# Patient Record
Sex: MALE | Race: WHITE | NOT HISPANIC OR LATINO | Employment: OTHER | URBAN - METROPOLITAN AREA
[De-identification: names, ages, dates, MRNs, and addresses within clinical notes are randomized per-mention and may not be internally consistent; named-entity substitution may affect disease eponyms.]

---

## 2017-07-19 LAB
LEFT EYE DIABETIC RETINOPATHY: NORMAL
RIGHT EYE DIABETIC RETINOPATHY: NORMAL

## 2019-04-12 ENCOUNTER — APPOINTMENT (EMERGENCY)
Dept: CT IMAGING | Facility: HOSPITAL | Age: 74
End: 2019-04-12
Payer: COMMERCIAL

## 2019-04-12 ENCOUNTER — HOSPITAL ENCOUNTER (EMERGENCY)
Facility: HOSPITAL | Age: 74
Discharge: HOME | End: 2019-04-12
Attending: EMERGENCY MEDICINE | Admitting: EMERGENCY MEDICINE
Payer: COMMERCIAL

## 2019-04-12 VITALS
HEART RATE: 79 BPM | SYSTOLIC BLOOD PRESSURE: 134 MMHG | DIASTOLIC BLOOD PRESSURE: 65 MMHG | HEIGHT: 72 IN | TEMPERATURE: 98.3 F | OXYGEN SATURATION: 96 % | RESPIRATION RATE: 18 BRPM | BODY MASS INDEX: 32.51 KG/M2 | WEIGHT: 240 LBS

## 2019-04-12 DIAGNOSIS — N39.0 UTI (URINARY TRACT INFECTION): Primary | ICD-10-CM

## 2019-04-12 DIAGNOSIS — N28.89 RENAL MASS OF UNKNOWN NATURE: ICD-10-CM

## 2019-04-12 LAB
ALBUMIN SERPL BCP-MCNC: 4 G/DL (ref 3.5–5.7)
ALP SERPL-CCNC: 61 U/L (ref 55–165)
ALT SERPL W P-5'-P-CCNC: 14 U/L (ref 7–52)
ANION GAP SERPL CALCULATED.3IONS-SCNC: 7 MMOL/L (ref 4–13)
APTT PPP: 32 SECONDS (ref 26–38)
AST SERPL W P-5'-P-CCNC: 12 U/L (ref 13–39)
ATRIAL RATE: 85 BPM
BACTERIA UR QL AUTO: ABNORMAL /HPF
BILIRUB DIRECT SERPL-MCNC: 0.1 MG/DL (ref 0–0.2)
BILIRUB SERPL-MCNC: 0.8 MG/DL (ref 0.2–1)
BILIRUB UR QL STRIP: NEGATIVE
BUN SERPL-MCNC: 28 MG/DL (ref 7–25)
CALCIUM SERPL-MCNC: 9.6 MG/DL (ref 8.6–10.5)
CHLORIDE SERPL-SCNC: 97 MMOL/L (ref 98–107)
CLARITY UR: ABNORMAL
CO2 SERPL-SCNC: 30 MMOL/L (ref 21–31)
COLOR UR: YELLOW
CREAT SERPL-MCNC: 1.48 MG/DL (ref 0.7–1.3)
EOSINOPHIL # BLD AUTO: 0.2 THOUSAND/UL (ref 0–0.61)
EOSINOPHIL NFR BLD MANUAL: 2 % (ref 0–6)
ERYTHROCYTE [DISTWIDTH] IN BLOOD BY AUTOMATED COUNT: 13.7 % (ref 11.6–15.1)
EXT FECAL OCCULT BLOOD SCREEN: NEGATIVE
GFR SERPL CREATININE-BSD FRML MDRD: 47 ML/MIN/1.73SQ M
GLUCOSE SERPL-MCNC: 197 MG/DL (ref 65–140)
GLUCOSE SERPL-MCNC: 330 MG/DL (ref 65–140)
GLUCOSE UR STRIP-MCNC: ABNORMAL MG/DL
HCT VFR BLD AUTO: 44.5 % (ref 42–52)
HGB BLD-MCNC: 15 G/DL (ref 12–17)
HGB UR QL STRIP.AUTO: ABNORMAL
INR PPP: 0.95 (ref 0.9–1.5)
KETONES UR STRIP-MCNC: ABNORMAL MG/DL
LEUKOCYTE ESTERASE UR QL STRIP: NEGATIVE
LIPASE SERPL-CCNC: 21 U/L (ref 11–82)
LYMPHOCYTES # BLD AUTO: 0.51 THOUSAND/UL (ref 0.6–4.47)
LYMPHOCYTES # BLD AUTO: 5 % (ref 20–51)
MCH RBC QN AUTO: 30.4 PG (ref 26.8–34.3)
MCHC RBC AUTO-ENTMCNC: 33.7 G/DL (ref 31.4–37.4)
MCV RBC AUTO: 90 FL (ref 82–98)
MONOCYTES # BLD AUTO: 0.71 THOUSAND/UL (ref 0–1.22)
MONOCYTES NFR BLD AUTO: 7 % (ref 4–12)
MUCOUS THREADS UR QL AUTO: ABNORMAL
NEUTS BAND NFR BLD MANUAL: 1 % (ref 0–8)
NEUTS SEG # BLD: 8.69 THOUSAND/UL (ref 1.81–6.82)
NEUTS SEG NFR BLD AUTO: 85 % (ref 42–75)
NITRITE UR QL STRIP: NEGATIVE
NON-SQ EPI CELLS URNS QL MICRO: ABNORMAL /HPF
P AXIS: 61 DEGREES
PH UR STRIP.AUTO: 5.5 [PH]
PLATELET # BLD AUTO: 148 THOUSANDS/UL (ref 149–390)
PLATELET BLD QL SMEAR: ADEQUATE
PMV BLD AUTO: 9.8 FL (ref 8.9–12.7)
POTASSIUM SERPL-SCNC: 4 MMOL/L (ref 3.5–5.5)
PR INTERVAL: 172 MS
PROT SERPL-MCNC: 6.9 G/DL (ref 6.4–8.9)
PROT UR STRIP-MCNC: NEGATIVE MG/DL
PROTHROMBIN TIME: 11.1 SECONDS (ref 10.2–13)
QRS AXIS: -25 DEGREES
QRSD INTERVAL: 98 MS
QT INTERVAL: 374 MS
QTC INTERVAL: 445 MS
RBC # BLD AUTO: 4.93 MILLION/UL (ref 3.88–5.62)
RBC #/AREA URNS AUTO: ABNORMAL /HPF
RBC MORPH BLD: NORMAL
SODIUM SERPL-SCNC: 134 MMOL/L (ref 134–143)
SP GR UR STRIP.AUTO: 1.02 (ref 1–1.03)
T WAVE AXIS: 22 DEGREES
TOTAL CELLS COUNTED SPEC: 100
UROBILINOGEN UR QL STRIP.AUTO: 0.2 E.U./DL
VENTRICULAR RATE: 85 BPM
WBC # BLD AUTO: 10.1 THOUSAND/UL (ref 4.31–10.16)
WBC #/AREA URNS AUTO: ABNORMAL /HPF

## 2019-04-12 PROCEDURE — 87086 URINE CULTURE/COLONY COUNT: CPT | Performed by: EMERGENCY MEDICINE

## 2019-04-12 PROCEDURE — 81001 URINALYSIS AUTO W/SCOPE: CPT | Performed by: EMERGENCY MEDICINE

## 2019-04-12 PROCEDURE — 93005 ELECTROCARDIOGRAM TRACING: CPT

## 2019-04-12 PROCEDURE — 96361 HYDRATE IV INFUSION ADD-ON: CPT

## 2019-04-12 PROCEDURE — 85610 PROTHROMBIN TIME: CPT | Performed by: EMERGENCY MEDICINE

## 2019-04-12 PROCEDURE — 87077 CULTURE AEROBIC IDENTIFY: CPT | Performed by: EMERGENCY MEDICINE

## 2019-04-12 PROCEDURE — 82270 OCCULT BLOOD FECES: CPT | Performed by: EMERGENCY MEDICINE

## 2019-04-12 PROCEDURE — 81003 URINALYSIS AUTO W/O SCOPE: CPT | Performed by: EMERGENCY MEDICINE

## 2019-04-12 PROCEDURE — 96374 THER/PROPH/DIAG INJ IV PUSH: CPT

## 2019-04-12 PROCEDURE — 36415 COLL VENOUS BLD VENIPUNCTURE: CPT | Performed by: EMERGENCY MEDICINE

## 2019-04-12 PROCEDURE — 96375 TX/PRO/DX INJ NEW DRUG ADDON: CPT

## 2019-04-12 PROCEDURE — 87186 SC STD MICRODIL/AGAR DIL: CPT | Performed by: EMERGENCY MEDICINE

## 2019-04-12 PROCEDURE — 83690 ASSAY OF LIPASE: CPT | Performed by: EMERGENCY MEDICINE

## 2019-04-12 PROCEDURE — 85007 BL SMEAR W/DIFF WBC COUNT: CPT | Performed by: EMERGENCY MEDICINE

## 2019-04-12 PROCEDURE — 85730 THROMBOPLASTIN TIME PARTIAL: CPT | Performed by: EMERGENCY MEDICINE

## 2019-04-12 PROCEDURE — 80048 BASIC METABOLIC PNL TOTAL CA: CPT | Performed by: EMERGENCY MEDICINE

## 2019-04-12 PROCEDURE — 93010 ELECTROCARDIOGRAM REPORT: CPT | Performed by: INTERNAL MEDICINE

## 2019-04-12 PROCEDURE — 99284 EMERGENCY DEPT VISIT MOD MDM: CPT

## 2019-04-12 PROCEDURE — 82948 REAGENT STRIP/BLOOD GLUCOSE: CPT

## 2019-04-12 PROCEDURE — 74176 CT ABD & PELVIS W/O CONTRAST: CPT

## 2019-04-12 PROCEDURE — 80076 HEPATIC FUNCTION PANEL: CPT | Performed by: EMERGENCY MEDICINE

## 2019-04-12 PROCEDURE — 85027 COMPLETE CBC AUTOMATED: CPT | Performed by: EMERGENCY MEDICINE

## 2019-04-12 RX ORDER — KETOROLAC TROMETHAMINE 30 MG/ML
15 INJECTION, SOLUTION INTRAMUSCULAR; INTRAVENOUS ONCE
Status: COMPLETED | OUTPATIENT
Start: 2019-04-12 | End: 2019-04-12

## 2019-04-12 RX ORDER — ONDANSETRON 2 MG/ML
4 INJECTION INTRAMUSCULAR; INTRAVENOUS ONCE
Status: COMPLETED | OUTPATIENT
Start: 2019-04-12 | End: 2019-04-12

## 2019-04-12 RX ORDER — NITROFURANTOIN 25; 75 MG/1; MG/1
100 CAPSULE ORAL 2 TIMES DAILY WITH MEALS
Status: DISCONTINUED | OUTPATIENT
Start: 2019-04-12 | End: 2019-04-12 | Stop reason: HOSPADM

## 2019-04-12 RX ORDER — LISINOPRIL AND HYDROCHLOROTHIAZIDE 20; 12.5 MG/1; MG/1
1 TABLET ORAL 2 TIMES DAILY
COMMUNITY
End: 2019-12-19 | Stop reason: SDUPTHER

## 2019-04-12 RX ORDER — SODIUM CHLORIDE 9 MG/ML
125 INJECTION, SOLUTION INTRAVENOUS CONTINUOUS
Status: DISCONTINUED | OUTPATIENT
Start: 2019-04-12 | End: 2019-04-12 | Stop reason: HOSPADM

## 2019-04-12 RX ORDER — NITROFURANTOIN 25; 75 MG/1; MG/1
100 CAPSULE ORAL 2 TIMES DAILY WITH MEALS
Status: DISCONTINUED | OUTPATIENT
Start: 2019-04-12 | End: 2019-04-12

## 2019-04-12 RX ORDER — NITROFURANTOIN 25; 75 MG/1; MG/1
100 CAPSULE ORAL 2 TIMES DAILY
Qty: 14 CAPSULE | Refills: 0 | Status: SHIPPED | OUTPATIENT
Start: 2019-04-12 | End: 2019-04-19

## 2019-04-12 RX ADMIN — SODIUM CHLORIDE 125 ML/HR: 0.9 INJECTION, SOLUTION INTRAVENOUS at 08:26

## 2019-04-12 RX ADMIN — NITROFURANTOIN (MONOHYDRATE/MACROCRYSTALS) 100 MG: 75; 25 CAPSULE ORAL at 12:02

## 2019-04-12 RX ADMIN — KETOROLAC TROMETHAMINE 15 MG: 30 INJECTION, SOLUTION INTRAMUSCULAR; INTRAVENOUS at 08:46

## 2019-04-12 RX ADMIN — IOHEXOL 50 ML: 240 INJECTION, SOLUTION INTRATHECAL; INTRAVASCULAR; INTRAVENOUS; ORAL at 10:30

## 2019-04-12 RX ADMIN — ONDANSETRON 4 MG: 2 INJECTION INTRAMUSCULAR; INTRAVENOUS at 08:26

## 2019-04-14 LAB — BACTERIA UR CULT: ABNORMAL

## 2019-04-14 RX ORDER — CIPROFLOXACIN 500 MG/1
500 TABLET, FILM COATED ORAL 2 TIMES DAILY
Qty: 10 TABLET | Refills: 0 | Status: SHIPPED | OUTPATIENT
Start: 2019-04-14 | End: 2019-04-19

## 2019-06-21 ENCOUNTER — APPOINTMENT (EMERGENCY)
Dept: CT IMAGING | Facility: HOSPITAL | Age: 74
End: 2019-06-21
Payer: COMMERCIAL

## 2019-06-21 ENCOUNTER — HOSPITAL ENCOUNTER (EMERGENCY)
Facility: HOSPITAL | Age: 74
Discharge: HOME | End: 2019-06-22
Attending: EMERGENCY MEDICINE
Payer: COMMERCIAL

## 2019-06-21 VITALS
DIASTOLIC BLOOD PRESSURE: 87 MMHG | HEART RATE: 93 BPM | BODY MASS INDEX: 31.69 KG/M2 | TEMPERATURE: 97.8 F | WEIGHT: 234 LBS | SYSTOLIC BLOOD PRESSURE: 138 MMHG | RESPIRATION RATE: 16 BRPM | OXYGEN SATURATION: 96 % | HEIGHT: 72 IN

## 2019-06-21 DIAGNOSIS — R53.1 WEAKNESS: Primary | ICD-10-CM

## 2019-06-21 LAB
ANION GAP SERPL CALCULATED.3IONS-SCNC: 10 MMOL/L (ref 4–13)
BASOPHILS # BLD AUTO: 0 THOUSANDS/ΜL (ref 0–0.1)
BASOPHILS NFR BLD AUTO: 0 % (ref 0–2)
BUN SERPL-MCNC: 28 MG/DL (ref 7–25)
CALCIUM SERPL-MCNC: 9.8 MG/DL (ref 8.6–10.5)
CHLORIDE SERPL-SCNC: 101 MMOL/L (ref 98–107)
CO2 SERPL-SCNC: 27 MMOL/L (ref 21–31)
CREAT SERPL-MCNC: 1.5 MG/DL (ref 0.7–1.3)
EOSINOPHIL # BLD AUTO: 0.2 THOUSAND/ΜL (ref 0–0.61)
EOSINOPHIL NFR BLD AUTO: 2 % (ref 0–5)
ERYTHROCYTE [DISTWIDTH] IN BLOOD BY AUTOMATED COUNT: 13.6 % (ref 11.5–14.5)
GFR SERPL CREATININE-BSD FRML MDRD: 46 ML/MIN/1.73SQ M
GLUCOSE SERPL-MCNC: 181 MG/DL (ref 65–99)
HCT VFR BLD AUTO: 43.4 % (ref 42–47)
HGB BLD-MCNC: 15 G/DL (ref 14–18)
LYMPHOCYTES # BLD AUTO: 0.7 THOUSANDS/ΜL (ref 0.6–4.47)
LYMPHOCYTES NFR BLD AUTO: 6 % (ref 21–51)
MCH RBC QN AUTO: 30.8 PG (ref 26–34)
MCHC RBC AUTO-ENTMCNC: 34.5 G/DL (ref 31–37)
MCV RBC AUTO: 89 FL (ref 81–99)
MONOCYTES # BLD AUTO: 1 THOUSAND/ΜL (ref 0.17–1.22)
MONOCYTES NFR BLD AUTO: 8 % (ref 2–12)
NEUTROPHILS # BLD AUTO: 11 THOUSANDS/ΜL (ref 1.4–6.5)
NEUTS SEG NFR BLD AUTO: 85 % (ref 42–75)
PLATELET # BLD AUTO: 169 THOUSANDS/UL (ref 149–390)
PMV BLD AUTO: 10.1 FL (ref 8.6–11.7)
POTASSIUM SERPL-SCNC: 4 MMOL/L (ref 3.5–5.5)
RBC # BLD AUTO: 4.86 MILLION/UL (ref 4.3–5.9)
SODIUM SERPL-SCNC: 138 MMOL/L (ref 134–143)
WBC # BLD AUTO: 13 THOUSAND/UL (ref 4.8–10.8)

## 2019-06-21 PROCEDURE — 36415 COLL VENOUS BLD VENIPUNCTURE: CPT | Performed by: EMERGENCY MEDICINE

## 2019-06-21 PROCEDURE — 93005 ELECTROCARDIOGRAM TRACING: CPT

## 2019-06-21 PROCEDURE — 70496 CT ANGIOGRAPHY HEAD: CPT

## 2019-06-21 PROCEDURE — 70498 CT ANGIOGRAPHY NECK: CPT

## 2019-06-21 PROCEDURE — 80048 BASIC METABOLIC PNL TOTAL CA: CPT | Performed by: EMERGENCY MEDICINE

## 2019-06-21 PROCEDURE — 85025 COMPLETE CBC W/AUTO DIFF WBC: CPT | Performed by: EMERGENCY MEDICINE

## 2019-06-21 PROCEDURE — 70490 CT SOFT TISSUE NECK W/O DYE: CPT

## 2019-06-21 PROCEDURE — 99285 EMERGENCY DEPT VISIT HI MDM: CPT

## 2019-06-22 LAB
ATRIAL RATE: 91 BPM
P AXIS: 63 DEGREES
PR INTERVAL: 156 MS
QRS AXIS: -28 DEGREES
QRSD INTERVAL: 94 MS
QT INTERVAL: 346 MS
QTC INTERVAL: 425 MS
T WAVE AXIS: 23 DEGREES
VENTRICULAR RATE: 91 BPM

## 2019-06-22 PROCEDURE — 93010 ELECTROCARDIOGRAM REPORT: CPT | Performed by: INTERNAL MEDICINE

## 2019-06-24 ENCOUNTER — TRANSCRIBE ORDERS (OUTPATIENT)
Dept: NEUROSURGERY | Facility: CLINIC | Age: 74
End: 2019-06-24

## 2019-06-24 DIAGNOSIS — I72.9 ANEURYSM (HCC): Primary | ICD-10-CM

## 2019-07-09 ENCOUNTER — TRANSCRIBE ORDERS (OUTPATIENT)
Dept: NEUROSURGERY | Facility: CLINIC | Age: 74
End: 2019-07-09

## 2019-07-09 ENCOUNTER — CONSULT (OUTPATIENT)
Dept: NEUROSURGERY | Facility: CLINIC | Age: 74
End: 2019-07-09
Payer: COMMERCIAL

## 2019-07-09 VITALS
HEART RATE: 66 BPM | TEMPERATURE: 97.5 F | BODY MASS INDEX: 33.05 KG/M2 | HEIGHT: 72 IN | RESPIRATION RATE: 16 BRPM | WEIGHT: 244 LBS | DIASTOLIC BLOOD PRESSURE: 86 MMHG | SYSTOLIC BLOOD PRESSURE: 118 MMHG

## 2019-07-09 DIAGNOSIS — I72.9 ANEURYSM (HCC): ICD-10-CM

## 2019-07-09 DIAGNOSIS — Z01.818 PRE-PROCEDURAL EXAMINATION: Primary | ICD-10-CM

## 2019-07-09 DIAGNOSIS — G45.9 TIA (TRANSIENT ISCHEMIC ATTACK): ICD-10-CM

## 2019-07-09 DIAGNOSIS — I67.1 CEREBRAL ANEURYSM, NONRUPTURED: Primary | ICD-10-CM

## 2019-07-09 PROCEDURE — 99203 OFFICE O/P NEW LOW 30 MIN: CPT | Performed by: NEUROLOGICAL SURGERY

## 2019-07-09 NOTE — PROGRESS NOTES
[facility] Office Note  Joel Pastrana is a 72 y o  male    Hand Dominance: Right    Type of Visit: Consult      Diagnoses and all orders for this visit:    Cerebral aneurysm, nonruptured  -     Ambulatory referral to Physical Therapy; Future  -     CTA head w wo contrast; Future    Aneurysm ([address] )  -     Ambulatory referral to Neurosurgery    TIA (transient ischemic attack)  -     Ambulatory referral to Physical Therapy; Future  -     CTA head w wo contrast; Future        DISCUSSION SUMMARY  This is a 72-year-old gentleman who suffered a a sudden onset of left dexter paresis while in the shower that has subsequently resolved  This likely represented a transient ischemic attack  He had a incidentally noted left PCOM aneurysm which is 5 mm in greatest dimension with a wide-based neck  I have recommended that he undergo follow-up CTA in 6 months time as intervention should not be instituted in the setting of a recent TIA  I also recommended that he follow-up with neurology to improve the details of the situation ensure up the diagnosis of TIA and to consider optimal treatment strategies for this including consideration of anticoagulation/antiplatelets  In the interval I have recommended aspirin 81 mg per day      CHIEF COMPLAINT  Patient presents for initial consult regarding unruptured intracranial aneurysm    NEUROSURGERY PROCEDURES  NONE    HISTORY OF PRESENT ILLNESS  Patient reports that he was taking a shower and his left side gave out and he fell while in the bathtub  Then, EMS came and took him to the [facility]  While in they performed 3 CTs and one of them showed the brain aneurysm  Patient was referred for neurosurgical consult  Patient brought to the ER via EMS following acute left-sided weakness of unclear etiology this involved his left hand and left arm  The symptoms completely resolved within 24 hr    While in the ER he did have a CTA which demonstrated an incidental left-sided aneurysm  Is because of this incidental aneurysm that he is seen in the office today  It does not appear that an MRI was ever done  He has never had anything like this happen to him before  The total duration of this weakness was approximately 5 min  REVIEW OF SYSTEMS  Review of Systems   Constitutional: Negative  HENT: Negative  Eyes: Positive for visual disturbance (patient advised to set up appointment with regular eye doctor to examine)  Respiratory: Negative  Cardiovascular: Negative  Gastrointestinal: Negative  Endocrine: Negative  Genitourinary: Negative  Musculoskeletal: Positive for arthralgias (pain in his hips), back pain and neck pain  Negative for gait problem (just slow and careful)  Skin: Negative  Allergic/Immunologic: Negative  Neurological: Positive for weakness (weakness of the left side that will last about 5 minutes)  Hematological: Negative  Psychiatric/Behavioral: Negative  All other systems reviewed and are negative  I reviewed the ROS      MEDICAL HISTORY  Active Ambulatory Problems     Diagnosis Date Noted    Cerebral aneurysm, nonruptured 07/09/2019    TIA (transient ischemic attack) 07/09/2019     Resolved Ambulatory Problems     Diagnosis Date Noted    No Resolved Ambulatory Problems     Past Medical History:   Diagnosis Date    Diabetes mellitus ([facility] [address] )     Hypertension        Past Surgical History:   Procedure Laterality Date    CYSTECTOMY      Per patient cyst removal from his back       Social History     Tobacco Use   Smoking Status Never Smoker   Smokeless Tobacco Never Used       Social History     Substance and Sexual Activity   Alcohol Use Not Currently       Social History     Substance and Sexual Activity   Drug Use Not Currently       Vitals:    07/09/19 0958   BP: 118/86   BP Location: Left arm   Patient Position: Sitting   Cuff Size: Adult   Pulse: 66   Resp: 16   Temp: 97 5 °F (36 4 °C) TempSrc: Tympanic   Weight: 111 kg (244 lb)   Height: 6' (1 829 m)         Current Outpatient Medications:     lisinopril-hydrochlorothiazide (PRINZIDE,ZESTORETIC) 20-12 5 MG per tablet, Take 1 tablet by mouth 2 (two) times a day , Disp: , Rfl:     metFORMIN (GLUCOPHAGE) 500 mg tablet, 1000 mg in the morning and 500 mg at night, Disp: , Rfl:      No Known Allergies     The following portions of the patient's history were updated by MA and reviewed by MD: allergies, current medications, past family history, past medical history, past social history, past surgical history and problem list       Physical Exam  Awake alert and oriented  Extraocular movements are intact  Pupils are equal round reactive to light and accommodate  Facial sensation is intact bilaterally  Facial expression is intact in grimace and at rest  His speech is clear and comprehensible  He has no drift  His power is 5/5 bilaterally  He is able to ambulate with a normal station and gait however he is unable to perform tandem gait without sidestepping  He does not extinguish to double simultaneous stimulation    RESULTS/DATA  A CTA of the brain is carefully reviewed  This demonstrates a left supraclinoid aneurysm in the region of the posterior communicating artery  This is a wide-based neck and the overall dimension is 5 mm x 3 mm has a relatively smooth is dome

## 2019-07-10 ENCOUNTER — CONSULT (OUTPATIENT)
Dept: NEUROLOGY | Facility: CLINIC | Age: 74
End: 2019-07-10
Payer: COMMERCIAL

## 2019-07-10 VITALS
SYSTOLIC BLOOD PRESSURE: 130 MMHG | HEART RATE: 70 BPM | DIASTOLIC BLOOD PRESSURE: 70 MMHG | HEIGHT: 72 IN | RESPIRATION RATE: 18 BRPM | WEIGHT: 246 LBS | BODY MASS INDEX: 33.32 KG/M2

## 2019-07-10 DIAGNOSIS — R53.1 LEFT-SIDED WEAKNESS: Primary | ICD-10-CM

## 2019-07-10 DIAGNOSIS — G45.9 TIA (TRANSIENT ISCHEMIC ATTACK): ICD-10-CM

## 2019-07-10 DIAGNOSIS — I67.1 CEREBRAL ANEURYSM, NONRUPTURED: ICD-10-CM

## 2019-07-10 PROCEDURE — 99204 OFFICE O/P NEW MOD 45 MIN: CPT | Performed by: PSYCHIATRY & NEUROLOGY

## 2019-07-10 RX ORDER — ASPIRIN 81 MG/1
81 TABLET, CHEWABLE ORAL DAILY
COMMUNITY
End: 2019-08-29 | Stop reason: CLARIF

## 2019-07-10 RX ORDER — ATORVASTATIN CALCIUM 20 MG/1
20 TABLET, FILM COATED ORAL DAILY
Qty: 30 TABLET | Refills: 3 | Status: SHIPPED | OUTPATIENT
Start: 2019-07-10 | End: 2019-10-11 | Stop reason: SDUPTHER

## 2019-07-10 NOTE — PROGRESS NOTES
Patient ID: Helen Nix is a 72 y o  male  Assessment/Plan: This is a 71 y/o  Male who is here as a new patient referred by Neurosurgery  He has had 3 episodes of left-sided arm and leg weakness that resolved  1st 1 resolved within 2 hours  2nd to resolved within 5-10 minutes  I am concerned about a vascular process such as a TIA like event  So I do want a rule out possible seizure-like activity as well  He does not have any altered mental status or confusion with these episodes making seizure less likely  He had CTA head & neck which did not show any evidence of stenosis/occlusion  I personally reviewed these images  No focal deficits on my examination  PLAN:  -c/w with combination of aspirin, will start atorvastatin for secondary stroke prevention  -obtain CTA head & neck and MRI brain  -will get echocardiogram as well  -get routine EEG  -Continue with BP goal < 130/80, BP today is at goal currently    -Continue to monitor DM and appropriate Euglycemic control  -Defer to PCP regarding DM and BP management  -recommend PCP check LDL levels, goal LDL <70  -does not smoke  -denies any history of snoring  -I advised patient to avoid using NSAIDs for headaches or other pain and instead just stick to tylenol    -I educated regarding mediterranean style diet and regular exercise regimen of brisk walking atleast 4-5 times a week  -I educated patient and family regarding medication compliance, stroke risk factor modifications  Unruptured aneurysm  -patient following neurosurgery    I would like to follow up in 6 weeks  I would be happy to see the patient sooner if any new questions/concerns arise  Patient/Guardian was advised to the call the office if they have any questions and concerns in the meantime       Patient/Guardian does understand that if they have any new stroke like symptoms such as facial droop on one side, weakness/paralysis on either side, speech trouble, numbness on one side, balance issues, any vision changes, extreme dizziness or any new headache, to call 9-1-1 immediately or to proceed to the nearest ER immediately  Problem List Items Addressed This Visit        Cardiovascular and Mediastinum    TIA (transient ischemic attack)    Relevant Medications    atorvastatin (LIPITOR) 20 mg tablet    Other Relevant Orders    MRI brain without contrast    Echo complete with contrast if indicated       Nervous and Auditory    Left-sided weakness - Primary    Relevant Medications    atorvastatin (LIPITOR) 20 mg tablet    Other Relevant Orders    MRI brain without contrast    Echo complete with contrast if indicated    Lipid panel    EEG Routine and awake             Subjective:    HPI    This is a 71 y/o Male who is here as a new patient for evaluation of stroke/left sided symptoms  He says that she has a PCOM cerebral aneurysm (non-ruptured) and saw neurosurgery and there was a 5mm in dimension with a wide based neck  Neurosurgery at this point does not want to do any surgical intervention  Recommend undergoing follow-up CTA in 6 months  He was recommended to see Neurology for possible TIA like symptoms  He is here today  He had an episode on June 22nd, and he went to take a shower and while he was in the shower, he did not have strength in left arm, and left leg, and he does not recall if his left face was involved, and he could not hold on and it took an hour and half to get out of the tub, and then she called 9-1-1  It lasted for an hour and half then he was able to get strength back in that left side, and then was able to walked out to the ambulance and then he had to be assisted  He was able to dress himself as well  He stated that he did have some slurred speech as well  He felt like he was getting better by the time ambulance arrived  He does have history of DM, hypertension  He quit smoking 30 years ago  He does not have hyperlipidemia   When he was at the hospital, he had CTA head and neck which I personally reviewed the images of which showed the 5mm left supraclinoid ICA aneurysm, and no other stenosis/occlusion  He was aspirin and discharged  He has had two more episodes of left leg and left arm weak, and lasted for a few minutes  He peguero snot feel confusion/or altered during these episodes  He did not go to the ER when he had these episodes  The following portions of the patient's history were reviewed and updated as appropriate:   He  has a past medical history of Diabetes mellitus ([address] ) and Hypertension  He   Patient Active Problem List    Diagnosis Date Noted    Left-sided weakness 07/10/2019    Cerebral aneurysm, nonruptured 07/09/2019    TIA (transient ischemic attack) 07/09/2019     He  has a past surgical history that includes Cystectomy  His family history includes Colon cancer in his mother; Dementia in his father; Prostate cancer in his mother  He  reports that he has never smoked  He has never used smokeless tobacco  He reports that he drank alcohol  He reports that he has current or past drug history  Current Outpatient Medications   Medication Sig Dispense Refill    aspirin 81 mg chewable tablet Chew 81 mg daily      lisinopril-hydrochlorothiazide (PRINZIDE,ZESTORETIC) 20-12 5 MG per tablet Take 1 tablet by mouth 2 (two) times a day       metFORMIN (GLUCOPHAGE) 500 mg tablet 1000 mg in the morning and 500 mg at night      atorvastatin (LIPITOR) 20 mg tablet Take 1 tablet (20 mg total) by mouth daily 30 tablet 3     No current facility-administered medications for this visit        Current Outpatient Medications on File Prior to Visit   Medication Sig    aspirin 81 mg chewable tablet Chew 81 mg daily    lisinopril-hydrochlorothiazide (PRINZIDE,ZESTORETIC) 20-12 5 MG per tablet Take 1 tablet by mouth 2 (two) times a day     metFORMIN (GLUCOPHAGE) 500 mg tablet 1000 mg in the morning and 500 mg at night     No current facility-administered medications on file prior to visit  He has No Known Allergies            Objective:    Blood pressure 130/70, pulse 70, resp  rate 18, height 6' (1 829 m), weight 112 kg (246 lb)  Physical Exam  General: Patient is not in any acute/apparent distress, well nourished, well developed and cooperative  HEENT: normocephalic, atraumatic, moist membranes  Neck: supple  Heart: regular heart rate and rhythm, no murmurs, rubs and or gallops  Chest: Clear to auscultation bilaterally  Abdomen: soft and non-tender  Extremities: no edema noted   Skin: no lesions or rash  Musculosketal: no bony abnormalities    Neurologic Examination:   Mental status: alert, awake, oriented X 3 and following commands  Poor memory and inattentiveness    Speech/Language: Speech is fluent without any dysarthria, no aphasia noted, can name, repeat, read and write and comprehension intact    Cranial Nerves:   CN I: smell not tested  CN II: Visual fields full to confrontation  CN III, IV, VI: Extraocular movements intact bilaterally  Pupils equal round and reactive to light bilaterally  CN V: Facial sensation is normal   CN VII: Full and symmetric facial movement  CN VIII: Hearing is normal   CN IX, X: Palate elevates symmetrically  Normal gag reflex  CN XI: Shoulder shrug strength is normal   CN XII: Tongue midline without atrophy or fasciculations  Motor:   Strength 5/5 in all 4 extremities  No pronator drift  Normal rapid alternating movements  Bulk/tone - normal   Fasiculations - none    Sensory:   Sensation intact to soft touch, vibration and pinprick in all 4 extremities  Cerebellar:   Finger-to-nose intact, normal heel to shin  Reflexes: 2+ in all 4 extremities  Pathologic reflexes - babinski reflex negative, Hoffmans reflex - negative    Gait:   Normal gait, able to tandem walk, able to tip-toe, able to walk on heels, normal arm to swing   Rhomberg sign negative    ROS:  I personally reviewed ROS  Review of Systems   Constitutional: Negative  Negative for appetite change and fever  HENT: Negative  Negative for hearing loss, tinnitus, trouble swallowing and voice change  Eyes: Negative  Negative for photophobia and pain  Respiratory: Negative  Negative for shortness of breath  Cardiovascular: Negative  Negative for palpitations  Gastrointestinal: Negative  Negative for nausea and vomiting  Endocrine: Negative  Negative for cold intolerance and heat intolerance  Genitourinary: Negative  Negative for dysuria, frequency and urgency  Musculoskeletal: Positive for gait problem  Negative for myalgias and neck pain  Patient stated that he has balance problems and his left side goes weak  Patient also stated that he had a fall 22nd of June  Skin: Negative  Negative for rash  Neurological: Positive for weakness  Negative for dizziness, tremors, seizures, syncope, facial asymmetry, speech difficulty, light-headedness, numbness and headaches  Patient states that his left arm and leg has  weakness  Hematological: Negative  Does not bruise/bleed easily  Psychiatric/Behavioral: Negative  Negative for confusion, hallucinations and sleep disturbance

## 2019-08-01 ENCOUNTER — HOSPITAL ENCOUNTER (OUTPATIENT)
Dept: NEUROLOGY | Facility: HOSPITAL | Age: 74
Discharge: HOME | End: 2019-08-01
Attending: PSYCHIATRY & NEUROLOGY
Payer: COMMERCIAL

## 2019-08-01 ENCOUNTER — HOSPITAL ENCOUNTER (OUTPATIENT)
Dept: NON INVASIVE DIAGNOSTICS | Facility: HOSPITAL | Age: 74
Discharge: HOME | End: 2019-08-01
Attending: PSYCHIATRY & NEUROLOGY
Payer: COMMERCIAL

## 2019-08-01 ENCOUNTER — HOSPITAL ENCOUNTER (OUTPATIENT)
Dept: MRI IMAGING | Facility: HOSPITAL | Age: 74
Discharge: HOME | End: 2019-08-01
Attending: PSYCHIATRY & NEUROLOGY
Payer: COMMERCIAL

## 2019-08-01 DIAGNOSIS — R53.1 LEFT-SIDED WEAKNESS: ICD-10-CM

## 2019-08-01 DIAGNOSIS — G45.9 TIA (TRANSIENT ISCHEMIC ATTACK): ICD-10-CM

## 2019-08-01 PROCEDURE — 93306 TTE W/DOPPLER COMPLETE: CPT | Performed by: INTERNAL MEDICINE

## 2019-08-01 PROCEDURE — 70551 MRI BRAIN STEM W/O DYE: CPT

## 2019-08-01 PROCEDURE — 95816 EEG AWAKE AND DROWSY: CPT | Performed by: PSYCHIATRY & NEUROLOGY

## 2019-08-01 PROCEDURE — 93306 TTE W/DOPPLER COMPLETE: CPT

## 2019-08-01 PROCEDURE — 95816 EEG AWAKE AND DROWSY: CPT

## 2019-08-03 DIAGNOSIS — G45.9 TIA (TRANSIENT ISCHEMIC ATTACK): ICD-10-CM

## 2019-08-03 DIAGNOSIS — R53.1 LEFT-SIDED WEAKNESS: ICD-10-CM

## 2019-08-03 RX ORDER — ATORVASTATIN CALCIUM 20 MG/1
20 TABLET, FILM COATED ORAL DAILY
Qty: 30 TABLET | Refills: 0 | Status: CANCELLED | OUTPATIENT
Start: 2019-08-03

## 2019-08-05 ENCOUNTER — TELEPHONE (OUTPATIENT)
Dept: NEUROLOGY | Facility: CLINIC | Age: 74
End: 2019-08-05

## 2019-08-28 ENCOUNTER — DOCUMENTATION (OUTPATIENT)
Dept: NEUROLOGY | Facility: CLINIC | Age: 74
End: 2019-08-28

## 2019-08-28 NOTE — PROGRESS NOTES
Received fax stating lipid panel is not covered by diagnosis of weakness  Provided code for TIA G45 9  Faxed to [phone]

## 2019-08-29 ENCOUNTER — OFFICE VISIT (OUTPATIENT)
Dept: NEUROLOGY | Facility: CLINIC | Age: 74
End: 2019-08-29
Payer: COMMERCIAL

## 2019-08-29 VITALS
WEIGHT: 242 LBS | BODY MASS INDEX: 32.78 KG/M2 | HEART RATE: 80 BPM | DIASTOLIC BLOOD PRESSURE: 80 MMHG | HEIGHT: 72 IN | SYSTOLIC BLOOD PRESSURE: 120 MMHG

## 2019-08-29 DIAGNOSIS — R53.1 LEFT-SIDED WEAKNESS: ICD-10-CM

## 2019-08-29 DIAGNOSIS — I63.81 LACUNAR STROKE (HCC): ICD-10-CM

## 2019-08-29 DIAGNOSIS — G45.9 TIA (TRANSIENT ISCHEMIC ATTACK): Primary | ICD-10-CM

## 2019-08-29 DIAGNOSIS — I67.1 CEREBRAL ANEURYSM, NONRUPTURED: ICD-10-CM

## 2019-08-29 PROCEDURE — 99215 OFFICE O/P EST HI 40 MIN: CPT | Performed by: PSYCHIATRY & NEUROLOGY

## 2019-08-29 RX ORDER — CLOPIDOGREL BISULFATE 75 MG/1
75 TABLET ORAL DAILY
Qty: 30 TABLET | Refills: 2 | Status: SHIPPED | OUTPATIENT
Start: 2019-08-29 | End: 2019-09-28

## 2019-08-29 NOTE — PROGRESS NOTES
Patient ID: Karey Boswell is a 72 y o  male  Assessment/Plan:    80-year-old male who is here as a follow-up of possible TIA  No more episodes of L sided numbness/weakness  MRI brain which I personally reviewed the images which does show subacute R corona radiata stroke, which is likely can explanation of his symptoms  He had EEG routine which was also negative  He does have a DM, hypertension  Patient was already on aspirin prior to the stroke, so will switch to Plavix  Plan:  -will start plavix today, d/c aspirin and continue with lipitor for secondary stroke prevention  -will check P2y12 assay in a week  -Continue with BP goal < 130/80, BP is at goal    -Continue to monitor DM and appropriate Euglycemic control  -Defer to PCP regarding DM and BP management  -does not smoke  -I advised patient to avoid using NSAIDs for headaches or other pain and instead just stick to tylenol    -I educated regarding mediterranean style diet and regular exercise regimen of brisk walking atleast 4-5 times a week  Literature given  -I educated patient and family regarding medication compliance and stroke risk factor modifications    -is currently driving and no issues with that  -I reassured patient and family that fatigue is a common symptom of stroke and will resolve over time  I advised the patient to take multi-vitamin which might help give some energy  I would like to follow up in 6 months  I would be happy to see the patient sooner if any new questions/concerns arise  Patient/Guardian was advised to the call the office if they have any questions and concerns in the meantime       Patient/Guardian does understand that if they have any new stroke like symptoms such as facial droop on one side, weakness/paralysis on either side, speech trouble, numbness on one side, balance issues, any vision changes, extreme dizziness or any new headache, to call 9-1-1 immediately or to proceed to the nearest ER immediately  Problem List Items Addressed This Visit        Cardiovascular and Mediastinum    Cerebral aneurysm, nonruptured    TIA (transient ischemic attack) - Primary    Relevant Medications    clopidogrel (PLAVIX) 75 mg tablet    Other Relevant Orders    P2Y12 Platelet inhibitor       Nervous and Auditory    Left-sided weakness      Other Visit Diagnoses     Lacunar stroke (HCC)        Relevant Medications    clopidogrel (PLAVIX) 75 mg tablet    Other Relevant Orders    P2Y12 Platelet inhibitor             Subjective:    HPI    79-year-old male who is here as a follow-up of possible TIA like episode  He was last seen by me in July and at that time he had 3 episodes of left upper left lower extremity weakness that resolved within 2 hours  MRI brain which I personally reviewed the images of was done which was negative for any acute findings but does have some microangiopathic changes  He had an echocardiogram which showed EF of 50-55% without any wall motion abnormalities or any atrial size abnormalities  CTA head and neck showed a left supraclinoid ICA a 5 mm aneurysm  He has seen Neurosurgery for this 5 mm aneurysm in the meantime and no surgical intervention was recommended at this time  Patient had a routine EEG as well which was negative for any seizure-like activity  He is here today and is doing well  He is compliant with his medications  He is tolerating them well without any side effects  He did not have any more episodes of this  He has some fatigue but it is getting better  He says that he has been doing home PT for balance and stuff which does seem to be helping  The following portions of the patient's history were reviewed and updated as appropriate:   He  has a past medical history of Diabetes mellitus ([facility] [address] ) and Hypertension    He   Patient Active Problem List    Diagnosis Date Noted    Left-sided weakness 07/10/2019    Cerebral aneurysm, nonruptured 07/09/2019    TIA (transient ischemic attack) 07/09/2019     He  has a past surgical history that includes Cystectomy  His family history includes Colon cancer in his mother; Dementia in his father; Prostate cancer in his mother  He  reports that he has never smoked  He has never used smokeless tobacco  He reports that he drank alcohol  He reports that he has current or past drug history  Current Outpatient Medications   Medication Sig Dispense Refill    atorvastatin (LIPITOR) 20 mg tablet Take 1 tablet (20 mg total) by mouth daily 30 tablet 3    lisinopril-hydrochlorothiazide (PRINZIDE,ZESTORETIC) 20-12 5 MG per tablet Take 1 tablet by mouth 2 (two) times a day       metFORMIN (GLUCOPHAGE) 500 mg tablet 1000 mg in the morning and 500 mg at night      clopidogrel (PLAVIX) 75 mg tablet Take 1 tablet (75 mg total) by mouth daily 30 tablet 2     No current facility-administered medications for this visit  Current Outpatient Medications on File Prior to Visit   Medication Sig    atorvastatin (LIPITOR) 20 mg tablet Take 1 tablet (20 mg total) by mouth daily    lisinopril-hydrochlorothiazide (PRINZIDE,ZESTORETIC) 20-12 5 MG per tablet Take 1 tablet by mouth 2 (two) times a day     metFORMIN (GLUCOPHAGE) 500 mg tablet 1000 mg in the morning and 500 mg at night    [DISCONTINUED] aspirin 81 mg chewable tablet Chew 81 mg daily     No current facility-administered medications on file prior to visit  He has No Known Allergies            Objective:    Blood pressure 120/80, pulse 80, height 6' (1 829 m), weight 110 kg (242 lb)  Physical Exam  General - Patient is alert, awake, follows commands  Speech - no dysarthria noted, no aphasia noted  Neuro:   Cranial nerves: PERRL, EOMI, no facial droop noted, facial sensation intact, tongue midline     Motor: 5/5 throughout  Sensory - intact to soft touch throughout  Reflexes - 2+ throughout  Coordination - no ataxia/dysmetria noted  Gait - normal    ROS:  I personally reviewed ROS   Review of Systems   Constitutional: Positive for fatigue  Negative for appetite change and fever  HENT: Negative  Negative for hearing loss, tinnitus, trouble swallowing and voice change  Eyes: Negative  Negative for photophobia and pain  Respiratory: Negative  Negative for shortness of breath  Cardiovascular: Negative  Negative for palpitations  Gastrointestinal: Negative  Negative for nausea and vomiting  Endocrine: Negative  Negative for cold intolerance and heat intolerance  Genitourinary: Negative  Negative for dysuria, frequency and urgency  Musculoskeletal: Positive for gait problem  Negative for myalgias and neck pain  Patient states that his balance is getting better  Skin: Negative  Negative for rash  Neurological: Negative for dizziness, tremors, seizures, syncope, facial asymmetry, speech difficulty, weakness, light-headedness, numbness and headaches  Hematological: Negative  Does not bruise/bleed easily  Psychiatric/Behavioral: Negative  Negative for confusion, hallucinations and sleep disturbance

## 2019-09-06 ENCOUNTER — APPOINTMENT (OUTPATIENT)
Dept: LAB | Facility: HOSPITAL | Age: 74
End: 2019-09-06
Attending: PSYCHIATRY & NEUROLOGY
Payer: COMMERCIAL

## 2019-09-06 DIAGNOSIS — I63.81 LACUNAR STROKE (HCC): ICD-10-CM

## 2019-09-06 DIAGNOSIS — G45.9 TIA (TRANSIENT ISCHEMIC ATTACK): ICD-10-CM

## 2019-09-06 LAB — PA ADP BLD-ACNC: 117 PRU (ref 194–418)

## 2019-09-06 PROCEDURE — 85576 BLOOD PLATELET AGGREGATION: CPT

## 2019-09-06 PROCEDURE — 36415 COLL VENOUS BLD VENIPUNCTURE: CPT

## 2019-09-09 ENCOUNTER — TELEPHONE (OUTPATIENT)
Dept: NEUROLOGY | Facility: CLINIC | Age: 74
End: 2019-09-09

## 2019-09-09 NOTE — TELEPHONE ENCOUNTER
----- Message from Cheyanne Escalera sent at 9/9/2019  8:41 AM EDT -----  Regarding: Test Results Question  Contact: [phone]  I have a question about P2Y12 PLATELET INHIBITOR resulted on 9/6/19, 2:56 PM     Does this result show the drug "Atorvastatin" as working OK?

## 2019-09-09 NOTE — TELEPHONE ENCOUNTER
Spoke to patient to make him aware  Patient expressed understanding  No further questions at this time

## 2019-09-10 ENCOUNTER — OFFICE VISIT (OUTPATIENT)
Dept: NEPHROLOGY | Facility: CLINIC | Age: 74
End: 2019-09-10
Payer: COMMERCIAL

## 2019-09-10 VITALS
BODY MASS INDEX: 32.51 KG/M2 | WEIGHT: 240 LBS | DIASTOLIC BLOOD PRESSURE: 74 MMHG | HEIGHT: 72 IN | SYSTOLIC BLOOD PRESSURE: 130 MMHG

## 2019-09-10 DIAGNOSIS — N18.30 STAGE 3 CHRONIC KIDNEY DISEASE (HCC): Primary | ICD-10-CM

## 2019-09-10 DIAGNOSIS — I10 ESSENTIAL HYPERTENSION: ICD-10-CM

## 2019-09-10 DIAGNOSIS — I67.1 CEREBRAL ANEURYSM, NONRUPTURED: ICD-10-CM

## 2019-09-10 DIAGNOSIS — E11.9 TYPE 2 DIABETES MELLITUS WITHOUT COMPLICATION, WITHOUT LONG-TERM CURRENT USE OF INSULIN (HCC): ICD-10-CM

## 2019-09-10 PROCEDURE — 99214 OFFICE O/P EST MOD 30 MIN: CPT | Performed by: INTERNAL MEDICINE

## 2019-09-10 NOTE — ASSESSMENT & PLAN NOTE
Renal function continues to improve, he is likely approaching his max eGFR, continue to closely monitor for now

## 2019-09-10 NOTE — ASSESSMENT & PLAN NOTE
No results found for: HGBA1C    No results for input(s): POCGLU in the last 72 hours  Blood Sugar Average: Last 72 hrs:    HgbA1 excellent at 6 8%, continue with metformin

## 2019-09-10 NOTE — PROGRESS NOTES
[facility] [place], [place]    Name: Helen Nix  YOB: 1945      Assessment/Plan:    Essential hypertension  Well controlled on current medications  Stage 3 chronic kidney disease ([facility] )   Renal function continues to improve, he is likely approaching his max eGFR, continue to closely monitor for now  Type 2 diabetes mellitus (HCC)  No results found for: HGBA1C    No results for input(s): POCGLU in the last 72 hours  Blood Sugar Average: Last 72 hrs:    HgbA1 excellent at 6 8%, continue with metformin  Problem List Items Addressed This Visit        Endocrine    Type 2 diabetes mellitus ([facility] )     No results found for: HGBA1C    No results for input(s): POCGLU in the last 72 hours  Blood Sugar Average: Last 72 hrs:    HgbA1 excellent at 6 8%, continue with metformin  Relevant Orders    Hemoglobin A1C    Urinalysis with microscopic    Microalbumin / creatinine urine ratio    TSH + Free T4    CBC       Cardiovascular and Mediastinum    Essential hypertension     Well controlled on current medications  Relevant Orders    Comprehensive metabolic panel       Genitourinary    Stage 3 chronic kidney disease ([facility] ) - Primary      Renal function continues to improve, he is likely approaching his max eGFR, continue to closely monitor for now  Relevant Orders    Comprehensive metabolic panel       Other    Cerebral aneurysm, nonruptured            Subjective:      Patient ID: Helen Nix is a 72 y o  male  Patient is doing well at this time  He was switched from ASA to plavix  BPs have been well controlled  Patient's BS are doing better, he has significantly improved his diet        The following portions of the patient's history were reviewed and updated as appropriate: allergies, current medications, past family history, past medical history, past social history, past surgical history and problem list     Review of Systems   All other systems reviewed and are negative          Social History     Socioeconomic History    Marital status: /Civil Union     Spouse name: None    Number of children: None    Years of education: None    Highest education level: None   Occupational History    Occupation: Retired 2015 -     Social Needs    Financial resource strain: None    Food insecurity:     Worry: None     Inability: None    Transportation needs:     Medical: None     Non-medical: None   Tobacco Use    Smoking status: Never Smoker    Smokeless tobacco: Never Used   Substance and Sexual Activity    Alcohol use: Never     Frequency: Never    Drug use: Not Currently    Sexual activity: None   Lifestyle    Physical activity:     Days per week: None     Minutes per session: None    Stress: None   Relationships    Social connections:     Talks on phone: None     Gets together: None     Attends Taoist service: None     Active member of club or organization: None     Attends meetings of clubs or organizations: None     Relationship status: None    Intimate partner violence:     Fear of current or ex partner: None     Emotionally abused: None     Physically abused: None     Forced sexual activity: None   Other Topics Concern    None   Social History Narrative    Denies drug use - As per [employer]    Consumes on average 1 cup of regular coffee per day      Past Medical History:   Diagnosis Date    Benign essential hypertension     [facility]     Essential hypertension     Hyperlipidemia     Hypertension     Pilonidal cyst with abscess     Type 2 diabetes mellitus ([facility] )     Type II diabetes mellitus, uncontrolled ([facility] )     Vitamin D deficiency      Past Surgical History:   Procedure Laterality Date    CYSTECTOMY      Per patient cyst removal from his back    LASIK         Current Outpatient Medications:     atorvastatin (LIPITOR) 20 mg tablet, Take 1 tablet (20 mg total) by mouth daily, Disp: 30 tablet, Rfl: 3    clopidogrel (PLAVIX) 75 mg tablet, Take 1 tablet (75 mg total) by mouth daily, Disp: 30 tablet, Rfl: 2    lisinopril-hydrochlorothiazide (PRINZIDE,ZESTORETIC) 20-12 5 MG per tablet, Take 1 tablet by mouth 2 (two) times a day , Disp: , Rfl:     metFORMIN (GLUCOPHAGE) 500 mg tablet, 1000 mg in the morning and 500 mg at night, Disp: , Rfl:      Lab Results   Component Value Date    SODIUM 138 06/21/2019    K 4 0 06/21/2019     06/21/2019    CO2 27 06/21/2019    AGAP 10 06/21/2019    BUN 28 (H) 06/21/2019    CREATININE 1 50 (H) 06/21/2019    GLUC 181 (H) 06/21/2019    CALCIUM 9 8 06/21/2019    AST 12 (L) 04/12/2019    ALT 14 04/12/2019    ALKPHOS 61 04/12/2019    TP 6 9 04/12/2019    TBILI 0 80 04/12/2019    EGFR 46 06/21/2019     Lab Results   Component Value Date    WBC 13 00 (H) 06/21/2019    HGB 15 0 06/21/2019    HCT 43 4 06/21/2019    MCV 89 06/21/2019     06/21/2019     No results found for: CHOLESTEROL  No results found for: HDL  No results found for: LDLCALC  No results found for: TRIG  No results found for: CHOLHDL  No results found for: NKS4XNTFLVCE, TSH        Objective:      /74 (BP Location: Right arm, Patient Position: Sitting, Cuff Size: Standard)   Ht 6' (1 829 m)   Wt 109 kg (240 lb)   BMI 32 55 kg/m²          Physical Exam   Constitutional: He is oriented to person, place, and time  He appears well-developed and well-nourished  No distress  HENT:   Head: Normocephalic and atraumatic  Eyes: Conjunctivae are normal    Neck: Neck supple  Cardiovascular: Normal rate and regular rhythm  Pulmonary/Chest: Effort normal and breath sounds normal    Abdominal: Soft  Musculoskeletal: He exhibits no edema  Neurological: He is alert and oriented to person, place, and time  No cranial nerve deficit  Skin: Skin is warm  No rash noted  Psychiatric: He has a normal mood and affect   His behavior is normal

## 2019-09-24 ENCOUNTER — TELEPHONE (OUTPATIENT)
Dept: NEPHROLOGY | Facility: CLINIC | Age: 74
End: 2019-09-24

## 2019-09-24 ENCOUNTER — OFFICE VISIT (OUTPATIENT)
Dept: FAMILY MEDICINE CLINIC | Facility: CLINIC | Age: 74
End: 2019-09-24
Payer: COMMERCIAL

## 2019-09-24 VITALS
RESPIRATION RATE: 18 BRPM | OXYGEN SATURATION: 98 % | HEART RATE: 62 BPM | DIASTOLIC BLOOD PRESSURE: 72 MMHG | SYSTOLIC BLOOD PRESSURE: 132 MMHG | HEIGHT: 72 IN | TEMPERATURE: 97.7 F | WEIGHT: 242 LBS | BODY MASS INDEX: 32.78 KG/M2

## 2019-09-24 DIAGNOSIS — E11.8 TYPE 2 DIABETES MELLITUS WITH COMPLICATION, UNSPECIFIED WHETHER LONG TERM INSULIN USE: ICD-10-CM

## 2019-09-24 DIAGNOSIS — R21 SKIN RASH: ICD-10-CM

## 2019-09-24 DIAGNOSIS — Z01.00 DIABETIC EYE EXAM (HCC): ICD-10-CM

## 2019-09-24 DIAGNOSIS — E66.09 CLASS 1 OBESITY DUE TO EXCESS CALORIES WITH SERIOUS COMORBIDITY AND BODY MASS INDEX (BMI) OF 32.0 TO 32.9 IN ADULT: ICD-10-CM

## 2019-09-24 DIAGNOSIS — E11.9 DIABETIC EYE EXAM (HCC): ICD-10-CM

## 2019-09-24 DIAGNOSIS — Z00.00 ENCOUNTER FOR MEDICARE ANNUAL WELLNESS EXAM: Primary | ICD-10-CM

## 2019-09-24 DIAGNOSIS — E11.9 ENCOUNTER FOR DIABETIC FOOT EXAM (HCC): ICD-10-CM

## 2019-09-24 DIAGNOSIS — Z11.59 NEED FOR HEPATITIS C SCREENING TEST: ICD-10-CM

## 2019-09-24 LAB
CREAT UR-MCNC: 218 MG/DL
MICROALBUMIN UR-MCNC: 119 MG/L (ref 0–20)
MICROALBUMIN/CREAT 24H UR: 55 MG/G CREATININE (ref 0–30)
SL AMB POCT HEMOGLOBIN AIC: 6.9 (ref ?–6.5)

## 2019-09-24 PROCEDURE — G0438 PPPS, INITIAL VISIT: HCPCS | Performed by: NURSE PRACTITIONER

## 2019-09-24 PROCEDURE — 83036 HEMOGLOBIN GLYCOSYLATED A1C: CPT | Performed by: NURSE PRACTITIONER

## 2019-09-24 PROCEDURE — 1170F FXNL STATUS ASSESSED: CPT | Performed by: NURSE PRACTITIONER

## 2019-09-24 PROCEDURE — 82570 ASSAY OF URINE CREATININE: CPT | Performed by: NURSE PRACTITIONER

## 2019-09-24 PROCEDURE — 99214 OFFICE O/P EST MOD 30 MIN: CPT | Performed by: NURSE PRACTITIONER

## 2019-09-24 PROCEDURE — 82043 UR ALBUMIN QUANTITATIVE: CPT | Performed by: NURSE PRACTITIONER

## 2019-09-24 PROCEDURE — 1125F AMNT PAIN NOTED PAIN PRSNT: CPT | Performed by: NURSE PRACTITIONER

## 2019-09-24 NOTE — TELEPHONE ENCOUNTER
Please have the patient call cardiology regarding this issue and what medication might want to transition to  As far as the rash goes, please have him take 2 Benadryl, that is 50 mg total, now and then again 50 mg at around 8:00 p m  Tonight  They will make him drowsy just so he is aware  An please have not give a call back regarding what Cardiology wishes due with the Plavix, if anything

## 2019-09-24 NOTE — PATIENT INSTRUCTIONS
Claritin or Zyrtec (generic is fine) for histamine response  Consider Stopping Plavix for 2-3 weeks then restart when rash has cleared up- if rash returns contact Neurologist about Plavix allergy  If stopping Plavix, start Aspirin 325mg tablets (4 baby Aspirins) daily   Betamethasone cream to itchy areas

## 2019-09-24 NOTE — PROGRESS NOTES
Assessment and Plan:     Problem List Items Addressed This Visit        Endocrine    Type 2 diabetes mellitus ([address] )    Relevant Orders    POCT hemoglobin A1c (Completed)    Microalbumin / creatinine urine ratio      Other Visit Diagnoses     Encounter for Medicare annual wellness exam    -  Primary    Diabetic eye exam [facility])        Relevant Orders    Ambulatory Referral to Ophthalmology    Need for hepatitis C screening test        Relevant Orders    Hepatitis C antibody    Skin rash        Relevant Medications    betamethasone valerate (VALISONE) 0 1 % cream           Preventive health issues were discussed with patient, and age appropriate screening tests were ordered as noted in patient's After Visit Summary  Personalized health advice and appropriate referrals for health education or preventive services given if needed, as noted in patient's After Visit Summary       History of Present Illness:     Patient presents for Medicare Annual Wellness visit    Patient Care Team:  Wesley Velazquez as PCP - General (Family Medicine)     Problem List:     Patient Active Problem List   Diagnosis    Cerebral aneurysm, nonruptured    TIA (transient ischemic attack)    Left-sided weakness    Stage 3 chronic kidney disease ([address] )    Essential hypertension    Type 2 diabetes mellitus ([address] )      Past Medical and Surgical History:     Past Medical History:   Diagnosis Date    Benign essential hypertension     Diabetes mellitus ([address] )     Essential hypertension     Hyperlipidemia     Hypertension     Pilonidal cyst with abscess     Type 2 diabetes mellitus ([address] )     Type II diabetes mellitus, uncontrolled ([address] )     Vitamin D deficiency      Past Surgical History:   Procedure Laterality Date    CYSTECTOMY      Per patient cyst removal from his back    LASIK        Family History:     Family History   Problem Relation Age of Onset    Colon cancer Mother     Diabetes type II Mother     Heart disease Mother    Ary Sher Prostate cancer Father       Social History:     Social History     Socioeconomic History    Marital status: /Civil Union     Spouse name: None    Number of children: None    Years of education: None    Highest education level: None   Occupational History    Occupation: Retired 2015 -     Social Needs    Financial resource strain: None    Food insecurity:     Worry: None     Inability: None    Transportation needs:     Medical: None     Non-medical: None   Tobacco Use    Smoking status: Never Smoker    Smokeless tobacco: Never Used   Substance and Sexual Activity    Alcohol use: Never     Frequency: Never    Drug use: Not Currently    Sexual activity: None   Lifestyle    Physical activity:     Days per week: None     Minutes per session: None    Stress: None   Relationships    Social connections:     Talks on phone: None     Gets together: None     Attends Orthodox service: None     Active member of club or organization: None     Attends meetings of clubs or organizations: None     Relationship status: None    Intimate partner violence:     Fear of current or ex partner: None     Emotionally abused: None     Physically abused: None     Forced sexual activity: None   Other Topics Concern    None   Social History Narrative    Denies drug use - As per [employer]    Consumes on average 1 cup of regular coffee per day        Medications and Allergies:     Current Outpatient Medications   Medication Sig Dispense Refill    atorvastatin (LIPITOR) 20 mg tablet Take 1 tablet (20 mg total) by mouth daily 30 tablet 3    clopidogrel (PLAVIX) 75 mg tablet Take 1 tablet (75 mg total) by mouth daily 30 tablet 2    lisinopril-hydrochlorothiazide (PRINZIDE,ZESTORETIC) 20-12 5 MG per tablet Take 1 tablet by mouth 2 (two) times a day       metFORMIN (GLUCOPHAGE) 500 mg tablet 1000 mg in the morning and 500 mg at night      betamethasone valerate (VALISONE) 0 1 % cream Apply topically 2 (two) times a day 30 g 1     No current facility-administered medications for this visit  No Known Allergies   Immunizations:     Immunization History   Administered Date(s) Administered    INFLUENZA 11/03/2014, 09/14/2015    Pneumococcal Conjugate 13-Valent 01/03/2017    Pneumococcal Polysaccharide PPV23 11/25/2014, 10/17/2017      Health Maintenance:         Topic Date Due    Hepatitis C Screening  1945    CRC Screening: Colonoscopy  1945         Topic Date Due    HEPATITIS B VACCINES (1 of 3 - Risk 3-dose series) 10/05/1966    DTaP,Tdap,and Td Vaccines (1 - Tdap) 10/05/1968    INFLUENZA VACCINE  07/01/2019      Medicare Health Risk Assessment:     /72   Pulse 62   Temp 97 7 °F (36 5 °C)   Resp 18   Ht 6' (1 829 m)   Wt 110 kg (242 lb)   SpO2 98%   BMI 32 82 kg/m²      Jessica Stern is here for his Subsequent Wellness visit  Health Risk Assessment:   Patient rates overall health as fair  Patient feels that their physical health rating is slightly worse  Eyesight was rated as same  Hearing was rated as same  Patient feels that their emotional and mental health rating is slightly worse  Pain experienced in the last 7 days has been none  Patient states that he has experienced no weight loss or gain in last 6 months  Fall Risk Screening: In the past year, patient has experienced: history of falling in past year    Number of falls: 1  Injured during fall?: Yes    Feels unsteady when standing or walking?: No    Worried about falling?: No      Home Safety:  Patient does not have trouble with stairs inside or outside of their home  Patient has working smoke alarms and has working carbon monoxide detector  Home safety hazards include: none  Nutrition:   Current diet is Diabetic  Medications:   Patient is currently taking over-the-counter supplements  OTC medications include: see medication list  Patient is able to manage medications       Activities of Daily Living (ADLs)/Instrumental Activities of Daily Living (IADLs):   Walk and transfer into and out of bed and chair?: Yes  Dress and groom yourself?: Yes    Bathe or shower yourself?: Yes    Feed yourself?  Yes  Do your laundry/housekeeping?: Yes  Manage your money, pay your bills and track your expenses?: Yes  Make your own meals?: Yes    Do your own shopping?: Yes    Previous Hospitalizations:   Any hospitalizations or ED visits within the last 12 months?: Yes    How many hospitalizations have you had in the last year?: 1-2    Advance Care Planning:   Living will: Yes    Advanced directive: Yes    Advanced directive counseling given: Yes      PREVENTIVE SCREENINGS      Cardiovascular Screening:    General: Screening Current    Due for: Lipid Panel      Diabetes Screening:     General: History Diabetes and Screening Current    Due for: Blood Glucose      Colorectal Cancer Screening:       Due for: Colonoscopy - High Risk      Prostate Cancer Screening:    General: Risks and Benefits Discussed      Osteoporosis Screening:    General: Screening Not Indicated      Abdominal Aortic Aneurysm (AAA) Screening:    Risk factors include: age between 73-69 yo        Lung Cancer Screening:     General: Screening Not Indicated      Bary Lombard, CRNP

## 2019-09-24 NOTE — PROGRESS NOTES
[facility]        NAME: Roel Mazariegos is a 72 y o  male  : 1945    MRN: [id number]  DATE: 2019  TIME: 12:40 PM    Assessment and Plan   Encounter for Medicare annual wellness exam [W93 98]  1  Encounter for Medicare annual wellness exam     2  Type 2 diabetes mellitus with complication, unspecified whether long term insulin use  POCT hemoglobin A1c    Microalbumin / creatinine urine ratio   3  Diabetic eye exam [facility])  Ambulatory Referral to Ophthalmology   4  Need for hepatitis C screening test  Hepatitis C antibody   5  Skin rash  betamethasone valerate (VALISONE) 0 1 % cream   6  Class 1 obesity due to excess calories with serious comorbidity and body mass index (BMI) of 32 0 to 32 9 in adult     7  Encounter for diabetic foot exam [facility])           Patient Instructions     Patient Instructions   Claritin or Zyrtec (generic is fine) for histamine response  Consider Stopping Plavix for 2-3 weeks then restart when rash has cleared up- if rash returns contact Neurologist about Plavix allergy  If stopping Plavix, start Aspirin 325mg tablets (4 baby Aspirins) daily   Betamethasone cream to itchy areas            Chief Complaint     Chief Complaint   Patient presents with   [address]    Medicare Wellness Visit    Diabetes    Rash     red spots all overbody          History of Present Illness       Here to establish care- previously seen by Dr Clarissa Carranza, has stage 3 kidney disease  C/o Rash on right wrist- started 2 days ago- is now spreading on entire body- abdomen/forearms/legs- itchy  Recently started Plavix (1 month ago) for CVA by Neurologist- also takes 1- 81mg ASA daily   Denies tongue or mouth swelling/symptoms, denies breathing difficulty or wheezing  HgA1c today 6 9- needs DM foot, DM eye, Micro Albumin  Unsure when last PSA- last record of labs - will wait until we know for sure  Due for colonscopy- FIT test Positive for blood per patient- no record in chart        Review of Systems   Review of Systems   Constitutional: Negative for activity change, diaphoresis, fatigue and fever  HENT: Positive for voice change (since stroke- speech has been different- slower and higher pitch)  Negative for congestion, facial swelling, hearing loss, rhinorrhea, sinus pressure, sinus pain, sneezing and sore throat  Eyes: Negative for discharge and visual disturbance  Respiratory: Negative for cough, choking, chest tightness, shortness of breath, wheezing and stridor  Cardiovascular: Negative for chest pain, palpitations and leg swelling  Gastrointestinal: Negative for abdominal distention, abdominal pain, constipation, diarrhea, nausea and vomiting  Endocrine: Negative for polydipsia, polyphagia and polyuria  Genitourinary: Negative for difficulty urinating, dysuria, frequency and urgency  Musculoskeletal: Negative for arthralgias, back pain, gait problem, joint swelling, myalgias, neck pain and neck stiffness  Skin: Positive for rash (see hpi)  Negative for color change and wound  Neurological: Negative for dizziness, syncope, speech difficulty, weakness, light-headedness and headaches  Hematological: Negative for adenopathy  Does not bruise/bleed easily  Psychiatric/Behavioral: Negative for agitation, behavioral problems, confusion, hallucinations, sleep disturbance and suicidal ideas  The patient is not nervous/anxious            Current Medications       Current Outpatient Medications:     atorvastatin (LIPITOR) 20 mg tablet, Take 1 tablet (20 mg total) by mouth daily, Disp: 30 tablet, Rfl: 3    clopidogrel (PLAVIX) 75 mg tablet, Take 1 tablet (75 mg total) by mouth daily, Disp: 30 tablet, Rfl: 2    lisinopril-hydrochlorothiazide (PRINZIDE,ZESTORETIC) 20-12 5 MG per tablet, Take 1 tablet by mouth 2 (two) times a day , Disp: , Rfl:     metFORMIN (GLUCOPHAGE) 500 mg tablet, 1000 mg in the morning and 500 mg at night, Disp: , Rfl:     betamethasone valerate (VALISONE) 0 1 % cream, Apply topically 2 (two) times a day, Disp: 30 g, Rfl: 1    Current Allergies     Allergies as of 09/24/2019    (No Known Allergies)            The following portions of the patient's history were reviewed and updated as appropriate: allergies, current medications, past family history, past medical history, past social history, past surgical history and problem list      Past Medical History:   Diagnosis Date    Benign essential hypertension     Diabetes mellitus ([place] [address] )     Essential hypertension     Hyperlipidemia     Hypertension     Pilonidal cyst with abscess     Type 2 diabetes mellitus ([place] [address] )     Type II diabetes mellitus, uncontrolled ([place] [address] )     Vitamin D deficiency        Past Surgical History:   Procedure Laterality Date    CYSTECTOMY      Per patient cyst removal from his back    LASIK         Family History   Problem Relation Age of Onset    Colon cancer Mother     Diabetes type II Mother     Heart disease Mother     Prostate cancer Father          Medications have been verified  Objective   /72   Pulse 62   Temp 97 7 °F (36 5 °C)   Resp 18   Ht 6' (1 829 m)   Wt 110 kg (242 lb)   SpO2 98%   BMI 32 82 kg/m²        Physical Exam     Physical Exam   Constitutional: He is oriented to person, place, and time  He appears well-developed and well-nourished  No distress  HENT:   Mouth/Throat: Uvula is midline and oropharynx is clear and moist  No posterior oropharyngeal edema or posterior oropharyngeal erythema  Eyes: Pupils are equal, round, and reactive to light  Conjunctivae are normal    Neck: Normal range of motion  Neck supple  No tracheal deviation present  No thyromegaly present  Cardiovascular: Normal rate, regular rhythm and normal heart sounds  Pulses are no weak pulses  No murmur heard  Pulses:       Dorsalis pedis pulses are 2+ on the right side, and 2+ on the left side     Pulmonary/Chest: Effort normal and breath sounds normal  No respiratory distress  He has no wheezes  Musculoskeletal: Normal range of motion  He exhibits edema (LLE- +1 pitting- pt  reports this is baselines "my left foot is always swollen")  He exhibits no tenderness or deformity  Feet:   Right Foot:   Skin Integrity: Positive for dry skin  Negative for ulcer, skin breakdown, erythema, warmth or callus  Left Foot:   Skin Integrity: Positive for dry skin  Negative for ulcer, skin breakdown, erythema, warmth or callus  Neurological: He is alert and oriented to person, place, and time  Skin: Skin is warm and dry  Rash (blotchy urticarial rash- generalized- one area on left eyelid- erythematic, no central bite, no central clearing  most significant and largest areas on b/l lovett side wrists  warm to touch  other areas are smaller and round in appearance) noted  He is not diaphoretic  Psychiatric: He has a normal mood and affect  His behavior is normal  Judgment and thought content normal    Nursing note and vitals reviewed  BMI Counseling: Body mass index is 32 82 kg/m²  The BMI is above normal  Nutrition recommendations include reducing fast food intake, consuming healthier snacks, decreasing soda and/or juice intake and moderation in carbohydrate intake  Patient's shoes and socks removed  Right Foot/Ankle   Right Foot Inspection  Skin Exam: skin normal, skin intact and dry skin no warmth, no callus, no erythema, no maceration, no abnormal color, no pre-ulcer, no ulcer and no callus                          Toe Exam: ROM and strength within normal limits  Sensory       Monofilament testing: intact  Vascular  Capillary refills: < 3 seconds  The right DP pulse is 2+       Left Foot/Ankle  Left Foot Inspection  Skin Exam: skin normal, skin intact and dry skinno warmth, no erythema, no maceration, normal color, no pre-ulcer, no ulcer and no callus                         Toe Exam: ROM and strength within normal limits                   Sensory Monofilament: intact  Vascular  Capillary refills: < 3 seconds  The left DP pulse is 2+  Assign Risk Category:  No deformity present; No loss of protective sensation;  No weak pulses       Risk: 0

## 2019-09-24 NOTE — TELEPHONE ENCOUNTER
Patient called stating that he has a rash all over his body  He states that he thinks it started after he started taking Plavix  He has been on Plavix for a month

## 2019-09-27 ENCOUNTER — TELEPHONE (OUTPATIENT)
Dept: FAMILY MEDICINE CLINIC | Facility: CLINIC | Age: 74
End: 2019-09-27

## 2019-09-27 NOTE — TELEPHONE ENCOUNTER
Unfortunately we will most likely have to do Prednisone for his symptoms related to  the pain and allergic reaction- ok to give him 10mg PO BID x 5 days  This may increase his blood sugars but will only be temporary  Is the rash improving?

## 2019-09-27 NOTE — TELEPHONE ENCOUNTER
Patient is experiencing joint pain in hands and wrists as well as his right leg and hip  He has been experiencing the symptoms since the rash broke out  He'd like advise on what to do for it

## 2019-09-28 ENCOUNTER — APPOINTMENT (EMERGENCY)
Dept: RADIOLOGY | Facility: HOSPITAL | Age: 74
DRG: 872 | End: 2019-09-28
Payer: COMMERCIAL

## 2019-09-28 ENCOUNTER — TELEPHONE (OUTPATIENT)
Dept: OTHER | Facility: OTHER | Age: 74
End: 2019-09-28

## 2019-09-28 ENCOUNTER — HOSPITAL ENCOUNTER (INPATIENT)
Facility: HOSPITAL | Age: 74
LOS: 5 days | Discharge: HOME | DRG: 872 | End: 2019-10-03
Attending: EMERGENCY MEDICINE | Admitting: INTERNAL MEDICINE
Payer: COMMERCIAL

## 2019-09-28 ENCOUNTER — APPOINTMENT (EMERGENCY)
Dept: CT IMAGING | Facility: HOSPITAL | Age: 74
DRG: 872 | End: 2019-09-28
Payer: COMMERCIAL

## 2019-09-28 DIAGNOSIS — A41.9 SEPSIS (HCC): ICD-10-CM

## 2019-09-28 DIAGNOSIS — M25.50 ARTHRALGIA, UNSPECIFIED JOINT: Primary | ICD-10-CM

## 2019-09-28 LAB
ALBUMIN SERPL BCP-MCNC: 4 G/DL (ref 3.5–5.7)
ALP SERPL-CCNC: 56 U/L (ref 55–165)
ALT SERPL W P-5'-P-CCNC: 12 U/L (ref 7–52)
ANION GAP SERPL CALCULATED.3IONS-SCNC: 10 MMOL/L (ref 4–13)
APTT PPP: 40 SECONDS (ref 23–37)
AST SERPL W P-5'-P-CCNC: 9 U/L (ref 13–39)
BACTERIA UR QL AUTO: ABNORMAL /HPF
BASOPHILS # BLD AUTO: 0 THOUSANDS/ΜL (ref 0–0.1)
BASOPHILS NFR BLD AUTO: 0 % (ref 0–2)
BILIRUB SERPL-MCNC: 1.7 MG/DL (ref 0.2–1)
BILIRUB UR QL STRIP: NEGATIVE
BUN SERPL-MCNC: 20 MG/DL (ref 7–25)
CALCIUM SERPL-MCNC: 9.7 MG/DL (ref 8.6–10.5)
CHLORIDE SERPL-SCNC: 98 MMOL/L (ref 98–107)
CLARITY UR: CLEAR
CO2 SERPL-SCNC: 26 MMOL/L (ref 21–31)
COLOR UR: YELLOW
CREAT SERPL-MCNC: 1.11 MG/DL (ref 0.7–1.3)
CRP SERPL HS-MCNC: >90 MG/L
EOSINOPHIL # BLD AUTO: 0 THOUSAND/ΜL (ref 0–0.61)
EOSINOPHIL NFR BLD AUTO: 0 % (ref 0–5)
ERYTHROCYTE [DISTWIDTH] IN BLOOD BY AUTOMATED COUNT: 13.1 % (ref 11.5–14.5)
ERYTHROCYTE [SEDIMENTATION RATE] IN BLOOD: 41 MM/HOUR (ref 0–20)
FINE GRAN CASTS URNS QL MICRO: ABNORMAL /LPF
GFR SERPL CREATININE-BSD FRML MDRD: 66 ML/MIN/1.73SQ M
GLUCOSE SERPL-MCNC: 164 MG/DL (ref 65–140)
GLUCOSE SERPL-MCNC: 186 MG/DL (ref 65–140)
GLUCOSE SERPL-MCNC: 208 MG/DL (ref 65–99)
GLUCOSE UR STRIP-MCNC: NEGATIVE MG/DL
HCT VFR BLD AUTO: 43 % (ref 42–47)
HGB BLD-MCNC: 14.7 G/DL (ref 14–18)
HGB UR QL STRIP.AUTO: ABNORMAL
INR PPP: 1.23 (ref 0.9–1.5)
KETONES UR STRIP-MCNC: ABNORMAL MG/DL
LACTATE SERPL-SCNC: 1.4 MMOL/L (ref 0.5–2)
LEUKOCYTE ESTERASE UR QL STRIP: NEGATIVE
LIPASE SERPL-CCNC: 11 U/L (ref 11–82)
LYMPHOCYTES # BLD AUTO: 0.9 THOUSANDS/ΜL (ref 0.6–4.47)
LYMPHOCYTES NFR BLD AUTO: 7 % (ref 21–51)
MAGNESIUM SERPL-MCNC: 1.5 MG/DL (ref 1.9–2.7)
MCH RBC QN AUTO: 30.5 PG (ref 26–34)
MCHC RBC AUTO-ENTMCNC: 34.2 G/DL (ref 31–37)
MCV RBC AUTO: 89 FL (ref 81–99)
MONOCYTES # BLD AUTO: 1.9 THOUSAND/ΜL (ref 0.17–1.22)
MONOCYTES NFR BLD AUTO: 14 % (ref 2–12)
MUCOUS THREADS UR QL AUTO: ABNORMAL
NEUTROPHILS # BLD AUTO: 11.2 THOUSANDS/ΜL (ref 1.4–6.5)
NEUTS SEG NFR BLD AUTO: 79 % (ref 42–75)
NITRITE UR QL STRIP: NEGATIVE
NON-SQ EPI CELLS URNS QL MICRO: ABNORMAL /HPF
PH UR STRIP.AUTO: 5 [PH]
PLATELET # BLD AUTO: 174 THOUSANDS/UL (ref 149–390)
PMV BLD AUTO: 9.5 FL (ref 8.6–11.7)
POTASSIUM SERPL-SCNC: 3.7 MMOL/L (ref 3.5–5.5)
PROT SERPL-MCNC: 7.2 G/DL (ref 6.4–8.9)
PROT UR STRIP-MCNC: ABNORMAL MG/DL
PROTHROMBIN TIME: 14.3 SECONDS (ref 10.2–13)
RBC # BLD AUTO: 4.82 MILLION/UL (ref 4.3–5.9)
RBC #/AREA URNS AUTO: ABNORMAL /HPF
SODIUM SERPL-SCNC: 134 MMOL/L (ref 134–143)
SP GR UR STRIP.AUTO: >=1.03 (ref 1–1.03)
TROPONIN I SERPL-MCNC: <0.03 NG/ML
UROBILINOGEN UR QL STRIP.AUTO: 0.2 E.U./DL
WBC # BLD AUTO: 14 THOUSAND/UL (ref 4.8–10.8)
WBC #/AREA URNS AUTO: ABNORMAL /HPF

## 2019-09-28 PROCEDURE — 96365 THER/PROPH/DIAG IV INF INIT: CPT

## 2019-09-28 PROCEDURE — 86141 C-REACTIVE PROTEIN HS: CPT | Performed by: EMERGENCY MEDICINE

## 2019-09-28 PROCEDURE — 96367 TX/PROPH/DG ADDL SEQ IV INF: CPT

## 2019-09-28 PROCEDURE — 85652 RBC SED RATE AUTOMATED: CPT | Performed by: EMERGENCY MEDICINE

## 2019-09-28 PROCEDURE — 85025 COMPLETE CBC W/AUTO DIFF WBC: CPT | Performed by: EMERGENCY MEDICINE

## 2019-09-28 PROCEDURE — 81001 URINALYSIS AUTO W/SCOPE: CPT | Performed by: EMERGENCY MEDICINE

## 2019-09-28 PROCEDURE — 99285 EMERGENCY DEPT VISIT HI MDM: CPT

## 2019-09-28 PROCEDURE — 86618 LYME DISEASE ANTIBODY: CPT | Performed by: EMERGENCY MEDICINE

## 2019-09-28 PROCEDURE — 83605 ASSAY OF LACTIC ACID: CPT | Performed by: EMERGENCY MEDICINE

## 2019-09-28 PROCEDURE — 36415 COLL VENOUS BLD VENIPUNCTURE: CPT | Performed by: EMERGENCY MEDICINE

## 2019-09-28 PROCEDURE — 84484 ASSAY OF TROPONIN QUANT: CPT | Performed by: EMERGENCY MEDICINE

## 2019-09-28 PROCEDURE — 71045 X-RAY EXAM CHEST 1 VIEW: CPT

## 2019-09-28 PROCEDURE — 85730 THROMBOPLASTIN TIME PARTIAL: CPT | Performed by: EMERGENCY MEDICINE

## 2019-09-28 PROCEDURE — 86753 PROTOZOA ANTIBODY NOS: CPT | Performed by: EMERGENCY MEDICINE

## 2019-09-28 PROCEDURE — 86308 HETEROPHILE ANTIBODY SCREEN: CPT | Performed by: EMERGENCY MEDICINE

## 2019-09-28 PROCEDURE — 93005 ELECTROCARDIOGRAM TRACING: CPT

## 2019-09-28 PROCEDURE — 83735 ASSAY OF MAGNESIUM: CPT | Performed by: EMERGENCY MEDICINE

## 2019-09-28 PROCEDURE — 85610 PROTHROMBIN TIME: CPT | Performed by: EMERGENCY MEDICINE

## 2019-09-28 PROCEDURE — 86666 EHRLICHIA ANTIBODY: CPT | Performed by: EMERGENCY MEDICINE

## 2019-09-28 PROCEDURE — 99222 1ST HOSP IP/OBS MODERATE 55: CPT | Performed by: INTERNAL MEDICINE

## 2019-09-28 PROCEDURE — 80053 COMPREHEN METABOLIC PANEL: CPT | Performed by: EMERGENCY MEDICINE

## 2019-09-28 PROCEDURE — 96361 HYDRATE IV INFUSION ADD-ON: CPT

## 2019-09-28 PROCEDURE — 70450 CT HEAD/BRAIN W/O DYE: CPT

## 2019-09-28 PROCEDURE — 87631 RESP VIRUS 3-5 TARGETS: CPT | Performed by: EMERGENCY MEDICINE

## 2019-09-28 PROCEDURE — 81003 URINALYSIS AUTO W/O SCOPE: CPT | Performed by: EMERGENCY MEDICINE

## 2019-09-28 PROCEDURE — 82948 REAGENT STRIP/BLOOD GLUCOSE: CPT

## 2019-09-28 PROCEDURE — 87040 BLOOD CULTURE FOR BACTERIA: CPT | Performed by: EMERGENCY MEDICINE

## 2019-09-28 PROCEDURE — 83690 ASSAY OF LIPASE: CPT | Performed by: EMERGENCY MEDICINE

## 2019-09-28 RX ORDER — ACETAMINOPHEN 325 MG/1
650 TABLET ORAL ONCE
Status: COMPLETED | OUTPATIENT
Start: 2019-09-28 | End: 2019-09-28

## 2019-09-28 RX ORDER — DOXYCYCLINE HYCLATE 50 MG/1
100 CAPSULE ORAL EVERY 12 HOURS SCHEDULED
Status: DISCONTINUED | OUTPATIENT
Start: 2019-09-28 | End: 2019-10-03

## 2019-09-28 RX ORDER — ATORVASTATIN CALCIUM 20 MG/1
20 TABLET, FILM COATED ORAL
Status: DISCONTINUED | OUTPATIENT
Start: 2019-09-29 | End: 2019-09-28

## 2019-09-28 RX ORDER — ASPIRIN 81 MG/1
324 TABLET, CHEWABLE ORAL DAILY
Status: DISCONTINUED | OUTPATIENT
Start: 2019-09-29 | End: 2019-10-03 | Stop reason: HOSPADM

## 2019-09-28 RX ORDER — ACETAMINOPHEN 325 MG/1
650 TABLET ORAL EVERY 6 HOURS PRN
Status: DISCONTINUED | OUTPATIENT
Start: 2019-09-28 | End: 2019-10-03 | Stop reason: HOSPADM

## 2019-09-28 RX ORDER — ASPIRIN 81 MG/1
324 TABLET, CHEWABLE ORAL DAILY
COMMUNITY
End: 2020-09-29

## 2019-09-28 RX ORDER — CEFEPIME HYDROCHLORIDE 2 G/50ML
2000 INJECTION, SOLUTION INTRAVENOUS ONCE
Status: COMPLETED | OUTPATIENT
Start: 2019-09-28 | End: 2019-09-28

## 2019-09-28 RX ORDER — SODIUM CHLORIDE 9 MG/ML
75 INJECTION, SOLUTION INTRAVENOUS CONTINUOUS
Status: DISCONTINUED | OUTPATIENT
Start: 2019-09-28 | End: 2019-09-30

## 2019-09-28 RX ORDER — LISINOPRIL 20 MG/1
20 TABLET ORAL DAILY
Status: DISCONTINUED | OUTPATIENT
Start: 2019-09-29 | End: 2019-10-03 | Stop reason: HOSPADM

## 2019-09-28 RX ORDER — METOPROLOL TARTRATE 5 MG/5ML
5 INJECTION INTRAVENOUS EVERY 6 HOURS PRN
Status: DISCONTINUED | OUTPATIENT
Start: 2019-09-28 | End: 2019-09-29

## 2019-09-28 RX ORDER — MAGNESIUM SULFATE 1 G/100ML
1 INJECTION INTRAVENOUS ONCE
Status: COMPLETED | OUTPATIENT
Start: 2019-09-28 | End: 2019-09-28

## 2019-09-28 RX ADMIN — SODIUM CHLORIDE 75 ML/HR: 9 INJECTION, SOLUTION INTRAVENOUS at 14:57

## 2019-09-28 RX ADMIN — CEFEPIME HYDROCHLORIDE 2000 MG: 2 INJECTION, SOLUTION INTRAVENOUS at 12:49

## 2019-09-28 RX ADMIN — DOXYCYCLINE HYCLATE 100 MG: 50 CAPSULE ORAL at 14:57

## 2019-09-28 RX ADMIN — SODIUM CHLORIDE 500 ML: 0.9 INJECTION, SOLUTION INTRAVENOUS at 22:51

## 2019-09-28 RX ADMIN — SODIUM CHLORIDE 1000 ML: 0.9 INJECTION, SOLUTION INTRAVENOUS at 11:46

## 2019-09-28 RX ADMIN — ACETAMINOPHEN 650 MG: 325 TABLET ORAL at 11:48

## 2019-09-28 RX ADMIN — ACETAMINOPHEN 650 MG: 325 TABLET ORAL at 22:18

## 2019-09-28 RX ADMIN — ENOXAPARIN SODIUM 40 MG: 40 INJECTION SUBCUTANEOUS at 14:57

## 2019-09-28 RX ADMIN — SODIUM CHLORIDE 1000 ML: 0.9 INJECTION, SOLUTION INTRAVENOUS at 13:26

## 2019-09-28 RX ADMIN — MAGNESIUM SULFATE HEPTAHYDRATE 1 G: 1 INJECTION, SOLUTION INTRAVENOUS at 13:26

## 2019-09-28 RX ADMIN — DOXYCYCLINE HYCLATE 100 MG: 50 CAPSULE ORAL at 21:29

## 2019-09-28 RX ADMIN — METOPROLOL TARTRATE 5 MG: 5 INJECTION, SOLUTION INTRAVENOUS at 22:54

## 2019-09-28 RX ADMIN — INSULIN LISPRO 1 UNITS: 100 INJECTION, SOLUTION INTRAVENOUS; SUBCUTANEOUS at 16:58

## 2019-09-28 RX ADMIN — INSULIN LISPRO 1 UNITS: 100 INJECTION, SOLUTION INTRAVENOUS; SUBCUTANEOUS at 21:29

## 2019-09-28 NOTE — H&P
H&P- Tre Davidson 1945, 72 y o  male MRN: [id number]    Unit/Bed#: -01 Encounter: [id number]    Primary Care Provider: KAYODE Duff   Date and time admitted to hospital: 9/28/2019 11:08 AM        * Sepsis ([facility] )  Assessment & Plan  · Met sepsis criteria with leukocytosis and tachycardia  · No obvious source this time  · Concern for possible to tick borne illness in light of joint pain, rash, myalgias and body aches  · Will start doxycycline pending serologies including Lyme, Ehrlichia, Babesia  · If cultures and diagnostic testing unrevealing, will consider Infectious Disease consultation    Type 2 diabetes mellitus ([facility] )  Assessment & Plan  Lab Results   Component Value Date    HGBA1C 6 9 (A) 09/24/2019       No results for input(s): POCGLU in the last 72 hours  Blood Sugar Average: Last 72 hrs:     Hold metformin while in-house, sliding scale insulin    Essential hypertension  Assessment & Plan  · Continue home hypertensives aside from diuretics    TIA (transient ischemic attack)  Assessment & Plan  · Continue home regimen of aspirin, statin  · Plavix discontinued due to allergy      VTE Prophylaxis: Enoxaparin (Lovenox)  Code Status: full code  POLST: POLST is not applicable to this patient  Discussion with family:patient    Anticipated Length of Stay:  Patient will be admitted on an Inpatient basis with an anticipated length of stay of  > 2 midnights  Justification for Hospital Stay: sepsis    Total Time for Visit, including Counseling / Coordination of Care: 1 hour  Greater than 50% of this total time spent on direct patient counseling and coordination of care  Chief Complaint:   Joint pain, fever and rash    History of Present Illness:    Tre Davidson is a 72 y o  male who presents with joint pain, fever and rash  Patient notes his symptoms began approximately 2 days ago  Patient recently had a TIA/subacute CVA for which he was switched from aspirin and Plavix    When discussing his case with his primary care physician and describing his rash, he was instructed to stop the Plavix  His rash improved, the patient continues to have joint pain, weakness and subjective fevers  Patient denies any cough, chest pain, shortness of breath, nausea, vomiting, dysuria and is otherwise without complaints  No known history of tick bites although the patient does spend a significant amount of time outside  Review of Systems:  Review of Systems   Constitutional: Positive for chills, fatigue and fever  Musculoskeletal: Positive for myalgias  Neurological: Positive for weakness  All other systems reviewed and are negative  Past Medical and Surgical History:   Past Medical History:   Diagnosis Date    Benign essential hypertension     Diabetes mellitus ([facility] )     Essential hypertension     Hyperlipidemia     Hypertension     Pilonidal cyst with abscess     Rheumatic heart disease     TIA (transient ischemic attack)     Type 2 diabetes mellitus (HCC)     Type II diabetes mellitus, uncontrolled (HCC)     Vitamin D deficiency        Past Surgical History:   Procedure Laterality Date    CYSTECTOMY      Per patient cyst removal from his back    LASIK         Meds/Allergies:  Prior to Admission medications    Medication Sig Start Date End Date Taking?  Authorizing Provider   aspirin 81 mg chewable tablet Chew 324 mg daily   Yes Historical Provider, MD   atorvastatin (LIPITOR) 20 mg tablet Take 1 tablet (20 mg total) by mouth daily 7/10/19   Osiris Potts MD   betamethasone valerate (VALISONE) 0 1 % cream Apply topically 2 (two) times a day 9/24/19   KAYODE Clark   lisinopril-hydrochlorothiazide (PRINZIDE,ZESTORETIC) 20-12 5 MG per tablet Take 1 tablet by mouth 2 (two) times a day     Historical Provider, MD   metFORMIN (GLUCOPHAGE) 500 mg tablet 1000 mg in the morning and 500 mg at night    Historical Provider, MD   clopidogrel (PLAVIX) 75 mg tablet Take 1 tablet (75 mg total) by mouth daily 8/29/19 9/28/19  Sergio Doyle MD     I have reviewed home medications with patient personally  Allergies: Allergies   Allergen Reactions    Plavix [Clopidogrel] Rash     Stopped two days ago because got a rash and doctors thought it was from this        Social History:  Marital Status: /Civil Union   Occupation: n/a  Patient Pre-hospital Living Situation: home  Patient Pre-hospital Level of Mobility: good  Patient Pre-hospital Diet Restrictions: diabetic  Substance Use History:     Social History     Substance and Sexual Activity   Alcohol Use Never    Frequency: Never     Social History     Tobacco Use   Smoking Status Never Smoker   Smokeless Tobacco Never Used     Social History     Substance and Sexual Activity   Drug Use Not Currently       Family History:  I have reviewed the patients family history    Physical Exam:   Vitals:   Blood Pressure: 142/65 (09/28/19 1259)  Pulse: 100 (09/28/19 1259)  Temperature: 97 8 °F (36 6 °C) (09/28/19 1259)  Temp Source: Temporal (09/28/19 1259)  Respirations: 16 (09/28/19 1259)  Height: 6' (182 9 cm) (09/28/19 1114)  Weight - Scale: 110 kg (242 lb) (09/28/19 1114)  SpO2: 98 % (09/28/19 1259)    Physical Exam   Constitutional: He is oriented to person, place, and time  He appears well-developed and well-nourished  HENT:   Head: Normocephalic and atraumatic  Eyes: Pupils are equal, round, and reactive to light  EOM are normal    Neck: Normal range of motion  Neck supple  Cardiovascular: Regular rhythm  tachycardic   Pulmonary/Chest: Effort normal and breath sounds normal    Abdominal: Soft  Bowel sounds are normal    Musculoskeletal: Normal range of motion  He exhibits no edema  Neurological: He is alert and oriented to person, place, and time  Skin: Skin is warm  Capillary refill takes less than 2 seconds  Rash noted  Psychiatric: He has a normal mood and affect   His behavior is normal  Judgment and thought content normal    Nursing note and vitals reviewed  Additional Data:   Lab Results: I have personally reviewed pertinent reports  Results from last 7 days   Lab Units 09/28/19  1133   WBC Thousand/uL 14 00*   HEMOGLOBIN g/dL 14 7   HEMATOCRIT % 43 0   PLATELETS Thousands/uL 174   NEUTROS PCT % 79*   LYMPHS PCT % 7*   MONOS PCT % 14*   EOS PCT % 0     Results from last 7 days   Lab Units 09/28/19  1133   POTASSIUM mmol/L 3 7   CHLORIDE mmol/L 98   CO2 mmol/L 26   BUN mg/dL 20   CREATININE mg/dL 1 11   CALCIUM mg/dL 9 7   ALK PHOS U/L 56   ALT U/L 12   AST U/L 9*     Results from last 7 days   Lab Units 09/28/19  1133   INR  1 23         Results from last 7 days   Lab Units 09/24/19  1152   HEMOGLOBIN A1C  6 9*       Imaging: I have personally reviewed pertinent reports  CT head without contrast   Final Result by Sahil Banerjee MD (09/28 9644)      1  No acute intracranial abnormality  2   Known 5 mm left supraclinoid aneurysm better seen on prior exams  Workstation performed: [id number]         XR chest 1 view    (Results Pending)       EKG, Pathology, and Other Studies Reviewed on Admission:   · EKG: n/a    NetAccess/Epic Records Reviewed: Yes     ** Please Note: This note has been constructed using a voice recognition system   **

## 2019-09-28 NOTE — ED TRIAGE NOTES
Patient states he developed a rash on his hands and legs two days ago and his doctors took him off his plavix as they thought he had a reaction to it   He has had pain in his joints and increase in urination ( urinating every two hours, denies burning or pain)

## 2019-09-28 NOTE — ED PROVIDER NOTES
History  Chief Complaint   Patient presents with    Joint Pain    Rash    Weakness - Generalized     Generalized joint pain and rash, starting 2 days ago, no documented fever but "felt warm" pre-hospital   Rash was mainly on trunk and legs and after discussion with his doctors patient stopped taking Plavix which was started 1 month ago after TIA w/discovery of subacute CVA by MRI 2019  Rash is now nearly resolved, and barely visible to patient  Denies: cough, vomiting/diarrhea, SOB, now focal weakness  Denies tick bite or specific environmental exposure  Admits to urinary frequency w/o dysuria  Prior to Admission Medications   Prescriptions Last Dose Informant Patient Reported?  Taking?   aspirin 81 mg chewable tablet 2019 at Unknown time  Yes Yes   Sig: Chew 324 mg daily   atorvastatin (LIPITOR) 20 mg tablet 2019 at Unknown time  No Yes   Sig: Take 1 tablet (20 mg total) by mouth daily   betamethasone valerate (VALISONE) 0 1 % cream 2019 at Unknown time  No Yes   Sig: Apply topically 2 (two) times a day   lisinopril-hydrochlorothiazide (PRINZIDE,ZESTORETIC) 20-12 5 MG per tablet 2019 at Unknown time Self Yes Yes   Sig: Take 1 tablet by mouth 2 (two) times a day    metFORMIN (GLUCOPHAGE) 500 mg tablet 2019 at Unknown time Self Yes Yes   Si mg in the morning and 500 mg at night      Facility-Administered Medications: None       Past Medical History:   Diagnosis Date    Benign essential hypertension     Diabetes mellitus ([facility] )     Essential hypertension     Hyperlipidemia     Hypertension     Pilonidal cyst with abscess     Rheumatic heart disease     TIA (transient ischemic attack)     Type 2 diabetes mellitus (HCC)     Type II diabetes mellitus, uncontrolled ([address] )     Vitamin D deficiency        Past Surgical History:   Procedure Laterality Date    CYSTECTOMY      Per patient cyst removal from his back    LASIK         Family History   Problem Relation Age of Onset    Colon cancer Mother     Diabetes type II Mother     Heart disease Mother     Prostate cancer Father      I have reviewed and agree with the history as documented  Social History     Tobacco Use    Smoking status: Never Smoker    Smokeless tobacco: Never Used   Substance Use Topics    Alcohol use: Never     Frequency: Never    Drug use: Never        Review of Systems   Constitutional: Positive for chills, fatigue and fever  HENT: Negative for rhinorrhea and sore throat  Eyes: Negative for visual disturbance  Respiratory: Negative for cough and shortness of breath  Cardiovascular: Negative for chest pain and leg swelling  Gastrointestinal: Negative for abdominal pain, diarrhea, nausea and vomiting  Genitourinary: Positive for frequency  Negative for dysuria  Musculoskeletal: Positive for arthralgias  Negative for myalgias  Skin: Negative for rash  Neurological: Negative for dizziness, syncope and speech difficulty  Psychiatric/Behavioral: Negative for confusion  All other systems reviewed and are negative  Physical Exam  Physical Exam   Constitutional: He is oriented to person, place, and time  He appears well-developed and well-nourished  HENT:   Nose: Nose normal    Mouth/Throat: Oropharynx is clear and moist  No oropharyngeal exudate  Eyes: Pupils are equal, round, and reactive to light  Conjunctivae and EOM are normal  No scleral icterus  Neck: Normal range of motion  Neck supple  No JVD present  No tracheal deviation present  Cardiovascular: Regular rhythm and normal heart sounds  Tachycardia present  No murmur heard  Pulmonary/Chest: Effort normal and breath sounds normal  No respiratory distress  He has no wheezes  He has no rales  Abdominal: Soft  Bowel sounds are normal  There is no tenderness  There is no guarding  Musculoskeletal: Normal range of motion  He exhibits no edema or tenderness     Diffuse joint tenderness w/pain elicited on passive ROM   Neurological: He is alert and oriented to person, place, and time  No cranial nerve deficit or sensory deficit  He exhibits normal muscle tone  5/5 motor, nl sens   Skin: Skin is warm and dry  Rash: areas of faint circular erythema on LE which is blanching   Psychiatric: He has a normal mood and affect  His behavior is normal    Nursing note and vitals reviewed        Vital Signs  ED Triage Vitals   Temperature Pulse Respirations Blood Pressure SpO2   09/28/19 1114 09/28/19 1114 09/28/19 1114 09/28/19 1114 09/28/19 1114   99 6 °F (37 6 °C) (!) 112 20 (!) 185/102 98 %      Temp Source Heart Rate Source Patient Position - Orthostatic VS BP Location FiO2 (%)   09/28/19 1114 09/28/19 1114 09/28/19 1114 09/28/19 1114 --   Temporal Monitor Sitting Left arm       Pain Score       09/28/19 1420       Worst Possible Pain           Vitals:    09/28/19 1114 09/28/19 1200 09/28/19 1259 09/28/19 1420   BP: (!) 185/102  142/65 138/70   Pulse: (!) 112 103 100 95   Patient Position - Orthostatic VS: Sitting  Sitting Lying         Visual Acuity      ED Medications  Medications   aspirin chewable tablet 324 mg (has no administration in time range)   lisinopril (ZESTRIL) tablet 20 mg (has no administration in time range)   doxycycline hyclate (VIBRAMYCIN) capsule 100 mg (100 mg Oral Given 9/28/19 1457)   enoxaparin (LOVENOX) subcutaneous injection 40 mg (40 mg Subcutaneous Given 9/28/19 1457)   insulin lispro (HumaLOG) 100 units/mL subcutaneous injection 1-5 Units (1 Units Subcutaneous Given 9/28/19 1658)   insulin lispro (HumaLOG) 100 units/mL subcutaneous injection 1-5 Units (has no administration in time range)   sodium chloride 0 9 % infusion (75 mL/hr Intravenous New Bag 9/28/19 1457)   sodium chloride 0 9 % bolus 1,000 mL (0 mL Intravenous Stopped 9/28/19 1250)   acetaminophen (TYLENOL) tablet 650 mg (650 mg Oral Given 9/28/19 1148)   cefepime (MAXIPIME) IVPB (premix) 2,000 mg (0 mg Intravenous Stopped 9/28/19 1319)   magnesium sulfate IVPB (premix) SOLN 1 g (1 g Intravenous New Bag 9/28/19 1326)   sodium chloride 0 9 % bolus 1,000 mL (1,000 mL Intravenous New Bag 9/28/19 1326)       Diagnostic Studies  Results Reviewed     Procedure Component Value Units Date/Time    Urine Microscopic [[id number]]  (Abnormal) Collected:  09/28/19 1321    Lab Status:  Final result Specimen:  Urine, Clean Catch Updated:  09/28/19 1429     RBC, UA 1-2 /hpf      WBC, UA 1-2 /hpf      Epithelial Cells Occasional /hpf      Bacteria, UA Occasional /hpf      Fine granular casts 0-1 /lpf      MUCUS THREADS Occasional    UA w Reflex to Microscopic w Reflex to Culture [[id number]]  (Abnormal) Collected:  09/28/19 1321    Lab Status:  Final result Specimen:  Urine, Clean Catch Updated:  09/28/19 1335     Color, UA Yellow     Clarity, UA Clear     Specific Gravity, UA >=1 030     pH, UA 5 0     Leukocytes, UA Negative     Nitrite, UA Negative     Protein, UA 2+ mg/dl      Glucose, UA Negative mg/dl      Ketones, UA Trace mg/dl      Urobilinogen, UA 0 2 E U /dl      Bilirubin, UA Negative     Blood, UA 1+    Sedimentation rate, automated [[id number]]  (Abnormal) Collected:  09/28/19 1133    Lab Status:  Final result Specimen:  Blood from Arm, Right Updated:  09/28/19 1217     Sed Rate 41 mm/hour     Troponin I [[id number]]  (Normal) Collected:  09/28/19 1133    Lab Status:  Final result Specimen:  Blood from Arm, Right Updated:  09/28/19 1202     Troponin I <0 03 ng/mL     Comprehensive metabolic panel [[id number]]  (Abnormal) Collected:  09/28/19 1133    Lab Status:  Final result Specimen:  Blood from Arm, Right Updated:  09/28/19 1202     Sodium 134 mmol/L      Potassium 3 7 mmol/L      Chloride 98 mmol/L      CO2 26 mmol/L      ANION GAP 10 mmol/L      BUN 20 mg/dL      Creatinine 1 11 mg/dL      Glucose 208 mg/dL      Calcium 9 7 mg/dL      AST 9 U/L      ALT 12 U/L      Alkaline Phosphatase 56 U/L      Total Protein 7 2 g/dL Albumin 4 0 g/dL      Total Bilirubin 1 70 mg/dL      eGFR 66 ml/min/1 73sq m     Narrative:       [place] guidelines for Chronic Kidney Disease (CKD):     Stage 1 with normal or high GFR (GFR > 90 mL/min/1 73 square meters)    Stage 2 Mild CKD (GFR = 60-89 mL/min/1 73 square meters)    Stage 3A Moderate CKD (GFR = 45-59 mL/min/1 73 square meters)    Stage 3B Moderate CKD (GFR = 30-44 mL/min/1 73 square meters)    Stage 4 Severe CKD (GFR = 15-29 mL/min/1 73 square meters)    Stage 5 End Stage CKD (GFR <15 mL/min/1 73 square meters)  Note: GFR calculation is accurate only with a steady state creatinine    Magnesium [[id number]]  (Abnormal) Collected:  09/28/19 1133    Lab Status:  Final result Specimen:  Blood from Arm, Right Updated:  09/28/19 1202     Magnesium 1 5 mg/dL     Lipase [[id number]]  (Normal) Collected:  09/28/19 1133    Lab Status:  Final result Specimen:  Blood from Arm, Right Updated:  09/28/19 1202     Lipase 11 u/L     High sensitivity CRP [[id number]] Collected:  09/28/19 1133    Lab Status:  Final result Specimen:  Blood from Arm, Right Updated:  09/28/19 1201     CRP, High Sensitivity >90 00 mg/L     Narrative:             HsCRP Level       Relative Risk           <1 0 mg/L          Low           1 0 to 3 0 mg/L    Average           >3 0 mg/L          High        Lactic acid, plasma [[id number]]  (Normal) Collected:  09/28/19 1133    Lab Status:  Final result Specimen:  Blood from Arm, Right Updated:  09/28/19 1156     LACTIC ACID 1 4 mmol/L     Narrative:       Result may be elevated if tourniquet was used during collection      Protime-INR [[id number]]  (Abnormal) Collected:  09/28/19 1133    Lab Status:  Final result Specimen:  Blood from Arm, Right Updated:  09/28/19 1152     Protime 14 3 seconds      INR 1 23    APTT [[id number]]  (Abnormal) Collected:  09/28/19 1133    Lab Status:  Final result Specimen:  Blood from Arm, Right Updated:  09/28/19 1152     PTT 40 seconds     CBC and differential [[id number]]  (Abnormal) Collected:  09/28/19 1133    Lab Status:  Final result Specimen:  Blood from Arm, Right Updated:  09/28/19 1146     WBC 14 00 Thousand/uL      RBC 4 82 Million/uL      Hemoglobin 14 7 g/dL      Hematocrit 43 0 %      MCV 89 fL      MCH 30 5 pg      MCHC 34 2 g/dL      RDW 13 1 %      MPV 9 5 fL      Platelets 582 Thousands/uL      Neutrophils Relative 79 %      Lymphocytes Relative 7 %      Monocytes Relative 14 %      Eosinophils Relative 0 %      Basophils Relative 0 %      Neutrophils Absolute 11 20 Thousands/µL      Lymphocytes Absolute 0 90 Thousands/µL      Monocytes Absolute 1 90 Thousand/µL      Eosinophils Absolute 0 00 Thousand/µL      Basophils Absolute 0 00 Thousands/µL     Babesia microti antibody, IgG & Igm [[id number]] Collected:  09/28/19 1133    Lab Status: In process Specimen:  Blood from Arm, Right Updated:  09/28/19 1138    Lyme Antibody Profile with reflex to [facility] [[id number]] Collected:  09/28/19 1133    Lab Status: In process Specimen:  Blood from Arm, Right Updated:  44/74/52 1188    Ehrlichia antibody panel [[id number]] Collected:  09/28/19 1133    Lab Status: In process Specimen:  Blood from Arm, Right Updated:  09/28/19 1138    Mononucleosis screen [[id number]] Collected:  09/28/19 1133    Lab Status: In process Specimen:  Blood from Arm, Right Updated:  09/28/19 1138    Blood culture #1 [[id number]] Collected:  09/28/19 1133    Lab Status: In process Specimen:  Blood from Arm, Right Updated:  09/28/19 1138    Blood culture #2 [[id number]] Collected:  09/28/19 1133    Lab Status: In process Specimen:  Blood from Arm, Right Updated:  09/28/19 1137    Influenza A/B and RSV by PCR [[id number]] Collected:  09/28/19 1133    Lab Status: In process Specimen:  Nasopharyngeal Swab Updated:  09/28/19 1137                 XR chest 1 view   Final Result by Mitchel Jamil MD (09/28 1528)      No acute cardiopulmonary disease  Workstation performed: [id number]         CT head without contrast   Final Result by Elisabeth Santos MD (09/28 1234)      1  No acute intracranial abnormality  2   Known 5 mm left supraclinoid aneurysm better seen on prior exams                    Workstation performed: [id number]                    Procedures  ECG 12 Lead Documentation Only  Date/Time: 9/28/2019 12:25 PM  Performed by: Avelina Root MD  Authorized by: Avelina Root MD     ECG reviewed by me, the ED Provider: yes    Patient location:  ED  Comments:      Sinus tachycardia at 110, w/isolated PACs, no acute ST-T wave changes, no PVCs           ED Course  ED Course as of Sep 28 1755   Sat Sep 28, 2019   1124 Initial Impression/MDM: generalized arthralgias, recent rash now resolving (thought to be Plavix allergy), w/fever, no AMS or specific c/o w/regards to fever source; suspect sepsis; will screen with appropriate labs, start IVF w/Tylenol PO, then re-evaluate for more directed treatment; will send tick studies, although no hx tick bite          1349 Stable; no hypotension; inflammatory markers and WBCs elevated, given cefepime IV for broad empiric coverage pending ID of organism, suspicion for Lyme given presentation w/tick studies pending; discussed w/Dr Anum Quan who will admit                                   MDM    Disposition  Final diagnoses:   Arthralgia, unspecified joint   Sepsis ([facility] [address] )     Time reflects when diagnosis was documented in both MDM as applicable and the Disposition within this note     Time User Action Codes Description Comment    9/28/2019  1:47 PM Clarissa Gaucher Add [M25 50] Arthralgia, unspecified joint     9/28/2019  1:47 PM Clarissa Gaucher Add [A41 9] Sepsis [facility])       ED Disposition     ED Disposition Condition Date/Time Comment    Admit Stable Sat Sep 28, 2019  1:47 PM Case was discussed with Dr Anum Quan and the patient's admission status was agreed to be Admission Status: inpatient status to the service of   [facility]          Follow-up Information    None         Current Discharge Medication List      CONTINUE these medications which have NOT CHANGED    Details   aspirin 81 mg chewable tablet Chew 324 mg daily      atorvastatin (LIPITOR) 20 mg tablet Take 1 tablet (20 mg total) by mouth daily  Qty: 30 tablet, Refills: 3    Associated Diagnoses: TIA (transient ischemic attack); Left-sided weakness      betamethasone valerate (VALISONE) 0 1 % cream Apply topically 2 (two) times a day  Qty: 30 g, Refills: 1    Associated Diagnoses: Skin rash      lisinopril-hydrochlorothiazide (PRINZIDE,ZESTORETIC) 20-12 5 MG per tablet Take 1 tablet by mouth 2 (two) times a day       metFORMIN (GLUCOPHAGE) 500 mg tablet 1000 mg in the morning and 500 mg at night           No discharge procedures on file      ED Provider  Electronically Signed by           Roc Cheung MD  09/28/19 1548

## 2019-09-28 NOTE — TELEPHONE ENCOUNTER
Karol Tran 1945  CONFIDENTIALTY NOTICE: This fax transmission is intended only for the addressee  It contains information that is legally privileged,  confidential or otherwise protected from use or disclosure  If you are not the intended recipient, you are strictly prohibited from reviewing,  disclosing, copying using or disseminating any of this information or taking any action in reliance on or regarding this information  If you have  received this fax in error, please notify us immediately by telephone so that we can arrange for its return to us  Page: 1  2  Call Id: [id number]  Health Call  Standard Call Report  Health Call  Patient Name: Karol Tran  Gender: Male  : 1945  Age: 72 Y 6 M 21 D  Return Phone  Number:  [phone] (Home), [phone] (Cell)  Address: [facility]/Zip: [address] [place] [address]  Practice Name: [address] [place] [address] Charged:  Physician:  [facility] Name: Padmini Ritter  Relationship To  Patient: Spouse  Return Phone Number: [phone] (Cell)  Presenting Problem: "My  is having terrible body  pain "  Service Type: Triage  Charged Service 1: Triages  Pharmacy Name and  Number:  Nurse Assessment  Nurse: eBnjy Nixon RN, Gary Malagon Date/Time: 2019 8:56:29 AM  Type of assessment required:  ---General (Adult or Child)  Duration of Current S/S  ---Since yesterday  Location/Radiation  ---Generalized - Shoulder, hands, legs, hips  Temperature (F) and route:  ---98 (oral)  Symptom Specific Meds (Dose/Time):  ---None  Other S/S  ---Has been having generalized pain all over his body since yesterday morning  Pain  is in his arms, hips, and legs  Is having a hard time walking which is unusual for the  patient  Denies swelling to arms or legs  Is diabetic but has not measured BG this  morning  Wife states "he has been on a good trend recently" regarding blood glucose    Pain Scale on scale of 1-10, 10 being the worst:  ---10/10- Aching pain  Symptom progression:  ---worse  Intake and Output  Pearl Reach 1945  CONFIDENTIALTY NOTICE: This fax transmission is intended only for the addressee  It contains information that is legally privileged,  confidential or otherwise protected from use or disclosure  If you are not the intended recipient, you are strictly prohibited from reviewing,  disclosing, copying using or disseminating any of this information or taking any action in reliance on or regarding this information  If you have  received this fax in error, please notify us immediately by telephone so that we can arrange for its return to us  Page: 2 of 2  Call Id: [id number]  Nurse Assessment  ---Decreased / [place] a lot  Last Exam/Treatment:  ---9/24/2019 in the office for routine well check  Protocols  Protocol Title Nurse Date/Time  No Guideline Available - Sick Adult Call Daljit Borjas, [address] [place] [address], Vania Gardner 9/28/2019 9:10:28 AM  Question Caller Affirmed  Disp  Time Disposition Final User  9/28/2019 9:10:53 AM Go to ED Now Daljit Borjas RN, Vania Gardner  9/28/2019 9:11:06 AM RN Triaged Yes Daljit Borjas, RN, [facility] Advice Given Per Protocol  GO TO ED NOW: You need to be seen in the Emergency Department  Go to the ER at [facility] now  Drive carefully  CARE ADVICE per nursing judgment or reference (No Guideline Available - Sick Adult Call;  Adult)  Caller Understands: Yes  Caller Disagree/Comply: Comply  PreDisposition: Unsure

## 2019-09-28 NOTE — ASSESSMENT & PLAN NOTE
Lab Results   Component Value Date    HGBA1C 6 9 (A) 09/24/2019       No results for input(s): POCGLU in the last 72 hours      Blood Sugar Average: Last 72 hrs:     Hold metformin while in-house, sliding scale insulin

## 2019-09-28 NOTE — ASSESSMENT & PLAN NOTE
· Met sepsis criteria with leukocytosis and tachycardia  · No obvious source this time  · Concern for possible to tick borne illness in light of joint pain, rash, myalgias and body aches  · Will start doxycycline pending serologies including Lyme, Ehrlichia, Babesia  · If cultures and diagnostic testing unrevealing, will consider Infectious Disease consultation

## 2019-09-28 NOTE — PLAN OF CARE
Problem: Potential for Falls  Goal: Patient will remain free of falls  Description  INTERVENTIONS:  - Assess patient frequently for physical needs  -  Identify cognitive and physical deficits and behaviors that affect risk of falls    -  [place] fall precautions as indicated by assessment   - Educate patient/family on patient safety including physical limitations  - Instruct patient to call for assistance with activity based on assessment  - Modify environment to reduce risk of injury  - Consider OT/PT consult to assist with strengthening/mobility  Outcome: Progressing     Problem: Prexisting or High Potential for Compromised Skin Integrity  Goal: Skin integrity is maintained or improved  Description  INTERVENTIONS:  - Identify patients at risk for skin breakdown  - Assess and monitor skin integrity  - Assess and monitor nutrition and hydration status  - Monitor labs   - Assess for incontinence   - Turn and reposition patient  - Assist with mobility/ambulation  - Relieve pressure over bony prominences  - Avoid friction and shearing  - Provide appropriate hygiene as needed including keeping skin clean and dry  - Evaluate need for skin moisturizer/barrier cream  - Collaborate with interdisciplinary team   - Patient/family teaching  - Consider wound care consult   Outcome: Progressing     Problem: PAIN - ADULT  Goal: Verbalizes/displays adequate comfort level or baseline comfort level  Description  Interventions:  - Encourage patient to monitor pain and request assistance  - Assess pain using appropriate pain scale  - Administer analgesics based on type and severity of pain and evaluate response  - Implement non-pharmacological measures as appropriate and evaluate response  - Consider cultural and social influences on pain and pain management  - Notify physician/advanced practitioner if interventions unsuccessful or patient reports new pain  Outcome: Progressing     Problem: INFECTION - ADULT  Goal: Absence or prevention of progression during hospitalization  Description  INTERVENTIONS:  - Assess and monitor for signs and symptoms of infection  - Monitor lab/diagnostic results  - Monitor all insertion sites, i e  indwelling lines, tubes, and drains  - Monitor endotracheal if appropriate and nasal secretions for changes in amount and color  - [place] appropriate cooling/warming therapies per order  - Administer medications as ordered  - Instruct and encourage patient and family to use good hand hygiene technique  - Identify and instruct in appropriate isolation precautions for identified infection/condition  Outcome: Progressing  Goal: Absence of fever/infection during neutropenic period  Description  INTERVENTIONS:  - Monitor WBC    Outcome: Progressing     Problem: SAFETY ADULT  Goal: Maintain or return to baseline ADL function  Description  INTERVENTIONS:  -  Assess patient's ability to carry out ADLs; assess patient's baseline for ADL function and identify physical deficits which impact ability to perform ADLs (bathing, care of mouth/teeth, toileting, grooming, dressing, etc )  - Assess/evaluate cause of self-care deficits   - Assess range of motion  - Assess patient's mobility; develop plan if impaired  - Assess patient's need for assistive devices and provide as appropriate  - Encourage maximum independence but intervene and supervise when necessary  - Involve family in performance of ADLs  - Assess for home care needs following discharge   - Consider OT consult to assist with ADL evaluation and planning for discharge  - Provide patient education as appropriate  Outcome: Progressing  Goal: Maintain or return mobility status to optimal level  Description  INTERVENTIONS:  - Assess patient's baseline mobility status (ambulation, transfers, stairs, etc )    - Identify cognitive and physical deficits and behaviors that affect mobility  - Identify mobility aids required to assist with transfers and/or ambulation (gait belt, sit-to-stand, lift, walker, cane, etc )  - [place] fall precautions as indicated by assessment  - Record patient progress and toleration of activity level on Mobility SBAR; progress patient to next Phase/Stage  - Instruct patient to call for assistance with activity based on assessment  - Consider rehabilitation consult to assist with strengthening/weightbearing, etc   Outcome: Progressing     Problem: DISCHARGE PLANNING  Goal: Discharge to home or other facility with appropriate resources  Description  INTERVENTIONS:  - Identify barriers to discharge w/patient and caregiver  - Arrange for needed discharge resources and transportation as appropriate  - Identify discharge learning needs (meds, wound care, etc )  - Arrange for interpretive services to assist at discharge as needed  - Refer to Case Management Department for coordinating discharge planning if the patient needs post-hospital services based on physician/advanced practitioner order or complex needs related to functional status, cognitive ability, or social support system  Outcome: Progressing     Problem: Knowledge Deficit  Goal: Patient/family/caregiver demonstrates understanding of disease process, treatment plan, medications, and discharge instructions  Description  Complete learning assessment and assess knowledge base    Interventions:  - Provide teaching at level of understanding  - Provide teaching via preferred learning methods  Outcome: Progressing

## 2019-09-29 PROBLEM — M13.0 POLYARTICULAR ARTHRITIS: Status: ACTIVE | Noted: 2019-09-29

## 2019-09-29 LAB
ALBUMIN SERPL BCP-MCNC: 3.4 G/DL (ref 3.5–5.7)
ALP SERPL-CCNC: 44 U/L (ref 55–165)
ALT SERPL W P-5'-P-CCNC: 9 U/L (ref 7–52)
ANION GAP SERPL CALCULATED.3IONS-SCNC: 8 MMOL/L (ref 4–13)
AST SERPL W P-5'-P-CCNC: 7 U/L (ref 13–39)
ATRIAL RATE: 112 BPM
BASOPHILS # BLD AUTO: 0.1 THOUSANDS/ΜL (ref 0–0.1)
BASOPHILS NFR BLD AUTO: 0 % (ref 0–2)
BILIRUB SERPL-MCNC: 1.6 MG/DL (ref 0.2–1)
BUN SERPL-MCNC: 23 MG/DL (ref 7–25)
CALCIUM SERPL-MCNC: 8.8 MG/DL (ref 8.6–10.5)
CHLORIDE SERPL-SCNC: 99 MMOL/L (ref 98–107)
CK SERPL-CCNC: 23 U/L (ref 30–308)
CO2 SERPL-SCNC: 29 MMOL/L (ref 21–31)
CREAT SERPL-MCNC: 1.31 MG/DL (ref 0.7–1.3)
EOSINOPHIL # BLD AUTO: 0.1 THOUSAND/ΜL (ref 0–0.61)
EOSINOPHIL NFR BLD AUTO: 1 % (ref 0–5)
ERYTHROCYTE [DISTWIDTH] IN BLOOD BY AUTOMATED COUNT: 13.1 % (ref 11.5–14.5)
FLUAV AG SPEC QL: NOT DETECTED
FLUBV AG SPEC QL: NOT DETECTED
GFR SERPL CREATININE-BSD FRML MDRD: 54 ML/MIN/1.73SQ M
GLUCOSE SERPL-MCNC: 149 MG/DL (ref 65–140)
GLUCOSE SERPL-MCNC: 163 MG/DL (ref 65–99)
GLUCOSE SERPL-MCNC: 172 MG/DL (ref 65–140)
GLUCOSE SERPL-MCNC: 203 MG/DL (ref 65–140)
GLUCOSE SERPL-MCNC: 244 MG/DL (ref 65–140)
HCT VFR BLD AUTO: 39 % (ref 42–47)
HGB BLD-MCNC: 13.1 G/DL (ref 14–18)
LYMPHOCYTES # BLD AUTO: 1.4 THOUSANDS/ΜL (ref 0.6–4.47)
LYMPHOCYTES NFR BLD AUTO: 11 % (ref 21–51)
MCH RBC QN AUTO: 30.6 PG (ref 26–34)
MCHC RBC AUTO-ENTMCNC: 33.5 G/DL (ref 31–37)
MCV RBC AUTO: 92 FL (ref 81–99)
MONOCYTES # BLD AUTO: 1.5 THOUSAND/ΜL (ref 0.17–1.22)
MONOCYTES NFR BLD AUTO: 13 % (ref 2–12)
NEUTROPHILS # BLD AUTO: 9.1 THOUSANDS/ΜL (ref 1.4–6.5)
NEUTS SEG NFR BLD AUTO: 75 % (ref 42–75)
P AXIS: 50 DEGREES
PLATELET # BLD AUTO: 168 THOUSANDS/UL (ref 149–390)
PMV BLD AUTO: 9.7 FL (ref 8.6–11.7)
POTASSIUM SERPL-SCNC: 3.5 MMOL/L (ref 3.5–5.5)
PR INTERVAL: 150 MS
PROT SERPL-MCNC: 6.3 G/DL (ref 6.4–8.9)
QRS AXIS: -37 DEGREES
QRSD INTERVAL: 92 MS
QT INTERVAL: 330 MS
QTC INTERVAL: 450 MS
RBC # BLD AUTO: 4.27 MILLION/UL (ref 4.3–5.9)
RSV B RNA SPEC QL NAA+PROBE: NOT DETECTED
SODIUM SERPL-SCNC: 136 MMOL/L (ref 134–143)
T WAVE AXIS: 44 DEGREES
VENTRICULAR RATE: 112 BPM
WBC # BLD AUTO: 12.1 THOUSAND/UL (ref 4.8–10.8)

## 2019-09-29 PROCEDURE — 80053 COMPREHEN METABOLIC PANEL: CPT | Performed by: INTERNAL MEDICINE

## 2019-09-29 PROCEDURE — G8978 MOBILITY CURRENT STATUS: HCPCS

## 2019-09-29 PROCEDURE — 85025 COMPLETE CBC W/AUTO DIFF WBC: CPT | Performed by: INTERNAL MEDICINE

## 2019-09-29 PROCEDURE — 93010 ELECTROCARDIOGRAM REPORT: CPT | Performed by: INTERNAL MEDICINE

## 2019-09-29 PROCEDURE — G8979 MOBILITY GOAL STATUS: HCPCS

## 2019-09-29 PROCEDURE — 97530 THERAPEUTIC ACTIVITIES: CPT

## 2019-09-29 PROCEDURE — 99232 SBSQ HOSP IP/OBS MODERATE 35: CPT | Performed by: INTERNAL MEDICINE

## 2019-09-29 PROCEDURE — 97163 PT EVAL HIGH COMPLEX 45 MIN: CPT

## 2019-09-29 PROCEDURE — 82550 ASSAY OF CK (CPK): CPT | Performed by: INTERNAL MEDICINE

## 2019-09-29 PROCEDURE — 82948 REAGENT STRIP/BLOOD GLUCOSE: CPT

## 2019-09-29 RX ORDER — INSULIN GLARGINE 100 [IU]/ML
10 INJECTION, SOLUTION SUBCUTANEOUS
Status: DISCONTINUED | OUTPATIENT
Start: 2019-09-29 | End: 2019-09-29

## 2019-09-29 RX ORDER — INSULIN GLARGINE 100 [IU]/ML
10 INJECTION, SOLUTION SUBCUTANEOUS
Status: DISCONTINUED | OUTPATIENT
Start: 2019-09-29 | End: 2019-10-02

## 2019-09-29 RX ORDER — KETOROLAC TROMETHAMINE 30 MG/ML
15 INJECTION, SOLUTION INTRAMUSCULAR; INTRAVENOUS EVERY 6 HOURS PRN
Status: DISCONTINUED | OUTPATIENT
Start: 2019-09-29 | End: 2019-09-29

## 2019-09-29 RX ORDER — INSULIN GLARGINE 100 [IU]/ML
7 INJECTION, SOLUTION SUBCUTANEOUS
Status: DISCONTINUED | OUTPATIENT
Start: 2019-09-29 | End: 2019-09-29

## 2019-09-29 RX ADMIN — INSULIN GLARGINE 10 UNITS: 100 INJECTION, SOLUTION SUBCUTANEOUS at 21:52

## 2019-09-29 RX ADMIN — KETOROLAC TROMETHAMINE 15 MG: 30 INJECTION, SOLUTION INTRAMUSCULAR; INTRAVENOUS at 11:47

## 2019-09-29 RX ADMIN — ASPIRIN 81 MG 324 MG: 81 TABLET ORAL at 08:13

## 2019-09-29 RX ADMIN — INSULIN LISPRO 2 UNITS: 100 INJECTION, SOLUTION INTRAVENOUS; SUBCUTANEOUS at 11:47

## 2019-09-29 RX ADMIN — KETOROLAC TROMETHAMINE 15 MG: 30 INJECTION, SOLUTION INTRAMUSCULAR; INTRAVENOUS at 01:30

## 2019-09-29 RX ADMIN — INSULIN LISPRO 1 UNITS: 100 INJECTION, SOLUTION INTRAVENOUS; SUBCUTANEOUS at 22:10

## 2019-09-29 RX ADMIN — LISINOPRIL 20 MG: 20 TABLET ORAL at 08:13

## 2019-09-29 RX ADMIN — DOXYCYCLINE HYCLATE 100 MG: 50 CAPSULE ORAL at 21:52

## 2019-09-29 RX ADMIN — INSULIN LISPRO 1 UNITS: 100 INJECTION, SOLUTION INTRAVENOUS; SUBCUTANEOUS at 07:30

## 2019-09-29 RX ADMIN — SODIUM CHLORIDE 75 ML/HR: 9 INJECTION, SOLUTION INTRAVENOUS at 01:25

## 2019-09-29 RX ADMIN — ENOXAPARIN SODIUM 40 MG: 40 INJECTION SUBCUTANEOUS at 08:12

## 2019-09-29 RX ADMIN — DOXYCYCLINE HYCLATE 100 MG: 50 CAPSULE ORAL at 08:13

## 2019-09-29 NOTE — ASSESSMENT & PLAN NOTE
Lab Results   Component Value Date    HGBA1C 6 9 (A) 09/24/2019       Recent Labs     09/28/19  1608 09/28/19  2106 09/29/19  0621 09/29/19  1119   POCGLU 164* 186* 172* 244*       Blood Sugar Average: Last 72 hrs:  (P) 191 5   Hold metformin while in-house, sliding scale insulin along with Lantus

## 2019-09-29 NOTE — PHYSICAL THERAPY NOTE
Physical Therapy Evaluation     Patient's Name: Dami Qurenata    Admitting Diagnosis  Joint pain [M25 50]  Rash [R21]  Weakness [R53 1]  Sepsis ([facility] [address] ) [A41 9]  Arthralgia, unspecified joint [M25 50]    Problem List  Patient Active Problem List   Diagnosis    Cerebral aneurysm, nonruptured    TIA (transient ischemic attack)    Left-sided weakness    Stage 3 chronic kidney disease ([facility] [address] )    Essential hypertension    Type 2 diabetes mellitus ([place] [address] )    Class 1 obesity due to excess calories with serious comorbidity and body mass index (BMI) of 32 0 to 32 9 in adult    Sepsis [facility])       Past Medical History  Past Medical History:   Diagnosis Date    Benign essential hypertension     Diabetes mellitus ([place] [address] )     Essential hypertension     Hyperlipidemia     Hypertension     Pilonidal cyst with abscess     Rheumatic heart disease     TIA (transient ischemic attack)     Type 2 diabetes mellitus (HCC)     Type II diabetes mellitus, uncontrolled ([place] [address] )     Vitamin D deficiency        Past Surgical History  Past Surgical History:   Procedure Laterality Date    CYSTECTOMY      Per patient cyst removal from his back    LASIK          09/29/19 0820   Note Type   Note type Eval only   Pain Assessment   Pain Assessment 0-10   Pain Score 9   Pain Type Acute pain   Pain Location Generalized   Pain Descriptors Aching; Sharp   Pain Frequency Intermittent  (with mobility)   Pain Onset Ongoing   Clinical Progression Not changed   Patient's Stated Pain Goal No pain   Hospital Pain Intervention(s) Medication (See MAR); Repositioned; Ambulation/increased activity   Multiple Pain Sites Yes   Home Living   Type of Home Mobile home   Home Layout One level;Stairs to enter with rails  (6 [place]LLY w/B HR's)   Bathroom Shower/Tub Walk-in shower   Bathroom Toilet Raised   Bathroom Equipment Grab bars in shower   [address]   (PTA, pt  did not utilize an AD or DME )   Prior Function   Level of [place] Independent with ADLs and functional mobility   Lives With Spouse   ADL Assistance Independent   IADLs Independent   Falls in the last 6 months 1 to 4  (x 1)   Vocational   (works 1 day a week x 12 hrs)   Restrictions/Precautions   Weight Bearing Precautions Per Order No   Other Precautions Fall Risk;Pain;Telemetry   General   Family/Caregiver Present No   Cognition   Overall Cognitive Status WFL   Arousal/Participation Alert   Orientation Level Oriented X4   Memory Within functional limits   Following Commands Follows one step commands with increased time or repetition   RUE Assessment   RUE Assessment X   RUE Overall AROM   R Shoulder Flexion decreased to 90 degrees   R Shoulder ABduction decreased to 90 degrees   LUE Assessment   LUE Assessment WFL   RLE Assessment   RLE Assessment X   Strength RLE   R Hip Flexion 3+/5   R Knee Extension 3+/5   R Ankle Dorsiflexion 3+/5   LLE Assessment   LLE Assessment X   Strength LLE   L Hip Flexion 3+/5   L Knee Extension 3+/5   L Ankle Dorsiflexion 3+/5   Coordination   Movements are Fluid and Coordinated 0   Coordination and Movement Description Incremental mobility requiring increased time and tactile cues of 1 throughout session  Light Touch   RLE Light Touch Grossly intact   LLE Light Touch Grossly intact   Sharp/Dull   RLE Sharp/Dull Grossly intact   LLE Sharp/Dull Grossly intact   Bed Mobility   Supine to Sit 3  Moderate assistance   Additional items HOB elevated; Bedrails;Assist x 1; Increased time required;Verbal cues;LE management   Sit to Supine 3  Moderate assistance   Additional items Assist x 1;Bedrails; Increased time required;Verbal cues;LE management   Transfers   Sit to Stand 4  Minimal assistance   Additional items Assist x 1;Bedrails; Increased time required   Stand to Sit 4  Minimal assistance   Additional items Assist x 1;Bedrails; Increased time required;Verbal cues   Stand pivot 3  Moderate assistance   Additional items Assist x 1;Verbal cues Toilet transfer   (CGA)   Additional items Assist x 1;Standard toilet;Verbal cues; Increased time required   Ambulation/Elevation   Gait pattern Narrow MAGALY; Forward Flexion;Decreased foot clearance; Short stride   Gait Assistance   (CGA)   Additional items Assist x 1;Verbal cues; Tactile cues  (consistently)   Assistive Device Rolling walker   Distance 25 feet x 2  (to/from toilet)   Balance   Static Sitting Good   Dynamic Sitting Good   Static Standing Fair +   Dynamic Standing Fair   Ambulatory Fair   Endurance Deficit   Endurance Deficit Yes   Activity Tolerance   Activity Tolerance Patient limited by fatigue   Nurse Made Aware yes   Assessment   Prognosis Good   Problem List Decreased strength;Decreased endurance; Impaired balance;Decreased mobility; Decreased safety awareness; Impaired judgement;Pain   Assessment Pt is 72 y o  male seen for PT evaluation [facility] w/ Sepsis ([facility] [address] )  PT consulted to assess pt's functional mobility and d/c needs  Order placed for PT eval and tx, w/ activity as tolerated order  Comorbidities affecting pt's physical performance at time of assessment include: pain, sepsis, HTN,DM,CKD stage 3, L sided weakness   PTA, pt was independent w/ all functional mobility w/ o AD  Personal factors affecting pt at time of IE include: inaccessible home environment, ambulating w/ assistive device, stairs to enter home, inability to navigate community distances, inability to navigate level surfaces w/o external assistance, unable to perform dynamic tasks in community, unable to perform physical activity and inability to perform IADLs   Please find objective findings from PT assessment regarding body systems outlined above with impairments and limitations including weakness, impaired balance, decreased endurance, gait deviations, pain, decreased activity tolerance, decreased functional mobility tolerance, decreased safety awareness, impaired judgement and fall risk  The following objective measures performed on IE also reveal limitations: Barthel Index: 55/100  Pt's clinical presentation is currently unstable/unpredictable  Pt to benefit from continued PT tx to address deficits as defined above and maximize level of functional independent mobility and consistency  From PT/mobility standpoint, recommendation at time of d/c would be STR vs HHPT pending progress in order to facilitate return to PLOF  Barriers to Discharge Inaccessible home environment   Barriers to Discharge Comments HO[place] home   Goals   Patient Goals feel better soon   LTG Expiration Date 10/09/19   Long Term Goal #1 1 )Patient will complete bed mobility with supervision of 1 for decrease need for caregiver assistance, decrease burden of care  2 ) Patient will complete transfers with supervision of 1 to decrease risk of falls, facilitate upright standing posture  3 ) BLE strength to greater than/equal to 4-/5 gross musculature to increase ability to safely transfer, control descent to chair  4 ) Patient will exhibit increase dynamic standing balance to Good , for 2-3 minutes  without LOB and supervision of 1 to improve activity tolerance & endurance  5 ) Patient will exhibit increase dynamic ambulatory balance to Fair+ for 100-200 feet w/RW modified I  to improve ability to mobilize to toilet, chair and decrease risk for additional medical complications  6 ) Patient will exhibit good self monitoring and ability to follow 2 step commands to increase complexity of tasks and resume ADL's without LOB  PT Treatment Day 1   Plan   Treatment/Interventions Functional transfer training;LE strengthening/ROM; Therapeutic exercise; Endurance training;Patient/family training;Equipment eval/education; Bed mobility;Gait training;OT   PT Frequency 5x/wk   Recommendation   Recommendation Other (Comment)  (STR vs HHPT)   Equipment Recommended Walker   PT - OK to Discharge No   Additional Comments Upon conclusion, pt  was resting in bed w/all needs within reach  Barthel Index   Feeding 10   Bathing 5   Grooming Score 5   Dressing Score 10   Bladder Score 5   Bowels Score 5   Toilet Use Score 5   Transfers (Bed/Chair) Score 10   Mobility (Level Surface) Score 0   Stairs Score 0   Barthel Index Score 55     Additional skilled interventions: Therapeutic Activity x 10 minutes    S: 9/10 generalized pain w/ mobility, agreeable to mobilize OOB and requested toilet usage;A&Ox4  O: therapeutic activity x 10 minutes including mobilization education: bed mobility, supine<->sit, static/dynamic sitting balance w/ postural facilitation, sit<->stand transfers, static/dynamic standing balance w/ postural facilitation, toileting, and continued safety training for increased awareness  A: Patient exhibited  weakness, impaired balance, gait dysfunction, decreased endurance, activity intolerance, and decline from PLOF to benefit from continued interventions  P: Continue PT tx with progression of functional tasks as patient can safely tolerate, 5x/week        Beatriz Cole, PT

## 2019-09-29 NOTE — PLAN OF CARE
Problem: Potential for Falls  Goal: Patient will remain free of falls  Description  INTERVENTIONS:  - Assess patient frequently for physical needs  -  Identify cognitive and physical deficits and behaviors that affect risk of falls    -  [place] fall precautions as indicated by assessment   - Educate patient/family on patient safety including physical limitations  - Instruct patient to call for assistance with activity based on assessment  - Modify environment to reduce risk of injury  - Consider OT/PT consult to assist with strengthening/mobility  Outcome: Progressing     Problem: Prexisting or High Potential for Compromised Skin Integrity  Goal: Skin integrity is maintained or improved  Description  INTERVENTIONS:  - Identify patients at risk for skin breakdown  - Assess and monitor skin integrity  - Assess and monitor nutrition and hydration status  - Monitor labs   - Assess for incontinence   - Turn and reposition patient  - Assist with mobility/ambulation  - Relieve pressure over bony prominences  - Avoid friction and shearing  - Provide appropriate hygiene as needed including keeping skin clean and dry  - Evaluate need for skin moisturizer/barrier cream  - Collaborate with interdisciplinary team   - Patient/family teaching  - Consider wound care consult   Outcome: Progressing     Problem: PAIN - ADULT  Goal: Verbalizes/displays adequate comfort level or baseline comfort level  Description  Interventions:  - Encourage patient to monitor pain and request assistance  - Assess pain using appropriate pain scale  - Administer analgesics based on type and severity of pain and evaluate response  - Implement non-pharmacological measures as appropriate and evaluate response  - Consider cultural and social influences on pain and pain management  - Notify physician/advanced practitioner if interventions unsuccessful or patient reports new pain  Outcome: Progressing     Problem: INFECTION - ADULT  Goal: Absence or prevention of progression during hospitalization  Description  INTERVENTIONS:  - Assess and monitor for signs and symptoms of infection  - Monitor lab/diagnostic results  - Monitor all insertion sites, i e  indwelling lines, tubes, and drains  - Monitor endotracheal if appropriate and nasal secretions for changes in amount and color  - [place] appropriate cooling/warming therapies per order  - Administer medications as ordered  - Instruct and encourage patient and family to use good hand hygiene technique  - Identify and instruct in appropriate isolation precautions for identified infection/condition  Outcome: Progressing  Goal: Absence of fever/infection during neutropenic period  Description  INTERVENTIONS:  - Monitor WBC    Outcome: Progressing     Problem: SAFETY ADULT  Goal: Maintain or return to baseline ADL function  Description  INTERVENTIONS:  -  Assess patient's ability to carry out ADLs; assess patient's baseline for ADL function and identify physical deficits which impact ability to perform ADLs (bathing, care of mouth/teeth, toileting, grooming, dressing, etc )  - Assess/evaluate cause of self-care deficits   - Assess range of motion  - Assess patient's mobility; develop plan if impaired  - Assess patient's need for assistive devices and provide as appropriate  - Encourage maximum independence but intervene and supervise when necessary  - Involve family in performance of ADLs  - Assess for home care needs following discharge   - Consider OT consult to assist with ADL evaluation and planning for discharge  - Provide patient education as appropriate  Outcome: Progressing  Goal: Maintain or return mobility status to optimal level  Description  INTERVENTIONS:  - Assess patient's baseline mobility status (ambulation, transfers, stairs, etc )    - Identify cognitive and physical deficits and behaviors that affect mobility  - Identify mobility aids required to assist with transfers and/or ambulation (gait belt, sit-to-stand, lift, walker, cane, etc )  - [place] fall precautions as indicated by assessment  - Record patient progress and toleration of activity level on Mobility SBAR; progress patient to next Phase/Stage  - Instruct patient to call for assistance with activity based on assessment  - Consider rehabilitation consult to assist with strengthening/weightbearing, etc   Outcome: Progressing     Problem: DISCHARGE PLANNING  Goal: Discharge to home or other facility with appropriate resources  Description  INTERVENTIONS:  - Identify barriers to discharge w/patient and caregiver  - Arrange for needed discharge resources and transportation as appropriate  - Identify discharge learning needs (meds, wound care, etc )  - Arrange for interpretive services to assist at discharge as needed  - Refer to Case Management Department for coordinating discharge planning if the patient needs post-hospital services based on physician/advanced practitioner order or complex needs related to functional status, cognitive ability, or social support system  Outcome: Progressing     Problem: Knowledge Deficit  Goal: Patient/family/caregiver demonstrates understanding of disease process, treatment plan, medications, and discharge instructions  Description  Complete learning assessment and assess knowledge base    Interventions:  - Provide teaching at level of understanding  - Provide teaching via preferred learning methods  Outcome: Progressing

## 2019-09-29 NOTE — PLAN OF CARE
Problem: PHYSICAL THERAPY ADULT  Goal: Performs mobility at highest level of function for planned discharge setting  See evaluation for individualized goals  Description  Treatment/Interventions: Functional transfer training, LE strengthening/ROM, Therapeutic exercise, Endurance training, Patient/family training, Equipment eval/education, Bed mobility, Gait training, OT  Equipment Recommended: Atiya Rodríguez  See flowsheet documentation for full assessment, interventions and recommendations  Dennie Ona, PT     Note:   Prognosis: Good  Problem List: Decreased strength, Decreased endurance, Impaired balance, Decreased mobility, Decreased safety awareness, Impaired judgement, Pain  Assessment: Pt is 72 y o  male seen for PT evaluation [facility] w/ Sepsis ([facility] [address] )  PT consulted to assess pt's functional mobility and d/c needs  Order placed for PT eval and tx, w/ activity as tolerated order  Comorbidities affecting pt's physical performance at time of assessment include: pain, sepsis, HTN,DM,CKD stage 3, L sided weakness   PTA, pt was independent w/ all functional mobility w/ o AD  Personal factors affecting pt at time of IE include: inaccessible home environment, ambulating w/ assistive device, stairs to enter home, inability to navigate community distances, inability to navigate level surfaces w/o external assistance, unable to perform dynamic tasks in community, unable to perform physical activity and inability to perform IADLs  Please find objective findings from PT assessment regarding body systems outlined above with impairments and limitations including weakness, impaired balance, decreased endurance, gait deviations, pain, decreased activity tolerance, decreased functional mobility tolerance, decreased safety awareness, impaired judgement and fall risk  The following objective measures performed on IE also reveal limitations: Barthel Index: 55/100   Pt's clinical presentation is currently unstable/unpredictable  Pt to benefit from continued PT tx to address deficits as defined above and maximize level of functional independent mobility and consistency  From PT/mobility standpoint, recommendation at time of d/c would be STR vs HHPT pending progress in order to facilitate return to PLOF  Barriers to Discharge: Inaccessible home environment  Barriers to Discharge Comments: HO[place] home  Recommendation: Other (Comment)(STR vs HHPT)     PT - OK to Discharge: No    See flowsheet documentation for full assessment     Asya Loo, PT

## 2019-09-29 NOTE — PROGRESS NOTES
Patient resting in bed at present  Patient complains of being "hot"  Temp 98 degrees F tympanically  Cool washcloth given  Will continue to monitor

## 2019-09-29 NOTE — PROGRESS NOTES
Progress Note - Tj Pitt 1945, 72 y o  male MRN: [id number]    Unit/Bed#: -01 Encounter: [id number]    Primary Care Provider: KAYODE Clark   Date and time admitted to hospital: 9/28/2019 11:08 AM        * Sepsis ([address] )  Assessment & Plan  · Met sepsis criteria with leukocytosis and tachycardia  · No obvious source this time  · Concern for possible to tick borne illness in light of joint pain, rash, myalgias and body aches  · Will start doxycycline pending serologies including Lyme, Ehrlichia, Babesia  · If cultures and diagnostic testing unrevealing, will consider Infectious Disease consultation    Polyarticular arthritis  Assessment & Plan  · Patient notes feeling much better this morning  · Unclear cause of symptomatology  · Inflammatory markers elevated  · In light of fever, polyarticular joint pain, in outside activities, there is a concern for tick-borne illnesses and patient has empirically been started on doxycycline  A Lyme titers, Ehrlichia, Babesia have been sent  · Statin discontinued  · Autoimmune workup ordered    Type 2 diabetes mellitus [facility])  Assessment & Plan  Lab Results   Component Value Date    HGBA1C 6 9 (A) 09/24/2019       Recent Labs     09/28/19  1608 09/28/19  2106 09/29/19  0621 09/29/19  1119   POCGLU 164* 186* 172* 244*       Blood Sugar Average: Last 72 hrs:  (P) 191 5   Hold metformin while in-house, sliding scale insulin along with Lantus    Essential hypertension  Assessment & Plan  · Continue home hypertensives aside from diuretics    TIA (transient ischemic attack)  Assessment & Plan  · Continue home regimen of aspirin, statin  · Plavix discontinued due to allergy        VTE Pharmacologic Prophylaxis: Pharmacologic: Enoxaparin (Lovenox)    Patient Centered Rounds: I have performed bedside rounds with nursing staff today      Discussions with Specialists or Other Care Team Provider: n/a  Education and Discussions with Family / Patient: patient and wife    Current Length of Stay: 1 day(s)    Current Patient Status: Inpatient   Certification Statement: The patient will continue to require additional inpatient hospital stay due to sepsis    Discharge Plan: pending hospital course    Code Status: Level 1 - Full Code    Subjective:   Patient seen examined  No acute events overnight  Feels somewhat improved this morning but still with joint pains  Rash improved    Objective:     Vitals:   Temp (24hrs), Av 9 °F (37 2 °C), Min:97 4 °F (36 3 °C), Max:101 3 °F (38 5 °C)    Temp:  [97 4 °F (36 3 °C)-101 3 °F (38 5 °C)] 97 4 °F (36 3 °C)  HR:  [] 94  Resp:  [16-20] 18  BP: (120-182)/(64-87) 120/64  SpO2:  [93 %-98 %] 96 %  Body mass index is 33 kg/m²  Input and Output Summary (last 24 hours): Intake/Output Summary (Last 24 hours) at 2019 1234  Last data filed at 2019 0900  Gross per 24 hour   Intake 2350 ml   Output 1150 ml   Net 1200 ml       Physical Exam:     Physical Exam   Constitutional: He is oriented to person, place, and time  He appears well-developed and well-nourished  HENT:   Head: Normocephalic and atraumatic  Eyes: Pupils are equal, round, and reactive to light  EOM are normal    Neck: Normal range of motion  Neck supple  Cardiovascular: Normal rate and regular rhythm  Pulmonary/Chest: Effort normal and breath sounds normal    Abdominal: Soft  Bowel sounds are normal    Musculoskeletal: He exhibits no tenderness or deformity  Limited range of motion in all 4 extremities although improved from yesterday   Neurological: He is alert and oriented to person, place, and time  Skin: Skin is warm  Capillary refill takes less than 2 seconds  No erythema  Rash improved   Psychiatric: He has a normal mood and affect  His behavior is normal  Judgment and thought content normal    Nursing note and vitals reviewed        Additional Data:     Labs:    Results from last 7 days   Lab Units 19  0544   WBC Thousand/uL 12 10*   HEMOGLOBIN g/dL 13 1*   HEMATOCRIT % 39 0*   PLATELETS Thousands/uL 168   NEUTROS PCT % 75   LYMPHS PCT % 11*   MONOS PCT % 13*   EOS PCT % 1     Results from last 7 days   Lab Units 09/29/19  0544   POTASSIUM mmol/L 3 5   CHLORIDE mmol/L 99   CO2 mmol/L 29   BUN mg/dL 23   CREATININE mg/dL 1 31*   CALCIUM mg/dL 8 8   ALK PHOS U/L 44*   ALT U/L 9   AST U/L 7*     Results from last 7 days   Lab Units 09/28/19  1133   INR  1 23     Results from last 7 days   Lab Units 09/29/19  1119 09/29/19  0621 09/28/19  2106 09/28/19  1608   POC GLUCOSE mg/dl 244* 172* 186* 164*     Results from last 7 days   Lab Units 09/24/19  1152   HEMOGLOBIN A1C  6 9*       * I Have Reviewed All Lab Data Listed Above  * Additional Pertinent Lab Tests Reviewed: [address] Admission  Reviewed    Imaging:  Imaging Reports Reviewed Today Include: n/a    Recent Cultures (last 7 days):           Last 24 Hours Medication List:     Current Facility-Administered Medications:  acetaminophen 650 mg Oral Q6H PRN Nidia Torres PA-C    aspirin 324 mg Oral Daily Marcie Maher MD    doxycycline hyclate 100 mg Oral Q12H 300 Eddie Vann MD    enoxaparin 40 mg Subcutaneous Daily Marcie Maher MD    insulin glargine 10 Units Subcutaneous HS Marcie Maher MD    insulin lispro 1-5 Units Subcutaneous TID Miles Cole MD    insulin lispro 1-5 Units Subcutaneous HS Marcie Maher MD    lisinopril 20 mg Oral Daily Marcie Maher MD    sodium chloride 75 mL/hr Intravenous Continuous Marcie Maher MD Last Rate: 75 mL/hr (09/29/19 0125)        Today, Patient Was Seen By: Marcie Maher MD    ** Please Note: Dictation voice to text software may have been used in the creation of this document   **

## 2019-09-29 NOTE — ASSESSMENT & PLAN NOTE
· Patient notes feeling much better this morning  · Unclear cause of symptomatology  · Inflammatory markers elevated  · In light of fever, polyarticular joint pain, in outside activities, there is a concern for tick-borne illnesses and patient has empirically been started on doxycycline    A Lyme titers, Ehrlichia, Babesia have been sent  · Statin discontinued  · Autoimmune workup ordered

## 2019-09-29 NOTE — UTILIZATION REVIEW
Initial Clinical Review    Admission: Date/Time/Statement: Inpatient Admission Orders (From admission, onward)     Ordered        09/28/19 1347  Inpatient Admission (expected length of stay for this patient Order details is greater than two midnights)  Once                   Orders Placed This Encounter   Procedures    Inpatient Admission (expected length of stay for this patient Order details is greater than two midnights)     Standing Status:   Standing     Number of Occurrences:   1     Order Specific Question:   Admitting Physician     Answer:   Kamran Romo [[id number]]     Order Specific Question:   Level of Care     Answer:   Med Surg [16]     Order Specific Question:   Estimated length of stay     Answer:   More than 2 Midnights     Order Specific Question:   Certification     Answer:   I certify that inpatient services are medically necessary for this patient for a duration of greater than two midnights  See H&P and MD Progress Notes for additional information about the patient's course of treatment  ED Arrival Information     Expected Arrival Acuity Means of Arrival Escorted By Service Admission Type    - 9/28/2019 11:08 [facility]    Arrival Complaint    body aches        Chief Complaint   Patient presents with    Joint Pain    Rash    Weakness - Generalized     Assessment/Plan:73 year odl male to the ED via EMS with complaints of joint pain and rash which started 2 days prior to his arrival   Admitted to inpatient for sepsis  He has leukocytosis and tachycardia with no source of infection at this time  Concern for possible to tick borne illness in light of joint pain, rash, myalgias and body aches  Started on doxycycline pending lyme, ehrlichia, and abesia  Patient recently had TIA , subacute CVA for which he was started on plavix    When rash started, he was instructed to stop plavix which  improved the reash, but hie continues with myalgias, weakness  Update 9/28:  Joint pains continue, rash improved  ED Triage Vitals   Temperature Pulse Respirations Blood Pressure SpO2   09/28/19 1114 09/28/19 1114 09/28/19 1114 09/28/19 1114 09/28/19 1114   99 6 °F (37 6 °C) (!) 112 20 (!) 185/102 98 %      Temp Source Heart Rate Source Patient Position - Orthostatic VS BP Location FiO2 (%)   09/28/19 1114 09/28/19 1114 09/28/19 1114 09/28/19 1114 --   Temporal Monitor Sitting Left arm       Pain Score       09/28/19 1420       Worst Possible Pain        Wt Readings from Last 1 Encounters:   09/28/19 110 kg (243 lb 4 8 oz)     Additional Vital Signs:   Date/Time  Temp  Pulse  Resp  BP  SpO2  O2 Device  Patient Position - Orthostatic VS   09/29/19 0812        168/86         09/29/19 0723  97 4 °F (36 3 °C)Abnormal   94  18  169/87  96 %  None (Room air)  Lying   09/29/19 0103  98 1 °F (36 7 °C)  101  20  141/71      Lying   09/28/19 2226  100 6 °F (38 1 °C)Abnormal   117Abnormal   20  182/87Abnormal   93 %  None (Room air)  Lying   09/28/19 2158  101 3 °F (38 5 °C)Abnormal                09/28/19 1500            None (Room air)     09/28/19 1420  98 2 °F (36 8 °C)  95  18  138/70  95 %  None (Room air)  Lying   09/28/19 1259  97 8 °F (36 6 °C)  100  16  142/65  98 %  None (Room air)  Sitting   09/28/19 1200    103  24Abnormal                Pertinent Labs/Diagnostic Test Results:   CXR:  No acute cardiopulmonary disease  CT head 9/28:   1   No acute intracranial abnormality  2   Known 5 mm left supraclinoid aneurysm better seen on prior exams    Results from last 7 days   Lab Units 09/29/19  0544 09/28/19  1133   WBC Thousand/uL 12 10* 14 00*   HEMOGLOBIN g/dL 13 1* 14 7   HEMATOCRIT % 39 0* 43 0   PLATELETS Thousands/uL 168 174   NEUTROS ABS Thousands/µL 9 10* 11 20*         Results from last 7 days   Lab Units 09/29/19  0544 09/28/19  1133   SODIUM mmol/L 136 134   POTASSIUM mmol/L 3 5 3 7   CHLORIDE mmol/L 99 98   CO2 mmol/L 29 26 ANION GAP mmol/L 8 10   BUN mg/dL 23 20   CREATININE mg/dL 1 31* 1 11   EGFR ml/min/1 73sq m 54 66   CALCIUM mg/dL 8 8 9 7   MAGNESIUM mg/dL  --  1 5*     Results from last 7 days   Lab Units 09/29/19  0544 09/28/19  1133   AST U/L 7* 9*   ALT U/L 9 12   ALK PHOS U/L 44* 56   TOTAL PROTEIN g/dL 6 3* 7 2   ALBUMIN g/dL 3 4* 4 0   TOTAL BILIRUBIN mg/dL 1 60* 1 70*     Results from last 7 days   Lab Units 09/29/19  1119 09/29/19  0621 09/28/19  2106 09/28/19  1608   POC GLUCOSE mg/dl 244* 172* 186* 164*     Results from last 7 days   Lab Units 09/29/19  0544 09/28/19  1133   GLUCOSE RANDOM mg/dL 163* 208*         Results from last 7 days   Lab Units 09/24/19  1152   HEMOGLOBIN A1C  6 9*       Results from last 7 days   Lab Units 09/29/19  0544   CK TOTAL U/L 23*     Results from last 7 days   Lab Units 09/28/19  1133   TROPONIN I ng/mL <0 03         Results from last 7 days   Lab Units 09/28/19  1133   PROTIME seconds 14 3*   INR  1 23   PTT seconds 40*             Results from last 7 days   Lab Units 09/28/19  1133   LACTIC ACID mmol/L 1 4     Results from last 7 days   Lab Units 09/28/19  1133   LIPASE u/L 11     Results from last 7 days   Lab Units 09/28/19  1133   SED RATE mm/hour 41*         Results from last 7 days   Lab Units 09/28/19  1321 09/24/19  1216   CLARITY UA  Clear  --    COLOR UA  Yellow  --    SPEC GRAV UA  >=1 030*  --    PH UA  5 0  --    GLUCOSE UA mg/dl Negative  --    KETONES UA mg/dl Trace*  --    BLOOD UA  1+*  --    PROTEIN UA mg/dl 2+*  --    NITRITE UA  Negative  --    BILIRUBIN UA  Negative  --    UROBILINOGEN UA E U /dl 0 2  --    LEUKOCYTES UA  Negative  --    WBC UA /hpf 1-2*  --    RBC UA /hpf 1-2*  --    BACTERIA UA /hpf Occasional  --    EPITHELIAL CELLS WET PREP /hpf Occasional  --    MUCUS THREADS  Occasional*  --    CREATININE UR mg/dL  --  218 0       ED Treatment:   Medication Administration from 09/28/2019 1108 to 09/28/2019 2757       Date/Time Order Dose Route Action 09/28/2019 1146 sodium chloride 0 9 % bolus 1,000 mL 1,000 mL Intravenous New Bag     09/28/2019 1148 acetaminophen (TYLENOL) tablet 650 mg 650 mg Oral Given     09/28/2019 1249 cefepime (MAXIPIME) IVPB (premix) 2,000 mg 2,000 mg Intravenous New Bag     09/28/2019 1326 magnesium sulfate IVPB (premix) SOLN 1 g 1 g Intravenous New Bag     09/28/2019 1326 sodium chloride 0 9 % bolus 1,000 mL 1,000 mL Intravenous New Bag        Past Medical History:   Diagnosis Date    Benign essential hypertension     Diabetes mellitus ([facility] )     Essential hypertension     Hyperlipidemia     Hypertension     Pilonidal cyst with abscess     Rheumatic heart disease     TIA (transient ischemic attack)     Type 2 diabetes mellitus (HCC)     Type II diabetes mellitus, uncontrolled (HCC)     Vitamin D deficiency        Admitting Diagnosis: Joint pain [M25 50]  Rash [R21]  Weakness [R53 1]  Sepsis ([facility] ) [A41 9]  Arthralgia, unspecified joint [M25 50]  Age/Sex: 72 y o  male  Admission Orders:  CMP, CBC, Ramu with reflex to titer, blood cultures x 2, lyme antibody, ehrlicia, besia, mono screen, flu a/b/rsv  Current Facility-Administered Medications:  acetaminophen 650 mg Oral Q6H PRN   aspirin 324 mg Oral Daily   doxycycline hyclate 100 mg Oral Q12H [address]   enoxaparin 40 mg Subcutaneous Daily   insulin glargine 7 Units Subcutaneous HS   insulin lispro 1-5 Units Subcutaneous TID AC   insulin lispro 1-5 Units Subcutaneous HS   ketorolac 15 mg Intravenous Q6H PRN   lisinopril 20 mg Oral Daily   metoprolol 5 mg Intravenous Q6H PRN   sodium chloride 75 mL/hr Intravenous Continuous       Network Utilization Review Department  Phone: [phone]; Fax [phone]  [email or web address]  ATTENTION: Please call with any questions or concerns to [phone]  and carefully listen to the prompts so that you are directed to the right person     Send all requests for admission clinical reviews, approved or denied determinations and any other requests to fax [phone]   All voicemails are confidential

## 2019-09-30 LAB
ALBUMIN SERPL BCP-MCNC: 3 G/DL (ref 3.5–5.7)
ALP SERPL-CCNC: 42 U/L (ref 55–165)
ALT SERPL W P-5'-P-CCNC: 8 U/L (ref 7–52)
ANION GAP SERPL CALCULATED.3IONS-SCNC: 7 MMOL/L (ref 4–13)
AST SERPL W P-5'-P-CCNC: 7 U/L (ref 13–39)
BILIRUB SERPL-MCNC: 0.8 MG/DL (ref 0.2–1)
BUN SERPL-MCNC: 25 MG/DL (ref 7–25)
CALCIUM SERPL-MCNC: 8.4 MG/DL (ref 8.6–10.5)
CHLORIDE SERPL-SCNC: 103 MMOL/L (ref 98–107)
CO2 SERPL-SCNC: 25 MMOL/L (ref 21–31)
CREAT SERPL-MCNC: 1.12 MG/DL (ref 0.7–1.3)
ERYTHROCYTE [DISTWIDTH] IN BLOOD BY AUTOMATED COUNT: 13.3 % (ref 11.5–14.5)
GFR SERPL CREATININE-BSD FRML MDRD: 66 ML/MIN/1.73SQ M
GLUCOSE SERPL-MCNC: 166 MG/DL (ref 65–99)
GLUCOSE SERPL-MCNC: 169 MG/DL (ref 65–140)
GLUCOSE SERPL-MCNC: 206 MG/DL (ref 65–140)
GLUCOSE SERPL-MCNC: 233 MG/DL (ref 65–140)
GLUCOSE SERPL-MCNC: 251 MG/DL (ref 65–140)
HCT VFR BLD AUTO: 36 % (ref 42–47)
HETEROPH AB SER QL: NEGATIVE
HGB BLD-MCNC: 12.4 G/DL (ref 14–18)
MCH RBC QN AUTO: 31.1 PG (ref 26–34)
MCHC RBC AUTO-ENTMCNC: 34.4 G/DL (ref 31–37)
MCV RBC AUTO: 90 FL (ref 81–99)
PLATELET # BLD AUTO: 169 THOUSANDS/UL (ref 149–390)
PMV BLD AUTO: 10 FL (ref 8.6–11.7)
POTASSIUM SERPL-SCNC: 3.5 MMOL/L (ref 3.5–5.5)
PROT SERPL-MCNC: 5.7 G/DL (ref 6.4–8.9)
RBC # BLD AUTO: 3.98 MILLION/UL (ref 4.3–5.9)
SODIUM SERPL-SCNC: 135 MMOL/L (ref 134–143)
WBC # BLD AUTO: 9.4 THOUSAND/UL (ref 4.8–10.8)

## 2019-09-30 PROCEDURE — 97530 THERAPEUTIC ACTIVITIES: CPT

## 2019-09-30 PROCEDURE — 82948 REAGENT STRIP/BLOOD GLUCOSE: CPT

## 2019-09-30 PROCEDURE — 97116 GAIT TRAINING THERAPY: CPT

## 2019-09-30 PROCEDURE — 99232 SBSQ HOSP IP/OBS MODERATE 35: CPT | Performed by: PHYSICIAN ASSISTANT

## 2019-09-30 PROCEDURE — 80053 COMPREHEN METABOLIC PANEL: CPT | Performed by: INTERNAL MEDICINE

## 2019-09-30 PROCEDURE — 85027 COMPLETE CBC AUTOMATED: CPT | Performed by: INTERNAL MEDICINE

## 2019-09-30 PROCEDURE — 97167 OT EVAL HIGH COMPLEX 60 MIN: CPT

## 2019-09-30 PROCEDURE — G8988 SELF CARE GOAL STATUS: HCPCS

## 2019-09-30 PROCEDURE — G8987 SELF CARE CURRENT STATUS: HCPCS

## 2019-09-30 RX ORDER — HYDROCODONE BITARTRATE AND ACETAMINOPHEN 5; 325 MG/1; MG/1
1 TABLET ORAL EVERY 6 HOURS PRN
Status: DISCONTINUED | OUTPATIENT
Start: 2019-09-30 | End: 2019-10-03 | Stop reason: HOSPADM

## 2019-09-30 RX ORDER — CLONIDINE HYDROCHLORIDE 0.1 MG/1
0.1 TABLET ORAL
Status: DISCONTINUED | OUTPATIENT
Start: 2019-09-30 | End: 2019-10-03 | Stop reason: HOSPADM

## 2019-09-30 RX ADMIN — INSULIN LISPRO 1 UNITS: 100 INJECTION, SOLUTION INTRAVENOUS; SUBCUTANEOUS at 07:12

## 2019-09-30 RX ADMIN — LISINOPRIL 20 MG: 20 TABLET ORAL at 08:13

## 2019-09-30 RX ADMIN — ASPIRIN 81 MG 324 MG: 81 TABLET ORAL at 08:13

## 2019-09-30 RX ADMIN — CLONIDINE HYDROCHLORIDE 0.1 MG: 0.1 TABLET ORAL at 22:27

## 2019-09-30 RX ADMIN — HYDROCODONE BITARTRATE AND ACETAMINOPHEN 1 TABLET: 5; 325 TABLET ORAL at 17:49

## 2019-09-30 RX ADMIN — ACETAMINOPHEN 650 MG: 325 TABLET ORAL at 08:12

## 2019-09-30 RX ADMIN — ENOXAPARIN SODIUM 40 MG: 40 INJECTION SUBCUTANEOUS at 08:14

## 2019-09-30 RX ADMIN — HYDROCODONE BITARTRATE AND ACETAMINOPHEN 1 TABLET: 5; 325 TABLET ORAL at 22:51

## 2019-09-30 RX ADMIN — INSULIN GLARGINE 10 UNITS: 100 INJECTION, SOLUTION SUBCUTANEOUS at 22:28

## 2019-09-30 RX ADMIN — INSULIN LISPRO 2 UNITS: 100 INJECTION, SOLUTION INTRAVENOUS; SUBCUTANEOUS at 11:39

## 2019-09-30 RX ADMIN — INSULIN LISPRO 1 UNITS: 100 INJECTION, SOLUTION INTRAVENOUS; SUBCUTANEOUS at 22:28

## 2019-09-30 RX ADMIN — INSULIN LISPRO 2 UNITS: 100 INJECTION, SOLUTION INTRAVENOUS; SUBCUTANEOUS at 16:44

## 2019-09-30 RX ADMIN — DOXYCYCLINE HYCLATE 100 MG: 50 CAPSULE ORAL at 08:13

## 2019-09-30 RX ADMIN — SODIUM CHLORIDE 75 ML/HR: 9 INJECTION, SOLUTION INTRAVENOUS at 04:20

## 2019-09-30 RX ADMIN — HYDROCODONE BITARTRATE AND ACETAMINOPHEN 1 TABLET: 5; 325 TABLET ORAL at 11:39

## 2019-09-30 RX ADMIN — DOXYCYCLINE HYCLATE 100 MG: 50 CAPSULE ORAL at 22:27

## 2019-09-30 NOTE — OCCUPATIONAL THERAPY NOTE
Occupational Therapy Evaluation      Dary Silvestre    9/30/2019    Patient Active Problem List   Diagnosis    Cerebral aneurysm, nonruptured    TIA (transient ischemic attack)    Left-sided weakness    Stage 3 chronic kidney disease ([facility] )    Essential hypertension    Type 2 diabetes mellitus (HCC)    Class 1 obesity due to excess calories with serious comorbidity and body mass index (BMI) of 32 0 to 32 9 in adult    Sepsis ([facility] )    Polyarticular arthritis       Past Medical History:   Diagnosis Date    Benign essential hypertension     Diabetes mellitus ([place] [address] )     Essential hypertension     Hyperlipidemia     Hypertension     Pilonidal cyst with abscess     Rheumatic heart disease     TIA (transient ischemic attack)     Type 2 diabetes mellitus ([place] [address] )     Type II diabetes mellitus, uncontrolled ([place] [address] )     Vitamin D deficiency        Past Surgical History:   Procedure Laterality Date    CYSTECTOMY      Per patient cyst removal from his back    LASIK          09/30/19 7573   Note Type   Note type Eval only   Restrictions/Precautions   Weight Bearing Precautions Per Order No   Other Precautions Fall Risk;Pain   Pain Assessment   Pain Assessment 0-10   Pain Score Worst Possible Pain   Pain Type Acute pain   Pain Location Hand   Pain Orientation Left   Pain Descriptors Aching;Discomfort   Pain Frequency Intermittent   Pain Onset Ongoing   Hospital Pain Intervention(s) Ambulation/increased activity;Repositioned   Multiple Pain Sites No   Home Living   Type of Home Mobile home   Home Layout One level;Stairs to enter with rails; Performs ADLs on one level; Able to live on main level with bedroom/bathroom  (6 [address]LLY)   Bathroom Shower/Tub Walk-in shower   Bathroom Toilet Raised   Bathroom Equipment Grab bars in shower   [address]   (No AD used at baseline )   Prior Function   Level of [place] Independent with ADLs and functional mobility   Lives With Spouse Receives Help From Family   ADL Assistance Independent   IADLs Independent   Falls in the last 6 months 1 to 4  (x 1)   Vocational   (works 1 day a week x 12 hrs)   Lifestyle   Autonomy Patient reporting being independent with ADLs/IADLs, ambulatory with no AD and lives with wife in a one story house with 6 HOLLY   Reciprocal Relationships family    Intrinsic Gratification enjoys mowing the lawn    Psychosocial   Psychosocial (WDL) WDL   ADL   Eating Assistance 6  Modified independent   Grooming Assistance 5  Supervision/Setup   UB Bathing Assistance 5  Supervision/Setup   LB Bathing Assistance 4  Minimal Assistance   UB Dressing Assistance 5  Supervision/Setup   LB Dressing Assistance 4  [address]  4  Minimal Assistance   Bed Mobility   Supine to [address]   Additional items HOB elevated; Bedrails; Increased time required;Verbal cues   Additional Comments Pt OOB and seated in chair at end of session    Transfers   Sit to Stand 5  Supervision   Additional items Assist x 1; Increased time required;Verbal cues   Stand to Sit 5  Supervision   Additional items Assist x 1; Armrests; Increased time required;Verbal cues   Functional Mobility   Functional Mobility   (CGA)   Additional items Rolling walker   Balance   Static Sitting Good   Dynamic Sitting Good   Static Standing Fair +   Dynamic Standing Fair   Activity Tolerance   Activity Tolerance Patient limited by fatigue   Nurse Made Aware RN verbalized pt appropriate for therapy    RUE Assessment   RUE Assessment X   RUE Overall AROM   R Shoulder Flexion ~90 degrees    R Shoulder ABduction ~90 degrees    LUE Assessment   LUE Assessment WFL   Hand Function   Gross Motor Coordination Functional   Fine Motor Coordination Functional   Vision-Basic Assessment   Current Vision Wears glasses all the time   Cognition   Overall Cognitive Status WFL   Arousal/Participation Alert; Responsive; Cooperative   Attention Within functional limits Orientation Level Oriented X4   Memory Within functional limits   Following Commands Follows one step commands with increased time or repetition   Comments Mini-Cog assessment performed today  Version 1 was given  Patient able to recall 2/3 words  Patient able to draw a normal clock with the correct time  Total score of test was 4/5 indicating no further screening of cognition is required  Cognition Assessment Tools   (Mini-cog )   Score 4   Assessment   Limitation Decreased ADL status; Decreased UE strength;Decreased endurance;Decreased self-care trans;Decreased high-level ADLs   Prognosis Good   Assessment Pt is a 72 y o  male who was admitted to [address] [place] [address] on 9/28/2019 with Sepsis ([facility] )  At this time, patient is also affected by the comorbidities of: TIA, HTN, DM, and polyarticular arthritis  Additionally, patient is affected by the following personal factors:steps to enter environment, difficulty performing ADLS and difficulty performing IADLS   Orders placed for OT evaluation/treatment with up with assistance orders  Prior to admission, Patient reporting being independent with ADLs/IADLs, ambulatory with no AD and lives with wife in a one story house with 6 HOLLY  Upon OT evaluation, patient requires supervision/set-up for UB ADLs and minimum assist for LB ADLs  Occupational performance is affected by the following deficits: decreased ROM, decreased strength, decreased balance, decreased tolerance and increased pain  Based on the following information, patient would benefit from continued skilled OT treatment while in the hospital to address deficits and maximize level of functional independence with ADL's and functional mobility  Occupational Performance areas to address include: eating, grooming, bathing/shower, toilet hygiene, dressing, medication management, functional mobility, clothing management, meal prep and household maintenance   From OT standpoint, recommendation at time of d/c would be home OT  Goals   Patient Goals to go home    Plan   Treatment Interventions ADL retraining;Functional transfer training;UE strengthening/ROM; Endurance training;Patient/family training;Equipment evaluation/education; Compensatory technique education; Energy conservation   Goal Expiration Date 10/10/19   OT Treatment Day 0   OT Frequency 3-5x/wk   Recommendation   OT Discharge Recommendation Home OT   OT - OK to Discharge Yes  (Once medically cleared)   [place] Index   Feeding 10   Bathing 0   Grooming Score 5   Dressing Score 10   Bladder Score 10   Bowels Score 10   Toilet Use Score 5   Transfers (Bed/Chair) Score 10   Mobility (Level Surface) Score 0   Stairs Score 0   B[place]hel Index Score 60     GOALS:    *ADL transfers with (I) for inc'd independence with ADLs/purposeful tasks    *UB ADL with (I) for inc'd independence with self cares    *LB ADL with (I) using AE prn for inc'd independence with self cares    *Toileting with (I) for clothing management and hygiene for return to PLOF with personal care    *Increase static stand balance to good and dyn stand balance to fair+ for inc'd safety with standing purposeful tasks    *Increase stand tolerance x8 m for inc'd tolerance with standing purposeful tasks    *Participate in 10m UE therex to increase overall stamina/activity tolerance for purposeful tasks    *Bed mobility- (I) for inc'd independence to manage own comfort and initiate EOB & OOB purposeful tasks    *Patient will verbalize and demonstrate use of energy conservation/deep breathing techniques and work simplification skills during functional activities with no verbal cues  *Patient will increase OOB/sitting tolerance to 2-4 hours per day to increase participation in self-care and leisure tasks with no s/s of exertion           Meredith Foster MS, OTR/L

## 2019-09-30 NOTE — ASSESSMENT & PLAN NOTE
· Met sepsis criteria with leukocytosis and tachycardia  · No obvious source this time  · Concern for possible to tick borne illness in light of joint pain, rash, myalgias and body aches  · Continue doxycycline pending serologies including Lyme, Ehrlichia, Babesia  · If cultures and diagnostic testing unrevealing, will consider Infectious Disease consultation

## 2019-09-30 NOTE — NURSING NOTE
Pt assisted back into bed RT pt request, unable to sit out for lunch RT pain and fatigue  Temp 98 0  Personal needs and call bell within reach, will continue to monitor

## 2019-09-30 NOTE — ASSESSMENT & PLAN NOTE
· Patient notes feeling much better this morning  · Unclear cause of symptomatology  · Inflammatory markers elevated  · In light of fever, polyarticular joint pain, in outside activities, there is a concern for tick-borne illnesses and patient has empirically been started on doxycycline    A Lyme titers, Ehrlichia, Babesia have been sent  · Statin discontinued  · Autoimmune workup pending

## 2019-09-30 NOTE — PLAN OF CARE
Problem: PHYSICAL THERAPY ADULT  Goal: Performs mobility at highest level of function for planned discharge setting  See evaluation for individualized goals  Description  Treatment/Interventions: Functional transfer training, LE strengthening/ROM, Therapeutic exercise, Endurance training, Patient/family training, Equipment eval/education, Bed mobility, Gait training, OT  Equipment Recommended: Bria Mcintosh  See flowsheet documentation for full assessment, interventions and recommendations  Jonas Martinez, PT     Outcome: Progressing  Note:   Prognosis: Good  Problem List: Decreased strength, Decreased endurance, Impaired balance, Decreased mobility, Decreased safety awareness, Impaired judgement, Pain  Assessment: Pt seen for PT treatment session this date with interventions consisting of gait training w/ emphasis on improving pt's ability to ambulate level surfaces x 125 ft x 2 with CGA provided by therapist with RW and therapeutic activity consisting of training: bed mobility, supine<>sit transfers and sit<>stand transfers  Pt agreeable to PT treatment session upon arrival, pt found supine in bed w/ HOB elevated, in no apparent distress, A&O x 4 and responsive  In comparison to previous session, pt with improvements in tolerating increased amb distances, no overt LOB  Post session: pt returned back to recliner, all needs in reach and RN notified of session findings/recommendations  Continue to recommend OP PT at time of d/c in order to maximize pt's functional independence and safety w/ mobility  Pt continues to be functioning below baseline level, and remains limited 2* factors listed above  PT will continue to see pt while here in order to address the deficits listed above and provide interventions consistent w/ POC in effort to achieve STGs    Barriers to Discharge: Inaccessible home environment  Barriers to Discharge Comments: HOLLY home  Recommendation: Outpatient PT, Home with family support     PT - OK to Discharge: Yes(when medically cleared)    See flowsheet documentation for full assessment

## 2019-09-30 NOTE — ASSESSMENT & PLAN NOTE
Lab Results   Component Value Date    HGBA1C 6 9 (A) 09/24/2019       Recent Labs     09/29/19  1604 09/29/19  2050 09/30/19  0655 09/30/19  1034   POCGLU 149* 203* 169* 233*       Blood Sugar Average: Last 72 hrs:  (P) 190   Hold metformin while in-house, sliding scale insulin along with Lantus

## 2019-09-30 NOTE — PLAN OF CARE
Problem: OCCUPATIONAL THERAPY ADULT  Goal: Performs self-care activities at highest level of function for planned discharge setting  See evaluation for individualized goals  Description  Treatment Interventions: ADL retraining, Functional transfer training, UE strengthening/ROM, Endurance training, Patient/family training, Equipment evaluation/education, Compensatory technique education, Energy conservation          See flowsheet documentation for full assessment, interventions and recommendations  Note:   Limitation: Decreased ADL status, Decreased UE strength, Decreased endurance, Decreased self-care trans, Decreased high-level ADLs  Prognosis: Good  Assessment: Pt is a 72 y o  male who was admitted to [address] [place] [address] on 9/28/2019 with Sepsis ([facility] )  At this time, patient is also affected by the comorbidities of: TIA, HTN, DM, and polyarticular arthritis  Additionally, patient is affected by the following personal factors:steps to enter environment, difficulty performing ADLS and difficulty performing IADLS   Orders placed for OT evaluation/treatment with up with assistance orders  Prior to admission, Patient reporting being independent with ADLs/IADLs, ambulatory with no AD and lives with wife in a one story house with 6 HOLLY  Upon OT evaluation, patient requires supervision/set-up for UB ADLs and minimum assist for LB ADLs  Occupational performance is affected by the following deficits: decreased ROM, decreased strength, decreased balance, decreased tolerance and increased pain  Based on the following information, patient would benefit from continued skilled OT treatment while in the hospital to address deficits and maximize level of functional independence with ADL's and functional mobility   Occupational Performance areas to address include: eating, grooming, bathing/shower, toilet hygiene, dressing, medication management, functional mobility, clothing management, meal prep and household maintenance  From OT standpoint, recommendation at time of d/c would be home OT       OT Discharge Recommendation: Home OT  OT - OK to Discharge: Yes(Once medically cleared)     Anna Tavares, OT

## 2019-09-30 NOTE — PLAN OF CARE
Problem: Potential for Falls  Goal: Patient will remain free of falls  Description  INTERVENTIONS:  - Assess patient frequently for physical needs  -  Identify cognitive and physical deficits and behaviors that affect risk of falls    -  [place] fall precautions as indicated by assessment   - Educate patient/family on patient safety including physical limitations  - Instruct patient to call for assistance with activity based on assessment  - Modify environment to reduce risk of injury  - Consider OT/PT consult to assist with strengthening/mobility  Outcome: Progressing     Problem: Prexisting or High Potential for Compromised Skin Integrity  Goal: Skin integrity is maintained or improved  Description  INTERVENTIONS:  - Identify patients at risk for skin breakdown  - Assess and monitor skin integrity  - Assess and monitor nutrition and hydration status  - Monitor labs   - Assess for incontinence   - Turn and reposition patient  - Assist with mobility/ambulation  - Relieve pressure over bony prominences  - Avoid friction and shearing  - Provide appropriate hygiene as needed including keeping skin clean and dry  - Evaluate need for skin moisturizer/barrier cream  - Collaborate with interdisciplinary team   - Patient/family teaching  - Consider wound care consult   Outcome: Progressing     Problem: PAIN - ADULT  Goal: Verbalizes/displays adequate comfort level or baseline comfort level  Description  Interventions:  - Encourage patient to monitor pain and request assistance  - Assess pain using appropriate pain scale  - Administer analgesics based on type and severity of pain and evaluate response  - Implement non-pharmacological measures as appropriate and evaluate response  - Consider cultural and social influences on pain and pain management  - Notify physician/advanced practitioner if interventions unsuccessful or patient reports new pain  Outcome: Progressing     Problem: INFECTION - ADULT  Goal: Absence or prevention of progression during hospitalization  Description  INTERVENTIONS:  - Assess and monitor for signs and symptoms of infection  - Monitor lab/diagnostic results  - Monitor all insertion sites, i e  indwelling lines, tubes, and drains  - Monitor endotracheal if appropriate and nasal secretions for changes in amount and color  - [place] appropriate cooling/warming therapies per order  - Administer medications as ordered  - Instruct and encourage patient and family to use good hand hygiene technique  - Identify and instruct in appropriate isolation precautions for identified infection/condition  Outcome: Progressing  Goal: Absence of fever/infection during neutropenic period  Description  INTERVENTIONS:  - Monitor WBC    Outcome: Progressing     Problem: SAFETY ADULT  Goal: Maintain or return to baseline ADL function  Description  INTERVENTIONS:  -  Assess patient's ability to carry out ADLs; assess patient's baseline for ADL function and identify physical deficits which impact ability to perform ADLs (bathing, care of mouth/teeth, toileting, grooming, dressing, etc )  - Assess/evaluate cause of self-care deficits   - Assess range of motion  - Assess patient's mobility; develop plan if impaired  - Assess patient's need for assistive devices and provide as appropriate  - Encourage maximum independence but intervene and supervise when necessary  - Involve family in performance of ADLs  - Assess for home care needs following discharge   - Consider OT consult to assist with ADL evaluation and planning for discharge  - Provide patient education as appropriate  Outcome: Progressing  Goal: Maintain or return mobility status to optimal level  Description  INTERVENTIONS:  - Assess patient's baseline mobility status (ambulation, transfers, stairs, etc )    - Identify cognitive and physical deficits and behaviors that affect mobility  - Identify mobility aids required to assist with transfers and/or ambulation (gait belt, sit-to-stand, lift, walker, cane, etc )  - [place] fall precautions as indicated by assessment  - Record patient progress and toleration of activity level on Mobility SBAR; progress patient to next Phase/Stage  - Instruct patient to call for assistance with activity based on assessment  - Consider rehabilitation consult to assist with strengthening/weightbearing, etc   Outcome: Progressing     Problem: DISCHARGE PLANNING  Goal: Discharge to home or other facility with appropriate resources  Description  INTERVENTIONS:  - Identify barriers to discharge w/patient and caregiver  - Arrange for needed discharge resources and transportation as appropriate  - Identify discharge learning needs (meds, wound care, etc )  - Arrange for interpretive services to assist at discharge as needed  - Refer to Case Management Department for coordinating discharge planning if the patient needs post-hospital services based on physician/advanced practitioner order or complex needs related to functional status, cognitive ability, or social support system  Outcome: Progressing     Problem: Knowledge Deficit  Goal: Patient/family/caregiver demonstrates understanding of disease process, treatment plan, medications, and discharge instructions  Description  Complete learning assessment and assess knowledge base    Interventions:  - Provide teaching at level of understanding  - Provide teaching via preferred learning methods  Outcome: Progressing

## 2019-09-30 NOTE — SOCIAL WORK
Evaluated the pt at the bedside  Pt was admitted to the hospital with sepsis  Explained the  Role of CM and options of discharge planning with the pt  Pt states he lives with his spouse in a one story home with 6 HOLLY  Pt states prior to hi9s hospitalization the pt was independent with ADL's and IADL's and was able to drive  Pt gets his medications from [address] [place] [address] and [facility]  Pt reports his wife will transport him home upon discharge  Patient/caregiver received discharge checklist   Content reviewed  Patient/caregiver encouraged to participate in discharge plan of care prior to discharge home  CM reviewed d/c planning process including the following: identifying help at home, patient preference for d/c planning needs, availability of treatment team to discuss questions or concerns patient and/or family may have regarding understanding medications and recognizing signs and symptoms once discharged  CM also encouraged patient to follow up with all recommended appointments after discharge  Patient advised of importance for patient and family to participate in managing patients medical well being

## 2019-09-30 NOTE — PHYSICAL THERAPY NOTE
Physical Therapy Treatment Note       09/30/19 0905   Pain Assessment   Pain Assessment 0-10   Pain Score Worst Possible Pain   Pain Type Acute pain   Pain Location Hand   Pain Orientation Left   Pain Descriptors Aching;Crushing   Pain Frequency Intermittent   Pain Onset Ongoing   Effect of Pain on Daily Activities limited ability to grasp walker for ambulation   Patient's Stated Pain Goal No pain   Hospital Pain Intervention(s) Repositioned; Ambulation/increased activity; Elevated; Rest   Restrictions/Precautions   Weight Bearing Precautions Per Order No   Other Precautions Fall Risk;Pain;Multiple lines   General   Chart Reviewed Yes   Response to Previous Treatment Patient with no complaints from previous session  Family/Caregiver Present No   Cognition   Overall Cognitive Status WFL   Arousal/Participation Alert; Responsive; Cooperative   Attention Within functional limits   Orientation Level Oriented X4   Memory Within functional limits   Following Commands Follows one step commands with increased time or repetition   Comments pt agreeable to PT session   Subjective   Subjective "I feel pretty good today"   Bed Mobility   Supine to Sit 5  Supervision   Additional items Assist x 1;HOB elevated; Bedrails; Increased time required;Verbal cues   Additional Comments Pt OOB and seated in chair at end of session , all needs within reach   Transfers   Sit to Stand 5  Supervision   Additional items Assist x 1; Increased time required;Verbal cues   Stand to Sit 5  Supervision   Additional items Assist x 1;Verbal cues   Ambulation/Elevation   Gait pattern Narrow MAGALY; Forward Flexion;Decreased foot clearance; Short stride   Gait Assistance 4  Minimal assist  (CGA)   Additional items Assist x 1;Verbal cues   Assistive Device Rolling walker   Distance 125 ft x 2   Stair Management Assistance Not tested   Balance   Static Sitting Normal   Dynamic Sitting Good   Static Standing Good   Dynamic Standing Fair +   Ambulatory Fair + Endurance Deficit   Endurance Deficit Yes   Activity Tolerance   Activity Tolerance Patient tolerated treatment well   Nurse Made Aware RN verbalized pt appropriate for therapy    Assessment   Prognosis Good   Problem List Decreased strength;Decreased endurance; Impaired balance;Decreased mobility; Decreased safety awareness; Impaired judgement;Pain   Assessment Pt seen for PT treatment session this date with interventions consisting of gait training w/ emphasis on improving pt's ability to ambulate level surfaces x 125 ft x 2 with CGA provided by therapist with RW and therapeutic activity consisting of training: bed mobility, supine<>sit transfers and sit<>stand transfers  Pt agreeable to PT treatment session upon arrival, pt found supine in bed w/ HOB elevated, in no apparent distress, A&O x 4 and responsive  In comparison to previous session, pt with improvements in tolerating increased amb distances, no overt LOB  Post session: pt returned back to recliner, all needs in reach and RN notified of session findings/recommendations  Continue to recommend OP PT at time of d/c in order to maximize pt's functional independence and safety w/ mobility  Pt continues to be functioning below baseline level, and remains limited 2* factors listed above  PT will continue to see pt while here in order to address the deficits listed above and provide interventions consistent w/ POC in effort to achieve STGs  Barriers to Discharge Inaccessible home environment   Goals   Patient Goals "to return home"   LTG Expiration Date 10/09/19   Long Term Goal #1 goals remain appropriate   PT Treatment Day 2   Plan   Treatment/Interventions Functional transfer training;LE strengthening/ROM; Therapeutic exercise; Endurance training;Patient/family training;Equipment eval/education; Bed mobility;Gait training;OT   Progress Progressing toward goals   PT Frequency 5x/wk   Recommendation   Recommendation Outpatient PT; Home with family support Equipment Recommended Walker  (continue RW use)   PT - OK to Discharge Yes  (when medically cleared)       Vivian Ballesteros PT    Time of PT treatment session: [id number]

## 2019-09-30 NOTE — NURSING NOTE
Pt resting in bed, complains of sore throat, red blanching Rash to upper chest, pink, swollen bilat hands  Pt complains of 7/10 pain to bilat hands and declines tylenol, asking for better pain control since ,'Tylenol hasn't been working at all"  Pt denies shortness of breath  Personal needs and call bell within reach, will continue to monitor  Tylenol administered for fever

## 2019-09-30 NOTE — PROGRESS NOTES
Progress Note - Brandon Lance 1945, 72 y o  male MRN: [id number]    Unit/Bed#: -01 Encounter: [id number]    Primary Care Provider: KAYODE Mcguire   Date and time admitted to hospital: 9/28/2019 11:08 AM        Polyarticular arthritis  Assessment & Plan  · Patient notes feeling much better this morning  · Unclear cause of symptomatology  · Inflammatory markers elevated  · In light of fever, polyarticular joint pain, in outside activities, there is a concern for tick-borne illnesses and patient has empirically been started on doxycycline  A Lyme titers, Ehrlichia, Babesia have been sent  · Statin discontinued  · Autoimmune workup pending    * Sepsis ([facility] [address] )  Assessment & Plan  · Met sepsis criteria with leukocytosis and tachycardia  · No obvious source this time  · Concern for possible to tick borne illness in light of joint pain, rash, myalgias and body aches  · Continue doxycycline pending serologies including Lyme, Ehrlichia, Babesia  · If cultures and diagnostic testing unrevealing, will consider Infectious Disease consultation    Type 2 diabetes mellitus [facility])  Assessment & Plan  Lab Results   Component Value Date    HGBA1C 6 9 (A) 09/24/2019       Recent Labs     09/29/19  1604 09/29/19  2050 09/30/19  0655 09/30/19  1034   POCGLU 149* 203* 169* 233*       Blood Sugar Average: Last 72 hrs:  (P) 190   Hold metformin while in-house, sliding scale insulin along with Lantus    Essential hypertension  Assessment & Plan  · Continue lisinopril  · HCTZ on hold    TIA (transient ischemic attack)  Assessment & Plan  · Continue home regimen of aspirin, statin  · Plavix discontinued due to allergy    VTE Pharmacologic Prophylaxis: Pharmacologic: Enoxaparin (Lovenox)    Patient Centered Rounds: I have performed bedside rounds with nursing staff today      Discussions with Specialists or Other Care Team Provider: Nursing  Education and Discussions with Family / Patient: Patient and family at bedside    Current Length of Stay: 2 day(s)    Current Patient Status: Inpatient   Certification Statement: The patient will continue to require additional inpatient hospital stay due to fever, pending workup    Discharge Plan: Pending hospital course    Code Status: Level 1 - Full Code    Subjective:   Patient with increased hand swelling and pain today  Unable to get wedding ring off finger due to swelling  +Sore throat  Rash on anterior chest not itchy or painful  Appetite ok  No chest pain or shortness of breath  Objective:     Vitals:   Temp (24hrs), Av °F (37 2 °C), Min:98 °F (36 7 °C), Max:101 °F (38 3 °C)    Temp:  [98 °F (36 7 °C)-101 °F (38 3 °C)] 98 °F (36 7 °C)  HR:  [83-98] 98  Resp:  [18] 18  BP: (134-154)/(70-82) 154/82  SpO2:  [94 %-98 %] 94 %  Body mass index is 33 kg/m²  Input and Output Summary (last 24 hours): Intake/Output Summary (Last 24 hours) at 2019 1240  Last data filed at 2019 0816  Gross per 24 hour   Intake 300 ml   Output 1050 ml   Net -750 ml       Physical Exam:     Physical Exam   Constitutional: No distress  HENT:   Head: Normocephalic and atraumatic  Slight pharyngeal erythema   Neck: Neck supple  Cardiovascular: Normal rate and regular rhythm  Pulmonary/Chest: Effort normal and breath sounds normal  No respiratory distress  He has no wheezes  Abdominal: Soft  Bowel sounds are normal  He exhibits no distension  There is no tenderness  Musculoskeletal: He exhibits no tenderness  +Swelling of B/L hands L>R   Lymphadenopathy:     He has no cervical adenopathy  Neurological: He is alert  Skin: Skin is warm  Rash noted     +Erythema ant chest as well as B/L hands       Additional Data:     Labs:    Results from last 7 days   Lab Units 19  0511 19  0544   WBC Thousand/uL 9 40 12 10*   HEMOGLOBIN g/dL 12 4* 13 1*   HEMATOCRIT % 36 0* 39 0*   PLATELETS Thousands/uL 169 168   NEUTROS PCT %  --  75   LYMPHS PCT %  --  11*   MONOS PCT % --  13*   EOS PCT %  --  1     Results from last 7 days   Lab Units 09/30/19  0511   POTASSIUM mmol/L 3 5   CHLORIDE mmol/L 103   CO2 mmol/L 25   BUN mg/dL 25   CREATININE mg/dL 1 12   CALCIUM mg/dL 8 4*   ALK PHOS U/L 42*   ALT U/L 8   AST U/L 7*     Results from last 7 days   Lab Units 09/28/19  1133   INR  1 23     Results from last 7 days   Lab Units 09/30/19  1034 09/30/19  0655 09/29/19  2050 09/29/19  1604 09/29/19  1119 09/29/19  0621 09/28/19  2106 09/28/19  1608   POC GLUCOSE mg/dl 233* 169* 203* 149* 244* 172* 186* 164*     Results from last 7 days   Lab Units 09/24/19  1152   HEMOGLOBIN A1C  6 9*       * I Have Reviewed All Lab Data Listed Above  * Additional Pertinent Lab Tests Reviewed: [facility]  Reviewed      Recent Cultures (last 7 days):     Results from last 7 days   Lab Units 09/28/19  1133   BLOOD CULTURE  No Growth at 24 hrs  No Growth at 24 hrs  INFLUENZA B PCR  Not Detected   RSV PCR  Not Detected       Last 24 Hours Medication List:     Current Facility-Administered Medications:  acetaminophen 650 mg Oral Q6H PRN Bhaskar Flores PA-C   aspirin 324 mg Oral Daily Vinnie Flores MD   doxycycline hyclate 100 mg Oral Q12H [address] Vinnie Flores MD   enoxaparin 40 mg Subcutaneous Daily Vinnie Flores MD   HYDROcodone-acetaminophen 1 tablet Oral Q6H PRN Renetta Woods PA-C   insulin glargine 10 Units Subcutaneous HS Vinnie Flores MD   insulin lispro 1-5 Units Subcutaneous TID Deborah Rodríguez MD   insulin lispro 1-5 Units Subcutaneous HS Vinnie Flores MD   lisinopril 20 mg Oral Daily Vinnie Flores MD        Today, Patient Was Seen By: Renetta Woods PA-C    ** Please Note: Dictation voice to text software may have been used in the creation of this document   **

## 2019-09-30 NOTE — UTILIZATION REVIEW
Notification of Inpatient Admission/Inpatient Authorization Request  This is a Notification of Inpatient Admission/Request for Inpatient Authorization for our facility [facility]  Be advised that this patient was admitted to our facility under Inpatient Status  Please contact the Utilization Review Department where the patient is receiving care services for additional admission information  Place of Service Code: 24   Place of Service Name: [facility]  Presentation Date & Time: 9/28/2019 11:08 AM  Inpatient Admission Date & Time: 9/28/19 1347  Discharge Date & Time: No discharge date for patient encounter  Discharge Disposition (if discharged): Home/Self Care  Attending Physician & NPI#: Madeline Gramajo [[id number]]   Attending Physician:  CONCHIS Gramajo  Specialty- Internal Medicine  [address] [place] [address]  Phone 1: [phone]  Fax: [phone]   Admission Orders (From admission, onward)     Ordered        09/28/19 1347  Inpatient Admission (expected length of stay for this patient Order details is greater than two midnights)  Once                   Facility: [address] [place] [address] Utilization Review Department  Phone: [phone]; Fax [phone]  [email or web address]  ATTENTION: Please call with any questions or concerns to [phone]  and carefully listen to the prompts so that you are directed to the right person  Send all requests for admission clinical reviews, approved or denied determinations and any other requests to fax [phone]   All voicemails are confidential

## 2019-09-30 NOTE — PLAN OF CARE
Problem: DISCHARGE PLANNING - CARE MANAGEMENT  Goal: Discharge to post-acute care or home with appropriate resources  Description  INTERVENTIONS:  - Conduct assessment to determine patient/family and health care team treatment goals, and need for post-acute services based on payer coverage, community resources, and patient preferences, and barriers to discharge  - Address psychosocial, clinical, and financial barriers to discharge as identified in assessment in conjunction with the patient/family and health care team  - Arrange appropriate level of post-acute services according to patient's   needs and preference and payer coverage in collaboration with the physician and health care team  - Communicate with and update the patient/family, physician, and health care team regarding progress on the discharge plan  - Arrange appropriate transportation to post-acute venues  Discharge to home with possible [facility] services     Outcome: Progressing

## 2019-10-01 LAB
ANION GAP SERPL CALCULATED.3IONS-SCNC: 9 MMOL/L (ref 4–13)
B BURGDOR IGG+IGM SER-ACNC: <0.91 ISR (ref 0–0.9)
BASOPHILS # BLD AUTO: 0 THOUSANDS/ΜL (ref 0–0.1)
BASOPHILS NFR BLD AUTO: 0 % (ref 0–2)
BUN SERPL-MCNC: 24 MG/DL (ref 7–25)
CALCIUM SERPL-MCNC: 8.7 MG/DL (ref 8.6–10.5)
CHLORIDE SERPL-SCNC: 104 MMOL/L (ref 98–107)
CO2 SERPL-SCNC: 24 MMOL/L (ref 21–31)
CREAT SERPL-MCNC: 1.02 MG/DL (ref 0.7–1.3)
EOSINOPHIL # BLD AUTO: 0.1 THOUSAND/ΜL (ref 0–0.61)
EOSINOPHIL NFR BLD AUTO: 1 % (ref 0–5)
ERYTHROCYTE [DISTWIDTH] IN BLOOD BY AUTOMATED COUNT: 13.2 % (ref 11.5–14.5)
GFR SERPL CREATININE-BSD FRML MDRD: 74 ML/MIN/1.73SQ M
GLUCOSE SERPL-MCNC: 201 MG/DL (ref 65–140)
GLUCOSE SERPL-MCNC: 202 MG/DL (ref 65–140)
GLUCOSE SERPL-MCNC: 203 MG/DL (ref 65–99)
GLUCOSE SERPL-MCNC: 228 MG/DL (ref 65–140)
GLUCOSE SERPL-MCNC: 238 MG/DL (ref 65–140)
HCT VFR BLD AUTO: 36.6 % (ref 42–47)
HGB BLD-MCNC: 12.3 G/DL (ref 14–18)
LYMPHOCYTES # BLD AUTO: 1 THOUSANDS/ΜL (ref 0.6–4.47)
LYMPHOCYTES NFR BLD AUTO: 9 % (ref 21–51)
MCH RBC QN AUTO: 30.6 PG (ref 26–34)
MCHC RBC AUTO-ENTMCNC: 33.6 G/DL (ref 31–37)
MCV RBC AUTO: 91 FL (ref 81–99)
MONOCYTES # BLD AUTO: 1.1 THOUSAND/ΜL (ref 0.17–1.22)
MONOCYTES NFR BLD AUTO: 11 % (ref 2–12)
NEUTROPHILS # BLD AUTO: 8.2 THOUSANDS/ΜL (ref 1.4–6.5)
NEUTS SEG NFR BLD AUTO: 79 % (ref 42–75)
PLATELET # BLD AUTO: 197 THOUSANDS/UL (ref 149–390)
PMV BLD AUTO: 9.6 FL (ref 8.6–11.7)
POTASSIUM SERPL-SCNC: 3.6 MMOL/L (ref 3.5–5.5)
RBC # BLD AUTO: 4.02 MILLION/UL (ref 4.3–5.9)
SODIUM SERPL-SCNC: 137 MMOL/L (ref 134–143)
WBC # BLD AUTO: 10.5 THOUSAND/UL (ref 4.8–10.8)

## 2019-10-01 PROCEDURE — 80048 BASIC METABOLIC PNL TOTAL CA: CPT | Performed by: PHYSICIAN ASSISTANT

## 2019-10-01 PROCEDURE — 82948 REAGENT STRIP/BLOOD GLUCOSE: CPT

## 2019-10-01 PROCEDURE — 99232 SBSQ HOSP IP/OBS MODERATE 35: CPT | Performed by: NURSE PRACTITIONER

## 2019-10-01 PROCEDURE — 85025 COMPLETE CBC W/AUTO DIFF WBC: CPT | Performed by: PHYSICIAN ASSISTANT

## 2019-10-01 PROCEDURE — 97116 GAIT TRAINING THERAPY: CPT

## 2019-10-01 PROCEDURE — 97112 NEUROMUSCULAR REEDUCATION: CPT

## 2019-10-01 RX ADMIN — HYDROCODONE BITARTRATE AND ACETAMINOPHEN 1 TABLET: 5; 325 TABLET ORAL at 05:36

## 2019-10-01 RX ADMIN — LISINOPRIL 20 MG: 20 TABLET ORAL at 09:37

## 2019-10-01 RX ADMIN — HYDROCODONE BITARTRATE AND ACETAMINOPHEN 1 TABLET: 5; 325 TABLET ORAL at 09:41

## 2019-10-01 RX ADMIN — ACETAMINOPHEN 650 MG: 325 TABLET ORAL at 23:54

## 2019-10-01 RX ADMIN — ASPIRIN 81 MG 324 MG: 81 TABLET ORAL at 09:37

## 2019-10-01 RX ADMIN — INSULIN LISPRO 1 UNITS: 100 INJECTION, SOLUTION INTRAVENOUS; SUBCUTANEOUS at 07:52

## 2019-10-01 RX ADMIN — INSULIN LISPRO 2 UNITS: 100 INJECTION, SOLUTION INTRAVENOUS; SUBCUTANEOUS at 22:13

## 2019-10-01 RX ADMIN — INSULIN GLARGINE 10 UNITS: 100 INJECTION, SOLUTION SUBCUTANEOUS at 22:13

## 2019-10-01 RX ADMIN — INSULIN LISPRO 2 UNITS: 100 INJECTION, SOLUTION INTRAVENOUS; SUBCUTANEOUS at 11:29

## 2019-10-01 RX ADMIN — INSULIN LISPRO 1 UNITS: 100 INJECTION, SOLUTION INTRAVENOUS; SUBCUTANEOUS at 16:44

## 2019-10-01 RX ADMIN — ENOXAPARIN SODIUM 40 MG: 40 INJECTION SUBCUTANEOUS at 09:37

## 2019-10-01 RX ADMIN — DOXYCYCLINE HYCLATE 100 MG: 50 CAPSULE ORAL at 21:59

## 2019-10-01 RX ADMIN — DOXYCYCLINE HYCLATE 100 MG: 50 CAPSULE ORAL at 09:37

## 2019-10-01 NOTE — PLAN OF CARE
Problem: PHYSICAL THERAPY ADULT  Goal: Performs mobility at highest level of function for planned discharge setting  See evaluation for individualized goals  Description  Treatment/Interventions: Functional transfer training, LE strengthening/ROM, Therapeutic exercise, Endurance training, Patient/family training, Equipment eval/education, Bed mobility, Gait training, OT  Equipment Recommended: Atyia Rodríguez  See flowsheet documentation for full assessment, interventions and recommendations  Dennie Ona, PT     Outcome: Progressing  Note:   Prognosis: Good  Problem List: Decreased strength, Decreased endurance, Impaired balance, Decreased mobility, Impaired judgement, Decreased safety awareness, Pain  Assessment: Pt seen for PT treatment session this date with interventions consisting of gait training w/ emphasis on improving pt's ability to ambulate level surfaces x 150 feet x 2 with CGA provided by therapist with no AD and neuromuscular re-education: side stepping bilaterally, marching and multi-directional head turns x 20 reps ft w/ o UE support  Pt agreeable to PT treatment session upon arrival, pt found supine in bed w/ HOB elevated, A&O x 4  In comparison to previous session, pt with improvements in ambulatory distance  Post session: all needs in reach and RN notified of session findings/recommendations Continue to recommend OP PT at time of d/c in order to maximize pt's functional independence and safety w/ mobility  Pt continues to be functioning below baseline level, and remains limited 2* factors listed above and including impaired balance, gait dysfunction, weakness, pain, decline from PLOF  PT will continue to see pt while here in order to address the deficits listed above and provide interventions consistent w/ POC in effort to achieve LTGs    Barriers to Discharge: Inaccessible home environment  Barriers to Discharge Comments: [place] home  Recommendation: Outpatient PT     PT - OK to Discharge: Yes(if medically stable)    See flowsheet documentation for full assessment     Ladi Suarez, PT

## 2019-10-01 NOTE — ASSESSMENT & PLAN NOTE
· Met sepsis criteria with leukocytosis and tachycardia  · No obvious source this time  · Concern for possible to tick borne illness in light of joint pain, rash, myalgias and body aches  · Continue doxycycline pending serologies including Ehrlichia, Babesia  · Lyme antibodies negative    · Blood cultures no growth to date  · Will consult Infectious Disease

## 2019-10-01 NOTE — ASSESSMENT & PLAN NOTE
· Unclear cause of symptomatology  · Inflammatory markers elevated  · In light of fever, polyarticular joint pain, in outside activities, there is a concern for tick-borne illnesses and patient has empirically been started on doxycycline    A Lyme titers- negative  · Ehrlichia, Babesia- pending   · Statin discontinued  · Autoimmune workup pending  · 10/1- patient developed left hand redness and joint swelling

## 2019-10-01 NOTE — PROGRESS NOTES
Progress Note - Ximena Saba 1945, 72 y o  male MRN: [id number]    Unit/Bed#: -01 Encounter: [id number]    Primary Care Provider: Read Friends, CRNP   Date and time admitted to hospital: 9/28/2019 11:08 AM        * Sepsis ([address] )  Assessment & Plan  · Met sepsis criteria with leukocytosis and tachycardia  · No obvious source this time  · Concern for possible to tick borne illness in light of joint pain, rash, myalgias and body aches  · Continue doxycycline pending serologies including Ehrlichia, Babesia  · Lyme antibodies negative  · Blood cultures no growth to date  · Will consult Infectious Disease    Polyarticular arthritis  Assessment & Plan  · Unclear cause of symptomatology  · Inflammatory markers elevated  · In light of fever, polyarticular joint pain, in outside activities, there is a concern for tick-borne illnesses and patient has empirically been started on doxycycline  A Lyme titers- negative  · Ehrlichia, Babesia- pending   · Statin discontinued  · Autoimmune workup pending  · 10/1- patient developed left hand redness and joint swelling    Type 2 diabetes mellitus [facility])  Assessment & Plan  Lab Results   Component Value Date    HGBA1C 6 9 (A) 09/24/2019       Recent Labs     09/30/19  1634 09/30/19  2001 10/01/19  0607 10/01/19  1106   POCGLU 251* 206* 201* 238*       Blood Sugar Average: Last 72 hrs:  (P) 076 [id number]   · Hold metformin while in-house, sliding scale insulin along with Lantus      Essential hypertension  Assessment & Plan  · Continue lisinopril  · HCTZ on hold  · Monitor blood pressure closely    TIA (transient ischemic attack)  Assessment & Plan  · Continue home regimen of aspirin, statin  · Plavix discontinued due to allergy        VTE Pharmacologic Prophylaxis: Pharmacologic: Enoxaparin (Lovenox)    Patient Centered Rounds: I have performed bedside rounds with nursing staff today      Discussions with Specialists or Other Care Team Provider: nursing, CM, Rx  Education and Discussions with Family / Patient: patient and wife     Current Length of Stay: 3 day(s)    Current Patient Status: Inpatient   Certification Statement: The patient will continue to require additional inpatient hospital stay due to sepsis     Discharge Plan: pending hospital course     Code Status: Level 1 - Full Code    Subjective:   Feeling slightly better  Patient c/o left hand swelling with redness and increased pain  Objective:     Vitals:   Temp (24hrs), Av 6 °F (37 °C), Min:97 8 °F (36 6 °C), Max:100 3 °F (37 9 °C)    Temp:  [97 8 °F (36 6 °C)-100 3 °F (37 9 °C)] 97 8 °F (36 6 °C)  HR:  [] 85  Resp:  [18] 18  BP: (141-180)/(69-81) 147/75  SpO2:  [94 %-96 %] 96 %  Body mass index is 33 kg/m²  Input and Output Summary (last 24 hours): Intake/Output Summary (Last 24 hours) at 10/1/2019 1612  Last data filed at 2019 1749  Gross per 24 hour   Intake 260 ml   Output 300 ml   Net -40 ml       Physical Exam:     Physical Exam   Constitutional: He is oriented to person, place, and time  He appears well-developed and well-nourished  No distress  HENT:   Head: Normocephalic and atraumatic  Mouth/Throat: Oropharynx is clear and moist    Eyes: Pupils are equal, round, and reactive to light  Conjunctivae and EOM are normal    Neck: Normal range of motion  Neck supple  No thyromegaly present  Cardiovascular: Normal rate, regular rhythm, normal heart sounds and intact distal pulses  Pulmonary/Chest: Effort normal and breath sounds normal  No respiratory distress  He has no wheezes  Abdominal: Soft  Bowel sounds are normal  He exhibits no distension  There is no tenderness  Musculoskeletal: Normal range of motion  He exhibits no edema or deformity  Neurological: He is alert and oriented to person, place, and time  He has normal reflexes  No cranial nerve deficit  Skin: Skin is warm and dry  There is erythema (left hand with joints erythema and warm to touch)  Psychiatric: He has a normal mood and affect  His behavior is normal  Thought content normal    Vitals reviewed  Additional Data:     Labs:    Results from last 7 days   Lab Units 10/01/19  0531   WBC Thousand/uL 10 50   HEMOGLOBIN g/dL 12 3*   HEMATOCRIT % 36 6*   PLATELETS Thousands/uL 197   NEUTROS PCT % 79*   LYMPHS PCT % 9*   MONOS PCT % 11   EOS PCT % 1     Results from last 7 days   Lab Units 10/01/19  0531 09/30/19  0511   POTASSIUM mmol/L 3 6 3 5   CHLORIDE mmol/L 104 103   CO2 mmol/L 24 25   BUN mg/dL 24 25   CREATININE mg/dL 1 02 1 12   CALCIUM mg/dL 8 7 8 4*   ALK PHOS U/L  --  42*   ALT U/L  --  8   AST U/L  --  7*     Results from last 7 days   Lab Units 09/28/19  1133   INR  1 23     Results from last 7 days   Lab Units 10/01/19  1106 10/01/19  0607 09/30/19  2001 09/30/19  1634 09/30/19  1034 09/30/19  0655 09/29/19  2050 09/29/19  1604 09/29/19  1119 09/29/19  0621 09/28/19  2106 09/28/19  1608   POC GLUCOSE mg/dl 238* 201* 206* 251* 233* 169* 203* 149* 244* 172* 186* 164*           * I Have Reviewed All Lab Data Listed Above  * Additional Pertinent Lab Tests Reviewed: [facility] [address] Admission  Reviewed    Imaging:  Imaging Reports Reviewed Today Include: n/a     Recent Cultures (last 7 days):     Results from last 7 days   Lab Units 09/28/19  1133   BLOOD CULTURE  No Growth at 48 hrs  No Growth at 48 hrs     INFLUENZA B PCR  Not Detected   RSV PCR  Not Detected       Last 24 Hours Medication List:     Current Facility-Administered Medications:  acetaminophen 650 mg Oral Q6H PRN Simran Black PA-C   aspirin 324 mg Oral Daily Faith Newman MD   cloNIDine 0 1 mg Oral Q3H PRN Gian Anderson PA-C   doxycycline hyclate 100 mg Oral Q12H [facility] Faith Newman MD   enoxaparin 40 mg Subcutaneous Daily Faith Newman MD   HYDROcodone-acetaminophen 1 tablet Oral Q6H PRN Veda Garcia PA-C   insulin glargine 10 Units Subcutaneous HS Faith Newman MD   insulin lispro 1-5 Units Subcutaneous CHRISTINA Abreu MD   insulin lispro 1-5 Units Subcutaneous HS Claire Ramos MD   lisinopril 20 mg Oral Daily Claire Ramos MD        Today, Patient Was Seen By: KAYODE Jha    ** Please Note: Dictation voice to text software may have been used in the creation of this document   **

## 2019-10-01 NOTE — PHYSICAL THERAPY NOTE
Physical Therapy Treatment Session Note     Patient's Name: Arvin Davis    Admitting Diagnosis  Joint pain [M25 50]  Rash [R21]  Weakness [R53 1]  Sepsis ([facility] ) [A41 9]  Arthralgia, unspecified joint [M25 50]    Problem List  Patient Active Problem List   Diagnosis    Cerebral aneurysm, nonruptured    TIA (transient ischemic attack)    Left-sided weakness    Stage 3 chronic kidney disease ([facility] )    Essential hypertension    Type 2 diabetes mellitus ([place] [address] )    Class 1 obesity due to excess calories with serious comorbidity and body mass index (BMI) of 32 0 to 32 9 in adult    [facility]    Polyarticular arthritis       Past Medical History  Past Medical History:   Diagnosis Date    Benign essential hypertension     Diabetes mellitus ([place] [address] )     Essential hypertension     Hyperlipidemia     Hypertension     Pilonidal cyst with abscess     Rheumatic heart disease     TIA (transient ischemic attack)     Type 2 diabetes mellitus (HCC)     Type II diabetes mellitus, uncontrolled ([place] [address] )     Vitamin D deficiency        Past Surgical History  Past Surgical History:   Procedure Laterality Date    CYSTECTOMY      Per patient cyst removal from his back    LASIK          10/01/19 1149   Pain Assessment   Pain Assessment 0-10   Pain Score Worst Possible Pain   Pain Type Acute pain   Pain Location Hand   Pain Orientation Left   Pain Descriptors Aching;Tender   Pain Frequency Constant/continuous   Pain Onset Ongoing   Clinical Progression Not changed   Patient's Stated Pain Goal No pain   Hospital Pain Intervention(s) Medication (See MAR); Repositioned;Elevated   Diversional Activities Television   Multiple Pain Sites No   Restrictions/Precautions   Weight Bearing Precautions Per Order No   Other Precautions Fall Risk;Pain   General   Chart Reviewed Yes   Response to Previous Treatment Patient with no complaints from previous session     Family/Caregiver Present No   Cognition   Overall Cognitive Status [facility] Arousal/Participation Alert; Cooperative   Attention Within functional limits   Orientation Level Oriented X4   Memory Within functional limits   Following Commands Follows one step commands without difficulty   Subjective   Subjective "My hand feels really tender today"   Bed Mobility   Rolling R 5  Supervision   Additional items Assist x 1;HOB elevated; Bedrails   Rolling L 5  Supervision   Additional items Assist x 1;HOB elevated; Bedrails   Supine to Sit 5  Supervision   Additional items Assist x 1;HOB elevated; Bedrails; Increased time required   Sit to Supine   (DNT as pt  remained OOB in chair upon conclusion )   Transfers   Sit to Stand 5  Supervision   Additional items Assist x 1;Bedrails; Increased time required   Stand to Sit 5  Supervision   Additional items Assist x 1; Armrests; Increased time required   Stand pivot 5  Supervision   Additional items Assist x 1;Verbal cues   Ambulation/Elevation   Gait pattern Forward Flexion;Decreased foot clearance; Short stride   Gait Assistance   (CGA)   Additional items Assist x 1   Assistive Device None   Distance 150 feet x 2  (w/o rest break)   Stair Management Assistance Not tested   Balance   Static Sitting Normal   Dynamic Sitting Good   Static Standing Good   Dynamic Standing Good   Ambulatory Fair +   Endurance Deficit   Endurance Deficit Yes   Endurance Deficit Description SOB exhibited upon AMB progression, resolved upon rest in chair x 2 minutes  Activity Tolerance   Activity Tolerance Patient tolerated treatment well   Nurse Made Aware yes   Exercises   Neuro re-ed X 15 minutes including: standing multi-directional head turns 20 reps each w/ supervision of 1 and w/o UE support, sidestepping B x 20 reps w/ supervision of 1 w/o UE support, BLE marching x 20 reps w/ supervision of 1 and w/o UE support , and continued postural facilitation to increase stability & safety  Assessment   Prognosis Good   Problem List Decreased strength;Decreased endurance; Impaired balance;Decreased mobility; Impaired judgement;Decreased safety awareness;Pain   Assessment Pt seen for PT treatment session this date with interventions consisting of gait training w/ emphasis on improving pt's ability to ambulate level surfaces x 150 feet x 2 with CGA provided by therapist with no AD and neuromuscular re-education: side stepping bilaterally, marching and multi-directional head turns x 20 reps ft w/ o UE support  Pt agreeable to PT treatment session upon arrival, pt found supine in bed w/ HOB elevated, A&O x 4  In comparison to previous session, pt with improvements in ambulatory distance  Post session: all needs in reach and RN notified of session findings/recommendations Continue to recommend OP PT at time of d/c in order to maximize pt's functional independence and safety w/ mobility  Pt continues to be functioning below baseline level, and remains limited 2* factors listed above and including impaired balance, gait dysfunction, weakness, pain, decline from PLOF  PT will continue to see pt while here in order to address the deficits listed above and provide interventions consistent w/ POC in effort to achieve LTGs  Goals   Patient Goals go home soon   LTG Expiration Date 10/09/19   Long Term Goal #1 LTGs remain appropriate   PT Treatment Day 3   Plan   Treatment/Interventions Functional transfer training;LE strengthening/ROM; Therapeutic exercise; Endurance training;Bed mobility;Gait training;OT  (&neuro re-education w/balance training)   Progress Progressing toward goals   PT Frequency Other (Comment)  (3-5x/wk)   Recommendation   Recommendation Outpatient PT   PT - OK to Discharge Yes  (if medically stable)   Additional Comments Upon conclusion, pt  was resting in chair w/all needs within reach, new ice pack placed on L hand       Beatriz Cole, PT

## 2019-10-01 NOTE — ASSESSMENT & PLAN NOTE
Lab Results   Component Value Date    HGBA1C 6 9 (A) 09/24/2019       Recent Labs     09/30/19  1634 09/30/19  2001 10/01/19  0607 10/01/19  1106   POCGLU 251* 206* 201* 238*       Blood Sugar Average: Last 72 hrs:  (P) 015 [id number]   · Hold metformin while in-house, sliding scale insulin along with Lantus

## 2019-10-01 NOTE — OCCUPATIONAL THERAPY NOTE
Patient declined OT treatment at this time  He states he's waiting to 'find out' about his swollen Left hand - currently has ice on same  Will continue with POC as appropriate     BRUNA Segura/ELENA

## 2019-10-01 NOTE — PLAN OF CARE
Problem: Potential for Falls  Goal: Patient will remain free of falls  Description  INTERVENTIONS:  - Assess patient frequently for physical needs  -  Identify cognitive and physical deficits and behaviors that affect risk of falls    -  [place] fall precautions as indicated by assessment   - Educate patient/family on patient safety including physical limitations  - Instruct patient to call for assistance with activity based on assessment  - Modify environment to reduce risk of injury  - Consider OT/PT consult to assist with strengthening/mobility  Outcome: Progressing     Problem: Prexisting or High Potential for Compromised Skin Integrity  Goal: Skin integrity is maintained or improved  Description  INTERVENTIONS:  - Identify patients at risk for skin breakdown  - Assess and monitor skin integrity  - Assess and monitor nutrition and hydration status  - Monitor labs   - Assess for incontinence   - Turn and reposition patient  - Assist with mobility/ambulation  - Relieve pressure over bony prominences  - Avoid friction and shearing  - Provide appropriate hygiene as needed including keeping skin clean and dry  - Evaluate need for skin moisturizer/barrier cream  - Collaborate with interdisciplinary team   - Patient/family teaching  - Consider wound care consult   Outcome: Progressing     Problem: PAIN - ADULT  Goal: Verbalizes/displays adequate comfort level or baseline comfort level  Description  Interventions:  - Encourage patient to monitor pain and request assistance  - Assess pain using appropriate pain scale  - Administer analgesics based on type and severity of pain and evaluate response  - Implement non-pharmacological measures as appropriate and evaluate response  - Consider cultural and social influences on pain and pain management  - Notify physician/advanced practitioner if interventions unsuccessful or patient reports new pain  Outcome: Progressing     Problem: INFECTION - ADULT  Goal: Absence or prevention of progression during hospitalization  Description  INTERVENTIONS:  - Assess and monitor for signs and symptoms of infection  - Monitor lab/diagnostic results  - Monitor all insertion sites, i e  indwelling lines, tubes, and drains  - Monitor endotracheal if appropriate and nasal secretions for changes in amount and color  - [place] appropriate cooling/warming therapies per order  - Administer medications as ordered  - Instruct and encourage patient and family to use good hand hygiene technique  - Identify and instruct in appropriate isolation precautions for identified infection/condition  Outcome: Progressing  Goal: Absence of fever/infection during neutropenic period  Description  INTERVENTIONS:  - Monitor WBC    Outcome: Progressing     Problem: SAFETY ADULT  Goal: Maintain or return to baseline ADL function  Description  INTERVENTIONS:  -  Assess patient's ability to carry out ADLs; assess patient's baseline for ADL function and identify physical deficits which impact ability to perform ADLs (bathing, care of mouth/teeth, toileting, grooming, dressing, etc )  - Assess/evaluate cause of self-care deficits   - Assess range of motion  - Assess patient's mobility; develop plan if impaired  - Assess patient's need for assistive devices and provide as appropriate  - Encourage maximum independence but intervene and supervise when necessary  - Involve family in performance of ADLs  - Assess for home care needs following discharge   - Consider OT consult to assist with ADL evaluation and planning for discharge  - Provide patient education as appropriate  Outcome: Progressing  Goal: Maintain or return mobility status to optimal level  Description  INTERVENTIONS:  - Assess patient's baseline mobility status (ambulation, transfers, stairs, etc )    - Identify cognitive and physical deficits and behaviors that affect mobility  - Identify mobility aids required to assist with transfers and/or ambulation (gait belt, sit-to-stand, lift, walker, cane, etc )  - [place] fall precautions as indicated by assessment  - Record patient progress and toleration of activity level on Mobility SBAR; progress patient to next Phase/Stage  - Instruct patient to call for assistance with activity based on assessment  - Consider rehabilitation consult to assist with strengthening/weightbearing, etc   Outcome: Progressing     Problem: DISCHARGE PLANNING  Goal: Discharge to home or other facility with appropriate resources  Description  INTERVENTIONS:  - Identify barriers to discharge w/patient and caregiver  - Arrange for needed discharge resources and transportation as appropriate  - Identify discharge learning needs (meds, wound care, etc )  - Arrange for interpretive services to assist at discharge as needed  - Refer to Case Management Department for coordinating discharge planning if the patient needs post-hospital services based on physician/advanced practitioner order or complex needs related to functional status, cognitive ability, or social support system  Outcome: Progressing     Problem: Knowledge Deficit  Goal: Patient/family/caregiver demonstrates understanding of disease process, treatment plan, medications, and discharge instructions  Description  Complete learning assessment and assess knowledge base    Interventions:  - Provide teaching at level of understanding  - Provide teaching via preferred learning methods  Outcome: Progressing     Problem: DISCHARGE PLANNING - CARE MANAGEMENT  Goal: Discharge to post-acute care or home with appropriate resources  Description  INTERVENTIONS:  - Conduct assessment to determine patient/family and health care team treatment goals, and need for post-acute services based on payer coverage, community resources, and patient preferences, and barriers to discharge  - Address psychosocial, clinical, and financial barriers to discharge as identified in assessment in conjunction with the patient/family and health care team  - Arrange appropriate level of post-acute services according to patient's   needs and preference and payer coverage in collaboration with the physician and health care team  - Communicate with and update the patient/family, physician, and health care team regarding progress on the discharge plan  - Arrange appropriate transportation to post-acute venues  Discharge to home with possible [place] [id number] services     Outcome: Progressing

## 2019-10-02 ENCOUNTER — APPOINTMENT (INPATIENT)
Dept: MRI IMAGING | Facility: HOSPITAL | Age: 74
DRG: 872 | End: 2019-10-02
Payer: COMMERCIAL

## 2019-10-02 ENCOUNTER — APPOINTMENT (INPATIENT)
Dept: RADIOLOGY | Facility: HOSPITAL | Age: 74
DRG: 872 | End: 2019-10-02
Payer: COMMERCIAL

## 2019-10-02 ENCOUNTER — TELEPHONE (OUTPATIENT)
Dept: OBGYN CLINIC | Facility: HOSPITAL | Age: 74
End: 2019-10-02

## 2019-10-02 PROBLEM — N17.9 AKI (ACUTE KIDNEY INJURY) (HCC): Status: ACTIVE | Noted: 2019-10-02

## 2019-10-02 LAB
A PHAGOCYTOPH IGG TITR SER IF: NEGATIVE {TITER}
A PHAGOCYTOPH IGM TITR SER IF: NEGATIVE {TITER}
ANION GAP SERPL CALCULATED.3IONS-SCNC: 9 MMOL/L (ref 4–13)
B MICROTI IGG TITR SER: NORMAL {TITER}
B MICROTI IGM TITR SER: NORMAL {TITER}
BASOPHILS # BLD AUTO: 0 THOUSANDS/ΜL (ref 0–0.1)
BASOPHILS NFR BLD AUTO: 0 % (ref 0–2)
BUN SERPL-MCNC: 20 MG/DL (ref 7–25)
CALCIUM SERPL-MCNC: 8.5 MG/DL (ref 8.6–10.5)
CHLORIDE SERPL-SCNC: 100 MMOL/L (ref 98–107)
CO2 SERPL-SCNC: 24 MMOL/L (ref 21–31)
CREAT SERPL-MCNC: 0.95 MG/DL (ref 0.7–1.3)
E CHAFFEENSIS IGG TITR SER IF: NEGATIVE {TITER}
E CHAFFEENSIS IGM TITR SER IF: NEGATIVE {TITER}
EOSINOPHIL # BLD AUTO: 0.2 THOUSAND/ΜL (ref 0–0.61)
EOSINOPHIL NFR BLD AUTO: 2 % (ref 0–5)
ERYTHROCYTE [DISTWIDTH] IN BLOOD BY AUTOMATED COUNT: 12.9 % (ref 11.5–14.5)
GFR SERPL CREATININE-BSD FRML MDRD: 80 ML/MIN/1.73SQ M
GLUCOSE SERPL-MCNC: 160 MG/DL (ref 65–140)
GLUCOSE SERPL-MCNC: 184 MG/DL (ref 65–140)
GLUCOSE SERPL-MCNC: 185 MG/DL (ref 65–99)
GLUCOSE SERPL-MCNC: 206 MG/DL (ref 65–140)
GLUCOSE SERPL-MCNC: 258 MG/DL (ref 65–140)
HCT VFR BLD AUTO: 36.3 % (ref 42–47)
HGB BLD-MCNC: 12.2 G/DL (ref 14–18)
LYMPHOCYTES # BLD AUTO: 1.2 THOUSANDS/ΜL (ref 0.6–4.47)
LYMPHOCYTES NFR BLD AUTO: 13 % (ref 21–51)
MCH RBC QN AUTO: 30 PG (ref 26–34)
MCHC RBC AUTO-ENTMCNC: 33.6 G/DL (ref 31–37)
MCV RBC AUTO: 89 FL (ref 81–99)
MONOCYTES # BLD AUTO: 1.3 THOUSAND/ΜL (ref 0.17–1.22)
MONOCYTES NFR BLD AUTO: 14 % (ref 2–12)
NEUTROPHILS # BLD AUTO: 6.9 THOUSANDS/ΜL (ref 1.4–6.5)
NEUTS SEG NFR BLD AUTO: 72 % (ref 42–75)
PLATELET # BLD AUTO: 207 THOUSANDS/UL (ref 149–390)
PMV BLD AUTO: 8.9 FL (ref 8.6–11.7)
POTASSIUM SERPL-SCNC: 3.6 MMOL/L (ref 3.5–5.5)
PROCALCITONIN SERPL-MCNC: 0.23 NG/ML
RBC # BLD AUTO: 4.07 MILLION/UL (ref 4.3–5.9)
SODIUM SERPL-SCNC: 133 MMOL/L (ref 134–143)
URATE SERPL-MCNC: 4.8 MG/DL (ref 4.2–8)
WBC # BLD AUTO: 9.7 THOUSAND/UL (ref 4.8–10.8)

## 2019-10-02 PROCEDURE — 80048 BASIC METABOLIC PNL TOTAL CA: CPT | Performed by: NURSE PRACTITIONER

## 2019-10-02 PROCEDURE — 82948 REAGENT STRIP/BLOOD GLUCOSE: CPT

## 2019-10-02 PROCEDURE — 86038 ANTINUCLEAR ANTIBODIES: CPT | Performed by: NURSE PRACTITIONER

## 2019-10-02 PROCEDURE — 85025 COMPLETE CBC W/AUTO DIFF WBC: CPT | Performed by: NURSE PRACTITIONER

## 2019-10-02 PROCEDURE — 73218 MRI UPPER EXTREMITY W/O DYE: CPT

## 2019-10-02 PROCEDURE — 84550 ASSAY OF BLOOD/URIC ACID: CPT | Performed by: NURSE PRACTITIONER

## 2019-10-02 PROCEDURE — 97110 THERAPEUTIC EXERCISES: CPT

## 2019-10-02 PROCEDURE — 73221 MRI JOINT UPR EXTREM W/O DYE: CPT

## 2019-10-02 PROCEDURE — 84145 PROCALCITONIN (PCT): CPT | Performed by: NURSE PRACTITIONER

## 2019-10-02 PROCEDURE — 97116 GAIT TRAINING THERAPY: CPT

## 2019-10-02 PROCEDURE — 73130 X-RAY EXAM OF HAND: CPT

## 2019-10-02 PROCEDURE — 99232 SBSQ HOSP IP/OBS MODERATE 35: CPT | Performed by: NURSE PRACTITIONER

## 2019-10-02 PROCEDURE — G0427 INPT/ED TELECONSULT70: HCPCS | Performed by: INTERNAL MEDICINE

## 2019-10-02 RX ORDER — INSULIN GLARGINE 100 [IU]/ML
15 INJECTION, SOLUTION SUBCUTANEOUS
Status: DISCONTINUED | OUTPATIENT
Start: 2019-10-02 | End: 2019-10-03

## 2019-10-02 RX ORDER — IBUPROFEN 600 MG/1
600 TABLET ORAL EVERY 8 HOURS SCHEDULED
Status: DISCONTINUED | OUTPATIENT
Start: 2019-10-02 | End: 2019-10-03 | Stop reason: HOSPADM

## 2019-10-02 RX ORDER — SODIUM CHLORIDE 9 MG/ML
100 INJECTION, SOLUTION INTRAVENOUS CONTINUOUS
Status: DISPENSED | OUTPATIENT
Start: 2019-10-02 | End: 2019-10-02

## 2019-10-02 RX ORDER — NYSTATIN 100000 [USP'U]/G
POWDER TOPICAL 2 TIMES DAILY
Status: DISCONTINUED | OUTPATIENT
Start: 2019-10-02 | End: 2019-10-03 | Stop reason: HOSPADM

## 2019-10-02 RX ORDER — FAMOTIDINE 20 MG/1
20 TABLET, FILM COATED ORAL 2 TIMES DAILY
Status: DISCONTINUED | OUTPATIENT
Start: 2019-10-02 | End: 2019-10-03 | Stop reason: HOSPADM

## 2019-10-02 RX ADMIN — ENOXAPARIN SODIUM 40 MG: 40 INJECTION SUBCUTANEOUS at 08:26

## 2019-10-02 RX ADMIN — ACETAMINOPHEN 650 MG: 325 TABLET ORAL at 08:26

## 2019-10-02 RX ADMIN — IBUPROFEN 600 MG: 600 TABLET, FILM COATED ORAL at 15:11

## 2019-10-02 RX ADMIN — FAMOTIDINE 20 MG: 20 TABLET ORAL at 17:54

## 2019-10-02 RX ADMIN — SODIUM CHLORIDE 100 ML/HR: 9 INJECTION, SOLUTION INTRAVENOUS at 08:27

## 2019-10-02 RX ADMIN — DOXYCYCLINE HYCLATE 100 MG: 50 CAPSULE ORAL at 08:26

## 2019-10-02 RX ADMIN — LISINOPRIL 20 MG: 20 TABLET ORAL at 08:27

## 2019-10-02 RX ADMIN — IBUPROFEN 600 MG: 600 TABLET, FILM COATED ORAL at 09:45

## 2019-10-02 RX ADMIN — HYDROCODONE BITARTRATE AND ACETAMINOPHEN 1 TABLET: 5; 325 TABLET ORAL at 09:45

## 2019-10-02 RX ADMIN — NYSTATIN: 100000 POWDER TOPICAL at 17:54

## 2019-10-02 RX ADMIN — INSULIN LISPRO 2 UNITS: 100 INJECTION, SOLUTION INTRAVENOUS; SUBCUTANEOUS at 12:02

## 2019-10-02 RX ADMIN — INSULIN LISPRO 1 UNITS: 100 INJECTION, SOLUTION INTRAVENOUS; SUBCUTANEOUS at 16:32

## 2019-10-02 RX ADMIN — FAMOTIDINE 20 MG: 20 TABLET ORAL at 09:45

## 2019-10-02 RX ADMIN — INSULIN LISPRO 1 UNITS: 100 INJECTION, SOLUTION INTRAVENOUS; SUBCUTANEOUS at 08:26

## 2019-10-02 RX ADMIN — NYSTATIN 1 APPLICATION: 100000 POWDER TOPICAL at 08:25

## 2019-10-02 RX ADMIN — ASPIRIN 81 MG 324 MG: 81 TABLET ORAL at 08:26

## 2019-10-02 NOTE — CASE MANAGEMENT
Pt continues to have painful joints  Pt is recommending STR vs HHC upon discharge  Pt had a telenuero consult today  Will evaluate prior to discharge for needs

## 2019-10-02 NOTE — TELEPHONE ENCOUNTER
Karla from [facility] called in requesting a consult for L hand  Patient is in room 120 bed 1  Kirstin Hilton for more assistance       #: [phone]

## 2019-10-02 NOTE — PHYSICAL THERAPY NOTE
10/02/19 0823   Pain Assessment   Pain Assessment 0-10   Pain Score Worst Possible Pain   Pain Type Acute pain   Pain Location Hand   Pain Orientation Left   Pain Descriptors Aching; Sharp   Pain Frequency Constant/continuous   Pain Onset Ongoing   Clinical Progression Gradually worsening   Effect of Pain on Daily Activities effecting gripping of RW for amb   Patient's Stated Pain Goal No pain   Multiple Pain Sites Yes  (7/10 R lateral knee)   Restrictions/Precautions   Weight Bearing Precautions Per Order No   Other Precautions Telemetry; Fall Risk;Pain   General   Chart Reviewed Yes   Family/Caregiver Present No   Cognition   Overall Cognitive Status WFL   Arousal/Participation Alert; Cooperative   Attention Within functional limits   Orientation Level Oriented X4   Memory Within functional limits   Following Commands Follows one step commands without difficulty   Comments pt agreed to PT session   Subjective   Subjective "My L hand really hurts 10/10  The side of my R knee hurts too 7/10 "   Bed Mobility   Supine to Sit 4  Minimal assistance   Additional items Assist x 1;HOB elevated; Bedrails; Increased time required;Verbal cues;LE management   Additional Comments pt sat OOB in chair to end session & set up for breakfast   Transfers   Sit to Stand 5  Supervision   Additional items Assist x 1;Bedrails; Increased time required;Verbal cues   Stand to Sit 5  Supervision   Additional items Assist x 1; Armrests; Increased time required;Verbal cues   Ambulation/Elevation   Gait pattern Narrow MAGALY; Forward Flexion;Decreased foot clearance; Short stride; Excessively slow   Gait Assistance   (CGA)   Additional items Assist x 1;Verbal cues; Tactile cues   Assistive Device Rolling walker   Distance 100 feet x 2 with seated rest break between   Stair Management Assistance Not tested   Balance   Static Sitting Normal   Dynamic Sitting Good   Static Standing Good   Dynamic Standing Fair +   Ambulatory Fair +   Endurance Deficit Endurance Deficit Yes   Endurance Deficit Description fatigue, SOB, pain   Activity Tolerance   Activity Tolerance Patient limited by fatigue;Patient limited by pain   Nurse Made Aware yes RN Renard Matias aware   Exercises   Hip Flexion Sitting;20 reps;AROM; Bilateral   Hip Abduction Sitting;20 reps;AROM; Bilateral   Hip Adduction Sitting;20 reps;AROM; Bilateral   Knee AROM Long Arc Quad Sitting;20 reps;AROM; Bilateral   Ankle Pumps Sitting;20 reps;AROM; Bilateral   Assessment   Prognosis Good   Problem List Decreased strength;Decreased range of motion;Decreased endurance; Impaired balance;Decreased mobility; Impaired judgement;Decreased safety awareness;Pain   Assessment Pt seen for PT treatment session this date with interventions consisting of gait training w/ emphasis on improving pt's ability to ambulate level surfaces x 100 feet x 2 with CGA provided by therapist with RW and Therapeutic exercise consisting of: AROM 20 reps B LE in sitting position  Pt agreeable to PT treatment session upon arrival, pt found supine in bed w/ HOB elevated, in no apparent distress  In comparison to previous session, pt with no improvements as evidenced by decreased amb distance due to c/o pain L hand & R knee  Post session: all needs in reach seated in bedside chair, set up for breakfast, SCDs active  Continue to recommend STR at time of d/c in order to maximize pt's functional independence and safety w/ mobility  Pt continues to be functioning below baseline level, and remains limited 2* factors listed above and including decreased strength, endurance & safe functional mobility  PT will continue to see pt while here in order to address the deficits listed above and provide interventions consistent w/ POC in effort to achieve STGs     Barriers to Discharge Inaccessible home environment   Barriers to Discharge Comments [place] home   Goals   Patient Goals have less pain   PT Treatment Day 4   Plan   Treatment/Interventions Functional transfer training;LE strengthening/ROM; Elevations; Therapeutic exercise; Endurance training;Patient/family training;Equipment eval/education; Bed mobility;Gait training;Spoke to nursing   Progress No functional improvements   PT Frequency 5x/wk   Recommendation   Recommendation   (STR vs home PT)   Equipment Recommended Walker   PT - OK to Discharge Yes  (when med cleared)   Stephanie Gomes, PTA

## 2019-10-02 NOTE — QUICK NOTE
Addendum to my progress note-   Acute kidney injury  · likely due to volume depletion and acute infection  · patient received IV fluid and renal function is much improved  · Avoid any nephrotoxins  · Monitor renal function closely

## 2019-10-02 NOTE — ASSESSMENT & PLAN NOTE
Lab Results   Component Value Date    HGBA1C 6 9 (A) 09/24/2019       Recent Labs     10/01/19  1106 10/01/19  1636 10/01/19  2015 10/02/19  0634   POCGLU 238* 202* 228* 184*       Blood Sugar Average: Last 72 hrs:  (P) 351 [id number]   · Hold metformin while in-house, sliding scale insulin along with Lantus

## 2019-10-02 NOTE — PLAN OF CARE
Problem: PHYSICAL THERAPY ADULT  Goal: Performs mobility at highest level of function for planned discharge setting  See evaluation for individualized goals  Description  Treatment/Interventions: Functional transfer training, LE strengthening/ROM, Therapeutic exercise, Endurance training, Patient/family training, Equipment eval/education, Bed mobility, Gait training, OT  Equipment Recommended: Jd Kay  See flowsheet documentation for full assessment, interventions and recommendations  Pawan , PT     Outcome: Not Progressing  Note:   Prognosis: Good  Problem List: Decreased strength, Decreased range of motion, Decreased endurance, Impaired balance, Decreased mobility, Impaired judgement, Decreased safety awareness, Pain  Assessment: Pt seen for PT treatment session this date with interventions consisting of gait training w/ emphasis on improving pt's ability to ambulate level surfaces x 100 feet x 2 with CGA provided by therapist with RW and Therapeutic exercise consisting of: AROM 20 reps B LE in sitting position  Pt agreeable to PT treatment session upon arrival, pt found supine in bed w/ HOB elevated, in no apparent distress  In comparison to previous session, pt with no improvements as evidenced by decreased amb distance due to c/o pain L hand & R knee  Post session: all needs in reach seated in bedside chair, set up for breakfast, SCDs active  Continue to recommend STR at time of d/c in order to maximize pt's functional independence and safety w/ mobility  Pt continues to be functioning below baseline level, and remains limited 2* factors listed above and including decreased strength, endurance & safe functional mobility  PT will continue to see pt while here in order to address the deficits listed above and provide interventions consistent w/ POC in effort to achieve STGs    Barriers to Discharge: Inaccessible home environment  Barriers to Discharge Comments: [place] home  Recommendation: (STR vs home PT) PT - OK to Discharge: Yes(when med cleared)    See flowsheet documentation for full assessment

## 2019-10-02 NOTE — TELEPHONE ENCOUNTER
Spoke with patients wife  He is in the hospital still with severe hand pain that has traveled to his body and a body rash  They will contact the office once is he discharged

## 2019-10-02 NOTE — ASSESSMENT & PLAN NOTE
· Present on admission noted with creatinine of 1 3  · Suspected to be due to volume depletion and acute infection  · Resolved   · Avoid nephrotoxins  · Monitor renal function closely

## 2019-10-02 NOTE — ASSESSMENT & PLAN NOTE
· Met sepsis criteria with leukocytosis and tachycardia  · No obvious source at this time  · Concern for possible to tick borne illness in light of joint pain, rash, myalgias and body aches  · Patient continues to spike fevers  · Continue doxycycline pending serologies including Ehrlichia, Babesia  · Lyme antibodies negative    · Blood cultures no growth to date  · Pending infectious Disease evaluation

## 2019-10-02 NOTE — ASSESSMENT & PLAN NOTE
· Unclear cause of symptomatology  · Inflammatory markers elevated  · In light of fever, polyarticular joint pain, in outside activities, there is a concern for tick-borne illnesses and patient has empirically been started on doxycycline    A Lyme titers- negative  · Ehrlichia, Babesia- pending   · Statin discontinued  · CPR and sed rate- elevated, RODRIGO- pending   · 10/1- patient developed left hand redness and joint swelling- improving

## 2019-10-02 NOTE — TELEMEDICINE
Consultation - Infectious Disease   Arvin Davis 72 y o  male MRN: [id number]  Unit/Bed#: -01 Encounter: [id number]      Inpatient consult to Infectious Diseases  Consult performed by: Yusef Carlin MD  Consult ordered by: KAYODE Jennings            REQUIRED DOCUMENTATION:     1  This service was provided via Telemedicine  2  Provider located at Home  3  TeleMed provider: Yusef Carlin MD   4  Identify all parties in room with patient during tele consult:  RN  5  After connecting through [employer], patient was identified by name and date of birth and assistant checked wristband  Patient was then informed that this was a Telemedicine visit and that the exam was being conducted confidentially over secure lines  My office door was closed  No one else was in the room  Patient acknowledged consent and understanding of privacy and security of the Telemedicine visit, and gave us permission to have the assistant stay in the room in order to assist with the history and to conduct the exam   I informed the patient that I have reviewed their record in Epic and presented the opportunity for them to ask any questions regarding the visit today  The patient agreed to participate  Assessment/Recommendations     44-year-old diabetic male with recent hospitalization for subacute stroke presents with a rash followed by polyarthralgia and febrile illness/sepsis like syndrome     1  Sepsis like syndrome, present on admission with leukocytosis and tachycardia and now fever  - No apparent infectious source of sepsis  - Clinically stable but persistently febrile without leukocytosis    · Management as below    2  Acute febrile illness  - patient has no localizing symptoms of UTI/pneumonia or other bacterial infection    Concern remains for possible viral syndrome and/or tick-borne illness   - non infectious causes include vasculitis/connective tissue disease versus crystal arthropathy such as gout  - patient remains febrile but without leukocytosis    · Await results of Ehrlichia panel, RODRIGO  · Please check uric acid  · Please repeat LFTs  · Please check EBV and CMV serology  · Recommend continuing oral Doxycycline   · Await results of XR hand, consult Ortho    3  Polyarthralgia, acute left wrist and hand arthritis  - Patient has generalized joint pain and now acute arthritis of left hand  - Differential includes early Lyme disease which may explain negative initial titers  Would also need to rule out gout  No other clinical features to suggest connective tissue disorder    · Await results of XR hand  · Recommend consulting Orthopedics, consider joint aspiration to assess for crystals, cell count and culture    4  Type 2 diabetes, recent subacute CVA    · Continue supportive care per primary team    Thank you for involving me in the care of your patient  Please call with questions, change in clinical status or if tests recommended above are abnormal      Discussed with the primary service  History     Reason for Consult: Fever  HPI: Arvin Davis is a 72y o  year old male with type 2 diabetes, hypertension and a recent hospitalization about one month ago with TIA symptoms, noted to have a subacute CVA and started on plavix  He reports that about one week ago he acutely developed  a rash over his trunk, hands and legs  Rash was itchy and per the patient, comprised of "grey patches"  He saw his PCP who instructed him to stop plavix and use claritin as well as topical cream  The patient does not recall taking prednisone  The rash resolved but he subsequently developed generalized pain in all joints and muscle aches and weakness and subjective fever  He has not had symptoms like this before  No recent tick bites  He presented to the ER on 9/28 for further evaluation  In the ER, he was tachycardic and hypertensive  Labs were notable for leukocytosis  EKG noted no acute ST changes   CXR showed no infiltrate  He was admitted and started on doxycycline for possible tick-borne infection  On 10/1 patient developed acute onset left hand redness and joint swelling  The redness has resolved but the swelling has persisted across his knuckles and wrist  Any active or passive motion at the joint is painful  He also developed a fever to 102 7 last night  This morning his right hand appears to be mildly swollen  He continues to note generalized aches and pains  White count remains normal   Blood cultures from admission have been negative  Lyme titers have been negative  Denies nausea, vomiting, diarrhea or rash and is tolerating antibiotics well  Infectious disease is being consulted for diagnostic work up and antibiotic management  Review of Systems  A [id number] point system-based review of systems is otherwise negative      PAST MEDICAL HISTORY:  Past Medical History:   Diagnosis Date    Benign essential hypertension     Diabetes mellitus ([address] )     Essential hypertension     Hyperlipidemia     Hypertension     Pilonidal cyst with abscess     Rheumatic heart disease     TIA (transient ischemic attack)     Type 2 diabetes mellitus (HCC)     Type II diabetes mellitus, uncontrolled (HCC)     Vitamin D deficiency      Past Surgical History:   Procedure Laterality Date    CYSTECTOMY      Per patient cyst removal from his back    LAS         FAMILY HISTORY:  Non-contributory    SOCIAL HISTORY:  Social History   /Civil Union  Social History     Substance and Sexual Activity   Alcohol Use Never    Frequency: Never     Social History     Substance and Sexual Activity   Drug Use Never     Social History     Tobacco Use   Smoking Status Never Smoker   Smokeless Tobacco Never Used       ALLERGIES:  Allergies   Allergen Reactions    Plavix [Clopidogrel] Rash     Stopped two days ago because got a rash and doctors thought it was from this        MEDICATIONS:  All current active medications have been reviewed  Physical Exam     Temp:  [97 8 °F (36 6 °C)-102 7 °F (39 3 °C)] 101 °F (38 3 °C)  HR:  [] 100  Resp:  [18] 18  BP: (147-166)/(73-98) 166/98  SpO2:  [94 %-96 %] 94 %  Temp (24hrs), Av 2 °F (37 9 °C), Min:97 8 °F (36 6 °C), Max:102 7 °F (39 3 °C)  Current: Temperature: (!) 101 °F (38 3 °C)    Intake/Output Summary (Last 24 hours) at 10/2/2019 09  Last data filed at 10/2/2019 0446  Gross per 24 hour   Intake    Output 800 ml   Net -800 ml     Physical exam findings reported by bedside and primary medical team staff    General Appearance:  Appearing sleepy but nontoxic, and in no distress, appears stated age   Head:  Normocephalic, without obvious abnormality, atraumatic   Eyes:  PERRL, conjunctiva pink and sclera anicteric, both eyes   Nose: Nares normal, mucosa normal, no drainage   Throat: Oropharynx moist without lesions; lips, mucosa, and tongue normal; teeth and gums normal   Neck: Supple, symmetrical, trachea midline, no adenopathy, no tenderness/mass/nodules   Back:   Symmetric, no curvature, ROM normal, no CVA tenderness   Lungs:   Clear to auscultation bilaterally, no audible wheezes, rhonchi and rales, respirations unlabored   Chest Wall:  No tenderness or deformity   Heart:  Regular rate and rhythm, S1, S2 normal, no murmur, rub or gallop   Abdomen:   Soft, non-tender, non-distended, positive bowel sounds, no masses, no organomegaly    No CVA tenderness   Extremities: L hand appears swollen with swelling across MCP joints and wrist joint with painful passive range of motion  Mild erythema and warmth  Mild swelling noted over right hand  Skin: Skin color, texture, turgor normal, no rashes or lesions  No draining wounds noted  Lymph nodes: Cervical, supraclavicular, and axillary nodes normal   Neurologic: Alert and oriented times 3       Invasive Devices:   Peripheral IV 19 Right Wrist (Active)   Site Assessment Clean;Dry; Intact 2019  7:56 PM   Dressing Type Transparent 9/30/2019  7:56 PM   Line Status Flushed 9/30/2019  7:56 PM   Dressing Status Clean;Dry; Intact 9/30/2019  7:56 PM   Reason Not Rotated Not due 9/30/2019  9:20 AM       Labs, Imaging, & Other Studies     Lab Results:    I have personally reviewed pertinent labs  Results from last 7 days   Lab Units 10/02/19  0438 10/01/19  0531 09/30/19  0511   WBC Thousand/uL 9 70 10 50 9 40   HEMOGLOBIN g/dL 12 2* 12 3* 12 4*   PLATELETS Thousands/uL 207 197 169     Results from last 7 days   Lab Units 10/02/19  0438  09/30/19  0511 09/29/19  0544 09/28/19  1133   POTASSIUM mmol/L 3 6   < > 3 5 3 5 3 7   CHLORIDE mmol/L 100   < > 103 99 98   CO2 mmol/L 24   < > 25 29 26   BUN mg/dL 20   < > 25 23 20   CREATININE mg/dL 0 95   < > 1 12 1 31* 1 11   EGFR ml/min/1 73sq m 80   < > 66 54 66   CALCIUM mg/dL 8 5*   < > 8 4* 8 8 9 7   AST U/L  --   --  7* 7* 9*   ALT U/L  --   --  8 9 12   ALK PHOS U/L  --   --  42* 44* 56    < > = values in this interval not displayed  Results from last 7 days   Lab Units 09/28/19  1133   BLOOD CULTURE  No Growth at 72 hrs  No Growth at 72 hrs  INFLUENZA B PCR  Not Detected   RSV PCR  Not Detected       Imaging Studies:   I have personally reviewed pertinent imaging study reports and images in PACS  EKG, Pathology, and Other Studies:   I have personally reviewed pertinent reports  Counseling/Coordination of care: Total 70 minutes communication with the patient via telehealth  Labs, medical tests and imaging studies were independently reviewed by me as noted above in HPI and old records were obtained and summarized as noted above in HPI

## 2019-10-02 NOTE — PROGRESS NOTES
Progress Note - Mike Anderson 1945, 72 y o  male MRN: [id number]    Unit/Bed#: -Radha Encounter: [id number]    Primary Care Provider: KAYODE Gramajo   Date and time admitted to hospital: 9/28/2019 11:08 AM        * Sepsis ([address] )  Assessment & Plan  · Met sepsis criteria with leukocytosis and tachycardia  · No obvious source at this time  · Concern for possible to tick borne illness in light of joint pain, rash, myalgias and body aches  · Patient continues to spike fevers  · Continue doxycycline pending serologies including Ehrlichia, Babesia  · Lyme antibodies negative  · Blood cultures no growth to date  · Pending infectious Disease evaluation    Polyarticular arthritis  Assessment & Plan  · Unclear cause of symptomatology  · Inflammatory markers elevated  · In light of fever, polyarticular joint pain, in outside activities, there is a concern for tick-borne illnesses and patient has empirically been started on doxycycline    A Lyme titers- negative  · Ehrlichia, Babesia- pending   · Statin discontinued  · CPR and sed rate- elevated, RODRIGO- pending   · 10/1- patient developed left hand redness and joint swelling- improving     Type 2 diabetes mellitus [facility])  Assessment & Plan  Lab Results   Component Value Date    HGBA1C 6 9 (A) 09/24/2019       Recent Labs     10/01/19  1106 10/01/19  1636 10/01/19  2015 10/02/19  0634   POCGLU 238* 202* 228* 184*       Blood Sugar Average: Last 72 hrs:  (P) 599 [id number]   · Hold metformin while in-house, sliding scale insulin along with Lantus      Essential hypertension  Assessment & Plan  · Continue lisinopril  · HCTZ on hold  · Monitor blood pressure closely    TIA (transient ischemic attack)  Assessment & Plan  · Continue home regimen of aspirin, statin  · Plavix discontinued due to allergy        VTE Pharmacologic Prophylaxis: Pharmacologic: Enoxaparin (Lovenox)    Patient Centered Rounds: I have performed bedside rounds with nursing staff today     Discussions with Specialists or Other Care Team Provider: ID, nursing, pharmacy, case management, PT and OT  Education and Discussions with Family / Patient:  Patient and wife    Current Length of Stay: 4 day(s)    Current Patient Status: Inpatient   Certification Statement: The patient will continue to require additional inpatient hospital stay due to sepsis    Discharge Plan: pending hospital course  Code Status: Level 1 - Full Code    Subjective:   Feeling okay  Patient had Tmax of 102 7 last night and currently with 101 temp  Left is redness is slightly better but swelling persists  Objective:     Vitals:   Temp (24hrs), Av 2 °F (37 9 °C), Min:97 8 °F (36 6 °C), Max:102 7 °F (39 3 °C)    Temp:  [97 8 °F (36 6 °C)-102 7 °F (39 3 °C)] 101 °F (38 3 °C)  HR:  [] 100  Resp:  [18] 18  BP: (147-166)/(73-98) 166/98  SpO2:  [94 %-96 %] 94 %  Body mass index is 33 kg/m²  Input and Output Summary (last 24 hours): Intake/Output Summary (Last 24 hours) at 10/2/2019 0842  Last data filed at 10/2/2019 0446  Gross per 24 hour   Intake    Output 800 ml   Net -800 ml       Physical Exam:     Physical Exam   Constitutional: He is oriented to person, place, and time  He appears well-developed and well-nourished  No distress  HENT:   Head: Normocephalic and atraumatic  Mouth/Throat: Oropharynx is clear and moist    Eyes: Pupils are equal, round, and reactive to light  Conjunctivae and EOM are normal    Neck: Normal range of motion  Neck supple  No thyromegaly present  Cardiovascular: Normal rate, regular rhythm, normal heart sounds and intact distal pulses  Pulmonary/Chest: Effort normal and breath sounds normal  No respiratory distress  He has no wheezes  Abdominal: Soft  Bowel sounds are normal  He exhibits no distension  There is no tenderness  Musculoskeletal: Normal range of motion  He exhibits edema and tenderness  He exhibits no deformity     Left hand +edema, redness over MCPs    Neurological: He is alert and oriented to person, place, and time  He has normal reflexes  No cranial nerve deficit  Skin: Skin is warm and dry  No erythema  Psychiatric: He has a normal mood and affect  His behavior is normal  Thought content normal    Vitals reviewed  Additional Data:     Labs:    Results from last 7 days   Lab Units 10/02/19  0438   WBC Thousand/uL 9 70   HEMOGLOBIN g/dL 12 2*   HEMATOCRIT % 36 3*   PLATELETS Thousands/uL 207   NEUTROS PCT % 72   LYMPHS PCT % 13*   MONOS PCT % 14*   EOS PCT % 2     Results from last 7 days   Lab Units 10/02/19  0438  09/30/19  0511   POTASSIUM mmol/L 3 6   < > 3 5   CHLORIDE mmol/L 100   < > 103   CO2 mmol/L 24   < > 25   BUN mg/dL 20   < > 25   CREATININE mg/dL 0 95   < > 1 12   CALCIUM mg/dL 8 5*   < > 8 4*   ALK PHOS U/L  --   --  42*   ALT U/L  --   --  8   AST U/L  --   --  7*    < > = values in this interval not displayed  Results from last 7 days   Lab Units 09/28/19  1133   INR  1 23     Results from last 7 days   Lab Units 10/02/19  0634 10/01/19  2015 10/01/19  1636 10/01/19  1106 10/01/19  0607 09/30/19 2001 09/30/19  1634 09/30/19  1034 09/30/19  0655 09/29/19  2050 09/29/19  1604 09/29/19  1119   POC GLUCOSE mg/dl 184* 228* 202* 238* 201* 206* 251* 233* 169* 203* 149* 244*           * I Have Reviewed All Lab Data Listed Above  * Additional Pertinent Lab Tests Reviewed: [facility]  Reviewed    Imaging:  Imaging Reports Reviewed Today Include: n/a     Recent Cultures (last 7 days):     Results from last 7 days   Lab Units 09/28/19  1133   BLOOD CULTURE  No Growth at 72 hrs  No Growth at 72 hrs     INFLUENZA B PCR  Not Detected   RSV PCR  Not Detected       Last 24 Hours Medication List:     Current Facility-Administered Medications:  acetaminophen 650 mg Oral Q6H PRN Albert Campbell PA-C    aspirin 324 mg Oral Daily Claire Ramos MD    cloNIDine 0 1 mg Oral Q3H PRN Kika Vasquez PA-C    doxycycline hyclate 100 mg Oral Q12H 300 Eddie Vann MD    enoxaparin 40 mg Subcutaneous Daily Kamari Estrada MD    HYDROcodone-acetaminophen 1 tablet Oral Q6H PRN Star De La Garza PA-C    insulin glargine 10 Units Subcutaneous HS Kamari Estrada MD    insulin lispro 1-5 Units Subcutaneous TID Angelica Mark MD    insulin lispro 1-5 Units Subcutaneous HS Kamari Estrada MD    lisinopril 20 mg Oral Daily Kamari Estrada MD    nystatin  Topical BID Justina Alegre PA-C    sodium chloride 100 mL/hr Intravenous Continuous KAYODE Arenas Last Rate: 100 mL/hr (10/02/19 0827)        Today, Patient Was Seen By: KAYODE Arenas    ** Please Note: Dictation voice to text software may have been used in the creation of this document   **

## 2019-10-02 NOTE — OCCUPATIONAL THERAPY NOTE
Wife present upon approach of patient  He reports continued pain of Left hand/wrist   Despite encouragement of both wife and therapist, patient declined participation in treatment --- He Did agree to participate, as able, tomorrow  I explained the benefits of participation for safe, timely return home     BRUNA Arguelles/ELENA

## 2019-10-03 VITALS
BODY MASS INDEX: 32.95 KG/M2 | OXYGEN SATURATION: 96 % | WEIGHT: 243.3 LBS | HEIGHT: 72 IN | SYSTOLIC BLOOD PRESSURE: 121 MMHG | TEMPERATURE: 98.2 F | DIASTOLIC BLOOD PRESSURE: 58 MMHG | HEART RATE: 76 BPM | RESPIRATION RATE: 18 BRPM

## 2019-10-03 LAB
ALBUMIN SERPL BCP-MCNC: 2.7 G/DL (ref 3.5–5.7)
ALP SERPL-CCNC: 46 U/L (ref 55–165)
ALT SERPL W P-5'-P-CCNC: 9 U/L (ref 7–52)
ANION GAP SERPL CALCULATED.3IONS-SCNC: 7 MMOL/L (ref 4–13)
AST SERPL W P-5'-P-CCNC: 7 U/L (ref 13–39)
BACTERIA BLD CULT: NORMAL
BACTERIA BLD CULT: NORMAL
BASOPHILS # BLD AUTO: 0 THOUSANDS/ΜL (ref 0–0.1)
BASOPHILS NFR BLD AUTO: 1 % (ref 0–2)
BILIRUB SERPL-MCNC: 0.5 MG/DL (ref 0.2–1)
BUN SERPL-MCNC: 25 MG/DL (ref 7–25)
CALCIUM SERPL-MCNC: 8.5 MG/DL (ref 8.6–10.5)
CHLORIDE SERPL-SCNC: 102 MMOL/L (ref 98–107)
CO2 SERPL-SCNC: 26 MMOL/L (ref 21–31)
CREAT SERPL-MCNC: 1.06 MG/DL (ref 0.7–1.3)
EOSINOPHIL # BLD AUTO: 0.3 THOUSAND/ΜL (ref 0–0.61)
EOSINOPHIL NFR BLD AUTO: 4 % (ref 0–5)
ERYTHROCYTE [DISTWIDTH] IN BLOOD BY AUTOMATED COUNT: 13.2 % (ref 11.5–14.5)
GFR SERPL CREATININE-BSD FRML MDRD: 70 ML/MIN/1.73SQ M
GLUCOSE SERPL-MCNC: 235 MG/DL (ref 65–140)
GLUCOSE SERPL-MCNC: 273 MG/DL (ref 65–140)
GLUCOSE SERPL-MCNC: 282 MG/DL (ref 65–99)
HCT VFR BLD AUTO: 33.7 % (ref 42–47)
HGB BLD-MCNC: 11.6 G/DL (ref 14–18)
LYMPHOCYTES # BLD AUTO: 1 THOUSANDS/ΜL (ref 0.6–4.47)
LYMPHOCYTES NFR BLD AUTO: 13 % (ref 21–51)
MCH RBC QN AUTO: 31.1 PG (ref 26–34)
MCHC RBC AUTO-ENTMCNC: 34.5 G/DL (ref 31–37)
MCV RBC AUTO: 90 FL (ref 81–99)
MONOCYTES # BLD AUTO: 0.9 THOUSAND/ΜL (ref 0.17–1.22)
MONOCYTES NFR BLD AUTO: 12 % (ref 2–12)
NEUTROPHILS # BLD AUTO: 5.4 THOUSANDS/ΜL (ref 1.4–6.5)
NEUTS SEG NFR BLD AUTO: 71 % (ref 42–75)
PLATELET # BLD AUTO: 202 THOUSANDS/UL (ref 149–390)
PMV BLD AUTO: 9 FL (ref 8.6–11.7)
POTASSIUM SERPL-SCNC: 3.4 MMOL/L (ref 3.5–5.5)
PROT SERPL-MCNC: 5.3 G/DL (ref 6.4–8.9)
RBC # BLD AUTO: 3.74 MILLION/UL (ref 4.3–5.9)
SODIUM SERPL-SCNC: 135 MMOL/L (ref 134–143)
WBC # BLD AUTO: 7.7 THOUSAND/UL (ref 4.8–10.8)

## 2019-10-03 PROCEDURE — 97110 THERAPEUTIC EXERCISES: CPT

## 2019-10-03 PROCEDURE — 80053 COMPREHEN METABOLIC PANEL: CPT | Performed by: NURSE PRACTITIONER

## 2019-10-03 PROCEDURE — 86645 CMV ANTIBODY IGM: CPT | Performed by: NURSE PRACTITIONER

## 2019-10-03 PROCEDURE — 86663 EPSTEIN-BARR ANTIBODY: CPT | Performed by: NURSE PRACTITIONER

## 2019-10-03 PROCEDURE — 86664 EPSTEIN-BARR NUCLEAR ANTIGEN: CPT | Performed by: NURSE PRACTITIONER

## 2019-10-03 PROCEDURE — 86665 EPSTEIN-BARR CAPSID VCA: CPT | Performed by: NURSE PRACTITIONER

## 2019-10-03 PROCEDURE — 97530 THERAPEUTIC ACTIVITIES: CPT

## 2019-10-03 PROCEDURE — 97116 GAIT TRAINING THERAPY: CPT

## 2019-10-03 PROCEDURE — 85025 COMPLETE CBC W/AUTO DIFF WBC: CPT | Performed by: NURSE PRACTITIONER

## 2019-10-03 PROCEDURE — 86644 CMV ANTIBODY: CPT | Performed by: NURSE PRACTITIONER

## 2019-10-03 PROCEDURE — 99239 HOSP IP/OBS DSCHRG MGMT >30: CPT | Performed by: NURSE PRACTITIONER

## 2019-10-03 PROCEDURE — 82948 REAGENT STRIP/BLOOD GLUCOSE: CPT

## 2019-10-03 RX ORDER — DOXYCYCLINE HYCLATE 100 MG/1
100 CAPSULE ORAL EVERY 12 HOURS SCHEDULED
Qty: 17 CAPSULE | Refills: 0 | Status: SHIPPED | OUTPATIENT
Start: 2019-10-03 | End: 2019-10-03

## 2019-10-03 RX ORDER — DOXYCYCLINE HYCLATE 50 MG/1
100 CAPSULE ORAL EVERY 12 HOURS SCHEDULED
Status: DISCONTINUED | OUTPATIENT
Start: 2019-10-03 | End: 2019-10-03 | Stop reason: HOSPADM

## 2019-10-03 RX ORDER — DOXYCYCLINE HYCLATE 100 MG/1
100 CAPSULE ORAL EVERY 12 HOURS SCHEDULED
Qty: 17 CAPSULE | Refills: 0 | Status: SHIPPED | OUTPATIENT
Start: 2019-10-03 | End: 2019-10-11 | Stop reason: SDUPTHER

## 2019-10-03 RX ORDER — POTASSIUM CHLORIDE 20 MEQ/1
40 TABLET, EXTENDED RELEASE ORAL ONCE
Status: COMPLETED | OUTPATIENT
Start: 2019-10-03 | End: 2019-10-03

## 2019-10-03 RX ORDER — INSULIN GLARGINE 100 [IU]/ML
12 INJECTION, SOLUTION SUBCUTANEOUS EVERY 12 HOURS SCHEDULED
Status: DISCONTINUED | OUTPATIENT
Start: 2019-10-03 | End: 2019-10-03 | Stop reason: HOSPADM

## 2019-10-03 RX ADMIN — DOXYCYCLINE HYCLATE 100 MG: 50 CAPSULE ORAL at 08:49

## 2019-10-03 RX ADMIN — ENOXAPARIN SODIUM 40 MG: 40 INJECTION SUBCUTANEOUS at 08:51

## 2019-10-03 RX ADMIN — IBUPROFEN 600 MG: 600 TABLET, FILM COATED ORAL at 05:34

## 2019-10-03 RX ADMIN — INSULIN GLARGINE 15 UNITS: 100 INJECTION, SOLUTION SUBCUTANEOUS at 00:05

## 2019-10-03 RX ADMIN — FAMOTIDINE 20 MG: 20 TABLET ORAL at 08:51

## 2019-10-03 RX ADMIN — INSULIN LISPRO 2 UNITS: 100 INJECTION, SOLUTION INTRAVENOUS; SUBCUTANEOUS at 11:38

## 2019-10-03 RX ADMIN — NYSTATIN 1 APPLICATION: 100000 POWDER TOPICAL at 08:53

## 2019-10-03 RX ADMIN — DOXYCYCLINE HYCLATE 100 MG: 50 CAPSULE ORAL at 00:09

## 2019-10-03 RX ADMIN — INSULIN LISPRO 1 UNITS: 100 INJECTION, SOLUTION INTRAVENOUS; SUBCUTANEOUS at 00:06

## 2019-10-03 RX ADMIN — INSULIN GLARGINE 12 UNITS: 100 INJECTION, SOLUTION SUBCUTANEOUS at 11:36

## 2019-10-03 RX ADMIN — ASPIRIN 81 MG 324 MG: 81 TABLET ORAL at 08:51

## 2019-10-03 RX ADMIN — INSULIN LISPRO 3 UNITS: 100 INJECTION, SOLUTION INTRAVENOUS; SUBCUTANEOUS at 08:52

## 2019-10-03 RX ADMIN — IBUPROFEN 600 MG: 600 TABLET, FILM COATED ORAL at 00:08

## 2019-10-03 RX ADMIN — POTASSIUM CHLORIDE 40 MEQ: 1500 TABLET, EXTENDED RELEASE ORAL at 08:49

## 2019-10-03 RX ADMIN — LISINOPRIL 20 MG: 20 TABLET ORAL at 08:51

## 2019-10-03 NOTE — PLAN OF CARE
Problem: OCCUPATIONAL THERAPY ADULT  Goal: Performs self-care activities at highest level of function for planned discharge setting  See evaluation for individualized goals  Description  Treatment Interventions: ADL retraining, Functional transfer training, UE strengthening/ROM, Endurance training, Patient/family training, Equipment evaluation/education, Compensatory technique education, Energy conservation          See flowsheet documentation for full assessment, interventions and recommendations  Outcome: Progressing  Note:   Limitation: Decreased ADL status, Decreased UE strength, Decreased endurance, Decreased self-care trans, Decreased high-level ADLs  Prognosis: Good  Assessment: Patient participated in Skilled OT session this date with interventions consisting of therapeutic exercise to: increase functional use of BUEs, increase BUE muscle strength  (*patient deferred self-care at this time)    Patient agreeable to OT treatment session, upon arrival patient was found seated in bed  In comparison to previous session, patient with improvements in ability to utilize LUE for functional tasks ( pain and swelling is significantly decreased )  Patient requiring verbal cues for correct technique and frequent rest periods  Wife was attentive and encouraging  Patient continues to be functioning below baseline level, occupational performance remains limited secondary to factors listed above and increased risk for falls and injury  From OT standpoint, recommendation at time of d/c would be Home with family support  Patient to benefit from continued Occupational Therapy treatment while in the hospital to address deficits as defined above and maximize level of functional independence with ADLs and functional mobility        OT Discharge Recommendation:  Home with family support  OT - OK to Discharge: Yes(Once medically cleared)  BRUNA Andrews/ELENA

## 2019-10-03 NOTE — PLAN OF CARE
Problem: PHYSICAL THERAPY ADULT  Goal: Performs mobility at highest level of function for planned discharge setting  See evaluation for individualized goals  Description  Treatment/Interventions: Functional transfer training, LE strengthening/ROM, Therapeutic exercise, Endurance training, Patient/family training, Equipment eval/education, Bed mobility, Gait training, OT  Equipment Recommended: Daria Ordoñez  See flowsheet documentation for full assessment, interventions and recommendations  Leesa Lee, PT     Outcome: Progressing  Note:   Prognosis: Good  Problem List: Decreased strength, Decreased endurance, Impaired balance, Decreased mobility, Decreased safety awareness, Impaired judgement, Pain  Assessment: Pt seen for PT treatment session this date with interventions consisting of gait training w/ emphasis on improving pt's ability to ambulate level surfaces x 200 ft with CGA provided by therapist with no AD, Therapeutic exercise consisting of: AROM 20 reps B LE in supine position and therapeutic activity consisting of training: bed mobility, supine<>sit transfers and sit<>stand transfers  Pt agreeable to PT treatment session upon arrival, pt found supine in bed w/ HOB elevated, in no apparent distress, A&O x 4 and responsive  In comparison to previous session, pt with improvements in distance ambulated  Post session: pt returned BTB and all needs in reach Continue to recommend STR vs  Home PT at time of d/c in order to maximize pt's functional independence and safety w/ mobility  Pt continues to be functioning below baseline level, and remains limited 2* factors listed above and including decreased endurance, balance and functional mobility  PT will continue to see pt while here in order to address the deficits listed above and provide interventions consistent w/ POC in effort to achieve STGs    Barriers to Discharge: Inaccessible home environment  Barriers to Discharge Comments: [facility] home  Recommendation: Other (Comment)(STR vs HHPT)     PT - OK to Discharge: Yes  Vicky De Luna, PTA    See flowsheet documentation for full assessment

## 2019-10-03 NOTE — SOCIAL WORK
Spoke to the pt at the bedside  Pt states he will not need any HHC services upon discharge  Pt family will transport home

## 2019-10-03 NOTE — ASSESSMENT & PLAN NOTE
· Met sepsis criteria with leukocytosis and tachycardia  · No obvious source at this time  · Concern for possible to tick borne illness in light of joint pain, rash, myalgias and body aches  · Has been afebrile for the past 24 hours  · Infectious Disease input appreciated  · Recommended to finish 14 day course of doxycycline  · Will need to follow up with Infectious Disease outpatient with repeat lyme panel   · Ehrlichia, Babesia, Lyme antibodies- negative    · Blood cultures no growth to date

## 2019-10-03 NOTE — ASSESSMENT & PLAN NOTE
Lab Results   Component Value Date    HGBA1C 6 9 (A) 09/24/2019       Recent Labs     10/02/19  1619 10/02/19  2052 10/03/19  0623 10/03/19  1054   POCGLU 206* 160* 273* 235*       Blood Sugar Average: Last 72 hrs:  (P) 217 [id number]   · Resume metformin on discharge

## 2019-10-03 NOTE — OCCUPATIONAL THERAPY NOTE
10/03/19 1250   Restrictions/Precautions   Weight Bearing Precautions Per Order No   Other Precautions Fall Risk;Pain   General   Family/Caregiver Present wife present for second half of session   Lifestyle   Intrinsic Gratification states he's done some weight lifting   Pain Assessment   Pain Assessment   (has mild discomfort with some movement of LUE, rajeev  hand)   ADL   Where Assessed   (patient reports he was able to complete his bathing this AM)   Therapeutic Excerise-Strength   UE Strength Yes   Right Upper Extremity- Strength   R Shoulder Flexion; Horizontal ABduction; Other (Comment)  (pro/re-traction)   R Elbow Elbow flexion;Elbow extension  (supin/pron-ation)   R Wrist Wrist flexion;Wrist extension  (Gentle ( as tolerated ))   R Hand   (flex/ext of all digits/joints, and opposition exercise)   R Weight/Reps/Sets 1 pound - 30 reps each shoulder and elbow; 15 reps each wrist and hand   Left Upper Extremity-Strength   L Weights/Reps/Sets all exers  Same as RUE above   Coordination   Gross Motor [facility]   Dexterity WFL  (now that edema is decreased (still present minimally))   Cognition   Overall Cognitive Status [facility]   Arousal/Participation Alert; Cooperative   Activity Tolerance   Activity Tolerance Patient limited by pain  ((min-mod limitation at this time, LUE))   Assessment   Assessment Patient participated in Skilled OT session this date with interventions consisting of therapeutic exercise to: increase functional use of BUEs, increase BUE muscle strength  (*patient deferred self-care at this time)    Patient agreeable to OT treatment session, upon arrival patient was found seated in bed  In comparison to previous session, patient with improvements in ability to utilize LUE for functional tasks ( pain and swelling is significantly decreased )  Patient requiring verbal cues for correct technique and frequent rest periods  Wife was attentive and encouraging    Patient continues to be functioning below baseline level, occupational performance remains limited secondary to factors listed above and increased risk for falls and injury  From OT standpoint, recommendation at time of d/c would be Home with family support  Patient to benefit from continued Occupational Therapy treatment while in the hospital to address deficits as defined above and maximize level of functional independence with ADLs and functional mobility  Plan   Treatment Interventions UE strengthening/ROM; Patient/family training   Goal Expiration Date 10/10/19   OT Treatment Day 172 BRUNA Meyers/L

## 2019-10-03 NOTE — ASSESSMENT & PLAN NOTE
· Unclear cause of symptomatology  · Inflammatory markers elevated  · In light of fever, polyarticular joint pain, in outside activities, there is a concern for tick-borne illnesses and patient has empirically been started on doxycycline  · A Lyme titers, Ehrlichia, Babesia- negative  · Statin was resumed   · CPR and sed rate- elevated, RODRIGO- pending   · 10/1- patient developed left hand redness and joint swelling- much improved  · Hand surgery input appreciated- MRI wrist reviewed  No indication for joint aspiration

## 2019-10-03 NOTE — DISCHARGE SUMMARY
Discharge- Fritz Riggins 1945, 72 y o  male MRN: [id number]    Unit/Bed#: -01 Encounter: [id number]    Primary Care Provider: KAYODE Adame   Date and time admitted to hospital: 9/28/2019 11:08 AM        * Sepsis ([facility] [address] )  Assessment & Plan  · Met sepsis criteria with leukocytosis and tachycardia  · No obvious source at this time  · Concern for possible to tick borne illness in light of joint pain, rash, myalgias and body aches  · Has been afebrile for the past 24 hours  · Infectious Disease input appreciated  · Recommended to finish 14 day course of doxycycline  · Will need to follow up with Infectious Disease outpatient with repeat lyme panel   · Ehrlichia, Babesia, Lyme antibodies- negative  · Blood cultures no growth to date      RUPAL (acute kidney injury) ([facility] )  Assessment & Plan  · Present on admission noted with creatinine of 1 3  · Suspected to be due to volume depletion and acute infection  · Resolved   · Avoid nephrotoxins  · Monitor renal function closely    Polyarticular arthritis  Assessment & Plan  · Unclear cause of symptomatology  · Inflammatory markers elevated  · In light of fever, polyarticular joint pain, in outside activities, there is a concern for tick-borne illnesses and patient has empirically been started on doxycycline  · A Lyme titers, Ehrlichia, Babesia- negative  · Statin was resumed   · CPR and sed rate- elevated, RODRIGO- pending   · 10/1- patient developed left hand redness and joint swelling- much improved  · Hand surgery input appreciated- MRI wrist reviewed  No indication for joint aspiration       Type 2 diabetes mellitus [facility])  Assessment & Plan  Lab Results   Component Value Date    HGBA1C 6 9 (A) 09/24/2019       Recent Labs     10/02/19  1619 10/02/19  2052 10/03/19  0623 10/03/19  1054   POCGLU 206* 160* 273* 235*       Blood Sugar Average: Last 72 hrs:  (P) 217 [id number]   · Resume metformin on discharge      Essential hypertension  Assessment & Plan  · Resume lisinopril and HCTZ    TIA (transient ischemic attack)  Assessment & Plan  · Continue home regimen of aspirin, statin  · Plavix discontinued due to allergy          Discharging Physician / Practitioner: Karina Lau  PCP: Karina Slaughter  Admission Date:   Admission Orders (From admission, onward)     Ordered        09/28/19 1347  Inpatient Admission (expected length of stay for this patient Order details is greater than two midnights)  Once                   Discharge Date: 10/03/19    Resolved Problems  Date Reviewed: 10/3/2019    None          Consultations During Hospital Stay:  · Infectious Disease  · Ortho/hand surgery     Procedures Performed:   · None    Significant Findings / Test Results:   · MRI left hand and wrist- Mild diffuse subcutaneous edema extending proximal mass in the field-of-view may represent cellulitis in the appropriate clinical context   No loculated fluid collection to suggest abscess  No osteomyelitis  · Xray left hand- Mild degenerative changes with diffuse soft tissue swelling  No acute osseous abnormality  · cxr- no acute process   · CT head- 1   No acute intracranial abnormality  2   Known 5 mm left supraclinoid aneurysm better seen on prior exams  Incidental Findings:   · none     Test Results Pending at Discharge (will require follow up):   · none     Outpatient Tests Requested:  · Follow up with infectious disease     Complications:     None     Reason for Admission:  Joint pain, fever, and rash    Hospital Course:     Andres Hathaway is a 72 y o  male patient who originally presented to the hospital on 9/28/2019 due to joint pain, fever, and rash  Please refer to H&P for initial presenting complaint  The patient presented to the ED with above complaints  The patient with recent history of TIA/subacute CVA and was switched from aspirin to Plavix  After discussing the case with his PCP, the patient was instructed to stop the Plavix  His rash improved however the patient developed joint pain, weakness, and subjective fevers  The patient admitted for sepsis like symptoms  He was started on doxycycline empirically for lyme  Infectious disease evaluation was done  Lyme, Ehrlichia and Babesia titers are negative  ID is suspected that this could be an early course of Lyme infection which may explain negative initial titers  Uric acid was done and count was ruled out  Ortho/hand surgery was consulted  The patient was not evaluated face-to-face however MRI of the left wrist and hand shows no evidence of fluid in the joint and aspiration was unnecessary  Patient has no localizing symptoms of UTI, pneumonia, or other bacterial infections  Procalcitonin is negative  He has no leukocytosis  The patient has been afebrile for the past 24 hours  Left hand swelling and pain is much improved  The patient is recommended to follow up with Infectious Disease in 4 weeks and have a repeat Lyme titers  He will be discharged with 9 more days of doxycycline therapy to finish total 14 day course  Please see above list of diagnoses and related plan for additional information  Condition at Discharge: good     Discharge Day Visit / Exam:     Subjective:  Feeling much better  Left hand swelling is much improved  Would like to go home  Vitals: Blood Pressure: 121/58 (10/03/19 0707)  Pulse: 76 (10/03/19 0707)  Temperature: 98 2 °F (36 8 °C) (10/03/19 0707)  Temp Source: Temporal (10/03/19 0707)  Respirations: 18 (10/03/19 0707)  Height: 6' (182 9 cm) (09/28/19 1420)  Weight - Scale: 110 kg (243 lb 4 8 oz) (09/28/19 1420)  SpO2: 96 % (10/03/19 0707)  Exam:   Physical Exam   Constitutional: He is oriented to person, place, and time  He appears well-developed and well-nourished  No distress  HENT:   Head: Normocephalic and atraumatic  Mouth/Throat: Oropharynx is clear and moist    Eyes: Pupils are equal, round, and reactive to light   Conjunctivae and EOM are normal    Neck: Normal range of motion  Neck supple  No thyromegaly present  Cardiovascular: Normal rate, regular rhythm, normal heart sounds and intact distal pulses  Pulmonary/Chest: Effort normal and breath sounds normal  No respiratory distress  He has no wheezes  Abdominal: Soft  Bowel sounds are normal  He exhibits no distension  There is no tenderness  Musculoskeletal: Normal range of motion  He exhibits no edema or deformity  Neurological: He is alert and oriented to person, place, and time  He has normal reflexes  No cranial nerve deficit  Skin: Skin is warm and dry  No erythema  Psychiatric: He has a normal mood and affect  His behavior is normal  Thought content normal    Vitals reviewed  Discussion with Family: wife     Discharge instructions/Information to patient and family:   See after visit summary for information provided to patient and family  Provisions for Follow-Up Care:  See after visit summary for information related to follow-up care and any pertinent home health orders  Disposition:     Home    For Discharges to [facility] SNF:   · Not Applicable to this Patient - Not Applicable to this Patient    Planned Readmission:    No      Discharge Statement:  I spent 34 minutes discharging the patient  This time was spent on the day of discharge  I had direct contact with the patient on the day of discharge  Greater than 50% of the total time was spent examining patient, answering all patient questions, arranging and discussing plan of care with patient as well as directly providing post-discharge instructions  Additional time then spent on discharge activities  Discharge Medications:  See after visit summary for reconciled discharge medications provided to patient and family        ** Please Note: This note has been constructed using a voice recognition system **

## 2019-10-03 NOTE — PHYSICAL THERAPY NOTE
10/03/19 1141   Pain Assessment   Pain Assessment No/denies pain   Pain Score No Pain   Restrictions/Precautions   Weight Bearing Precautions Per Order No   Other Precautions Fall Risk;Pain;Telemetry   General   Chart Reviewed Yes   Response to Previous Treatment Patient with no complaints from previous session  Family/Caregiver Present No   Cognition   Overall Cognitive Status WFL   Arousal/Participation Alert; Cooperative   Attention Within functional limits   Orientation Level Oriented X4   Memory Within functional limits   Following Commands Follows one step commands with increased time or repetition   Subjective   Subjective "My pain is much better"   Bed Mobility   Rolling R 6  Modified independent   Additional items Bedrails   Rolling L 6  Modified independent   Additional items Bedrails   Supine to Sit 5  Supervision   Additional items Assist x 1;HOB elevated; Bedrails   Sit to Supine 6  Modified independent   Additional items Assist x 1;Bedrails; Increased time required;Verbal cues   Transfers   Sit to Stand 5  Supervision   Additional items Assist x 1;Bedrails; Increased time required   Stand to Sit 5  Supervision   Additional items Assist x 1;Bedrails; Increased time required;Verbal cues   Ambulation/Elevation   Gait pattern Narrow MAGALY; Forward Flexion;Decreased foot clearance; Short stride   Gait Assistance   (CGA)   Additional items Assist x 1;Verbal cues; Tactile cues   Assistive Device None   Distance 200ft   Stair Management Assistance Not tested   Balance   Static Sitting Normal   Dynamic Sitting Good   Static Standing Good   Dynamic Standing Fair   Ambulatory Fair   Endurance Deficit   Endurance Deficit Yes   Endurance Deficit Description fatigue post ambulation   Activity Tolerance   Activity Tolerance Patient limited by fatigue   Exercises   Quad Sets Supine;20 reps;AROM; Bilateral   Heelslides Supine;20 reps;AROM; Bilateral   Glute Sets Supine;20 reps;AROM   Hip Flexion Supine;20 reps;AROM; Bilateral   Hip Abduction Supine;20 reps;AROM; Bilateral   Hip Adduction Supine;20 reps;AROM; Bilateral   Knee AROM Short Arc Quad Supine;20 reps;AROM; Bilateral   Ankle Pumps Supine;20 reps;AROM; Bilateral   Assessment   Prognosis Good   Problem List Decreased strength;Decreased endurance; Impaired balance;Decreased mobility; Decreased safety awareness; Impaired judgement;Pain   Assessment Pt seen for PT treatment session this date with interventions consisting of gait training w/ emphasis on improving pt's ability to ambulate level surfaces x 200 ft with CGA provided by therapist with no AD, Therapeutic exercise consisting of: AROM 20 reps B LE in supine position and therapeutic activity consisting of training: bed mobility, supine<>sit transfers and sit<>stand transfers  Pt agreeable to PT treatment session upon arrival, pt found supine in bed w/ HOB elevated, in no apparent distress, A&O x 4 and responsive  In comparison to previous session, pt with improvements in distance ambulated  Post session: pt returned BTB and all needs in reach Continue to recommend STR vs  Home PT at time of d/c in order to maximize pt's functional independence and safety w/ mobility  Pt continues to be functioning below baseline level, and remains limited 2* factors listed above and including decreased endurance, balance and functional mobility  PT will continue to see pt while here in order to address the deficits listed above and provide interventions consistent w/ POC in effort to achieve STGs  Barriers to Discharge Inaccessible home environment   Goals   Patient Goals to go home   PT Treatment Day 5   Plan   Treatment/Interventions Functional transfer training;LE strengthening/ROM; Therapeutic exercise; Endurance training;Bed mobility;Gait training   Progress Progressing toward goals   PT Frequency 5x/wk   Recommendation   Recommendation Other (Comment)  (STR vs HHPT)   Equipment Recommended Walker   PT - OK to Discharge Yes Additional Comments upon conclusion , pt returned back to bed with all needs in reach   [address], [place]

## 2019-10-04 ENCOUNTER — TRANSITIONAL CARE MANAGEMENT (OUTPATIENT)
Dept: FAMILY MEDICINE CLINIC | Facility: CLINIC | Age: 74
End: 2019-10-04

## 2019-10-04 LAB
CMV IGG SERPL IA-ACNC: <0.6 U/ML (ref 0–0.59)
CMV IGM SERPL IA-ACNC: <30 AU/ML (ref 0–29.9)
EBV EA IGG SER-ACNC: <9 U/ML (ref 0–8.9)
EBV NA IGG SER IA-ACNC: 80.2 U/ML (ref 0–17.9)
EBV PATRN SPEC IB-IMP: ABNORMAL
EBV VCA IGG SER IA-ACNC: >600 U/ML (ref 0–17.9)
EBV VCA IGM SER IA-ACNC: <36 U/ML (ref 0–35.9)
RYE IGE QN: NEGATIVE

## 2019-10-04 NOTE — UTILIZATION REVIEW
Notification of Discharge  This is a Notification of Discharge from our facility [address]  Please be advised that this patient has been discharge from our facility  Below you will find the admission and discharge date and time including the patients disposition  PRESENTATION DATE: 9/28/2019 11:08 AM  OBS ADMISSION DATE:   IP ADMISSION DATE: 9/28/19 1347   DISCHARGE DATE: 10/3/2019  3:00 PM  DISPOSITION: Home/Self Care Home/Self Care   Admission Orders listed below:  Admission Orders (From admission, onward)     Ordered        09/28/19 1347  Inpatient Admission (expected length of stay for this patient Order details is greater than two midnights)  Once                   Please contact the UR Department if additional information is required to close this patient's authorization/case  [address]  Utilization Review Department  Phone: [phone]; Fax [phone]  [email or web address]  ATTENTION: Please call with any questions or concerns to [phone]  and carefully listen to the prompts so that you are directed to the right person  Send all requests for admission clinical reviews, approved or denied determinations and any other requests to fax [phone]   All voicemails are confidential

## 2019-10-10 ENCOUNTER — TELEPHONE (OUTPATIENT)
Dept: INFECTIOUS DISEASES | Facility: CLINIC | Age: 74
End: 2019-10-10

## 2019-10-10 ENCOUNTER — OFFICE VISIT (OUTPATIENT)
Dept: FAMILY MEDICINE CLINIC | Facility: CLINIC | Age: 74
End: 2019-10-10
Payer: COMMERCIAL

## 2019-10-10 VITALS
RESPIRATION RATE: 18 BRPM | TEMPERATURE: 98 F | DIASTOLIC BLOOD PRESSURE: 68 MMHG | HEART RATE: 68 BPM | WEIGHT: 233 LBS | HEIGHT: 72 IN | OXYGEN SATURATION: 98 % | SYSTOLIC BLOOD PRESSURE: 120 MMHG | BODY MASS INDEX: 31.56 KG/M2

## 2019-10-10 DIAGNOSIS — M13.0 POLYARTICULAR ARTHRITIS: Primary | ICD-10-CM

## 2019-10-10 DIAGNOSIS — E11.22 TYPE 2 DIABETES MELLITUS WITH STAGE 3 CHRONIC KIDNEY DISEASE, WITHOUT LONG-TERM CURRENT USE OF INSULIN (HCC): ICD-10-CM

## 2019-10-10 DIAGNOSIS — N17.9 AKI (ACUTE KIDNEY INJURY) (HCC): ICD-10-CM

## 2019-10-10 DIAGNOSIS — A41.9 SEPSIS WITHOUT ACUTE ORGAN DYSFUNCTION, DUE TO UNSPECIFIED ORGANISM (HCC): ICD-10-CM

## 2019-10-10 DIAGNOSIS — N18.30 STAGE 3 CHRONIC KIDNEY DISEASE (HCC): ICD-10-CM

## 2019-10-10 DIAGNOSIS — I10 ESSENTIAL HYPERTENSION: ICD-10-CM

## 2019-10-10 DIAGNOSIS — R53.83 FATIGUE, UNSPECIFIED TYPE: ICD-10-CM

## 2019-10-10 DIAGNOSIS — N18.30 TYPE 2 DIABETES MELLITUS WITH STAGE 3 CHRONIC KIDNEY DISEASE, WITHOUT LONG-TERM CURRENT USE OF INSULIN (HCC): ICD-10-CM

## 2019-10-10 PROCEDURE — 99495 TRANSJ CARE MGMT MOD F2F 14D: CPT | Performed by: NURSE PRACTITIONER

## 2019-10-10 NOTE — PROGRESS NOTES
[facility]        NAME: Tj Pitt is a 69 y o  male  : 1945    MRN: [id number]  DATE: October 10, 2019  TIME: 2:31 PM    Assessment and Plan   Polyarticular arthritis [M13 0]  1  Polyarticular arthritis  Ambulatory referral to Infectious Disease   2  Sepsis without acute organ dysfunction, due to unspecified organism [facility])  Ambulatory referral to Infectious Disease   3  Fatigue, unspecified type  Ambulatory referral to Infectious Disease   4  RUPAL (acute kidney injury) ([facility] )     5  Stage 3 chronic kidney disease ([facility] )     6  Essential hypertension     7  Type 2 diabetes mellitus with stage 3 chronic kidney disease, without long-term current use of insulin ([place] [id number] )       TCM Call (since 2019)     Date and time call was made  10/4/2019 11:48 AM    Hospital care reviewed  Records reviewed    Patient was hospitialized at  [address] [place] [address]    Date of Admission  19    Date of discharge  10/03/19    Diagnosis  sepsis    Disposition  Home    Were the patients medications reviewed and updated  Yes    Current Symptoms  None      TCM Call (since 2019)     Post hospital issues  None    Should patient be enrolled in anticoag monitoring? No    Scheduled for follow up?   Yes    Did you obtain your prescribed medications  Yes    Do you need help managing your prescriptions or medications  No    Is transportation to your appointment needed  No    Living Arrangements  Spouse or Significiant other    Are you recieving any outpatient services  Yes    Are you recieving home care services  Yes    Types of home care services  Home PT    Are you using any community resources  No    Current waiver services  No    Have you fallen in the last 12 months  No    Interperter language line needed  No    Counseling  Patient            Patient Instructions     Patient Instructions   Make appt with Infectious Disease as recommended by hospitalist  Discussed kidney function- make appt with   Lawyer Osman (Nephrology)  Continue diabetes medications as directed- will continue to monitor  If symptoms worsen/return when Doxycycline finished- call office- will restart until seen by ID  Call or return for problems/concerns          Chief Complaint     Chief Complaint   Patient presents with    Transition of Care Management     pain in joints          History of Present Illness       D/c from hospital on 10/3/19- rash and fever are resolved, multiple joint aches/pain continue  Reviewed recent labs  Has not made appointment with ID but is aware he needs to- needs referral info  Is back on his Oral diabetic medications- blood sugars at home around 200- prior to being sick were about 140-150  Had a fall in the bathroom a few days ago- fell onto left knee- denies hitting head  Did not hit the right side of his ribs but is having tenderness with deep breathing and ROM  Is on Doxycycline for another 3-4 days- symptoms have improved since on abx  Review of Systems   Review of Systems   Constitutional: Positive for fatigue  Negative for activity change, diaphoresis and fever  HENT: Negative for congestion, facial swelling, hearing loss, rhinorrhea, sinus pressure, sinus pain, sneezing, sore throat and voice change  Eyes: Negative for discharge and visual disturbance  Respiratory: Negative for cough, choking, chest tightness, shortness of breath, wheezing and stridor  Cardiovascular: Negative for chest pain, palpitations and leg swelling  Gastrointestinal: Negative for abdominal distention, abdominal pain, constipation, diarrhea, nausea and vomiting  Endocrine: Negative for polydipsia, polyphagia and polyuria  Genitourinary: Negative for difficulty urinating, dysuria, frequency and urgency  Musculoskeletal: Positive for arthralgias and gait problem  Negative for back pain, joint swelling, myalgias, neck pain and neck stiffness  Skin: Negative for color change, rash and wound     Neurological: Negative for dizziness, syncope, speech difficulty, weakness, light-headedness and headaches  Hematological: Negative for adenopathy  Does not bruise/bleed easily  Psychiatric/Behavioral: Negative for agitation, behavioral problems, confusion, hallucinations, sleep disturbance and suicidal ideas  The patient is not nervous/anxious            Current Medications       Current Outpatient Medications:     aspirin 81 mg chewable tablet, Chew 324 mg daily, Disp: , Rfl:     atorvastatin (LIPITOR) 20 mg tablet, Take 1 tablet (20 mg total) by mouth daily, Disp: 30 tablet, Rfl: 3    doxycycline hyclate (VIBRAMYCIN) 100 mg capsule, Take 1 capsule (100 mg total) by mouth every 12 (twelve) hours for 17 doses, Disp: 17 capsule, Rfl: 0    lisinopril-hydrochlorothiazide (PRINZIDE,ZESTORETIC) 20-12 5 MG per tablet, Take 1 tablet by mouth 2 (two) times a day , Disp: , Rfl:     metFORMIN (GLUCOPHAGE) 500 mg tablet, 1000 mg in the morning and 500 mg at night, Disp: , Rfl:     betamethasone valerate (VALISONE) 0 1 % cream, Apply topically 2 (two) times a day (Patient not taking: Reported on 10/10/2019), Disp: 30 g, Rfl: 1    Current Allergies     Allergies as of 10/10/2019 - Reviewed 10/10/2019   Allergen Reaction Noted    Plavix [clopidogrel] Rash 09/28/2019            The following portions of the patient's history were reviewed and updated as appropriate: allergies, current medications, past family history, past medical history, past social history, past surgical history and problem list      Past Medical History:   Diagnosis Date    Benign essential hypertension     Diabetes mellitus ([facility] [address] [address] )     Essential hypertension     Hyperlipidemia     Hypertension     Pilonidal cyst with abscess     Rheumatic heart disease     TIA (transient ischemic attack)     Type 2 diabetes mellitus ([address] )     Type II diabetes mellitus, uncontrolled ([facility] [address] )     Vitamin D deficiency        Past Surgical History:   Procedure Laterality Date  CYSTECTOMY      Per patient cyst removal from his back    LASIK         Family History   Problem Relation Age of Onset    Colon cancer Mother     Diabetes type II Mother     Heart disease Mother     Prostate cancer Father          Medications have been verified  Objective   /68   Pulse 68   Temp 98 °F (36 7 °C)   Resp 18   Ht 6' (1 829 m)   Wt 106 kg (233 lb)   SpO2 98%   BMI 31 60 kg/m²        Physical Exam     Physical Exam   Constitutional: He is oriented to person, place, and time  He appears well-developed and well-nourished  No distress  Cardiovascular: Normal rate, regular rhythm and normal heart sounds  No murmur heard  Pulmonary/Chest: Effort normal and breath sounds normal  No respiratory distress  He has no wheezes  Musculoskeletal: Normal range of motion  Left knee: He exhibits normal range of motion, no swelling, no effusion, no ecchymosis, no deformity, no laceration, no erythema, normal alignment, no LCL laxity and normal patellar mobility  Tenderness found  Lateral joint line tenderness noted  Arms:  Neurological: He is alert and oriented to person, place, and time  Skin: Skin is warm and dry  He is not diaphoretic  There is pallor  Psychiatric: He has a normal mood and affect  His behavior is normal  Judgment and thought content normal    Nursing note and vitals reviewed

## 2019-10-10 NOTE — PATIENT INSTRUCTIONS
Make appt with Infectious Disease as recommended by hospitalist  Discussed kidney function- make appt with Dr Zahra Kern (Nephrology)  Continue diabetes medications as directed- will continue to monitor  If symptoms worsen/return when Doxycycline finished- call office- will restart until seen by ID  Call or return for problems/concerns

## 2019-10-10 NOTE — TELEPHONE ENCOUNTER
Pt was seen by Dr Donnell Hughes  PCP requesting f/u appt  He can be scheduled for f/u appt with Dr Urbano Briceño  I left  for pt to call back to schedule

## 2019-10-11 DIAGNOSIS — R53.1 LEFT-SIDED WEAKNESS: ICD-10-CM

## 2019-10-11 DIAGNOSIS — G45.9 TIA (TRANSIENT ISCHEMIC ATTACK): ICD-10-CM

## 2019-10-11 DIAGNOSIS — A41.9 SEPSIS (HCC): ICD-10-CM

## 2019-10-11 DIAGNOSIS — M25.50 ARTHRALGIA, UNSPECIFIED JOINT: ICD-10-CM

## 2019-10-11 RX ORDER — DOXYCYCLINE HYCLATE 100 MG/1
100 CAPSULE ORAL EVERY 12 HOURS SCHEDULED
Qty: 34 CAPSULE | Refills: 0 | Status: SHIPPED | OUTPATIENT
Start: 2019-10-11 | End: 2019-10-20

## 2019-10-11 RX ORDER — ATORVASTATIN CALCIUM 20 MG/1
20 TABLET, FILM COATED ORAL DAILY
Qty: 30 TABLET | Refills: 3 | Status: SHIPPED | OUTPATIENT
Start: 2019-10-11 | End: 2020-03-02 | Stop reason: ALTCHOICE

## 2019-10-11 RX ORDER — ATORVASTATIN CALCIUM 20 MG/1
TABLET, FILM COATED ORAL
Qty: 30 TABLET | Refills: 3 | Status: SHIPPED | OUTPATIENT
Start: 2019-10-11 | End: 2020-03-02 | Stop reason: ALTCHOICE

## 2019-10-28 ENCOUNTER — TELEPHONE (OUTPATIENT)
Dept: FAMILY MEDICINE CLINIC | Facility: CLINIC | Age: 74
End: 2019-10-28

## 2019-10-28 NOTE — TELEPHONE ENCOUNTER
The specialist is not necessary if he is feeling better but if he hasn't been off for at least 2 weeks I would keep the appointment because it did take a while to get the visit and if his joint pain returns he will be back where he started and have to wait for an appointment with specialist

## 2019-10-28 NOTE — TELEPHONE ENCOUNTER
Patient just called to let Magdalena Kilgore know he stopped taking his antibiotic like she suggested and his joints feel  Better and he feels much better   Wants to know if you still want him to see the specialist  Shantel Wakefield

## 2019-10-30 ENCOUNTER — IMMUNIZATIONS (OUTPATIENT)
Dept: FAMILY MEDICINE CLINIC | Facility: CLINIC | Age: 74
End: 2019-10-30
Payer: COMMERCIAL

## 2019-10-30 DIAGNOSIS — Z23 NEEDS FLU SHOT: Primary | ICD-10-CM

## 2019-10-30 PROCEDURE — 90662 IIV NO PRSV INCREASED AG IM: CPT

## 2019-10-30 PROCEDURE — G0008 ADMIN INFLUENZA VIRUS VAC: HCPCS

## 2019-10-31 NOTE — PROGRESS NOTES
FOLLOW UP - Infectious Disease   Raza Gutierres 69 y o  male MRN: [id number]  Unit/Bed#:  Encounter: [id number]      IMPRESSION & RECOMMENDATIONS:     61-year-old diabetic male with recent hospitalization for subacute stroke presents with a rash followed by polyarthralgia and febrile illness/sepsis like syndrome      1  Sepsis like syndrome, present on admission with leukocytosis and tachycardia and now fever  - No apparent infectious source of sepsis  - Clinically resolved     · Management as below     2  Acute febrile illness  - patient had no localizing symptoms of UTI/pneumonia or other bacterial infection    - Ehrlichia, EBV, CMV, Lyme titer was negative (EBV indicative of past infection/convalescence)  - non infectious causes include vasculitis/connective tissue disease versus crystal arthropathy such as gout but there was no evidence of arthritis, uric acid and RODRIGO was normal vesus delayed type hypersensitivity reaction to plavix or statin     · Repeat Lyme titer now to assess for seroconversion  · Patient has completed a 14 day course of Doxy  · Continue supportive care    3  Polyarthralgia, acute left wrist and hand arthritis  - Patient had generalized joint pain and now acute left hand swelling although MRI was negative for arthritis  - Clinically improved    · Continue supportive care    4  Type 2 diabetes, recent subacute CVA     · Continue supportive care per primary team         HISTORY OF PRESENT ILLNESS:    HPI: Raza Gutierres is a 69y o  year old male with a recent hospitalization for subacute stroke presented with a rash followed by polyarthralgia and febrile illness/sepsis like syndrome  There was concern for possible  tick borne illness in light of joint pain, rash, myalgias and body aches and he was started on empiric doxycycline  Work up was unremarkable with negative ehrlichia, babesia and Lyme   Blood cultures were negative but inflammatory markers were elevated (ESR and CRP but RODRIGO was negative)  His hospital course was complicated by development of acute left hand swelling and redness  MRI wrist and hand noted only subcutaneous edema  He was discharged home to complete doxy  INTERVAL HISTORY:  The patient has no complaints  He has had no additional fever, chills or joint pains or swelling  Denies fevers, chills, or sweats  Denies nausea, vomiting, or diarrhea  REVIEW OF SYSTEMS:  A complete 12 point system-based review of systems is otherwise negative  PAST MEDICAL HISTORY:  Past Medical History:   Diagnosis Date    Benign essential hypertension     Diabetes mellitus ([address] )     Essential hypertension     Hyperlipidemia     Hypertension     Pilonidal cyst with abscess     Rheumatic heart disease     TIA (transient ischemic attack)     Type 2 diabetes mellitus (HCC)     Type II diabetes mellitus, uncontrolled (HCC)     Vitamin D deficiency      Past Surgical History:   Procedure Laterality Date    CYSTECTOMY      Per patient cyst removal from his back    LASIK         FAMILY HISTORY:  Non-contributory    SOCIAL HISTORY:  Social History   Social History     Substance and Sexual Activity   Alcohol Use Never    Frequency: Never     Social History     Substance and Sexual Activity   Drug Use Never     Social History     Tobacco Use   Smoking Status Never Smoker   Smokeless Tobacco Never Used       ALLERGIES:  Allergies   Allergen Reactions    Plavix [Clopidogrel] Rash     Stopped two days ago because got a rash and doctors thought it was from this        MEDICATIONS:  All current active medications have been reviewed  Antibiotics:    PHYSICAL EXAM:  There were no vitals filed for this visit        General Appearance:  Appearing well, nontoxic, and in no distress   Head:  Normocephalic, without obvious abnormality, atraumatic   Eyes:  Conjunctiva pink and sclera anicteric, both eyes   Nose: Nares normal, mucosa normal, no drainage   Throat: Oropharynx moist without lesions Neck: Supple, symmetrical, no adenopathy, no tenderness/mass/nodules   Back:   Symmetric, no curvature, ROM normal, no CVA tenderness   Lungs:   Clear to auscultation bilaterally, respirations unlabored   Chest Wall:  No tenderness or deformity   Heart:  RRR; no murmur, rub or gallop   Abdomen:   Soft, non-tender, non-distended, positive bowel sounds,    Extremities: No cyanosis, clubbing or edema   Skin: No rashes or lesions  No draining wounds noted  Lymph nodes: Cervical, supraclavicular nodes normal   Neurologic: Alert and oriented times 3, extremity strength 5/5 and symmetric       LABS, IMAGING, & OTHER STUDIES:  Lab Results:  I have personally reviewed pertinent labs  Lab Results   Component Value Date    K 3 4 (L) 10/03/2019     10/03/2019    CO2 26 10/03/2019    BUN 25 10/03/2019    CREATININE 1 06 10/03/2019    CALCIUM 8 5 (L) 10/03/2019    AST 7 (L) 10/03/2019    ALT 9 10/03/2019    ALKPHOS 46 (L) 10/03/2019    EGFR 70 10/03/2019     Lab Results   Component Value Date    WBC 7 70 10/03/2019    HGB 11 6 (L) 10/03/2019    HCT 33 7 (L) 10/03/2019    MCV 90 10/03/2019     10/03/2019   No results found for: SEDRATE      Imaging Studies:   I have personally reviewed pertinent imaging study reports and images in PACS  Other Studies:   I have personally reviewed pertinent reports  Labs, medical tests and imaging studies were independently reviewed by me as noted above in HPI and old records were obtained and summarized as noted above in HPI

## 2019-11-04 ENCOUNTER — OFFICE VISIT (OUTPATIENT)
Dept: INFECTIOUS DISEASES | Facility: CLINIC | Age: 74
End: 2019-11-04
Payer: COMMERCIAL

## 2019-11-04 ENCOUNTER — APPOINTMENT (OUTPATIENT)
Dept: LAB | Facility: HOSPITAL | Age: 74
End: 2019-11-04
Attending: INTERNAL MEDICINE
Payer: COMMERCIAL

## 2019-11-04 VITALS
SYSTOLIC BLOOD PRESSURE: 148 MMHG | HEART RATE: 83 BPM | WEIGHT: 231 LBS | DIASTOLIC BLOOD PRESSURE: 69 MMHG | HEIGHT: 72 IN | TEMPERATURE: 98.2 F | BODY MASS INDEX: 31.29 KG/M2

## 2019-11-04 DIAGNOSIS — E11.22 TYPE 2 DIABETES MELLITUS WITH STAGE 3 CHRONIC KIDNEY DISEASE, WITHOUT LONG-TERM CURRENT USE OF INSULIN (HCC): Primary | ICD-10-CM

## 2019-11-04 DIAGNOSIS — N18.30 TYPE 2 DIABETES MELLITUS WITH STAGE 3 CHRONIC KIDNEY DISEASE, WITHOUT LONG-TERM CURRENT USE OF INSULIN (HCC): Primary | ICD-10-CM

## 2019-11-04 DIAGNOSIS — M13.0 POLYARTICULAR ARTHRITIS: ICD-10-CM

## 2019-11-04 DIAGNOSIS — A41.9 SEPSIS WITHOUT ACUTE ORGAN DYSFUNCTION, DUE TO UNSPECIFIED ORGANISM (HCC): ICD-10-CM

## 2019-11-04 DIAGNOSIS — R22.32 LOCALIZED SWELLING ON LEFT HAND: ICD-10-CM

## 2019-11-04 PROCEDURE — 99214 OFFICE O/P EST MOD 30 MIN: CPT | Performed by: INTERNAL MEDICINE

## 2019-11-04 PROCEDURE — 86618 LYME DISEASE ANTIBODY: CPT

## 2019-11-04 PROCEDURE — 36415 COLL VENOUS BLD VENIPUNCTURE: CPT

## 2019-11-05 LAB — B BURGDOR IGG+IGM SER-ACNC: <0.91 ISR (ref 0–0.9)

## 2019-11-06 ENCOUNTER — TELEPHONE (OUTPATIENT)
Dept: INFECTIOUS DISEASES | Facility: CLINIC | Age: 74
End: 2019-11-06

## 2019-11-06 NOTE — TELEPHONE ENCOUNTER
Pt did not answer  Left below information in voicemail with our callback number if pt has questions or concerns  ----- Message from Liana Hawkins MD sent at 11/6/2019  8:05 AM EST -----  Lyme titer remains negative  So unclear if his previous symptoms were due to a virus or possibly a drug reaction  Since he has no symptoms now, no further testing needed

## 2019-11-29 ENCOUNTER — TELEPHONE (OUTPATIENT)
Dept: NEUROSURGERY | Facility: CLINIC | Age: 74
End: 2019-11-29

## 2019-11-29 NOTE — TELEPHONE ENCOUNTER
Received message f/u appointment for 1/7/2020 was cancelled via my chart        Called patient back [facility] for him to call back to reschedule his follow up appointment for after his f/u up study 1/2/2020

## 2019-12-02 ENCOUNTER — OFFICE VISIT (OUTPATIENT)
Dept: FAMILY MEDICINE CLINIC | Facility: CLINIC | Age: 74
End: 2019-12-02
Payer: COMMERCIAL

## 2019-12-02 VITALS
OXYGEN SATURATION: 96 % | HEIGHT: 72 IN | BODY MASS INDEX: 32.91 KG/M2 | DIASTOLIC BLOOD PRESSURE: 76 MMHG | HEART RATE: 120 BPM | TEMPERATURE: 99.4 F | SYSTOLIC BLOOD PRESSURE: 128 MMHG | RESPIRATION RATE: 18 BRPM | WEIGHT: 243 LBS

## 2019-12-02 DIAGNOSIS — B34.9 ACUTE VIRAL SYNDROME: Primary | ICD-10-CM

## 2019-12-02 PROCEDURE — 1036F TOBACCO NON-USER: CPT | Performed by: NURSE PRACTITIONER

## 2019-12-02 PROCEDURE — 3725F SCREEN DEPRESSION PERFORMED: CPT | Performed by: NURSE PRACTITIONER

## 2019-12-02 PROCEDURE — 99213 OFFICE O/P EST LOW 20 MIN: CPT | Performed by: NURSE PRACTITIONER

## 2019-12-02 PROCEDURE — 3008F BODY MASS INDEX DOCD: CPT | Performed by: NURSE PRACTITIONER

## 2019-12-02 PROCEDURE — 1160F RVW MEDS BY RX/DR IN RCRD: CPT | Performed by: NURSE PRACTITIONER

## 2019-12-02 RX ORDER — GUAIFENESIN 600 MG
600 TABLET, EXTENDED RELEASE 12 HR ORAL EVERY 12 HOURS SCHEDULED
Qty: 30 TABLET | Refills: 1 | Status: SHIPPED | OUTPATIENT
Start: 2019-12-02 | End: 2020-03-02 | Stop reason: ALTCHOICE

## 2019-12-02 RX ORDER — AZITHROMYCIN 250 MG/1
TABLET, FILM COATED ORAL
Qty: 6 TABLET | Refills: 0 | Status: SHIPPED | OUTPATIENT
Start: 2019-12-02 | End: 2019-12-07

## 2019-12-02 NOTE — PROGRESS NOTES
[facility]        NAME: Destin Pollock is a 69 y o  male  : 1945    MRN: [id number]  DATE: 2019  TIME: 4:55 PM    Assessment and Plan   Acute viral syndrome [B34 9]  1  Acute viral syndrome  azithromycin (ZITHROMAX) 250 mg tablet    guaiFENesin (MUCINEX) 600 mg 12 hr tablet         Patient Instructions     Patient Instructions   Increase fluids and rest  Mucinex and Zpak as directed  Discussed viral vs bacterial infection  Return if symptoms worsen or for problems/concerns            Chief Complaint     Chief Complaint   Patient presents with    Cough    Wheezing    Shortness of Breath    Fatigue         History of Present Illness       Cough x 2 days, fatigue, sinus congestion and drainage  No fevers at home      Review of Systems   Review of Systems   Constitutional: Positive for fatigue  Negative for activity change, chills and fever  HENT: Positive for congestion, postnasal drip, rhinorrhea and sore throat  Negative for ear pain and sinus pressure  Eyes: Positive for discharge (reports "a little bit")  Negative for pain and redness  Respiratory: Positive for cough and wheezing (mild)  Cardiovascular: Negative for chest pain  Gastrointestinal: Negative for diarrhea, nausea and vomiting  Musculoskeletal: Negative for myalgias  Skin: Negative for rash  Neurological: Positive for dizziness  Negative for headaches           Current Medications       Current Outpatient Medications:     aspirin 81 mg chewable tablet, Chew 324 mg daily, Disp: , Rfl:     lisinopril-hydrochlorothiazide (PRINZIDE,ZESTORETIC) 20-12 5 MG per tablet, Take 1 tablet by mouth 2 (two) times a day , Disp: , Rfl:     metFORMIN (GLUCOPHAGE) 500 mg tablet, 1000 mg in the morning and 500 mg at night, Disp: , Rfl:     atorvastatin (LIPITOR) 20 mg tablet, TAKE 1 TABLET BY MOUTH ONCE DAILY (Patient not taking: Reported on 2019), Disp: 30 tablet, Rfl: 3    atorvastatin (LIPITOR) 20 mg tablet, Take 1 tablet (20 mg total) by mouth daily (Patient not taking: Reported on 11/4/2019), Disp: 30 tablet, Rfl: 3    azithromycin (ZITHROMAX) 250 mg tablet, 2 tablets day 1, then 1 tablet days 2-5, Disp: 6 tablet, Rfl: 0    betamethasone valerate (VALISONE) 0 1 % cream, Apply topically 2 (two) times a day (Patient not taking: Reported on 10/10/2019), Disp: 30 g, Rfl: 1    guaiFENesin (MUCINEX) 600 mg 12 hr tablet, Take 1 tablet (600 mg total) by mouth every 12 (twelve) hours, Disp: 30 tablet, Rfl: 1    Current Allergies     Allergies as of 12/02/2019 - Reviewed 12/02/2019   Allergen Reaction Noted    Plavix [clopidogrel] Rash 09/28/2019            The following portions of the patient's history were reviewed and updated as appropriate: allergies, current medications, past family history, past medical history, past social history, past surgical history and problem list      Past Medical History:   Diagnosis Date    Benign essential hypertension     Diabetes mellitus ([facility] )     Essential hypertension     Hyperlipidemia     Hypertension     Pilonidal cyst with abscess     Rheumatic heart disease     TIA (transient ischemic attack)     Type 2 diabetes mellitus ([facility] )     Type II diabetes mellitus, uncontrolled ([facility] )     Vitamin D deficiency        Past Surgical History:   Procedure Laterality Date    CYSTECTOMY      Per patient cyst removal from his back    LASIK         Family History   Problem Relation Age of Onset    Colon cancer Mother     Diabetes type II Mother     Heart disease Mother     Prostate cancer Father          Medications have been verified  Objective   /76   Pulse (!) 120   Temp 99 4 °F (37 4 °C)   Resp 18   Ht 6' (1 829 m)   Wt 110 kg (243 lb)   SpO2 96%   BMI 32 96 kg/m²        Physical Exam     Physical Exam   Constitutional: He is oriented to person, place, and time  He appears well-developed and well-nourished  He has a sickly appearance   No distress  HENT:   Head: Normocephalic and atraumatic  Right Ear: Tympanic membrane, external ear and ear canal normal    Left Ear: Tympanic membrane, external ear and ear canal normal    Nose: Rhinorrhea present  No epistaxis  Mouth/Throat: Uvula is midline and mucous membranes are normal  Posterior oropharyngeal erythema present  Cardiovascular: Regular rhythm and normal heart sounds  Tachycardia present  Exam reveals no gallop and no friction rub  No murmur heard  Pulmonary/Chest: Effort normal and breath sounds normal  No respiratory distress  He has no wheezes  He has no rales  Abdominal: He exhibits no distension  There is no tenderness  Lymphadenopathy:        Head (right side): No submandibular adenopathy present  Head (left side): No submandibular adenopathy present  Neurological: He is alert and oriented to person, place, and time  Skin: He is not diaphoretic  Psychiatric: He has a normal mood and affect  His behavior is normal  Judgment and thought content normal    Nursing note and vitals reviewed

## 2019-12-02 NOTE — PATIENT INSTRUCTIONS
Increase fluids and rest  Mucinex and Zpak as directed  Discussed viral vs bacterial infection  Return if symptoms worsen or for problems/concerns

## 2019-12-04 ENCOUNTER — TELEPHONE (OUTPATIENT)
Dept: FAMILY MEDICINE CLINIC | Facility: CLINIC | Age: 74
End: 2019-12-04

## 2019-12-04 DIAGNOSIS — B34.9 ACUTE VIRAL SYNDROME: Primary | ICD-10-CM

## 2019-12-04 RX ORDER — PREDNISONE 1 MG/1
20 TABLET ORAL DAILY
Qty: 5 TABLET | Refills: 0 | Status: SHIPPED | OUTPATIENT
Start: 2019-12-04 | End: 2020-03-02 | Stop reason: ALTCHOICE

## 2019-12-04 NOTE — TELEPHONE ENCOUNTER
Spoke with patients wife and notified per provider we can call in a cough suppressant for the patient and he can schedule an appointment tomorrow to follow up  Per patients wife they can only come in today after 4:15   The patient will go to Urgent Care for an evaluation this evening after 4:30

## 2019-12-04 NOTE — TELEPHONE ENCOUNTER
Patients wife called stating patients congestion and cough has worsened and he is now wheezing  They are concerned it is going to turn into pneumonia  They would like to know if he should have stronger medications or if he should be seen this afternoon?

## 2019-12-04 NOTE — TELEPHONE ENCOUNTER
Patient is already prescribed an antibiotic that would treat the pneumonia  Mucinex would help, can get this over the counter  Could also try robitussin OTC  Antibiotics take time to start working  If the wheezing is worse we can try prednisone 20mg daily x 5 days if they are agreeable

## 2019-12-04 NOTE — TELEPHONE ENCOUNTER
Spoke with patients wife again, they would like prednisone called in to [address] [place] [address] in [place]  They will try otc Robitussin and let us know if his symptoms do not worsen

## 2019-12-19 DIAGNOSIS — I10 ESSENTIAL HYPERTENSION: Primary | ICD-10-CM

## 2019-12-19 RX ORDER — LISINOPRIL AND HYDROCHLOROTHIAZIDE 20; 12.5 MG/1; MG/1
1 TABLET ORAL 2 TIMES DAILY
Qty: 180 TABLET | Refills: 1 | Status: SHIPPED | OUTPATIENT
Start: 2019-12-19 | End: 2020-06-22 | Stop reason: SDUPTHER

## 2019-12-19 NOTE — TELEPHONE ENCOUNTER
Need refill on Lisinopril/hydro 20/12 5 mg takes 1 tablet 2 times daily  # 90 refill?     Pharmacy: [facility]    VLMMD:[phone]

## 2020-01-04 DIAGNOSIS — E11.9 TYPE 2 DIABETES MELLITUS WITHOUT COMPLICATION, WITHOUT LONG-TERM CURRENT USE OF INSULIN (HCC): Primary | ICD-10-CM

## 2020-01-04 PROCEDURE — 3066F NEPHROPATHY DOC TX: CPT | Performed by: ANESTHESIOLOGY

## 2020-01-29 NOTE — ADDENDUM NOTE
Addended by: Leticia Cannon on: 10/10/2019 02:33 PM     Modules accepted: Level of Service JAUN ambulatory encounter  FAMILY PRACTICE OFFICE VISIT    CHIEF COMPLAINT:    Chief Complaint   Patient presents with   • Back Pain     left lower back pain radiating to the leg and upper back for 2 weeks, tylenol and aleve but not working       SUBJECTIVE:  Davon Hull is a 87 year old male who presented with daughter requesting evaluation for low back pain  Started 2 weeks ago  In left lumbar area, radiates to upper back and buttock  Denies any trauma or falls he lives by himself and he is here with daughter and daughter's not sure of any recent falls  He denies any numbness, weakness, tingling in bilateral lower extremities  He denies any bowel or urinary incontinence  Taking Tylenol and aleve p.r.n. no relief    Review of systems:   Constitutional: No fever and chills.   HENT: No ear pain, congestion, sore throat  Respiratory: No cough and shortness of breath.    Cardiovascular: No chest pain or chest pressure.   Gastrointestinal: No nausea, vomiting  Genitourinary: No dysuria  Skin: No rash.   Neurological: No headaches, vertigo      OBJECTIVE:  PROBLEM LIST:   Patient Active Problem List   Diagnosis   • Hypertension   • Hyperlipidemia   • OA (osteoarthritis)   • CKD (chronic kidney disease) stage 3, GFR 30-59 ml/min (CMS/HCC)   • Glaucoma   • Diabetic polyneuropathy (CMS/HCC)   • GERD (gastroesophageal reflux disease)   • PFO (patent foramen ovale)   • Dermatophytosis of nail   • Pain of toes of both feet   • Bilateral lower extremity edema   • Dyspnea on exertion   • Eczema   • Leg edema, right   • Type 2 diabetes mellitus with diabetic neuropathy (CMS/HCC)       PAST HISTORIES:   I have reviewed the past medical history, family history, social history, medications and allergies listed in the medical record as obtained by my nursing staff and support staff and agree with their documentation.    PHYSICAL EXAM:   Vitals:    01/29/20 1322   BP: 134/82   Pulse: 72   Resp: 16   Weight: 90 kg   Height:  5' 7\" (1.702 m)     General:   Alert, cooperative, conversive in no acute distress.  Skin:  Warm and dry without rash.    Head:  Normocephalic, atraumatic.   Neck:  Trachea is midline.  Eyes:  Normal conjunctivae and sclerae.  Pupils equal, round and reactive to light.  Extraocular movements intact.  ENT:  Oral Mucous membranes are moist.  Cardiovascular:  Symmetrical distal pulses.  Regular rate and rhythm without murmur.  Respiratory:  Normal respiratory effort.  Clear to auscultation.  No wheezes, rales or rhonchi.  Gastrointestinal:  Soft and non tender.    Musculoskeletal: BACK:  The spine is nontender.  No costovertebral angle tenderness.  DTRs (deep tendon reflexes) 2+ bilaterally, no neurovascular compromise.   straight leg exam cannot be performed due to patient's debility  Neurologic:   Oriented x4.  Cranial nerves II-XII are intact.  No focal deficits or lateralizing signs.  Psychiatric:   Cooperative.  Appropriate mood and affect.    LAB RESULTS:   All pertinent laboratory results were reviewed.    ASSESSMENT:   1. Multifocal dementia, with behavioral disturbance (CMS/[facility])    2. Back pain of lumbar region with sciatica    3. Type 2 diabetes mellitus with diabetic neuropathy, without long-term current use of insulin (CMS/[facility])        PLAN:   Orders Placed This Encounter   • SERVICE TO HOME CARE   • tiZANidine (ZANAFLEX) 4 MG tablet       Davon was seen today for back pain.    Diagnoses and all orders for this visit:    Multifocal dementia, with behavioral disturbance (CMS/HCC)  -     SERVICE TO HOME CARE    Back pain of lumbar region with sciatica  -     SERVICE TO HOME CARE    Type 2 diabetes mellitus with diabetic neuropathy, without long-term current use of insulin (CMS/[facility])  -     SERVICE TO HOME CARE    Other orders  -     tiZANidine (ZANAFLEX) 4 MG tablet; Take 1 tablet by mouth every 8 hours as needed (pain).      Red flag s/s reviewed and discussed for conditions listed including concerns for  prompt ED evaluation  Medical compliance with plan discussed and risks of non-compliance reviewed  Education completed on disease process, etiology & prognosis  Proper usage and side effects of medications reviewed & discussed     Return to clinic as clinically indicated or as discussed     Health Maintenance:   - Topics discussed are recorded in the Health Maintenance section of the EMR.     Follow-up as needed    Instructions provided as documented in the after visit summary.    The patient indicated understanding of the diagnosis and agreed with the plan of care.

## 2020-01-31 DIAGNOSIS — E11.9 TYPE 2 DIABETES MELLITUS WITHOUT COMPLICATION, WITHOUT LONG-TERM CURRENT USE OF INSULIN (HCC): ICD-10-CM

## 2020-02-20 ENCOUNTER — OFFICE VISIT (OUTPATIENT)
Dept: FAMILY MEDICINE CLINIC | Facility: CLINIC | Age: 75
End: 2020-02-20
Payer: COMMERCIAL

## 2020-02-20 VITALS
RESPIRATION RATE: 18 BRPM | DIASTOLIC BLOOD PRESSURE: 80 MMHG | HEART RATE: 64 BPM | OXYGEN SATURATION: 98 % | TEMPERATURE: 98.3 F | WEIGHT: 243 LBS | BODY MASS INDEX: 32.91 KG/M2 | HEIGHT: 72 IN | SYSTOLIC BLOOD PRESSURE: 124 MMHG

## 2020-02-20 DIAGNOSIS — B34.9 ACUTE VIRAL SYNDROME: Primary | ICD-10-CM

## 2020-02-20 DIAGNOSIS — N18.30 TYPE 2 DIABETES MELLITUS WITH STAGE 3 CHRONIC KIDNEY DISEASE, WITHOUT LONG-TERM CURRENT USE OF INSULIN (HCC): ICD-10-CM

## 2020-02-20 DIAGNOSIS — I49.9 IRREGULAR HEART RATE: ICD-10-CM

## 2020-02-20 DIAGNOSIS — R56.9 CONVULSIONS, UNSPECIFIED CONVULSION TYPE (HCC): ICD-10-CM

## 2020-02-20 DIAGNOSIS — E11.22 TYPE 2 DIABETES MELLITUS WITH STAGE 3 CHRONIC KIDNEY DISEASE, WITHOUT LONG-TERM CURRENT USE OF INSULIN (HCC): ICD-10-CM

## 2020-02-20 PROBLEM — I72.9 ANEURYSM (HCC): Status: ACTIVE | Noted: 2020-02-20

## 2020-02-20 PROCEDURE — 99213 OFFICE O/P EST LOW 20 MIN: CPT | Performed by: NURSE PRACTITIONER

## 2020-02-20 PROCEDURE — 1036F TOBACCO NON-USER: CPT | Performed by: NURSE PRACTITIONER

## 2020-02-20 PROCEDURE — 4040F PNEUMOC VAC/ADMIN/RCVD: CPT | Performed by: NURSE PRACTITIONER

## 2020-02-20 PROCEDURE — 3079F DIAST BP 80-89 MM HG: CPT | Performed by: NURSE PRACTITIONER

## 2020-02-20 PROCEDURE — 3074F SYST BP LT 130 MM HG: CPT | Performed by: NURSE PRACTITIONER

## 2020-02-20 PROCEDURE — 3008F BODY MASS INDEX DOCD: CPT | Performed by: NURSE PRACTITIONER

## 2020-02-20 PROCEDURE — 1160F RVW MEDS BY RX/DR IN RCRD: CPT | Performed by: NURSE PRACTITIONER

## 2020-02-20 PROCEDURE — 3066F NEPHROPATHY DOC TX: CPT | Performed by: NURSE PRACTITIONER

## 2020-02-20 NOTE — PATIENT INSTRUCTIONS
Discussed viral vs bacterial infection  Continue OTC cough/cold medications as directed, if fevers start- call office for antibiotic  Call or return for problems/concerns  EKG copy given to patient- pt   Wishes to f/u with outpatient cardiology instead of going to ER- agrees to go to ER if he feels different or worse  EKG reviewed and patient also evaluated by Dr Sandra Blackwood in office

## 2020-02-20 NOTE — PROGRESS NOTES
[facility]        NAME: Jamal Davis is a 69 y o  male  : 1945    MRN: [id number]  DATE: 2020  TIME: 4:09 PM    Assessment and Plan   Acute viral syndrome [B34 9]  1  Acute viral syndrome     2  Irregular heart rate  Ambulatory referral to Cardiology   3  Type 2 diabetes mellitus with stage 3 chronic kidney disease, without long-term current use of insulin ([facility] [address] )     4  Convulsions, unspecified convulsion type [facility])           Patient Instructions     Patient Instructions   Discussed viral vs bacterial infection  Continue OTC cough/cold medications as directed, if fevers start- call office for antibiotic  Call or return for problems/concerns  EKG copy given to patient- pt  Wishes to f/u with outpatient cardiology instead of going to ER- agrees to go to ER if he feels different or worse  EKG reviewed and patient also evaluated by Dr Sandra Blackwood in office              Chief Complaint     Chief Complaint   Patient presents with    Cough    Nasal Congestion         History of Present Illness       68 y/o male presents to the office for right-sided sore throat, congestion, and right ear pain  Also notes sneezing   + non-productive cough  Has not tried any OTC medications  Patent denies fever, chills, SOB, chest pain, or palpitations  Notes symptoms have been the same, but today awoke feeling  Better  Review of Systems   Review of Systems   Constitutional: Negative for activity change, chills, fatigue and fever  HENT: Positive for congestion, ear pain, postnasal drip, rhinorrhea, sinus pressure and sore throat  Negative for trouble swallowing  Eyes: Negative for pain, discharge and redness  Respiratory: Positive for cough  Negative for wheezing  Cardiovascular: Negative for chest pain  Gastrointestinal: Negative for constipation, diarrhea, nausea and vomiting  Musculoskeletal: Negative for myalgias  Skin: Negative for rash     Neurological: Negative for dizziness and headaches           Current Medications       Current Outpatient Medications:     aspirin 81 mg chewable tablet, Chew 324 mg daily, Disp: , Rfl:     lisinopril-hydrochlorothiazide (PRINZIDE,ZESTORETIC) 20-12 5 MG per tablet, Take 1 tablet by mouth 2 (two) times a day, Disp: 180 tablet, Rfl: 1    metFORMIN (GLUCOPHAGE) 500 mg tablet, Take 2 Tablets by mouth in the AM and 1 Tablet by Mouth in the pm, Disp: 270 tablet, Rfl: 1    atorvastatin (LIPITOR) 20 mg tablet, TAKE 1 TABLET BY MOUTH ONCE DAILY (Patient not taking: Reported on 11/4/2019), Disp: 30 tablet, Rfl: 3    atorvastatin (LIPITOR) 20 mg tablet, Take 1 tablet (20 mg total) by mouth daily (Patient not taking: Reported on 11/4/2019), Disp: 30 tablet, Rfl: 3    betamethasone valerate (VALISONE) 0 1 % cream, Apply topically 2 (two) times a day (Patient not taking: Reported on 10/10/2019), Disp: 30 g, Rfl: 1    guaiFENesin (MUCINEX) 600 mg 12 hr tablet, Take 1 tablet (600 mg total) by mouth every 12 (twelve) hours (Patient not taking: Reported on 2/20/2020), Disp: 30 tablet, Rfl: 1    predniSONE 5 mg tablet, Take 4 tablets (20 mg total) by mouth daily (Patient not taking: Reported on 2/20/2020), Disp: 5 tablet, Rfl: 0    Current Allergies     Allergies as of 02/20/2020 - Reviewed 02/20/2020   Allergen Reaction Noted    Plavix [clopidogrel] Rash 09/28/2019            The following portions of the patient's history were reviewed and updated as appropriate: allergies, current medications, past family history, past medical history, past social history, past surgical history and problem list      Past Medical History:   Diagnosis Date    Benign essential hypertension     Diabetes mellitus ([facility] [address] )     Essential hypertension     Hyperlipidemia     Hypertension     Pilonidal cyst with abscess     Rheumatic heart disease     TIA (transient ischemic attack)     Type 2 diabetes mellitus ([address] )     Type II diabetes mellitus, uncontrolled ([facility] [address] )  Vitamin D deficiency        Past Surgical History:   Procedure Laterality Date    CYSTECTOMY      Per patient cyst removal from his back    LASIK         Family History   Problem Relation Age of Onset    Colon cancer Mother     Diabetes type II Mother     Heart disease Mother     Prostate cancer Father          Medications have been verified  Objective   /80   Pulse 64   Temp 98 3 °F (36 8 °C) (Tympanic)   Resp 18   Ht 6' (1 829 m)   Wt 110 kg (243 lb)   SpO2 98%   BMI 32 96 kg/m²        Physical Exam     Physical Exam   Constitutional: He is oriented to person, place, and time  He appears well-developed and well-nourished  No distress  HENT:   Head: Normocephalic and atraumatic  Right Ear: Tympanic membrane and external ear normal    Left Ear: Tympanic membrane and external ear normal    Ears:    Nose: Mucosal edema and rhinorrhea present  No epistaxis  Right sinus exhibits no maxillary sinus tenderness and no frontal sinus tenderness  Left sinus exhibits no maxillary sinus tenderness and no frontal sinus tenderness  Mouth/Throat: Uvula is midline and mucous membranes are normal  Posterior oropharyngeal erythema present  Neck: Normal range of motion  Neck supple  No thyromegaly present  Cardiovascular: Normal rate and normal heart sounds  An irregular rhythm present  Exam reveals no gallop and no friction rub  No murmur heard  Pulmonary/Chest: Effort normal and breath sounds normal  No respiratory distress  He has no wheezes  He has no rales  Abdominal: He exhibits no distension  Musculoskeletal: Normal range of motion  Lymphadenopathy:     He has no cervical adenopathy  Neurological: He is alert and oriented to person, place, and time  Skin: He is not diaphoretic  Psychiatric: He has a normal mood and affect  His behavior is normal  Judgment and thought content normal    Nursing note and vitals reviewed

## 2020-02-24 PROBLEM — N18.2 STAGE 2 CHRONIC KIDNEY DISEASE: Status: ACTIVE | Noted: 2019-09-10

## 2020-02-28 ENCOUNTER — TELEPHONE (OUTPATIENT)
Dept: FAMILY MEDICINE CLINIC | Facility: CLINIC | Age: 75
End: 2020-02-28

## 2020-03-02 ENCOUNTER — CONSULT (OUTPATIENT)
Dept: CARDIOLOGY CLINIC | Facility: CLINIC | Age: 75
End: 2020-03-02
Payer: COMMERCIAL

## 2020-03-02 VITALS
BODY MASS INDEX: 32.51 KG/M2 | HEIGHT: 72 IN | SYSTOLIC BLOOD PRESSURE: 128 MMHG | HEART RATE: 64 BPM | DIASTOLIC BLOOD PRESSURE: 72 MMHG | WEIGHT: 240 LBS

## 2020-03-02 DIAGNOSIS — I10 ESSENTIAL HYPERTENSION: ICD-10-CM

## 2020-03-02 DIAGNOSIS — I49.1 PREMATURE ATRIAL COMPLEXES: Primary | ICD-10-CM

## 2020-03-02 DIAGNOSIS — I49.9 IRREGULAR HEART RATE: ICD-10-CM

## 2020-03-02 PROCEDURE — 99203 OFFICE O/P NEW LOW 30 MIN: CPT | Performed by: PHYSICIAN ASSISTANT

## 2020-03-02 RX ORDER — MELATONIN
1000 DAILY
COMMUNITY
End: 2020-09-29

## 2020-03-02 RX ORDER — GLUC/MSM/COLGN2/HYAL/ANTIARTH3 375-375-20
TABLET ORAL 2 TIMES DAILY
COMMUNITY

## 2020-03-02 NOTE — PROGRESS NOTES
[facility]     Venkata Fair / 69 y o  male / : 1945 / MRN: [id number]  Date of Visit: 20    ASSESSMENT/PLAN  1  PACs  · Noted on EKG during recent PCP visit - have also been present on prior EKGs   · Asymptomatic   · Discussed benign nature w/ pt and wife   · Had one EKG last year with PVCs, but echo since that time revealed normal LV function  · No further cardiac testing at this time    2  HTN - controlled on lisinopril-HCTZ  3  Prior TIA 2019- on ASA   4  Diabetes     Follow up on PRN basis     SUBJECTIVE:  HPI: Venkata Fair is a 69 y o  male who presents in consultation from [facility] for irregular heart beat detected during recent office visit  EKG from visit reviewed - sinus with PACs  He is asymptomatic and denies palpitations  No other cardiac complaints - he denies chest pain, dyspnea, change in exercise tolerance, near-syncope or syncope  He remains relatively active and does body weight exercises at home daily  Cardiac testing:    EKG 20 - sinus rhythm with PACs, left axis  EKG 19 - sinus tachycardia with PACs, left axis   EKG 19 - sinus rhythm with PACs  EKG 2019 - sinus rhyhm with PVCs     Echo 19 - EF 50-55%         Allergies   Allergen Reactions    Plavix [Clopidogrel] Rash     Stopped two days ago because got a rash and doctors thought it was from this          Current Outpatient Medications:     aspirin 81 mg chewable tablet, Chew 324 mg daily, Disp: , Rfl:     cholecalciferol (VITAMIN D3) 1,000 units tablet, Take 1,000 Units by mouth daily, Disp: , Rfl:     lisinopril-hydrochlorothiazide (PRINZIDE,ZESTORETIC) 20-12 5 MG per tablet, Take 1 tablet by mouth 2 (two) times a day, Disp: 180 tablet, Rfl: 1    metFORMIN (GLUCOPHAGE) 500 mg tablet, Take 2 Tablets by mouth in the AM and 1 Tablet by Mouth in the pm, Disp: 270 tablet, Rfl: 1    Multiple Vitamins-Minerals (MICHELLE MULTI MEN) TABS, Take by mouth 2 (two) times a day, Disp: , Rfl:     Past Medical History:   Diagnosis Date    Hyperlipidemia     Hypertension     Pilonidal cyst with abscess     Rheumatic heart disease     TIA (transient ischemic attack)     Type 2 diabetes mellitus (HCC)     Vitamin D deficiency        Family History   Problem Relation Age of Onset    Colon cancer Mother     Diabetes type II Mother     Heart disease Mother     Prostate cancer Father        Past Surgical History:   Procedure Laterality Date    CYSTECTOMY      Per patient cyst removal from his back    LASIK         Social History     Socioeconomic History    Marital status: /Civil Union     Spouse name: Not on file    Number of children: Not on file    Years of education: Not on file    Highest education level: Not on file   Occupational History    Occupation: Retired 2015 -     Social Needs    Financial resource strain: Not on file    Food insecurity:     Worry: Not on file     Inability: Not on file   [employer] needs:     Medical: Not on file     Non-medical: Not on file   Tobacco Use    Smoking status: Never Smoker    Smokeless tobacco: Never Used   Substance and Sexual Activity    Alcohol use: Never     Frequency: Never    Drug use: Never    Sexual activity: Not on file   Lifestyle    Physical activity:     Days per week: Not on file     Minutes per session: Not on file    Stress: Not on file   Relationships    Social connections:     Talks on phone: Not on file     Gets together: Not on file     Attends [employer] service: Not on file     Active member of club or organization: Not on file     Attends meetings of clubs or organizations: Not on file     Relationship status: Not on file    Intimate partner violence:     Fear of current or ex partner: Not on file     Emotionally abused: Not on file     Physically abused: Not on file     Forced sexual activity: Not on file   Other Topics Concern    Not on file   Social History Narrative Denies drug use - As per [employer]    Consumes on average 1 cup of regular coffee per day        Review of Systems   Constitution: Negative  HENT: Negative  Eyes: Negative  Cardiovascular: Negative for chest pain, claudication, dyspnea on exertion, irregular heartbeat, leg swelling, near-syncope, orthopnea, palpitations, paroxysmal nocturnal dyspnea and syncope  Respiratory: Negative for cough, shortness of breath and wheezing  Endocrine: Negative  Skin: Negative  Musculoskeletal: Negative  Gastrointestinal: Negative  Genitourinary: Negative  Neurological: Negative for dizziness and light-headedness  Psychiatric/Behavioral: Negative  OBJECTIVE:  Vitals: /72   Pulse 64   Ht 6' (1 829 m)   Wt 109 kg (240 lb)   BMI 32 55 kg/m²     Physical exam:   Physical Exam   Constitutional: He is oriented to person, place, and time  He appears well-developed and well-nourished  No distress  HENT:   Head: Normocephalic and atraumatic  Eyes: EOM are normal  No scleral icterus  Neck: Normal range of motion  Neck supple  No JVD present  Cardiovascular: Normal rate, S1 normal and S2 normal  An irregular rhythm present  No murmur heard  Consistent with PACs   Pulmonary/Chest: Effort normal and breath sounds normal  He has no wheezes  He has no rales  Abdominal: Soft  Bowel sounds are normal    Musculoskeletal: He exhibits no edema  Neurological: He is alert and oriented to person, place, and time  Skin: Skin is warm and dry  Psychiatric: He has a normal mood and affect   His behavior is normal        Lab Results:   Lab Results   Component Value Date    HGBA1C 6 9 (A) 09/24/2019     No results found for: CHOL  No results found for: HDL  No results found for: LDLCALC  No results found for: TRIG  No results found for: CHOLHDL

## 2020-06-05 ENCOUNTER — OFFICE VISIT (OUTPATIENT)
Dept: FAMILY MEDICINE CLINIC | Facility: CLINIC | Age: 75
End: 2020-06-05
Payer: COMMERCIAL

## 2020-06-05 VITALS
SYSTOLIC BLOOD PRESSURE: 128 MMHG | HEIGHT: 72 IN | HEART RATE: 70 BPM | DIASTOLIC BLOOD PRESSURE: 82 MMHG | BODY MASS INDEX: 32.37 KG/M2 | OXYGEN SATURATION: 99 % | TEMPERATURE: 97.5 F | WEIGHT: 239 LBS | RESPIRATION RATE: 18 BRPM

## 2020-06-05 DIAGNOSIS — G89.29 CHRONIC RIGHT-SIDED LOW BACK PAIN WITH RIGHT-SIDED SCIATICA: Primary | ICD-10-CM

## 2020-06-05 DIAGNOSIS — M54.41 CHRONIC RIGHT-SIDED LOW BACK PAIN WITH RIGHT-SIDED SCIATICA: Primary | ICD-10-CM

## 2020-06-05 DIAGNOSIS — I72.9 ANEURYSM (HCC): ICD-10-CM

## 2020-06-05 DIAGNOSIS — N18.30 TYPE 2 DIABETES MELLITUS WITH STAGE 3 CHRONIC KIDNEY DISEASE, WITHOUT LONG-TERM CURRENT USE OF INSULIN (HCC): ICD-10-CM

## 2020-06-05 DIAGNOSIS — E11.22 TYPE 2 DIABETES MELLITUS WITH STAGE 3 CHRONIC KIDNEY DISEASE, WITHOUT LONG-TERM CURRENT USE OF INSULIN (HCC): ICD-10-CM

## 2020-06-05 LAB — SL AMB POCT HEMOGLOBIN AIC: 7 (ref ?–6.5)

## 2020-06-05 PROCEDURE — 3051F HG A1C>EQUAL 7.0%<8.0%: CPT | Performed by: NURSE PRACTITIONER

## 2020-06-05 PROCEDURE — 3008F BODY MASS INDEX DOCD: CPT | Performed by: NURSE PRACTITIONER

## 2020-06-05 PROCEDURE — 3066F NEPHROPATHY DOC TX: CPT | Performed by: NURSE PRACTITIONER

## 2020-06-05 PROCEDURE — 83036 HEMOGLOBIN GLYCOSYLATED A1C: CPT | Performed by: NURSE PRACTITIONER

## 2020-06-05 PROCEDURE — 1160F RVW MEDS BY RX/DR IN RCRD: CPT | Performed by: NURSE PRACTITIONER

## 2020-06-05 PROCEDURE — 3079F DIAST BP 80-89 MM HG: CPT | Performed by: NURSE PRACTITIONER

## 2020-06-05 PROCEDURE — 4040F PNEUMOC VAC/ADMIN/RCVD: CPT | Performed by: NURSE PRACTITIONER

## 2020-06-05 PROCEDURE — 3051F HG A1C>EQUAL 7.0%<8.0%: CPT | Performed by: ANESTHESIOLOGY

## 2020-06-05 PROCEDURE — 1036F TOBACCO NON-USER: CPT | Performed by: NURSE PRACTITIONER

## 2020-06-05 PROCEDURE — 3074F SYST BP LT 130 MM HG: CPT | Performed by: NURSE PRACTITIONER

## 2020-06-05 PROCEDURE — 99214 OFFICE O/P EST MOD 30 MIN: CPT | Performed by: NURSE PRACTITIONER

## 2020-06-05 RX ORDER — CYCLOBENZAPRINE HCL 10 MG
10 TABLET ORAL 3 TIMES DAILY PRN
Qty: 30 TABLET | Refills: 0 | Status: SHIPPED | OUTPATIENT
Start: 2020-06-05 | End: 2020-09-29

## 2020-06-05 RX ORDER — PREDNISONE 10 MG/1
10 TABLET ORAL 2 TIMES DAILY WITH MEALS
Qty: 10 TABLET | Refills: 0 | Status: SHIPPED | OUTPATIENT
Start: 2020-06-05 | End: 2020-06-10

## 2020-06-08 ENCOUNTER — CONSULT (OUTPATIENT)
Dept: PAIN MEDICINE | Facility: CLINIC | Age: 75
End: 2020-06-08
Payer: COMMERCIAL

## 2020-06-08 VITALS
DIASTOLIC BLOOD PRESSURE: 72 MMHG | SYSTOLIC BLOOD PRESSURE: 134 MMHG | HEIGHT: 72 IN | BODY MASS INDEX: 32.64 KG/M2 | WEIGHT: 241 LBS

## 2020-06-08 DIAGNOSIS — M54.16 LUMBAR RADICULOPATHY: Primary | ICD-10-CM

## 2020-06-08 DIAGNOSIS — M54.41 CHRONIC RIGHT-SIDED LOW BACK PAIN WITH RIGHT-SIDED SCIATICA: ICD-10-CM

## 2020-06-08 DIAGNOSIS — G89.29 CHRONIC RIGHT-SIDED LOW BACK PAIN WITH RIGHT-SIDED SCIATICA: ICD-10-CM

## 2020-06-08 PROCEDURE — 99204 OFFICE O/P NEW MOD 45 MIN: CPT | Performed by: ANESTHESIOLOGY

## 2020-06-08 PROCEDURE — 3051F HG A1C>EQUAL 7.0%<8.0%: CPT | Performed by: ANESTHESIOLOGY

## 2020-06-08 PROCEDURE — 1160F RVW MEDS BY RX/DR IN RCRD: CPT | Performed by: ANESTHESIOLOGY

## 2020-06-08 RX ORDER — GABAPENTIN 100 MG/1
100 CAPSULE ORAL 3 TIMES DAILY
Qty: 90 CAPSULE | Refills: 1 | Status: SHIPPED | OUTPATIENT
Start: 2020-06-08 | End: 2020-09-29

## 2020-06-11 ENCOUNTER — EVALUATION (OUTPATIENT)
Dept: PHYSICAL THERAPY | Facility: CLINIC | Age: 75
End: 2020-06-11
Payer: COMMERCIAL

## 2020-06-11 ENCOUNTER — TELEPHONE (OUTPATIENT)
Dept: PAIN MEDICINE | Facility: MEDICAL CENTER | Age: 75
End: 2020-06-11

## 2020-06-11 DIAGNOSIS — G89.29 CHRONIC RIGHT-SIDED LOW BACK PAIN WITH RIGHT-SIDED SCIATICA: ICD-10-CM

## 2020-06-11 DIAGNOSIS — G89.29 CHRONIC RIGHT-SIDED LOW BACK PAIN WITH RIGHT-SIDED SCIATICA: Primary | ICD-10-CM

## 2020-06-11 DIAGNOSIS — M54.16 LUMBAR RADICULOPATHY: ICD-10-CM

## 2020-06-11 DIAGNOSIS — M54.41 CHRONIC RIGHT-SIDED LOW BACK PAIN WITH RIGHT-SIDED SCIATICA: Primary | ICD-10-CM

## 2020-06-11 DIAGNOSIS — M54.41 CHRONIC RIGHT-SIDED LOW BACK PAIN WITH RIGHT-SIDED SCIATICA: ICD-10-CM

## 2020-06-11 PROCEDURE — 97162 PT EVAL MOD COMPLEX 30 MIN: CPT | Performed by: PHYSICAL THERAPIST

## 2020-06-11 PROCEDURE — 97110 THERAPEUTIC EXERCISES: CPT | Performed by: PHYSICAL THERAPIST

## 2020-06-11 PROCEDURE — 97535 SELF CARE MNGMENT TRAINING: CPT | Performed by: PHYSICAL THERAPIST

## 2020-06-11 NOTE — TELEPHONE ENCOUNTER
Pt was there 2 days ago  RM gave him a script for Gabapentin and a chart says gabapentin 300 mg and when he got to the pharmacy the bottle is saying Gabapentin 100 mg take 3x a day  Pt is confused now       Pt can be reached at [phone]

## 2020-06-11 NOTE — TELEPHONE ENCOUNTER
Order states Gabapentin 100 mg, take 1 capsule TID  Please clarify Gabapentin titration instructions for pt  Thank you

## 2020-06-12 ENCOUNTER — HOSPITAL ENCOUNTER (OUTPATIENT)
Dept: MRI IMAGING | Facility: HOSPITAL | Age: 75
Discharge: HOME | End: 2020-06-12
Payer: COMMERCIAL

## 2020-06-12 DIAGNOSIS — M54.41 CHRONIC RIGHT-SIDED LOW BACK PAIN WITH RIGHT-SIDED SCIATICA: ICD-10-CM

## 2020-06-12 DIAGNOSIS — M54.16 LUMBAR RADICULOPATHY: ICD-10-CM

## 2020-06-12 DIAGNOSIS — G89.29 CHRONIC RIGHT-SIDED LOW BACK PAIN WITH RIGHT-SIDED SCIATICA: ICD-10-CM

## 2020-06-12 PROCEDURE — 72148 MRI LUMBAR SPINE W/O DYE: CPT

## 2020-06-15 ENCOUNTER — OFFICE VISIT (OUTPATIENT)
Dept: PHYSICAL THERAPY | Facility: CLINIC | Age: 75
End: 2020-06-15
Payer: COMMERCIAL

## 2020-06-15 ENCOUNTER — TELEPHONE (OUTPATIENT)
Dept: PAIN MEDICINE | Facility: CLINIC | Age: 75
End: 2020-06-15

## 2020-06-15 DIAGNOSIS — M54.41 CHRONIC RIGHT-SIDED LOW BACK PAIN WITH RIGHT-SIDED SCIATICA: Primary | ICD-10-CM

## 2020-06-15 DIAGNOSIS — G89.29 CHRONIC RIGHT-SIDED LOW BACK PAIN WITH RIGHT-SIDED SCIATICA: Primary | ICD-10-CM

## 2020-06-15 PROCEDURE — 97535 SELF CARE MNGMENT TRAINING: CPT | Performed by: PHYSICAL THERAPIST

## 2020-06-15 PROCEDURE — 97110 THERAPEUTIC EXERCISES: CPT | Performed by: PHYSICAL THERAPIST

## 2020-06-15 PROCEDURE — 97140 MANUAL THERAPY 1/> REGIONS: CPT | Performed by: PHYSICAL THERAPIST

## 2020-06-18 ENCOUNTER — APPOINTMENT (OUTPATIENT)
Dept: PHYSICAL THERAPY | Facility: CLINIC | Age: 75
End: 2020-06-18
Payer: COMMERCIAL

## 2020-06-18 DIAGNOSIS — M48.061 LUMBAR FORAMINAL STENOSIS: Primary | ICD-10-CM

## 2020-06-18 DIAGNOSIS — M54.16 LUMBAR RADICULOPATHY: ICD-10-CM

## 2020-06-18 NOTE — PROGRESS NOTES
Daily Note     Today's date: 2020  Patient name: Bertha Coulter  : 1945  MRN: [id number]  Referring provider: KAYODE Mcdonald  Dx: No diagnosis found  Subjective: ***      Objective: See treatment diary below      Assessment: Tolerated treatment {Tolerated treatment :}   Patient {assessment:[id number]}      Plan: {PLAN:}     Precautions: RECENT TIA (  in the last 10 months) / FALLS  RE-EVAL:    Specialty Daily Treatment Diary     Manual  6/11 6/15 6/18     STM lumbar paraspinals        Prone knee flexion stretch  B/L 3x:30 xx     Hamstring stretch B/L  *90-90 3x:30 x     Piriformis Stretch B/L        Hip Flexor Stretch B/L            Therapeutic Exercise 6/11 6/15 6/18     Treadmill Ambulation        UBE Stand ALT FWD/BACK  *NV *m     Clam shells  *NV *x     Abdominal hollow  *10x:05 x     kegels  *10x:10 x     Bridges  *10x p! x     Self 90-90 hamstring stretch B/L   *x     Prone press ups 2x5 with cues 2x10 xx     Standing back extensions x10 with UE support for balance At high table 3x10 x     Quad DLS UE  LE  UE+LE        SLR flex/EXT  *NV *x,xx             Quadruped knee lifts        Lean on mat donkey kicks        Neuro Re-ed        Core brace        kegels        Brace with knee fallouts        Sit ball marches        SLB                Gait Train                Therapeutic Activity        Chair squats            Modalities        MHP/CP to L/B

## 2020-06-22 ENCOUNTER — APPOINTMENT (OUTPATIENT)
Dept: PHYSICAL THERAPY | Facility: CLINIC | Age: 75
End: 2020-06-22
Payer: COMMERCIAL

## 2020-06-22 DIAGNOSIS — I10 ESSENTIAL HYPERTENSION: ICD-10-CM

## 2020-06-22 RX ORDER — LISINOPRIL AND HYDROCHLOROTHIAZIDE 20; 12.5 MG/1; MG/1
1 TABLET ORAL 2 TIMES DAILY
Qty: 180 TABLET | Refills: 1 | Status: SHIPPED | OUTPATIENT
Start: 2020-06-22 | End: 2020-12-14

## 2020-06-25 ENCOUNTER — APPOINTMENT (OUTPATIENT)
Dept: PHYSICAL THERAPY | Facility: CLINIC | Age: 75
End: 2020-06-25
Payer: COMMERCIAL

## 2020-06-29 ENCOUNTER — APPOINTMENT (OUTPATIENT)
Dept: PHYSICAL THERAPY | Facility: CLINIC | Age: 75
End: 2020-06-29
Payer: COMMERCIAL

## 2020-06-29 ENCOUNTER — OFFICE VISIT (OUTPATIENT)
Dept: PAIN MEDICINE | Facility: CLINIC | Age: 75
End: 2020-06-29
Payer: COMMERCIAL

## 2020-06-29 VITALS
SYSTOLIC BLOOD PRESSURE: 140 MMHG | TEMPERATURE: 98.1 F | BODY MASS INDEX: 31.94 KG/M2 | WEIGHT: 235.8 LBS | DIASTOLIC BLOOD PRESSURE: 86 MMHG | HEIGHT: 72 IN

## 2020-06-29 DIAGNOSIS — M54.16 LUMBAR RADICULOPATHY: Primary | ICD-10-CM

## 2020-06-29 DIAGNOSIS — M54.41 CHRONIC RIGHT-SIDED LOW BACK PAIN WITH RIGHT-SIDED SCIATICA: ICD-10-CM

## 2020-06-29 DIAGNOSIS — G89.29 CHRONIC RIGHT-SIDED LOW BACK PAIN WITH RIGHT-SIDED SCIATICA: ICD-10-CM

## 2020-06-29 PROCEDURE — 3008F BODY MASS INDEX DOCD: CPT | Performed by: ANESTHESIOLOGY

## 2020-06-29 PROCEDURE — 1036F TOBACCO NON-USER: CPT | Performed by: ANESTHESIOLOGY

## 2020-06-29 PROCEDURE — 1160F RVW MEDS BY RX/DR IN RCRD: CPT | Performed by: ANESTHESIOLOGY

## 2020-06-29 PROCEDURE — 99214 OFFICE O/P EST MOD 30 MIN: CPT | Performed by: ANESTHESIOLOGY

## 2020-06-29 PROCEDURE — 3051F HG A1C>EQUAL 7.0%<8.0%: CPT | Performed by: ANESTHESIOLOGY

## 2020-06-29 PROCEDURE — 4040F PNEUMOC VAC/ADMIN/RCVD: CPT | Performed by: ANESTHESIOLOGY

## 2020-06-29 PROCEDURE — 3066F NEPHROPATHY DOC TX: CPT | Performed by: ANESTHESIOLOGY

## 2020-06-29 PROCEDURE — 3077F SYST BP >= 140 MM HG: CPT | Performed by: ANESTHESIOLOGY

## 2020-06-29 PROCEDURE — 3079F DIAST BP 80-89 MM HG: CPT | Performed by: ANESTHESIOLOGY

## 2020-06-30 DIAGNOSIS — M54.16 LUMBAR RADICULOPATHY: Primary | ICD-10-CM

## 2020-06-30 DIAGNOSIS — Z01.818 PREOP TESTING: ICD-10-CM

## 2020-07-01 ENCOUNTER — TELEPHONE (OUTPATIENT)
Dept: FAMILY MEDICINE CLINIC | Facility: CLINIC | Age: 75
End: 2020-07-01

## 2020-07-01 DIAGNOSIS — Z01.818 PREOP TESTING: Primary | ICD-10-CM

## 2020-07-06 NOTE — TELEPHONE ENCOUNTER
Yes- he should come in 7 days before procedure  Have him come in on Friday 7/10 before our  comes for first

## 2020-07-08 ENCOUNTER — TELEPHONE (OUTPATIENT)
Dept: NEUROSURGERY | Facility: CLINIC | Age: 75
End: 2020-07-08

## 2020-07-08 NOTE — TELEPHONE ENCOUNTER
07/08/2020-CALLED PT AND LEFT MESSAGE ON MACHINE TO RESCHEDULE CX APT      IN BASKET MESSAGE:  Appointment canceled for Richa Michelle ([phone])   Visit Type: OFFICE VISIT LONG PG   Date        Time      Length    Provider                  Department   7/30/2020    1:00 PM  30 mins  Lorena Alberto MD            [facility]      Reason for Cancellation: Canceled via [address]

## 2020-07-09 NOTE — TELEPHONE ENCOUNTER
Called and left message with patient informing him of provider's recommendations and stressed importance of returning phone call  Asked for a return call to confirm  Also, called and left message for patient's emergency contact, his wife, with the same information

## 2020-07-09 NOTE — TELEPHONE ENCOUNTER
Received return call from patient  States that he is going to go to [place] for his covid swab  I confirmed that we do have the correct number on file for patient  Patient stated he was receiving messages just forgot to call us back

## 2020-07-10 ENCOUNTER — TELEPHONE (OUTPATIENT)
Dept: PAIN MEDICINE | Facility: CLINIC | Age: 75
End: 2020-07-10

## 2020-07-10 DIAGNOSIS — Z01.818 PREOP TESTING: ICD-10-CM

## 2020-07-10 PROCEDURE — U0003 INFECTIOUS AGENT DETECTION BY NUCLEIC ACID (DNA OR RNA); SEVERE ACUTE RESPIRATORY SYNDROME CORONAVIRUS 2 (SARS-COV-2) (CORONAVIRUS DISEASE [COVID-19]), AMPLIFIED PROBE TECHNIQUE, MAKING USE OF HIGH THROUGHPUT TECHNOLOGIES AS DESCRIBED BY CMS-2020-01-R: HCPCS

## 2020-07-11 LAB
INPATIENT: NORMAL
SARS-COV-2 RNA SPEC QL NAA+PROBE: NOT DETECTED

## 2020-07-17 ENCOUNTER — HOSPITAL ENCOUNTER (OUTPATIENT)
Dept: RADIOLOGY | Facility: HOSPITAL | Age: 75
Discharge: HOME | End: 2020-07-17

## 2020-07-17 NOTE — TELEPHONE ENCOUNTER
LVM for patient that his procedure is cancelled for today due to hospital  Charan Donya my number for patient to call back and reschedule

## 2020-07-17 NOTE — TELEPHONE ENCOUNTER
Patient called returning procedure  call  Please be advise thank you        Please call patient back @ [phone]

## 2020-07-20 NOTE — TELEPHONE ENCOUNTER
Pt called in to reschedule his procedure         Please be advise thank you        Please call patient back @  [phone]

## 2020-07-20 NOTE — TELEPHONE ENCOUNTER
Pt called in to reschedule his procedure   Please be advise thank you        Please call patient back @  [phone]

## 2020-07-21 ENCOUNTER — TELEPHONE (OUTPATIENT)
Dept: FAMILY MEDICINE CLINIC | Facility: CLINIC | Age: 75
End: 2020-07-21

## 2020-07-21 NOTE — TELEPHONE ENCOUNTER
Called patient in regards to diabetic eye exam  States he had surgery on his retina   Will call to see if he had an actual diabetic eye exam  Eye surgery done at [facility]

## 2020-07-22 NOTE — TELEPHONE ENCOUNTER
Patient has been scheduled for procedure with Dr Jenny Vargas at the [address]  Procedure instructions were reviewed with patient as follows:  Nothing to eat or drink one hour before  Wear appropriate clothing  Transportation is needed   If sick, on an antibiotic or have an open wound must call office  Refrain from vaccinations 2 weeks before and after  If any questions call the office    Patient advised that he will need COVID testing on 08/07 or 08/10    Patient verbalized understanding and appreciation

## 2020-07-28 DIAGNOSIS — E11.9 TYPE 2 DIABETES MELLITUS WITHOUT COMPLICATION, WITHOUT LONG-TERM CURRENT USE OF INSULIN (HCC): ICD-10-CM

## 2020-08-06 DIAGNOSIS — Z01.818 PREOP TESTING: Primary | ICD-10-CM

## 2020-08-07 DIAGNOSIS — Z01.818 PREOP TESTING: ICD-10-CM

## 2020-08-07 PROCEDURE — U0003 INFECTIOUS AGENT DETECTION BY NUCLEIC ACID (DNA OR RNA); SEVERE ACUTE RESPIRATORY SYNDROME CORONAVIRUS 2 (SARS-COV-2) (CORONAVIRUS DISEASE [COVID-19]), AMPLIFIED PROBE TECHNIQUE, MAKING USE OF HIGH THROUGHPUT TECHNOLOGIES AS DESCRIBED BY CMS-2020-01-R: HCPCS | Performed by: ANESTHESIOLOGY

## 2020-08-09 LAB — SARS-COV-2 RNA SPEC QL NAA+PROBE: NOT DETECTED

## 2020-08-14 ENCOUNTER — HOSPITAL ENCOUNTER (OUTPATIENT)
Dept: RADIOLOGY | Facility: HOSPITAL | Age: 75
Discharge: HOME | End: 2020-08-14
Admitting: ANESTHESIOLOGY
Payer: COMMERCIAL

## 2020-08-14 VITALS
WEIGHT: 235 LBS | OXYGEN SATURATION: 97 % | RESPIRATION RATE: 18 BRPM | BODY MASS INDEX: 31.83 KG/M2 | TEMPERATURE: 99 F | HEART RATE: 84 BPM | DIASTOLIC BLOOD PRESSURE: 96 MMHG | HEIGHT: 72 IN | SYSTOLIC BLOOD PRESSURE: 182 MMHG

## 2020-08-14 DIAGNOSIS — M54.16 LUMBAR RADICULOPATHY: ICD-10-CM

## 2020-08-14 PROCEDURE — 77003 FLUOROGUIDE FOR SPINE INJECT: CPT

## 2020-08-14 RX ORDER — LIDOCAINE HYDROCHLORIDE 10 MG/ML
5 INJECTION, SOLUTION EPIDURAL; INFILTRATION; INTRACAUDAL; PERINEURAL ONCE
Status: COMPLETED | OUTPATIENT
Start: 2020-08-14 | End: 2020-08-14

## 2020-08-14 RX ORDER — PAPAVERINE HCL 150 MG
7.5 CAPSULE, EXTENDED RELEASE ORAL ONCE
Status: COMPLETED | OUTPATIENT
Start: 2020-08-14 | End: 2020-08-14

## 2020-08-14 RX ADMIN — LIDOCAINE HYDROCHLORIDE 5 ML: 10 INJECTION, SOLUTION EPIDURAL; INFILTRATION; INTRACAUDAL; PERINEURAL at 12:20

## 2020-08-14 RX ADMIN — Medication 7.5 MG: at 12:24

## 2020-08-14 NOTE — DISCHARGE INSTRUCTIONS
Epidural Steroid Injection   WHAT YOU NEED TO KNOW:   An epidural steroid injection (KHLOE) is a procedure to inject steroid medicine into the epidural space  The epidural space is between your spinal cord and vertebrae  Steroids reduce inflammation and fluid buildup in your spine that may be causing pain  You may be given pain medicine along with the steroids  ACTIVITY  · Do not drive or operate machinery today  · No strenuous activity today - bending, lifting, etc   · You may resume normal activites starting tomorrow - start slowly and as tolerated  · You may shower today, but no tub baths or hot tubs  · You may have numbness for several hours from the local anesthetic  Please use caution and common sense, especially with weight-bearing activities  CARE OF THE INJECTION SITE  · If you have soreness or pain, apply ice to the area today (20 minutes on/20 minutes off)  · Starting tomorrow, you may use warm, moist heat or ice if needed  · You may have an increase or change in your discomfort for 36-48 hours after your treatment  · Apply ice and continue with any pain medication you have been prescribed  · [facility]ify [facility] if you have any of the following: redness, drainage, swelling, headache, stiff neck or fever above 100°F     SPECIAL INSTRUCTIONS  · Our office will contact you in approximately 7 days for a progress report  MEDICATIONS  · Continue to take all routine medications  · Our office may have instructed you to hold some medications  If you have a problem specifically related to your procedure, please call our office at [phone]  Problems not related to your procedure should be directed to your primary care physician

## 2020-08-14 NOTE — H&P
Assessment:  1  Lumbar radiculopathy  FL spine and pain procedure    FL spine and pain procedure         Plan:  Rosalind Martinez is a 69 y o  male with complaints of low back pain and right leg pain presents to surgical center for procedure  1  We will perform a right L3-L4 transforaminal epidural steroid injection  2  2  Follow-up 1 month after injection    Complete risks and benefits including bleeding, infection, tissue reaction, nerve injury and allergic reaction were discussed  The approach was demonstrated using models and literature was provided  Verbal and written consent was obtained  My impressions and treatment recommendations were discussed in detail with the patient who verbalized understanding and had no further questions  Discharge instructions were provided  I personally saw and examined the patient and I agree with the above discussed plan of care  Orders Placed This Encounter   Procedures    FL spine and pain procedure     Standing Status:   Standing     Number of Occurrences:   1     Order Specific Question:   Reason for Exam:     Answer:   Right L3-L4 TFESI     Order Specific Question:   Anticoagulant hold needed? Answer:   yes-Aspirin     No orders of the defined types were placed in this encounter  History of Present Illness:  Rosalind Martinez is a 69 y o  male who presents for a follow up office visit in regards to = low back and right leg pain  The patients current symptoms include 8/10 constant throbbing, cramping, burning pain without any particular time pattern  I have personally reviewed and/or updated the patient's past medical history, past surgical history, family history, social history, current medications, allergies, and vital signs today  Review of Systems   Musculoskeletal: Positive for back pain  Neurological:        Burning   All other systems reviewed and are negative        Patient Active Problem List   Diagnosis    Cerebral aneurysm, nonruptured    TIA (transient ischemic attack)    Left-sided weakness    Stage 2 chronic kidney disease    Essential hypertension    Type 2 diabetes mellitus (HCC)    Class 1 obesity due to excess calories with serious comorbidity and body mass index (BMI) of 32 0 to 32 9 in adult    Sepsis (HCC)    Polyarticular arthritis    [facility]UPAL [facility]acute kidney injury) ([facility] [address] )    Aneurysm (HCC)    Chronic right-sided low back pain with right-sided sciatica    Lumbar radiculopathy       Past Medical History:   Diagnosis Date    Hyperlipidemia     Hypertension     Pilonidal cyst with abscess     Rheumatic heart disease     TIA (transient ischemic attack)     Type 2 diabetes mellitus (HCC)     Vitamin D deficiency        Past Surgical History:   Procedure Laterality Date    CYSTECTOMY      Per patient cyst removal from his back    LASIK         Family History   Problem Relation Age of Onset    Colon cancer Mother     Diabetes type II Mother     Heart disease Mother     Prostate cancer Father        Social History     Occupational History    Occupation: Retired 2015 -     Tobacco Use    Smoking status: Never Smoker    Smokeless tobacco: Never Used   Substance and Sexual Activity    Alcohol use: Never     Frequency: Never    Drug use: Never    Sexual activity: Not on file       Current Outpatient Medications on File Prior to Encounter   Medication Sig    aspirin 81 mg chewable tablet Chew 324 mg daily    cholecalciferol (VITAMIN D3) 1,000 units tablet Take 1,000 Units by mouth daily    cyclobenzaprine (FLEXERIL) 10 mg tablet Take 1 tablet (10 mg total) by mouth 3 (three) times a day as needed for muscle spasms for up to 10 days    gabapentin (NEURONTIN) 100 mg capsule Take 1 capsule (100 mg total) by mouth 3 (three) times a day (Patient not taking: Reported on 6/29/2020)    lisinopril-hydrochlorothiazide (PRINZIDE,ZESTORETIC) 20-12 5 MG per tablet Take 1 tablet by mouth 2 (two) times a day    metFORMIN (GLUCOPHAGE) 500 mg tablet take 2 tablets by mouth IN THE MORNING then take 1 tablet by mouth IN THE EVENING    Multiple Vitamins-Minerals (MICHELLE MULTI MEN) TABS Take by mouth 2 (two) times a day     No current facility-administered medications on file prior to encounter  Allergies   Allergen Reactions    Plavix [Clopidogrel] Rash     Stopped two days ago because got a rash and doctors thought it was from this        Physical Exam:    /74   Pulse 77   Temp (!) 97 1 °F (36 2 °C) (Temporal)   Resp 18   Ht 6' (1 829 m)   Wt 107 kg (235 lb)   SpO2 98%   BMI 31 87 kg/m²     Constitutional:normal, well developed, well nourished, alert, in no distress and non-toxic and no overt pain behavior    Eyes:anicteric  HEENT:grossly intact  Neck:supple, symmetric, trachea midline and no masses   Pulmonary:even and unlabored  Cardiovascular:No edema or pitting edema present  Skin:Normal without rashes or lesions and well hydrated  Psychiatric:Mood and affect appropriate  Neurologic:Cranial Nerves II-XII grossly intact  Musculoskeletal:normal

## 2020-08-21 ENCOUNTER — TELEPHONE (OUTPATIENT)
Dept: PAIN MEDICINE | Facility: CLINIC | Age: 75
End: 2020-08-21

## 2020-08-25 ENCOUNTER — TELEPHONE (OUTPATIENT)
Dept: RADIOLOGY | Facility: CLINIC | Age: 75
End: 2020-08-25

## 2020-08-25 NOTE — TELEPHONE ENCOUNTER
----- Message from [employer]qu[employer] sent at 8/25/2020  1:11 PM EDT -----  Regarding: Visit Follow-Up Question  Contact: [phone]  I have a question about FL SPINE AND PAIN PROCEDURE resulted on 8/14/20, 12:30 PM     My pain is strongest in the morning  Why do I still have pain? The pain decreases after I wear my pants: Why? After a short time I can walk without aids all day long; but the pain stays

## 2020-08-25 NOTE — TELEPHONE ENCOUNTER
Please see note below from patient  Pt had reported 90% improvement 1 week post   Needs to be scheduled for F/U visit  Any suggestions?

## 2020-09-29 ENCOUNTER — OFFICE VISIT (OUTPATIENT)
Dept: FAMILY MEDICINE CLINIC | Facility: CLINIC | Age: 75
End: 2020-09-29
Payer: COMMERCIAL

## 2020-09-29 VITALS
DIASTOLIC BLOOD PRESSURE: 70 MMHG | HEART RATE: 62 BPM | OXYGEN SATURATION: 98 % | HEIGHT: 72 IN | BODY MASS INDEX: 32.29 KG/M2 | SYSTOLIC BLOOD PRESSURE: 108 MMHG | WEIGHT: 238.4 LBS | RESPIRATION RATE: 18 BRPM | TEMPERATURE: 97.1 F

## 2020-09-29 DIAGNOSIS — N18.30 TYPE 2 DIABETES MELLITUS WITH STAGE 3 CHRONIC KIDNEY DISEASE, WITHOUT LONG-TERM CURRENT USE OF INSULIN (HCC): Primary | ICD-10-CM

## 2020-09-29 DIAGNOSIS — E11.9 ENCOUNTER FOR DIABETIC FOOT EXAM (HCC): ICD-10-CM

## 2020-09-29 DIAGNOSIS — Z00.00 ENCOUNTER FOR MEDICARE ANNUAL WELLNESS EXAM: ICD-10-CM

## 2020-09-29 DIAGNOSIS — Z23 ENCOUNTER FOR IMMUNIZATION: ICD-10-CM

## 2020-09-29 DIAGNOSIS — Z01.00 DIABETIC EYE EXAM (HCC): ICD-10-CM

## 2020-09-29 DIAGNOSIS — E11.9 DIABETIC EYE EXAM (HCC): ICD-10-CM

## 2020-09-29 DIAGNOSIS — Z12.5 SCREENING FOR PROSTATE CANCER: ICD-10-CM

## 2020-09-29 DIAGNOSIS — E78.2 MIXED HYPERLIPIDEMIA: ICD-10-CM

## 2020-09-29 DIAGNOSIS — Z11.59 ENCOUNTER FOR HEPATITIS C SCREENING TEST FOR LOW RISK PATIENT: ICD-10-CM

## 2020-09-29 DIAGNOSIS — E11.22 TYPE 2 DIABETES MELLITUS WITH STAGE 3 CHRONIC KIDNEY DISEASE, WITHOUT LONG-TERM CURRENT USE OF INSULIN (HCC): Primary | ICD-10-CM

## 2020-09-29 DIAGNOSIS — E66.09 CLASS 1 OBESITY DUE TO EXCESS CALORIES WITH SERIOUS COMORBIDITY AND BODY MASS INDEX (BMI) OF 32.0 TO 32.9 IN ADULT: ICD-10-CM

## 2020-09-29 LAB — SL AMB POCT HEMOGLOBIN AIC: 6.6 (ref ?–6.5)

## 2020-09-29 PROCEDURE — 3044F HG A1C LEVEL LT 7.0%: CPT | Performed by: NURSE PRACTITIONER

## 2020-09-29 PROCEDURE — 3066F NEPHROPATHY DOC TX: CPT | Performed by: NURSE PRACTITIONER

## 2020-09-29 PROCEDURE — 83036 HEMOGLOBIN GLYCOSYLATED A1C: CPT | Performed by: NURSE PRACTITIONER

## 2020-09-29 PROCEDURE — 90662 IIV NO PRSV INCREASED AG IM: CPT

## 2020-09-29 PROCEDURE — 99213 OFFICE O/P EST LOW 20 MIN: CPT | Performed by: NURSE PRACTITIONER

## 2020-09-29 PROCEDURE — G0008 ADMIN INFLUENZA VIRUS VAC: HCPCS

## 2020-09-29 PROCEDURE — G0439 PPPS, SUBSEQ VISIT: HCPCS | Performed by: NURSE PRACTITIONER

## 2020-09-29 PROCEDURE — 1170F FXNL STATUS ASSESSED: CPT | Performed by: NURSE PRACTITIONER

## 2020-09-29 PROCEDURE — 3078F DIAST BP <80 MM HG: CPT | Performed by: NURSE PRACTITIONER

## 2020-09-29 PROCEDURE — 1125F AMNT PAIN NOTED PAIN PRSNT: CPT | Performed by: NURSE PRACTITIONER

## 2020-09-29 NOTE — PROGRESS NOTES
Assessment and Plan:     Problem List Items Addressed This Visit        Endocrine    Type 2 diabetes mellitus ([place] [address] )    Relevant Orders    POCT hemoglobin A1c (Completed)    Comprehensive metabolic panel       Other    Class 1 obesity due to excess calories with serious comorbidity and body mass index (BMI) of 32 0 to 32 9 in adult      Other Visit Diagnoses     Encounter for Medicare annual wellness exam    -  Primary    Diabetic eye exam ([place] [address] )        Encounter for hepatitis C screening test for low risk patient        Relevant Orders    Hepatitis C antibody    Encounter for immunization        Relevant Orders    influenza vaccine, high-dose, PF 0 7 mL (FLUZONE HIGH-DOSE) (Completed)    Mixed hyperlipidemia        Relevant Orders    Lipid Panel with Direct LDL reflex    Screening for prostate cancer        Relevant Orders    PSA, Total Screen        BMI Counseling: Body mass index is 32 33 kg/m²  The BMI is above normal  Nutrition recommendations include decreasing portion sizes, encouraging healthy choices of fruits and vegetables, decreasing fast food intake, consuming healthier snacks, limiting drinks that contain sugar, moderation in carbohydrate intake and increasing intake of lean protein  Preventive health issues were discussed with patient, and age appropriate screening tests were ordered as noted in patient's After Visit Summary  Personalized health advice and appropriate referrals for health education or preventive services given if needed, as noted in patient's After Visit Summary       History of Present Illness:     Patient presents for Medicare Annual Wellness visit    Patient Care Team:  Yesenia Tilley as PCP - General (Family Medicine)     Problem List:     Patient Active Problem List   Diagnosis    Cerebral aneurysm, nonruptured    TIA (transient ischemic attack)    Left-sided weakness    Stage 2 chronic kidney disease    Essential hypertension    Type 2 diabetes mellitus ([place] [address] )  Class 1 obesity due to excess calories with serious comorbidity and body mass index (BMI) of 32 0 to 32 9 in adult    Sepsis (HCC)    Polyarticular arthritis    RUPAL [facility]acute kidney injury) ([facility] [address] )    Aneurysm (HCC)    Chronic right-sided low back pain with right-sided sciatica    Lumbar radiculopathy      Past Medical and Surgical History:     Past Medical History:   Diagnosis Date    Hyperlipidemia     Hypertension     Pilonidal cyst with abscess     Rheumatic heart disease     TIA (transient ischemic attack)     Type 2 diabetes mellitus (HCC)     Vitamin D deficiency      Past Surgical History:   Procedure Laterality Date    CYSTECTOMY      Per patient cyst removal from his back    LASIK        Family History:     Family History   Problem Relation Age of Onset    Colon cancer Mother     Diabetes type II Mother     Heart disease Mother     Prostate cancer Father       Social History:        Social History     Socioeconomic History    Marital status: /Civil Union     Spouse name: None    Number of children: None    Years of education: None    Highest education level: None   Occupational History    Occupation: Retired 2015 -     Social Needs    Financial resource strain: None    Food insecurity     Worry: None     Inability: None    Transportation needs     Medical: None     Non-medical: None   Tobacco Use    Smoking status: Never Smoker    Smokeless tobacco: Never Used   Substance and Sexual Activity    Alcohol use: Never     Frequency: Never    Drug use: Never    Sexual activity: None   Lifestyle    Physical activity     Days per week: None     Minutes per session: None    Stress: None   Relationships    Social connections     Talks on phone: None     Gets together: None     Attends [employer] service: None     Active member of club or organization: None     Attends meetings of clubs or organizations: None     Relationship status: None    Intimate partner violence     Fear of current or ex partner: None     Emotionally abused: None     Physically abused: None     Forced sexual activity: None   Other Topics Concern    None   Social History Narrative    Denies drug use - As per [employer]    Consumes on average 1 cup of regular coffee per day       Medications and Allergies:     Current Outpatient Medications   Medication Sig Dispense Refill    lisinopril-hydrochlorothiazide (PRINZIDE,ZESTORETIC) 20-12 5 MG per tablet Take 1 tablet by mouth 2 (two) times a day 180 tablet 1    metFORMIN (GLUCOPHAGE) 500 mg tablet take 2 tablets by mouth IN THE MORNING then take 1 tablet by mouth IN THE EVENING 270 tablet 1    Multiple Vitamins-Minerals (MICHELLE MULTI MEN) TABS Take by mouth 2 (two) times a day       No current facility-administered medications for this visit  Allergies   Allergen Reactions    Plavix [Clopidogrel] Rash     Stopped two days ago because got a rash and doctors thought it was from this       Immunizations:     Immunization History   Administered Date(s) Administered    INFLUENZA 11/03/2014, 09/14/2015    Influenza, high dose seasonal 0 7 mL 10/30/2019, 09/29/2020    Pneumococcal Conjugate 13-Valent 01/03/2017    Pneumococcal Polysaccharide PPV23 11/25/2014, 10/17/2017      Health Maintenance:         Topic Date Due    Hepatitis C Screening  1945         Topic Date Due    DTaP,Tdap,and Td Vaccines (1 - Tdap) 10/05/1968      Medicare Health Risk Assessment:     /70   Pulse 62   Temp (!) 97 1 °F (36 2 °C)   Resp 18   Ht 6' (1 829 m)   Wt 108 kg (238 lb 6 4 oz)   SpO2 98%   BMI 32 33 kg/m²      Viviane De Dios is here for his Subsequent Wellness visit  Health Risk Assessment:   Patient rates overall health as good  Patient feels that their physical health rating is same  Eyesight was rated as same  Hearing was rated as slightly worse  Patient feels that their emotional and mental health rating is same   Pain experienced in the last 7 days has been some  Patient's pain rating has been 4/10  Patient states that he has experienced no weight loss or gain in last 6 months  Fall Risk Screening: In the past year, patient has experienced: no history of falling in past year      Home Safety:  Patient does not have trouble with stairs inside or outside of their home  Patient has working smoke alarms and has working carbon monoxide detector  Home safety hazards include: none  Nutrition:   Current diet is Regular  Medications:   Patient is currently taking over-the-counter supplements  OTC medications include: see medication list  Patient is able to manage medications  Activities of Daily Living (ADLs)/Instrumental Activities of Daily Living (IADLs):   Walk and transfer into and out of bed and chair?: Yes  Dress and groom yourself?: Yes    Bathe or shower yourself?: Yes    Feed yourself?  Yes  Do your laundry/housekeeping?: Yes  Manage your money, pay your bills and track your expenses?: Yes  Make your own meals?: Yes    Do your own shopping?: Yes    Previous Hospitalizations:   Any hospitalizations or ED visits within the last 12 months?: No      Advance Care Planning:   Living will: Yes    Durable POA for healthcare: No    Advanced directive: Yes    Five wishes given: No      PREVENTIVE SCREENINGS      Cardiovascular Screening:    General: Screening Not Indicated and History Lipid Disorder    Due for: Lipid Panel      Diabetes Screening:     General: Screening Not Indicated and History Diabetes    Due for: Blood Glucose      Colorectal Cancer Screening:     General: Screening Current      Prostate Cancer Screening:      Due for: PSA      Abdominal Aortic Aneurysm (AAA) Screening:    Risk factors include: age between 73-67 yo        Lung Cancer Screening:     General: Screening Not Indicated      Hepatitis C Screening:      Hep C Screening Accepted: Yes        KAYODE Giron

## 2020-09-29 NOTE — PROGRESS NOTES
[facility]        NAME: Shaan Sadler is a 69 y o  male  : 1945    MRN: [id number]  DATE: 2020  TIME: 11:00 AM    Assessment and Plan   Type 2 diabetes mellitus with stage 3 chronic kidney disease, without long-term current use of insulin (HCC) [E11 22, N18 3]  1  Type 2 diabetes mellitus with stage 3 chronic kidney disease, without long-term current use of insulin (HCC)  POCT hemoglobin A1c    Comprehensive metabolic panel   2  Diabetic eye exam ([facility] [address] )     3  Encounter for hepatitis C screening test for low risk patient  Hepatitis C antibody   4  Encounter for immunization  influenza vaccine, high-dose, PF 0 7 mL (FLUZONE HIGH-DOSE)   5  Mixed hyperlipidemia  Lipid Panel with Direct LDL reflex   6  Screening for prostate cancer  PSA, Total Screen   7  Encounter for Medicare annual wellness exam     8  Class 1 obesity due to excess calories with serious comorbidity and body mass index (BMI) of 32 0 to 32 9 in adult     9  Encounter for diabetic foot exam [facility])           Patient Instructions     Patient Instructions     Call our office when you get home with the name of your eye doctor so we can put a referral in- we need you to get your Diabetic Eye exam done before the end of the year  Get labs as ordered at your convenience- fast for 10 hours  Return in 6 months or sooner if needed   Medicare Preventive Visit Patient Instructions  Thank you for completing your Welcome to Medicare Visit or Medicare Annual Wellness Visit today  Your next wellness visit will be due in one year (2021)  The screening/preventive services that you may require over the next 5-10 years are detailed below  Some tests may not apply to you based off risk factors and/or age  Screening tests ordered at today's visit but not completed yet may show as past due  Also, please note that scanned in results may not display below    Preventive Screenings:  Service Recommendations Previous Testing/Comments   Colorectal Cancer Screening  · Colonoscopy    · Fecal Occult Blood Test (FOBT)/Fecal Immunochemical Test (FIT)  · Fecal DNA/Cologuard Test  · Flexible Sigmoidoscopy Age: 54-65 years old   Colonoscopy: every 10 years (May be performed more frequently if at higher risk)  OR  FOBT/FIT: every 1 year  OR  Cologuard: every 3 years  OR  Sigmoidoscopy: every 5 years  Screening may be recommended earlier than age 48 if at higher risk for colorectal cancer  Also, an individualized decision between you and your healthcare provider will decide whether screening between the ages of 74-80 would be appropriate  Colonoscopy: Not on file  FOBT/FIT: Not on file  Cologuard: 06/14/2019  Sigmoidoscopy: Not on file    Screening Current     Prostate Cancer Screening Individualized decision between patient and health care provider in men between ages of 53-78   Medicare will cover every 12 months beginning on the day after your 50th birthday PSA: No results in last 5 years          Hepatitis C Screening Once for adults born between 80 and 1965  More frequently in patients at high risk for Hepatitis C Hep C Antibody: Not on file       Diabetes Screening 1-2 times per year if you're at risk for diabetes or have pre-diabetes Fasting glucose: No results in last 5 years   A1C: 7 0    Screening Not Indicated  History Diabetes   Cholesterol Screening Once every 5 years if you don't have a lipid disorder  May order more often based on risk factors  Lipid panel: Not on file          Other Preventive Screenings Covered by Medicare:  1  Abdominal Aortic Aneurysm (AAA) Screening: covered once if your at risk  You're considered to be at risk if you have a family history of AAA or a male between the age of 73-68 who smoking at least 100 cigarettes in your lifetime    2  Lung Cancer Screening: covers low dose CT scan once per year if you meet all of the following conditions: (1) Age 50-69; (2) No signs or symptoms of lung cancer; (3) Current smoker or have quit smoking within the last 15 years; (4) You have a tobacco smoking history of at least 30 pack years (packs per day x number of years you smoked); (5) You get a written order from a healthcare provider  3  Glaucoma Screening: covered annually if you're considered high risk: (1) You have diabetes OR (2) Family history of glaucoma OR (3)  aged 48 and older OR (3)  American aged 72 and older  3  Osteoporosis Screening: covered every 2 years if you meet one of the following conditions: (1) Have a vertebral abnormality; (2) On glucocorticoid therapy for more than 3 months; (3) Have primary hyperparathyroidism; (4) On osteoporosis medications and need to assess response to drug therapy  5  HIV Screening: covered annually if you're between the age of 12-76  Also covered annually if you are younger than 13 and older than 72 with risk factors for HIV infection  For pregnant patients, it is covered up to 3 times per pregnancy  Immunizations:  Immunization Recommendations   Influenza Vaccine Annual influenza vaccination during flu season is recommended for all persons aged >= 6 months who do not have contraindications   Pneumococcal Vaccine (Prevnar and Pneumovax)  * Prevnar = PCV13  * Pneumovax = PPSV23 Adults 25-60 years old: 1-3 doses may be recommended based on certain risk factors  Adults 72 years old: Prevnar (PCV13) vaccine recommended followed by Pneumovax (PPSV23) vaccine  If already received PPSV23 since turning 65, then PCV13 recommended at least one year after PPSV23 dose  Hepatitis B Vaccine 3 dose series if at intermediate or high risk (ex: diabetes, end stage renal disease, liver disease)   Tetanus (Td) Vaccine - COST NOT COVERED BY MEDICARE PART B Following completion of primary series, a booster dose should be given every 10 years to maintain immunity against tetanus  Td may also be given as tetanus wound prophylaxis     Tdap Vaccine - COST NOT COVERED BY MEDICARE PART B Recommended at least once for all adults  For pregnant patients, recommended with each pregnancy  Shingles Vaccine (Shingrix) - COST NOT COVERED BY MEDICARE PART B  2 shot series recommended in those aged 48 and above     Health Maintenance Due:      Topic Date Due    Hepatitis C Screening  1945     Immunizations Due:      Topic Date Due    DTaP,Tdap,and Td Vaccines (1 - Tdap) 10/05/1968    Influenza Vaccine  07/01/2020     Advance Directives   What are advance directives? Advance directives are legal documents that state your wishes and plans for medical care  These plans are made ahead of time in case you lose your ability to make decisions for yourself  Advance directives can apply to any medical decision, such as the treatments you want, and if you want to donate organs  What are the types of advance directives? There are many types of advance directives, and each state has rules about how to use them  You may choose a combination of any of the following:  · Living will: This is a written record of the treatment you want  You can also choose which treatments you do not want, which to limit, and which to stop at a certain time  This includes surgery, medicine, IV fluid, and tube feedings  · Durable power of  for healthcare [facility]): This is a written record that states who you want to make healthcare choices for you when you are unable to make them for yourself  This person, called a proxy, is usually a family member or a friend  You may choose more than 1 proxy  · Do not resuscitate (DNR) order:  A DNR order is used in case your heart stops beating or you stop breathing  It is a request not to have certain forms of treatment, such as CPR  A DNR order may be included in other types of advance directives  · Medical directive: This covers the care that you want if you are in a coma, near death, or unable to make decisions for yourself   You can list the treatments you want for each condition  Treatment may include pain medicine, surgery, blood transfusions, dialysis, IV or tube feedings, and a ventilator (breathing machine)  · Values history: This document has questions about your views, beliefs, and how you feel and think about life  This information can help others choose the care that you would choose  Why are advance directives important? An advance directive helps you control your care  Although spoken wishes may be used, it is better to have your wishes written down  Spoken wishes can be misunderstood, or not followed  Treatments may be given even if you do not want them  An advance directive may make it easier for your family to make difficult choices about your care  Weight Management   Why it is important to manage your weight:  Being overweight increases your risk of health conditions such as heart disease, high blood pressure, type 2 diabetes, and certain types of cancer  It can also increase your risk for osteoarthritis, sleep apnea, and other respiratory problems  Aim for a slow, steady weight loss  Even a small amount of weight loss can lower your risk of health problems  How to lose weight safely:  A safe and healthy way to lose weight is to eat fewer calories and get regular exercise  You can lose up about 1 pound a week by decreasing the number of calories you eat by 500 calories each day  Healthy meal plan for weight management:  A healthy meal plan includes a variety of foods, contains fewer calories, and helps you stay healthy  A healthy meal plan includes the following:  · Eat whole-grain foods more often  A healthy meal plan should contain fiber  Fiber is the part of grains, fruits, and vegetables that is not broken down by your body  Whole-grain foods are healthy and provide extra fiber in your diet  Some examples of whole-grain foods are whole-wheat breads and pastas, oatmeal, brown rice, and bulgur  · Eat a variety of vegetables every day  Include dark, leafy greens such as spinach, kale, mukund greens, and mustard greens  Eat yellow and orange vegetables such as carrots, sweet potatoes, and winter squash  · Eat a variety of fruits every day  Choose fresh or canned fruit (canned in its own juice or light syrup) instead of juice  Fruit juice has very little or no fiber  · Eat low-fat dairy foods  Drink fat-free (skim) milk or 1% milk  Eat fat-free yogurt and low-fat cottage cheese  Try low-fat cheeses such as mozzarella and other reduced-fat cheeses  · Choose meat and other protein foods that are low in fat  Choose beans or other legumes such as split peas or lentils  Choose fish, skinless poultry (chicken or turkey), or lean cuts of red meat (beef or pork)  Before you cook meat or poultry, cut off any visible fat  · Use less fat and oil  Try baking foods instead of frying them  Add less fat, such as margarine, sour cream, regular salad dressing and mayonnaise to foods  Eat fewer high-fat foods  Some examples of high-fat foods include french fries, doughnuts, ice cream, and cakes  · Eat fewer sweets  Limit foods and drinks that are high in sugar  This includes candy, cookies, regular soda, and sweetened drinks  Exercise:  Exercise at least 30 minutes per day on most days of the week  Some examples of exercise include walking, biking, dancing, and swimming  You can also fit in more physical activity by taking the stairs instead of the elevator or parking farther away from stores  Ask your healthcare provider about the best exercise plan for you  © Copyright [employer] 2018 Information is for End User's use only and may not be sold, redistributed or otherwise used for commercial purposes   All illustrations and images included in CareNotes® are the copyrighted property of A D A M , Inc  or [facility] [place] [employer] [place]           Chief Complaint     Chief Complaint   Patient presents with   [facility] Wellness Visit         History of Present Illness       Here for 6 month [facility]- Hga1c today is 6 6  He is taking Metformin once daily, reviewed medication list- is UTD and accurate  Patient denies complaints  Is willing to get labs and Diabetic eye exam done  Diabetic foot exam done today      Review of Systems   Review of Systems   Constitutional: Negative for activity change, diaphoresis, fatigue and fever  HENT: Negative for congestion, facial swelling, hearing loss, rhinorrhea, sinus pressure, sinus pain, sneezing, sore throat and voice change  Eyes: Negative for discharge and visual disturbance  Respiratory: Negative for cough, choking, chest tightness, shortness of breath, wheezing and stridor  Cardiovascular: Negative for chest pain, palpitations and leg swelling  Gastrointestinal: Negative for abdominal distention, abdominal pain, constipation, diarrhea, nausea and vomiting  Endocrine: Negative for polydipsia, polyphagia and polyuria  Genitourinary: Negative for difficulty urinating, dysuria, frequency and urgency  Musculoskeletal: Negative for arthralgias, back pain, gait problem, joint swelling, myalgias, neck pain and neck stiffness  Skin: Negative for color change, rash and wound  Neurological: Negative for dizziness, syncope, speech difficulty, weakness, light-headedness and headaches  Hematological: Negative for adenopathy  Does not bruise/bleed easily  Psychiatric/Behavioral: Negative for agitation, behavioral problems, confusion, hallucinations, sleep disturbance and suicidal ideas  The patient is not nervous/anxious            Current Medications       Current Outpatient Medications:     lisinopril-hydrochlorothiazide (PRINZIDE,ZESTORETIC) 20-12 5 MG per tablet, Take 1 tablet by mouth 2 (two) times a day, Disp: 180 tablet, Rfl: 1    metFORMIN (GLUCOPHAGE) 500 mg tablet, take 2 tablets by mouth IN THE MORNING then take 1 tablet by mouth IN THE EVENING, Disp: 270 tablet, Rfl: 1    Multiple Vitamins-Minerals (MICHELLE MULTI MEN) TABS, Take by mouth 2 (two) times a day, Disp: , Rfl:     Current Allergies     Allergies as of 09/29/2020 - Reviewed 09/29/2020   Allergen Reaction Noted    Plavix [clopidogrel] Rash 09/28/2019            The following portions of the patient's history were reviewed and updated as appropriate: allergies, current medications, past family history, past medical history, past social history, past surgical history and problem list      Past Medical History:   Diagnosis Date    Hyperlipidemia     Hypertension     Pilonidal cyst with abscess     Rheumatic heart disease     TIA (transient ischemic attack)     Type 2 diabetes mellitus ([facility] [address] )     Vitamin D deficiency        Past Surgical History:   Procedure Laterality Date    CYSTECTOMY      Per patient cyst removal from his back    LASIK         Family History   Problem Relation Age of Onset    Colon cancer Mother     Diabetes type II Mother     Heart disease Mother     Prostate cancer Father          Medications have been verified  Objective   /70   Pulse 62   Temp (!) 97 1 °F (36 2 °C)   Resp 18   Ht 6' (1 829 m)   Wt 108 kg (238 lb 6 4 oz)   SpO2 98%   BMI 32 33 kg/m²        Physical Exam     Physical Exam  Vitals signs and nursing note reviewed  Constitutional:       General: He is not in acute distress  Appearance: He is well-developed  He is not diaphoretic  Neck:      Musculoskeletal: Normal range of motion and neck supple  Thyroid: No thyromegaly  Trachea: No tracheal deviation  Cardiovascular:      Rate and Rhythm: Normal rate and regular rhythm  Pulses: no weak pulses          Dorsalis pedis pulses are 2+ on the right side and 2+ on the left side  Heart sounds: Normal heart sounds  No murmur  Pulmonary:      Effort: Pulmonary effort is normal  No respiratory distress  Breath sounds: Normal breath sounds  No wheezing  Musculoskeletal: Normal range of motion           General: No tenderness or deformity  Feet:      Right foot:      Skin integrity: Dry skin present  No ulcer, skin breakdown, erythema, warmth or callus  Left foot:      Skin integrity: Dry skin present  No ulcer, skin breakdown, erythema, warmth or callus  Skin:     General: Skin is warm and dry  Neurological:      Mental Status: He is alert and oriented to person, place, and time  Psychiatric:         Mood and Affect: Mood normal          Speech: Speech normal          Behavior: Behavior normal          Thought Content: Thought content normal          Judgment: Judgment normal        Patient's shoes and socks removed  Right Foot/Ankle   Right Foot Inspection  Skin Exam: skin normal, skin intact and dry skin no warmth, no callus, no erythema, no maceration, no abnormal color, no pre-ulcer, no ulcer and no callus                          Toe Exam: ROM and strength within normal limits  Sensory       Monofilament testing: intact  Vascular  Capillary refills: < 3 seconds  The right DP pulse is 2+  Left Foot/Ankle  Left Foot Inspection  Skin Exam: skin normal, skin intact and dry skinno warmth, no erythema, no maceration, normal color, no pre-ulcer, no ulcer and no callus                         Toe Exam: ROM and strength within normal limits                   Sensory       Monofilament: intact  Vascular  Capillary refills: < 3 seconds  The left DP pulse is 2+  Assign Risk Category:  No deformity present; No loss of protective sensation;  No weak pulses       Risk: 0

## 2020-09-29 NOTE — PATIENT INSTRUCTIONS
Call our office when you get home with the name of your eye doctor so we can put a referral in- we need you to get your Diabetic Eye exam done before the end of the year  Get labs as ordered at your convenience- fast for 10 hours  Return in 6 months or sooner if needed   Medicare Preventive Visit Patient Instructions  Thank you for completing your Welcome to Medicare Visit or Medicare Annual Wellness Visit today  Your next wellness visit will be due in one year (9/29/2021)  The screening/preventive services that you may require over the next 5-10 years are detailed below  Some tests may not apply to you based off risk factors and/or age  Screening tests ordered at today's visit but not completed yet may show as past due  Also, please note that scanned in results may not display below  Preventive Screenings:  Service Recommendations Previous Testing/Comments   Colorectal Cancer Screening  · Colonoscopy    · Fecal Occult Blood Test (FOBT)/Fecal Immunochemical Test (FIT)  · Fecal DNA/Cologuard Test  · Flexible Sigmoidoscopy Age: 54-65 years old   Colonoscopy: every 10 years (May be performed more frequently if at higher risk)  OR  FOBT/FIT: every 1 year  OR  Cologuard: every 3 years  OR  Sigmoidoscopy: every 5 years  Screening may be recommended earlier than age 48 if at higher risk for colorectal cancer  Also, an individualized decision between you and your healthcare provider will decide whether screening between the ages of 74-80 would be appropriate   Colonoscopy: Not on file  FOBT/FIT: Not on file  Cologuard: 06/14/2019  Sigmoidoscopy: Not on file    Screening Current     Prostate Cancer Screening Individualized decision between patient and health care provider in men between ages of 53-78   Medicare will cover every 12 months beginning on the day after your 50th birthday PSA: No results in last 5 years          Hepatitis C Screening Once for adults born between 80 and 1965  More frequently in patients at high risk for Hepatitis C Hep C Antibody: Not on file       Diabetes Screening 1-2 times per year if you're at risk for diabetes or have pre-diabetes Fasting glucose: No results in last 5 years   A1C: 7 0    Screening Not Indicated  History Diabetes   Cholesterol Screening Once every 5 years if you don't have a lipid disorder  May order more often based on risk factors  Lipid panel: Not on file          Other Preventive Screenings Covered by Medicare:  1  Abdominal Aortic Aneurysm (AAA) Screening: covered once if your at risk  You're considered to be at risk if you have a family history of AAA or a male between the age of 73-68 who smoking at least 100 cigarettes in your lifetime  2  Lung Cancer Screening: covers low dose CT scan once per year if you meet all of the following conditions: (1) Age 50-69; (2) No signs or symptoms of lung cancer; (3) Current smoker or have quit smoking within the last 15 years; (4) You have a tobacco smoking history of at least 30 pack years (packs per day x number of years you smoked); (5) You get a written order from a healthcare provider  3  Glaucoma Screening: covered annually if you're considered high risk: (1) You have diabetes OR (2) Family history of glaucoma OR (3)  aged 48 and older OR (3)  American aged 72 and older  3  Osteoporosis Screening: covered every 2 years if you meet one of the following conditions: (1) Have a vertebral abnormality; (2) On glucocorticoid therapy for more than 3 months; (3) Have primary hyperparathyroidism; (4) On osteoporosis medications and need to assess response to drug therapy  5  HIV Screening: covered annually if you're between the age of 12-76  Also covered annually if you are younger than 13 and older than 72 with risk factors for HIV infection  For pregnant patients, it is covered up to 3 times per pregnancy      Immunizations:  Immunization Recommendations   Influenza Vaccine Annual influenza vaccination during flu season is recommended for all persons aged >= 6 months who do not have contraindications   Pneumococcal Vaccine (Prevnar and Pneumovax)  * Prevnar = PCV13  * Pneumovax = PPSV23 Adults 25-60 years old: 1-3 doses may be recommended based on certain risk factors  Adults 72 years old: Prevnar (PCV13) vaccine recommended followed by Pneumovax (PPSV23) vaccine  If already received PPSV23 since turning 65, then PCV13 recommended at least one year after PPSV23 dose  Hepatitis B Vaccine 3 dose series if at intermediate or high risk (ex: diabetes, end stage renal disease, liver disease)   Tetanus (Td) Vaccine - COST NOT COVERED BY MEDICARE PART B Following completion of primary series, a booster dose should be given every 10 years to maintain immunity against tetanus  Td may also be given as tetanus wound prophylaxis  Tdap Vaccine - COST NOT COVERED BY MEDICARE PART B Recommended at least once for all adults  For pregnant patients, recommended with each pregnancy  Shingles Vaccine (Shingrix) - COST NOT COVERED BY MEDICARE PART B  2 shot series recommended in those aged 48 and above     Health Maintenance Due:      Topic Date Due    Hepatitis C Screening  1945     Immunizations Due:      Topic Date Due    DTaP,Tdap,and Td Vaccines (1 - Tdap) 10/05/1968    Influenza Vaccine  07/01/2020     Advance Directives   What are advance directives? Advance directives are legal documents that state your wishes and plans for medical care  These plans are made ahead of time in case you lose your ability to make decisions for yourself  Advance directives can apply to any medical decision, such as the treatments you want, and if you want to donate organs  What are the types of advance directives? There are many types of advance directives, and each state has rules about how to use them  You may choose a combination of any of the following:  · Living will: This is a written record of the treatment you want   You can also choose which treatments you do not want, which to limit, and which to stop at a certain time  This includes surgery, medicine, IV fluid, and tube feedings  · Durable power of  for healthcare [facility]): This is a written record that states who you want to make healthcare choices for you when you are unable to make them for yourself  This person, called a proxy, is usually a family member or a friend  You may choose more than 1 proxy  · Do not resuscitate (DNR) order:  A DNR order is used in case your heart stops beating or you stop breathing  It is a request not to have certain forms of treatment, such as CPR  A DNR order may be included in other types of advance directives  · Medical directive: This covers the care that you want if you are in a coma, near death, or unable to make decisions for yourself  You can list the treatments you want for each condition  Treatment may include pain medicine, surgery, blood transfusions, dialysis, IV or tube feedings, and a ventilator (breathing machine)  · Values history: This document has questions about your views, beliefs, and how you feel and think about life  This information can help others choose the care that you would choose  Why are advance directives important? An advance directive helps you control your care  Although spoken wishes may be used, it is better to have your wishes written down  Spoken wishes can be misunderstood, or not followed  Treatments may be given even if you do not want them  An advance directive may make it easier for your family to make difficult choices about your care  Weight Management   Why it is important to manage your weight:  Being overweight increases your risk of health conditions such as heart disease, high blood pressure, type 2 diabetes, and certain types of cancer  It can also increase your risk for osteoarthritis, sleep apnea, and other respiratory problems  Aim for a slow, steady weight loss   Even a small amount of weight loss can lower your risk of health problems  How to lose weight safely:  A safe and healthy way to lose weight is to eat fewer calories and get regular exercise  You can lose up about 1 pound a week by decreasing the number of calories you eat by 500 calories each day  Healthy meal plan for weight management:  A healthy meal plan includes a variety of foods, contains fewer calories, and helps you stay healthy  A healthy meal plan includes the following:  · Eat whole-grain foods more often  A healthy meal plan should contain fiber  Fiber is the part of grains, fruits, and vegetables that is not broken down by your body  Whole-grain foods are healthy and provide extra fiber in your diet  Some examples of whole-grain foods are whole-wheat breads and pastas, oatmeal, brown rice, and bulgur  · Eat a variety of vegetables every day  Include dark, leafy greens such as spinach, kale, mukund greens, and mustard greens  Eat yellow and orange vegetables such as carrots, sweet potatoes, and winter squash  · Eat a variety of fruits every day  Choose fresh or canned fruit (canned in its own juice or light syrup) instead of juice  Fruit juice has very little or no fiber  · Eat low-fat dairy foods  Drink fat-free (skim) milk or 1% milk  Eat fat-free yogurt and low-fat cottage cheese  Try low-fat cheeses such as mozzarella and other reduced-fat cheeses  · Choose meat and other protein foods that are low in fat  Choose beans or other legumes such as split peas or lentils  Choose fish, skinless poultry (chicken or turkey), or lean cuts of red meat (beef or pork)  Before you cook meat or poultry, cut off any visible fat  · Use less fat and oil  Try baking foods instead of frying them  Add less fat, such as margarine, sour cream, regular salad dressing and mayonnaise to foods  Eat fewer high-fat foods  Some examples of high-fat foods include french fries, doughnuts, ice cream, and cakes  · Eat fewer sweets  Limit foods and drinks that are high in sugar  This includes candy, cookies, regular soda, and sweetened drinks  Exercise:  Exercise at least 30 minutes per day on most days of the week  Some examples of exercise include walking, biking, dancing, and swimming  You can also fit in more physical activity by taking the stairs instead of the elevator or parking farther away from stores  Ask your healthcare provider about the best exercise plan for you  © Copyright [employer] 2018 Information is for End User's use only and may not be sold, redistributed or otherwise used for commercial purposes   All illustrations and images included in CareNotes® are the copyrighted property of [employer]  or [address] [place] [address]

## 2020-09-30 ENCOUNTER — APPOINTMENT (OUTPATIENT)
Dept: LAB | Facility: CLINIC | Age: 75
End: 2020-09-30
Payer: COMMERCIAL

## 2020-09-30 DIAGNOSIS — E78.2 MIXED HYPERLIPIDEMIA: ICD-10-CM

## 2020-09-30 DIAGNOSIS — Z11.59 ENCOUNTER FOR HEPATITIS C SCREENING TEST FOR LOW RISK PATIENT: ICD-10-CM

## 2020-09-30 DIAGNOSIS — N18.30 TYPE 2 DIABETES MELLITUS WITH STAGE 3 CHRONIC KIDNEY DISEASE, WITHOUT LONG-TERM CURRENT USE OF INSULIN (HCC): ICD-10-CM

## 2020-09-30 DIAGNOSIS — E11.22 TYPE 2 DIABETES MELLITUS WITH STAGE 3 CHRONIC KIDNEY DISEASE, WITHOUT LONG-TERM CURRENT USE OF INSULIN (HCC): ICD-10-CM

## 2020-09-30 DIAGNOSIS — Z12.5 SCREENING FOR PROSTATE CANCER: ICD-10-CM

## 2020-09-30 LAB
ALBUMIN SERPL BCP-MCNC: 3.8 G/DL (ref 3.5–5)
ALP SERPL-CCNC: 66 U/L (ref 46–116)
ALT SERPL W P-5'-P-CCNC: 19 U/L (ref 12–78)
ANION GAP SERPL CALCULATED.3IONS-SCNC: 5 MMOL/L (ref 4–13)
AST SERPL W P-5'-P-CCNC: 9 U/L (ref 5–45)
BACTERIA UR QL AUTO: ABNORMAL /HPF
BILIRUB SERPL-MCNC: 1.07 MG/DL (ref 0.2–1)
BILIRUB UR QL STRIP: NEGATIVE
BUN SERPL-MCNC: 28 MG/DL (ref 5–25)
CALCIUM SERPL-MCNC: 10 MG/DL (ref 8.3–10.1)
CHLORIDE SERPL-SCNC: 104 MMOL/L (ref 100–108)
CHOLEST SERPL-MCNC: 203 MG/DL (ref 50–200)
CLARITY UR: CLEAR
CO2 SERPL-SCNC: 31 MMOL/L (ref 21–32)
COLOR UR: YELLOW
CREAT SERPL-MCNC: 1.25 MG/DL (ref 0.6–1.3)
CREAT UR-MCNC: 119 MG/DL
GFR SERPL CREATININE-BSD FRML MDRD: 57 ML/MIN/1.73SQ M
GLUCOSE P FAST SERPL-MCNC: 127 MG/DL (ref 65–99)
GLUCOSE UR STRIP-MCNC: NEGATIVE MG/DL
HCV AB SER QL: NORMAL
HDLC SERPL-MCNC: 42 MG/DL
HGB UR QL STRIP.AUTO: NEGATIVE
HYALINE CASTS #/AREA URNS LPF: ABNORMAL /LPF
KETONES UR STRIP-MCNC: NEGATIVE MG/DL
LDLC SERPL CALC-MCNC: 128 MG/DL (ref 0–100)
LEUKOCYTE ESTERASE UR QL STRIP: NEGATIVE
MICROALBUMIN UR-MCNC: 61.8 MG/L (ref 0–20)
MICROALBUMIN/CREAT 24H UR: 52 MG/G CREATININE (ref 0–30)
NITRITE UR QL STRIP: NEGATIVE
NON-SQ EPI CELLS URNS QL MICRO: ABNORMAL /HPF
PH UR STRIP.AUTO: 6.5 [PH]
POTASSIUM SERPL-SCNC: 3.8 MMOL/L (ref 3.5–5.3)
PROT SERPL-MCNC: 7.3 G/DL (ref 6.4–8.2)
PROT UR STRIP-MCNC: ABNORMAL MG/DL
PSA SERPL-MCNC: 0.9 NG/ML (ref 0–4)
RBC #/AREA URNS AUTO: ABNORMAL /HPF
SODIUM SERPL-SCNC: 140 MMOL/L (ref 136–145)
SP GR UR STRIP.AUTO: 1.02 (ref 1–1.03)
TRIGL SERPL-MCNC: 164 MG/DL
UROBILINOGEN UR QL STRIP.AUTO: 1 E.U./DL
WBC #/AREA URNS AUTO: ABNORMAL /HPF

## 2020-09-30 PROCEDURE — 81001 URINALYSIS AUTO W/SCOPE: CPT | Performed by: INTERNAL MEDICINE

## 2020-09-30 PROCEDURE — 36415 COLL VENOUS BLD VENIPUNCTURE: CPT

## 2020-09-30 PROCEDURE — 80061 LIPID PANEL: CPT

## 2020-09-30 PROCEDURE — 80053 COMPREHEN METABOLIC PANEL: CPT

## 2020-09-30 PROCEDURE — 3060F POS MICROALBUMINURIA REV: CPT | Performed by: NURSE PRACTITIONER

## 2020-09-30 PROCEDURE — G0103 PSA SCREENING: HCPCS

## 2020-09-30 PROCEDURE — 82570 ASSAY OF URINE CREATININE: CPT | Performed by: INTERNAL MEDICINE

## 2020-09-30 PROCEDURE — 86803 HEPATITIS C AB TEST: CPT

## 2020-09-30 PROCEDURE — 82043 UR ALBUMIN QUANTITATIVE: CPT | Performed by: INTERNAL MEDICINE

## 2020-10-02 ENCOUNTER — TELEPHONE (OUTPATIENT)
Dept: FAMILY MEDICINE CLINIC | Facility: CLINIC | Age: 75
End: 2020-10-02

## 2020-10-02 DIAGNOSIS — E66.09 CLASS 1 OBESITY DUE TO EXCESS CALORIES WITH SERIOUS COMORBIDITY AND BODY MASS INDEX (BMI) OF 32.0 TO 32.9 IN ADULT: ICD-10-CM

## 2020-10-02 DIAGNOSIS — E11.22 TYPE 2 DIABETES MELLITUS WITH STAGE 3 CHRONIC KIDNEY DISEASE, WITHOUT LONG-TERM CURRENT USE OF INSULIN, UNSPECIFIED WHETHER STAGE 3A OR 3B CKD (HCC): Primary | ICD-10-CM

## 2020-10-02 DIAGNOSIS — N18.30 TYPE 2 DIABETES MELLITUS WITH STAGE 3 CHRONIC KIDNEY DISEASE, WITHOUT LONG-TERM CURRENT USE OF INSULIN, UNSPECIFIED WHETHER STAGE 3A OR 3B CKD (HCC): Primary | ICD-10-CM

## 2020-10-05 ENCOUNTER — OFFICE VISIT (OUTPATIENT)
Dept: PAIN MEDICINE | Facility: CLINIC | Age: 75
End: 2020-10-05
Payer: COMMERCIAL

## 2020-10-05 VITALS — HEIGHT: 72 IN | TEMPERATURE: 97 F | WEIGHT: 238 LBS | BODY MASS INDEX: 32.23 KG/M2

## 2020-10-05 DIAGNOSIS — M54.41 CHRONIC RIGHT-SIDED LOW BACK PAIN WITH RIGHT-SIDED SCIATICA: ICD-10-CM

## 2020-10-05 DIAGNOSIS — M54.16 LUMBAR RADICULOPATHY: ICD-10-CM

## 2020-10-05 DIAGNOSIS — G89.29 CHRONIC RIGHT-SIDED LOW BACK PAIN WITH RIGHT-SIDED SCIATICA: ICD-10-CM

## 2020-10-05 DIAGNOSIS — G89.4 CHRONIC PAIN SYNDROME: Primary | ICD-10-CM

## 2020-10-05 PROCEDURE — 1160F RVW MEDS BY RX/DR IN RCRD: CPT | Performed by: NURSE PRACTITIONER

## 2020-10-05 PROCEDURE — 1036F TOBACCO NON-USER: CPT | Performed by: NURSE PRACTITIONER

## 2020-10-05 PROCEDURE — 99213 OFFICE O/P EST LOW 20 MIN: CPT | Performed by: NURSE PRACTITIONER

## 2020-11-17 LAB
LEFT EYE DIABETIC RETINOPATHY: NORMAL
RIGHT EYE DIABETIC RETINOPATHY: NORMAL

## 2020-11-17 PROCEDURE — 2022F DILAT RTA XM EVC RTNOPTHY: CPT | Performed by: NURSE PRACTITIONER

## 2020-12-14 DIAGNOSIS — I10 ESSENTIAL HYPERTENSION: ICD-10-CM

## 2020-12-14 RX ORDER — LISINOPRIL AND HYDROCHLOROTHIAZIDE 20; 12.5 MG/1; MG/1
TABLET ORAL
Qty: 180 TABLET | Refills: 1 | Status: SHIPPED | OUTPATIENT
Start: 2020-12-14 | End: 2021-04-05 | Stop reason: SDUPTHER

## 2021-01-22 DIAGNOSIS — E11.9 TYPE 2 DIABETES MELLITUS WITHOUT COMPLICATION, WITHOUT LONG-TERM CURRENT USE OF INSULIN (HCC): ICD-10-CM

## 2021-03-05 DIAGNOSIS — Z23 ENCOUNTER FOR IMMUNIZATION: ICD-10-CM

## 2021-03-23 ENCOUNTER — IMMUNIZATIONS (OUTPATIENT)
Dept: FAMILY MEDICINE CLINIC | Facility: HOSPITAL | Age: 76
End: 2021-03-23

## 2021-03-23 DIAGNOSIS — Z23 ENCOUNTER FOR IMMUNIZATION: Primary | ICD-10-CM

## 2021-03-23 PROCEDURE — 0001A SARS-COV-2 / COVID-19 MRNA VACCINE (PFIZER-BIONTECH) 30 MCG: CPT

## 2021-03-23 PROCEDURE — 91300 SARS-COV-2 / COVID-19 MRNA VACCINE (PFIZER-BIONTECH) 30 MCG: CPT

## 2021-04-05 ENCOUNTER — OFFICE VISIT (OUTPATIENT)
Dept: FAMILY MEDICINE CLINIC | Facility: CLINIC | Age: 76
End: 2021-04-05
Payer: COMMERCIAL

## 2021-04-05 ENCOUNTER — APPOINTMENT (OUTPATIENT)
Dept: LAB | Facility: CLINIC | Age: 76
End: 2021-04-05
Payer: COMMERCIAL

## 2021-04-05 VITALS
SYSTOLIC BLOOD PRESSURE: 138 MMHG | HEART RATE: 77 BPM | WEIGHT: 241.2 LBS | TEMPERATURE: 98.1 F | RESPIRATION RATE: 18 BRPM | OXYGEN SATURATION: 99 % | HEIGHT: 72 IN | DIASTOLIC BLOOD PRESSURE: 78 MMHG | BODY MASS INDEX: 32.67 KG/M2

## 2021-04-05 DIAGNOSIS — E11.22 TYPE 2 DIABETES MELLITUS WITH STAGE 3A CHRONIC KIDNEY DISEASE, WITHOUT LONG-TERM CURRENT USE OF INSULIN (HCC): ICD-10-CM

## 2021-04-05 DIAGNOSIS — I10 ESSENTIAL HYPERTENSION: ICD-10-CM

## 2021-04-05 DIAGNOSIS — E66.09 CLASS 1 OBESITY DUE TO EXCESS CALORIES WITH SERIOUS COMORBIDITY AND BODY MASS INDEX (BMI) OF 32.0 TO 32.9 IN ADULT: ICD-10-CM

## 2021-04-05 DIAGNOSIS — N18.30 TYPE 2 DIABETES MELLITUS WITH STAGE 3 CHRONIC KIDNEY DISEASE, WITHOUT LONG-TERM CURRENT USE OF INSULIN, UNSPECIFIED WHETHER STAGE 3A OR 3B CKD (HCC): ICD-10-CM

## 2021-04-05 DIAGNOSIS — E55.9 VITAMIN D DEFICIENCY: ICD-10-CM

## 2021-04-05 DIAGNOSIS — R53.83 FATIGUE, UNSPECIFIED TYPE: Primary | ICD-10-CM

## 2021-04-05 DIAGNOSIS — I67.1 BRAIN ANEURYSM: ICD-10-CM

## 2021-04-05 DIAGNOSIS — E11.9 TYPE 2 DIABETES MELLITUS WITHOUT COMPLICATION, WITHOUT LONG-TERM CURRENT USE OF INSULIN (HCC): ICD-10-CM

## 2021-04-05 DIAGNOSIS — R53.83 FATIGUE, UNSPECIFIED TYPE: ICD-10-CM

## 2021-04-05 DIAGNOSIS — N18.31 TYPE 2 DIABETES MELLITUS WITH STAGE 3A CHRONIC KIDNEY DISEASE, WITHOUT LONG-TERM CURRENT USE OF INSULIN (HCC): ICD-10-CM

## 2021-04-05 DIAGNOSIS — E11.22 TYPE 2 DIABETES MELLITUS WITH STAGE 3 CHRONIC KIDNEY DISEASE, WITHOUT LONG-TERM CURRENT USE OF INSULIN, UNSPECIFIED WHETHER STAGE 3A OR 3B CKD (HCC): ICD-10-CM

## 2021-04-05 LAB
25(OH)D3 SERPL-MCNC: 66.6 NG/ML (ref 30–100)
ALBUMIN SERPL BCP-MCNC: 3.9 G/DL (ref 3.5–5)
ALP SERPL-CCNC: 66 U/L (ref 46–116)
ALT SERPL W P-5'-P-CCNC: 22 U/L (ref 12–78)
ANION GAP SERPL CALCULATED.3IONS-SCNC: 5 MMOL/L (ref 4–13)
AST SERPL W P-5'-P-CCNC: 16 U/L (ref 5–45)
BASOPHILS # BLD AUTO: 0.05 THOUSANDS/ΜL (ref 0–0.1)
BASOPHILS NFR BLD AUTO: 1 % (ref 0–1)
BILIRUB SERPL-MCNC: 1.07 MG/DL (ref 0.2–1)
BUN SERPL-MCNC: 28 MG/DL (ref 5–25)
CALCIUM SERPL-MCNC: 9.7 MG/DL (ref 8.3–10.1)
CHLORIDE SERPL-SCNC: 103 MMOL/L (ref 100–108)
CHOLEST SERPL-MCNC: 235 MG/DL (ref 50–200)
CO2 SERPL-SCNC: 31 MMOL/L (ref 21–32)
CREAT SERPL-MCNC: 1.33 MG/DL (ref 0.6–1.3)
CREAT UR-MCNC: 115 MG/DL
EOSINOPHIL # BLD AUTO: 0.3 THOUSAND/ΜL (ref 0–0.61)
EOSINOPHIL NFR BLD AUTO: 3 % (ref 0–6)
ERYTHROCYTE [DISTWIDTH] IN BLOOD BY AUTOMATED COUNT: 12.5 % (ref 11.6–15.1)
EST. AVERAGE GLUCOSE BLD GHB EST-MCNC: 134 MG/DL
GFR SERPL CREATININE-BSD FRML MDRD: 53 ML/MIN/1.73SQ M
GLUCOSE P FAST SERPL-MCNC: 157 MG/DL (ref 65–99)
HBA1C MFR BLD: 6.3 %
HCT VFR BLD AUTO: 46.6 % (ref 36.5–49.3)
HDLC SERPL-MCNC: 39 MG/DL
HGB BLD-MCNC: 15.3 G/DL (ref 12–17)
IMM GRANULOCYTES # BLD AUTO: 0.05 THOUSAND/UL (ref 0–0.2)
IMM GRANULOCYTES NFR BLD AUTO: 1 % (ref 0–2)
LDLC SERPL CALC-MCNC: 148 MG/DL (ref 0–100)
LYMPHOCYTES # BLD AUTO: 1.8 THOUSANDS/ΜL (ref 0.6–4.47)
LYMPHOCYTES NFR BLD AUTO: 20 % (ref 14–44)
MCH RBC QN AUTO: 30.5 PG (ref 26.8–34.3)
MCHC RBC AUTO-ENTMCNC: 32.8 G/DL (ref 31.4–37.4)
MCV RBC AUTO: 93 FL (ref 82–98)
MICROALBUMIN UR-MCNC: 98.4 MG/L (ref 0–20)
MICROALBUMIN/CREAT 24H UR: 86 MG/G CREATININE (ref 0–30)
MONOCYTES # BLD AUTO: 0.77 THOUSAND/ΜL (ref 0.17–1.22)
MONOCYTES NFR BLD AUTO: 9 % (ref 4–12)
NEUTROPHILS # BLD AUTO: 6.01 THOUSANDS/ΜL (ref 1.85–7.62)
NEUTS SEG NFR BLD AUTO: 66 % (ref 43–75)
NONHDLC SERPL-MCNC: 196 MG/DL
NRBC BLD AUTO-RTO: 0 /100 WBCS
PLATELET # BLD AUTO: 181 THOUSANDS/UL (ref 149–390)
PMV BLD AUTO: 12.5 FL (ref 8.9–12.7)
POTASSIUM SERPL-SCNC: 4.1 MMOL/L (ref 3.5–5.3)
PROT SERPL-MCNC: 7.4 G/DL (ref 6.4–8.2)
RBC # BLD AUTO: 5.02 MILLION/UL (ref 3.88–5.62)
SODIUM SERPL-SCNC: 139 MMOL/L (ref 136–145)
TRIGL SERPL-MCNC: 242 MG/DL
TSH SERPL DL<=0.05 MIU/L-ACNC: 1.47 UIU/ML (ref 0.36–3.74)
WBC # BLD AUTO: 8.98 THOUSAND/UL (ref 4.31–10.16)

## 2021-04-05 PROCEDURE — 85025 COMPLETE CBC W/AUTO DIFF WBC: CPT

## 2021-04-05 PROCEDURE — 1036F TOBACCO NON-USER: CPT | Performed by: NURSE PRACTITIONER

## 2021-04-05 PROCEDURE — 80053 COMPREHEN METABOLIC PANEL: CPT

## 2021-04-05 PROCEDURE — 3725F SCREEN DEPRESSION PERFORMED: CPT | Performed by: NURSE PRACTITIONER

## 2021-04-05 PROCEDURE — 82570 ASSAY OF URINE CREATININE: CPT | Performed by: NURSE PRACTITIONER

## 2021-04-05 PROCEDURE — 3066F NEPHROPATHY DOC TX: CPT | Performed by: NURSE PRACTITIONER

## 2021-04-05 PROCEDURE — 3008F BODY MASS INDEX DOCD: CPT | Performed by: NURSE PRACTITIONER

## 2021-04-05 PROCEDURE — 84443 ASSAY THYROID STIM HORMONE: CPT

## 2021-04-05 PROCEDURE — 99214 OFFICE O/P EST MOD 30 MIN: CPT | Performed by: NURSE PRACTITIONER

## 2021-04-05 PROCEDURE — 82306 VITAMIN D 25 HYDROXY: CPT

## 2021-04-05 PROCEDURE — 83036 HEMOGLOBIN GLYCOSYLATED A1C: CPT

## 2021-04-05 PROCEDURE — 1160F RVW MEDS BY RX/DR IN RCRD: CPT | Performed by: NURSE PRACTITIONER

## 2021-04-05 PROCEDURE — 80061 LIPID PANEL: CPT

## 2021-04-05 PROCEDURE — 3044F HG A1C LEVEL LT 7.0%: CPT | Performed by: NURSE PRACTITIONER

## 2021-04-05 PROCEDURE — 3060F POS MICROALBUMINURIA REV: CPT | Performed by: NURSE PRACTITIONER

## 2021-04-05 PROCEDURE — 36415 COLL VENOUS BLD VENIPUNCTURE: CPT

## 2021-04-05 PROCEDURE — 82043 UR ALBUMIN QUANTITATIVE: CPT | Performed by: NURSE PRACTITIONER

## 2021-04-05 RX ORDER — LISINOPRIL AND HYDROCHLOROTHIAZIDE 20; 12.5 MG/1; MG/1
1 TABLET ORAL 2 TIMES DAILY
Qty: 180 TABLET | Refills: 3 | Status: SHIPPED | OUTPATIENT
Start: 2021-04-05 | End: 2022-06-13

## 2021-04-05 NOTE — PROGRESS NOTES
[facility]        NAME: Elvin Tai is a 70 y o  male  : 1945    MRN: [id number]  DATE: 2021  TIME: 12:05 PM    Assessment and Plan   Fatigue, unspecified type [R53 83]  1  Fatigue, unspecified type  CBC and differential    TSH, 3rd generation with Free T4 reflex   2  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 chronic kidney disease, without long-term current use of insulin ([facility])  Microalbumin / creatinine urine ratio   3  Brain aneurysm      congenital- per patient- no follow up indicated   4  Vitamin D deficiency  Vitamin D 25 hydroxy   5  Class 1 obesity due to excess calories with serious comorbidity and body mass index (BMI) of 32 0 to 32 9 in adult     6  Essential hypertension  lisinopril-hydrochlorothiazide (PRINZIDE,ZESTORETIC) 20-12 5 MG per tablet   7  Type 2 diabetes mellitus without complication, without long-term current use of insulin ([facility])  metFORMIN (GLUCOPHAGE) 500 mg tablet         Patient Instructions     Patient Instructions   Continue same medications  Get labs today  Encouraged exercise for energy, weight loss  6 month medcheck or call sooner for problems/concerns          Chief Complaint     Chief Complaint   Patient presents with    Follow-up         History of Present Illness       Here for 6 month medcheck- continues with chronic fatigue, is willing to go get his routine labs after this appointment      Review of Systems   Review of Systems   Constitutional: Positive for fatigue  Negative for activity change, diaphoresis and fever  HENT: Negative for congestion, facial swelling, hearing loss, rhinorrhea, sinus pressure, sinus pain, sneezing, sore throat and voice change  Eyes: Negative for discharge and visual disturbance  Respiratory: Negative for cough, choking, chest tightness, shortness of breath, wheezing and stridor  Cardiovascular: Negative for chest pain, palpitations and leg swelling     Gastrointestinal: Negative for abdominal distention, abdominal pain, constipation, diarrhea, nausea and vomiting  Endocrine: Negative for polydipsia, polyphagia and polyuria  Genitourinary: Negative for difficulty urinating, dysuria, frequency and urgency  Musculoskeletal: Negative for arthralgias, back pain, gait problem, joint swelling, myalgias, neck pain and neck stiffness  Skin: Negative for color change, rash and wound  Neurological: Negative for dizziness, syncope, speech difficulty, weakness, light-headedness and headaches  Hematological: Negative for adenopathy  Does not bruise/bleed easily  Psychiatric/Behavioral: Negative for agitation, behavioral problems, confusion, hallucinations, sleep disturbance and suicidal ideas  The patient is not nervous/anxious            Current Medications       Current Outpatient Medications:     lisinopril-hydrochlorothiazide (PRINZIDE,ZESTORETIC) 20-12 5 MG per tablet, Take 1 tablet by mouth 2 (two) times a day, Disp: 180 tablet, Rfl: 3    metFORMIN (GLUCOPHAGE) 500 mg tablet, 2 tablets in the morning, 1 tablet in the evening, Disp: 270 tablet, Rfl: 3    Multiple Vitamins-Minerals (MICHELLE MULTI MEN) TABS, Take by mouth 2 (two) times a day, Disp: , Rfl:     Current Allergies     Allergies as of 04/05/2021 - Reviewed 04/05/2021   Allergen Reaction Noted    Plavix [clopidogrel] Rash 09/28/2019            The following portions of the patient's history were reviewed and updated as appropriate: allergies, current medications, past family history, past medical history, past social history, past surgical history and problem list      Past Medical History:   Diagnosis Date    Hyperlipidemia     Hypertension     Pilonidal cyst with abscess     Rheumatic heart disease     TIA (transient ischemic attack)     Type 2 diabetes mellitus ([facility] [address] )     Vitamin D deficiency        Past Surgical History:   Procedure Laterality Date    CYSTECTOMY      Per patient cyst removal from his back    LASIK         Family History   Problem Relation Age of Onset    Colon cancer Mother     Diabetes type II Mother     Heart disease Mother     Prostate cancer Father          Medications have been verified  Objective   /78   Pulse 77   Temp 98 1 °F (36 7 °C) (Tympanic)   Resp 18   Ht 6' (1 829 m)   Wt 109 kg (241 lb 3 2 oz)   SpO2 99%   BMI 32 71 kg/m²        Physical Exam     Physical Exam  Vitals signs and nursing note reviewed  Constitutional:       General: He is not in acute distress  Appearance: Normal appearance  He is well-developed  He is not diaphoretic  Neck:      Musculoskeletal: Normal range of motion and neck supple  Thyroid: No thyromegaly  Trachea: No tracheal deviation  Cardiovascular:      Rate and Rhythm: Normal rate and regular rhythm  Heart sounds: Normal heart sounds  No murmur  Comments: Negative carotid bruits bilateral  Pulmonary:      Effort: Pulmonary effort is normal  No respiratory distress  Breath sounds: Normal breath sounds  No wheezing  Musculoskeletal: Normal range of motion  General: No tenderness or deformity  Right lower leg: No edema  Left lower leg: No edema  Skin:     General: Skin is warm and dry  Neurological:      General: No focal deficit present  Mental Status: He is alert and oriented to person, place, and time  Psychiatric:         Mood and Affect: Mood normal          Speech: Speech normal          Behavior: Behavior normal          Thought Content: Thought content normal          Judgment: Judgment normal          BMI Counseling: Body mass index is 32 71 kg/m²  The BMI is above normal  Nutrition recommendations include decreasing portion sizes, encouraging healthy choices of fruits and vegetables, decreasing fast food intake, consuming healthier snacks, limiting drinks that contain sugar, moderation in carbohydrate intake and increasing intake of lean protein   Exercise recommendations include exercising 3-5 times per week             PHQ-9 Depression Screening    PHQ-9:   Frequency of the following problems over the past two weeks:

## 2021-04-05 NOTE — PATIENT INSTRUCTIONS
Continue same medications  Get labs today  Encouraged exercise for energy, weight loss  6 month medcheck or call sooner for problems/concerns

## 2021-04-06 DIAGNOSIS — E78.2 MIXED HYPERLIPIDEMIA: Primary | ICD-10-CM

## 2021-04-06 RX ORDER — ROSUVASTATIN CALCIUM 5 MG/1
5 TABLET, COATED ORAL DAILY
Qty: 90 TABLET | Refills: 1 | Status: SHIPPED | OUTPATIENT
Start: 2021-04-06 | End: 2022-02-21

## 2021-04-14 ENCOUNTER — IMMUNIZATIONS (OUTPATIENT)
Dept: FAMILY MEDICINE CLINIC | Facility: HOSPITAL | Age: 76
End: 2021-04-14

## 2021-04-14 DIAGNOSIS — Z23 ENCOUNTER FOR IMMUNIZATION: Primary | ICD-10-CM

## 2021-04-14 PROCEDURE — 0002A SARS-COV-2 / COVID-19 MRNA VACCINE (PFIZER-BIONTECH) 30 MCG: CPT

## 2021-04-14 PROCEDURE — 91300 SARS-COV-2 / COVID-19 MRNA VACCINE (PFIZER-BIONTECH) 30 MCG: CPT

## 2021-08-25 ENCOUNTER — VBI (OUTPATIENT)
Dept: ADMINISTRATIVE | Facility: OTHER | Age: 76
End: 2021-08-25

## 2021-10-07 ENCOUNTER — OFFICE VISIT (OUTPATIENT)
Dept: FAMILY MEDICINE CLINIC | Facility: CLINIC | Age: 76
End: 2021-10-07
Payer: COMMERCIAL

## 2021-10-07 ENCOUNTER — APPOINTMENT (OUTPATIENT)
Dept: LAB | Facility: CLINIC | Age: 76
End: 2021-10-07
Payer: COMMERCIAL

## 2021-10-07 VITALS
SYSTOLIC BLOOD PRESSURE: 130 MMHG | HEART RATE: 74 BPM | WEIGHT: 239 LBS | TEMPERATURE: 97.6 F | RESPIRATION RATE: 20 BRPM | HEIGHT: 72 IN | OXYGEN SATURATION: 98 % | BODY MASS INDEX: 32.37 KG/M2 | DIASTOLIC BLOOD PRESSURE: 78 MMHG

## 2021-10-07 DIAGNOSIS — Z12.5 SCREENING FOR PROSTATE CANCER: ICD-10-CM

## 2021-10-07 DIAGNOSIS — E78.2 MIXED HYPERLIPIDEMIA: ICD-10-CM

## 2021-10-07 DIAGNOSIS — Z00.00 MEDICARE ANNUAL WELLNESS VISIT, SUBSEQUENT: ICD-10-CM

## 2021-10-07 DIAGNOSIS — I72.9 ANEURYSM (HCC): ICD-10-CM

## 2021-10-07 DIAGNOSIS — N18.31 STAGE 3A CHRONIC KIDNEY DISEASE (HCC): ICD-10-CM

## 2021-10-07 DIAGNOSIS — E66.09 CLASS 1 OBESITY DUE TO EXCESS CALORIES WITH SERIOUS COMORBIDITY AND BODY MASS INDEX (BMI) OF 32.0 TO 32.9 IN ADULT: ICD-10-CM

## 2021-10-07 DIAGNOSIS — N18.31 TYPE 2 DIABETES MELLITUS WITH STAGE 3A CHRONIC KIDNEY DISEASE, WITHOUT LONG-TERM CURRENT USE OF INSULIN (HCC): Primary | ICD-10-CM

## 2021-10-07 DIAGNOSIS — N18.31 TYPE 2 DIABETES MELLITUS WITH STAGE 3A CHRONIC KIDNEY DISEASE, WITHOUT LONG-TERM CURRENT USE OF INSULIN (HCC): ICD-10-CM

## 2021-10-07 DIAGNOSIS — Z01.00 DIABETIC EYE EXAM (HCC): ICD-10-CM

## 2021-10-07 DIAGNOSIS — E11.9 DIABETIC EYE EXAM (HCC): ICD-10-CM

## 2021-10-07 DIAGNOSIS — E11.22 TYPE 2 DIABETES MELLITUS WITH STAGE 3A CHRONIC KIDNEY DISEASE, WITHOUT LONG-TERM CURRENT USE OF INSULIN (HCC): ICD-10-CM

## 2021-10-07 DIAGNOSIS — E11.22 TYPE 2 DIABETES MELLITUS WITH STAGE 3A CHRONIC KIDNEY DISEASE, WITHOUT LONG-TERM CURRENT USE OF INSULIN (HCC): Primary | ICD-10-CM

## 2021-10-07 DIAGNOSIS — E11.9 ENCOUNTER FOR DIABETIC FOOT EXAM (HCC): ICD-10-CM

## 2021-10-07 LAB
ALBUMIN SERPL BCP-MCNC: 3.7 G/DL (ref 3.5–5)
ALP SERPL-CCNC: 73 U/L (ref 46–116)
ALT SERPL W P-5'-P-CCNC: 21 U/L (ref 12–78)
ANION GAP SERPL CALCULATED.3IONS-SCNC: 2 MMOL/L (ref 4–13)
AST SERPL W P-5'-P-CCNC: 12 U/L (ref 5–45)
BASOPHILS # BLD AUTO: 0.06 THOUSANDS/ΜL (ref 0–0.1)
BASOPHILS NFR BLD AUTO: 1 % (ref 0–1)
BILIRUB SERPL-MCNC: 1.14 MG/DL (ref 0.2–1)
BUN SERPL-MCNC: 22 MG/DL (ref 5–25)
CALCIUM SERPL-MCNC: 10.3 MG/DL (ref 8.3–10.1)
CHLORIDE SERPL-SCNC: 102 MMOL/L (ref 100–108)
CHOLEST SERPL-MCNC: 204 MG/DL (ref 50–200)
CO2 SERPL-SCNC: 32 MMOL/L (ref 21–32)
CREAT SERPL-MCNC: 1.46 MG/DL (ref 0.6–1.3)
EOSINOPHIL # BLD AUTO: 0.26 THOUSAND/ΜL (ref 0–0.61)
EOSINOPHIL NFR BLD AUTO: 3 % (ref 0–6)
ERYTHROCYTE [DISTWIDTH] IN BLOOD BY AUTOMATED COUNT: 12.6 % (ref 11.6–15.1)
EST. AVERAGE GLUCOSE BLD GHB EST-MCNC: 143 MG/DL
GFR SERPL CREATININE-BSD FRML MDRD: 47 ML/MIN/1.73SQ M
GLUCOSE P FAST SERPL-MCNC: 149 MG/DL (ref 65–99)
HBA1C MFR BLD: 6.6 %
HCT VFR BLD AUTO: 45.9 % (ref 36.5–49.3)
HDLC SERPL-MCNC: 36 MG/DL
HGB BLD-MCNC: 15.2 G/DL (ref 12–17)
IMM GRANULOCYTES # BLD AUTO: 0.03 THOUSAND/UL (ref 0–0.2)
IMM GRANULOCYTES NFR BLD AUTO: 0 % (ref 0–2)
LDLC SERPL CALC-MCNC: 120 MG/DL (ref 0–100)
LYMPHOCYTES # BLD AUTO: 1.71 THOUSANDS/ΜL (ref 0.6–4.47)
LYMPHOCYTES NFR BLD AUTO: 20 % (ref 14–44)
MCH RBC QN AUTO: 30.7 PG (ref 26.8–34.3)
MCHC RBC AUTO-ENTMCNC: 33.1 G/DL (ref 31.4–37.4)
MCV RBC AUTO: 93 FL (ref 82–98)
MONOCYTES # BLD AUTO: 0.88 THOUSAND/ΜL (ref 0.17–1.22)
MONOCYTES NFR BLD AUTO: 10 % (ref 4–12)
NEUTROPHILS # BLD AUTO: 5.68 THOUSANDS/ΜL (ref 1.85–7.62)
NEUTS SEG NFR BLD AUTO: 66 % (ref 43–75)
NONHDLC SERPL-MCNC: 168 MG/DL
NRBC BLD AUTO-RTO: 0 /100 WBCS
PLATELET # BLD AUTO: 180 THOUSANDS/UL (ref 149–390)
PMV BLD AUTO: 12.1 FL (ref 8.9–12.7)
POTASSIUM SERPL-SCNC: 4.2 MMOL/L (ref 3.5–5.3)
PROT SERPL-MCNC: 7.4 G/DL (ref 6.4–8.2)
PSA SERPL-MCNC: 1.1 NG/ML (ref 0–4)
RBC # BLD AUTO: 4.95 MILLION/UL (ref 3.88–5.62)
SODIUM SERPL-SCNC: 136 MMOL/L (ref 136–145)
TRIGL SERPL-MCNC: 238 MG/DL
WBC # BLD AUTO: 8.62 THOUSAND/UL (ref 4.31–10.16)

## 2021-10-07 PROCEDURE — 3066F NEPHROPATHY DOC TX: CPT | Performed by: NURSE PRACTITIONER

## 2021-10-07 PROCEDURE — 85025 COMPLETE CBC W/AUTO DIFF WBC: CPT

## 2021-10-07 PROCEDURE — 80053 COMPREHEN METABOLIC PANEL: CPT

## 2021-10-07 PROCEDURE — 3288F FALL RISK ASSESSMENT DOCD: CPT | Performed by: NURSE PRACTITIONER

## 2021-10-07 PROCEDURE — G0439 PPPS, SUBSEQ VISIT: HCPCS | Performed by: NURSE PRACTITIONER

## 2021-10-07 PROCEDURE — 3044F HG A1C LEVEL LT 7.0%: CPT | Performed by: NURSE PRACTITIONER

## 2021-10-07 PROCEDURE — 1160F RVW MEDS BY RX/DR IN RCRD: CPT | Performed by: NURSE PRACTITIONER

## 2021-10-07 PROCEDURE — 99214 OFFICE O/P EST MOD 30 MIN: CPT | Performed by: NURSE PRACTITIONER

## 2021-10-07 PROCEDURE — 1036F TOBACCO NON-USER: CPT | Performed by: NURSE PRACTITIONER

## 2021-10-07 PROCEDURE — 36415 COLL VENOUS BLD VENIPUNCTURE: CPT

## 2021-10-07 PROCEDURE — 80061 LIPID PANEL: CPT

## 2021-10-07 PROCEDURE — G0103 PSA SCREENING: HCPCS

## 2021-10-07 PROCEDURE — 1125F AMNT PAIN NOTED PAIN PRSNT: CPT | Performed by: NURSE PRACTITIONER

## 2021-10-07 PROCEDURE — 3725F SCREEN DEPRESSION PERFORMED: CPT | Performed by: NURSE PRACTITIONER

## 2021-10-07 PROCEDURE — 83036 HEMOGLOBIN GLYCOSYLATED A1C: CPT

## 2021-10-07 PROCEDURE — 3008F BODY MASS INDEX DOCD: CPT | Performed by: NURSE PRACTITIONER

## 2021-10-07 PROCEDURE — 1170F FXNL STATUS ASSESSED: CPT | Performed by: NURSE PRACTITIONER

## 2021-10-08 ENCOUNTER — TELEPHONE (OUTPATIENT)
Dept: NEPHROLOGY | Facility: CLINIC | Age: 76
End: 2021-10-08

## 2021-10-25 ENCOUNTER — TELEPHONE (OUTPATIENT)
Dept: NEPHROLOGY | Facility: CLINIC | Age: 76
End: 2021-10-25

## 2021-11-23 ENCOUNTER — VBI (OUTPATIENT)
Dept: ADMINISTRATIVE | Facility: OTHER | Age: 76
End: 2021-11-23

## 2021-12-09 ENCOUNTER — TELEPHONE (OUTPATIENT)
Dept: FAMILY MEDICINE CLINIC | Facility: CLINIC | Age: 76
End: 2021-12-09

## 2021-12-16 ENCOUNTER — TELEPHONE (OUTPATIENT)
Dept: FAMILY MEDICINE CLINIC | Facility: CLINIC | Age: 76
End: 2021-12-16

## 2022-02-17 ENCOUNTER — APPOINTMENT (OUTPATIENT)
Dept: LAB | Facility: CLINIC | Age: 77
End: 2022-02-17
Payer: COMMERCIAL

## 2022-02-17 DIAGNOSIS — N18.31 STAGE 3A CHRONIC KIDNEY DISEASE (HCC): ICD-10-CM

## 2022-02-17 LAB
ALBUMIN SERPL BCP-MCNC: 3.7 G/DL (ref 3.5–5)
ALP SERPL-CCNC: 65 U/L (ref 46–116)
ALT SERPL W P-5'-P-CCNC: 24 U/L (ref 12–78)
ANION GAP SERPL CALCULATED.3IONS-SCNC: 5 MMOL/L (ref 4–13)
AST SERPL W P-5'-P-CCNC: 17 U/L (ref 5–45)
BACTERIA UR QL AUTO: NORMAL /HPF
BASOPHILS # BLD AUTO: 0.06 THOUSANDS/ΜL (ref 0–0.1)
BASOPHILS NFR BLD AUTO: 1 % (ref 0–1)
BILIRUB SERPL-MCNC: 1.01 MG/DL (ref 0.2–1)
BILIRUB UR QL STRIP: NEGATIVE
BUN SERPL-MCNC: 23 MG/DL (ref 5–25)
CALCIUM SERPL-MCNC: 10.4 MG/DL (ref 8.3–10.1)
CHLORIDE SERPL-SCNC: 102 MMOL/L (ref 100–108)
CLARITY UR: CLEAR
CO2 SERPL-SCNC: 31 MMOL/L (ref 21–32)
COLOR UR: YELLOW
CREAT SERPL-MCNC: 1.38 MG/DL (ref 0.6–1.3)
CREAT UR-MCNC: 105 MG/DL
EOSINOPHIL # BLD AUTO: 0.29 THOUSAND/ΜL (ref 0–0.61)
EOSINOPHIL NFR BLD AUTO: 3 % (ref 0–6)
ERYTHROCYTE [DISTWIDTH] IN BLOOD BY AUTOMATED COUNT: 12.4 % (ref 11.6–15.1)
GFR SERPL CREATININE-BSD FRML MDRD: 50 ML/MIN/1.73SQ M
GLUCOSE P FAST SERPL-MCNC: 139 MG/DL (ref 65–99)
GLUCOSE UR STRIP-MCNC: NEGATIVE MG/DL
HCT VFR BLD AUTO: 47.5 % (ref 36.5–49.3)
HGB BLD-MCNC: 16 G/DL (ref 12–17)
HGB UR QL STRIP.AUTO: NEGATIVE
HYALINE CASTS #/AREA URNS LPF: NORMAL /LPF
IMM GRANULOCYTES # BLD AUTO: 0.04 THOUSAND/UL (ref 0–0.2)
IMM GRANULOCYTES NFR BLD AUTO: 0 % (ref 0–2)
KETONES UR STRIP-MCNC: NEGATIVE MG/DL
LEUKOCYTE ESTERASE UR QL STRIP: NEGATIVE
LYMPHOCYTES # BLD AUTO: 1.87 THOUSANDS/ΜL (ref 0.6–4.47)
LYMPHOCYTES NFR BLD AUTO: 21 % (ref 14–44)
MAGNESIUM SERPL-MCNC: 1.8 MG/DL (ref 1.6–2.6)
MCH RBC QN AUTO: 31.5 PG (ref 26.8–34.3)
MCHC RBC AUTO-ENTMCNC: 33.7 G/DL (ref 31.4–37.4)
MCV RBC AUTO: 94 FL (ref 82–98)
MICROALBUMIN UR-MCNC: 67.9 MG/L (ref 0–20)
MICROALBUMIN/CREAT 24H UR: 65 MG/G CREATININE (ref 0–30)
MONOCYTES # BLD AUTO: 0.9 THOUSAND/ΜL (ref 0.17–1.22)
MONOCYTES NFR BLD AUTO: 10 % (ref 4–12)
NEUTROPHILS # BLD AUTO: 5.98 THOUSANDS/ΜL (ref 1.85–7.62)
NEUTS SEG NFR BLD AUTO: 65 % (ref 43–75)
NITRITE UR QL STRIP: NEGATIVE
NON-SQ EPI CELLS URNS QL MICRO: NORMAL /HPF
NRBC BLD AUTO-RTO: 0 /100 WBCS
PH UR STRIP.AUTO: 6 [PH]
PHOSPHATE SERPL-MCNC: 3.1 MG/DL (ref 2.3–4.1)
PLATELET # BLD AUTO: 187 THOUSANDS/UL (ref 149–390)
PMV BLD AUTO: 12.3 FL (ref 8.9–12.7)
POTASSIUM SERPL-SCNC: 4 MMOL/L (ref 3.5–5.3)
PROT SERPL-MCNC: 7.3 G/DL (ref 6.4–8.2)
PROT UR STRIP-MCNC: NEGATIVE MG/DL
PTH-INTACT SERPL-MCNC: 11.9 PG/ML (ref 18.4–80.1)
RBC # BLD AUTO: 5.08 MILLION/UL (ref 3.88–5.62)
RBC #/AREA URNS AUTO: NORMAL /HPF
SODIUM SERPL-SCNC: 138 MMOL/L (ref 136–145)
SP GR UR STRIP.AUTO: 1.02 (ref 1–1.03)
UROBILINOGEN UR QL STRIP.AUTO: 0.2 E.U./DL
WBC # BLD AUTO: 9.14 THOUSAND/UL (ref 4.31–10.16)
WBC #/AREA URNS AUTO: NORMAL /HPF

## 2022-02-17 PROCEDURE — 83735 ASSAY OF MAGNESIUM: CPT

## 2022-02-17 PROCEDURE — 82570 ASSAY OF URINE CREATININE: CPT

## 2022-02-17 PROCEDURE — 84100 ASSAY OF PHOSPHORUS: CPT

## 2022-02-17 PROCEDURE — 3060F POS MICROALBUMINURIA REV: CPT | Performed by: INTERNAL MEDICINE

## 2022-02-17 PROCEDURE — 81001 URINALYSIS AUTO W/SCOPE: CPT

## 2022-02-17 PROCEDURE — 83970 ASSAY OF PARATHORMONE: CPT

## 2022-02-17 PROCEDURE — 80053 COMPREHEN METABOLIC PANEL: CPT

## 2022-02-17 PROCEDURE — 85025 COMPLETE CBC W/AUTO DIFF WBC: CPT

## 2022-02-17 PROCEDURE — 36415 COLL VENOUS BLD VENIPUNCTURE: CPT

## 2022-02-17 PROCEDURE — 82043 UR ALBUMIN QUANTITATIVE: CPT

## 2022-02-18 ENCOUNTER — TELEPHONE (OUTPATIENT)
Dept: NEPHROLOGY | Facility: CLINIC | Age: 77
End: 2022-02-18

## 2022-02-18 NOTE — TELEPHONE ENCOUNTER
Appointment Confirmation   Person confirmed appointment with  If not patient, name of the person Patient   Date and time of appointment 02/21/22 @ 4:00     Patient acknowledged and will be at appointment?  yes   Did you advise the patient to bring their current medications for verification? (including any OTC) yes    Additional Information

## 2022-02-21 ENCOUNTER — OFFICE VISIT (OUTPATIENT)
Dept: NEPHROLOGY | Facility: CLINIC | Age: 77
End: 2022-02-21
Payer: COMMERCIAL

## 2022-02-21 VITALS
SYSTOLIC BLOOD PRESSURE: 138 MMHG | OXYGEN SATURATION: 99 % | BODY MASS INDEX: 32.41 KG/M2 | DIASTOLIC BLOOD PRESSURE: 78 MMHG | HEART RATE: 60 BPM | WEIGHT: 239 LBS

## 2022-02-21 DIAGNOSIS — N18.31 TYPE 2 DIABETES MELLITUS WITH STAGE 3A CHRONIC KIDNEY DISEASE, WITHOUT LONG-TERM CURRENT USE OF INSULIN (HCC): ICD-10-CM

## 2022-02-21 DIAGNOSIS — E83.52 HYPERCALCEMIA: ICD-10-CM

## 2022-02-21 DIAGNOSIS — E11.22 TYPE 2 DIABETES MELLITUS WITH STAGE 3A CHRONIC KIDNEY DISEASE, WITHOUT LONG-TERM CURRENT USE OF INSULIN (HCC): ICD-10-CM

## 2022-02-21 DIAGNOSIS — I10 ESSENTIAL HYPERTENSION: ICD-10-CM

## 2022-02-21 DIAGNOSIS — N18.31 STAGE 3A CHRONIC KIDNEY DISEASE (HCC): Primary | ICD-10-CM

## 2022-02-21 DIAGNOSIS — N28.1 KIDNEY CYST, ACQUIRED: ICD-10-CM

## 2022-02-21 PROBLEM — N18.2 STAGE 2 CHRONIC KIDNEY DISEASE: Status: RESOLVED | Noted: 2019-09-10 | Resolved: 2022-02-21

## 2022-02-21 PROBLEM — N17.9 AKI (ACUTE KIDNEY INJURY) (HCC): Status: RESOLVED | Noted: 2019-10-02 | Resolved: 2022-02-21

## 2022-02-21 PROCEDURE — 99214 OFFICE O/P EST MOD 30 MIN: CPT | Performed by: INTERNAL MEDICINE

## 2022-02-21 PROCEDURE — 1036F TOBACCO NON-USER: CPT | Performed by: INTERNAL MEDICINE

## 2022-02-21 PROCEDURE — 3066F NEPHROPATHY DOC TX: CPT | Performed by: INTERNAL MEDICINE

## 2022-02-21 PROCEDURE — 1160F RVW MEDS BY RX/DR IN RCRD: CPT | Performed by: INTERNAL MEDICINE

## 2022-02-21 NOTE — PROGRESS NOTES
[facility] [place], [place]    Name: Milly Cedillo  YOB: 1945      Assessment/Plan:    Stage 3a chronic kidney disease [facility])  Lab Results   Component Value Date    EGFR 50 02/17/2022    EGFR 47 10/07/2021    EGFR 53 04/05/2021    CREATININE 1 38 (H) 02/17/2022    CREATININE 1 46 (H) 10/07/2021    CREATININE 1 33 (H) 04/05/2021     Patient kidney function appears to have stabilized around an estimated GFR 50 mL/ minute  True kidney function likely slightly better than this as his body surface area is greater than 1 73 meters squared, however will continue to monitor at this time  Continue to avoid potential nephrotoxins, focus on controlling blood pressures as well as maintaining appropriate blood sugar control  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 chronic kidney disease, without long-term current use of insulin ([facility])    Lab Results   Component Value Date    HGBA1C 6 6 (H) 10/07/2021       Blood sugars continue to be fairly well controlled, continue current medications according to his primary care provider  Patient may ultimately benefit from additional medications, will defer for now and continue monitor  Kidney cyst, acquired    Patient noted to have a potentially septated cyst from CT scan in 2019  Will re-evaluate this with a kidney/bladder ultrasound in the near future  Further imaging referral to be done as clinically indicated  Hypercalcemia    Patient does not believe he is on hydrochlorothiazide, however it is part of the lisinopril/hydrochlorothiazide combination pill  He will confirm this with a call back to the office  I would ideally like to discontinue hydrochlorothiazide to confirm that his calcium level decreases  Patient had an appropriately reduced PTH level in the setting of hypercalcemia           Problem List Items Addressed This Visit        Endocrine    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 chronic kidney disease, without long-term current use of insulin ([facility] )       Lab Results   Component Value Date    HGBA1C 6 6 (H) 10/07/2021       Blood sugars continue to be fairly well controlled, continue current medications according to his primary care provider  Patient may ultimately benefit from additional medications, will defer for now and continue monitor  Cardiovascular and Mediastinum    Essential hypertension       Genitourinary    Stage 3a chronic kidney disease ([facility] ) - Primary     Lab Results   Component Value Date    EGFR 50 02/17/2022    EGFR 47 10/07/2021    EGFR 53 04/05/2021    CREATININE 1 38 (H) 02/17/2022    CREATININE 1 46 (H) 10/07/2021    CREATININE 1 33 (H) 04/05/2021     Patient kidney function appears to have stabilized around an estimated GFR 50 mL/ minute  True kidney function likely slightly better than this as his body surface area is greater than 1 73 meters squared, however will continue to monitor at this time  Continue to avoid potential nephrotoxins, focus on controlling blood pressures as well as maintaining appropriate blood sugar control  Relevant Orders    Comprehensive metabolic panel    Microalbumin / creatinine urine ratio    Urinalysis with microscopic    Phosphorus    PTH, intact    Magnesium    Kidney cyst, acquired       Patient noted to have a potentially septated cyst from CT scan in 2019  Will re-evaluate this with a kidney/bladder ultrasound in the near future  Further imaging referral to be done as clinically indicated  Relevant Orders    US kidney and bladder       Other    Hypercalcemia       Patient does not believe he is on hydrochlorothiazide, however it is part of the lisinopril/hydrochlorothiazide combination pill  He will confirm this with a call back to the office  I would ideally like to discontinue hydrochlorothiazide to confirm that his calcium level decreases    Patient had an appropriately reduced PTH level in the setting of hypercalcemia  BMI 32 0-32 9,adult            Patient is stable the renal standpoint  Will be checking a kidney ultrasound to follow-up on the kidney cyst that was noted in 2019  Also need to follow-up on the serum calcium level after the patient confirms that he is on the combination lisinopril / hydrochlorothiazide tablet  An additional issue is that the patient may be experiencing apnea, he deferred further evaluation of this, this should be addressed at every appointment case the patient has this issue, and would benefit from CPAP  Subjective:      Patient ID: Jeanette Gutierrez is a 78 y o  male  Patient presents for follow-up  In general is no specific complaints, taking all medications as prescribed with no specific side effects  A small issue that he noted is that as he is falling asleep, he notes that at times he forgets debride, catches breath and then there was no other issues  He is unsure if he has apneic episodes while he is sleeping  Hypertension  This is a chronic problem  The current episode started more than 1 year ago  The problem is unchanged  The problem is controlled  Pertinent negatives include no chest pain, orthopnea or peripheral edema  There are no associated agents to hypertension  Risk factors for coronary artery disease include diabetes mellitus, male gender and sedentary lifestyle  Past treatments include ACE inhibitors, diuretics and lifestyle changes  There are no compliance problems  Hypertensive end-organ damage includes kidney disease  Identifiable causes of hypertension include chronic renal disease  The following portions of the patient's history were reviewed and updated as appropriate: allergies, current medications, past family history, past medical history, past social history, past surgical history and problem list     Review of Systems   Cardiovascular: Negative for chest pain and orthopnea     All other systems reviewed and are negative          Social History     Socioeconomic History    Marital status: /Civil Union     Spouse name: None    Number of children: None    Years of education: None    Highest education level: None   Occupational History    Occupation: Retired 2015 -     Tobacco Use    Smoking status: Never Smoker    Smokeless tobacco: Never Used   Vaping Use    Vaping Use: Never used   Substance and Sexual Activity    Alcohol use: Never    Drug use: Never    Sexual activity: None   Other Topics Concern    None   Social History Narrative    Denies drug use - As per [address]    Consumes on average 1 cup of regular coffee per day      Social Determinants of Health     Financial Resource Strain: Not on file   Food Insecurity: Not on file   Transportation Needs: Not on file   Physical Activity: Not on file   Stress: Not on file   Social Connections: Not on file   Intimate Partner Violence: Not on file   Housing Stability: Not on file     Past Medical History:   Diagnosis Date    RUPAL [facility]acute kidney injury) ([facility] ) 10/2/2019    Hyperlipidemia     Hypertension     Pilonidal cyst with abscess     Rheumatic heart disease     Stage 2 chronic kidney disease 9/10/2019    TIA (transient ischemic attack)     Type 2 diabetes mellitus ([place] [address] )     Vitamin D deficiency      Past Surgical History:   Procedure Laterality Date    CYSTECTOMY      Per patient cyst removal from his back    LASIK         Current Outpatient Medications:     lisinopril-hydrochlorothiazide (PRINZIDE,ZESTORETIC) 20-12 5 MG per tablet, Take 1 tablet by mouth 2 (two) times a day, Disp: 180 tablet, Rfl: 3    metFORMIN (GLUCOPHAGE) 500 mg tablet, 2 tablets in the morning, 1 tablet in the evening, Disp: 270 tablet, Rfl: 3    Multiple Vitamins-Minerals (MICHELLE MULTI MEN) TABS, Take by mouth 2 (two) times a day, Disp: , Rfl:     Lab Results   Component Value Date    SODIUM 138 02/17/2022    K 4 0 02/17/2022     02/17/2022    CO2 31 02/17/2022    AGAP 5 02/17/2022    BUN 23 02/17/2022    CREATININE 1 38 (H) 02/17/2022    GLUC 282 (H) 10/03/2019    GLUF 139 (H) 02/17/2022    CALCIUM 10 4 (H) 02/17/2022    AST 17 02/17/2022    ALT 24 02/17/2022    ALKPHOS 65 02/17/2022    TP 7 3 02/17/2022    TBILI 1 01 (H) 02/17/2022    EGFR 50 02/17/2022     Lab Results   Component Value Date    WBC 9 14 02/17/2022    HGB 16 0 02/17/2022    HCT 47 5 02/17/2022    MCV 94 02/17/2022     02/17/2022     Lab Results   Component Value Date    CHOLESTEROL 204 (H) 10/07/2021    CHOLESTEROL 235 (H) 04/05/2021    CHOLESTEROL 203 (H) 09/30/2020     Lab Results   Component Value Date    HDL 36 (L) 10/07/2021    HDL 39 (L) 04/05/2021    HDL 42 09/30/2020     Lab Results   Component Value Date    LDLCALC 120 (H) 10/07/2021    LDLCALC 148 (H) 04/05/2021    LDLCALC 128 (H) 09/30/2020     Lab Results   Component Value Date    TRIG 238 (H) 10/07/2021    TRIG 242 (H) 04/05/2021    TRIG 164 (H) 09/30/2020     No results found for: [place], [place]  Lab Results   Component Value Date    NYR8KGELTYLU 1 470 04/05/2021     Lab Results   Component Value Date    PTH 11 9 (L) 02/17/2022    CALCIUM 10 4 (H) 02/17/2022    PHOS 3 1 02/17/2022     No results found for: SPEP, UPEP  No results found for: MICROALBUR, [id number]        Objective:      /78   Pulse 60   Wt 108 kg (239 lb)   SpO2 99%   BMI 32 41 kg/m²          Physical Exam  Vitals reviewed  Constitutional:       General: He is not in acute distress  Appearance: He is well-developed  HENT:      Head: Normocephalic and atraumatic  Eyes:      Conjunctiva/sclera: Conjunctivae normal    Cardiovascular:      Rate and Rhythm: Normal rate and regular rhythm  Pulmonary:      Effort: Pulmonary effort is normal       Breath sounds: Normal breath sounds  Abdominal:      Palpations: Abdomen is soft  Musculoskeletal:         General: Swelling (trace bilateral LE edema) present  Cervical back: Neck supple     Skin: General: Skin is warm  Findings: No rash  Neurological:      Mental Status: He is alert and oriented to person, place, and time  Cranial Nerves: No cranial nerve deficit     Psychiatric:         Behavior: Behavior normal

## 2022-02-21 NOTE — ASSESSMENT & PLAN NOTE
Patient does not believe he is on hydrochlorothiazide, however it is part of the lisinopril/hydrochlorothiazide combination pill  He will confirm this with a call back to the office  I would ideally like to discontinue hydrochlorothiazide to confirm that his calcium level decreases  Patient had an appropriately reduced PTH level in the setting of hypercalcemia

## 2022-02-21 NOTE — ASSESSMENT & PLAN NOTE
Lab Results   Component Value Date    HGBA1C 6 6 (H) 10/07/2021       Blood sugars continue to be fairly well controlled, continue current medications according to his primary care provider  Patient may ultimately benefit from additional medications, will defer for now and continue monitor

## 2022-02-21 NOTE — PATIENT INSTRUCTIONS
Please give us a call regarding your blood pressure medication, confirm that you are on the lisinopril/hydrochlorothiazide combination pill  If fever on this medication, will need to decide if he would like to continue to stay on a combination or if we can send in a prescription for plain lisinopril due to the elevated calcium levels

## 2022-02-21 NOTE — ASSESSMENT & PLAN NOTE
Patient noted to have a potentially septated cyst from CT scan in 2019  Will re-evaluate this with a kidney/bladder ultrasound in the near future  Further imaging referral to be done as clinically indicated

## 2022-02-21 NOTE — ASSESSMENT & PLAN NOTE
Lab Results   Component Value Date    EGFR 50 02/17/2022    EGFR 47 10/07/2021    EGFR 53 04/05/2021    CREATININE 1 38 (H) 02/17/2022    CREATININE 1 46 (H) 10/07/2021    CREATININE 1 33 (H) 04/05/2021     Patient kidney function appears to have stabilized around an estimated GFR 50 mL/ minute  True kidney function likely slightly better than this as his body surface area is greater than 1 73 meters squared, however will continue to monitor at this time  Continue to avoid potential nephrotoxins, focus on controlling blood pressures as well as maintaining appropriate blood sugar control

## 2022-02-24 ENCOUNTER — VBI (OUTPATIENT)
Dept: ADMINISTRATIVE | Facility: OTHER | Age: 77
End: 2022-02-24

## 2022-03-21 ENCOUNTER — TELEPHONE (OUTPATIENT)
Dept: NEPHROLOGY | Facility: CLINIC | Age: 77
End: 2022-03-21

## 2022-03-21 ENCOUNTER — HOSPITAL ENCOUNTER (OUTPATIENT)
Dept: ULTRASOUND IMAGING | Facility: HOSPITAL | Age: 77
Discharge: HOME | End: 2022-03-21
Attending: INTERNAL MEDICINE
Payer: COMMERCIAL

## 2022-03-21 DIAGNOSIS — N28.89 KIDNEY MASS: Primary | ICD-10-CM

## 2022-03-21 DIAGNOSIS — N28.1 KIDNEY CYST, ACQUIRED: ICD-10-CM

## 2022-03-21 PROCEDURE — 76770 US EXAM ABDO BACK WALL COMP: CPT

## 2022-03-21 NOTE — PROGRESS NOTES
Discussed with patient that he has a kidney cyst that appears to have transformed from a septated lesion to a solid lesion  He will be seeing Urology for evaluation and recommendations going forward  In the meantime orders placed in the chart for CT scan with contrast to further evaluate      Rizwan Anderson,

## 2022-03-21 NOTE — TELEPHONE ENCOUNTER
Please contact the patient regarding scheduling CT scan of the abdomen pelvis which is semi urgent, I do not 1 weight more than 1 week    Orders in the chart for referral to see urologist, please call their office and have the patient scheduled to be seen in next 2-3 weeks if possible

## 2022-03-22 ENCOUNTER — TELEPHONE (OUTPATIENT)
Dept: UROLOGY | Facility: AMBULATORY SURGERY CENTER | Age: 77
End: 2022-03-22

## 2022-03-22 NOTE — TELEPHONE ENCOUNTER
Called Urology and spoke with Carson Richter and she stated that they will contact patient to schedule the appointment  Called patient to make him aware that Urology will be contacting him to schedule his appointment

## 2022-03-22 NOTE — TELEPHONE ENCOUNTER
Quinn Marsh from [facility] called in regards to appointment for patient for Renal Mass

## 2022-03-22 NOTE — TELEPHONE ENCOUNTER
Called and spoke to patient  Informed patient that Dr Dot Lopez in [address] location has an opening on 3/28/22  Patient stated that his doctor referred him to Dr Queenie Cho  Informed patient that Dr Queenie Cho does not travel to this location  Patient stated that he really wants to see Dr Queenie Cho  Informed patient I would route to appropriate office to assist in scheduling  Patient has 5 0cm lesion concerning for RCC  Patient is having CT done on 3/26/22  Patient would only like to see Dr Queenie Cho      Please assist in scheduling

## 2022-03-22 NOTE — TELEPHONE ENCOUNTER
It would be best for him to see 1 of the robotic surgeons, but he insists I can see him for a 15 minute visit after gets his CT scan done so I can take a look at that

## 2022-03-22 NOTE — TELEPHONE ENCOUNTER
Complaint/Diagnosis:Kidney Mass         Insurance:[employer] Gold PPO    History of cancer:no    Previous urologist:?     OutsideTesting/where:epic    If yes,what kind:epic    Records requested/where:[facility]    Preferred location:[facility]

## 2022-03-23 NOTE — TELEPHONE ENCOUNTER
Called pt and left msg on VM per comm consent to schedule appt with Dr Char Ashraf to discuss CT scheduled for 3/26/22  Dr Char Ashraf said to schedule pt in a 15 minute slot  Not sure if [facility] office is doable for pt  [facility]

## 2022-03-26 ENCOUNTER — HOSPITAL ENCOUNTER (OUTPATIENT)
Dept: CT IMAGING | Facility: HOSPITAL | Age: 77
Discharge: HOME | End: 2022-03-26
Attending: INTERNAL MEDICINE
Payer: COMMERCIAL

## 2022-03-26 DIAGNOSIS — N28.89 KIDNEY MASS: ICD-10-CM

## 2022-03-26 PROCEDURE — 74177 CT ABD & PELVIS W/CONTRAST: CPT

## 2022-03-26 RX ADMIN — IOHEXOL 100 ML: 350 INJECTION, SOLUTION INTRAVENOUS at 15:16

## 2022-04-01 DIAGNOSIS — N18.31 STAGE 3A CHRONIC KIDNEY DISEASE (HCC): Primary | ICD-10-CM

## 2022-04-01 DIAGNOSIS — N28.89 RIGHT KIDNEY MASS: Primary | ICD-10-CM

## 2022-04-01 PROCEDURE — 3066F NEPHROPATHY DOC TX: CPT | Performed by: UROLOGY

## 2022-04-06 DIAGNOSIS — E11.9 TYPE 2 DIABETES MELLITUS WITHOUT COMPLICATION, WITHOUT LONG-TERM CURRENT USE OF INSULIN (HCC): ICD-10-CM

## 2022-04-06 NOTE — TELEPHONE ENCOUNTER
Patient pharmacy requesting refill(s) of: Metformin 500mg    Last filled: 4/5/21 #270x3  Last appt: 10/7/21  Next appt: 4/15/22  Pharmacy: [facility]

## 2022-04-11 ENCOUNTER — TELEPHONE (OUTPATIENT)
Dept: NEPHROLOGY | Facility: CLINIC | Age: 77
End: 2022-04-11

## 2022-04-11 NOTE — TELEPHONE ENCOUNTER
Molina Jones called stating that patient needs prior auth for his MRI  I e-mailed the central auth team about doing the authorization for this

## 2022-04-13 ENCOUNTER — APPOINTMENT (OUTPATIENT)
Dept: LAB | Facility: CLINIC | Age: 77
End: 2022-04-13
Payer: COMMERCIAL

## 2022-04-13 DIAGNOSIS — N18.31 STAGE 3A CHRONIC KIDNEY DISEASE (HCC): ICD-10-CM

## 2022-04-13 LAB
BUN SERPL-MCNC: 20 MG/DL (ref 5–25)
CREAT SERPL-MCNC: 1.4 MG/DL (ref 0.6–1.3)
GFR SERPL CREATININE-BSD FRML MDRD: 49 ML/MIN/1.73SQ M

## 2022-04-13 PROCEDURE — 82565 ASSAY OF CREATININE: CPT

## 2022-04-13 PROCEDURE — 36415 COLL VENOUS BLD VENIPUNCTURE: CPT

## 2022-04-13 PROCEDURE — 84520 ASSAY OF UREA NITROGEN: CPT

## 2022-04-18 ENCOUNTER — HOSPITAL ENCOUNTER (OUTPATIENT)
Dept: MRI IMAGING | Facility: HOSPITAL | Age: 77
Discharge: HOME | End: 2022-04-18
Attending: INTERNAL MEDICINE
Payer: COMMERCIAL

## 2022-04-18 DIAGNOSIS — N28.89 RIGHT KIDNEY MASS: ICD-10-CM

## 2022-04-18 PROCEDURE — G1004 CDSM NDSC: HCPCS

## 2022-04-18 PROCEDURE — A9585 GADOBUTROL INJECTION: HCPCS | Performed by: INTERNAL MEDICINE

## 2022-04-18 PROCEDURE — 74183 MRI ABD W/O CNTR FLWD CNTR: CPT

## 2022-04-18 RX ADMIN — GADOBUTROL 10 ML: 604.72 INJECTION INTRAVENOUS at 11:39

## 2022-04-22 ENCOUNTER — TELEPHONE (OUTPATIENT)
Dept: NEPHROLOGY | Facility: CLINIC | Age: 77
End: 2022-04-22

## 2022-04-29 ENCOUNTER — OFFICE VISIT (OUTPATIENT)
Dept: UROLOGY | Facility: CLINIC | Age: 77
End: 2022-04-29
Payer: COMMERCIAL

## 2022-04-29 VITALS
BODY MASS INDEX: 32.51 KG/M2 | WEIGHT: 240 LBS | DIASTOLIC BLOOD PRESSURE: 80 MMHG | HEIGHT: 72 IN | SYSTOLIC BLOOD PRESSURE: 124 MMHG

## 2022-04-29 DIAGNOSIS — N28.89 KIDNEY MASS: ICD-10-CM

## 2022-04-29 PROCEDURE — 3008F BODY MASS INDEX DOCD: CPT | Performed by: UROLOGY

## 2022-04-29 PROCEDURE — 1160F RVW MEDS BY RX/DR IN RCRD: CPT | Performed by: UROLOGY

## 2022-04-29 PROCEDURE — 1036F TOBACCO NON-USER: CPT | Performed by: UROLOGY

## 2022-04-29 PROCEDURE — 99204 OFFICE O/P NEW MOD 45 MIN: CPT | Performed by: UROLOGY

## 2022-04-29 NOTE — PROGRESS NOTES
[address] [facility]  [facility]  [address] [facility], [address] [place] [address]  [phone]  www  [facility]  org      NAME: Ximena Saba  AGE: 78 y o  SEX: male  : 1945   MRN: [id number]    DATE: 2022  TIME: 11:16 AM    Assessment and Plan:    Right renal mass, solid and looks like renal cell carcinoma  We discussed treatments for this and I recommend laparoscopic-assisted robotic nephrectomy  It does not look like partial nephrectomy candidate  In my opinion no need for biopsy  He has a normal contralateral kidney although he does have chronic kidney disease with type 2 diabetes and hypertension  We will refer him to 1 of our robotic surgeons who operates at [place] per his preference for another opinion and to set this up  Chief Complaint     Chief Complaint   Patient presents with    New Patient Visit       History of Present Illness   New patient office visit:  5 cm right renal mass-looks like a typical renal cell carcinoma, lower pole  Does not look like a candidate for partial nephrectomy  I personally have reviewed his CT scan  This was present on the scan in 2019 it was 5 cm at the time, now it is read as 5 1 cm  No lymphadenopathy  Normal contralateral kidney  Creatinine is elevated at 1 40 as of 2022, and it was 1 331 year ago, and was normal at 1 2 2020  He has known stage III CKD for which he sees Dr Margarete Bernheim for, type 2 diabetes and hypertension  He has a history of a TIA  Prostate cancer screening:  PSA was 1 1 10/7/2021  It was 0 2020       The following portions of the patient's history were reviewed and updated as appropriate: allergies, current medications, past family history, past medical history, past social history, past surgical history and problem list   Past Medical History:   Diagnosis Date    [facility]) ([facility] ) 10/2/2019    Hyperlipidemia     Hypertension  Pilonidal cyst with abscess     Rheumatic heart disease     Stage 2 chronic kidney disease 9/10/2019    TIA (transient ischemic attack)     Type 2 diabetes mellitus (HCC)     Vitamin D deficiency      Past Surgical History:   Procedure Laterality Date    CYSTECTOMY      Per patient cyst removal from his back    LASIK       shoulder  Review of Systems   Review of Systems   Constitutional: Negative for fever  Respiratory: Negative for shortness of breath  Cardiovascular: Negative for chest pain  Genitourinary: Negative  Musculoskeletal: Positive for back pain  Active Problem List     Patient Active Problem List   Diagnosis    Cerebral aneurysm, nonruptured    TIA (transient ischemic attack)    Left-sided weakness    Essential hypertension    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 chronic kidney disease, without long-term current use of insulin ([facility]    Class 1 obesity due to excess calories with serious comorbidity and body mass index (BMI) of 32 0 to 32 9 in adult    [facility]    Polyarticular arthritis    Aneurysm (HCC)    Chronic right-sided low back pain with right-sided sciatica    Lumbar radiculopathy    Chronic pain syndrome    Hypercalcemia    Stage 3a chronic kidney disease (HCC)    Kidney cyst, acquired    BMI 32 0-32 9,adult       Objective   /80   Ht 6' (1 829 m)   Wt 109 kg (240 lb)   BMI 32 55 kg/m²     Physical Exam  Vitals reviewed  Constitutional:       Appearance: Normal appearance  He is normal weight  HENT:      Head: Normocephalic and atraumatic  Eyes:      Extraocular Movements: Extraocular movements intact  Pulmonary:      Effort: Pulmonary effort is normal    Abdominal:      General: There is no distension  Palpations: Abdomen is soft  There is no mass  Tenderness: There is no abdominal tenderness  There is no right CVA tenderness, left CVA tenderness, guarding or rebound  Hernia: No hernia is present  Genitourinary:     Penis: Normal        Testes: Normal       Prostate: Normal       Rectum: Normal       Comments: Normal circumcised phallus, testicles are descended bilaterally and within normal limits  Rectal exam reveals normal tone no masses and his prostate is about 40 g, smooth symmetric benign  No nodules  Musculoskeletal:         General: Normal range of motion  Cervical back: Normal range of motion  Skin:     Coloration: Skin is not jaundiced or pale  Neurological:      General: No focal deficit present  Mental Status: He is alert and oriented to person, place, and time  Psychiatric:         Mood and Affect: Mood normal          Behavior: Behavior normal          Thought Content:  Thought content normal          Judgment: Judgment normal              Current Medications     Current Outpatient Medications:     lisinopril-hydrochlorothiazide (PRINZIDE,ZESTORETIC) 20-12 5 MG per tablet, Take 1 tablet by mouth 2 (two) times a day, Disp: 180 tablet, Rfl: 3    metFORMIN (GLUCOPHAGE) 500 mg tablet, take 2 tablets by mouth every morning then take 1 tablet every evening, Disp: 270 tablet, Rfl: 3    Multiple Vitamins-Minerals (MICHELLE MULTI MEN) TABS, Take by mouth 2 (two) times a day, Disp: , Rfl:         Kina Vicente MD

## 2022-05-02 ENCOUNTER — TELEPHONE (OUTPATIENT)
Dept: NEPHROLOGY | Facility: CLINIC | Age: 77
End: 2022-05-02

## 2022-05-02 NOTE — TELEPHONE ENCOUNTER
Patient called stating that he would like to speak with you in regards to his right kidney  He can be reached at [phone]

## 2022-05-04 NOTE — TELEPHONE ENCOUNTER
I do not have a chance to do this yet, I wanted to do it over lunch and have been too busy with other things  And continue to forget to call him after hours

## 2022-05-16 ENCOUNTER — OFFICE VISIT (OUTPATIENT)
Dept: FAMILY MEDICINE CLINIC | Facility: CLINIC | Age: 77
End: 2022-05-16
Payer: COMMERCIAL

## 2022-05-16 VITALS
RESPIRATION RATE: 20 BRPM | HEIGHT: 72 IN | BODY MASS INDEX: 32.23 KG/M2 | SYSTOLIC BLOOD PRESSURE: 128 MMHG | TEMPERATURE: 98 F | WEIGHT: 238 LBS | HEART RATE: 77 BPM | OXYGEN SATURATION: 99 % | DIASTOLIC BLOOD PRESSURE: 80 MMHG

## 2022-05-16 DIAGNOSIS — N18.31 TYPE 2 DIABETES MELLITUS WITH STAGE 3A CHRONIC KIDNEY DISEASE, WITHOUT LONG-TERM CURRENT USE OF INSULIN (HCC): Primary | ICD-10-CM

## 2022-05-16 DIAGNOSIS — N28.89 RIGHT RENAL MASS: ICD-10-CM

## 2022-05-16 DIAGNOSIS — E78.2 MIXED HYPERLIPIDEMIA: ICD-10-CM

## 2022-05-16 DIAGNOSIS — I10 ESSENTIAL HYPERTENSION: ICD-10-CM

## 2022-05-16 DIAGNOSIS — E66.09 CLASS 1 OBESITY DUE TO EXCESS CALORIES WITH SERIOUS COMORBIDITY AND BODY MASS INDEX (BMI) OF 32.0 TO 32.9 IN ADULT: ICD-10-CM

## 2022-05-16 DIAGNOSIS — I67.1 CEREBRAL ANEURYSM, NONRUPTURED: ICD-10-CM

## 2022-05-16 DIAGNOSIS — Z12.5 SCREENING FOR PROSTATE CANCER: ICD-10-CM

## 2022-05-16 DIAGNOSIS — E11.22 TYPE 2 DIABETES MELLITUS WITH STAGE 3A CHRONIC KIDNEY DISEASE, WITHOUT LONG-TERM CURRENT USE OF INSULIN (HCC): Primary | ICD-10-CM

## 2022-05-16 LAB — SL AMB POCT HEMOGLOBIN AIC: 7.2 (ref ?–6.5)

## 2022-05-16 PROCEDURE — 99214 OFFICE O/P EST MOD 30 MIN: CPT | Performed by: NURSE PRACTITIONER

## 2022-05-16 PROCEDURE — 83036 HEMOGLOBIN GLYCOSYLATED A1C: CPT | Performed by: NURSE PRACTITIONER

## 2022-05-16 PROCEDURE — 3051F HG A1C>EQUAL 7.0%<8.0%: CPT | Performed by: UROLOGY

## 2022-05-16 PROCEDURE — 3066F NEPHROPATHY DOC TX: CPT | Performed by: UROLOGY

## 2022-05-16 PROCEDURE — 3725F SCREEN DEPRESSION PERFORMED: CPT | Performed by: NURSE PRACTITIONER

## 2022-05-16 NOTE — PROGRESS NOTES
[facility]        NAME: Yuriy Foley is a 78 y o  male  : 1945    MRN: [id number]  DATE: May 16, 2022  TIME: 10:40 AM    Assessment and Plan   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 chronic kidney disease, without long-term current use of insulin (HCC) [E11 22, N18 31]  1  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 chronic kidney disease, without long-term current use of insulin (HCC)  POCT hemoglobin A1c    Comprehensive metabolic panel    CBC and differential    HEMOGLOBIN A1C W/ EAG ESTIMATION   2  Cerebral aneurysm, nonruptured     3  Mixed hyperlipidemia  Lipid panel   4  Screening for prostate cancer  PSA, Total Screen   5  Class 1 obesity due to excess calories with serious comorbidity and body mass index (BMI) of 32 0 to 32 9 in adult     6  Essential hypertension     7  Right renal mass           Patient Instructions     Patient Instructions   Continue same medications  Strict diabetic diet  Keep appointment with robotic surgeon  Get bloodwork done before next visit  6 month medche/medicare wellness or call sooner for problems/concerns          Chief Complaint     Chief Complaint   Patient presents with    Follow-up         History of Present Illness       Here for 6 month medcheck-   Since his last visit he has been diagnosed with a right renal mass- is scheduled with Robotic surgeon to discuss right nephrectomy on   HgA1c today is 7 2- he has been having a sliver of pie and small scoop of ice cream every day- we discussed that he should try to cut this back to every other day and to eliminate other sources of simple carbohydrates      Review of Systems   Review of Systems   Constitutional: Negative for activity change, diaphoresis, fatigue and fever  HENT: Negative for congestion, facial swelling, hearing loss, rhinorrhea, sinus pressure, sinus pain, sneezing, sore throat and voice change  Eyes: Negative for discharge and visual disturbance     Respiratory: Negative for cough, choking, chest tightness, shortness of breath, wheezing and stridor  Cardiovascular: Negative for chest pain, palpitations and leg swelling  Gastrointestinal: Negative for abdominal distention, abdominal pain, constipation, diarrhea, nausea and vomiting  Endocrine: Negative for polydipsia, polyphagia and polyuria  Genitourinary: Negative for difficulty urinating, dysuria, frequency and urgency  Musculoskeletal: Positive for arthralgias, back pain and myalgias  Negative for gait problem, joint swelling, neck pain and neck stiffness  Skin: Negative for color change, rash and wound  Neurological: Negative for dizziness, syncope, speech difficulty, weakness, light-headedness and headaches  Hematological: Negative for adenopathy  Does not bruise/bleed easily  Psychiatric/Behavioral: Negative for agitation, behavioral problems, confusion, hallucinations, sleep disturbance and suicidal ideas  The patient is not nervous/anxious            Current Medications       Current Outpatient Medications:     lisinopril-hydrochlorothiazide (PRINZIDE,ZESTORETIC) 20-12 5 MG per tablet, Take 1 tablet by mouth 2 (two) times a day, Disp: 180 tablet, Rfl: 3    metFORMIN (GLUCOPHAGE) 500 mg tablet, take 2 tablets by mouth every morning then take 1 tablet every evening, Disp: 270 tablet, Rfl: 3    Multiple Vitamins-Minerals (MICHELLE MULTI MEN) TABS, Take by mouth 2 (two) times a day, Disp: , Rfl:     Current Allergies     Allergies as of 05/16/2022 - Reviewed 05/16/2022   Allergen Reaction Noted    Plavix [clopidogrel] Rash 09/28/2019            The following portions of the patient's history were reviewed and updated as appropriate: allergies, current medications, past family history, past medical history, past social history, past surgical history and problem list      Past Medical History:   Diagnosis Date    TIA (transient ischemic attack)        Past Surgical History:   Procedure Laterality Date  CYSTECTOMY      Per patient cyst removal from his back    LASIK         Family History   Problem Relation Age of Onset    Colon cancer Mother     Diabetes type II Mother     Heart disease Mother     Prostate cancer Father          Medications have been verified  Objective   /80   Pulse 77   Temp 98 °F (36 7 °C) (Tympanic)   Resp 20   Ht 6' (1 829 m)   Wt 108 kg (238 lb)   SpO2 99%   BMI 32 28 kg/m²        Physical Exam     Physical Exam  Vitals and nursing note reviewed  Exam conducted with a chaperone present (wife- Surinder Shadow)  Constitutional:       General: He is not in acute distress  Appearance: He is well-developed  He is not diaphoretic  Neck:      Thyroid: No thyromegaly  Trachea: No tracheal deviation  Cardiovascular:      Rate and Rhythm: Normal rate  Rhythm regularly irregular  Heart sounds: Normal heart sounds  No murmur heard  Pulmonary:      Effort: Pulmonary effort is normal  No respiratory distress  Breath sounds: Normal breath sounds  No wheezing  Musculoskeletal:         General: No tenderness or deformity  Normal range of motion  Cervical back: Normal range of motion and neck supple  Skin:     General: Skin is warm and dry  Neurological:      Mental Status: He is alert and oriented to person, place, and time  Psychiatric:         Mood and Affect: Mood normal          Speech: Speech normal          Behavior: Behavior normal          Thought Content: Thought content normal          Judgment: Judgment normal        BMI Counseling: Body mass index is 32 28 kg/m²  The BMI is above normal  Nutrition recommendations include decreasing portion sizes, encouraging healthy choices of fruits and vegetables, decreasing fast food intake, consuming healthier snacks, limiting drinks that contain sugar, moderation in carbohydrate intake and increasing intake of lean protein  Rationale for BMI follow-up plan is due to patient being overweight or obese  Depression Screening and Follow-up Plan: Patient was screened for depression during today's encounter  They screened negative with a PHQ-2 score of 0

## 2022-05-31 ENCOUNTER — OFFICE VISIT (OUTPATIENT)
Dept: UROLOGY | Facility: CLINIC | Age: 77
End: 2022-05-31
Payer: COMMERCIAL

## 2022-05-31 VITALS
HEART RATE: 67 BPM | SYSTOLIC BLOOD PRESSURE: 140 MMHG | DIASTOLIC BLOOD PRESSURE: 80 MMHG | HEIGHT: 72 IN | BODY MASS INDEX: 32.23 KG/M2 | WEIGHT: 238 LBS

## 2022-05-31 DIAGNOSIS — C64.1 MALIGNANT NEOPLASM OF RIGHT KIDNEY (HCC): ICD-10-CM

## 2022-05-31 DIAGNOSIS — C64.2 MALIGNANT NEOPLASM OF LEFT KIDNEY (HCC): Primary | ICD-10-CM

## 2022-05-31 PROCEDURE — 1160F RVW MEDS BY RX/DR IN RCRD: CPT | Performed by: UROLOGY

## 2022-05-31 PROCEDURE — 1036F TOBACCO NON-USER: CPT | Performed by: UROLOGY

## 2022-05-31 PROCEDURE — 3008F BODY MASS INDEX DOCD: CPT | Performed by: UROLOGY

## 2022-05-31 PROCEDURE — 99215 OFFICE O/P EST HI 40 MIN: CPT | Performed by: UROLOGY

## 2022-05-31 RX ORDER — MELATONIN
1000 DAILY
COMMUNITY

## 2022-05-31 RX ORDER — ASPIRIN 81 MG/1
81 TABLET ORAL DAILY
COMMUNITY

## 2022-05-31 RX ORDER — RIBOFLAVIN (VITAMIN B2) 100 MG
100 TABLET ORAL DAILY
COMMUNITY

## 2022-05-31 RX ORDER — SODIUM CHLORIDE 9 MG/ML
125 INJECTION, SOLUTION INTRAVENOUS CONTINUOUS
Status: CANCELLED | OUTPATIENT
Start: 2022-05-31

## 2022-05-31 NOTE — LETTER
May 31, 2022     Jennifer Avilez, [address] [facility]    Patient: Nighat Moseley   YOB: 1945   Date of Visit: 5/31/2022       Dear Dr Arauz Salvage: Thank you for referring Thaddeus Pastrana to me for evaluation  Below are my notes for this consultation  If you have questions, please do not hesitate to call me  I look forward to following your patient along with you  Sincerely,        Andrea Rogers MD        CC: No Recipients  Andrea Rogers MD  5/31/2022 11:24 AM  Sign when Signing Visit  5/31/2022    Marco Culp  1945  [id number]        Assessment  5 cm right renal mass concerning for renal cell carcinoma, chronic kidney disease      Discussion  I had a lengthy discussion with Nanci Garza and his wife in the office today regarding his centrally located 5 cm renal mass along with his chronic kidney disease  We discussed that the mass is a renal cell carcinoma by MRI and CT scan criteria until proven otherwise  Treatment of renal cell carcinoma is surgical removal   I recommended a robot assisted laparoscopic right radical nephrectomy  He will require preoperative medical clearance secondary to prior history of TIA/CVA  He can continue on his 81 mg of aspirin at this time  We discussed the risk of the procedure including, but not limited to, bleeding, infection, conversion to an open procedure, and progression of chronic kidney disease/acute renal failure  History of Present Illness  78 y o  male with a history of a right renal mass identified on a CT scan in 2019  At that time it was approximately 4 cm in maximal dimension  More recently he had a follow-up CT scan almost 3 years later which reveals the mass now measures approximately 5 cm in maximal dimension  Fortunately there is no sign of metastatic disease or lymphadenopathy  Presents to the office today with his wife    He was previously evaluated by my partner   Sergo Cotter and has been recommended evaluation by 1 of the robotic surgeons in our group  The patient states that he is otherwise healthy other than a history of prior TIA/CVA  He had previously been treated with Plavix but this was held secondary to reaction and he is now treated with 81 mg of aspirin  He has a history of a congenital cerebral aneurysm  He also has a history of type 2 diabetes with stage IIIA chronic kidney disease  He denies any lower urinary tract symptoms  His most recent PSA from October of 2021 is noted to be 1 1  There is no family history of prostate cancer  In 2020 his PSA was 0 9  Per AUA guidelines at 78years of age he requires no further PSA testing  He is 6 ft tall and weighs 238 lb with a BMI of 32  AUA Symptom Score      Review of Systems  Review of Systems   Constitutional: Negative  HENT: Negative  Eyes: Negative  Respiratory: Negative  Cardiovascular: Negative  Gastrointestinal: Negative  Endocrine: Negative  Genitourinary:        Per HPI   Musculoskeletal: Negative  Skin: Negative  Allergic/Immunologic: Negative  Neurological: Negative  Hematological: Negative  Psychiatric/Behavioral: Negative            Past Medical History  Past Medical History:   Diagnosis Date    TIA (transient ischemic attack)        Past Social History  Past Surgical History:   Procedure Laterality Date    CYSTECTOMY      Per patient cyst removal from his back    LASIK         Past Family History  Family History   Problem Relation Age of Onset    Colon cancer Mother     Diabetes type II Mother     Heart disease Mother     Prostate cancer Father        Past Social history  Social History     Socioeconomic History    Marital status: /Civil Union     Spouse name: Not on file    Number of children: Not on file    Years of education: Not on file    Highest education level: Not on file   Occupational History    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     Tobacco Use    Smoking status: Never Smoker    Smokeless tobacco: Never Used   Vaping Use    Vaping Use: Never used   Substance and Sexual Activity    Alcohol use: Never    Drug use: Never    Sexual activity: Not on file   Other Topics Concern    Not on file   Social History Narrative    Denies drug use - As per [address]    Consumes on average 1 cup of regular coffee per day      Social Determinants of Health     Financial Resource Strain: Not on file   Food Insecurity: Not on file   Transportation Needs: Not on file   Physical Activity: Not on file   Stress: Not on file   Social Connections: Not on file   Intimate Partner Violence: Not on file   Housing Stability: Not on file       Current Medications  Current Outpatient Medications   Medication Sig Dispense Refill    Ascorbic Acid (vitamin C) 100 MG tablet Take 100 mg by mouth daily      aspirin (ECOTRIN LOW STRENGTH) 81 mg EC tablet Take 81 mg by mouth daily      cholecalciferol (VITAMIN D3) 1,000 units tablet Take 1,000 Units by mouth daily      lisinopril-hydrochlorothiazide (PRINZIDE,ZESTORETIC) 20-12 5 MG per tablet Take 1 tablet by mouth 2 (two) times a day 180 tablet 3    metFORMIN (GLUCOPHAGE) 500 mg tablet take 2 tablets by mouth every morning then take 1 tablet every evening 270 tablet 3    Multiple Vitamins-Minerals (MICHELLE MULTI MEN) TABS Take by mouth 2 (two) times a day       No current facility-administered medications for this visit  Allergies  Allergies   Allergen Reactions    Plavix [Clopidogrel] Rash     Stopped two days ago because got a rash and doctors thought it was from this        Past Medical History, Social History, Family History, medications and allergies were reviewed  Vitals  Vitals:    05/31/22 1015   BP: 140/80   Pulse: 67   Weight: 108 kg (238 lb)   Height: 6' (1 829 m)       Physical Exam  Physical Exam  On examination he is in no acute distress  Cardiac is regular  He has no respiratory distress  His abdomen is soft nontender nondistended  There are no prior surgical scars   examination reveals no CVA tenderness  Digital rectal examination is deferred  Skin is warm  Extremities without edema  Gait is slow      Results  Lab Results   Component Value Date    PSA 1 1 10/07/2021    PSA 0 9 09/30/2020     Lab Results   Component Value Date    CALCIUM 10 4 (H) 02/17/2022    K 4 0 02/17/2022    CO2 31 02/17/2022     02/17/2022    BUN 20 04/13/2022    CREATININE 1 40 (H) 04/13/2022     Lab Results   Component Value Date    WBC 9 14 02/17/2022    HGB 16 0 02/17/2022    HCT 47 5 02/17/2022    MCV 94 02/17/2022     02/17/2022         Office Urine Dip  No results found for this or any previous visit (from the past 1 hour(s)) ]

## 2022-05-31 NOTE — PROGRESS NOTES
5/31/2022    Fritz Riggins  1945  [id number]        Assessment  5 cm right renal mass concerning for renal cell carcinoma, chronic kidney disease      Discussion  I had a lengthy discussion with Kassandra Valle and his wife in the office today regarding his centrally located 5 cm renal mass along with his chronic kidney disease  We discussed that the mass is a renal cell carcinoma by MRI and CT scan criteria until proven otherwise  Treatment of renal cell carcinoma is surgical removal   I recommended a robot assisted laparoscopic right radical nephrectomy  He will require preoperative medical clearance secondary to prior history of TIA/CVA  He can continue on his 81 mg of aspirin at this time  We discussed the risk of the procedure including, but not limited to, bleeding, infection, conversion to an open procedure, and progression of chronic kidney disease/acute renal failure  History of Present Illness  78 y o  male with a history of a right renal mass identified on a CT scan in 2019  At that time it was approximately 4 cm in maximal dimension  More recently he had a follow-up CT scan almost 3 years later which reveals the mass now measures approximately 5 cm in maximal dimension  Fortunately there is no sign of metastatic disease or lymphadenopathy  Presents to the office today with his wife  He was previously evaluated by my partner Dr Maribeth Valdes and has been recommended evaluation by 1 of the robotic surgeons in our group  The patient states that he is otherwise healthy other than a history of prior TIA/CVA  He had previously been treated with Plavix but this was held secondary to reaction and he is now treated with 81 mg of aspirin  He has a history of a congenital cerebral aneurysm  He also has a history of type 2 diabetes with stage IIIA chronic kidney disease  He denies any lower urinary tract symptoms  His most recent PSA from October of 2021 is noted to be 1 1    There is no family history of prostate cancer  In 2020 his PSA was 0 9  Per AUA guidelines at 78years of age he requires no further PSA testing  He is 6 ft tall and weighs 238 lb with a BMI of 32  AUA Symptom Score      Review of Systems  Review of Systems   Constitutional: Negative  HENT: Negative  Eyes: Negative  Respiratory: Negative  Cardiovascular: Negative  Gastrointestinal: Negative  Endocrine: Negative  Genitourinary:        Per HPI   Musculoskeletal: Negative  Skin: Negative  Allergic/Immunologic: Negative  Neurological: Negative  Hematological: Negative  Psychiatric/Behavioral: Negative            Past Medical History  Past Medical History:   Diagnosis Date    TIA (transient ischemic attack)        Past Social History  Past Surgical History:   Procedure Laterality Date    CYSTECTOMY      Per patient cyst removal from his back    LASIK         Past Family History  Family History   Problem Relation Age of Onset    Colon cancer Mother     Diabetes type II Mother     Heart disease Mother     Prostate cancer Father        Past Social history  Social History     Socioeconomic History    Marital status: /Civil Union     Spouse name: Not on file    Number of children: Not on file    Years of education: Not on file    Highest education level: Not on file   Occupational History    Occupation: Retired 2015 -     Tobacco Use    Smoking status: Never Smoker    Smokeless tobacco: Never Used   Vaping Use    Vaping Use: Never used   Substance and Sexual Activity    Alcohol use: Never    Drug use: Never    Sexual activity: Not on file   Other Topics Concern    Not on file   Social History Narrative    Denies drug use - As per [address]    Consumes on average 1 cup of regular coffee per day      Social Determinants of Health     Financial Resource Strain: Not on file   Food Insecurity: Not on file   Transportation Needs: Not on file   Physical Activity: Not on file   Stress: Not on file   Social Connections: Not on file   Intimate Partner Violence: Not on file   Housing Stability: Not on file       Current Medications  Current Outpatient Medications   Medication Sig Dispense Refill    Ascorbic Acid (vitamin C) 100 MG tablet Take 100 mg by mouth daily      aspirin (ECOTRIN LOW STRENGTH) 81 mg EC tablet Take 81 mg by mouth daily      cholecalciferol (VITAMIN D3) 1,000 units tablet Take 1,000 Units by mouth daily      lisinopril-hydrochlorothiazide (PRINZIDE,ZESTORETIC) 20-12 5 MG per tablet Take 1 tablet by mouth 2 (two) times a day 180 tablet 3    metFORMIN (GLUCOPHAGE) 500 mg tablet take 2 tablets by mouth every morning then take 1 tablet every evening 270 tablet 3    Multiple Vitamins-Minerals (MICHELLE MULTI MEN) TABS Take by mouth 2 (two) times a day       No current facility-administered medications for this visit  Allergies  Allergies   Allergen Reactions    Plavix [Clopidogrel] Rash     Stopped two days ago because got a rash and doctors thought it was from this        Past Medical History, Social History, Family History, medications and allergies were reviewed  Vitals  Vitals:    05/31/22 1015   BP: 140/80   Pulse: 67   Weight: 108 kg (238 lb)   Height: 6' (1 829 m)       Physical Exam  Physical Exam  On examination he is in no acute distress  Cardiac is regular  He has no respiratory distress  His abdomen is soft nontender nondistended  There are no prior surgical scars   examination reveals no CVA tenderness  Digital rectal examination is deferred  Skin is warm  Extremities without edema  Gait is slow      Results  Lab Results   Component Value Date    PSA 1 1 10/07/2021    PSA 0 9 09/30/2020     Lab Results   Component Value Date    CALCIUM 10 4 (H) 02/17/2022    K 4 0 02/17/2022    CO2 31 02/17/2022     02/17/2022    BUN 20 04/13/2022    CREATININE 1 40 (H) 04/13/2022     Lab Results   Component Value Date    WBC 9 14 02/17/2022 HGB 16 0 02/17/2022    HCT 47 5 02/17/2022    MCV 94 02/17/2022     02/17/2022         Office Urine Dip  No results found for this or any previous visit (from the past 1 hour(s)) ]

## 2022-06-07 ENCOUNTER — TELEPHONE (OUTPATIENT)
Dept: UROLOGY | Facility: AMBULATORY SURGERY CENTER | Age: 77
End: 2022-06-07

## 2022-06-07 NOTE — TELEPHONE ENCOUNTER
I called pt to inform him that I am working on getting him scheduled for surgery with Dr Radha Chung on 7/6/2022 but I am still waiting for confirmation on the MD assist for that day and once I get it I will call him back to confirm pre op instructions  Pt verbalized understanding and will wait to hear back from me

## 2022-06-08 ENCOUNTER — PREP FOR PROCEDURE (OUTPATIENT)
Dept: UROLOGY | Facility: AMBULATORY SURGERY CENTER | Age: 77
End: 2022-06-08

## 2022-06-08 ENCOUNTER — ANESTHESIA EVENT (OUTPATIENT)
Dept: PERIOP | Facility: HOSPITAL | Age: 77
DRG: 656 | End: 2022-06-08
Payer: COMMERCIAL

## 2022-06-08 DIAGNOSIS — Z79.01 LONG TERM (CURRENT) USE OF ANTICOAGULANTS: ICD-10-CM

## 2022-06-08 DIAGNOSIS — R39.89 SUSPECTED UTI: ICD-10-CM

## 2022-06-08 DIAGNOSIS — Z01.810 PREOP CARDIOVASCULAR EXAM: ICD-10-CM

## 2022-06-08 DIAGNOSIS — Z01.818 PREOP EXAMINATION: ICD-10-CM

## 2022-06-08 DIAGNOSIS — C64.1 MALIGNANT NEOPLASM OF RIGHT KIDNEY (HCC): Primary | ICD-10-CM

## 2022-06-08 DIAGNOSIS — Z01.812 PRE-PROCEDURE LAB EXAM: ICD-10-CM

## 2022-06-08 NOTE — TELEPHONE ENCOUNTER
I returned call to pt and I was able to speak with him  I confirmed scheduling his surgery with Dr Gini Snow at the [facility] on 7/6/22  I then verbally went over all of pt 's pre op instructions and prep information with him  Pt is aware that he needs to have his pre op labs and EKG at a [address] around 6/15/22-6/22/22 so his PCP will have the results in time for his medical clearance appt on 6/24/22 at 10:00AM  Pt also aware that he needs to stop any Aspirin and vitamin products 1 week prior to surgery  Per pt 's request I am mailing him a copy of his surgical packet and instructed him to call our office with any questions or concerns regarding this procedure

## 2022-06-08 NOTE — TELEPHONE ENCOUNTER
I called pt this morning to confirm scheduling him for surgery with Dr Mateusz Shelby at the [facility] on 7/6/22  There was no answer when I called pt so I did leave a voicemail asking him to call our office back to schedule

## 2022-06-12 DIAGNOSIS — I10 ESSENTIAL HYPERTENSION: ICD-10-CM

## 2022-06-13 RX ORDER — LISINOPRIL AND HYDROCHLOROTHIAZIDE 20; 12.5 MG/1; MG/1
TABLET ORAL
Qty: 180 TABLET | Refills: 3 | Status: SHIPPED | OUTPATIENT
Start: 2022-06-13 | End: 2022-07-18

## 2022-06-15 ENCOUNTER — CLINICAL SUPPORT (OUTPATIENT)
Dept: URGENT CARE | Facility: CLINIC | Age: 77
End: 2022-06-15
Payer: COMMERCIAL

## 2022-06-15 ENCOUNTER — LAB REQUISITION (OUTPATIENT)
Dept: LAB | Facility: HOSPITAL | Age: 77
End: 2022-06-15
Payer: COMMERCIAL

## 2022-06-15 ENCOUNTER — APPOINTMENT (OUTPATIENT)
Dept: LAB | Facility: CLINIC | Age: 77
End: 2022-06-15
Payer: COMMERCIAL

## 2022-06-15 DIAGNOSIS — C64.1 MALIGNANT NEOPLASM OF RIGHT KIDNEY (HCC): ICD-10-CM

## 2022-06-15 DIAGNOSIS — C64.1 MALIGNANT NEOPLASM OF RIGHT KIDNEY, EXCEPT RENAL PELVIS (HCC): ICD-10-CM

## 2022-06-15 DIAGNOSIS — Z79.01 LONG TERM (CURRENT) USE OF ANTICOAGULANTS: ICD-10-CM

## 2022-06-15 DIAGNOSIS — Z01.810 PREOP CARDIOVASCULAR EXAM: ICD-10-CM

## 2022-06-15 DIAGNOSIS — Z01.818 PREOP EXAMINATION: ICD-10-CM

## 2022-06-15 DIAGNOSIS — Z01.812 PRE-PROCEDURE LAB EXAM: ICD-10-CM

## 2022-06-15 DIAGNOSIS — R94.31 ABNORMAL EKG: Primary | ICD-10-CM

## 2022-06-15 DIAGNOSIS — R39.89 SUSPECTED UTI: ICD-10-CM

## 2022-06-15 LAB
ABO GROUP BLD: NORMAL
ANION GAP SERPL CALCULATED.3IONS-SCNC: 6 MMOL/L (ref 4–13)
APTT PPP: 34 SECONDS (ref 23–37)
BASOPHILS # BLD AUTO: 0.05 THOUSANDS/ΜL (ref 0–0.1)
BASOPHILS NFR BLD AUTO: 1 % (ref 0–1)
BLD GP AB SCN SERPL QL: NEGATIVE
BUN SERPL-MCNC: 24 MG/DL (ref 5–25)
CALCIUM SERPL-MCNC: 9.5 MG/DL (ref 8.3–10.1)
CHLORIDE SERPL-SCNC: 103 MMOL/L (ref 100–108)
CO2 SERPL-SCNC: 28 MMOL/L (ref 21–32)
CREAT SERPL-MCNC: 1.37 MG/DL (ref 0.6–1.3)
EOSINOPHIL # BLD AUTO: 0.25 THOUSAND/ΜL (ref 0–0.61)
EOSINOPHIL NFR BLD AUTO: 3 % (ref 0–6)
ERYTHROCYTE [DISTWIDTH] IN BLOOD BY AUTOMATED COUNT: 12.3 % (ref 11.6–15.1)
GFR SERPL CREATININE-BSD FRML MDRD: 50 ML/MIN/1.73SQ M
GLUCOSE P FAST SERPL-MCNC: 138 MG/DL (ref 65–99)
HCT VFR BLD AUTO: 45.6 % (ref 36.5–49.3)
HGB BLD-MCNC: 15.1 G/DL (ref 12–17)
IMM GRANULOCYTES # BLD AUTO: 0.03 THOUSAND/UL (ref 0–0.2)
IMM GRANULOCYTES NFR BLD AUTO: 0 % (ref 0–2)
INR PPP: 0.99 (ref 0.84–1.19)
LYMPHOCYTES # BLD AUTO: 1.46 THOUSANDS/ΜL (ref 0.6–4.47)
LYMPHOCYTES NFR BLD AUTO: 19 % (ref 14–44)
MCH RBC QN AUTO: 31.2 PG (ref 26.8–34.3)
MCHC RBC AUTO-ENTMCNC: 33.1 G/DL (ref 31.4–37.4)
MCV RBC AUTO: 94 FL (ref 82–98)
MONOCYTES # BLD AUTO: 0.78 THOUSAND/ΜL (ref 0.17–1.22)
MONOCYTES NFR BLD AUTO: 10 % (ref 4–12)
NEUTROPHILS # BLD AUTO: 5.33 THOUSANDS/ΜL (ref 1.85–7.62)
NEUTS SEG NFR BLD AUTO: 67 % (ref 43–75)
NRBC BLD AUTO-RTO: 0 /100 WBCS
PLATELET # BLD AUTO: 181 THOUSANDS/UL (ref 149–390)
PMV BLD AUTO: 12.5 FL (ref 8.9–12.7)
POTASSIUM SERPL-SCNC: 4.2 MMOL/L (ref 3.5–5.3)
PROTHROMBIN TIME: 12.7 SECONDS (ref 11.6–14.5)
RBC # BLD AUTO: 4.84 MILLION/UL (ref 3.88–5.62)
RH BLD: POSITIVE
SODIUM SERPL-SCNC: 137 MMOL/L (ref 136–145)
SPECIMEN EXPIRATION DATE: NORMAL
WBC # BLD AUTO: 7.9 THOUSAND/UL (ref 4.31–10.16)

## 2022-06-15 PROCEDURE — 86901 BLOOD TYPING SEROLOGIC RH(D): CPT | Performed by: UROLOGY

## 2022-06-15 PROCEDURE — 80048 BASIC METABOLIC PNL TOTAL CA: CPT

## 2022-06-15 PROCEDURE — 36415 COLL VENOUS BLD VENIPUNCTURE: CPT

## 2022-06-15 PROCEDURE — 85025 COMPLETE CBC W/AUTO DIFF WBC: CPT

## 2022-06-15 PROCEDURE — 86850 RBC ANTIBODY SCREEN: CPT | Performed by: UROLOGY

## 2022-06-15 PROCEDURE — 85610 PROTHROMBIN TIME: CPT

## 2022-06-15 PROCEDURE — 86900 BLOOD TYPING SEROLOGIC ABO: CPT | Performed by: UROLOGY

## 2022-06-15 PROCEDURE — 85730 THROMBOPLASTIN TIME PARTIAL: CPT

## 2022-06-15 PROCEDURE — 87086 URINE CULTURE/COLONY COUNT: CPT

## 2022-06-16 LAB — BACTERIA UR CULT: NORMAL

## 2022-06-20 ENCOUNTER — RA CDI HCC (OUTPATIENT)
Dept: OTHER | Facility: HOSPITAL | Age: 77
End: 2022-06-20

## 2022-06-20 ENCOUNTER — TELEPHONE (OUTPATIENT)
Dept: UROLOGY | Facility: MEDICAL CENTER | Age: 77
End: 2022-06-20

## 2022-06-21 NOTE — PROGRESS NOTES
[facility]  coding opportunities     [phone]     Chart Reviewed number of suggestions sent to Provider: 1     Patients Insurance     Medicare Insurance: [address] [place] [address]

## 2022-06-23 NOTE — PRE-PROCEDURE INSTRUCTIONS
See Geriatric Assessment below    Phone assessment    Cognitive Assessment:    CAM:    TUG <15 sec:   Falls (last 6 months): none    Hand  score:  -Attempt 1:  -Attempt 2:  -Attempt 3:   J Luis Total Score: 22   PHQ- 9 Depression Scale:4   Nutrition Assessment Score:14   METS:7 25   Health goals:  -What are your overall health goals? (quit smoking, wt  loss, rest, decrease stress) reduce stress     -What brings you strength? (family, friends, Voodoo, health) Family and friends     -What activities are important to you? (exercise, reading, travel, work)  Working on the computer and travelling       Pre-Surgery Instructions:   Medication Instructions    Ascorbic Acid (vitamin C) 100 MG tablet Stop taking 7 days prior to surgery   aspirin (ECOTRIN LOW STRENGTH) 81 mg EC tablet Instructions provided by MD    cholecalciferol (VITAMIN D3) 1,000 units tablet Stop taking 7 days prior to surgery   lisinopril-hydrochlorothiazide (PRINZIDE,ZESTORETIC) 20-12 5 MG per tablet Hold day of surgery   metFORMIN (GLUCOPHAGE) 500 mg tablet Hold day of surgery   Multiple Vitamins-Minerals (MICHELLE MULTI MEN) TABS Stop taking 7 days prior to surgery

## 2022-06-23 NOTE — PRE-PROCEDURE INSTRUCTIONS
Pre-Surgery Instructions:   Medication Instructions    Ascorbic Acid (vitamin C) 100 MG tablet Stop taking 7 days prior to surgery   aspirin (ECOTRIN LOW STRENGTH) 81 mg EC tablet Instructions provided by MD    cholecalciferol (VITAMIN D3) 1,000 units tablet Stop taking 7 days prior to surgery   lisinopril-hydrochlorothiazide (PRINZIDE,ZESTORETIC) 20-12 5 MG per tablet Hold day of surgery   metFORMIN (GLUCOPHAGE) 500 mg tablet Hold day of surgery   Multiple Vitamins-Minerals (MICHELLE MULTI MEN) TABS Stop taking 7 days prior to surgery  PT DENIES FEVER, SOB, SORE THROAT AND COUGH  Pt verbalized understanding of shower, med and bowel prep instructions  Pt instructed to stop nsaids and supplements one week prior to surgery per surgeons office  Pt instructed to stop prinzide the day before and the day of surgery

## 2022-06-23 NOTE — PRE-PROCEDURE INSTRUCTIONS
Pre-Surgery Instructions:   Medication Instructions    Ascorbic Acid (vitamin C) 100 MG tablet Stop taking 7 days prior to surgery   aspirin (ECOTRIN LOW STRENGTH) 81 mg EC tablet Instructions provided by MD    cholecalciferol (VITAMIN D3) 1,000 units tablet Stop taking 7 days prior to surgery   lisinopril-hydrochlorothiazide (PRINZIDE,ZESTORETIC) 20-12 5 MG per tablet Hold day of surgery   metFORMIN (GLUCOPHAGE) 500 mg tablet Hold day of surgery   Multiple Vitamins-Minerals (MICHELLE MULTI MEN) TABS Stop taking 7 days prior to surgery

## 2022-06-24 ENCOUNTER — CONSULT (OUTPATIENT)
Dept: FAMILY MEDICINE CLINIC | Facility: CLINIC | Age: 77
End: 2022-06-24
Payer: COMMERCIAL

## 2022-06-24 VITALS
TEMPERATURE: 98.4 F | OXYGEN SATURATION: 98 % | SYSTOLIC BLOOD PRESSURE: 136 MMHG | BODY MASS INDEX: 31.97 KG/M2 | RESPIRATION RATE: 18 BRPM | DIASTOLIC BLOOD PRESSURE: 78 MMHG | HEIGHT: 72 IN | HEART RATE: 80 BPM | WEIGHT: 236 LBS

## 2022-06-24 DIAGNOSIS — C64.1 MALIGNANT NEOPLASM OF RIGHT KIDNEY (HCC): Primary | ICD-10-CM

## 2022-06-24 DIAGNOSIS — Z01.818 PREOP EXAMINATION: ICD-10-CM

## 2022-06-24 PROCEDURE — 99214 OFFICE O/P EST MOD 30 MIN: CPT | Performed by: NURSE PRACTITIONER

## 2022-06-24 PROCEDURE — 3725F SCREEN DEPRESSION PERFORMED: CPT | Performed by: NURSE PRACTITIONER

## 2022-06-24 NOTE — PROGRESS NOTES
[facility]        NAME: Arianna Streeter is a 78 y o  male  : 1945    MRN: [id number]  DATE: 2022  TIME: 10:11 AM    Assessment and Plan   Malignant neoplasm of right kidney ([facility] [address] ) [C64 1]  1  Malignant neoplasm of right kidney [facility])  Ambulatory referral to [facility]   2  Preop examination  Ambulatory referral to [facility]         Patient Instructions     There are no Patient Instructions on file for this visit  Chief Complaint     Chief Complaint   Patient presents with    Pre-op Exam         History of Present Illness       Presurgical Evaluation    Subjective:     Patient ID: Arianna Streeter is a 78 y o  male      Patient presents with:  Pre-op Exam      @[facility]@    The following portions of the patient's history were reviewed and updated as appropriate: allergies, current medications, past family history, past medical history, past social history, past surgical history and problem list     Procedure date: 22    Surgeon:  Dr Mini Cleveland  Planned procedure:  Right kidney removal  Diagnosis for procedure:  Malignant Neoplasm of right kidney    Prior anesthesia: no     CAD History: None     Pulmonary History: Pneumonia    Renal history: CKD stage 3 - GFR 30-59    Diabetes History:  Type 2  Controlled     Neurological History: CVA- TIA     On Immunosuppressant meds/biologics: No      [unfilled]     Current Outpatient Medications:  Ascorbic Acid (vitamin C) 100 MG tablet, Take 100 mg by mouth daily, Disp: , Rfl:   aspirin (ECOTRIN LOW STRENGTH) 81 mg EC tablet, Take 81 mg by mouth daily, Disp: , Rfl:   cholecalciferol (VITAMIN D3) 1,000 units tablet, Take 1,000 Units by mouth daily, Disp: , Rfl:   lisinopril-hydrochlorothiazide (PRINZIDE,ZESTORETIC) 20-12 5 MG per tablet, take 1 tablet by mouth twice a day, Disp: 180 tablet, Rfl: 3  metFORMIN (GLUCOPHAGE) 500 mg tablet, take 2 tablets by mouth every morning then take 1 tablet every evening, Disp: 270 tablet, Rfl: 3  Multiple Vitamins-Minerals (MICHELLE MULTI MEN) TABS, Take by mouth 2 (two) times a day, Disp: , Rfl:     No current facility-administered medications for this visit        Allergies on file:   Plavix (clopidogrel)    Patient Active Problem List:     Cerebral aneurysm, nonruptured     TIA (transient ischemic attack)     Left-sided weakness     Essential hypertension     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 chronic kidney disease, without long-term current use of insulin ([facility])     Class 1 obesity due to excess calories with serious comorbidity and body mass index (BMI) of 32 0 to 32 9 in adult     Polyarticular arthritis     Chronic right-sided low back pain with right-sided sciatica     Lumbar radiculopathy     Chronic pain syndrome     Hypercalcemia     Stage 3a chronic kidney disease (HCC)     Kidney cyst, acquired       Past Medical History:  No date: Diabetes mellitus ([facility] )  No date: Hypertension  No date: TIA (transient ischemic attack)    Past Surgical History:  No date: CYSTECTOMY      Comment:  Per patient cyst removal from his back  No date: LASIK    Review of patient's family history indicates:  Problem: Colon cancer      Relation: Mother          Age of Onset: (Not Specified)  Problem: Diabetes type II      Relation: Mother          Age of Onset: (Not Specified)  Problem: Heart disease      Relation: Mother          Age of Onset: (Not Specified)  Problem: Prostate cancer      Relation: Father          Age of Onset: (Not Specified)      Social History    Tobacco Use      Smoking status: Never Smoker      Smokeless tobacco: Never Used    Vaping Use      Vaping Use: Never used    Alcohol use: Never    Drug use: Never      Objective:    ---------------------------               06/24/22                      1006         ---------------------------   BP:          154/82         Pulse:         80           Resp:          18           Temp:   98 4 °F (36 9 °C)   TempSrc:    Tympanic SpO2:          98%          Weight:  107 kg (236 lb)    Height:   6' (1 829 m)     ---------------------------    [unfilled]     Preop labs/testing available and reviewed: yes    eGFR       Date                     Value               Ref Range           Status                06/15/2022               50                  ml/min/1 73sq m     Final            ----------  WBC       Date                     Value               Ref Range           Status                06/15/2022               7 90                4 31 - 10 16 T*     Final            ----------  INR       Date                     Value               Ref Range           Status                06/15/2022               0 99                0 84 - 1 19         Final            ----------    EKG yes    Echo no    Stress test/cath no    PFT/[place] no    Functional capacity: Walking , 4-5 MPH               4 Mets   Pick the highest level patient can comfortably perform   4 mets or greater for surgery    RCRI  High Risk surgery? 1 Point  CAD History:         1 Point   MI; Positive Stress Test; CP due to Mi;  Nitrate Usage to control Angina; Pathologic Q wave on EKG  CHF Active:         1 Point   Pulm Edema; Paroxysmal Nocturnal Dyspnea;  Bibasilar Rales (crackles);S3; CHF on CXR  Cerebrovascular Disease (TIA or CVA):     1 Point  DM on Insulin:        1 Point  Serum Creat >2 0 mg/dl:       1 Point          Total Points: 1     Scorin: Class I, Very Low Risk (0 4%)     1: Class II, Low risk (0 9%)     2: Class III Moderate (6 6%)     3: Class IV High (>11%)      RUPAL Risk:  GFR: eGFR       Date                     Value               Ref Range           Status                06/15/2022               50                  ml/min/1 73sq m     Final            ----------      For PCP:  If GFR>60, Hold ACE/ARB/Diuretic on the day of surgery, and NSAIDS 10 days before  If GFR<45, Consider PRE and POST op Nephrology Consult      If 46 <GFR> 59 : Has Patient had RUPAL in last 6 Months? no   If YES: Preop Nephrology consult   If No:  [facility] Nephrology consult  Assessment/Plan:    Patient is medically optimized (cleared) for the planned procedure  Further testing/evaluation is not required  Postop concerns: no    Problem List Items Addressed This Visit    None     Visit Diagnoses     Screening for colon cancer    -  Primary    Malignant neoplasm of right kidney ([address] )        Preop examination            Diagnoses and associated orders for this visit:     Malignant neoplasm of right kidney [facility])   Ambulatory referral to Family Practice     Preop examination   Ambulatory referral to [facility]       Outpatient Medications Marked as Taking for the 6/24/22 encounter (Consult) with KAYODE Orozco:  Ascorbic Acid (vitamin C) 100 MG tablet, per anesthesia guidelines   aspirin (ECOTRIN LOW STRENGTH) 81 mg EC tablet, Stop taking 1 week prior to surgery  cholecalciferol (VITAMIN D3) 1,000 units tablet, per anesthesia guidelines   lisinopril-hydrochlorothiazide (PRINZIDE,ZESTORETIC) 20-12 5 MG per tablet, per anesthesia guidelines   metFORMIN (GLUCOPHAGE) 500 mg tablet, per anesthesia guidelines   Multiple Vitamins-Minerals (MICHELLE MULTI MEN) TABS, per anesthesia guidelines          NOTE: Please use the above to review important meds for your specialty, the remainder "per anesthesia Guidelines "    NOTE: Please place an Inbasket message for "[facility]" pool for complicated patients  Review of Systems   Review of Systems   Constitutional: Negative for activity change, diaphoresis, fatigue and fever  HENT: Negative for congestion, facial swelling, hearing loss, rhinorrhea, sinus pressure, sinus pain, sneezing, sore throat and voice change  Eyes: Negative for discharge and visual disturbance  Respiratory: Negative for cough, choking, chest tightness, shortness of breath, wheezing and stridor      Cardiovascular: Negative for chest pain, palpitations and leg swelling  Gastrointestinal: Negative for abdominal distention, abdominal pain, constipation, diarrhea, nausea and vomiting  Endocrine: Negative for polydipsia, polyphagia and polyuria  Genitourinary: Negative for difficulty urinating, dysuria, frequency and urgency  Musculoskeletal: Positive for gait problem (at times)  Negative for arthralgias, back pain, joint swelling, myalgias, neck pain and neck stiffness  Skin: Negative for color change, rash and wound  Neurological: Negative for dizziness, syncope, speech difficulty, weakness, light-headedness and headaches  Hematological: Negative for adenopathy  Does not bruise/bleed easily  Psychiatric/Behavioral: Negative for agitation, behavioral problems, confusion, hallucinations, sleep disturbance and suicidal ideas  The patient is not nervous/anxious            Current Medications       Current Outpatient Medications:     Ascorbic Acid (vitamin C) 100 MG tablet, Take 100 mg by mouth daily, Disp: , Rfl:     aspirin (ECOTRIN LOW STRENGTH) 81 mg EC tablet, Take 81 mg by mouth daily, Disp: , Rfl:     cholecalciferol (VITAMIN D3) 1,000 units tablet, Take 1,000 Units by mouth daily, Disp: , Rfl:     lisinopril-hydrochlorothiazide (PRINZIDE,ZESTORETIC) 20-12 5 MG per tablet, take 1 tablet by mouth twice a day, Disp: 180 tablet, Rfl: 3    metFORMIN (GLUCOPHAGE) 500 mg tablet, take 2 tablets by mouth every morning then take 1 tablet every evening, Disp: 270 tablet, Rfl: 3    Multiple Vitamins-Minerals (MICHELLE MULTI MEN) TABS, Take by mouth 2 (two) times a day, Disp: , Rfl:     Current Allergies     Allergies as of 06/24/2022 - Reviewed 06/24/2022   Allergen Reaction Noted    Plavix [clopidogrel] Rash 09/28/2019            The following portions of the patient's history were reviewed and updated as appropriate: allergies, current medications, past family history, past medical history, past social history, past surgical history and problem list      Past Medical History:   Diagnosis Date    Diabetes mellitus ([address] )     Hypertension     TIA (transient ischemic attack)        Past Surgical History:   Procedure Laterality Date    CYSTECTOMY      Per patient cyst removal from his back    LASIK         Family History   Problem Relation Age of Onset    Colon cancer Mother     Diabetes type II Mother     Heart disease Mother     Prostate cancer Father          Medications have been verified  Objective   /78   Pulse 80   Temp 98 4 °F (36 9 °C) (Tympanic)   Resp 18   Ht 6' (1 829 m)   Wt 107 kg (236 lb)   SpO2 98%   BMI 32 01 kg/m²        Physical Exam     Physical Exam  Vitals and nursing note reviewed  Exam conducted with a chaperone present (wife)  Constitutional:       General: He is not in acute distress  Appearance: He is well-developed  He is not diaphoretic  Neck:      Thyroid: No thyromegaly  Trachea: No tracheal deviation  Cardiovascular:      Rate and Rhythm: Normal rate and regular rhythm  Heart sounds: Normal heart sounds  No murmur heard  Pulmonary:      Effort: Pulmonary effort is normal  No respiratory distress  Breath sounds: Normal breath sounds  No wheezing  Musculoskeletal:         General: No tenderness or deformity  Normal range of motion  Cervical back: Normal range of motion and neck supple  Skin:     General: Skin is warm and dry  Neurological:      Mental Status: He is alert and oriented to person, place, and time  Psychiatric:         Mood and Affect: Mood normal          Speech: Speech normal          Behavior: Behavior normal          Thought Content:  Thought content normal          Judgment: Judgment normal

## 2022-06-28 ENCOUNTER — OFFICE VISIT (OUTPATIENT)
Dept: UROLOGY | Facility: CLINIC | Age: 77
End: 2022-06-28
Payer: COMMERCIAL

## 2022-06-28 VITALS
WEIGHT: 236 LBS | DIASTOLIC BLOOD PRESSURE: 64 MMHG | OXYGEN SATURATION: 96 % | HEART RATE: 46 BPM | BODY MASS INDEX: 31.97 KG/M2 | HEIGHT: 72 IN | SYSTOLIC BLOOD PRESSURE: 122 MMHG

## 2022-06-28 DIAGNOSIS — C64.1 MALIGNANT NEOPLASM OF RIGHT KIDNEY (HCC): Primary | ICD-10-CM

## 2022-06-28 PROCEDURE — 1036F TOBACCO NON-USER: CPT | Performed by: UROLOGY

## 2022-06-28 PROCEDURE — 1160F RVW MEDS BY RX/DR IN RCRD: CPT | Performed by: UROLOGY

## 2022-06-28 PROCEDURE — 99215 OFFICE O/P EST HI 40 MIN: CPT | Performed by: UROLOGY

## 2022-06-28 PROCEDURE — 3008F BODY MASS INDEX DOCD: CPT | Performed by: UROLOGY

## 2022-06-28 NOTE — H&P (VIEW-ONLY)
6/28/2022    Denver Derry  1945  [id number]        Assessment  5 cm central right renal mass suspicious for renal cell carcinoma      Discussion  I had a lengthy discussion with the patient and his wife in the office today regarding his centrally located 5 cm right renal mass highly suspicious for renal cell carcinoma based on CT and MRI criteria  I do not feel that the mass is amenable to a partial nephrectomy  I recommend a robot assisted laparoscopic right radical nephrectomy  Risk of the procedure including, but not limited to, bleeding, infection, conversion to an open procedure, renal failure, and disease recurrence were discussed and reviewed  Informed consent obtained  Bowel preparations were reviewed  History of Present Illness  78 y o  male with a history of an incidentally found 5 cm right renal mass highly suspicious for renal cell carcinoma  The mass was identified on an ultrasound and confirmed by CT and MRI  I reviewed all imaging studies and I feel that based on the size and location of the mass the lesion is not amenable to a partial nephrectomy and that the patient will require a radical nephrectomy  He states he is otherwise healthy  He denies any prior abdominal surgery  AUA Symptom Score      Review of Systems  Review of Systems   Constitutional: Negative  HENT: Negative  Eyes: Negative  Respiratory: Negative  Cardiovascular: Negative  Gastrointestinal: Negative  Endocrine: Negative  Genitourinary:        Per HPI   Musculoskeletal: Negative  Skin: Negative  Allergic/Immunologic: Negative  Neurological: Negative  Hematological: Negative  Psychiatric/Behavioral: Negative            Past Medical History  Past Medical History:   Diagnosis Date    Diabetes mellitus ([address] )     Hypertension     TIA (transient ischemic attack)        Past Social History  Past Surgical History:   Procedure Laterality Date    CYSTECTOMY      Per patient cyst removal from his back    LASIK         Past Family History  Family History   Problem Relation Age of Onset    Colon cancer Mother     Diabetes type II Mother     Heart disease Mother     Prostate cancer Father        Past Social history  Social History     Socioeconomic History    Marital status: /Civil Union     Spouse name: Not on file    Number of children: Not on file    Years of education: Not on file    Highest education level: Not on file   Occupational History    Occupation: Retired 2015 -     Tobacco Use    Smoking status: Never Smoker    Smokeless tobacco: Never Used   Vaping Use    Vaping Use: Never used   Substance and Sexual Activity    Alcohol use: Never    Drug use: Never    Sexual activity: Not on file   Other Topics Concern    Not on file   Social History Narrative    Denies drug use - As per [address]    Consumes on average 1 cup of regular coffee per day      Social Determinants of Health     Financial Resource Strain: Not on file   Food Insecurity: Not on file   Transportation Needs: Not on file   Physical Activity: Not on file   Stress: Not on file   Social Connections: Not on file   Intimate Partner Violence: Not on file   Housing Stability: Not on file       Current Medications  Current Outpatient Medications   Medication Sig Dispense Refill    Ascorbic Acid (vitamin C) 100 MG tablet Take 100 mg by mouth daily      aspirin (ECOTRIN LOW STRENGTH) 81 mg EC tablet Take 81 mg by mouth daily      cholecalciferol (VITAMIN D3) 1,000 units tablet Take 1,000 Units by mouth daily      lisinopril-hydrochlorothiazide (PRINZIDE,ZESTORETIC) 20-12 5 MG per tablet take 1 tablet by mouth twice a day 180 tablet 3    metFORMIN (GLUCOPHAGE) 500 mg tablet take 2 tablets by mouth every morning then take 1 tablet every evening 270 tablet 3    Multiple Vitamins-Minerals (MICHELLE MULTI MEN) TABS Take by mouth 2 (two) times a day       No current facility-administered medications for this visit  Allergies  Allergies   Allergen Reactions    Plavix [Clopidogrel] Rash     Stopped two days ago because got a rash and doctors thought it was from this        Past Medical History, Social History, Family History, medications and allergies were reviewed  Vitals  Vitals:    06/28/22 1417   BP: 122/64   BP Location: Left arm   Patient Position: Sitting   Cuff Size: Adult   Pulse: (!) 46   SpO2: 96%   Weight: 107 kg (236 lb)   Height: 6' (1 829 m)       Physical Exam  Physical Exam    On examination he is in no acute distress  Cardiac is regular  He has no respiratory distress  Abdomen is soft nontender nondistended  There are no abdominal scars noted  Skin is warm  Extremities without edema    Neurologic is grossly intact and nonfocal   Gait normal   Affect normal   Results  Lab Results   Component Value Date    PSA 1 1 10/07/2021    PSA 0 9 09/30/2020     Lab Results   Component Value Date    CALCIUM 9 5 06/15/2022    K 4 2 06/15/2022    CO2 28 06/15/2022     06/15/2022    BUN 24 06/15/2022    CREATININE 1 37 (H) 06/15/2022     Lab Results   Component Value Date    WBC 7 90 06/15/2022    HGB 15 1 06/15/2022    HCT 45 6 06/15/2022    MCV 94 06/15/2022     06/15/2022         Office Urine Dip  No results found for this or any previous visit (from the past 1 hour(s)) ]

## 2022-06-28 NOTE — PROGRESS NOTES
6/28/2022    Jessica Ramsey  1945  [id number]        Assessment  5 cm central right renal mass suspicious for renal cell carcinoma      Discussion  I had a lengthy discussion with the patient and his wife in the office today regarding his centrally located 5 cm right renal mass highly suspicious for renal cell carcinoma based on CT and MRI criteria  I do not feel that the mass is amenable to a partial nephrectomy  I recommend a robot assisted laparoscopic right radical nephrectomy  Risk of the procedure including, but not limited to, bleeding, infection, conversion to an open procedure, renal failure, and disease recurrence were discussed and reviewed  Informed consent obtained  Bowel preparations were reviewed  History of Present Illness  78 y o  male with a history of an incidentally found 5 cm right renal mass highly suspicious for renal cell carcinoma  The mass was identified on an ultrasound and confirmed by CT and MRI  I reviewed all imaging studies and I feel that based on the size and location of the mass the lesion is not amenable to a partial nephrectomy and that the patient will require a radical nephrectomy  He states he is otherwise healthy  He denies any prior abdominal surgery  AUA Symptom Score      Review of Systems  Review of Systems   Constitutional: Negative  HENT: Negative  Eyes: Negative  Respiratory: Negative  Cardiovascular: Negative  Gastrointestinal: Negative  Endocrine: Negative  Genitourinary:        Per HPI   Musculoskeletal: Negative  Skin: Negative  Allergic/Immunologic: Negative  Neurological: Negative  Hematological: Negative  Psychiatric/Behavioral: Negative            Past Medical History  Past Medical History:   Diagnosis Date    Diabetes mellitus ([address] )     Hypertension     TIA (transient ischemic attack)        Past Social History  Past Surgical History:   Procedure Laterality Date    CYSTECTOMY      Per patient cyst removal from his back    LASIK         Past Family History  Family History   Problem Relation Age of Onset    Colon cancer Mother     Diabetes type II Mother     Heart disease Mother     Prostate cancer Father        Past Social history  Social History     Socioeconomic History    Marital status: /Civil Union     Spouse name: Not on file    Number of children: Not on file    Years of education: Not on file    Highest education level: Not on file   Occupational History    Occupation: Retired 2015 -     Tobacco Use    Smoking status: Never Smoker    Smokeless tobacco: Never Used   Vaping Use    Vaping Use: Never used   Substance and Sexual Activity    Alcohol use: Never    Drug use: Never    Sexual activity: Not on file   Other Topics Concern    Not on file   Social History Narrative    Denies drug use - As per [address]    Consumes on average 1 cup of regular coffee per day      Social Determinants of Health     Financial Resource Strain: Not on file   Food Insecurity: Not on file   Transportation Needs: Not on file   Physical Activity: Not on file   Stress: Not on file   Social Connections: Not on file   Intimate Partner Violence: Not on file   Housing Stability: Not on file       Current Medications  Current Outpatient Medications   Medication Sig Dispense Refill    Ascorbic Acid (vitamin C) 100 MG tablet Take 100 mg by mouth daily      aspirin (ECOTRIN LOW STRENGTH) 81 mg EC tablet Take 81 mg by mouth daily      cholecalciferol (VITAMIN D3) 1,000 units tablet Take 1,000 Units by mouth daily      lisinopril-hydrochlorothiazide (PRINZIDE,ZESTORETIC) 20-12 5 MG per tablet take 1 tablet by mouth twice a day 180 tablet 3    metFORMIN (GLUCOPHAGE) 500 mg tablet take 2 tablets by mouth every morning then take 1 tablet every evening 270 tablet 3    Multiple Vitamins-Minerals (MICHELLE MULTI MEN) TABS Take by mouth 2 (two) times a day       No current facility-administered medications for this visit  Allergies  Allergies   Allergen Reactions    Plavix [Clopidogrel] Rash     Stopped two days ago because got a rash and doctors thought it was from this        Past Medical History, Social History, Family History, medications and allergies were reviewed  Vitals  Vitals:    06/28/22 1417   BP: 122/64   BP Location: Left arm   Patient Position: Sitting   Cuff Size: Adult   Pulse: (!) 46   SpO2: 96%   Weight: 107 kg (236 lb)   Height: 6' (1 829 m)       Physical Exam  Physical Exam    On examination he is in no acute distress  Cardiac is regular  He has no respiratory distress  Abdomen is soft nontender nondistended  There are no abdominal scars noted  Skin is warm  Extremities without edema    Neurologic is grossly intact and nonfocal   Gait normal   Affect normal   Results  Lab Results   Component Value Date    PSA 1 1 10/07/2021    PSA 0 9 09/30/2020     Lab Results   Component Value Date    CALCIUM 9 5 06/15/2022    K 4 2 06/15/2022    CO2 28 06/15/2022     06/15/2022    BUN 24 06/15/2022    CREATININE 1 37 (H) 06/15/2022     Lab Results   Component Value Date    WBC 7 90 06/15/2022    HGB 15 1 06/15/2022    HCT 45 6 06/15/2022    MCV 94 06/15/2022     06/15/2022         Office Urine Dip  No results found for this or any previous visit (from the past 1 hour(s)) ]

## 2022-07-06 ENCOUNTER — HOSPITAL ENCOUNTER (INPATIENT)
Facility: HOSPITAL | Age: 77
LOS: 12 days | Discharge: HOME HEALTH | DRG: 656 | End: 2022-07-18
Attending: UROLOGY | Admitting: UROLOGY
Payer: COMMERCIAL

## 2022-07-06 ENCOUNTER — ANESTHESIA (OUTPATIENT)
Dept: PERIOP | Facility: HOSPITAL | Age: 77
DRG: 656 | End: 2022-07-06
Payer: COMMERCIAL

## 2022-07-06 DIAGNOSIS — N17.9 AKI (ACUTE KIDNEY INJURY) (HCC): ICD-10-CM

## 2022-07-06 DIAGNOSIS — E11.22 TYPE 2 DIABETES MELLITUS WITH STAGE 3A CHRONIC KIDNEY DISEASE, WITHOUT LONG-TERM CURRENT USE OF INSULIN (HCC): ICD-10-CM

## 2022-07-06 DIAGNOSIS — C64.1 RENAL CELL CANCER, RIGHT (HCC): ICD-10-CM

## 2022-07-06 DIAGNOSIS — C64.1 MALIGNANT NEOPLASM OF RIGHT KIDNEY (HCC): ICD-10-CM

## 2022-07-06 DIAGNOSIS — K92.0 COFFEE GROUND EMESIS: ICD-10-CM

## 2022-07-06 DIAGNOSIS — I10 ESSENTIAL HYPERTENSION: ICD-10-CM

## 2022-07-06 DIAGNOSIS — N18.31 TYPE 2 DIABETES MELLITUS WITH STAGE 3A CHRONIC KIDNEY DISEASE, WITHOUT LONG-TERM CURRENT USE OF INSULIN (HCC): ICD-10-CM

## 2022-07-06 DIAGNOSIS — N18.31 STAGE 3A CHRONIC KIDNEY DISEASE (HCC): Primary | ICD-10-CM

## 2022-07-06 DIAGNOSIS — I26.99 PULMONARY EMBOLISM AND INFARCTION (HCC): ICD-10-CM

## 2022-07-06 DIAGNOSIS — A41.9 SEPSIS (HCC): ICD-10-CM

## 2022-07-06 DIAGNOSIS — I48.91 ATRIAL FIBRILLATION WITH RAPID VENTRICULAR RESPONSE (HCC): ICD-10-CM

## 2022-07-06 LAB
ABO GROUP BLD: NORMAL
ANION GAP SERPL CALCULATED.3IONS-SCNC: 9 MMOL/L (ref 4–13)
BASOPHILS # BLD AUTO: 0.03 THOUSANDS/ΜL (ref 0–0.1)
BASOPHILS NFR BLD AUTO: 0 % (ref 0–1)
BUN SERPL-MCNC: 22 MG/DL (ref 5–25)
CALCIUM SERPL-MCNC: 8.5 MG/DL (ref 8.3–10.1)
CHLORIDE SERPL-SCNC: 107 MMOL/L (ref 100–108)
CO2 SERPL-SCNC: 22 MMOL/L (ref 21–32)
CREAT SERPL-MCNC: 1.8 MG/DL (ref 0.6–1.3)
EOSINOPHIL # BLD AUTO: 0.03 THOUSAND/ΜL (ref 0–0.61)
EOSINOPHIL NFR BLD AUTO: 0 % (ref 0–6)
ERYTHROCYTE [DISTWIDTH] IN BLOOD BY AUTOMATED COUNT: 12.4 % (ref 11.6–15.1)
GFR SERPL CREATININE-BSD FRML MDRD: 36 ML/MIN/1.73SQ M
GLUCOSE P FAST SERPL-MCNC: 202 MG/DL (ref 65–99)
GLUCOSE SERPL-MCNC: 166 MG/DL (ref 65–140)
GLUCOSE SERPL-MCNC: 198 MG/DL (ref 65–140)
GLUCOSE SERPL-MCNC: 202 MG/DL (ref 65–140)
GLUCOSE SERPL-MCNC: 231 MG/DL (ref 65–140)
HCT VFR BLD AUTO: 41 % (ref 36.5–49.3)
HGB BLD-MCNC: 13.5 G/DL (ref 12–17)
IMM GRANULOCYTES # BLD AUTO: 0.09 THOUSAND/UL (ref 0–0.2)
IMM GRANULOCYTES NFR BLD AUTO: 1 % (ref 0–2)
LYMPHOCYTES # BLD AUTO: 0.51 THOUSANDS/ΜL (ref 0.6–4.47)
LYMPHOCYTES NFR BLD AUTO: 4 % (ref 14–44)
MCH RBC QN AUTO: 30.8 PG (ref 26.8–34.3)
MCHC RBC AUTO-ENTMCNC: 32.9 G/DL (ref 31.4–37.4)
MCV RBC AUTO: 93 FL (ref 82–98)
MONOCYTES # BLD AUTO: 0.62 THOUSAND/ΜL (ref 0.17–1.22)
MONOCYTES NFR BLD AUTO: 5 % (ref 4–12)
NEUTROPHILS # BLD AUTO: 12.12 THOUSANDS/ΜL (ref 1.85–7.62)
NEUTS SEG NFR BLD AUTO: 90 % (ref 43–75)
NRBC BLD AUTO-RTO: 0 /100 WBCS
PLATELET # BLD AUTO: 115 THOUSANDS/UL (ref 149–390)
PLATELET # BLD AUTO: 120 THOUSANDS/UL (ref 149–390)
PMV BLD AUTO: 11.4 FL (ref 8.9–12.7)
PMV BLD AUTO: 12 FL (ref 8.9–12.7)
POTASSIUM SERPL-SCNC: 3.7 MMOL/L (ref 3.5–5.3)
RBC # BLD AUTO: 4.39 MILLION/UL (ref 3.88–5.62)
RH BLD: POSITIVE
SODIUM SERPL-SCNC: 138 MMOL/L (ref 136–145)
WBC # BLD AUTO: 13.4 THOUSAND/UL (ref 4.31–10.16)

## 2022-07-06 PROCEDURE — 88342 IMHCHEM/IMCYTCHM 1ST ANTB: CPT | Performed by: PATHOLOGY

## 2022-07-06 PROCEDURE — 85049 AUTOMATED PLATELET COUNT: CPT | Performed by: UROLOGY

## 2022-07-06 PROCEDURE — 82948 REAGENT STRIP/BLOOD GLUCOSE: CPT

## 2022-07-06 PROCEDURE — NC001 PR NO CHARGE: Performed by: PHYSICIAN ASSISTANT

## 2022-07-06 PROCEDURE — 99222 1ST HOSP IP/OBS MODERATE 55: CPT | Performed by: NURSE PRACTITIONER

## 2022-07-06 PROCEDURE — 88341 IMHCHEM/IMCYTCHM EA ADD ANTB: CPT | Performed by: PATHOLOGY

## 2022-07-06 PROCEDURE — 8E0W4CZ ROBOTIC ASSISTED PROCEDURE OF TRUNK REGION, PERCUTANEOUS ENDOSCOPIC APPROACH: ICD-10-PCS | Performed by: UROLOGY

## 2022-07-06 PROCEDURE — 50545 LAPARO RADICAL NEPHRECTOMY: CPT | Performed by: UROLOGY

## 2022-07-06 PROCEDURE — 0TT04ZZ RESECTION OF RIGHT KIDNEY, PERCUTANEOUS ENDOSCOPIC APPROACH: ICD-10-PCS | Performed by: UROLOGY

## 2022-07-06 PROCEDURE — 86920 COMPATIBILITY TEST SPIN: CPT

## 2022-07-06 PROCEDURE — S2900 ROBOTIC SURGICAL SYSTEM: HCPCS | Performed by: UROLOGY

## 2022-07-06 PROCEDURE — 85025 COMPLETE CBC W/AUTO DIFF WBC: CPT | Performed by: UROLOGY

## 2022-07-06 PROCEDURE — 80048 BASIC METABOLIC PNL TOTAL CA: CPT | Performed by: UROLOGY

## 2022-07-06 PROCEDURE — 88307 TISSUE EXAM BY PATHOLOGIST: CPT | Performed by: PATHOLOGY

## 2022-07-06 RX ORDER — LIDOCAINE HYDROCHLORIDE 20 MG/ML
INJECTION, SOLUTION EPIDURAL; INFILTRATION; INTRACAUDAL; PERINEURAL AS NEEDED
Status: DISCONTINUED | OUTPATIENT
Start: 2022-07-06 | End: 2022-07-06

## 2022-07-06 RX ORDER — ACETAMINOPHEN 325 MG/1
650 TABLET ORAL EVERY 6 HOURS SCHEDULED
Status: DISCONTINUED | OUTPATIENT
Start: 2022-07-06 | End: 2022-07-18 | Stop reason: HOSPADM

## 2022-07-06 RX ORDER — INSULIN LISPRO 100 [IU]/ML
1-6 INJECTION, SOLUTION INTRAVENOUS; SUBCUTANEOUS
Status: DISCONTINUED | OUTPATIENT
Start: 2022-07-07 | End: 2022-07-07 | Stop reason: ALTCHOICE

## 2022-07-06 RX ORDER — DEXTROSE, SODIUM CHLORIDE, SODIUM LACTATE, POTASSIUM CHLORIDE, AND CALCIUM CHLORIDE 5; .6; .31; .03; .02 G/100ML; G/100ML; G/100ML; G/100ML; G/100ML
125 INJECTION, SOLUTION INTRAVENOUS CONTINUOUS
Status: DISCONTINUED | OUTPATIENT
Start: 2022-07-06 | End: 2022-07-07 | Stop reason: ALTCHOICE

## 2022-07-06 RX ORDER — OXYCODONE HYDROCHLORIDE 5 MG/1
2.5 TABLET ORAL EVERY 4 HOURS PRN
Status: DISCONTINUED | OUTPATIENT
Start: 2022-07-06 | End: 2022-07-18 | Stop reason: HOSPADM

## 2022-07-06 RX ORDER — FENTANYL CITRATE 50 UG/ML
INJECTION, SOLUTION INTRAMUSCULAR; INTRAVENOUS AS NEEDED
Status: DISCONTINUED | OUTPATIENT
Start: 2022-07-06 | End: 2022-07-06

## 2022-07-06 RX ORDER — SODIUM CHLORIDE, SODIUM LACTATE, POTASSIUM CHLORIDE, CALCIUM CHLORIDE 600; 310; 30; 20 MG/100ML; MG/100ML; MG/100ML; MG/100ML
20 INJECTION, SOLUTION INTRAVENOUS CONTINUOUS
Status: DISCONTINUED | OUTPATIENT
Start: 2022-07-06 | End: 2022-07-07 | Stop reason: ALTCHOICE

## 2022-07-06 RX ORDER — CEFAZOLIN SODIUM 2 G/50ML
2000 SOLUTION INTRAVENOUS ONCE
Status: COMPLETED | OUTPATIENT
Start: 2022-07-06 | End: 2022-07-06

## 2022-07-06 RX ORDER — ENOXAPARIN SODIUM 100 MG/ML
40 INJECTION SUBCUTANEOUS DAILY
Status: DISCONTINUED | OUTPATIENT
Start: 2022-07-06 | End: 2022-07-08

## 2022-07-06 RX ORDER — HYDROMORPHONE HCL/PF 1 MG/ML
SYRINGE (ML) INJECTION AS NEEDED
Status: DISCONTINUED | OUTPATIENT
Start: 2022-07-06 | End: 2022-07-06

## 2022-07-06 RX ORDER — MORPHINE SULFATE 4 MG/ML
4 INJECTION, SOLUTION INTRAMUSCULAR; INTRAVENOUS EVERY 2 HOUR PRN
Status: DISPENSED | OUTPATIENT
Start: 2022-07-06 | End: 2022-07-08

## 2022-07-06 RX ORDER — SODIUM CHLORIDE 9 MG/ML
125 INJECTION, SOLUTION INTRAVENOUS CONTINUOUS
Status: DISCONTINUED | OUTPATIENT
Start: 2022-07-06 | End: 2022-07-07 | Stop reason: ALTCHOICE

## 2022-07-06 RX ORDER — PROPOFOL 10 MG/ML
INJECTION, EMULSION INTRAVENOUS AS NEEDED
Status: DISCONTINUED | OUTPATIENT
Start: 2022-07-06 | End: 2022-07-06

## 2022-07-06 RX ORDER — SODIUM CHLORIDE, SODIUM LACTATE, POTASSIUM CHLORIDE, CALCIUM CHLORIDE 600; 310; 30; 20 MG/100ML; MG/100ML; MG/100ML; MG/100ML
125 INJECTION, SOLUTION INTRAVENOUS CONTINUOUS
Status: DISCONTINUED | OUTPATIENT
Start: 2022-07-06 | End: 2022-07-07 | Stop reason: ALTCHOICE

## 2022-07-06 RX ORDER — ONDANSETRON 2 MG/ML
INJECTION INTRAMUSCULAR; INTRAVENOUS AS NEEDED
Status: DISCONTINUED | OUTPATIENT
Start: 2022-07-06 | End: 2022-07-06

## 2022-07-06 RX ORDER — BUPIVACAINE HYDROCHLORIDE 5 MG/ML
INJECTION, SOLUTION EPIDURAL; INTRACAUDAL AS NEEDED
Status: DISCONTINUED | OUTPATIENT
Start: 2022-07-06 | End: 2022-07-06 | Stop reason: HOSPADM

## 2022-07-06 RX ORDER — HYDROMORPHONE HCL IN WATER/PF 6 MG/30 ML
0.2 PATIENT CONTROLLED ANALGESIA SYRINGE INTRAVENOUS
Status: DISCONTINUED | OUTPATIENT
Start: 2022-07-06 | End: 2022-07-06 | Stop reason: HOSPADM

## 2022-07-06 RX ORDER — ROCURONIUM BROMIDE 10 MG/ML
INJECTION, SOLUTION INTRAVENOUS AS NEEDED
Status: DISCONTINUED | OUTPATIENT
Start: 2022-07-06 | End: 2022-07-06

## 2022-07-06 RX ORDER — BACITRACIN, NEOMYCIN, POLYMYXIN B 400; 3.5; 5 [USP'U]/G; MG/G; [USP'U]/G
1 OINTMENT TOPICAL 2 TIMES DAILY
Status: DISCONTINUED | OUTPATIENT
Start: 2022-07-06 | End: 2022-07-18 | Stop reason: HOSPADM

## 2022-07-06 RX ORDER — DEXAMETHASONE SODIUM PHOSPHATE 10 MG/ML
INJECTION, SOLUTION INTRAMUSCULAR; INTRAVENOUS AS NEEDED
Status: DISCONTINUED | OUTPATIENT
Start: 2022-07-06 | End: 2022-07-06

## 2022-07-06 RX ORDER — ALBUMIN, HUMAN INJ 5% 5 %
SOLUTION INTRAVENOUS CONTINUOUS PRN
Status: DISCONTINUED | OUTPATIENT
Start: 2022-07-06 | End: 2022-07-06

## 2022-07-06 RX ORDER — DOCUSATE SODIUM 100 MG/1
100 CAPSULE, LIQUID FILLED ORAL 2 TIMES DAILY
Status: DISCONTINUED | OUTPATIENT
Start: 2022-07-06 | End: 2022-07-18 | Stop reason: HOSPADM

## 2022-07-06 RX ORDER — OXYCODONE HYDROCHLORIDE 5 MG/1
5 TABLET ORAL EVERY 4 HOURS PRN
Status: DISCONTINUED | OUTPATIENT
Start: 2022-07-06 | End: 2022-07-18 | Stop reason: HOSPADM

## 2022-07-06 RX ORDER — ONDANSETRON 2 MG/ML
4 INJECTION INTRAMUSCULAR; INTRAVENOUS ONCE AS NEEDED
Status: DISCONTINUED | OUTPATIENT
Start: 2022-07-06 | End: 2022-07-06 | Stop reason: HOSPADM

## 2022-07-06 RX ORDER — FENTANYL CITRATE/PF 50 MCG/ML
25 SYRINGE (ML) INJECTION
Status: DISCONTINUED | OUTPATIENT
Start: 2022-07-06 | End: 2022-07-06 | Stop reason: HOSPADM

## 2022-07-06 RX ORDER — ONDANSETRON 2 MG/ML
4 INJECTION INTRAMUSCULAR; INTRAVENOUS EVERY 6 HOURS PRN
Status: DISCONTINUED | OUTPATIENT
Start: 2022-07-06 | End: 2022-07-18 | Stop reason: HOSPADM

## 2022-07-06 RX ORDER — LIDOCAINE HYDROCHLORIDE 10 MG/ML
0.5 INJECTION, SOLUTION EPIDURAL; INFILTRATION; INTRACAUDAL; PERINEURAL ONCE AS NEEDED
Status: DISCONTINUED | OUTPATIENT
Start: 2022-07-06 | End: 2022-07-06 | Stop reason: HOSPADM

## 2022-07-06 RX ADMIN — SODIUM CHLORIDE, SODIUM LACTATE, POTASSIUM CHLORIDE, AND CALCIUM CHLORIDE 125 ML/HR: .6; .31; .03; .02 INJECTION, SOLUTION INTRAVENOUS at 12:55

## 2022-07-06 RX ADMIN — ACETAMINOPHEN 650 MG: 325 TABLET, FILM COATED ORAL at 22:02

## 2022-07-06 RX ADMIN — ROCURONIUM BROMIDE 20 MG: 10 INJECTION INTRAVENOUS at 15:51

## 2022-07-06 RX ADMIN — PROPOFOL 120 MG: 10 INJECTION, EMULSION INTRAVENOUS at 13:55

## 2022-07-06 RX ADMIN — ROCURONIUM BROMIDE 10 MG: 10 INJECTION INTRAVENOUS at 15:18

## 2022-07-06 RX ADMIN — Medication 25 MCG: at 18:45

## 2022-07-06 RX ADMIN — ROCURONIUM BROMIDE 10 MG: 10 INJECTION INTRAVENOUS at 16:28

## 2022-07-06 RX ADMIN — FENTANYL CITRATE 50 MCG: 50 INJECTION INTRAMUSCULAR; INTRAVENOUS at 13:55

## 2022-07-06 RX ADMIN — ROCURONIUM BROMIDE 10 MG: 10 INJECTION INTRAVENOUS at 14:30

## 2022-07-06 RX ADMIN — ENOXAPARIN SODIUM 40 MG: 40 INJECTION SUBCUTANEOUS at 22:03

## 2022-07-06 RX ADMIN — SODIUM CHLORIDE, SODIUM LACTATE, POTASSIUM CHLORIDE, AND CALCIUM CHLORIDE: .6; .31; .03; .02 INJECTION, SOLUTION INTRAVENOUS at 16:34

## 2022-07-06 RX ADMIN — HYDROMORPHONE HYDROCHLORIDE 0.2 MG: 0.2 INJECTION, SOLUTION INTRAMUSCULAR; INTRAVENOUS; SUBCUTANEOUS at 18:57

## 2022-07-06 RX ADMIN — SODIUM CHLORIDE: 0.9 INJECTION, SOLUTION INTRAVENOUS at 13:57

## 2022-07-06 RX ADMIN — DEXTROSE, SODIUM CHLORIDE, SODIUM LACTATE, POTASSIUM CHLORIDE, AND CALCIUM CHLORIDE 125 ML/HR: 5; .6; .31; .03; .02 INJECTION, SOLUTION INTRAVENOUS at 19:29

## 2022-07-06 RX ADMIN — Medication 25 MCG: at 18:12

## 2022-07-06 RX ADMIN — ONDANSETRON 4 MG: 2 INJECTION INTRAMUSCULAR; INTRAVENOUS at 17:38

## 2022-07-06 RX ADMIN — FENTANYL CITRATE 25 MCG: 50 INJECTION INTRAMUSCULAR; INTRAVENOUS at 15:21

## 2022-07-06 RX ADMIN — PROPOFOL 30 MG: 10 INJECTION, EMULSION INTRAVENOUS at 13:57

## 2022-07-06 RX ADMIN — ALBUMIN (HUMAN): 12.5 INJECTION, SOLUTION INTRAVENOUS at 14:45

## 2022-07-06 RX ADMIN — Medication 25 MCG: at 18:40

## 2022-07-06 RX ADMIN — BACITRACIN ZINC, NEOMYCIN, POLYMYXIN B 1 SMALL APPLICATION: 400; 3.5; 5 OINTMENT TOPICAL at 22:03

## 2022-07-06 RX ADMIN — LIDOCAINE HYDROCHLORIDE 100 MG: 20 INJECTION, SOLUTION EPIDURAL; INFILTRATION; INTRACAUDAL at 13:55

## 2022-07-06 RX ADMIN — FENTANYL CITRATE 25 MCG: 50 INJECTION INTRAMUSCULAR; INTRAVENOUS at 14:10

## 2022-07-06 RX ADMIN — Medication 25 MCG: at 18:20

## 2022-07-06 RX ADMIN — ROCURONIUM BROMIDE 20 MG: 10 INJECTION INTRAVENOUS at 14:49

## 2022-07-06 RX ADMIN — DOCUSATE SODIUM 100 MG: 100 CAPSULE, LIQUID FILLED ORAL at 22:02

## 2022-07-06 RX ADMIN — CEFAZOLIN SODIUM 2000 MG: 2 SOLUTION INTRAVENOUS at 14:28

## 2022-07-06 RX ADMIN — HYDROMORPHONE HYDROCHLORIDE 0.2 MG: 1 INJECTION, SOLUTION INTRAMUSCULAR; INTRAVENOUS; SUBCUTANEOUS at 17:20

## 2022-07-06 RX ADMIN — DEXAMETHASONE SODIUM PHOSPHATE 10 MG: 10 INJECTION, SOLUTION INTRAMUSCULAR; INTRAVENOUS at 13:55

## 2022-07-06 RX ADMIN — ROCURONIUM BROMIDE 40 MG: 10 INJECTION INTRAVENOUS at 13:55

## 2022-07-06 RX ADMIN — SUGAMMADEX 300 MG: 100 INJECTION, SOLUTION INTRAVENOUS at 17:44

## 2022-07-06 RX ADMIN — PHENYLEPHRINE HYDROCHLORIDE 20 MCG/MIN: 10 INJECTION INTRAVENOUS at 14:21

## 2022-07-06 NOTE — ANESTHESIA PROCEDURE NOTES
Arterial Line Insertion  Performed by: Robles Vilchis MD  Authorized by: Robles Vilchis MD   Patient identity confirmed: verbally with patient  Preparation: Patient was prepped and draped in the usual sterile fashion    Indications: hemodynamic monitoring  Orientation:  Left  Location: radial artery  Procedure Details:  Needle gauge: 20  Number of attempts: 1    Post-procedure:  Post-procedure: dressing applied  Waveform: good waveform  Patient tolerance: Patient tolerated the procedure well with no immediate complications

## 2022-07-06 NOTE — INTERVAL H&P NOTE
H&P reviewed  After examining the patient I find no changes in the patients condition since the H&P had been written  Vitals:    07/06/22 1300   BP: 146/70   Pulse: 94   Resp: 16   Temp: 98 6 °F (37 °C)   SpO2: 96%     Incidental 5 cm right renal mass highly suspicious for renal cell carcinoma based on CT and MRI criteria  Patient has a prior history of TIA and had been on Plavix which has been on hold  Impression:  5 cm central right renal mass highly suspicious for renal cell carcinoma    Plan:  Based on the size and location of the mass I recommend a radical nephrectomy  I do not feel that the mass is amenable to a partial nephrectomy  Will plan for robot assisted laparoscopic right radical nephrectomy  Risk of the procedure including, but not limited to, bleeding, infection, bowel/vascular injury, conversion to open, and renal failure were discussed and reviewed  Informed consent obtained

## 2022-07-06 NOTE — CONSULTS
Consultation - Nephrology   Milly Cedillo 78 y o  male MRN: [id number]  Unit/Bed#: OR POOL Encounter: [id number]    ASSESSMENT and PLAN:  Perioperative optimization to reduce the incidence of acute kidney injury in the setting of Chronic kidney disease IIIA:    Etiology: Suspect secondary to diabetic kidney disease, hypertension nephrosclerosis and age-related nephron loss  Assessment:   After review medical records through [facility] and Care everywhere baseline creatinine between 1 3-1 4 dating back to 2020   Most recent creatinine 1 37 on 06/15/2022 with stable electrolytes and acid-base balance   Patient follows with Dr Sharon Hernandez and last seen on 02/21/2022   Last dose lisinopril/HCTZ was 07/03/2022  Plan:   Avoid hypotension, perturbations of blood pressure to prevent decreased renal perfusion   Avoid NSAIDs, nephrotoxins and limit IV contrast   IV fluids per primary team   Continue to hold lisinopril/HCTZ while admitted   Okay to use amlodipine for blood pressure control if needed while admitted   Continue monitor I/O, lab values volume status closely   Check BMP in a m     Suspect will have a higher creatinine in the setting of decreased nephron loss with nephrectomy    Blood pressure/hypertension:  Assessment and Plan:   Home medications include:  Lisinopril/HCTZ 20-12 5 mg 2 times daily   Current medications include:  None   Maximize hemodynamics to maintain MAP >65   Avoid hypotension or fluctuations in blood pressure   Will continue to trend    Right Renal mass:  Assessment and Plan:   Plan for surgical resection of right-sided renal mass   Per urology office note-concern for renal cell carcinoma   Urology follow-up    Electrolytes:   Assessment and Plan:  · Within normal limits  · Patient with history of hypercalcemia-now resolved    Diabetes:  · Per primary team      HISTORY OF PRESENT ILLNESS:  Requesting Physician: Sarah Stahl MD  Reason for Consult:  Perioperative optimization to reduce the incidence of acute kidney injury in the setting of Chronic Kidney Disease    Latanya Stafford is a 78 y o  male who has past medical history of CKD IIIA, hypertension, diabetes II, TIA, chronic pain syndrome who was found to have a 5 cm right renal mass concerning for renal cell carcinoma who presents for surgical resection  A renal consultation is requested today  On discussion the patient reports overall feeling okay  Reports has chronic lower extremity edema left greater than right and does not wear compression stockings  Last dose of lisinopril/HCTZ was Sunday  Denies NSAID use  Patient follows with Dr Mortimer Scurry and last seen in February       PAST MEDICAL HISTORY:  Past Medical History:   Diagnosis Date    Diabetes mellitus ([address] )     Hypertension     TIA (transient ischemic attack)        PAST SURGICAL HISTORY:  Past Surgical History:   Procedure Laterality Date    CYSTECTOMY      Per patient cyst removal from his back    LASIK         ALLERGIES:  Allergies   Allergen Reactions    Plavix [Clopidogrel] Rash     Stopped two days ago because got a rash and doctors thought it was from this        SOCIAL HISTORY:  Social History     Substance and Sexual Activity   Alcohol Use Never     Social History     Substance and Sexual Activity   Drug Use Never     Social History     Tobacco Use   Smoking Status Never Smoker   Smokeless Tobacco Never Used       FAMILY HISTORY:  Family History   Problem Relation Age of Onset    Colon cancer Mother     Diabetes type II Mother     Heart disease Mother     Prostate cancer Father        MEDICATIONS:    Current Facility-Administered Medications:     ceFAZolin (ANCEF) IVPB (premix in dextrose) 2,000 mg 50 mL, 2,000 mg, Intravenous, Once, Bridget Chakraborty MD    sodium chloride 0 9 % infusion, 125 mL/hr, Intravenous, Continuous, Bridget Chakraborty MD      REVIEW OF SYSTEMS:  Patient seen and examined at bedside in [facility]; prior to surgery  Review of Systems   Constitutional: Negative  HENT: Negative  Eyes: Negative  Respiratory: Negative  Cardiovascular: Positive for leg swelling  Bilateral lower extremity edema and report this is normal   Gastrointestinal: Positive for diarrhea  Reports still having some diarrhea/loose stools since bowel prep prior to surgery   Endocrine: Negative  Genitourinary: Negative  Musculoskeletal: Negative  Skin: Negative  PHYSICAL EXAM:  Current weight:    There were no vitals filed for this visit  Physical Exam  Vitals reviewed  Constitutional:       Appearance: Normal appearance  Comments: No acute distress, sitting on side of stretcher   HENT:      Head: Normocephalic and atraumatic  Nose: Nose normal       Mouth/Throat:      Mouth: Mucous membranes are moist       Pharynx: Oropharynx is clear  Eyes:      Extraocular Movements: Extraocular movements intact  Conjunctiva/sclera: Conjunctivae normal    Cardiovascular:      Rate and Rhythm: Normal rate and regular rhythm  Pulses: Normal pulses  Heart sounds: Normal heart sounds  Pulmonary:      Effort: Pulmonary effort is normal       Breath sounds: Normal breath sounds  Abdominal:      General: Bowel sounds are normal       Palpations: Abdomen is soft  Musculoskeletal:      Cervical back: Normal range of motion and neck supple  Right lower leg: Edema present  Left lower leg: Edema present  Comments: Plus one lower extremity edema bilaterally right greater than left   Skin:     General: Skin is warm and dry  Neurological:      General: No focal deficit present  Mental Status: He is alert and oriented to person, place, and time  Psychiatric:         Mood and Affect: Mood normal          Behavior: Behavior normal          Thought Content:  Thought content normal          Judgment: Judgment normal                 Lab Results:         Invalid input(s): ALBUMIN    Other Studies:

## 2022-07-06 NOTE — ANESTHESIA POSTPROCEDURE EVALUATION
Post-Op Assessment Note    CV Status:  Stable    Pain management: adequate     Mental Status:  Alert and awake   Hydration Status:  Euvolemic   PONV Controlled:  Controlled   Airway Patency:  Patent      Post Op Vitals Reviewed: Yes      Staff: CRNA         No complications documented      BP   175/92   Temp 99 2 °F (37 3 °C) (07/06/22 1805)    Pulse 105 (07/06/22 1805)   Resp 15 (07/06/22 1805)    SpO2 97 % (07/06/22 1805)

## 2022-07-06 NOTE — ANESTHESIA PREPROCEDURE EVALUATION
Procedure:  NEPHRECTOMY RADICAL LAPAROSCOPIC W/ ROBOTICS, poss open (Right Abdomen)    Relevant Problems   CARDIO   (+) Essential hypertension      ENDO   (+) 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 chronic kidney disease, without long-term current use of insulin (HCC)      /RENAL   (+) Kidney cyst, acquired   (+) Stage 3a chronic kidney disease (HCC)      MUSCULOSKELETAL   (+) Chronic right-sided low back pain with right-sided sciatica      NEURO/PSYCH   (+) Chronic pain syndrome   (+) Chronic right-sided low back pain with right-sided sciatica   (+) TIA (transient ischemic attack)     CAD/PCI/MI/CHF -- denies, small unruptured aneurysm possibly the cause  COPD/ASTHMA/ANGEL -- denies  PROBS WITH PRIOR ANESTHESIA -- denies  NPO STATUS CONFIRMED    Lab Results   Component Value Date    SODIUM 137 06/15/2022    K 4 2 06/15/2022    BUN 24 06/15/2022    CREATININE 1 37 (H) 06/15/2022    EGFR 50 06/15/2022     Lab Results   Component Value Date    HGBA1C 7 2 (A) 2022       Lab Results   Component Value Date    HGB 15 1 06/15/2022    HGB 16 0 2022    HGB 15 2 10/07/2021     06/15/2022     2022     10/07/2021     Lab Results   Component Value Date    WBC 7 90 06/15/2022       Lab Results   Component Value Date    CREATININE 1 37 (H) 06/15/2022    CREATININE 1 40 (H) 2022    CREATININE 1 38 (H) 2022       Lab Results   Component Value Date    INR 0 99 06/15/2022    INR 1 23 2019    INR 0 95 2019     Lab Results   Component Value Date    PTT 34 06/15/2022    PTT 40 (H) 2019    PTT 32 2019       No results found for: LACTATE      Type and Screen  O    Transthoracic Echocardiogram  2D, M-mode, Doppler, and Color Doppler     Study date:  01-Aug-2019     Patient: Malka Harmon  MR number: [id number]  Account number: [de-identified]  : 1945  Age: 72 years  Gender: Male  Status: Outpatient  [facility]   Height: 72 in  Weight: 245 5 lb  BP: 163/ 88 mmHg     Indications: TIA      Diagnoses: G45 9 - Transient cerebral ischemic attack, unspecified     Sonographer:  Nael Causey RDCS  Referring Physician:  Hammad Dewey MD  Group:  [facility]  Interpreting Physician:  Hernando Iverson MD     IMPRESSIONS:  Although no cardiac source of embolism was identified, the possibility cannot be ruled out on the basis of this study      SUMMARY     LEFT VENTRICLE:  Systolic function was normal  Ejection fraction was estimated in the range of 50 % to 55 %  There were no regional wall motion abnormalities  Doppler parameters were consistent with abnormal left ventricular relaxation (grade 1 diastolic dysfunction)      RIGHT VENTRICLE:  The size was normal   Systolic function was normal      MITRAL VALVE:  There was mild annular calcification  There was trace regurgitation  Physical Exam    Airway    Mallampati score: I  TM Distance: >3 FB       Dental   upper dentures and lower dentures,     Cardiovascular      Pulmonary      Other Findings        Anesthesia Plan  ASA Score- 3     Anesthesia Type- general with ASA Monitors  Additional Monitors: arterial line  Airway Plan: ETT  Comment:  CONCHIS Perez , have personally seen and evaluated the patient prior to anesthetic care  I have reviewed the pre-anesthetic record, and other medical records if appropriate to the anesthetic care  If a CRNA is involved in the case, I have reviewed the CRNA assessment, if present, and agree  Risks/benefits and alternatives discussed with patient including possible PONV, sore throat, and possibility of rare anesthetic and surgical emergencies          Plan Factors-Exercise tolerance (METS): >4 METS  Chart reviewed  EKG reviewed  Imaging results reviewed  Existing labs reviewed  Patient summary reviewed  Patient is not a current smoker    Patient instructed to abstain from smoking on day of procedure  There is medical exclusion for perioperative obstructive sleep apnea risk education  Induction- intravenous  Postoperative Plan- Plan for postoperative opioid use  Planned trial extubation    Informed Consent- Anesthetic plan and risks discussed with patient  I personally reviewed this patient with the CRNA  Discussed and agreed on the Anesthesia Plan with the CRNA  Silvio Torres

## 2022-07-06 NOTE — OP NOTE
OPERATIVE REPORT  PATIENT NAME: Hannah Doll    :  1945  MRN: [id number]  Pt Location: [facility] OR ROOM 14    SURGERY DATE: 2022    Surgeon(s) and Role:     * Landry Almaguer MD - Primary     * Linden Ramirez PA-C - Assisting     * Miguel Silverman PA-C - Harjinder Andrew MD - Baldemar Opitz, MD - Assisting      The physician assistants were both necessary assistance for port placement, retraction, suction, and passage of instruments  Dr Amber Eller was a required attending assistant surgeon due to the patient's body habitus and size and location of the mass and complexity of the renal hilum  Preop Diagnosis:  Malignant neoplasm of right kidney ([address] ) [C64 1]    Post-Op Diagnosis Codes:     * Malignant neoplasm of right kidney ([address] ) [C64 1]    Procedure(s) (LRB):  NEPHRECTOMY RADICAL LAPAROSCOPIC W/ ROBOTICS, poss open (Right)    Specimen(s):  ID Type Source Tests Collected by Time Destination   1 : RIGHT KIDNEY  Tissue Kidney, Right TISSUE EXAM Landry Almaguer MD 2022 1719        Estimated Blood Loss:   100 ml    Drains:  Urethral Catheter Latex 16 Fr  (Active)   Site Assessment Clean;Skin intact 22   Collection Container Standard drainage bag 22   Securement Method Securing device (Describe) 22   Number of days: 0       [REMOVED] NG/OG/Enteral Tube Orogastric 18 Fr Center mouth (Removed)   Number of days: 0       Anesthesia Type:   General    Operative Indications:  Malignant neoplasm of right kidney ([address] ) [C64 1]      Operative Findings:  Standard robot assisted laparoscopic right radical nephrectomy secondary to a central 5 cm right renal mass suspicious for renal cell carcinoma based on CT and MRI criteria    Complications:   None    Procedure and Technique:  Jose Luis Biggs is a 72-year-old male with a 5 cm central right renal mass which is not amenable to partial nephrectomy    Based on CT and MRI criteria this is most consistent with renal cell carcinoma  Risk and benefits of a robot assisted laparoscopic right radical nephrectomy were discussed reviewed informed consent was obtained  The patient was brought to the operating room on July 6, 2022  After the smooth induction general endotracheal anesthesia, patient was placed into a right lateral decubitus position  His bottom leg was bent and his top leg was straight  All pressure points were padded  An axillary roll was placed  His left arm was placed onto an arm board and the right arm was brought across his body and placed onto a thoracic arm   The bed was flexed  The patient was secured to the bed with gel rolls, talus and tape  A Baird catheter been placed prior to the start of the case  A time-out was performed with all members the operative team confirming the patient's identity, procedure to be performed, laterality of the case  Intravenous antibiotics were administered  A Veress needle was used to enter the abdomen in the right midportion  A pneumoperitoneum was created  Straight line ports were then placed approximately 6-7 fingerbreadths lateral to the umbilicus based on his large pannus  I 1st placed a port approximately 2 finger breaths on this line below the costal margin followed by 3 additional robotic ports all placed 4 fingerbreadths from 1 another along the same line  An additional 12 mm AirSeal port was placed between the camera port and right robotic arm and a subxiphoid 5 mm port was placed to act as a liver retractor  A locking Allis was utilized to lift up the lower edge of the liver  The robot was docked  The colon was reflected medially  This exposed the duodenum which was Kocherized  The vena cava was exposed  A significant amount of fatty tissue was around the kidney  This made dissection of the lower pole challenging  Ultimately the gonadal vein was identified followed by the ureter    The 4th arm of the robot was used to either lift the ureter or to place tension on to the kidney to facilitate dissection along the vena cava until the renal vein was identified  A single renal vein was identified which bifurcated early  There was also early bifurcation of the renal artery  The 2 bifurcated branches of the renal artery were dissected out  The most inferior branch was taken 1st with the endovascular stapler followed by the more superior branch just behind the renal vein  This left just the renal vein remaining which was also taken with the stapler  The remainder of the attachments of the kidney including the ureter and the plane between the kidney and the adrenal gland on the right side were taken with the vessel sealer  The kidney was fully mobilized after all the lateral, superior, and posterior attachments were taken  Hemostasis from the hilum was excellent  Based on the shear size of the kidney I made the decision that the kidney would not fit into an Endo-Catch bag and therefore opened along the straight line ports connecting the robotic ports until the incision was large enough to manually deliver the specimen from the abdominal cavity  This was accomplished with electrocautery while the pneumoperitoneum was in place until a hand could safely be placed into the abdomen to facilitate safe entry into the abdominal cavity  The fascia was then reapproximated with a 0 looped PDS  All incisions were irrigated  The skin was closed with staples  All sponge needle instrument counts were correct  Overall the patient tolerated the procedure well number no complications  The patient was extubated in the operating room transferred the PACU in stable condition at the conclusion the case      Patient Disposition:  PACU stable and extubated      SIGNATURE: Nick Wells MD  DATE: July 6, 2022  TIME: 7:10 PM

## 2022-07-07 ENCOUNTER — APPOINTMENT (INPATIENT)
Dept: RADIOLOGY | Facility: HOSPITAL | Age: 77
DRG: 656 | End: 2022-07-07
Payer: COMMERCIAL

## 2022-07-07 LAB
ANION GAP SERPL CALCULATED.3IONS-SCNC: 11 MMOL/L (ref 4–13)
ANION GAP SERPL CALCULATED.3IONS-SCNC: 7 MMOL/L (ref 4–13)
ATRIAL RATE: 117 BPM
BACTERIA UR QL AUTO: ABNORMAL /HPF
BILIRUB UR QL STRIP: NEGATIVE
BUN SERPL-MCNC: 24 MG/DL (ref 5–25)
BUN SERPL-MCNC: 32 MG/DL (ref 5–25)
CALCIUM SERPL-MCNC: 7.6 MG/DL (ref 8.3–10.1)
CALCIUM SERPL-MCNC: 8.3 MG/DL (ref 8.3–10.1)
CHLORIDE SERPL-SCNC: 103 MMOL/L (ref 100–108)
CHLORIDE SERPL-SCNC: 105 MMOL/L (ref 100–108)
CLARITY UR: CLEAR
CO2 SERPL-SCNC: 20 MMOL/L (ref 21–32)
CO2 SERPL-SCNC: 26 MMOL/L (ref 21–32)
COLOR UR: ABNORMAL
CREAT SERPL-MCNC: 2.39 MG/DL (ref 0.6–1.3)
CREAT SERPL-MCNC: 2.95 MG/DL (ref 0.6–1.3)
ERYTHROCYTE [DISTWIDTH] IN BLOOD BY AUTOMATED COUNT: 12.8 % (ref 11.6–15.1)
GFR SERPL CREATININE-BSD FRML MDRD: 19 ML/MIN/1.73SQ M
GFR SERPL CREATININE-BSD FRML MDRD: 25 ML/MIN/1.73SQ M
GLUCOSE SERPL-MCNC: 1277 MG/DL (ref 65–140)
GLUCOSE SERPL-MCNC: 212 MG/DL (ref 65–140)
GLUCOSE SERPL-MCNC: 235 MG/DL (ref 65–140)
GLUCOSE SERPL-MCNC: 307 MG/DL (ref 65–140)
GLUCOSE SERPL-MCNC: 309 MG/DL (ref 65–140)
GLUCOSE SERPL-MCNC: 311 MG/DL (ref 65–140)
GLUCOSE SERPL-MCNC: 322 MG/DL (ref 65–140)
GLUCOSE SERPL-MCNC: 327 MG/DL (ref 65–140)
GLUCOSE SERPL-MCNC: 345 MG/DL (ref 65–140)
GLUCOSE UR STRIP-MCNC: ABNORMAL MG/DL
HCT VFR BLD AUTO: 31.3 % (ref 36.5–49.3)
HGB BLD-MCNC: 10 G/DL (ref 12–17)
HGB UR QL STRIP.AUTO: ABNORMAL
KETONES UR STRIP-MCNC: ABNORMAL MG/DL
LEUKOCYTE ESTERASE UR QL STRIP: ABNORMAL
MCH RBC QN AUTO: 31.2 PG (ref 26.8–34.3)
MCHC RBC AUTO-ENTMCNC: 31.9 G/DL (ref 31.4–37.4)
MCV RBC AUTO: 98 FL (ref 82–98)
MUCOUS THREADS UR QL AUTO: ABNORMAL
NITRITE UR QL STRIP: NEGATIVE
NON-SQ EPI CELLS URNS QL MICRO: ABNORMAL /HPF
P AXIS: 29 DEGREES
PH UR STRIP.AUTO: 5 [PH]
PLATELET # BLD AUTO: 172 THOUSANDS/UL (ref 149–390)
PMV BLD AUTO: 13 FL (ref 8.9–12.7)
POTASSIUM SERPL-SCNC: 4.3 MMOL/L (ref 3.5–5.3)
POTASSIUM SERPL-SCNC: 4.7 MMOL/L (ref 3.5–5.3)
PR INTERVAL: 144 MS
PROT UR STRIP-MCNC: ABNORMAL MG/DL
QRS AXIS: -24 DEGREES
QRSD INTERVAL: 78 MS
QT INTERVAL: 320 MS
QTC INTERVAL: 446 MS
RBC # BLD AUTO: 3.21 MILLION/UL (ref 3.88–5.62)
RBC #/AREA URNS AUTO: ABNORMAL /HPF
SODIUM SERPL-SCNC: 136 MMOL/L (ref 136–145)
SODIUM SERPL-SCNC: 136 MMOL/L (ref 136–145)
SP GR UR STRIP.AUTO: 1.02 (ref 1–1.03)
T WAVE AXIS: -3 DEGREES
UROBILINOGEN UR STRIP-ACNC: <2 MG/DL
VENTRICULAR RATE: 117 BPM
WBC # BLD AUTO: 16.5 THOUSAND/UL (ref 4.31–10.16)
WBC #/AREA URNS AUTO: ABNORMAL /HPF

## 2022-07-07 PROCEDURE — 99024 POSTOP FOLLOW-UP VISIT: CPT | Performed by: UROLOGY

## 2022-07-07 PROCEDURE — 93010 ELECTROCARDIOGRAM REPORT: CPT | Performed by: INTERNAL MEDICINE

## 2022-07-07 PROCEDURE — 80048 BASIC METABOLIC PNL TOTAL CA: CPT | Performed by: PHYSICIAN ASSISTANT

## 2022-07-07 PROCEDURE — 80048 BASIC METABOLIC PNL TOTAL CA: CPT | Performed by: UROLOGY

## 2022-07-07 PROCEDURE — 99222 1ST HOSP IP/OBS MODERATE 55: CPT | Performed by: INTERNAL MEDICINE

## 2022-07-07 PROCEDURE — 99233 SBSQ HOSP IP/OBS HIGH 50: CPT | Performed by: INTERNAL MEDICINE

## 2022-07-07 PROCEDURE — 81001 URINALYSIS AUTO W/SCOPE: CPT | Performed by: INTERNAL MEDICINE

## 2022-07-07 PROCEDURE — 74018 RADEX ABDOMEN 1 VIEW: CPT

## 2022-07-07 PROCEDURE — 85027 COMPLETE CBC AUTOMATED: CPT | Performed by: UROLOGY

## 2022-07-07 PROCEDURE — 93005 ELECTROCARDIOGRAM TRACING: CPT

## 2022-07-07 PROCEDURE — 82948 REAGENT STRIP/BLOOD GLUCOSE: CPT

## 2022-07-07 PROCEDURE — C9113 INJ PANTOPRAZOLE SODIUM, VIA: HCPCS | Performed by: STUDENT IN AN ORGANIZED HEALTH CARE EDUCATION/TRAINING PROGRAM

## 2022-07-07 RX ORDER — INSULIN LISPRO 100 [IU]/ML
1-6 INJECTION, SOLUTION INTRAVENOUS; SUBCUTANEOUS EVERY 6 HOURS SCHEDULED
Status: DISCONTINUED | OUTPATIENT
Start: 2022-07-07 | End: 2022-07-11

## 2022-07-07 RX ORDER — PANTOPRAZOLE SODIUM 40 MG/10ML
40 INJECTION, POWDER, LYOPHILIZED, FOR SOLUTION INTRAVENOUS
Status: DISCONTINUED | OUTPATIENT
Start: 2022-07-07 | End: 2022-07-07

## 2022-07-07 RX ORDER — PANTOPRAZOLE SODIUM 40 MG/10ML
40 INJECTION, POWDER, LYOPHILIZED, FOR SOLUTION INTRAVENOUS EVERY 12 HOURS SCHEDULED
Status: DISCONTINUED | OUTPATIENT
Start: 2022-07-07 | End: 2022-07-12

## 2022-07-07 RX ORDER — SODIUM CHLORIDE 9 MG/ML
50 INJECTION, SOLUTION INTRAVENOUS CONTINUOUS
Status: DISCONTINUED | OUTPATIENT
Start: 2022-07-07 | End: 2022-07-12

## 2022-07-07 RX ADMIN — MORPHINE SULFATE 4 MG: 4 INJECTION INTRAVENOUS at 08:39

## 2022-07-07 RX ADMIN — SODIUM CHLORIDE 100 ML/HR: 0.9 INJECTION, SOLUTION INTRAVENOUS at 22:45

## 2022-07-07 RX ADMIN — BACITRACIN ZINC, NEOMYCIN, POLYMYXIN B 1 SMALL APPLICATION: 400; 3.5; 5 OINTMENT TOPICAL at 08:40

## 2022-07-07 RX ADMIN — BACITRACIN ZINC, NEOMYCIN, POLYMYXIN B 1 SMALL APPLICATION: 400; 3.5; 5 OINTMENT TOPICAL at 20:10

## 2022-07-07 RX ADMIN — ENOXAPARIN SODIUM 40 MG: 40 INJECTION SUBCUTANEOUS at 08:41

## 2022-07-07 RX ADMIN — PANTOPRAZOLE SODIUM 40 MG: 40 INJECTION, POWDER, FOR SOLUTION INTRAVENOUS at 22:42

## 2022-07-07 RX ADMIN — SODIUM CHLORIDE, SODIUM LACTATE, POTASSIUM CHLORIDE, AND CALCIUM CHLORIDE 1000 ML: .6; .31; .03; .02 INJECTION, SOLUTION INTRAVENOUS at 01:00

## 2022-07-07 RX ADMIN — MORPHINE SULFATE 4 MG: 4 INJECTION INTRAVENOUS at 12:52

## 2022-07-07 RX ADMIN — INSULIN LISPRO 5 UNITS: 100 INJECTION, SOLUTION INTRAVENOUS; SUBCUTANEOUS at 08:40

## 2022-07-07 RX ADMIN — INSULIN LISPRO 4 UNITS: 100 INJECTION, SOLUTION INTRAVENOUS; SUBCUTANEOUS at 12:53

## 2022-07-07 RX ADMIN — ACETAMINOPHEN 650 MG: 325 TABLET, FILM COATED ORAL at 05:20

## 2022-07-07 RX ADMIN — SODIUM CHLORIDE 100 ML/HR: 0.9 INJECTION, SOLUTION INTRAVENOUS at 13:05

## 2022-07-07 RX ADMIN — INSULIN LISPRO 3 UNITS: 100 INJECTION, SOLUTION INTRAVENOUS; SUBCUTANEOUS at 20:10

## 2022-07-07 RX ADMIN — OXYCODONE HYDROCHLORIDE 5 MG: 5 TABLET ORAL at 02:54

## 2022-07-07 RX ADMIN — PANTOPRAZOLE SODIUM 40 MG: 40 INJECTION, POWDER, FOR SOLUTION INTRAVENOUS at 12:52

## 2022-07-07 RX ADMIN — ONDANSETRON 4 MG: 2 INJECTION INTRAMUSCULAR; INTRAVENOUS at 01:06

## 2022-07-07 NOTE — PROGRESS NOTES
NEPHROLOGY PROGRESS NOTE   Richa Michelle 78 y o  male MRN: [id number]  Unit/Bed#: -01 Encounter: [id number]    ASSESSMENT & PLAN:  53-year-old male with past history of CKD stage IIIA, hypertension, diabetes mellitus type 2, chronic lower extremity edema left more than right right renal mass concerning for renal cell carcinoma was admitted and underwent elective laparoscopic radical right nephrectomy on 07/06  Nephrology following for postop RUPAL  Acute kidney injury on chronic kidney disease stage 3a  -baseline creatinine 1 3-1 4 dating back to 2020, recently was 1 37 on 06/15  -follows with Dr Francisco Fuller  -chronic kidney disease likely due to diabetic kidney disease, hypertensive nephrosclerosis and age-related nephron loss  -creatinine was trending up to 1 8 postop on 07/06, further worsened to peak creatinine 2 9 on 07/07  Patient being tachycardic, blood pressure was once in 90s on 07/07 night     Did require Levophed on 07/06  -postop acute kidney injury likely due to hemodynamic changes/hyperglycemia with glucose of 1277 on 07/07-? Draw from IV getting D5 LR/ hypotension/tachycardia and from nephron loss status post right nephrectomy on 7/6    -Check UA urine microscopy, previous UA from February was bland   -recommend avoiding hypotension  -continue current IV fluids  May consider IV albumin if blood pressure drops  Avoid nephrotoxins and NSAIDs  Avoid hypotension  -recommend avoiding CT with IV contrast due to risk of contrast nephropathy    Primary hypertension  -blood pressure currently acceptable  -continue to hold lisinopril HCTZ  Currently not on any antihypertensive medication  Avoid hypotension    Anemia hemoglobin trending down to 10 0, continue to monitor    Right renal mass, status post radical laparoscopic nephrectomy on 07/06, continue postop care per Urology      Diabetes mellitus with Hyperglycemia likely due to D5 LR, also suspecting blood draw from same site as receiving IV fluid  Repeat glucose level was much better    Discussed with primary team    SUBJECTIVE:  Complaining of nausea and had episode of vomiting  No chest pain or SOB  OBJECTIVE:  Current Weight: Weight - Scale: 107 kg (236 lb)  Vitals:    07/07/22 0729   BP: 148/97   Pulse: (!) 109   Resp:    Temp: 98 4 °F (36 9 °C)   SpO2: 92%       Intake/Output Summary (Last 24 hours) at 7/7/2022 1422  Last data filed at 7/7/2022 0729  Gross per 24 hour   Intake 2450 ml   Output 710 ml   Net 1740 ml       Physical Exam  General:  Ill looking, awake  Eyes: Conjunctivae pink,  Sclera anicteric  ENT: lips and mucous membranes moist  Neck: supple   Chest: Clear to Auscultation both lungs,  no crackles, ronchus or wheezing  CVS: S1 & S2 present, normal rate, regular rhythm, no murmur  Abdomen: soft, non-tender, non-distended, Bowel sounds normoactive   Stapled incisions   Extremities: trace edema of  legs  Skin: no rash  Neuro: awake, alert, oriented x 3   Psych: Mood and affect appropriate     Medications:    Current Facility-Administered Medications:     acetaminophen (TYLENOL) tablet 650 mg, 650 mg, Oral, Q6H [address], Rufus Ruiz MD, 650 mg at 07/07/22 0520    docusate sodium (COLACE) capsule 100 mg, 100 mg, Oral, BID, Rufus Ruiz MD, 100 mg at 07/06/22 2202    enoxaparin (LOVENOX) subcutaneous injection 40 mg, 40 mg, Subcutaneous, Daily, Rufus Ruiz MD, 40 mg at 07/07/22 0841    insulin lispro (HumaLOG) 100 units/mL subcutaneous injection 1-6 Units, 1-6 Units, Subcutaneous, Q6H [address], 4 Units at 07/07/22 1253 **AND** Fingerstick Glucose (POCT), , , Q6H, Jamia Olguin PA-C    lactated ringers bolus 1,000 mL, 1,000 mL, Intravenous, Once PRN, Last Rate: 1,000 mL/hr at 07/07/22 0100, 1,000 mL at 07/07/22 0100 **AND** lactated ringers bolus 1,000 mL, 1,000 mL, Intravenous, Once PRN, Rufus Ruiz MD    morphine injection 4 mg, 4 mg, Intravenous, Q2H PRN, Rufus Ruiz MD, 4 mg at 07/07/22 1252    neomycin-bacitracin-polymyxin b (NEOSPORIN) ointment 1 small application, 1 small application, Topical, BID, Virgilio Verdugo MD, 1 small application at 61/50/59 0840    ondansetron [facility]) injection 4 mg, 4 mg, Intravenous, Q6H PRN, Virgilio Verdugo MD, 4 mg at 07/07/22 0106    oxyCODONE (ROXICODONE) IR tablet 2 5 mg, 2 5 mg, Oral, Q4H PRN, Virgilio Verdugo MD    oxyCODONE (ROXICODONE) IR tablet 5 mg, 5 mg, Oral, Q4H PRN, Virgilio Verdugo MD, 5 mg at 07/07/22 0254    pantoprazole (PROTONIX) injection 40 mg, 40 mg, Intravenous, Q12H [address], Nella Lakes, DO, 40 mg at 07/07/22 1252    sodium chloride 0 9 % bolus 1,000 mL, 1,000 mL, Intravenous, Once PRN **AND** sodium chloride 0 9 % bolus 1,000 mL, 1,000 mL, Intravenous, Once PRN, Virgilio Verdugo MD    sodium chloride 0 9 % infusion, 100 mL/hr, Intravenous, Continuous, Niharika Garcia PA-C, Last Rate: 100 mL/hr at 07/07/22 1305, 100 mL/hr at 07/07/22 1305    Invasive Devices:   Urethral Catheter Latex 16 Fr   (Active)   Site Assessment Clean;Skin intact 07/06/22 1805   Baird Care Done 07/07/22 0839   Collection Container Standard drainage bag 07/06/22 1930   Securement Method Securing device (Describe) 07/06/22 1930   Output (mL) 350 mL 07/07/22 0729       Lab Results:   Results from last 7 days   Lab Units 07/07/22  1115 07/07/22  0817 07/07/22  0518 07/06/22  2125 07/06/22  1843   WBC Thousand/uL  --   --  16 50*  --  13 40*   HEMOGLOBIN g/dL  --   --  10 0*  --  13 5   HEMATOCRIT %  --   --  31 3*  --  41 0   PLATELETS Thousands/uL  --   --  172 115* 120*   POTASSIUM mmol/L 4 7 4 3  --   --  3 7   CHLORIDE mmol/L 103 105  --   --  107   CO2 mmol/L 26 20*  --   --  22   BUN mg/dL 32* 24  --   --  22   CREATININE mg/dL 2 95* 2 39*  --   --  1 80*   CALCIUM mg/dL 8 3 7 6*  --   --  8 5       Previous work up:    Prior MRI:   IMPRESSION:     Enhancing, predominantly solid 4 8 x 4 3 x 5 1 cm anterior lower pole right renal cortical mass extending into the renal sinus #10/96 and #11/38 suspicious for renal cell carcinoma  This lesion is increased in size from 2019 dimensions of 3 9 x 3 3 x   5 0 cm      Indeterminate 6 mm left hepatic lesion is likely a benign hemangioma  Attention to this lesion is recommended on surveillance imaging      4 mm gallbladder adherent sludge ball or fundal polyp; this does not meet size criteria necessitating follow-up follow-up      No definite metastatic disease  Portions of the record may have been created with voice recognition software  Occasional wrong word or "sound a like" substitutions may have occurred due to the inherent limitations of voice recognition software  Read the chart carefully and recognize, using context, where substitutions have occurred  If you have any questions, please contact the dictating provider

## 2022-07-07 NOTE — PLAN OF CARE
Problem: PAIN - ADULT  Goal: Verbalizes/displays adequate comfort level or baseline comfort level  Description: Interventions:  - Encourage patient to monitor pain and request assistance  - Assess pain using appropriate pain scale  - Administer analgesics based on type and severity of pain and evaluate response  - Implement non-pharmacological measures as appropriate and evaluate response  - Consider cultural and social influences on pain and pain management  - Notify physician/advanced practitioner if interventions unsuccessful or patient reports new pain  Outcome: Progressing     Problem: INFECTION - ADULT  Goal: Absence or prevention of progression during hospitalization  Description: INTERVENTIONS:  - Assess and monitor for signs and symptoms of infection  - Monitor lab/diagnostic results  - Monitor all insertion sites, i e  indwelling lines, tubes, and drains  - Monitor endotracheal if appropriate and nasal secretions for changes in amount and color  - [place] appropriate cooling/warming therapies per order  - Administer medications as ordered  - Instruct and encourage patient and family to use good hand hygiene technique  - Identify and instruct in appropriate isolation precautions for identified infection/condition  Outcome: Progressing  Goal: Absence of fever/infection during neutropenic period  Description: INTERVENTIONS:  - Monitor WBC    Outcome: Progressing     Problem: SAFETY ADULT  Goal: Patient will remain free of falls  Description: INTERVENTIONS:  - Educate patient/family on patient safety including physical limitations  - Instruct patient to call for assistance with activity   - Consult OT/PT to assist with strengthening/mobility   - Keep Call bell within reach  - Keep bed low and locked with side rails adjusted as appropriate  - Keep care items and personal belongings within reach  - Initiate and maintain comfort rounds  - Make Fall Risk Sign visible to staff  - Offer Toileting every  Hours, in advance of need  - Initiate/Maintain     alarm  - Obtain necessary fall risk management equipment:     - Apply yellow socks and bracelet for high fall risk patients  - Consider moving patient to room near nurses station  Outcome: Progressing  Goal: Maintain or return to baseline ADL function  Description: INTERVENTIONS:  -  Assess patient's ability to carry out ADLs; assess patient's baseline for ADL function and identify physical deficits which impact ability to perform ADLs (bathing, care of mouth/teeth, toileting, grooming, dressing, etc )  - Assess/evaluate cause of self-care deficits   - Assess range of motion  - Assess patient's mobility; develop plan if impaired  - Assess patient's need for assistive devices and provide as appropriate  - Encourage maximum independence but intervene and supervise when necessary  - Involve family in performance of ADLs  - Assess for home care needs following discharge   - Consider OT consult to assist with ADL evaluation and planning for discharge  - Provide patient education as appropriate  Outcome: Progressing  Goal: Maintains/Returns to pre admission functional level  Description: INTERVENTIONS:  - Perform BMAT or MOVE assessment daily    - Set and communicate daily mobility goal to care team and patient/family/caregiver  - Collaborate with rehabilitation services on mobility goals if consulted  - Perform Range of Motion    times a day  - Reposition patient every    hours    - Dangle patient      times a day  - Stand patient    times a day  - Ambulate patient    times a day  - Out of bed to chair      times a day   - Out of bed for meals    times a day  - Out of bed for toileting  - Record patient progress and toleration of activity level   Outcome: Progressing     Problem: DISCHARGE PLANNING  Goal: Discharge to home or other facility with appropriate resources  Description: INTERVENTIONS:  - Identify barriers to discharge w/patient and caregiver  - Arrange for needed discharge resources and transportation as appropriate  - Identify discharge learning needs (meds, wound care, etc )  - Arrange for interpretive services to assist at discharge as needed  - Refer to Case Management Department for coordinating discharge planning if the patient needs post-hospital services based on physician/advanced practitioner order or complex needs related to functional status, cognitive ability, or social support system  Outcome: Progressing     Problem: Knowledge Deficit  Goal: Patient/family/caregiver demonstrates understanding of disease process, treatment plan, medications, and discharge instructions  Description: Complete learning assessment and assess knowledge base    Interventions:  - Provide teaching at level of understanding  - Provide teaching via preferred learning methods  Outcome: Progressing

## 2022-07-07 NOTE — QUICK NOTE
Received TT message from primary RN that patient became diaphoretic and had 1 episode of vomiting brown emesis  Primary RN reports vitals of T 97 4,  bpm, RR 18, SpO2 96% on 1 L NC, BP 94/58  Blood sugar 327  Patient received 4 mg IV zofran  I discussed this with Dr Patrice Landrum who recommended patient receive an IV fluid bolus  Primary RN instructed to give 1,000 mL lactated ringer bolus per hypotension protocol        Zeinab Perera

## 2022-07-07 NOTE — UTILIZATION REVIEW
Initial Clinical Review    Elective Inpatient surgical procedure  Age/Sex: 78 y o  male  Surgery Date: 7/6/77  Procedure: NEPHRECTOMY RADICAL LAPAROSCOPIC W/ ROBOTICS, poss open (Right)  Anesthesia: general  Operative Findings: Standard robot assisted laparoscopic right radical nephrectomy secondary to a central 5 cm right renal mass suspicious for renal cell carcinoma based on CT and MRI criteria    POD#1 Progress Note:  Patient currently reporting that he is experiencing 9 and of 10 pain  He also reports some nausea and vomiting  Pt progressing slowly  Had 4 episodes of coffee-ground emesis, nausea currently improved with Zofran  GI consulted  Patient to remain NPO until that time  Continue IVF  Labs drawn revealed likely falsely elevated glucose dt labs drawn near IV site w/ 5% dextrose and LR  Repeat draw was 311  IVF changed to NSS  Metformin dc'd and pt placed on accuchecks w/ssi  Encourage ambulation, PT ordered  Provide bowel regimen, encourage inc spir use  Monitor hgb and recheck on 7/8  Pt was tachycardic, EKG revealed Sinus tachycardia with Premature atrial complexes with Aberrant conduction  Nephrology following dt hx of CKD, Cr trending up from baseline of 1 3, most recently 2 95  Maintain hernández catheter and monitor IOs  Admission Orders: Date/Time/Statement:   Admission Orders (From admission, onward)     Ordered        07/06/22 1910  Inpatient Admission  Once                      Orders Placed This Encounter   Procedures    Inpatient Admission     Standing Status:   Standing     Number of Occurrences:   1     Order Specific Question:   Level of Care     Answer:   Med Surg [16]     Order Specific Question:   Estimated length of stay     Answer:   More than 2 Midnights     Order Specific Question:   Certification     Answer:   I certify that inpatient services are medically necessary for this patient for a duration of greater than two midnights   See H&P and MD Progress Notes for additional information about the patient's course of treatment       Vital Signs: /97   Pulse (!) 109   Temp 98 4 °F (36 9 °C)   Resp 16   Ht 6' (1 829 m)   Wt 107 kg (236 lb)   SpO2 92%   BMI 32 01 kg/m²     Pertinent Labs/Diagnostic Test Results:       Results from last 7 days   Lab Units 07/06/22  2125 07/06/22  1843   WBC Thousand/uL  --  13 40*   HEMOGLOBIN g/dL  --  13 5   HEMATOCRIT %  --  41 0   PLATELETS Thousands/uL 115* 120*   NEUTROS ABS Thousands/µL  --  12 12*     Results from last 7 days   Lab Units 07/06/22  1843   SODIUM mmol/L 138   POTASSIUM mmol/L 3 7   CHLORIDE mmol/L 107   CO2 mmol/L 22   ANION GAP mmol/L 9   BUN mg/dL 22   CREATININE mg/dL 1 80*   EGFR ml/min/1 73sq m 36   CALCIUM mg/dL 8 5     Results from last 7 days   Lab Units 07/07/22  0658 07/07/22  0315 07/07/22  0043 07/06/22  2110 07/06/22  1813 07/06/22  1204   POC GLUCOSE mg/dl 322* 307* 327* 231* 198* 166*     Results from last 7 days   Lab Units 07/06/22  1843   GLUCOSE RANDOM mg/dL 202*       Diet: NPO  Mobility: OOB  DVT Prophylaxis: scd    Medications/Pain Control:   Scheduled Medications:  acetaminophen, 650 mg, Oral, Q6H ERICA  docusate sodium, 100 mg, Oral, BID  enoxaparin, 40 mg, Subcutaneous, Daily  insulin lispro, 1-6 Units, Subcutaneous, TID AC  metFORMIN, 1,000 mg, Oral, BID With Meals  neomycin-bacitracin-polymyxin b, 1 small application, Topical, BID      Continuous IV Infusions:  dextrose 5% lactated ringer's, 125 mL/hr, Intravenous, Continuous  lactated ringers, 125 mL/hr, Intravenous, Continuous  lactated ringers, 20 mL/hr, Intravenous, Continuous  sodium chloride, 125 mL/hr, Intravenous, Continuous      PRN Meds:  lactated ringers, 1,000 mL, Intravenous, Once PRN   And  lactated ringers, 1,000 mL, Intravenous, Once PRN  morphine injection, 4 mg, Intravenous, Q2H PRN  ondansetron, 4 mg, Intravenous, Q6H PRN  oxyCODONE, 2 5 mg, Oral, Q4H PRN  oxyCODONE, 5 mg, Oral, Q4H PRN  sodium chloride, 1,000 mL, Intravenous, Once PRN   And  sodium chloride, 1,000 mL, Intravenous, Once PRN        Network Utilization Review Department  ATTENTION: Please call with any questions or concerns to [phone] and carefully listen to the prompts so that you are directed to the right person  All voicemails are confidential   Sudie Crigler all requests for admission clinical reviews, approved or denied determinations and any other requests to dedicated fax number below belonging to the campus where the patient is receiving treatment   List of dedicated fax numbers for the Facilities:  [address] DENIALS (Administrative/Medical Necessity) [phone]   [address] (Maternity/NICU/Pediatrics) [phone]   [address]  [address] [facility] [place] [address] [address] [place] [address] [address] Sinan Mu[address]oz [address] [facility] [facility] [phone]

## 2022-07-07 NOTE — UTILIZATION REVIEW
Inpatient Admission Authorization Request   NOTIFICATION OF INPATIENT ADMISSION/INPATIENT AUTHORIZATION REQUEST   SERVICING FACILITY:   [facility]  Address: [address] [facility], [address] [address]  Tax ID: [id number]  NPI: [id number]  Place of Service: Inpatient [address] [facility] Code: 24     ATTENDING PROVIDER:  Attending Name and NPI#: Madeline Hilario [[id number]]  Address: [address] [place] [address], [address] [place] [address]  Phone: [phone]     UTILIZATION REVIEW CONTACT:  Mikaela De León Utilization   Network Utilization Review Department  Phone: [phone]  Fax: [phone]  Email: [email or web address]     PHYSICIAN ADVISORY SERVICES:  FOR MRVV-CV-DSYN REVIEW - MEDICAL NECESSITY DENIAL  Phone: [phone]  Fax: [phone]  Email: [email or web address]     TYPE OF REQUEST:  Inpatient Status     ADMISSION INFORMATION:  ADMISSION DATE/TIME: 7/6/22  7:10 PM  PATIENT DIAGNOSIS CODE/DESCRIPTION:  Malignant neoplasm of right kidney (HCC) [C64 1]  DISCHARGE DATE/TIME: No discharge date for patient encounter  IMPORTANT INFORMATION:  Please contact Mikaela De León directly with any questions or concerns regarding this request  Department voicemails are confidential     Send requests for admission clinical reviews, concurrent reviews, approvals, and administrative denials due to lack of clinical to fax [phone]

## 2022-07-07 NOTE — PROGRESS NOTES
Progress Note - Urology  Naresh Avila 78 y o  male MRN: [id number]  Unit/Bed#: -01 Encounter: [id number]    Assessment & Plan:    Right renal mass:  -postop day 1 radical nephrectomy laparoscopic with robotics, progressing slowly  -patient developed emesis overnight and has had approximately 4 episodes of coffee-ground emesis, nausea currently improved with Zofran  GI consulted  Patient to remain NPO until that time  -IV fluids currently 5% dextrose and lactated Ringer, a m  Labs revealed falsely elevated glucose of a 1000 most likely due to drawing near IV  Repeat drawn on opposite arm came back at 311  Fluids changed to normal saline 100 mL/hr   -metformin discontinued and patient placed on sliding scale blood sugar checks q 6 hours, will adjust to before meals once patient is able to have a diet  -  encourage ambulation, PT ordered  -provide a good bowel regimen  -encourage incentive spirometer  -SCDs  -reactive leukocytosis of 16  -hemoglobin dropped to 10 from 13 5, possible dilution as CBC was most likely drawn from site near IV like prior BMP  Will re-evaluate tomorrow   -patient tachycardic, additional labs are stable  EKG was obtained which revealed Sinus tachycardia with Premature atrial complexes with Aberrant conduction  Minimal voltage criteria for LVH, may be normal variant  Borderline ECG  When compared with ECG of 15-KSENIA-2022 10:26,  Vent  rate has increased BY  45 BPM  Inverted T waves have replaced nonspecific T wave abnormality in Inferior leads  -patient with history of CKD, nephrology consulted and following, appreciate their recommendations  Creatinine trending upwards from baseline of 1 3-1 0 4-1 8 and on repeat 2 39, a most recent repeat 2 95  Will maintain urethral Baird catheter at this time for adequate eyes and nose, 600 cc between yesterday and today       Anticipated additional stay for an additional 24-48 hours for medical optimization and postsurgical care     Subjective/Objective   Chief Complaint:     Subjective:  Patient currently reporting that he is experiencing 9 and of 10 pain  He also reports some nausea and vomiting  Denies any fevers or chills  Objective:     Blood pressure 148/97, pulse (!) 109, temperature 98 4 °F (36 9 °C), resp  rate 16, height 6' (1 829 m), weight 107 kg (236 lb), SpO2 92 %  ,Body mass index is 32 01 kg/m²  Intake/Output Summary (Last 24 hours) at 7/7/2022 1213  Last data filed at 7/7/2022 2382  Gross per 24 hour   Intake 2450 ml   Output 760 ml   Net 1690 ml       Invasive Devices  Report    Peripheral Intravenous Line  Duration           Peripheral IV 07/06/22 Left Wrist <1 day    Peripheral IV 07/06/22 Right;Ventral (anterior) Forearm <1 day          Drain  Duration           Urethral Catheter Latex 16 Fr  <1 day                Physical Exam  Constitutional:       General: He is not in acute distress  Appearance: He is normal weight  He is ill-appearing and diaphoretic  He is not toxic-appearing  Comments: Frail and clammy in appearance   HENT:      Head: Normocephalic and atraumatic  Right Ear: External ear normal       Left Ear: External ear normal       Nose: Nose normal       Mouth/Throat:      Pharynx: Oropharynx is clear  Eyes:      General: No scleral icterus  Conjunctiva/sclera: Conjunctivae normal    Cardiovascular:      Rate and Rhythm: Regular rhythm  Tachycardia present  Pulses: Normal pulses  Heart sounds: No murmur heard  No friction rub  No gallop  Pulmonary:      Effort: Pulmonary effort is normal  No respiratory distress  Breath sounds: No wheezing, rhonchi or rales  Abdominal:      General: Bowel sounds are normal  There is no distension  Palpations: Abdomen is soft  Tenderness: There is abdominal tenderness        Comments: Surgical incisions(staples) intact, dry, no sign of wound dehiscence or underlying infection, bowel sounds present although decreased throughout  Abdomen is soft, non tympanic to percussion,  Appropriate incisional tenderness on the right   Genitourinary:     Comments: Urethral Baird catheter in place draining clear yellow urine  Musculoskeletal:         General: Normal range of motion  Cervical back: Normal range of motion  Skin:     General: Skin is warm  Neurological:      General: No focal deficit present  Mental Status: He is alert and oriented to person, place, and time  Psychiatric:         Mood and Affect: Mood normal          Behavior: Behavior normal          Thought Content: Thought content normal          Judgment: Judgment normal            Lab, Imaging and other studies:I have personally reviewed pertinent lab results     Lab Results   Component Value Date    WBC 16 50 (H) 07/07/2022    HGB 10 0 (L) 07/07/2022    HCT 31 3 (L) 07/07/2022    MCV 98 07/07/2022     07/07/2022     Lab Results   Component Value Date    SODIUM 136 07/07/2022    K 4 7 07/07/2022     07/07/2022    CO2 26 07/07/2022    BUN 32 (H) 07/07/2022    CREATININE 2 95 (H) 07/07/2022    GLUC 311 (H) 07/07/2022    CALCIUM 8 3 07/07/2022        VTE Pharmacologic Prophylaxis: Enoxaparin (Lovenox)  VTE Mechanical Prophylaxis: sequential compression device    Demetri Carrera PA-C

## 2022-07-07 NOTE — CONSULTS
Consult Service: Gastroenterology      PATIENT INFORMATION      Jj Barnett 78 y o  male MRN: [id number]  Unit/Bed#: -01 Encounter: [id number]  PCP: KAYODE Koch  Date of Admission:  7/6/2022  Date of Consultation: 07/07/22  Requesting Physician: Dotty Mccracken MD       ASSESSMENTS & PLAN   Jj Barnett is a 78 y o  old male with PMH of CVA on daily ASA 80, HTN who is s/p R radical nephrectomy (7/6/22) to remove 5cm R renal mass suspected to be RCC, and is being consulted for an episode of hematemesis described as 'coffee ground' that occurred this AM     Hematemesis  Coffee-ground in appearance, likely concerning for possible upper GI source  Etiology likely s/t local esophageal injury from daily ASA use  H&H and RBC trending down, possible dilutional component, low clinical suspicion for acute GI bleed  Other ddx include PUD, Katalina-Mcgee tear, angiodysplasia though unlikely given no reported CVD or valvular disease or repair  Unlikely s/t variceal bleed or portal HTN gastropathy given patient is without significant risk factors including alcohol use or liver disease  · Start IV Protonix 40 q12h    · NPO status in preparation for EGD tomorrow for further evaluation  · Serial CBC   · IVF for hydration, replete electrolytes as indicated      REASON FOR CONSULTATION      Hematemesis       HISTORY OF PRESENT ILLNESS      Jj Barnett is a 78 y o  old male with PMH of CVA on daily ASA 80 who is admitted to med surg s/p R radical nephrectomy (7/6/22) to remove 5cm R renal mass suspected to be RCC, and is being consulted for an episode of coffee ground emesis this AM  Per nurse, patient was found to be diaphoretic, hypotensive at 94/58, tachycardic at 109, 96% on RA with coffee-ground emesis  Patient endorses nausea, but no new episodes of vomiting since  Denies prior hx of hematemesis  Denies melena or hematochezia  No prior EGD or colonoscopy   Most recent hospitalization in 2019 for sepsis s/t possible tick-born illness but o/w denies other medical hx including CVD, valvular repair, or liver disease  No prior hx of abdominal surgeries  Denies tobacco smoking or regular ETOH consumption  Endorses FH of colon cancer (father)  REVIEW OF SYSTEMS     A thorough 12-point review of systems has been conducted  Pertinent positives and negatives are mentioned in the history of present illness  PAST MEDICAL & SURGICAL HISTORY      Past Medical History:   Diagnosis Date    Diabetes mellitus ([address] )     Hypertension     TIA (transient ischemic attack)        Past Surgical History:   Procedure Laterality Date    CYSTECTOMY      Per patient cyst removal from his back    LASIK           MEDICATIONS & ALLERGIES       Medications:   Prior to Admission medications    Medication Sig Start Date End Date Taking? Authorizing Provider   Ascorbic Acid (vitamin C) 100 MG tablet Take 100 mg by mouth daily   Yes Historical Provider, MD   aspirin (ECOTRIN LOW STRENGTH) 81 mg EC tablet Take 81 mg by mouth daily   Yes Historical Provider, MD   cholecalciferol (VITAMIN D3) 1,000 units tablet Take 1,000 Units by mouth daily   Yes Historical Provider, MD   lisinopril-hydrochlorothiazide (PRINZIDE,ZESTORETIC) 20-12 5 MG per tablet take 1 tablet by mouth twice a day 6/13/22  Yes KAYODE Lay   metFORMIN (GLUCOPHAGE) 500 mg tablet take 2 tablets by mouth every morning then take 1 tablet every evening 4/7/22  Yes KAYODE Lay   Multiple Vitamins-Minerals (MICHELLE MULTI MEN) TABS Take by mouth 2 (two) times a day   Yes Historical Provider, MD       Allergies:    Allergies   Allergen Reactions    Plavix [Clopidogrel] Rash     Stopped two days ago because got a rash and doctors thought it was from this          SOCIAL HISTORY      Marital Status: /Civil Union    Substance Use History:   Social History     Substance and Sexual Activity   Alcohol Use Never     Social History     Tobacco Use Smoking Status Never Smoker   Smokeless Tobacco Never Used     Social History     Substance and Sexual Activity   Drug Use Never         FAMILY HISTORY      Non-Contributory      PHYSICAL EXAM     Vitals:   Blood Pressure: 148/97 (07/07/22 0729)  Pulse: (!) 109 (07/07/22 0729)  Temperature: 98 4 °F (36 9 °C) (07/07/22 0729)  Temp Source: Tympanic (07/06/22 1300)  Respirations: 16 (07/06/22 1930)  Height: 6' (182 9 cm) (07/06/22 1300)  Weight - Scale: 107 kg (236 lb) (07/06/22 1300)  SpO2: 92 % (07/07/22 0729)    Physical Exam:   GENERAL: NAD  HEENT:  NC/AT, no scleral icterus  CARDIAC:  RRR, +S1/S2  PULMONARY:  CTA B/L, no wheezing/rales/rhonci, non-labored breathing  ABDOMEN:  Soft, NT/ND, +BS, no rebound/guarding/rigidity  Multiple sutured sites of incision from R radical nephrectomy without active drainage or discharge  Extremities:  No edema, cyanosis, or clubbing  NEUROLOGIC:  Alert  SKIN:  No rashes or erythema        ADDITIONAL DATA     Lab Results:     Results from last 7 days   Lab Units 07/07/22  0518 07/06/22  2125 07/06/22  1843   WBC Thousand/uL 16 50*  --  13 40*   HEMOGLOBIN g/dL 10 0*  --  13 5   HEMATOCRIT % 31 3*  --  41 0   PLATELETS Thousands/uL 172   < > 120*   NEUTROS PCT %  --   --  90*   LYMPHS PCT %  --   --  4*   MONOS PCT %  --   --  5   EOS PCT %  --   --  0    < > = values in this interval not displayed  Results from last 7 days   Lab Units 07/07/22  0817   POTASSIUM mmol/L 4 3   CHLORIDE mmol/L 105   CO2 mmol/L 20*   BUN mg/dL 24   CREATININE mg/dL 2 39*   CALCIUM mg/dL 7 6*           Imaging:    No results found  EKG, Pathology, and Other Studies Reviewed on Admission:   · EKG: Reviewed      Counseling / Coordination of Care Time: 30 total mins spent n consult   Greater than 50% of total time spent on patient counseling and coordination of care            ** Please Note: This note is constructed using a voice recognition dictation system   **

## 2022-07-08 ENCOUNTER — APPOINTMENT (INPATIENT)
Dept: RADIOLOGY | Facility: HOSPITAL | Age: 77
DRG: 656 | End: 2022-07-08
Payer: COMMERCIAL

## 2022-07-08 ENCOUNTER — APPOINTMENT (INPATIENT)
Dept: GASTROENTEROLOGY | Facility: HOSPITAL | Age: 77
DRG: 656 | End: 2022-07-08
Payer: COMMERCIAL

## 2022-07-08 PROBLEM — R00.0 TACHYCARDIA: Status: ACTIVE | Noted: 2022-07-08

## 2022-07-08 PROBLEM — K92.2 GI BLEED: Status: ACTIVE | Noted: 2022-07-08

## 2022-07-08 PROBLEM — A41.9 SEPSIS (HCC): Status: ACTIVE | Noted: 2022-07-08

## 2022-07-08 PROBLEM — J96.01 ACUTE RESPIRATORY FAILURE WITH HYPOXIA (HCC): Status: ACTIVE | Noted: 2022-07-08

## 2022-07-08 LAB
ABO GROUP BLD BPU: NORMAL
ABO GROUP BLD BPU: NORMAL
ANION GAP SERPL CALCULATED.3IONS-SCNC: 5 MMOL/L (ref 4–13)
ANION GAP SERPL CALCULATED.3IONS-SCNC: 6 MMOL/L (ref 4–13)
BASE EXCESS BLDA CALC-SCNC: 0.9 MMOL/L
BASOPHILS # BLD AUTO: 0.04 THOUSANDS/ΜL (ref 0–0.1)
BASOPHILS NFR BLD AUTO: 0 % (ref 0–1)
BPU ID: NORMAL
BPU ID: NORMAL
BUN SERPL-MCNC: 43 MG/DL (ref 5–25)
BUN SERPL-MCNC: 45 MG/DL (ref 5–25)
CALCIUM SERPL-MCNC: 8 MG/DL (ref 8.3–10.1)
CALCIUM SERPL-MCNC: 8.1 MG/DL (ref 8.3–10.1)
CARDIAC TROPONIN I PNL SERPL HS: 120 NG/L (ref 8–18)
CHLORIDE SERPL-SCNC: 105 MMOL/L (ref 100–108)
CHLORIDE SERPL-SCNC: 108 MMOL/L (ref 100–108)
CO2 SERPL-SCNC: 25 MMOL/L (ref 21–32)
CO2 SERPL-SCNC: 26 MMOL/L (ref 21–32)
CREAT SERPL-MCNC: 3.55 MG/DL (ref 0.6–1.3)
CREAT SERPL-MCNC: 3.84 MG/DL (ref 0.6–1.3)
CROSSMATCH: NORMAL
CROSSMATCH: NORMAL
EOSINOPHIL # BLD AUTO: 0.02 THOUSAND/ΜL (ref 0–0.61)
EOSINOPHIL NFR BLD AUTO: 0 % (ref 0–6)
ERYTHROCYTE [DISTWIDTH] IN BLOOD BY AUTOMATED COUNT: 13.1 % (ref 11.6–15.1)
ERYTHROCYTE [DISTWIDTH] IN BLOOD BY AUTOMATED COUNT: 13.2 % (ref 11.6–15.1)
GFR SERPL CREATININE-BSD FRML MDRD: 14 ML/MIN/1.73SQ M
GFR SERPL CREATININE-BSD FRML MDRD: 15 ML/MIN/1.73SQ M
GLUCOSE SERPL-MCNC: 168 MG/DL (ref 65–140)
GLUCOSE SERPL-MCNC: 182 MG/DL (ref 65–140)
GLUCOSE SERPL-MCNC: 192 MG/DL (ref 65–140)
GLUCOSE SERPL-MCNC: 209 MG/DL (ref 65–140)
GLUCOSE SERPL-MCNC: 216 MG/DL (ref 65–140)
HCO3 BLDA-SCNC: 24.6 MMOL/L (ref 22–28)
HCT VFR BLD AUTO: 28.1 % (ref 36.5–49.3)
HCT VFR BLD AUTO: 29.8 % (ref 36.5–49.3)
HGB BLD-MCNC: 10.2 G/DL (ref 12–17)
HGB BLD-MCNC: 9.2 G/DL (ref 12–17)
IMM GRANULOCYTES # BLD AUTO: 0.21 THOUSAND/UL (ref 0–0.2)
IMM GRANULOCYTES NFR BLD AUTO: 1 % (ref 0–2)
LYMPHOCYTES # BLD AUTO: 1.52 THOUSANDS/ΜL (ref 0.6–4.47)
LYMPHOCYTES NFR BLD AUTO: 7 % (ref 14–44)
MCH RBC QN AUTO: 31.4 PG (ref 26.8–34.3)
MCH RBC QN AUTO: 31.5 PG (ref 26.8–34.3)
MCHC RBC AUTO-ENTMCNC: 32.7 G/DL (ref 31.4–37.4)
MCHC RBC AUTO-ENTMCNC: 34.2 G/DL (ref 31.4–37.4)
MCV RBC AUTO: 92 FL (ref 82–98)
MCV RBC AUTO: 96 FL (ref 82–98)
MONOCYTES # BLD AUTO: 3.07 THOUSAND/ΜL (ref 0.17–1.22)
MONOCYTES NFR BLD AUTO: 14 % (ref 4–12)
NASAL CANNULA: 2
NEUTROPHILS # BLD AUTO: 17.12 THOUSANDS/ΜL (ref 1.85–7.62)
NEUTS SEG NFR BLD AUTO: 78 % (ref 43–75)
NRBC BLD AUTO-RTO: 0 /100 WBCS
O2 CT BLDA-SCNC: 12 ML/DL (ref 16–23)
OXYHGB MFR BLDA: 90.7 % (ref 94–97)
PCO2 BLDA: 35.3 MM HG (ref 36–44)
PH BLDA: 7.46 [PH] (ref 7.35–7.45)
PLATELET # BLD AUTO: 160 THOUSANDS/UL (ref 149–390)
PLATELET # BLD AUTO: 187 THOUSANDS/UL (ref 149–390)
PMV BLD AUTO: 11.3 FL (ref 8.9–12.7)
PMV BLD AUTO: 12.2 FL (ref 8.9–12.7)
PO2 BLDA: 61.5 MM HG (ref 75–129)
POTASSIUM SERPL-SCNC: 4.6 MMOL/L (ref 3.5–5.3)
POTASSIUM SERPL-SCNC: 4.8 MMOL/L (ref 3.5–5.3)
PROCALCITONIN SERPL-MCNC: 1.21 NG/ML
RBC # BLD AUTO: 2.92 MILLION/UL (ref 3.88–5.62)
RBC # BLD AUTO: 3.25 MILLION/UL (ref 3.88–5.62)
SODIUM SERPL-SCNC: 137 MMOL/L (ref 136–145)
SODIUM SERPL-SCNC: 138 MMOL/L (ref 136–145)
SPECIMEN SOURCE: ABNORMAL
TSH SERPL DL<=0.05 MIU/L-ACNC: 0.55 UIU/ML (ref 0.45–4.5)
UNIT DISPENSE STATUS: NORMAL
UNIT DISPENSE STATUS: NORMAL
UNIT PRODUCT CODE: NORMAL
UNIT PRODUCT CODE: NORMAL
UNIT PRODUCT VOLUME: 350 ML
UNIT PRODUCT VOLUME: 350 ML
UNIT RH: NORMAL
UNIT RH: NORMAL
WBC # BLD AUTO: 17.5 THOUSAND/UL (ref 4.31–10.16)
WBC # BLD AUTO: 21.98 THOUSAND/UL (ref 4.31–10.16)

## 2022-07-08 PROCEDURE — 71045 X-RAY EXAM CHEST 1 VIEW: CPT

## 2022-07-08 PROCEDURE — 82805 BLOOD GASES W/O2 SATURATION: CPT | Performed by: PHYSICIAN ASSISTANT

## 2022-07-08 PROCEDURE — 99233 SBSQ HOSP IP/OBS HIGH 50: CPT | Performed by: INTERNAL MEDICINE

## 2022-07-08 PROCEDURE — G1004 CDSM NDSC: HCPCS

## 2022-07-08 PROCEDURE — A9540 TC99M MAA: HCPCS

## 2022-07-08 PROCEDURE — 97163 PT EVAL HIGH COMPLEX 45 MIN: CPT

## 2022-07-08 PROCEDURE — 85027 COMPLETE CBC AUTOMATED: CPT | Performed by: PHYSICIAN ASSISTANT

## 2022-07-08 PROCEDURE — 82948 REAGENT STRIP/BLOOD GLUCOSE: CPT

## 2022-07-08 PROCEDURE — 84145 PROCALCITONIN (PCT): CPT | Performed by: PHYSICIAN ASSISTANT

## 2022-07-08 PROCEDURE — 80048 BASIC METABOLIC PNL TOTAL CA: CPT | Performed by: INTERNAL MEDICINE

## 2022-07-08 PROCEDURE — 99253 IP/OBS CNSLTJ NEW/EST LOW 45: CPT | Performed by: INTERNAL MEDICINE

## 2022-07-08 PROCEDURE — 84443 ASSAY THYROID STIM HORMONE: CPT | Performed by: PHYSICIAN ASSISTANT

## 2022-07-08 PROCEDURE — 87040 BLOOD CULTURE FOR BACTERIA: CPT | Performed by: PHYSICIAN ASSISTANT

## 2022-07-08 PROCEDURE — 78582 LUNG VENTILAT&PERFUS IMAGING: CPT

## 2022-07-08 PROCEDURE — 80048 BASIC METABOLIC PNL TOTAL CA: CPT | Performed by: PHYSICIAN ASSISTANT

## 2022-07-08 PROCEDURE — A9558 XE133 XENON 10MCI: HCPCS

## 2022-07-08 PROCEDURE — 36600 WITHDRAWAL OF ARTERIAL BLOOD: CPT

## 2022-07-08 PROCEDURE — 99024 POSTOP FOLLOW-UP VISIT: CPT | Performed by: UROLOGY

## 2022-07-08 PROCEDURE — 93005 ELECTROCARDIOGRAM TRACING: CPT

## 2022-07-08 PROCEDURE — 84484 ASSAY OF TROPONIN QUANT: CPT | Performed by: PHYSICIAN ASSISTANT

## 2022-07-08 PROCEDURE — C9113 INJ PANTOPRAZOLE SODIUM, VIA: HCPCS | Performed by: STUDENT IN AN ORGANIZED HEALTH CARE EDUCATION/TRAINING PROGRAM

## 2022-07-08 PROCEDURE — 85025 COMPLETE CBC W/AUTO DIFF WBC: CPT | Performed by: INTERNAL MEDICINE

## 2022-07-08 PROCEDURE — 97167 OT EVAL HIGH COMPLEX 60 MIN: CPT

## 2022-07-08 PROCEDURE — 76770 US EXAM ABDO BACK WALL COMP: CPT

## 2022-07-08 RX ORDER — ENOXAPARIN SODIUM 100 MG/ML
30 INJECTION SUBCUTANEOUS DAILY
Status: DISCONTINUED | OUTPATIENT
Start: 2022-07-09 | End: 2022-07-09

## 2022-07-08 RX ORDER — ALBUMIN (HUMAN) 12.5 G/50ML
25 SOLUTION INTRAVENOUS ONCE
Status: COMPLETED | OUTPATIENT
Start: 2022-07-08 | End: 2022-07-08

## 2022-07-08 RX ORDER — SENNOSIDES 8.6 MG
1 TABLET ORAL
Status: DISCONTINUED | OUTPATIENT
Start: 2022-07-08 | End: 2022-07-14

## 2022-07-08 RX ORDER — SIMETHICONE 80 MG
80 TABLET,CHEWABLE ORAL EVERY 6 HOURS PRN
Status: DISCONTINUED | OUTPATIENT
Start: 2022-07-08 | End: 2022-07-18 | Stop reason: HOSPADM

## 2022-07-08 RX ORDER — DILTIAZEM HYDROCHLORIDE 5 MG/ML
10 INJECTION INTRAVENOUS ONCE
Status: COMPLETED | OUTPATIENT
Start: 2022-07-08 | End: 2022-07-09

## 2022-07-08 RX ORDER — LEVALBUTEROL INHALATION SOLUTION 0.63 MG/3ML
0.63 SOLUTION RESPIRATORY (INHALATION) EVERY 6 HOURS PRN
Status: DISCONTINUED | OUTPATIENT
Start: 2022-07-08 | End: 2022-07-08

## 2022-07-08 RX ADMIN — INSULIN LISPRO 2 UNITS: 100 INJECTION, SOLUTION INTRAVENOUS; SUBCUTANEOUS at 18:43

## 2022-07-08 RX ADMIN — SODIUM CHLORIDE 1000 ML: 0.9 INJECTION, SOLUTION INTRAVENOUS at 17:09

## 2022-07-08 RX ADMIN — SODIUM CHLORIDE 100 ML/HR: 0.9 INJECTION, SOLUTION INTRAVENOUS at 08:38

## 2022-07-08 RX ADMIN — CEFEPIME HYDROCHLORIDE 1000 MG: 1 INJECTION, POWDER, FOR SOLUTION INTRAMUSCULAR; INTRAVENOUS at 18:43

## 2022-07-08 RX ADMIN — ALBUMIN (HUMAN) 25 G: 0.25 INJECTION, SOLUTION INTRAVENOUS at 16:09

## 2022-07-08 RX ADMIN — PANTOPRAZOLE SODIUM 40 MG: 40 INJECTION, POWDER, FOR SOLUTION INTRAVENOUS at 08:47

## 2022-07-08 RX ADMIN — INSULIN LISPRO 1 UNITS: 100 INJECTION, SOLUTION INTRAVENOUS; SUBCUTANEOUS at 01:50

## 2022-07-08 RX ADMIN — BACITRACIN ZINC, NEOMYCIN, POLYMYXIN B 1 SMALL APPLICATION: 400; 3.5; 5 OINTMENT TOPICAL at 08:47

## 2022-07-08 NOTE — QUICK NOTE
Patient noted to be hypoxic setting in the high 80s upon arrival to the endoscopy suite  He was tachycardic between 120-140  Svetlana Blend Appears slightly pale  Spoke with Anesthesia and will hold off on endoscopy at this time  Hemoglobin has remained stable  No bowel movements for over 24 hours  No more episodes of vomiting for 24 hours  Consider getting PNA or PE workup

## 2022-07-08 NOTE — PROGRESS NOTES
Vancomycin Assessment    Sandra Bhagat is a 78 y o  male who is currently receiving vancomycin 1750mg once for sepsis   Relevant clinical data and objective history reviewed:  Creatinine   Date Value Ref Range Status   07/08/2022 3 84 (H) 0 60 - 1 30 mg/dL Final     Comment:     Standardized to IDMS reference method   07/08/2022 3 55 (H) 0 60 - 1 30 mg/dL Final     Comment:     Standardized to IDMS reference method   07/07/2022 2 95 (H) 0 60 - 1 30 mg/dL Final     Comment:     Standardized to IDMS reference method     /66   Pulse (!) 125   Temp 99 3 °F (37 4 °C) (Oral)   Resp 18   Ht 6' (1 829 m)   Wt 107 kg (236 lb)   SpO2 96%   BMI 32 01 kg/m²   I/O last 3 completed shifts:  In: -   Out: 410 [Urine:410]  Lab Results   Component Value Date/Time    BUN 45 (H) 07/08/2022 02:20 PM    WBC 17 50 (H) 07/08/2022 02:20 PM    HGB 9 2 (L) 07/08/2022 02:20 PM    HCT 28 1 (L) 07/08/2022 02:20 PM    MCV 96 07/08/2022 02:20 PM     07/08/2022 02:20 PM     Temp Readings from Last 3 Encounters:   07/08/22 99 3 °F (37 4 °C) (Oral)   06/24/22 98 4 °F (36 9 °C) (Tympanic)   05/16/22 98 °F (36 7 °C) (Tympanic)     Vancomycin Days of Therapy: 1    Assessment/Plan  The patient is currently on vancomycin utilizing pulse dosing based on adjusted body weight (due to obesity)  Baseline risks associated with therapy include: pre-existing renal impairment and advanced age  The patient is currently receiving 1750mg once and after clinical evaluation will be changed to 1250mg once when trough < 20  Pharmacy will also follow closely for s/sx of nephrotoxicity, infusion reactions, and appropriateness of therapy  BMP and CBC will be ordered per protocol  Plan for random level to be drawn at approx  1300 on 7/9/22  Due to infection severity, will target a trough of 15-20 (appropriate for most indications)   Pharmacy will continue to follow the patients culture results and clinical progress daily      Raquel Malhotra, Pharmacist

## 2022-07-08 NOTE — CONSULTS
[address] 1945, 78 y o  male MRN: [id number]  Unit/Bed#: [facility] Ugarte5-Radha Encounter: [id number]  Primary Care Provider: Nona Lennox, CRNP   Date and time admitted to hospital: 7/6/2022 11:12 AM    Consults    * Acute respiratory failure with hypoxia [facility])  Assessment & Plan  -Patient developed tachycardia and hypoxia prior to undergoing EGD to evaluate upper GI bleed  -Endoscopy canceled due to hypoxia, pox  89% RA    -clinically patient does not appear volume overload, no wheezing, able to speak in full sentences  -Started on 2L O2, pox 95% now  -check CXR, probnp, troponin, ABG  -R/o PE, no CTA secondary to renal disease  -check V/Q scan  -On prophylactic Lovenox  -Will give xopenex now due to tachycardia  -monitor respiratory status closely    Sepsis [facility])  Assessment & Plan  Patient s/p right nephrectomy developed tachycardia, worsening leukocytosis, hypoxia with no clear source of infection at this time  -check procalcitonin, lactic acid, chest x-ray  -UA small leukocyte, pyuria 7/7/22  -start empiric cefepime and vanco  -check blood culture  -Normal saline fluid resuscitation  -deescalate antibiotics based on culture results  -consider ID consult if above worsens      Tachycardia  Assessment & Plan  -likely multifactorial could be due to sepsis, ? PE  -IVF fluid resuscitation  -ekg stat  -V/Q scan when clinically stable  -cardiac monitoring  -check troponin, BNP  -monitor heart rate closely    GI bleed  Assessment & Plan  -Patient with an episode of coffee-ground emesis evaluated by GI was to undergo upper endoscopy this morning, procedure canceled due to hypoxia and tachycardia  -currently hemodynamically stable  -on PPI  -Hemoglobin 10 2  -GI on board    Renal cell cancer, right [facility])  Assessment & Plan  -Patient s/p right nephrectomy POD#2  -pathology pending    Stage 3a chronic kidney disease [facility])  Assessment & Plan  Lab Results Component Value Date    EGFR 15 07/08/2022    EGFR 19 07/07/2022    EGFR 25 07/07/2022    CREATININE 3 55 (H) 07/08/2022    CREATININE 2 95 (H) 07/07/2022    CREATININE 2 39 (H) 07/07/2022     -s/p right nephrectomy  -nephrology on board    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 chronic kidney disease, without long-term current use of insulin [facility])  Assessment & Plan  Lab Results   Component Value Date    HGBA1C 7 2 (A) 05/16/2022   Diabetes fairly controlled with A1c 7 2  Hold metformin   On ISS accu cheks    Recent Labs     07/07/22  1614 07/07/22  2112 07/08/22  0106 07/08/22  0800   POCGLU 235* 212* 182* 192*       Blood Sugar Average: Last 72 hrs:  (P) 589 [id number]    Essential hypertension  Assessment & Plan  -Currently normotensive  -oral antihypertensive on hold  -will give IV metoprolol p r n  VTE Pharmacologic Prophylaxis: VTE Score: 7 High Risk (Score >/= 5) - Pharmacological DVT Prophylaxis Ordered: enoxaparin (Lovenox)  Sequential Compression Devices Ordered  Code Status: Level 1 - Full Code   Discussion with family: Pending workup to communicated with  Anticipated Length of Stay: Patient will be admitted on an inpatient basis with an anticipated length of stay of greater than 2 midnights secondary to Acute respiratory failure with hypoxia  Total Time for Visit, including Counseling / Coordination of Care: 60 minutes Greater than 50% of this total time spent on direct patient counseling and coordination of care  Chief Complaint: hypoxia    History of Present Illness:  Bertha Coulter is a 78 y o  male with a PMH of DMII, HTN, right renal mass s/p right nephrectomy POD2 medicine consult called for hypoxia  Patient had coffee-ground emesis yesterday was to undergo EGD today  Patient noted with tachycardia heart rate 112-125 and hypoxia pox 89% RA with endoscopy canceled due to above    Currently, patient complains of yellowish productive cough, mild shortness of breath and chills  Denies fever, palpitation, chest pain, history of CHF, COPD, asthma or any other complaint  Review of Systems:  Review of Systems   Comprehensive review of systems negative except above HPI  Past Medical and Surgical History:   Past Medical History:   Diagnosis Date    Diabetes mellitus ([address] )     Hypertension     TIA (transient ischemic attack)        Past Surgical History:   Procedure Laterality Date    CYSTECTOMY      Per patient cyst removal from his back    LASIK      VT LAP, RADICAL NEPHRECTOMY Right 7/6/2022    Procedure: NEPHRECTOMY RADICAL LAPAROSCOPIC W/ ROBOTICS, poss open;  Surgeon: Dotty Mccracken MD;  Location: [facility];  Service: Urology       Meds/Allergies:  Prior to Admission medications    Medication Sig Start Date End Date Taking? Authorizing Provider   Ascorbic Acid (vitamin C) 100 MG tablet Take 100 mg by mouth daily   Yes Historical Provider, MD   aspirin (ECOTRIN LOW STRENGTH) 81 mg EC tablet Take 81 mg by mouth daily   Yes Historical Provider, MD   cholecalciferol (VITAMIN D3) 1,000 units tablet Take 1,000 Units by mouth daily   Yes Historical Provider, MD   lisinopril-hydrochlorothiazide (PRINZIDE,ZESTORETIC) 20-12 5 MG per tablet take 1 tablet by mouth twice a day 6/13/22  Yes KAYODE Lay   metFORMIN (GLUCOPHAGE) 500 mg tablet take 2 tablets by mouth every morning then take 1 tablet every evening 4/7/22  Yes KAYODE Lay   Multiple Vitamins-Minerals (MICHELLE MULTI MEN) TABS Take by mouth 2 (two) times a day   Yes Historical Provider, MD     I have reviewed home medications using recent Epic encounter  Allergies:    Allergies   Allergen Reactions    Plavix [Clopidogrel] Rash     Stopped two days ago because got a rash and doctors thought it was from this        Social History:  Marital Status: /Civil Union   Occupation:   Patient Pre-hospital Living Situation: Home  Patient Pre-hospital Level of Mobility: walks  Patient Pre-hospital Diet Restrictions:  Substance Use History:   Social History     Substance and Sexual Activity   Alcohol Use Never     Social History     Tobacco Use   Smoking Status Never Smoker   Smokeless Tobacco Never Used     Social History     Substance and Sexual Activity   Drug Use Never       Family History:  Family History   Problem Relation Age of Onset    Colon cancer Mother     Diabetes type II Mother     Heart disease Mother     Prostate cancer Father        Physical Exam:     Vitals:   Blood Pressure: 107/66 (07/08/22 1220)  Pulse: (!) 125 (07/08/22 1220)  Temperature: 99 3 °F (37 4 °C) (07/08/22 1233)  Temp Source: Oral (07/08/22 1233)  Respirations: 18 (07/08/22 1504)  Height: 6' (182 9 cm) (07/06/22 1300)  Weight - Scale: 107 kg (236 lb) (07/06/22 1300)  SpO2: 96 % (07/08/22 1504)    Physical Exam  Vitals and nursing note reviewed  Constitutional:       General: He is not in acute distress  Appearance: Normal appearance  He is well-developed  He is ill-appearing  He is not toxic-appearing  HENT:      Head: Normocephalic and atraumatic  Eyes:      Conjunctiva/sclera: Conjunctivae normal    Cardiovascular:      Rate and Rhythm: Regular rhythm  Tachycardia present  Pulses: Normal pulses  Heart sounds: Normal heart sounds  No murmur heard  Pulmonary:      Effort: Pulmonary effort is normal  No respiratory distress  Breath sounds: Normal breath sounds  Comments: Able to speak in full sentence  No accessory muscle use  Abdominal:      General: Abdomen is flat  Bowel sounds are normal  There is no distension  Palpations: Abdomen is soft  Tenderness: There is no abdominal tenderness  Musculoskeletal:      Cervical back: Neck supple  Right lower leg: Edema (Trace) present  Left lower leg: Edema (Trace) present  Skin:     General: Skin is warm and dry        Comments: Right mid abdominal multiple stable, no erythema purulent discharge   Neurological:      General: No focal deficit present  Mental Status: He is alert and oriented to person, place, and time  Psychiatric:         Mood and Affect: Mood normal          Behavior: Behavior normal          Additional Data:     Lab Results:  Results from last 7 days   Lab Units 07/08/22  1420 07/08/22  0451   WBC Thousand/uL 17 50* 21 98*   HEMOGLOBIN g/dL 9 2* 10 2*   HEMATOCRIT % 28 1* 29 8*   PLATELETS Thousands/uL 160 187   NEUTROS PCT %  --  78*   LYMPHS PCT %  --  7*   MONOS PCT %  --  14*   EOS PCT %  --  0     Results from last 7 days   Lab Units 07/08/22  1420   SODIUM mmol/L 138   POTASSIUM mmol/L 4 6   CHLORIDE mmol/L 108   CO2 mmol/L 25   BUN mg/dL 45*   CREATININE mg/dL 3 84*   ANION GAP mmol/L 5   CALCIUM mg/dL 8 0*   GLUCOSE RANDOM mg/dL 209*         Results from last 7 days   Lab Units 07/08/22  0800 07/08/22  0106 07/07/22  2112 07/07/22  1614 07/07/22  1116 07/07/22  0938 07/07/22  0658 07/07/22  0315 07/07/22  0043 07/06/22  2110 07/06/22  1813 07/06/22  1204   POC GLUCOSE mg/dl 192* 182* 212* 235* 309* 345* 322* 307* 327* 231* 198* 166*         Results from last 7 days   Lab Units 07/08/22  1420   PROCALCITONIN ng/ml 1 21*       Imaging: Reviewed radiology reports from this admission including: ultrasound(s)  XR chest 1 view   Final Result by Ruby Fair MD (07/08 1444)      Small left pleural effusion with adjacent atelectasis  Workstation performed: [id number]         US kidney and bladder   Final Result by Austen Conrad MD (07/08 0238)      Status post right nephrectomy with complex fluid collection identified within the right nephrectomy bed  Normal left kidney  Workstation performed: [id number]         XR abdomen 1 view kub   Final Result by Petros Fontanez MD (07/08 5767)      Mildly distended stomach and small bowel loops, most likely postoperative ileus given history  Small bowel obstruction cannot be excluded        Large amount of subcutaneous emphysema along the lower body wall, an expected recent postoperative finding  Workstation performed: [id number]         NM lung ventilation / perfusion    (Results Pending)       EKG and Other Studies Reviewed on Admission:   · EKG: pending  ** Please Note: This note has been constructed using a voice recognition system   **

## 2022-07-08 NOTE — CASE MANAGEMENT
Case Management Discharge Planning Note    Patient name Shaan Briggs /-16 MRN [id number]  : 1945 Date 2022       Current Admission Date: 2022  Current Admission Diagnosis:Renal cell cancer, right [facility])   Patient Active Problem List    Diagnosis Date Noted    [facility]al cell cancer, right [facility] [address] ) 2022    Hypercalcemia 2022    Stage 3a chronic kidney disease ([facility] ) 2022    Kidney cyst, acquired 2022    Chronic pain syndrome 10/05/2020    Chronic right-sided low back pain with right-sided sciatica 2020    Lumbar radiculopathy 2020    Polyarticular arthritis 2019    Class 1 obesity due to excess calories with serious comorbidity and body mass index (BMI) of 32 0 to 32 9 in adult 2019    Essential hypertension 09/10/2019    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 chronic kidney disease, without long-term current use of insulin ([facility] ) 09/10/2019    Left-sided weakness 07/10/2019    Cerebral aneurysm, nonruptured 2019    TIA (transient ischemic attack) 2019      LOS (days): 2  Geometric Mean LOS (GMLOS) (days): 2 10  Days to GMLOS:0 4     OBJECTIVE:  Risk of Unplanned Readmission Score: 15 25         Current admission status: Inpatient   Preferred Pharmacy:   [address] [place] [address] [address] [place] [id number]  Phone: [phone] Fax: [phone]    Primary Care Provider: Addis Sethi    Primary Insurance: Cristian RAMESH  Secondary Insurance:     DISCHARGE DETAILS:                                                       Discharge Destination Plan[de-identified] Home                                IMM Given (Date):: 22  IMM Given to[de-identified] Patient

## 2022-07-08 NOTE — PLAN OF CARE
Problem: OCCUPATIONAL THERAPY ADULT  Goal: Performs self-care activities at highest level of function for planned discharge setting  See evaluation for individualized goals  Description: Treatment Interventions: ADL retraining, Functional transfer training, UE strengthening/ROM, Endurance training, Cognitive reorientation, Patient/family training, Equipment evaluation/education, Compensatory technique education, Energy conservation, Activityengagement          See flowsheet documentation for full assessment, interventions and recommendations  Note: Limitation: Decreased ADL status, Decreased UE ROM, Decreased UE strength, Decreased Safe judgement during ADL, Decreased cognition, Decreased endurance, Decreased self-care trans, Decreased high-level ADLs, Decreased fine motor control  Prognosis: Fair  Assessment: Pt is a 78 y o  male who was admitted to [facility] on 7/6/2022 with Sepsis ([facility] [address] ) right renal mas spicious for renal cell carcinoma NEPHRECTOMY RADICAL LAPAROSCOPIC W/ ROBOTICS on 7/6  Pt's problem list also includes PMH of  has a past medical history of Diabetes mellitus ([facility] [address] ), Hypertension, and TIA (transient ischemic attack)    At baseline pt was completing I with ADL's/IaDL's  Pt lives with spouse in a mobile home with [employer]  Currently pt requires min a feeding, mod/max a  for overall ADLS and mod a x 2 for functional mobility/transfers  Pt currently presents with impairments in the following categories -steps to enter environment, difficulty performing ADLS, difficulty performing IADLS , limited insight into deficits, flat affect and decreased initiation and engagement  activity tolerance, endurance, standing balance/tolerance, sitting balance/tolerance, FMC, insight, safety , judgement , attention , sequencing , communication and interpersonal skills   These impairments, as well as pt's fatigue and risk for falls  limit pt's ability to safely engage in all baseline areas of occupation, includingeating, grooming, bathing, dressing, toileting, functional mobility/transfers, community mobility, laundry , driving, house maintenance, medication management, meal prep, cleaning, social participation  and leisure activities  The patient's raw score on the AM-PAC Daily Activity inpatient short form is 11, standardized score is 29 04, less than 39 4  Patients at this level are likely to benefit from discharge to post-acute rehabilitation services  Please refer to the recommendation of the Occupational Therapist for safe discharge planning  From OT standpoint, recommend STR upon D/C  OT will continue to follow to address the below stated goals  OT Discharge Recommendation: Post acute rehabilitation services  OT - OK to Discharge:  Yes

## 2022-07-08 NOTE — PLAN OF CARE
Problem: PAIN - ADULT  Goal: Verbalizes/displays adequate comfort level or baseline comfort level  Description: Interventions:  - Encourage patient to monitor pain and request assistance  - Assess pain using appropriate pain scale  - Administer analgesics based on type and severity of pain and evaluate response  - Implement non-pharmacological measures as appropriate and evaluate response  - Consider cultural and social influences on pain and pain management  - Notify physician/advanced practitioner if interventions unsuccessful or patient reports new pain  Outcome: Progressing     Problem: INFECTION - ADULT  Goal: Absence or prevention of progression during hospitalization  Description: INTERVENTIONS:  - Assess and monitor for signs and symptoms of infection  - Monitor lab/diagnostic results  - Monitor all insertion sites, i e  indwelling lines, tubes, and drains  - Monitor endotracheal if appropriate and nasal secretions for changes in amount and color  - [place] appropriate cooling/warming therapies per order  - Administer medications as ordered  - Instruct and encourage patient and family to use good hand hygiene technique  - Identify and instruct in appropriate isolation precautions for identified infection/condition  Outcome: Progressing  Goal: Absence of fever/infection during neutropenic period  Description: INTERVENTIONS:  - Monitor WBC    Outcome: Progressing     Problem: SAFETY ADULT  Goal: Patient will remain free of falls  Description: INTERVENTIONS:  - Educate patient/family on patient safety including physical limitations  - Instruct patient to call for assistance with activity   - Consult OT/PT to assist with strengthening/mobility   - Keep Call bell within reach  - Keep bed low and locked with side rails adjusted as appropriate  - Keep care items and personal belongings within reach  - Initiate and maintain comfort rounds  - Make Fall Risk Sign visible to staff  - Apply yellow socks and bracelet for high fall risk patients  - Consider moving patient to room near nurses station  Outcome: Progressing  Goal: Maintain or return to baseline ADL function  Description: INTERVENTIONS:  -  Assess patient's ability to carry out ADLs; assess patient's baseline for ADL function and identify physical deficits which impact ability to perform ADLs (bathing, care of mouth/teeth, toileting, grooming, dressing, etc )  - Assess/evaluate cause of self-care deficits   - Assess range of motion  - Assess patient's mobility; develop plan if impaired  - Assess patient's need for assistive devices and provide as appropriate  - Encourage maximum independence but intervene and supervise when necessary  - Involve family in performance of ADLs  - Assess for home care needs following discharge   - Consider OT consult to assist with ADL evaluation and planning for discharge  - Provide patient education as appropriate  Outcome: Progressing  Goal: Maintains/Returns to pre admission functional level  Description: INTERVENTIONS:  - Perform BMAT or MOVE assessment daily    - Set and communicate daily mobility goal to care team and patient/family/caregiver     - Collaborate with rehabilitation services on mobility goals if consulted  - Record patient progress and toleration of activity level   Outcome: Progressing     Problem: DISCHARGE PLANNING  Goal: Discharge to home or other facility with appropriate resources  Description: INTERVENTIONS:  - Identify barriers to discharge w/patient and caregiver  - Arrange for needed discharge resources and transportation as appropriate  - Identify discharge learning needs (meds, wound care, etc )  - Arrange for interpretive services to assist at discharge as needed  - Refer to Case Management Department for coordinating discharge planning if the patient needs post-hospital services based on physician/advanced practitioner order or complex needs related to functional status, cognitive ability, or social support system  Outcome: Progressing     Problem: Knowledge Deficit  Goal: Patient/family/caregiver demonstrates understanding of disease process, treatment plan, medications, and discharge instructions  Description: Complete learning assessment and assess knowledge base  Interventions:  - Provide teaching at level of understanding  - Provide teaching via preferred learning methods  Outcome: Progressing     Problem: MOBILITY - ADULT  Goal: Maintain or return to baseline ADL function  Description: INTERVENTIONS:  -  Assess patient's ability to carry out ADLs; assess patient's baseline for ADL function and identify physical deficits which impact ability to perform ADLs (bathing, care of mouth/teeth, toileting, grooming, dressing, etc )  - Assess/evaluate cause of self-care deficits   - Assess range of motion  - Assess patient's mobility; develop plan if impaired  - Assess patient's need for assistive devices and provide as appropriate  - Encourage maximum independence but intervene and supervise when necessary  - Involve family in performance of ADLs  - Assess for home care needs following discharge   - Consider OT consult to assist with ADL evaluation and planning for discharge  - Provide patient education as appropriate  Outcome: Progressing  Goal: Maintains/Returns to pre admission functional level  Description: INTERVENTIONS:  - Perform BMAT or MOVE assessment daily    - Set and communicate daily mobility goal to care team and patient/family/caregiver     - Collaborate with rehabilitation services on mobility goals if consulted  - Out of bed for toileting  - Record patient progress and toleration of activity level   Outcome: Progressing     Problem: Prexisting or High Potential for Compromised Skin Integrity  Goal: Skin integrity is maintained or improved  Description: INTERVENTIONS:  - Identify patients at risk for skin breakdown  - Assess and monitor skin integrity  - Assess and monitor nutrition and hydration status  - Monitor labs   - Assess for incontinence   - Turn and reposition patient  - Assist with mobility/ambulation  - Relieve pressure over bony prominences  - Avoid friction and shearing  - Provide appropriate hygiene as needed including keeping skin clean and dry  - Evaluate need for skin moisturizer/barrier cream  - Collaborate with interdisciplinary team   - Patient/family teaching  - Consider wound care consult   Outcome: Progressing     Problem: Potential for Falls  Goal: Patient will remain free of falls  Description: INTERVENTIONS:  - Educate patient/family on patient safety including physical limitations  - Instruct patient to call for assistance with activity   - Consult OT/PT to assist with strengthening/mobility   - Keep Call bell within reach  - Keep bed low and locked with side rails adjusted as appropriate  - Keep care items and personal belongings within reach  - Initiate and maintain comfort rounds  - Make Fall Risk Sign visible to staff  - Apply yellow socks and bracelet for high fall risk patients  - Consider moving patient to room near nurses station  Outcome: Progressing

## 2022-07-08 NOTE — CASE MANAGEMENT
Case Management Assessment & Discharge Planning Note    Patient name Janel Bledsoe  Location /-15 MRN [id number]  : 1945 Date 2022       Current Admission Date: 2022  Current Admission Diagnosis:Renal cell cancer, right [facility])   Patient Active Problem List    Diagnosis Date Noted    Renal cell cancer, right ([facility] [facility] ) 2022    Hypercalcemia 2022    Stage 3a chronic kidney disease ([facility] ) 2022    Kidney cyst, acquired 2022    Chronic pain syndrome 10/05/2020    Chronic right-sided low back pain with right-sided sciatica 2020    Lumbar radiculopathy 2020    Polyarticular arthritis 2019    Class 1 obesity due to excess calories with serious comorbidity and body mass index (BMI) of 32 0 to 32 9 in adult 2019    Essential hypertension 09/10/2019    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 chronic kidney disease, without long-term current use of insulin ([facility] ) 09/10/2019    Left-sided weakness 07/10/2019    Cerebral aneurysm, nonruptured 2019    TIA (transient ischemic attack) 2019      LOS (days): 2  Geometric Mean LOS (GMLOS) (days): 2 10  Days to GMLOS:0 4     OBJECTIVE:    Risk of Unplanned Readmission Score: 15 25         Current admission status: Inpatient       Preferred Pharmacy:   [address] [place] [address] [address] [place] [id number]  Phone: [phone] Fax: [phone]    Primary Care Provider: Genny Hope    Primary Insurance: Claire RAMESH  Secondary Insurance:     ASSESSMENT:  Kathy 26 Proxies    There are no active Health Care Proxies on file                   Readmission Root Cause  30 Day Readmission: No    Patient Information  Admitted from[de-identified] Home  Mental Status: Alert  During Assessment patient was accompanied by: Not accompanied during assessment  Assessment information provided by[de-identified] Patient  Primary Caregiver: Self  Support Systems: Spouse/significant other  [place] of Residence: [address] do you live in?: [address] entry access options   Select all that apply : Stairs  Number of steps to enter home : 2  Type of Current Residence: 2 story home  In the last 12 months, was there a time when you were not able to pay the mortgage or rent on time?: No  In the last 12 months, how many places have you lived?: 1  In the last 12 months, was there a time when you did not have a steady place to sleep or slept in a shelter (including now)?: No  Homeless/housing insecurity resource given?: N/A  Living Arrangements: Lives w/ Spouse/significant other    Activities of Daily Living Prior to Admission  Completes ADLs independently?: Yes  Does patient have a history of [address]?: No  Does patient currently have [address]?: No         Patient Information Continued  Does patient have prescription coverage?: Yes  Within the past 12 months, you worried that your food would run out before you got the money to buy more : Never true  Within the past 12 months, the food you bought just didn't last and you didn't have money to get more : Never true  Food insecurity resource given?: N/A  Does patient receive dialysis treatments?: No  Does patient have a history of substance abuse?: No  Does patient have a history of Mental Health Diagnosis?: No         Means of Transportation  Means of Transport to Appts[de-identified] Drives Self  In the past 12 months, has lack of transportation kept you from medical appointments or from getting medications?: No  In the past 12 months, has lack of transportation kept you from meetings, work, or from getting things needed for daily living?: No  Was application for public transport provided?: N/A        DISCHARGE DETAILS:     Discharge Destination Plan[de-identified] Home

## 2022-07-08 NOTE — ASSESSMENT & PLAN NOTE
Patient s/p right nephrectomy developed tachycardia, worsening leukocytosis, hypoxia with no clear source of infection at this time  -check procalcitonin, lactic acid, chest x-ray  -UA small leukocyte, pyuria 7/7/22  -start empiric cefepime and vanco  -check blood culture  -Normal saline fluid resuscitation  -deescalate antibiotics based on culture results  -consider ID consult if above worsens

## 2022-07-08 NOTE — PLAN OF CARE
Problem: PAIN - ADULT  Goal: Verbalizes/displays adequate comfort level or baseline comfort level  Description: Interventions:  - Encourage patient to monitor pain and request assistance  - Assess pain using appropriate pain scale  - Administer analgesics based on type and severity of pain and evaluate response  - Implement non-pharmacological measures as appropriate and evaluate response  - Consider cultural and social influences on pain and pain management  - Notify physician/advanced practitioner if interventions unsuccessful or patient reports new pain  Outcome: Progressing     Problem: INFECTION - ADULT  Goal: Absence or prevention of progression during hospitalization  Description: INTERVENTIONS:  - Assess and monitor for signs and symptoms of infection  - Monitor lab/diagnostic results  - Monitor all insertion sites, i e  indwelling lines, tubes, and drains  - Monitor endotracheal if appropriate and nasal secretions for changes in amount and color  - [place] appropriate cooling/warming therapies per order  - Administer medications as ordered  - Instruct and encourage patient and family to use good hand hygiene technique  - Identify and instruct in appropriate isolation precautions for identified infection/condition  Outcome: Progressing     Problem: SAFETY ADULT  Goal: Patient will remain free of falls  Description: INTERVENTIONS:  - Educate patient/family on patient safety including physical limitations  - Instruct patient to call for assistance with activity   - Consult OT/PT to assist with strengthening/mobility   - Keep Call bell within reach  - Keep bed low and locked with side rails adjusted as appropriate  - Keep care items and personal belongings within reach  - Initiate and maintain comfort rounds  - Make Fall Risk Sign visible to staff  - Offer Toileting every  Hours, in advance of need  - Initiate/Maintain alarm  - Obtain necessary fall risk management equipment:   - Apply yellow socks and bracelet for high fall risk patients  - Consider moving patient to room near nurses station  Outcome: Progressing

## 2022-07-08 NOTE — ASSESSMENT & PLAN NOTE
-Patient developed tachycardia and hypoxia prior to undergoing EGD to evaluate upper GI bleed  -Endoscopy canceled due to hypoxia, pox  89% RA    -clinically patient does not appear volume overload, no wheezing, able to speak in full sentences  -Started on 2L O2, pox 95% now  -check CXR, probnp, troponin, ABG  -R/o PE, no CTA secondary to renal disease  -check V/Q scan  -On prophylactic Lovenox  -Will give xopenex now due to tachycardia  -monitor respiratory status closely

## 2022-07-08 NOTE — RESPIRATORY THERAPY NOTE
RT Protocol Note  Tej Szymanski 78 y o  male MRN: [id number]  Unit/Bed#: -01 Encounter: [id number]    Assessment    Principal Problem:    Sepsis ([facility] )  Active Problems:    Essential hypertension    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 chronic kidney disease, without long-term current use of insulin (HCC)    Stage 3a chronic kidney disease (HCC)    Renal cell cancer, right (HCC)    Acute respiratory failure with hypoxia (HCC)    GI bleed    Tachycardia      Home Pulmonary Medications:  none       Past Medical History:   Diagnosis Date    Diabetes mellitus ([facility] )     Hypertension     TIA (transient ischemic attack)      Social History     Socioeconomic History    Marital status: /Civil Union     Spouse name: None    Number of children: None    Years of education: None    Highest education level: None   Occupational History    Occupation: Retired 2015 -     Tobacco Use    Smoking status: Never Smoker    Smokeless tobacco: Never Used   Vaping Use    Vaping Use: Never used   Substance and Sexual Activity    Alcohol use: Never    Drug use: Never    Sexual activity: None   Other Topics Concern    None   Social History Narrative    Denies drug use - As per [address]    Consumes on average 1 cup of regular coffee per day      Social Determinants of Health     Financial Resource Strain: Not on file   Food Insecurity: No Food Insecurity    Worried About Running Out of Food in the Last Year: Never true    Ran Out of Food in the Last Year: Never true   Transportation Needs: No Transportation Needs    Lack of Transportation (Medical): No    Lack of Transportation (Non-Medical):  No   Physical Activity: Not on file   Stress: Not on file   Social Connections: Not on file   Intimate Partner Violence: Not on file   Housing Stability: Low Risk     Unable to Pay for Housing in the Last Year: No    Number of Places Lived in the Last Year: 1    Unstable Housing in the Last Year: No       Subjective Objective    Physical Exam:   Assessment Type: Assess only  General Appearance: Alert, Awake  Respiratory Pattern: Normal  Chest Assessment: Chest expansion symmetrical  Bilateral Breath Sounds: Clear, Diminished  O2 Device: nc    Vitals:  Blood pressure 107/66, pulse (!) 125, temperature 99 3 °F (37 4 °C), temperature source Oral, resp  rate 18, height 6' (1 829 m), weight 107 kg (236 lb), SpO2 96 %  Results from last 7 days   Lab Units 07/08/22  1410   PH ART  7 461*   PCO2 ART mm Hg 35 3*   PO2 ART mm Hg 61 5*   HCO3 ART mmol/L 24 6   BASE EXC ART mmol/L 0 9   O2 CONTENT ART mL/dL 12 0*   O2 HGB, ARTERIAL % 90 7*   ABG SOURCE  Radial, Right       Imaging and other studies: I have personally reviewed pertinent reports  O2 Device: nc     Plan    Respiratory Plan: Discontinue Protocol        Resp Comments: (P) Pt found on 2L NC  CC Sepsis, CKD, cancer of kidney  Pt has no pulmonary history and does not take home nebs or inhalers and has never smoked  Most recent CXR shows atelectasis and left pleural effusion  Most recent ABG on 2L was 7 46/35/61/24  Breath sounds were clear and diminished  Pt appeared ill and complained of nausea  D/C respiratory protocol and prn xopenex as they are not indicated

## 2022-07-08 NOTE — PROGRESS NOTES
Postoperative day 2  Status post robot assisted laparoscopic radical right nephrectomy secondary to 5 cm central right renal mass  He has no complaints at this time  He did report coffee ground emesis within the last 24 hours  He has been evaluated by GI and they are planning for upper endoscopy today  Appreciate input from Nephrology  /80   Pulse (!) 120   Temp 98 6 °F (37 °C)   Resp 16   Ht 6' (1 829 m)   Wt 107 kg (236 lb)   SpO2 90%   BMI 32 01 kg/m²      on examination he is in no acute distress  His abdomen is benign  Incisions are clean dry and intact with staples in place  There are no peritoneal signs  There is no rebound or guarding  Lower extremity 1 to 2+ edema is appreciated to the mid shin     white count 22   Creatinine 3 55 ( baseline 1 4)      impression:   Right renal mass suspicious for renal cell carcinoma status post robot assisted laparoscopic right radical nephrectomy, acute on chronic renal failure, diabetes, leukocytosis     plan:    Maintain Baird until creatinine nadirs  Continue with IV hydration  Appreciate input from Nephrology   Appreciate input from GI  Plan for EGD today  Out of bed and ambulate  PT consult  Perform left renal ultrasound to ensure no evidence of hydronephrosis  Slim assistance for management of diabetes  Continue with inpatient management  Telephone update on Abiodun Ballesteros condition provided to his wife Bess Albert this morning    Addendum:  The patient went for EGD today and had transient hypoxia with tachycardia  Will obtain stat chest x-ray and ABG

## 2022-07-08 NOTE — OCCUPATIONAL THERAPY NOTE
Occupational Therapy Evaluation     Patient Name: Saritha Cain  Today's Date: 7/8/2022  Problem List  Principal Problem:    Sepsis ([facility] [facility] )  Active Problems:    Essential hypertension    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 chronic kidney disease, without long-term current use of insulin (HCC)    Stage 3a chronic kidney disease (HCC)    Renal cell cancer, right (HCC)    Acute respiratory failure with hypoxia (HCC)    GI bleed    Tachycardia    Past Medical History  Past Medical History:   Diagnosis Date    Diabetes mellitus ([facility] [facility] )     Hypertension     TIA (transient ischemic attack)      Past Surgical History  Past Surgical History:   Procedure Laterality Date    CYSTECTOMY      Per patient cyst removal from his back    LASIK      MS LAP, RADICAL NEPHRECTOMY Right 7/6/2022    Procedure: NEPHRECTOMY RADICAL LAPAROSCOPIC W/ ROBOTICS, poss open;  Surgeon: Monet Altamirano MD;  Location: [facility];  Service: Urology         07/08/22 1015   OT Last Visit   OT Visit Date 07/08/22   Note Type   Note type Evaluation   Restrictions/Precautions   Weight Bearing Precautions Per Order No   Other Precautions Cognitive; Chair Alarm; Bed Alarm;Telemetry; Fall Risk   Pain Assessment   Pain Assessment Tool 0-10   Pain Score No Pain   Home Living   Type of Home Mobile home  (+6STE)   Home Layout One level   Bathroom Shower/Tub Walk-in shower   Prior Function   Level of [place] Independent with ADLs and functional mobility   Lives With Spouse   Receives Help From Family   ADL Assistance Independent   IADLs Independent   Falls in the last 6 months 0   Lifestyle   Autonomy I with ADL's, shares homemaking tasks with spouse, continue to assess , pt a vague historian     Reciprocal Relationships spouse   Service to Others retired   ADL   Eating Assistance 4  [facility] 3  Moderate Assistance   UB [address] 3  Moderate Assistance   LB [address] 2  Maximal Assistance   UB Dressing Assistance 3  Moderate Assistance   LB Dressing Assistance 1  Total Assistance   Toileting Assistance  1  Total Assistance   Bed Mobility   Supine to Sit 3  Moderate assistance   Additional items Assist x 2   Sit to Supine 3  Moderate assistance   Additional items Assist x 2   Transfers   Sit to Stand 3  Moderate assistance   Additional items Assist x 2   Stand to Sit 3  Moderate assistance   Additional items Assist x 2   Additional Comments +B/L HHA and cues for safety/sequencing steps   Functional Mobility   Functional Mobility 3  Moderate assistance   Additional Comments mod a x 2 with 1-2 small steps to stretcher for off the floor testing   Balance   Static Sitting Fair   Dynamic Sitting Fair -   Static Standing Poor +   Dynamic Standing Poor   Ambulatory Poor -   Activity Tolerance   Activity Tolerance Patient limited by fatigue; Other (Comment)  (tachyarcdic ( bmp in supine) RN/nephrolgist notified )   Medical Staff Made Aware PT reji   Nurse Made Aware Rn cleared pt for therapy   RUE Assessment   RUE Assessment WFL   LUE Assessment   LUE Assessment WFL   Hand Function   Gross Motor Coordination Functional   Fine Motor Coordination Impaired   Cognition   Overall Cognitive Status Impaired   Arousal/Participation Alert; Responsive;Lethargic   Attention Difficulty attending to directions   Orientation Level Oriented to place   Memory Decreased recall of precautions;Decreased recall of recent events;Decreased short term memory   Following Commands Follows one step commands with increased time or repetition   Comments pt lethargic, able to answer a few evaluative questions/vague historian, slow to respond, impaired memory, 2 step direction following, continnue to assess  Assessment   Limitation Decreased ADL status; Decreased UE ROM; Decreased UE strength;Decreased Safe judgement during ADL;Decreased cognition;Decreased endurance;Decreased self-care trans;Decreased high-level ADLs; Decreased fine motor control   Prognosis Fair   Assessment Pt is a 78 y o  male who was admitted to [facility] on 7/6/2022 with Sepsis [facility]) right renal mas spicious for renal cell carcinoma NEPHRECTOMY RADICAL LAPAROSCOPIC W/ ROBOTICS on 7/6  Pt's problem list also includes PMH of  has a past medical history of Diabetes mellitus ([address] ), Hypertension, and TIA (transient ischemic attack)    At baseline pt was completing I with ADL's/IaDL's  Pt lives with spouse in a mobile home with [place]  Currently pt requires min a feeding, mod/max a  for overall ADLS and mod a x 2 for functional mobility/transfers  Pt currently presents with impairments in the following categories -steps to enter environment, difficulty performing ADLS, difficulty performing IADLS , limited insight into deficits, flat affect and decreased initiation and engagement  activity tolerance, endurance, standing balance/tolerance, sitting balance/tolerance, FMC, insight, safety , judgement , attention , sequencing , communication and interpersonal skills  These impairments, as well as pt's fatigue and risk for falls  limit pt's ability to safely engage in all baseline areas of occupation, includingeating, grooming, bathing, dressing, toileting, functional mobility/transfers, community mobility, laundry , driving, house maintenance, medication management, meal prep, cleaning, social participation  and leisure activities  The patient's raw score on the AM-PAC Daily Activity inpatient short form is 11, standardized score is 29 04, less than 39 4  Patients at this level are likely to benefit from discharge to post-acute rehabilitation services  Please refer to the recommendation of the Occupational Therapist for safe discharge planning  From OT standpoint, recommend STR upon D/C  OT will continue to follow to address the below stated goals     Goals   Patient Goals unable to state   LTG Time Frame 10-14   Long Term Goal #1 see goals below   Plan   Treatment Interventions ADL retraining;Functional transfer training;UE strengthening/ROM; Endurance training;Cognitive reorientation;Patient/family training;Equipment evaluation/education; Compensatory technique education; Energy conservation; Activityengagement   Goal Expiration Date 07/22/22   OT Frequency 3-5x/wk   Recommendation   OT Discharge Recommendation Post acute rehabilitation services   OT - OK to Discharge Yes   AM-PAC Daily Activity Inpatient   Lower Body Dressing 1   Bathing 2   Toileting 1   Upper Body Dressing 2   Grooming 2   Eating 3   Daily Activity Raw Score 11   Daily Activity Standardized Score (Calc for Raw Score >=11) 29 04   AM-PAC Applied Cognition Inpatient   Following a Speech/Presentation 2   Understanding Ordinary Conversation 3   Taking Medications 2   Remembering Where Things Are Placed or Put Away 2   Remembering List of 4-5 Errands 1   Taking Care of Complicated Tasks 1   Applied Cognition Raw Score 11   Applied Cognition Standardized Score 27 03    Occupational Therapy Goals:    *Mod I with bed mobility to engage in functional tasks  *Mod I Adl's after setup with use of AE PRN  *Mod I toileting and clothing management   *Mod I functional mobility and transfers to/from all surfaces with Fair + dynamic balance and safety for participation in dynamic adls and iadl tasks   *Demonstrate good carryover with safe use of RW during functional tasks   *Assess DME needs   *Increase activity tolerance to 25-30 minutes for participation in adls and enjoyable activities  *Pt to participate in further cognitive testing with good attention and participation to assist with safe d/c recommendations  *Mod I with Simulated IADL management task  *Pt will follow 100% simple one step verbal commands and be A/Ox4 consistently t/o use of external environmental cues w/ mod I  *Demonstrate good carryover of pt/family education and training with good tolerance for increased safety and independence with ADL's/ADl's    *Pt will improve standing balance to 2-3 minutes with functional tasks to increase I with toileting/transfers    *Patient will demonstrate 100% carryover of energy conservation techniques t/o functional I/ADL/leisure tasks w/o cues s/p skilled education to increase endurance during functional tasks    Jennifer Velez MOT, OTR/L

## 2022-07-08 NOTE — PROGRESS NOTES
NEPHROLOGY PROGRESS NOTE   Shaan Sadler 78 y o  male MRN: [id number]  Unit/Bed#: -01 Encounter: [id number]    ASSESSMENT & PLAN:  57-year-old male with past history of CKD stage IIIA, hypertension, diabetes mellitus type 2, chronic lower extremity edema left more than right right renal mass concerning for renal cell carcinoma was admitted and underwent elective laparoscopic radical right nephrectomy on 07/06  Nephrology following for postop RUPAL  Acute kidney injury on chronic kidney disease stage 3a  -baseline creatinine 1 3-1 4 dating back to 2020, recently was 1 37 on 06/15  -follows with Dr Violet Moore  -chronic kidney disease likely due to diabetic kidney disease, hypertensive nephrosclerosis and age-related nephron loss  -creatinine was trending up to 1 8 postop on 07/06, further worsened to peak creatinine 2 9 on 07/07  Patient being tachycardic, blood pressure was once in 90s on 07/07 night     Did require Levophed on 07/06  -postop acute kidney injury likely due to hemodynamic changes / hypotension/tachycardia and from nephron loss status post right nephrectomy on 7/6  -UA within microscopy from July 7th with 4-10 RBC as well as few wbc's nitrite negative  -renal function has worsened further to creatinine 3 5, EGFR 15  Patient had renal ultrasound done today, follow-up results  -currently NPO, will continue IV fluids  Ordered for IV albumin 25 g x 1 dose  -If shortness of breath, may decrease the rate of fluid and give lasix   -no indication for renal replacement therapy at this time but if renal function continues to worsen may need renal replacement therapy  Primary hypertension  -blood pressure currently on lower side but has been labile  -continue to hold lisinopril HCTZ  Currently not on any antihypertensive medication  Avoid hypotension    Anemia hemoglobin trended down but currently stable at 10 2 gram/deciliter, noted plan for EGD      Right renal mass, status post radical laparoscopic nephrectomy on 07/06, continue postop care per Urology  Diabetes mellitus with Hyperglycemia likely due to D5 LR, also suspecting blood draw from same site as receiving IV fluid  Repeat glucose level was much better  Continue management of diabetes per primary team     Leukocytosis/tachycardia:  Recommend medical team involvement, Tiger text message sent to the Urology    Discussed with primary team  Discussed with patient's wife Izabel Dominguez, discussed about possibility of dialysis and she is agreeable to dialysis if needed  SUBJECTIVE:  Patient appear ill  C/o back ache and left shoulder ache  OBJECTIVE:  Current Weight: Weight - Scale: 107 kg (236 lb)  Vitals:    07/08/22 1233   BP:    Pulse:    Resp:    Temp: 99 3 °F (37 4 °C)   SpO2: 95%   /66   Pulse (!) 125   Temp 99 3 °F (37 4 °C) (Oral)   Resp 18   Ht 6' (1 829 m)   Wt 107 kg (236 lb)   SpO2 95% Comment: Reported to me he was 88% on RA and oxygen was applied  BMI 32 01 kg/m²     No intake or output data in the 24 hours ending 07/08/22 1252    Physical Exam  General:  Ill looking, awake  Eyes: Conjunctivae pink,  Sclera anicteric  ENT: lips and mucous membranes moist  Neck: supple   Chest: Clear to Auscultation both lungs,  no crackles, ronchus or wheezing  CVS: S1 & S2 present, tachycardia,, regular rhythm, no murmur    Abdomen: soft, non-tender, non-distended, Bowel sounds normoactive  Extremities: trace edema of  legs  Skin: no rash  Neuro: awake, alert, oriented x 3   Psych: Mood and affect appropriate     Medications:    Current Facility-Administered Medications:     acetaminophen (TYLENOL) tablet 650 mg, 650 mg, Oral, Q6H [address], Orlando Pham MD, 650 mg at 07/07/22 0520    docusate sodium (COLACE) capsule 100 mg, 100 mg, Oral, BID, Orlando Pham MD, 100 mg at 07/06/22 2202    enoxaparin (LOVENOX) subcutaneous injection 40 mg, 40 mg, Subcutaneous, Daily, Orlando Pham MD, 40 mg at 07/07/22 5576    insulin lispro (HumaLOG) 100 units/mL subcutaneous injection 1-6 Units, 1-6 Units, Subcutaneous, Q6H [address], 1 Units at 07/08/22 0150 **AND** Fingerstick Glucose (POCT), , , Q6H, Niharika Garcia PA-C    morphine injection 4 mg, 4 mg, Intravenous, Q2H PRN, Virgilio Verdugo MD, 4 mg at 07/07/22 1252    neomycin-bacitracin-polymyxin b (NEOSPORIN) ointment 1 small application, 1 small application, Topical, BID, Virgilio Verdugo MD, 1 small application at 21/08/82 0847    ondansetron (ZOFRAN) injection 4 mg, 4 mg, Intravenous, Q6H PRN, Virgilio Verdugo MD, 4 mg at 07/07/22 0106    oxyCODONE (ROXICODONE) IR tablet 2 5 mg, 2 5 mg, Oral, Q4H PRN, Virgilio Verdugo MD    oxyCODONE (ROXICODONE) IR tablet 5 mg, 5 mg, Oral, Q4H PRN, Virgilio Verdugo MD, 5 mg at 07/07/22 0254    pantoprazole (PROTONIX) injection 40 mg, 40 mg, Intravenous, Q12H [address], [facility], , 40 mg at 07/08/22 0847    senna (SENOKOT) tablet 8 6 mg, 1 tablet, Oral, HS, Niharika Garcia PA-C    simethicone (MYLICON) chewable tablet 80 mg, 80 mg, Oral, Q6H PRN, Niharika Garcia PA-C    sodium chloride 0 9 % infusion, 75 mL/hr, Intravenous, Continuous, Niharika Garcia PA-C, Last Rate: 75 mL/hr at 07/08/22 1047, 75 mL/hr at 07/08/22 1047    Invasive Devices:   Urethral Catheter Latex 16 Fr   (Active)   Site Assessment Clean;Skin intact 07/06/22 1805   Barid Care Done 07/07/22 0839   Collection Container Standard drainage bag 07/06/22 1930   Securement Method Securing device (Describe) 07/06/22 1930   Output (mL) 350 mL 07/07/22 0729       Lab Results:   Results from last 7 days   Lab Units 07/08/22  0451 07/07/22  1115 07/07/22  0817 07/07/22  0518 07/06/22  2125 07/06/22  1843   WBC Thousand/uL 21 98*  --   --  16 50*  --  13 40*   HEMOGLOBIN g/dL 10 2*  --   --  10 0*  --  13 5   HEMATOCRIT % 29 8*  --   --  31 3*  --  41 0   PLATELETS Thousands/uL 187  --   --  172 115* 120*   POTASSIUM mmol/L 4 8 4 7 4 3 --   --  3 7   CHLORIDE mmol/L 105 103 105  --   --  107   CO2 mmol/L 26 26 20*  --   --  22   BUN mg/dL 43* 32* 24  --   --  22   CREATININE mg/dL 3 55* 2 95* 2 39*  --   --  1 80*   CALCIUM mg/dL 8 1* 8 3 7 6*  --   --  8 5       Previous work up:    Prior MRI:   IMPRESSION:     Enhancing, predominantly solid 4 8 x 4 3 x 5 1 cm anterior lower pole right renal cortical mass extending into the renal sinus #10/96 and #11/38 suspicious for renal cell carcinoma  This lesion is increased in size from 2019 dimensions of 3 9 x 3 3 x   5 0 cm      Indeterminate 6 mm left hepatic lesion is likely a benign hemangioma  Attention to this lesion is recommended on surveillance imaging      4 mm gallbladder adherent sludge ball or fundal polyp; this does not meet size criteria necessitating follow-up follow-up      No definite metastatic disease  Portions of the record may have been created with voice recognition software  Occasional wrong word or "sound a like" substitutions may have occurred due to the inherent limitations of voice recognition software  Read the chart carefully and recognize, using context, where substitutions have occurred  If you have any questions, please contact the dictating provider

## 2022-07-08 NOTE — PROGRESS NOTES
Pharmacy-Driven Renal Dosing Protocol    Per [employer] approved Pharmacy-Driven Renal Dosing Protocol, enoxaparin (Lovenox) 40 mg SQ daily has been reduced to enoxaparin (Lovenox) 30 mg SQ daily for a CrCl < 30 mL/min      Leesa Pabon, PharmD  Pharmacist

## 2022-07-08 NOTE — ASSESSMENT & PLAN NOTE
-likely multifactorial could be due to sepsis, ? PE  -IVF fluid resuscitation  -ekg stat  -V/Q scan when clinically stable  -cardiac monitoring  -check troponin, BNP  -monitor heart rate closely

## 2022-07-08 NOTE — ASSESSMENT & PLAN NOTE
Lab Results   Component Value Date    HGBA1C 7 2 (A) 05/16/2022   Diabetes fairly controlled with A1c 7 2  Hold metformin   On ISS accu cheks    Recent Labs     07/07/22  1614 07/07/22  2112 07/08/22  0106 07/08/22  0800   POCGLU 235* 212* 182* 192*       Blood Sugar Average: Last 72 hrs:  (P) 159 [id number]

## 2022-07-08 NOTE — PLAN OF CARE
Problem: PHYSICAL THERAPY ADULT  Goal: Performs mobility at highest level of function for planned discharge setting  See evaluation for individualized goals  Description: Treatment/Interventions: Functional transfer training, LE strengthening/ROM, Patient/family training, Bed mobility, Gait training, Spoke to nursing, Spoke to case management, OT          See flowsheet documentation for full assessment, interventions and recommendations  Note: Prognosis: Good  Problem List: Decreased strength, Decreased endurance, Impaired balance, Decreased mobility, Decreased cognition, Decreased coordination, Impaired judgement, Decreased safety awareness, Pain  Assessment: Pt seen for high complexity PT evaluation  Pt with active PT eval/treat and up with assistance orders  Pt is a 78 y o  male who was admitted to [facility] on 7/6/2022 with renal cell cancer s/p laparoscopic radical R nephrorectomy on 7/6  Pt's current dx/ problem list include: type 2 DM, obesity, HTN  Comorbidities affecting pt's physical performance at time of assessment are as follows:  has a past medical history of Diabetes mellitus ([facility] [address] ), Hypertension, and TIA (transient ischemic attack)  Personal factors affecting pt at time of initial examination include: steps to enter environment, limited home support, advanced age, past experience, inability to perform IADLs, inability to perform ADLs, inability to ambulate household distances, limited insight into impairments  Due to acute medical issues, ongoing medical workup for primary dx; pain, fall risk, increased reliance on more restrictive AD compared to baseline;  decreased activity tolerance compared to baseline, increased assistance needed from caregiver at current time, multiple lines, decline in overall functional mobility status; health management issues; note unstable clinical picture (high complexity)  Pt poor historian, information obtained from chart review   At baseline, pt resides with spouse in 2 [address] [place] [address] with 2 HOLLY vs mobile home with 6 HOLLY and was independent prior to hospital admission  Currently, upon initial examination, pt  is requiring mod Ax2 A for bed mobility skills; mod Ax2 for functional transfers and mod Ax2 for ambulation with b/l HHA  Currently, pt presents functioning below baseline and with overall mobility deficits 2* to: decreased LE strength/AROM; limited flexibility;  generalized weakness/ deconditioning; decreased endurance; decreased activity tolerance; decreased coordination; impaired balance; gait deviations; decreased safety awareness; SOB/FLORES; fatigue; impaired safety and judgement; limited insight into current deficits; bed/ chair alarms; multiple lines; as well as: weakness, decreased ROM, impaired balance, decreased endurance, impaired coordination, gait deviations, pain, decreased activity tolerance, decreased functional mobility tolerance and fall risk  Pt currently at increased risk for falls  At the end of evaluation, pt was left supine on stretcher with transport staff present with all needs in reach  Pt would benefit from continued skilled PT services while in hospital to address remaining limitations and maximize functional potential  PT to continue to follow pt and recommends rehab upon d/c  The patient's AM-PAC Basic Mobility Inpatient Short Form Raw Score is 8  A Raw score of less than or equal to 16 suggests the patient may benefit from discharge to post-acute rehabilitation services  Please also refer to the recommendation of the Physical Therapist for safe discharge planning  Barriers to Discharge: Inaccessible home environment        PT Discharge Recommendation: Post acute rehabilitation services          See flowsheet documentation for full assessment

## 2022-07-08 NOTE — PHYSICAL THERAPY NOTE
Physical Therapy Evaluation     Patient's Name: Phuong Harrington    Admitting Diagnosis  Malignant neoplasm of right kidney [facility]) [C64 1]    Problem List  Patient Active Problem List   Diagnosis    Cerebral aneurysm, nonruptured    TIA (transient ischemic attack)    Left-sided weakness    Essential hypertension    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 chronic kidney disease, without long-term current use of insulin (HCC)    Class 1 obesity due to excess calories with serious comorbidity and body mass index (BMI) of 32 0 to 32 9 in adult    Polyarticular arthritis    Chronic right-sided low back pain with right-sided sciatica    Lumbar radiculopathy    Chronic pain syndrome    Hypercalcemia    Stage 3a chronic kidney disease (HCC)    Kidney cyst, acquired    Renal cell cancer, right [facility])       Past Medical History  Past Medical History:   Diagnosis Date    Diabetes mellitus ([facility] [address] )     Hypertension     TIA (transient ischemic attack)        Past Surgical History  Past Surgical History:   Procedure Laterality Date    CYSTECTOMY      Per patient cyst removal from his back    LASIK      ID LAP, RADICAL NEPHRECTOMY Right 7/6/2022    Procedure: NEPHRECTOMY RADICAL LAPAROSCOPIC W/ ROBOTICS, poss open;  Surgeon: Damaris Welch MD;  Location: [facility];  Service: Urology          07/08/22 1119   PT Last Visit   PT Visit Date 07/08/22   Note Type   Note type Evaluation   Pain Assessment   Pain Assessment Tool FLACC   Pain Location/Orientation Location: Abdomen   Hospital Pain Intervention(s) Repositioned; Ambulation/increased activity; Emotional support   Pain Rating: FLACC (Rest) - Face 1   Pain Rating: FLACC (Rest) - Legs 0   Pain Rating: FLACC (Rest) - Activity 1   Pain Rating: FLACC (Rest) - Cry 0   Pain Rating: FLACC (Rest) - Consolability 0   Score: FLACC (Rest) 2   Pain Rating: FLACC (Activity) - Face 1   Pain Rating: FLACC (Activity) - Legs 0   Pain Rating: FLACC (Activity) - Activity 1   Pain Rating: FLACC (Activity) - Cry 1   Pain Rating: FLACC (Activity) - Consolability 0   Score: FLACC (Activity) 3   Restrictions/Precautions   Weight Bearing Precautions Per Order No   Other Precautions Cognitive; Chair Alarm; Bed Alarm;Multiple lines; Fall Risk;Pain   Home Living   Additional Comments Pt poor historian 2* increased lethargy  Conflicting information in chart regarding home setup  Per chart pt resides in mobile home with 6 HOLLY vs 2 SH with 2 HOLLY  Pt resides with spouse and was I with ADLs/ IADLs without AD+ drives self  Will need to confirm with CM   Prior Function   Level of [place] Independent with ADLs and functional mobility   Lives With Spouse   Receives Help From Family   ADL Assistance Independent   IADLs Independent   Falls in the last 6 months 0  (per pt report)   General   Family/Caregiver Present No   Cognition   Overall Cognitive Status Impaired   Arousal/Participation Lethargic   Orientation Level Oriented to person   Following Commands Follows one step commands with increased time or repetition   Comments pt with increased lethargy this am, slow to respond with increased time required throughout session- RN aware   RLE Assessment   RLE Assessment   (functionally 3/5 assessed with mobility)   LLE Assessment   LLE Assessment   (functionally 3/5 assessed with mobility)   Bed Mobility   Supine to Sit 3  Moderate assistance   Additional items Assist x 2;HOB elevated; Bedrails; Increased time required;Verbal cues;LE management   Sit to Supine 3  Moderate assistance   Additional items Assist x 2;HOB elevated; Increased time required;Verbal cues;LE management   Additional Comments pt supine in bed upon arrival  Pt returned supine on stretcher with transport staff present  (Pt with /105, - RN updated)   Transfers   Sit to Stand 3  Moderate assistance   Additional items Assist x 2; Increased time required;Verbal cues   Stand to Sit 3  Moderate assistance   Additional items Assist x 2; Increased time required;Verbal cues   Stand pivot 3  Moderate assistance   Additional items Assist x 2; Increased time required;Verbal cues   Additional Comments transfers with b/l HHA   Ambulation/Elevation   Gait pattern Excessively slow; Short stride; Foward flexed;Decreased foot clearance; Improper Weight shift   Gait Assistance 3  Moderate assist   Additional items Assist x 2;Verbal cues; Tactile cues   Assistive Device   (b/l HHA)   Distance 1 step to assist with SPT from EOB > stretcher   Balance   Static Sitting Fair   Dynamic Sitting Fair -   Static Standing Poor   Dynamic Standing Poor   Ambulatory Poor -   Endurance Deficit   Endurance Deficit Yes   Activity Tolerance   Activity Tolerance Patient limited by fatigue   Medical Staff Made Aware OT; co-eval performed this date 2* increased medicla complexity and multiple co-morbidities   Nurse Made Aware RN cleared pt to participate in therapy session   Assessment   Prognosis Good   Problem List Decreased strength;Decreased endurance; Impaired balance;Decreased mobility; Decreased cognition;Decreased coordination; Impaired judgement;Decreased safety awareness;Pain   Assessment Pt seen for high complexity PT evaluation  Pt with active PT eval/treat and up with assistance orders  Pt is a 78 y o  male who was admitted to [facility] on 7/6/2022 with renal cell cancer s/p laparoscopic radical R nephrorectomy on 7/6  Pt's current dx/ problem list include: type 2 DM, obesity, HTN  Comorbidities affecting pt's physical performance at time of assessment are as follows:  has a past medical history of Diabetes mellitus ([facility] [address] ), Hypertension, and TIA (transient ischemic attack)  Personal factors affecting pt at time of initial examination include: steps to enter environment, limited home support, advanced age, past experience, inability to perform IADLs, inability to perform ADLs, inability to ambulate household distances, limited insight into impairments   Due to acute medical issues, ongoing medical workup for primary dx; pain, fall risk, increased reliance on more restrictive AD compared to baseline;  decreased activity tolerance compared to baseline, increased assistance needed from caregiver at current time, multiple lines, decline in overall functional mobility status; health management issues; note unstable clinical picture (high complexity)  Pt poor historian, information obtained from chart review  At baseline, pt resides with spouse in 2  with 2 HOLLY vs mobile home with 6 HOLLY and was independent prior to hospital admission  Currently, upon initial examination, pt  is requiring mod Ax2 A for bed mobility skills; mod Ax2 for functional transfers and mod Ax2 for ambulation with b/l HHA  Currently, pt presents functioning below baseline and with overall mobility deficits 2* to: decreased LE strength/AROM; limited flexibility;  generalized weakness/ deconditioning; decreased endurance; decreased activity tolerance; decreased coordination; impaired balance; gait deviations; decreased safety awareness; SOB/FLORES; fatigue; impaired safety and judgement; limited insight into current deficits; bed/ chair alarms; multiple lines; as well as: weakness, decreased ROM, impaired balance, decreased endurance, impaired coordination, gait deviations, pain, decreased activity tolerance, decreased functional mobility tolerance and fall risk  Pt currently at increased risk for falls  At the end of evaluation, pt was left supine on stretcher with transport staff present with all needs in reach  Pt would benefit from continued skilled PT services while in hospital to address remaining limitations and maximize functional potential  PT to continue to follow pt and recommends rehab upon d/c  The patient's AM-PAC Basic Mobility Inpatient Short Form Raw Score is 8  A Raw score of less than or equal to 16 suggests the patient may benefit from discharge to post-acute rehabilitation services   Please also refer to the recommendation of the Physical Therapist for safe discharge planning  Barriers to Discharge Inaccessible home environment   Goals   Patient Goals none stated   STG Expiration Date 07/22/22   Short Term Goal #1 STG 1  Pt will be able to perform bed mobility tasks with mod I in order to improve overall functional mobility and assist in safe d/c  STG 2  Pt with sit EOB for at least 25 minutes at mod I level in order to strengthen abdominal musculature and assist in future transfers/ ambulation  STG 3  Pt will be able to perform functional transfer with mod I in order to improve overall functional mobility and assist in safe d/c  STG 4  Pt will be able to ambulate at least 50 feet with least restrictive device with mod I level A in order to improve overall functional mobility and assist in safe d/c  STG 5  Pt will improve sitting/standing static/dynamic balance 1/2 grade in order to improve functional mobility and assist in safe d/c  STG 6  Pt will improve LE strength by 1/2 grade in order to improve functional mobility and assist in safe d/c  STG 7  Pt will be able to negotiate at least 2 stairs with least restrictive device with mod I level A in order to improve overall functional mobility and assist in safe d/c    PT Treatment Day 0   Plan   Treatment/Interventions Functional transfer training;LE strengthening/ROM; Patient/family training;Bed mobility;Gait training;Spoke to nursing;Spoke to case management;OT   PT Frequency 3-5x/wk   Recommendation   PT Discharge Recommendation Post acute rehabilitation services   AM-PAC Basic Mobility Inpatient   Turning in Bed Without Bedrails 3   Lying on Back to Sitting on Edge of Flat Bed 1   Moving Bed to Chair 1   Standing Up From Chair 1   Walk in Room 1   Climb 3-5 Stairs 1   Basic Mobility Inpatient Raw Score 8   Highest Level Of Mobility   JH-HLM Goal 3: Sit at edge of bed   JH-HLM Achieved 4: Move to chair/commode         Allison Garvin, PT

## 2022-07-08 NOTE — ASSESSMENT & PLAN NOTE
-Patient with an episode of coffee-ground emesis evaluated by GI was to undergo upper endoscopy this morning, procedure canceled due to hypoxia and tachycardia  -currently hemodynamically stable  -on PPI  -Hemoglobin 10 2  -GI on board

## 2022-07-08 NOTE — ASSESSMENT & PLAN NOTE
Lab Results   Component Value Date    EGFR 15 07/08/2022    EGFR 19 07/07/2022    EGFR 25 07/07/2022    CREATININE 3 55 (H) 07/08/2022    CREATININE 2 95 (H) 07/07/2022    CREATININE 2 39 (H) 07/07/2022     -s/p right nephrectomy  -nephrology on board

## 2022-07-09 ENCOUNTER — APPOINTMENT (INPATIENT)
Dept: RADIOLOGY | Facility: HOSPITAL | Age: 77
DRG: 656 | End: 2022-07-09
Payer: COMMERCIAL

## 2022-07-09 ENCOUNTER — APPOINTMENT (INPATIENT)
Dept: NON INVASIVE DIAGNOSTICS | Facility: HOSPITAL | Age: 77
DRG: 656 | End: 2022-07-09
Payer: COMMERCIAL

## 2022-07-09 LAB
ANION GAP SERPL CALCULATED.3IONS-SCNC: 8 MMOL/L (ref 4–13)
AORTIC ROOT: 3.6 CM
AORTIC VALVE MEAN VELOCITY: 9.6 M/S
APICAL FOUR CHAMBER EJECTION FRACTION: 59 %
ASCENDING AORTA: 3.8 CM
AV LVOT MEAN GRADIENT: 2 MMHG
AV LVOT PEAK GRADIENT: 4 MMHG
AV MEAN GRADIENT: 4 MMHG
AV PEAK GRADIENT: 7 MMHG
AV VELOCITY RATIO: 0.75
BUN SERPL-MCNC: 47 MG/DL (ref 5–25)
CALCIUM SERPL-MCNC: 7.6 MG/DL (ref 8.3–10.1)
CARDIAC TROPONIN I PNL SERPL HS: 108 NG/L (ref 8–18)
CHLORIDE SERPL-SCNC: 111 MMOL/L (ref 100–108)
CO2 SERPL-SCNC: 23 MMOL/L (ref 21–32)
CREAT SERPL-MCNC: 3.45 MG/DL (ref 0.6–1.3)
DOP CALC AO PEAK VEL: 1.28 M/S
DOP CALC AO VTI: 24.19 CM
DOP CALC LVOT PEAK VEL VTI: 22.8 CM
DOP CALC LVOT PEAK VEL: 0.96 M/S
E WAVE DECELERATION TIME: 237 MS
ERYTHROCYTE [DISTWIDTH] IN BLOOD BY AUTOMATED COUNT: 13.1 % (ref 11.6–15.1)
ERYTHROCYTE [DISTWIDTH] IN BLOOD BY AUTOMATED COUNT: 13.2 % (ref 11.6–15.1)
FLUAV RNA RESP QL NAA+PROBE: NEGATIVE
FLUBV RNA RESP QL NAA+PROBE: NEGATIVE
FRACTIONAL SHORTENING: 37 (ref 28–44)
GFR SERPL CREATININE-BSD FRML MDRD: 16 ML/MIN/1.73SQ M
GLUCOSE SERPL-MCNC: 151 MG/DL (ref 65–140)
GLUCOSE SERPL-MCNC: 165 MG/DL (ref 65–140)
GLUCOSE SERPL-MCNC: 168 MG/DL (ref 65–140)
GLUCOSE SERPL-MCNC: 174 MG/DL (ref 65–140)
GLUCOSE SERPL-MCNC: 199 MG/DL (ref 65–140)
HCT VFR BLD AUTO: 23.5 % (ref 36.5–49.3)
HCT VFR BLD AUTO: 25.2 % (ref 36.5–49.3)
HCT VFR BLD AUTO: 25.4 % (ref 36.5–49.3)
HGB BLD-MCNC: 7.6 G/DL (ref 12–17)
HGB BLD-MCNC: 8.4 G/DL (ref 12–17)
HGB BLD-MCNC: 8.4 G/DL (ref 12–17)
INTERVENTRICULAR SEPTUM IN DIASTOLE (PARASTERNAL SHORT AXIS VIEW): 1.1 CM
INTERVENTRICULAR SEPTUM: 1.1 CM (ref 0.6–1.1)
LAAS-AP2: 25.9 CM2
LAAS-AP4: 23.8 CM2
LACTATE SERPL-SCNC: 1.5 MMOL/L (ref 0.5–2)
LEFT ATRIUM SIZE: 4.1 CM
LEFT INTERNAL DIMENSION IN SYSTOLE: 3.6 CM (ref 2.1–4)
LEFT VENTRICULAR INTERNAL DIMENSION IN DIASTOLE: 5.7 CM (ref 3.5–6)
LEFT VENTRICULAR POSTERIOR WALL IN END DIASTOLE: 0.8 CM
LEFT VENTRICULAR STROKE VOLUME: 107 ML
LVSV (TEICH): 107 ML
MCH RBC QN AUTO: 31.2 PG (ref 26.8–34.3)
MCH RBC QN AUTO: 31.3 PG (ref 26.8–34.3)
MCHC RBC AUTO-ENTMCNC: 32.3 G/DL (ref 31.4–37.4)
MCHC RBC AUTO-ENTMCNC: 33.1 G/DL (ref 31.4–37.4)
MCV RBC AUTO: 94 FL (ref 82–98)
MCV RBC AUTO: 97 FL (ref 82–98)
MV E'TISSUE VEL-LAT: 11 CM/S
MV E'TISSUE VEL-SEP: 7 CM/S
MV PEAK A VEL: 1.06 M/S
MV PEAK E VEL: 94 CM/S
MV STENOSIS PRESSURE HALF TIME: 69 MS
MV VALVE AREA P 1/2 METHOD: 3.19
NT-PROBNP SERPL-MCNC: 1122 PG/ML
PLATELET # BLD AUTO: 146 THOUSANDS/UL (ref 149–390)
PLATELET # BLD AUTO: 158 THOUSANDS/UL (ref 149–390)
PMV BLD AUTO: 11.1 FL (ref 8.9–12.7)
PMV BLD AUTO: 11.5 FL (ref 8.9–12.7)
POTASSIUM SERPL-SCNC: 4.4 MMOL/L (ref 3.5–5.3)
RBC # BLD AUTO: 2.43 MILLION/UL (ref 3.88–5.62)
RBC # BLD AUTO: 2.69 MILLION/UL (ref 3.88–5.62)
RIGHT ATRIAL 2D VOLUME: 37 ML
RIGHT ATRIUM AREA SYSTOLE A4C: 14.7 CM2
RIGHT VENTRICLE ID DIMENSION: 4.2 CM
RSV RNA RESP QL NAA+PROBE: NEGATIVE
SARS-COV-2 RNA RESP QL NAA+PROBE: NEGATIVE
SL CV LEFT ATRIUM LENGTH A2C: 5.3 CM
SL CV LV EF: 55
SL CV PED ECHO LEFT VENTRICLE DIASTOLIC VOLUME (MOD BIPLANE) 2D: 162 ML
SL CV PED ECHO LEFT VENTRICLE SYSTOLIC VOLUME (MOD BIPLANE) 2D: 56 ML
SODIUM SERPL-SCNC: 142 MMOL/L (ref 136–145)
VANCOMYCIN SERPL-MCNC: 17.2 UG/ML (ref 10–20)
WBC # BLD AUTO: 12.11 THOUSAND/UL (ref 4.31–10.16)
WBC # BLD AUTO: 12.92 THOUSAND/UL (ref 4.31–10.16)

## 2022-07-09 PROCEDURE — 85027 COMPLETE CBC AUTOMATED: CPT | Performed by: STUDENT IN AN ORGANIZED HEALTH CARE EDUCATION/TRAINING PROGRAM

## 2022-07-09 PROCEDURE — C9113 INJ PANTOPRAZOLE SODIUM, VIA: HCPCS | Performed by: STUDENT IN AN ORGANIZED HEALTH CARE EDUCATION/TRAINING PROGRAM

## 2022-07-09 PROCEDURE — 80048 BASIC METABOLIC PNL TOTAL CA: CPT | Performed by: INTERNAL MEDICINE

## 2022-07-09 PROCEDURE — 82948 REAGENT STRIP/BLOOD GLUCOSE: CPT

## 2022-07-09 PROCEDURE — 99232 SBSQ HOSP IP/OBS MODERATE 35: CPT | Performed by: INTERNAL MEDICINE

## 2022-07-09 PROCEDURE — C8929 TTE W OR WO FOL WCON,DOPPLER: HCPCS

## 2022-07-09 PROCEDURE — 99024 POSTOP FOLLOW-UP VISIT: CPT | Performed by: STUDENT IN AN ORGANIZED HEALTH CARE EDUCATION/TRAINING PROGRAM

## 2022-07-09 PROCEDURE — 80202 ASSAY OF VANCOMYCIN: CPT | Performed by: INTERNAL MEDICINE

## 2022-07-09 PROCEDURE — 85018 HEMOGLOBIN: CPT | Performed by: STUDENT IN AN ORGANIZED HEALTH CARE EDUCATION/TRAINING PROGRAM

## 2022-07-09 PROCEDURE — 84484 ASSAY OF TROPONIN QUANT: CPT | Performed by: INTERNAL MEDICINE

## 2022-07-09 PROCEDURE — 0241U HB NFCT DS VIR RESP RNA 4 TRGT: CPT | Performed by: PHYSICIAN ASSISTANT

## 2022-07-09 PROCEDURE — P9016 RBC LEUKOCYTES REDUCED: HCPCS

## 2022-07-09 PROCEDURE — 83880 ASSAY OF NATRIURETIC PEPTIDE: CPT | Performed by: INTERNAL MEDICINE

## 2022-07-09 PROCEDURE — 83605 ASSAY OF LACTIC ACID: CPT | Performed by: PHYSICIAN ASSISTANT

## 2022-07-09 PROCEDURE — 87081 CULTURE SCREEN ONLY: CPT | Performed by: PHYSICIAN ASSISTANT

## 2022-07-09 PROCEDURE — 85027 COMPLETE CBC AUTOMATED: CPT | Performed by: INTERNAL MEDICINE

## 2022-07-09 PROCEDURE — 85014 HEMATOCRIT: CPT | Performed by: STUDENT IN AN ORGANIZED HEALTH CARE EDUCATION/TRAINING PROGRAM

## 2022-07-09 PROCEDURE — 99233 SBSQ HOSP IP/OBS HIGH 50: CPT | Performed by: INTERNAL MEDICINE

## 2022-07-09 PROCEDURE — 93306 TTE W/DOPPLER COMPLETE: CPT | Performed by: INTERNAL MEDICINE

## 2022-07-09 PROCEDURE — 30233N1 TRANSFUSION OF NONAUTOLOGOUS RED BLOOD CELLS INTO PERIPHERAL VEIN, PERCUTANEOUS APPROACH: ICD-10-PCS | Performed by: UROLOGY

## 2022-07-09 PROCEDURE — 99222 1ST HOSP IP/OBS MODERATE 55: CPT | Performed by: INTERNAL MEDICINE

## 2022-07-09 PROCEDURE — 74176 CT ABD & PELVIS W/O CONTRAST: CPT

## 2022-07-09 PROCEDURE — G1004 CDSM NDSC: HCPCS

## 2022-07-09 RX ORDER — HYDRALAZINE HYDROCHLORIDE 20 MG/ML
5 INJECTION INTRAMUSCULAR; INTRAVENOUS EVERY 6 HOURS PRN
Status: DISCONTINUED | OUTPATIENT
Start: 2022-07-09 | End: 2022-07-18 | Stop reason: HOSPADM

## 2022-07-09 RX ORDER — HEPARIN SODIUM 5000 [USP'U]/ML
5000 INJECTION, SOLUTION INTRAVENOUS; SUBCUTANEOUS EVERY 12 HOURS SCHEDULED
Status: DISCONTINUED | OUTPATIENT
Start: 2022-07-09 | End: 2022-07-18 | Stop reason: HOSPADM

## 2022-07-09 RX ADMIN — PERFLUTREN 0.6 ML/MIN: 6.52 INJECTION, SUSPENSION INTRAVENOUS at 08:11

## 2022-07-09 RX ADMIN — HEPARIN SODIUM 5000 UNITS: 5000 INJECTION INTRAVENOUS; SUBCUTANEOUS at 21:03

## 2022-07-09 RX ADMIN — INSULIN LISPRO 1 UNITS: 100 INJECTION, SOLUTION INTRAVENOUS; SUBCUTANEOUS at 00:29

## 2022-07-09 RX ADMIN — INSULIN LISPRO 1 UNITS: 100 INJECTION, SOLUTION INTRAVENOUS; SUBCUTANEOUS at 16:53

## 2022-07-09 RX ADMIN — INSULIN LISPRO 1 UNITS: 100 INJECTION, SOLUTION INTRAVENOUS; SUBCUTANEOUS at 05:46

## 2022-07-09 RX ADMIN — ACETAMINOPHEN 650 MG: 325 TABLET, FILM COATED ORAL at 10:58

## 2022-07-09 RX ADMIN — SENNOSIDES 8.6 MG: 8.6 TABLET, FILM COATED ORAL at 21:03

## 2022-07-09 RX ADMIN — BACITRACIN ZINC, NEOMYCIN, POLYMYXIN B 1 SMALL APPLICATION: 400; 3.5; 5 OINTMENT TOPICAL at 16:53

## 2022-07-09 RX ADMIN — BACITRACIN ZINC, NEOMYCIN, POLYMYXIN B 1 SMALL APPLICATION: 400; 3.5; 5 OINTMENT TOPICAL at 08:34

## 2022-07-09 RX ADMIN — ENOXAPARIN SODIUM 30 MG: 30 INJECTION SUBCUTANEOUS at 08:34

## 2022-07-09 RX ADMIN — DOCUSATE SODIUM 100 MG: 100 CAPSULE, LIQUID FILLED ORAL at 16:53

## 2022-07-09 RX ADMIN — DILTIAZEM HYDROCHLORIDE 10 MG: 5 INJECTION, SOLUTION INTRAVENOUS at 00:16

## 2022-07-09 RX ADMIN — ACETAMINOPHEN 650 MG: 325 TABLET, FILM COATED ORAL at 05:46

## 2022-07-09 RX ADMIN — SENNOSIDES 8.6 MG: 8.6 TABLET, FILM COATED ORAL at 00:18

## 2022-07-09 RX ADMIN — ACETAMINOPHEN 650 MG: 325 TABLET, FILM COATED ORAL at 16:53

## 2022-07-09 RX ADMIN — CEFEPIME HYDROCHLORIDE 1000 MG: 1 INJECTION, POWDER, FOR SOLUTION INTRAMUSCULAR; INTRAVENOUS at 05:56

## 2022-07-09 RX ADMIN — PANTOPRAZOLE SODIUM 40 MG: 40 INJECTION, POWDER, FOR SOLUTION INTRAVENOUS at 08:33

## 2022-07-09 RX ADMIN — DOCUSATE SODIUM 100 MG: 100 CAPSULE, LIQUID FILLED ORAL at 08:34

## 2022-07-09 RX ADMIN — ACETAMINOPHEN 650 MG: 325 TABLET, FILM COATED ORAL at 00:16

## 2022-07-09 RX ADMIN — INSULIN LISPRO 2 UNITS: 100 INJECTION, SOLUTION INTRAVENOUS; SUBCUTANEOUS at 10:58

## 2022-07-09 RX ADMIN — PANTOPRAZOLE SODIUM 40 MG: 40 INJECTION, POWDER, FOR SOLUTION INTRAVENOUS at 21:03

## 2022-07-09 RX ADMIN — VANCOMYCIN HYDROCHLORIDE 1750 MG: 10 INJECTION, POWDER, LYOPHILIZED, FOR SOLUTION INTRAVENOUS at 00:18

## 2022-07-09 RX ADMIN — METOPROLOL TARTRATE 25 MG: 25 TABLET, FILM COATED ORAL at 08:34

## 2022-07-09 RX ADMIN — CEFEPIME HYDROCHLORIDE 1000 MG: 1 INJECTION, POWDER, FOR SOLUTION INTRAMUSCULAR; INTRAVENOUS at 18:24

## 2022-07-09 RX ADMIN — METOPROLOL TARTRATE 25 MG: 25 TABLET, FILM COATED ORAL at 00:18

## 2022-07-09 RX ADMIN — PANTOPRAZOLE SODIUM 40 MG: 40 INJECTION, POWDER, FOR SOLUTION INTRAVENOUS at 00:16

## 2022-07-09 RX ADMIN — VANCOMYCIN HYDROCHLORIDE 1250 MG: 10 INJECTION, POWDER, LYOPHILIZED, FOR SOLUTION INTRAVENOUS at 19:37

## 2022-07-09 NOTE — PROGRESS NOTES
NEPHROLOGY PROGRESS NOTE   Melva Aguilar 78 y o  male MRN: [id number]  Unit/Bed#: [facility] 809-01 Encounter: [id number]        ASSESSMENT & PLAN    65-year-old male with past medical history of CKD, hypertension, type 2 diabetes, chronic lower extremity edema who presented for a right renal mass nephrectomy    1  Acute kidney injury on stage IIIA chronic kidney disease-baseline creatinine is 1 3-1 4 thought to be secondary to diabetic kidney disease and hypertensive nephrosclerosis creatinine did trend up and peaked at 3 8 patient is nonoliguric with intravenous fluid patient's creatinine has now improved down to 3 5 he is tolerating some p o  Intake, will monitor no urgent need for renal replacement therapy  If short of breath will plan on diuresis  Gentle IV hydration with normal saline at 50 cc an hour seems to be tolerating this for now     2  Primary hypertension-his stay lisinopril hydrochlorothiazide is on hold no episodes of hypotension    3  Leukocytosis/tachycardia-now on vancomycin and cefepime ongoing workup for sepsis     4  Anemia-status post surgery workup with potential EGD was tachycardic and hypoxic so on hold, concern for upper GI bleed    5  Sinus tachycardia-no episodes of AFib per Cardiology    6  Right renal mass status post radical laparoscopic nephrectomy-ongoing postoperative care    SUBJECTIVE:    Patient was seen today  He is tolerating his p o  Hydration  He is making more urine today  His heart rates have improved since yesterday    12 point review of systems was otherwise negative besides what is mentioned above      Medications:    Current Facility-Administered Medications:     acetaminophen (TYLENOL) tablet 650 mg, 650 mg, Oral, Q6H [facility] HOME, Alesha Christian MD, 650 mg at 07/09/22 1058    cefepime (MAXIPIME) 1,000 mg in dextrose 5 % 50 mL IVPB, 1,000 mg, Intravenous, Q12H, Carlita Adrian MD, Last Rate: 100 mL/hr at 07/09/22 0556, 1,000 mg at 07/09/22 0556    docusate sodium (COLACE) capsule 100 mg, 100 mg, Oral, BID, Arabella Galaviz MD, 100 mg at 07/09/22 0834    enoxaparin (LOVENOX) subcutaneous injection 30 mg, 30 mg, Subcutaneous, Daily, Scott Brooksra, DO, 30 mg at 07/09/22 0834    insulin lispro (HumaLOG) 100 units/mL subcutaneous injection 1-6 Units, 1-6 Units, Subcutaneous, Q6H [facility] HOME, 2 Units at 07/09/22 1058 **AND** Fingerstick Glucose (POCT), , , Q6H, Cyndie Howell PA-C    metoprolol tartrate (LOPRESSOR) tablet 25 mg, 25 mg, Oral, Q12H [facility], Raya Jacobs PA-C, 25 mg at 07/09/22 0834    neomycin-bacitracin-polymyxin b (NEOSPORIN) ointment 1 small application, 1 small application, Topical, BID, Arabella Galaviz MD, 1 small application at 47/79/79 0834    ondansetron (ZOFRAN) injection 4 mg, 4 mg, Intravenous, Q6H PRN, Arabella Galaviz MD, 4 mg at 07/07/22 0106    oxyCODONE (ROXICODONE) IR tablet 2 5 mg, 2 5 mg, Oral, Q4H PRN, Arabella Galaviz MD    oxyCODONE (ROXICODONE) IR tablet 5 mg, 5 mg, Oral, Q4H PRN, Arabella Galaviz MD, 5 mg at 07/07/22 0254    pantoprazole (PROTONIX) injection 40 mg, 40 mg, Intravenous, Q12H [facility], Chanel Favorite, DO, 40 mg at 07/09/22 8662    senna (SENOKOT) tablet 8 6 mg, 1 tablet, Oral, HS, Cyndie Howell PA-C, 8 6 mg at 07/09/22 0018    simethicone (MYLICON) chewable tablet 80 mg, 80 mg, Oral, Q6H PRN, Cyndie Howell PA-C    sodium chloride 0 9 % infusion, 50 mL/hr, Intravenous, Continuous, Ysabel Tatum PA-C, Last Rate: 50 mL/hr at 07/09/22 0026, 50 mL/hr at 07/09/22 0026    vancomycin (VANCOCIN) 1,250 mg in sodium chloride 0 9 % 250 mL IVPB, 15 mg/kg (Adjusted), Intravenous, Once PRN, Bj Meléndez MD    vancomycin (VANCOCIN) 1,250 mg in sodium chloride 0 9 % 250 mL IVPB, 15 mg/kg (Adjusted), Intravenous, Once, Kailyn Tena DO    OBJECTIVE:    Vitals:    07/09/22 0834 07/09/22 1357 07/09/22 1359 07/09/22 1411   BP: 152/85 125/70 125/70 125/68   Pulse: 83 71 67 62   Resp: 17 20     Temp: 98 1 °F (36 7 °C) 98 6 °F (37 °C) 98 6 °F (37 °C) 98 8 °F (37 1 °C)   TempSrc:       SpO2: 95%  95% 96%   Weight:       Height:            Temp:  [98 1 °F (36 7 °C)-101 1 °F (38 4 °C)] 98 8 °F (37 1 °C)  HR:  [] 62  Resp:  [16-24] 20  BP: (125-173)/(68-90) 125/68  SpO2:  [91 %-96 %] 96 %     Body mass index is 32 01 kg/m²  Weight (last 2 days)     Date/Time Weight    07/09/22 0711 107 (236)          I/O last 3 completed shifts: In: 300 [I V :300]  Out: 675 [Urine:675]    I/O this shift:  In: -   Out: 500 [Urine:500]      Physical exam:    General: no acute distress, cooperative  Eyes: conjunctivae pink, anicteric sclerae  ENT: lips and mucous membranes moist, no exudates, normal external ears  Neck: ROM intact, no JVD  Chest: No respiratory distress, no accessory muscle use  CVS: normal rate, non pericardial friction rub  Abdomen: soft, non-tender, non-distended, normoactive bowel sounds  Extremities:  Positive trace edema  Skin: no rash  Neuro: awake, alert, oriented, grossly intact  Psych:  Pleasant affect    Invasive Devices:   Urethral Catheter Latex 16 Fr   (Active)   Reasons to continue Urinary Catheter  Post-operative urological requirements 07/09/22 0802   Site Assessment Clean;Skin intact 07/09/22 0802   Baird Care Done 07/08/22 0900   Collection Container Standard drainage bag 07/09/22 0802   Securement Method Securing device (Describe) 07/09/22 0802   Output (mL) 500 mL 07/09/22 1401     Lab Results:   Results from last 7 days   Lab Units 07/09/22  1233 07/09/22  0450 07/08/22  1421 07/08/22  1420 07/08/22  0451 07/07/22  1623 07/07/22  1115 07/07/22  0817 07/07/22  0518   WBC Thousand/uL 12 92* 12 11*  --  17 50* 21 98*  --   --   --  16 50*   HEMOGLOBIN g/dL 8 4* 7 6*  --  9 2* 10 2*  --   --   --  10 0*   HEMATOCRIT % 25 4* 23 5*  --  28 1* 29 8*  --   --   --  31 3*   PLATELETS Thousands/uL 158 146*  --  160 187  --   --   --  172   POTASSIUM mmol/L  --  4 4  --  4 6 4 8  --  4 7 4 3  -- CHLORIDE mmol/L  --  111*  --  108 105  --  103 105  --    CO2 mmol/L  --  23  --  25 26  --  26 20*  --    BUN mg/dL  --  47*  --  45* 43*  --  32* 24  --    CREATININE mg/dL  --  3 45*  --  3 84* 3 55*  --  2 95* 2 39*  --    CALCIUM mg/dL  --  7 6*  --  8 0* 8 1*  --  8 3 7 6*  --    BLOOD CULTURE   --   --  Received in Microbiology Lab  Culture in Progress  Received in Microbiology Lab  Culture in Progress  --   --   --   --   --   --    LEUKOCYTES UA   --   --   --   --   --  Small*  --   --   --    BLOOD UA   --   --   --   --   --  Small*  --   --   --          Portions of the record may have been created with voice recognition software  Occasional wrong word or "sound a like" substitutions may have occurred due to the inherent limitations of voice recognition software  Read the chart carefully and recognize, using context, where substitutions have occurred  If you have any questions, please contact the dictating provider

## 2022-07-09 NOTE — PROGRESS NOTES
[address]  Progress Note - Elvin Tai 1945, 78 y o  male MRN: [id number]  Unit/Bed#: [facility] 809-01 Encounter: [id number]  Primary Care Provider: KAYODE Luna   Date and time admitted to hospital: 7/6/2022 11:12 AM    Tachycardia  Assessment & Plan  Resolved  Likely secondary to sepsis with physiological response to hypotension  TTE performed and pending read  See plans above    Sepsis [facility])  Assessment & Plan  "Patient s/p right nephrectomy developed tachycardia, worsening leukocytosis, hypoxia with no clear source of infection at this time  -check procalcitonin, lactic acid, chest x-ray  -UA small leukocyte, pyuria 7/7/22  -start empiric cefepime and vanco"    TSH measured within reference range  COVID/RSV/Influenza NEGATIVE  PENDING Blood and MRSA cultures collected on 07/08    Urinalysis on 07/07 suggestive of acute cystitis with hematuria  Continue empiric Cefepime and IV Vancomycin    GI bleed  Assessment & Plan  Patient with an episode of coffee-ground emesis evaluated by GI was to undergo upper endoscopy this morning, procedure canceled due to hypoxia and tachycardia  -currently hemodynamically stable  -on PPI"    Gastroenterology consulted; appreciate assistance  Pending workup for hypoxia and tachycardia prior to endoscopy    Renal cell cancer, right [facility])  Assessment & Plan  Patient is s/p right nephrectomy POD#3  PENDING pathology    Stage 3a chronic kidney disease [facility])  Assessment & Plan  Lab Results   Component Value Date    EGFR 16 07/09/2022    EGFR 14 07/08/2022    EGFR 15 07/08/2022    CREATININE 3 45 (H) 07/09/2022    CREATININE 3 84 (H) 07/08/2022    CREATININE 3 55 (H) 07/08/2022     s/p right nephrectomy  Nephrology consulted; appreciate assistance  Continue recs per Nephrology    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 chronic kidney disease, without long-term current use of insulin ([facility] [address] )  Assessment & Plan  Lab Results   Component Value Date    HGBA1C 7 2 (A) 05/16/2022   Diabetes fairly controlled with A1c 7 2  Hold metformin   On ISS accu cheks    Recent Labs     07/08/22  1701 07/09/22  0021 07/09/22  0546 07/09/22  1057   POCGLU 216* 174* 168* 199*       Blood Sugar Average: Last 72 hrs:  (P) 236 4375   Hyperglycemia relatively well controlled  Continue SSIP and FSBS while inpatient  Continue Diabetic Diet and Hypoglycemic protocol    Essential hypertension  Assessment & Plan  Well controlled  Continue home for Lopressor 25 mg BID  Home lisinopril and HCTZ held due to prior hypotension  Ordered PRN IV Hydralazine  Continue to monitor     * Acute respiratory failure with hypoxia (HCC)  Assessment & Plan  Stable  "-Patient developed tachycardia and hypoxia prior to undergoing EGD to evaluate upper GI bleed  -Endoscopy canceled due to hypoxia, pox  89% RA    -clinically patient does not appear volume overload, no wheezing, able to speak in full sentences"    CXR on 07/08 identified small atelectasis  V/Q scan showed low probability for PE  With mildly elevated troponin likely elevated due to reduced renal clearance in the setting of ESRD  Pending add on pro-BNP; elevated procalcitonin on 07/08  See plan below Sepsis          VTE Pharmacologic Prophylaxis: VTE Score: 7 Moderate Risk (Score 3-4) - Pharmacological DVT Prophylaxis Ordered: heparin  Patient Centered Rounds: I performed bedside rounds with nursing staff today  Discussions with Specialists or Other Care Team Provider: Nurse and      Education and Discussions with Family / Patient: Defer to primary team      Time Spent for Care: 30 minutes  More than 50% of total time spent on counseling and coordination of care as described above      Current Length of Stay: 3 day(s)  Current Patient Status: Inpatient   Certification Statement: The patient will continue to require additional inpatient hospital stay due to pending sepsis workup  Discharge Plan: [facility] is following this patient on consult  They are not yet medically stable for discharge secondary to Sepsis workup  Code Status: Level 1 - Full Code    Subjective:   Patient denies any complaints at this time  Per nursing staff, no acute events overnight  He is relieved to be seen by Cardiology and looks forward to dialysis on Monday  Objective:     Vitals:   Temp (24hrs), Av 1 °F (37 3 °C), Min:98 1 °F (36 7 °C), Max:101 1 °F (38 4 °C)    Temp:  [98 1 °F (36 7 °C)-101 1 °F (38 4 °C)] 98 6 °F (37 °C)  HR:  [] 63  Resp:  [16-24] 16  BP: (120-173)/(66-90) 120/66  SpO2:  [91 %-97 %] 97 %  Body mass index is 32 01 kg/m²  Input and Output Summary (last 24 hours): Intake/Output Summary (Last 24 hours) at 2022 1559  Last data filed at 2022 1401  Gross per 24 hour   Intake 300 ml   Output 1175 ml   Net -875 ml       Physical Exam:   Physical Exam  Vitals and nursing note reviewed  Constitutional:       Appearance: Normal appearance  He is normal weight  HENT:      Head: Normocephalic and atraumatic  Right Ear: External ear normal       Left Ear: External ear normal       Nose: Nose normal       Mouth/Throat:      Mouth: Mucous membranes are dry  Eyes:      Extraocular Movements: Extraocular movements intact  Conjunctiva/sclera: Conjunctivae normal    Cardiovascular:      Rate and Rhythm: Normal rate  Rhythm irregular  Heart sounds: No friction rub  No gallop  Pulmonary:      Effort: Pulmonary effort is normal  No respiratory distress  Breath sounds: No wheezing or rales  Abdominal:      General: Bowel sounds are normal       Tenderness: There is no abdominal tenderness  Musculoskeletal:         General: No deformity  Cervical back: Normal range of motion  Right lower leg: No edema  Left lower leg: No edema  Skin:     General: Skin is warm  Coloration: Skin is not jaundiced  Findings: No bruising     Neurological:      Mental Status: He is alert and oriented to person, place, and time  Mental status is at baseline  Cranial Nerves: No cranial nerve deficit  Motor: No weakness  Psychiatric:         Mood and Affect: Mood normal          Behavior: Behavior normal          Thought Content: Thought content normal           Additional Data:     Labs:  Results from last 7 days   Lab Units 07/09/22  1233 07/08/22  1420 07/08/22  0451   WBC Thousand/uL 12 92*   < > 21 98*   HEMOGLOBIN g/dL 8 4*   < > 10 2*   HEMATOCRIT % 25 4*   < > 29 8*   PLATELETS Thousands/uL 158   < > 187   NEUTROS PCT %  --   --  78*   LYMPHS PCT %  --   --  7*   MONOS PCT %  --   --  14*   EOS PCT %  --   --  0    < > = values in this interval not displayed       Results from last 7 days   Lab Units 07/09/22  0450   SODIUM mmol/L 142   POTASSIUM mmol/L 4 4   CHLORIDE mmol/L 111*   CO2 mmol/L 23   BUN mg/dL 47*   CREATININE mg/dL 3 45*   ANION GAP mmol/L 8   CALCIUM mg/dL 7 6*   GLUCOSE RANDOM mg/dL 165*         Results from last 7 days   Lab Units 07/09/22  1555 07/09/22  1057 07/09/22  0546 07/09/22  0021 07/08/22  1701 07/08/22  0800 07/08/22  0106 07/07/22  2112 07/07/22  1614 07/07/22  1116 07/07/22  0938 07/07/22  0658   POC GLUCOSE mg/dl 151* 199* 168* 174* 216* 192* 182* 212* 235* 309* 345* 322*         Results from last 7 days   Lab Units 07/09/22  0017 07/08/22  1420   LACTIC ACID mmol/L 1 5  --    PROCALCITONIN ng/ml  --  1 21*       Lines/Drains:  Invasive Devices  Report    Peripheral Intravenous Line  Duration           Peripheral IV 07/09/22 Right;Upper;Ventral (anterior) Arm <1 day          Drain  Duration           Urethral Catheter Latex 16 Fr  3 days              Urinary Catheter:  Goal for removal: Remove after 48 hrs of I/O monitoring           Telemetry:  Telemetry Orders (From admission, onward)             48 Hour Telemetry Monitoring  Continuous x 48 hours        References:    Telemetry Guidelines   Question:  Reason for 48 Hour Telemetry  Answer:  Arrhythmias Requiring Medical Therapy (eg  SVT, Vtach/fib, Bradycardia, Uncontrolled A-fib)                 Telemetry Reviewed: Normal Sinus Rhythm  Indication for Continued Telemetry Use: Acute CHF on >200 mg lasix/day or equivalent dose or with new reduced EF  Imaging: Reviewed radiology reports from this admission including: ECHO and VQ scan    Recent Cultures (last 7 days):   Results from last 7 days   Lab Units 07/08/22  1421   BLOOD CULTURE  Received in Microbiology Lab  Culture in Progress  Received in Microbiology Lab  Culture in Progress         Last 24 Hours Medication List:   Current Facility-Administered Medications   Medication Dose Route Frequency Provider Last Rate    acetaminophen  650 mg Oral Q6H [address] Lucillie Given, MD      cefepime  1,000 mg Intravenous Q12H Roe Osman MD 1,000 mg (07/09/22 0556)    docusate sodium  100 mg Oral BID Lucillie Given, MD      heparin (porcine)  5,000 Units Subcutaneous Q12H [address] Narda Quevedo MD      hydrALAZINE  5 mg Intravenous Q6H PRN Narda Quevedo MD      insulin lispro  1-6 Units Subcutaneous Q6H [address] [place], [place]      metoprolol tartrate  25 mg Oral Q12H [address] Nickie Martin PA-C      neomycin-bacitracin-polymyxin b  1 small application Topical BID Lucillie Given, MD      ondansetron  4 mg Intravenous Q6H PRN Lucillie Given, MD      oxyCODONE  2 5 mg Oral Q4H PRN Lucillie Given, MD      oxyCODONE  5 mg Oral Q4H PRN Lucillie Given, MD      pantoprazole  40 mg Intravenous Q12H [address] Julián Ochoa DO      senna  1 tablet Oral HS SASHA Lopez      simethicone  80 mg Oral Q6H PRN SASHA Lopez      sodium chloride  50 mL/hr Intravenous Continuous Nickie Martin PA-C 50 mL/hr (07/09/22 0026)    vancomycin  15 mg/kg (Adjusted) Intravenous Once PRN Roe Osman MD      vancomycin  15 mg/kg (Adjusted) Intravenous Once Shabana Valentino DO          Today, Patient Was Seen By: Raphael Pinedo MD    **Please Note: This note may have been constructed using a voice recognition system  **

## 2022-07-09 NOTE — ASSESSMENT & PLAN NOTE
Well controlled  Continue home for Lopressor 25 mg BID  Home lisinopril and HCTZ held due to prior hypotension  Ordered PRN IV Hydralazine  Continue to monitor

## 2022-07-09 NOTE — PROGRESS NOTES
Vancomycin IV Pharmacy-to-Dose Consultation    Rhonda Felty is a 78 y o  male who is currently receiving Vancomycin IV with management by the Pharmacy Consult service  Assessment/Plan:  The patient was reviewed  No signs or symptoms of infusion reactions were documented in the chart  Based on todays assessment, the vancomycin random was 17 2 so vancomycin 1250 mg IV once will be given  The current vancomycin regimen will continue (day # 2) dosing of 1250 mg IV prn when vancomycin level < 20, with a plan for a random to be drawn 7/10 at 1200  We will continue to follow the patients culture results and clinical progress daily      Huber Blum, Pharmacist

## 2022-07-09 NOTE — ASSESSMENT & PLAN NOTE
"Patient s/p right nephrectomy developed tachycardia, worsening leukocytosis, hypoxia with no clear source of infection at this time  -check procalcitonin, lactic acid, chest x-ray  -UA small leukocyte, pyuria 7/7/22  -start empiric cefepime and vanco"    TSH measured within reference range  COVID/RSV/Influenza NEGATIVE  PENDING Blood and MRSA cultures collected on 07/08    Urinalysis on 07/07 suggestive of acute cystitis with hematuria  Continue empiric Cefepime and IV Vancomycin

## 2022-07-09 NOTE — QUICK NOTE
VQ scan report noted to be low suspicion for PE  Discussed with [facility] for re-evaluation  Appreciate recommendations  Patient examined and stable on 2L NC      /84   Pulse (!) 113   Temp 98 2 °F (36 8 °C)   Resp 22   Ht 6' (1 829 m)   Wt 107 kg (236 lb)   SpO2 91%   BMI 32 01 kg/m²   Abdomen soft, ATTP, ND, incisions c/d/i with staples   No drain  UOP 425mL in the last 24hours    CXR with Left pleural effusion  Renal US demonstrates no HN      Continue to trend Cre  Continue to trend ins and outs  Needs Incentive Spirometer will f/u labs/cultures    Grady Ray MD

## 2022-07-09 NOTE — PLAN OF CARE
Problem: PAIN - ADULT  Goal: Verbalizes/displays adequate comfort level or baseline comfort level  Description: Interventions:  - Encourage patient to monitor pain and request assistance  - Assess pain using appropriate pain scale  - Administer analgesics based on type and severity of pain and evaluate response  - Implement non-pharmacological measures as appropriate and evaluate response  - Consider cultural and social influences on pain and pain management  - Notify physician/advanced practitioner if interventions unsuccessful or patient reports new pain  Outcome: Progressing     Problem: INFECTION - ADULT  Goal: Absence or prevention of progression during hospitalization  Description: INTERVENTIONS:  - Assess and monitor for signs and symptoms of infection  - Monitor lab/diagnostic results  - Monitor all insertion sites, i e  indwelling lines, tubes, and drains  - Monitor endotracheal if appropriate and nasal secretions for changes in amount and color  - [place] appropriate cooling/warming therapies per order  - Administer medications as ordered  - Instruct and encourage patient and family to use good hand hygiene technique  - Identify and instruct in appropriate isolation precautions for identified infection/condition  Outcome: Progressing     Problem: DISCHARGE PLANNING  Goal: Discharge to home or other facility with appropriate resources  Description: INTERVENTIONS:  - Identify barriers to discharge w/patient and caregiver  - Arrange for needed discharge resources and transportation as appropriate  - Identify discharge learning needs (meds, wound care, etc )  - Arrange for interpretive services to assist at discharge as needed  - Refer to Case Management Department for coordinating discharge planning if the patient needs post-hospital services based on physician/advanced practitioner order or complex needs related to functional status, cognitive ability, or social support system  Outcome: Progressing Problem: Knowledge Deficit  Goal: Patient/family/caregiver demonstrates understanding of disease process, treatment plan, medications, and discharge instructions  Description: Complete learning assessment and assess knowledge base    Interventions:  - Provide teaching at level of understanding  - Provide teaching via preferred learning methods  Outcome: Progressing     Problem: Prexisting or High Potential for Compromised Skin Integrity  Goal: Skin integrity is maintained or improved  Description: INTERVENTIONS:  - Identify patients at risk for skin breakdown  - Assess and monitor skin integrity  - Assess and monitor nutrition and hydration status  - Monitor labs   - Assess for incontinence   - Turn and reposition patient  - Assist with mobility/ambulation  - Relieve pressure over bony prominences  - Avoid friction and shearing  - Provide appropriate hygiene as needed including keeping skin clean and dry  - Evaluate need for skin moisturizer/barrier cream  - Collaborate with interdisciplinary team   - Patient/family teaching  - Consider wound care consult   Outcome: Progressing     Problem: GENITOURINARY - ADULT  Goal: Absence of urinary retention  Description: INTERVENTIONS:  - Assess patients ability to void and empty bladder  - Monitor I/O  - Bladder scan as needed  - Discuss with physician/AP medications to alleviate retention as needed  - Discuss catheterization for long term situations as appropriate  Outcome: Progressing  Goal: Urinary catheter remains patent  Description: INTERVENTIONS:  - Assess patency of urinary catheter  - If patient has a chronic hernández, consider changing catheter if non-functioning  - Follow guidelines for intermittent irrigation of non-functioning urinary catheter  Outcome: Progressing     Problem: METABOLIC, FLUID AND ELECTROLYTES - ADULT  Goal: Electrolytes maintained within normal limits  Description: INTERVENTIONS:  - Monitor labs and assess patient for signs and symptoms of electrolyte imbalances  - Administer electrolyte replacement as ordered  - Monitor response to electrolyte replacements, including repeat lab results as appropriate  - Instruct patient on fluid and nutrition as appropriate  Outcome: Progressing     Problem: SKIN/TISSUE INTEGRITY - ADULT  Goal: Incision(s), wounds(s) or drain site(s) healing without S/S of infection  Description: INTERVENTIONS  - Assess and document dressing, incision, wound bed, drain sites and surrounding tissue  - Provide patient and family education  Outcome: Progressing

## 2022-07-09 NOTE — CONSULTS
Consultation - General Cardiology Team 2  Bertha Coulter 78 y o  male MRN: [id number]  Unit/Bed#: [facility] 809-01 Encounter: [id number]      Inpatient consult to Cardiology  Consult performed by: Holly Burton MD  Consult ordered by: Naila Mae PA-C        PCP: KAYODE Islas       History of Present Illness   Physician Requesting Consult: Macie Dominguez MD  Reason for Consult / Principal Problem: tachycardia    HPI: Bertha Coulter is a 78y o  year old male with a history of right renal cancer cancer, HTN, DM2, CKD3A, and prior TIA who presented 7/6 for radical right sided nephrectomy  Post-operatively on 7/7, he had an episode of coffee-grounds emesis with developing RUPAL and was evaluated by GI for endoscopy  Endoscopy planned yesterday, 7/8, but aborted due to tachycardia and hypoxia  Currently the patient reports no symptoms, breathing normally without chest pain, lightheadedness, dizziness, or palpitations  He is not currently reporting any hematemesis  Review of Systems  Review of system was conducted and was negative except for as stated in the HPI        Historical Information   Past Medical History:   Diagnosis Date    Diabetes mellitus ([address] )     Hypertension     TIA (transient ischemic attack)      Past Surgical History:   Procedure Laterality Date    CYSTECTOMY      Per patient cyst removal from his back    LASIK      SD LAP, RADICAL NEPHRECTOMY Right 7/6/2022    Procedure: NEPHRECTOMY RADICAL LAPAROSCOPIC W/ ROBOTICS, poss open;  Surgeon: Macie Dominguez MD;  Location:  MAIN OR;  Service: Urology     Social History     Substance and Sexual Activity   Alcohol Use Never     Social History     Substance and Sexual Activity   Drug Use Never     Social History     Tobacco Use   Smoking Status Never Smoker   Smokeless Tobacco Never Used     Family History: non-contributory    Meds/Allergies   Hospital Medications:   Current Facility-Administered Medications Medication Dose Route Frequency    acetaminophen (TYLENOL) tablet 650 mg  650 mg Oral Q6H [address]    cefepime (MAXIPIME) 1,000 mg in dextrose 5 % 50 mL IVPB  1,000 mg Intravenous Q12H    docusate sodium (COLACE) capsule 100 mg  100 mg Oral BID    enoxaparin (LOVENOX) subcutaneous injection 30 mg  30 mg Subcutaneous Daily    insulin lispro (HumaLOG) 100 units/mL subcutaneous injection 1-6 Units  1-6 Units Subcutaneous Q6H [address]    metoprolol tartrate (LOPRESSOR) tablet 25 mg  25 mg Oral Q12H [address]    neomycin-bacitracin-polymyxin b (NEOSPORIN) ointment 1 small application  1 small application Topical BID    ondansetron (ZOFRAN) injection 4 mg  4 mg Intravenous Q6H PRN    oxyCODONE (ROXICODONE) IR tablet 2 5 mg  2 5 mg Oral Q4H PRN    oxyCODONE (ROXICODONE) IR tablet 5 mg  5 mg Oral Q4H PRN    pantoprazole (PROTONIX) injection 40 mg  40 mg Intravenous Q12H [address]    senna (SENOKOT) tablet 8 6 mg  1 tablet Oral HS    simethicone (MYLICON) chewable tablet 80 mg  80 mg Oral Q6H PRN    sodium chloride 0 9 % infusion  50 mL/hr Intravenous Continuous    vancomycin (VANCOCIN) 1,250 mg in sodium chloride 0 9 % 250 mL IVPB  15 mg/kg (Adjusted) Intravenous Once PRN     Home Medications:   Medications Prior to Admission   Medication    Ascorbic Acid (vitamin C) 100 MG tablet    aspirin (ECOTRIN LOW STRENGTH) 81 mg EC tablet    cholecalciferol (VITAMIN D3) 1,000 units tablet    lisinopril-hydrochlorothiazide (PRINZIDE,ZESTORETIC) 20-12 5 MG per tablet    metFORMIN (GLUCOPHAGE) 500 mg tablet    Multiple Vitamins-Minerals (MICHELLE MULTI MEN) TABS       Allergies   Allergen Reactions    Plavix [Clopidogrel] Rash     Stopped two days ago because got a rash and doctors thought it was from this        Objective   Vitals: Blood pressure 152/85, pulse 83, temperature 98 1 °F (36 7 °C), resp  rate 17, height 6' (1 829 m), weight 107 kg (236 lb), SpO2 95 %    Orthostatic Blood Pressures    Flowsheet Row Most Recent Value   Blood Pressure 152/85 filed at 2022 4121            Invasive Devices  Report    Peripheral Intravenous Line  Duration           Peripheral IV 22 Left Wrist 2 days    Peripheral IV 22 Right;Ventral (anterior) Forearm 2 days          Drain  Duration           Urethral Catheter Latex 16 Fr  2 days                Physical Exam  GEN: [place] appears well, alert and oriented x 3, pleasant and cooperative   HEENT:  Normocephalic, atraumatic, anicteric, moist mucous membranes  NECK: No JVD or carotid bruits   HEART: irregular rhythm, normal rate, normal S1 and S2, no murmurs, clicks, gallops or rubs   LUNGS: diminished breath sounds at lung bases  ABDOMEN:  Normoactive bowel sounds, soft, no tenderness, no distention  EXTREMITIES: peripheral pulses palpable; non-pitting edema present bilaterally  SKIN:  Dry, intact, warm to touch    Lab Results: I have personally reviewed pertinent lab results  Results from last 7 days   Lab Units 22  0017   HS TNI RAND ng/L 108*         Results from last 7 days   Lab Units 22  0450 22  1420 22  0451   POTASSIUM mmol/L 4 4 4 6 4 8   CO2 mmol/L 23 25 26   CHLORIDE mmol/L 111* 108 105   BUN mg/dL 47* 45* 43*   CREATININE mg/dL 3 45* 3 84* 3 55*     Results from last 7 days   Lab Units 22  0450 22  1420 22  0451   HEMOGLOBIN g/dL 7 6* 9 2* 10 2*   HEMATOCRIT % 23 5* 28 1* 29 8*   PLATELETS Thousands/uL 146* 160 187             Imaging: I have personally reviewed pertinent reports          Telemetry:   Sinus rhythm with PACs      ECHO:  Results for orders placed during the hospital encounter of 19    Echo complete with contrast if indicated    Narrative  [address] [place] [address]    Transthoracic Echocardiogram  2D, M-mode, Doppler, and Color Doppler    Study date:  01-Aug-2019    Patient: Deanna Beltran  MR number: [id number]  Account number: [de-identified]  : 1945  Age: 72 years  Gender: Male  Status: Outpatient  Location: [facility]   Height: 72 in  Weight: 245 5 lb  BP: 163/ 88 mmHg    Indications: TIA  Diagnoses: G45 9 - Transient cerebral ischemic attack, unspecified    Sonographer:  Desirae Russell RDCS  Referring Physician:  Wanda Varghese MD  Group:  [facility]  Interpreting Physician:  George Martin MD    IMPRESSIONS:  Although no cardiac source of embolism was identified, the possibility cannot be ruled out on the basis of this study  SUMMARY    LEFT VENTRICLE:  Systolic function was normal  Ejection fraction was estimated in the range of 50 % to 55 %  There were no regional wall motion abnormalities  Doppler parameters were consistent with abnormal left ventricular relaxation (grade 1 diastolic dysfunction)  RIGHT VENTRICLE:  The size was normal   Systolic function was normal     MITRAL VALVE:  There was mild annular calcification  There was trace regurgitation  PERICARDIUM:  A trace pericardial effusion was identified posterior to the heart  HISTORY: Consistent with transient ischemic attack or stroke  PRIOR HISTORY: Cerebral aneurysm Risk factors: hypertension and diabetes  PROCEDURE: The study was performed in the [facility]  This was a routine study  The transthoracic approach was used  The study included complete 2D imaging, M-mode, complete spectral Doppler, and color Doppler  The  heart rate was 64 bpm, at the start of the study  Images were obtained from the parasternal, apical, subcostal, and suprasternal notch acoustic windows  Echocardiographic views were limited due to lung interference  Image quality was  adequate  LEFT VENTRICLE: Size was normal  Systolic function was normal  Ejection fraction was estimated in the range of 50 % to 55 %  There were no regional wall motion abnormalities   Wall thickness was normal  DOPPLER: Doppler parameters were  consistent with abnormal left ventricular relaxation (grade 1 diastolic dysfunction)  RIGHT VENTRICLE: The size was normal  Systolic function was normal  Wall thickness was normal     LEFT ATRIUM: Size was within the limits of normal when indexed for body surface area  RIGHT ATRIUM: Size was normal     MITRAL VALVE: There was mild annular calcification  Valve structure was normal  There was normal leaflet separation  DOPPLER: The transmitral velocity was within the normal range  There was no evidence for stenosis  There was trace  regurgitation  AORTIC VALVE: The valve was trileaflet  Leaflets exhibited mildly increased thickness, mild calcification, and normal cuspal separation  DOPPLER: Transaortic velocity was within the normal range  There was no evidence for stenosis  There  was no significant regurgitation  TRICUSPID VALVE: The valve structure was normal  There was normal leaflet separation  DOPPLER: The transtricuspid velocity was within the normal range  There was no evidence for stenosis  There was no significant regurgitation  PULMONIC VALVE: Leaflets exhibited normal thickness, no calcification, and normal cuspal separation  DOPPLER: The transpulmonic velocity was within the normal range  There was trace regurgitation  PERICARDIUM: A trace pericardial effusion was identified posterior to the heart  The pericardium was normal in appearance  AORTA: The root exhibited normal size  SYSTEMIC VEINS: IVC: The inferior vena cava was not well visualized      SYSTEM MEASUREMENT TABLES    2D  %FS: 37 12 %  AV Diam: 3 32 cm  Ao asc: 3 21 cm  EDV(Teich): 128 79 ml  EF(Teich): 66 69 %  ESV(Teich): 42 9 ml  IVSd: 1 1 cm  LA Area: 14 67 cm2  LA Diam: 4 19 cm  LVEDV MOD A4C: 126 86 ml  LVEF MOD A4C: 47 38 %  LVESV MOD A4C: 66 75 ml  LVIDd: 5 19 cm  LVIDs: 3 26 cm  LVLd A4C: 7 95 cm  LVLs A4C: 6 8 cm  LVPWd: 1 05 cm  RA Area: 15 15 cm2  RV Diam : 4 71 cm  SI(Teich): 36 86 ml/m2  SV MOD A4C: 60 11 ml  SV(Cube): 104 9 ml  SV(Teich): 85 88 ml    CW  MR VTI: 70 22 cm  MR Vmax: 4 21 m/s  MR Vmean: 2 32 m/s  MR maxP 95 mmHg  MR meanP mmHg    MM  TAPSE: 1 91 cm    PW  AVC: 411 59 ms  E': 0 06 m/s  E/E': 10 37  MV A Keith: 0 84 m/s  MV Dec Davie: 3 58 m/s2  MV DecT: 174 66 ms  MV E Keith: 0 63 m/s  MV E/A Ratio: 0 74    [employer] Accredited Echocardiography Laboratory    Prepared and electronically signed by    Laury Nichole MD  Signed 01-Aug-2019 11:53:04    No results found for this or any previous visit  Assessment/Plan     Sinus tachycardia with frequent PACs, with elevated rates likely triggered by anemia / post-op atelectasis    Anemia due to suspected upper GI bleeding    Right renal cancer s/p radical nephrectomy    HTN    DM2    RUPAL on CKD 3A    Mr Bryan Levine is a 78year old male who developed hematemesis with worsening anemia (Hb 13 5 -> 7 6 g/dL) following a radical right nephrectomy performed  for renal cell cancer  Cardiology consulted for suspected Afib with RVR in the post-op setting, causing aborted endoscopy yesterday  Patient reports no symptoms  CXR shows low lung volumes suggestive of atelectasis  Review of ECG and telemetry shows a history of irregular narrow complex tachycardia  P waves are always present, and the trend in heart rate elevation and fall shows a gradual pattern consistent with sinus rhythm  Frequent PACs may be mistaken for atrial fibrillation  Echo performed today, showing normal LV function, dilated LA, no valvular disease of significance  Cardiac output appears overall increased  Clinically, he is not volume overloaded and may benefit from additional intravascular volume given his acute anemia  Overall, his tachycardia is likely due to anemia and atelectasis as reflected on CXR  TSH is normal and there is no evidence of PE    There is no need for long term anticoagulation for atrial fibrillation or aggressive rate control, as this is an expected compensatory response  No further cardiac workup is indicated       Modesto Marie MD  Cardiology Fellow

## 2022-07-09 NOTE — ASSESSMENT & PLAN NOTE
Stable  "-Patient developed tachycardia and hypoxia prior to undergoing EGD to evaluate upper GI bleed  -Endoscopy canceled due to hypoxia, pox  89% RA    -clinically patient does not appear volume overload, no wheezing, able to speak in full sentences"    CXR on 07/08 identified small atelectasis  V/Q scan showed low probability for PE  With mildly elevated troponin likely elevated due to reduced renal clearance in the setting of ESRD  Pending add on pro-BNP; elevated procalcitonin on 07/08  See plan below Sepsis

## 2022-07-09 NOTE — QUICK NOTE
Discussed patient with urology and reevaluated due to ongoing tachycardia in the 120s  Currently has no complaints  Tachycardia  Tele reviewed appears to have runs of afib with RVR  Cardizem ordered earlier at 1700 however not given for unclear reason  Will start patient on scheduled metoprolol tartrate 25 mg bid pending evaluation by cardiology  [facility] 6, will require AC  Repeat echo already ordered, prior echo with G1DD  Does have some nonpitting LE edema, will decrease fluids to 50 cc/hr given ongoing poor oral intake  Afib likely precipitated by SIRS vs sepsis, acute respiratory failure with hypoxia, ABLA in setting of postop period and possible UGIB  Does have low grade fevers  on scheduled tylenol and rectal temp noted to be 101 1 likely driving large part of tachycardia  On broad spectrum abx  Acute respiratory failure with hypoxia  Currently requiring 2L O2 via NC  Denies SOB or CP, has mild dry cough  VQ scan with low probability of PE  Possible aspiration pneumonitis given postop ileus with multiple episodes of vomiting, atelectasis, possible pneumonia  Incentive spirometry, check MRSA and COVID swabs

## 2022-07-09 NOTE — ASSESSMENT & PLAN NOTE
Patient with an episode of coffee-ground emesis evaluated by GI was to undergo upper endoscopy this morning, procedure canceled due to hypoxia and tachycardia  -currently hemodynamically stable  -on PPI"    Gastroenterology consulted; appreciate assistance  Pending workup for hypoxia and tachycardia prior to endoscopy

## 2022-07-09 NOTE — ASSESSMENT & PLAN NOTE
Lab Results   Component Value Date    HGBA1C 7 2 (A) 05/16/2022   Diabetes fairly controlled with A1c 7 2  Hold metformin   On ISS accu cheks    Recent Labs     07/08/22  1701 07/09/22  0021 07/09/22  0546 07/09/22  1057   POCGLU 216* 174* 168* 199*       Blood Sugar Average: Last 72 hrs:  (P) 236 4375   Hyperglycemia relatively well controlled  Continue SSIP and FSBS while inpatient  Continue Diabetic Diet and Hypoglycemic protocol

## 2022-07-09 NOTE — ASSESSMENT & PLAN NOTE
Resolved  Likely secondary to sepsis with physiological response to hypotension  TTE performed and pending read  See plans above

## 2022-07-09 NOTE — ASSESSMENT & PLAN NOTE
Lab Results   Component Value Date    EGFR 16 07/09/2022    EGFR 14 07/08/2022    EGFR 15 07/08/2022    CREATININE 3 45 (H) 07/09/2022    CREATININE 3 84 (H) 07/08/2022    CREATININE 3 55 (H) 07/08/2022     s/p right nephrectomy  Nephrology consulted; appreciate assistance  Continue recs per Nephrology

## 2022-07-09 NOTE — PLAN OF CARE
Problem: PAIN - ADULT  Goal: Verbalizes/displays adequate comfort level or baseline comfort level  Description: Interventions:  - Encourage patient to monitor pain and request assistance  - Assess pain using appropriate pain scale  - Administer analgesics based on type and severity of pain and evaluate response  - Implement non-pharmacological measures as appropriate and evaluate response  - Consider cultural and social influences on pain and pain management  - Notify physician/advanced practitioner if interventions unsuccessful or patient reports new pain  Outcome: Progressing     Problem: INFECTION - ADULT  Goal: Absence or prevention of progression during hospitalization  Description: INTERVENTIONS:  - Assess and monitor for signs and symptoms of infection  - Monitor lab/diagnostic results  - Monitor all insertion sites, i e  indwelling lines, tubes, and drains  - Monitor endotracheal if appropriate and nasal secretions for changes in amount and color  - [place] appropriate cooling/warming therapies per order  - Administer medications as ordered  - Instruct and encourage patient and family to use good hand hygiene technique  - Identify and instruct in appropriate isolation precautions for identified infection/condition  Outcome: Progressing

## 2022-07-10 LAB
ALBUMIN SERPL BCP-MCNC: 2.2 G/DL (ref 3.5–5)
ALP SERPL-CCNC: 46 U/L (ref 46–116)
ALT SERPL W P-5'-P-CCNC: 16 U/L (ref 12–78)
ANION GAP SERPL CALCULATED.3IONS-SCNC: 6 MMOL/L (ref 4–13)
AST SERPL W P-5'-P-CCNC: 16 U/L (ref 5–45)
BASOPHILS # BLD AUTO: 0.03 THOUSANDS/ΜL (ref 0–0.1)
BASOPHILS NFR BLD AUTO: 0 % (ref 0–1)
BILIRUB SERPL-MCNC: 1.79 MG/DL (ref 0.2–1)
BUN SERPL-MCNC: 48 MG/DL (ref 5–25)
CALCIUM ALBUM COR SERPL-MCNC: 9.1 MG/DL (ref 8.3–10.1)
CALCIUM SERPL-MCNC: 7.7 MG/DL (ref 8.3–10.1)
CHLORIDE SERPL-SCNC: 112 MMOL/L (ref 100–108)
CO2 SERPL-SCNC: 24 MMOL/L (ref 21–32)
CREAT SERPL-MCNC: 3.12 MG/DL (ref 0.6–1.3)
EOSINOPHIL # BLD AUTO: 0.36 THOUSAND/ΜL (ref 0–0.61)
EOSINOPHIL NFR BLD AUTO: 5 % (ref 0–6)
ERYTHROCYTE [DISTWIDTH] IN BLOOD BY AUTOMATED COUNT: 13.6 % (ref 11.6–15.1)
GFR SERPL CREATININE-BSD FRML MDRD: 18 ML/MIN/1.73SQ M
GLUCOSE SERPL-MCNC: 137 MG/DL (ref 65–140)
GLUCOSE SERPL-MCNC: 140 MG/DL (ref 65–140)
GLUCOSE SERPL-MCNC: 144 MG/DL (ref 65–140)
GLUCOSE SERPL-MCNC: 165 MG/DL (ref 65–140)
GLUCOSE SERPL-MCNC: 179 MG/DL (ref 65–140)
GLUCOSE SERPL-MCNC: 185 MG/DL (ref 65–140)
HCT VFR BLD AUTO: 23.3 % (ref 36.5–49.3)
HCT VFR BLD AUTO: 24.9 % (ref 36.5–49.3)
HCT VFR BLD AUTO: 25.2 % (ref 36.5–49.3)
HCT VFR BLD AUTO: 25.9 % (ref 36.5–49.3)
HGB BLD-MCNC: 7.7 G/DL (ref 12–17)
HGB BLD-MCNC: 8.1 G/DL (ref 12–17)
HGB BLD-MCNC: 8.1 G/DL (ref 12–17)
HGB BLD-MCNC: 8.4 G/DL (ref 12–17)
IMM GRANULOCYTES # BLD AUTO: 0.06 THOUSAND/UL (ref 0–0.2)
IMM GRANULOCYTES NFR BLD AUTO: 1 % (ref 0–2)
LYMPHOCYTES # BLD AUTO: 1.28 THOUSANDS/ΜL (ref 0.6–4.47)
LYMPHOCYTES NFR BLD AUTO: 16 % (ref 14–44)
MAGNESIUM SERPL-MCNC: 1.9 MG/DL (ref 1.6–2.6)
MCH RBC QN AUTO: 30.6 PG (ref 26.8–34.3)
MCHC RBC AUTO-ENTMCNC: 33 G/DL (ref 31.4–37.4)
MCV RBC AUTO: 93 FL (ref 82–98)
MONOCYTES # BLD AUTO: 0.93 THOUSAND/ΜL (ref 0.17–1.22)
MONOCYTES NFR BLD AUTO: 12 % (ref 4–12)
MRSA NOSE QL CULT: NORMAL
NEUTROPHILS # BLD AUTO: 5.21 THOUSANDS/ΜL (ref 1.85–7.62)
NEUTS SEG NFR BLD AUTO: 66 % (ref 43–75)
NRBC BLD AUTO-RTO: 0 /100 WBCS
PHOSPHATE SERPL-MCNC: 2.4 MG/DL (ref 2.3–4.1)
PLATELET # BLD AUTO: 142 THOUSANDS/UL (ref 149–390)
PMV BLD AUTO: 11.4 FL (ref 8.9–12.7)
POTASSIUM SERPL-SCNC: 4 MMOL/L (ref 3.5–5.3)
PROT SERPL-MCNC: 5.1 G/DL (ref 6.4–8.2)
RBC # BLD AUTO: 2.52 MILLION/UL (ref 3.88–5.62)
SODIUM SERPL-SCNC: 142 MMOL/L (ref 136–145)
WBC # BLD AUTO: 7.87 THOUSAND/UL (ref 4.31–10.16)

## 2022-07-10 PROCEDURE — 80053 COMPREHEN METABOLIC PANEL: CPT | Performed by: INTERNAL MEDICINE

## 2022-07-10 PROCEDURE — 85025 COMPLETE CBC W/AUTO DIFF WBC: CPT | Performed by: INTERNAL MEDICINE

## 2022-07-10 PROCEDURE — 84100 ASSAY OF PHOSPHORUS: CPT | Performed by: INTERNAL MEDICINE

## 2022-07-10 PROCEDURE — 85018 HEMOGLOBIN: CPT | Performed by: UROLOGY

## 2022-07-10 PROCEDURE — 99232 SBSQ HOSP IP/OBS MODERATE 35: CPT | Performed by: INTERNAL MEDICINE

## 2022-07-10 PROCEDURE — 85018 HEMOGLOBIN: CPT | Performed by: STUDENT IN AN ORGANIZED HEALTH CARE EDUCATION/TRAINING PROGRAM

## 2022-07-10 PROCEDURE — 83735 ASSAY OF MAGNESIUM: CPT | Performed by: INTERNAL MEDICINE

## 2022-07-10 PROCEDURE — 82948 REAGENT STRIP/BLOOD GLUCOSE: CPT

## 2022-07-10 PROCEDURE — 99233 SBSQ HOSP IP/OBS HIGH 50: CPT | Performed by: INTERNAL MEDICINE

## 2022-07-10 PROCEDURE — 85014 HEMATOCRIT: CPT | Performed by: STUDENT IN AN ORGANIZED HEALTH CARE EDUCATION/TRAINING PROGRAM

## 2022-07-10 PROCEDURE — 99024 POSTOP FOLLOW-UP VISIT: CPT | Performed by: STUDENT IN AN ORGANIZED HEALTH CARE EDUCATION/TRAINING PROGRAM

## 2022-07-10 PROCEDURE — 85014 HEMATOCRIT: CPT | Performed by: UROLOGY

## 2022-07-10 PROCEDURE — C9113 INJ PANTOPRAZOLE SODIUM, VIA: HCPCS | Performed by: STUDENT IN AN ORGANIZED HEALTH CARE EDUCATION/TRAINING PROGRAM

## 2022-07-10 RX ADMIN — CEFEPIME HYDROCHLORIDE 1000 MG: 1 INJECTION, POWDER, FOR SOLUTION INTRAMUSCULAR; INTRAVENOUS at 17:08

## 2022-07-10 RX ADMIN — ACETAMINOPHEN 650 MG: 325 TABLET, FILM COATED ORAL at 23:03

## 2022-07-10 RX ADMIN — PANTOPRAZOLE SODIUM 40 MG: 40 INJECTION, POWDER, FOR SOLUTION INTRAVENOUS at 08:46

## 2022-07-10 RX ADMIN — ACETAMINOPHEN 650 MG: 325 TABLET, FILM COATED ORAL at 06:16

## 2022-07-10 RX ADMIN — SODIUM CHLORIDE 50 ML/HR: 0.9 INJECTION, SOLUTION INTRAVENOUS at 00:03

## 2022-07-10 RX ADMIN — HEPARIN SODIUM 5000 UNITS: 5000 INJECTION INTRAVENOUS; SUBCUTANEOUS at 08:47

## 2022-07-10 RX ADMIN — HEPARIN SODIUM 5000 UNITS: 5000 INJECTION INTRAVENOUS; SUBCUTANEOUS at 22:21

## 2022-07-10 RX ADMIN — DOCUSATE SODIUM 100 MG: 100 CAPSULE, LIQUID FILLED ORAL at 08:46

## 2022-07-10 RX ADMIN — ACETAMINOPHEN 650 MG: 325 TABLET, FILM COATED ORAL at 17:07

## 2022-07-10 RX ADMIN — INSULIN LISPRO 1 UNITS: 100 INJECTION, SOLUTION INTRAVENOUS; SUBCUTANEOUS at 17:07

## 2022-07-10 RX ADMIN — CEFEPIME HYDROCHLORIDE 1000 MG: 1 INJECTION, POWDER, FOR SOLUTION INTRAMUSCULAR; INTRAVENOUS at 04:49

## 2022-07-10 RX ADMIN — BACITRACIN ZINC, NEOMYCIN, POLYMYXIN B 1 SMALL APPLICATION: 400; 3.5; 5 OINTMENT TOPICAL at 08:46

## 2022-07-10 RX ADMIN — BACITRACIN ZINC, NEOMYCIN, POLYMYXIN B 1 SMALL APPLICATION: 400; 3.5; 5 OINTMENT TOPICAL at 17:07

## 2022-07-10 RX ADMIN — PANTOPRAZOLE SODIUM 40 MG: 40 INJECTION, POWDER, FOR SOLUTION INTRAVENOUS at 22:21

## 2022-07-10 RX ADMIN — SENNOSIDES 8.6 MG: 8.6 TABLET, FILM COATED ORAL at 22:21

## 2022-07-10 RX ADMIN — INSULIN LISPRO 1 UNITS: 100 INJECTION, SOLUTION INTRAVENOUS; SUBCUTANEOUS at 00:02

## 2022-07-10 RX ADMIN — DOCUSATE SODIUM 100 MG: 100 CAPSULE, LIQUID FILLED ORAL at 17:07

## 2022-07-10 RX ADMIN — INSULIN LISPRO 1 UNITS: 100 INJECTION, SOLUTION INTRAVENOUS; SUBCUTANEOUS at 11:27

## 2022-07-10 RX ADMIN — ACETAMINOPHEN 650 MG: 325 TABLET, FILM COATED ORAL at 00:00

## 2022-07-10 RX ADMIN — ACETAMINOPHEN 650 MG: 325 TABLET, FILM COATED ORAL at 11:26

## 2022-07-10 NOTE — ASSESSMENT & PLAN NOTE
Lab Results   Component Value Date    HGBA1C 7 2 (A) 05/16/2022       Recent Labs     07/09/22  2355 07/10/22  0615 07/10/22  1058 07/10/22  1612   POCGLU 165* 140 179* 185*       Blood Sugar Average: Last 72 hrs:  (P) 189 [id number]     · Hold Home medication->Metformin while admitted  · Hyperglycemia relatively well controlled  · C/w Accu-checks and SSI AC/HS  · Continue Diabetic Diet and Hypoglycemic protocol

## 2022-07-10 NOTE — PROGRESS NOTES
Gastroenterology Progress Note      PATIENT INFORMATION      Patient: Mir Chawla 78 y o  male   MRN: [id number]  PCP: KAYODE Dubois  Unit/Bed#: [facility] 417-04 Encounter: [id number]  Date Of Visit: 07/10/22  Current Length of Stay: 4 day(s)     ASSESSMENTS & PLAN    Mir Chawla is a 78 y o  old male with PMH of CVA on daily ASA 80, HTN who is s/p R radical nephrectomy (22) to remove 5cm R renal mass suspected to be RCC, and is being consulted for an episode of hematemesis described as 'coffee ground' that occurred this AM     Coffee-ground emesis - resolved however hemoglobin continues to drop  No prior endoscopic evaluation  Initial EGD was canceled due to hypoxia and tachycardia which have both resolved  · Plan for EGD tomorrow  NPO at midnight  Okay to restart regular diet today  Will re-evaluate in the morning to make sure patient is stable  · Continue PPI b i d  · Trend hemoglobin bowel movement    Anemia, family history of colon cancer - baseline hemoglobin around 13 to 15 however presented with hemoglobin 10 and continues to slowly drop  No prior endoscopic evaluation  · Plan for EGD as above  · Recommend checking iron panel, B12 and folate  · Can consider colonoscopy in the future if hemoglobin continues to drop if in line with goals of care      SUBJECTIVE     Patient with no more episodes of vomiting  No bowel movements over the weekend  No abdominal pain  OBJECTIVE     Vitals:   Temp (24hrs), Av 7 °F (37 1 °C), Min:98 1 °F (36 7 °C), Max:99 3 °F (37 4 °C)    Temp:  [98 1 °F (36 7 °C)-99 3 °F (37 4 °C)] 98 1 °F (36 7 °C)  HR:  [62-77] 64  Resp:  [16-20] 16  BP: (120-147)/(63-70) 141/63  SpO2:  [94 %-97 %] 97 %  Body mass index is 32 01 kg/m²  Input and Output Summary (last 24 hours):        Intake/Output Summary (Last 24 hours) at 7/10/2022 1020  Last data filed at 7/10/2022 0519  Gross per 24 hour   Intake 2140 88 ml   Output 1200 ml   Net 940 88 ml Physical Exam:   GENERAL: NAD  HEENT:  NC/AT, no scleral icterus  CARDIAC:  RRR, +S1/S2  PULMONARY:  non-labored breathing  ABDOMEN:  Soft, NT/ND, +BS, no rebound/guarding/rigidity  Extremities:  No edema, cyanosis, or clubbing  NEUROLOGIC:  Alert  SKIN:  No rashes or erythema    ADDITIONAL DATA     Labs & Recent Cultures:     Results from last 7 days   Lab Units 07/10/22  0458   WBC Thousand/uL 7 87   HEMOGLOBIN g/dL 7 7*   HEMATOCRIT % 23 3*   PLATELETS Thousands/uL 142*   NEUTROS PCT % 66   LYMPHS PCT % 16   MONOS PCT % 12   EOS PCT % 5     Results from last 7 days   Lab Units 07/10/22  0458   POTASSIUM mmol/L 4 0   CHLORIDE mmol/L 112*   CO2 mmol/L 24   BUN mg/dL 48*   CREATININE mg/dL 3 12*   CALCIUM mg/dL 7 7*   ALK PHOS U/L 46   ALT U/L 16   AST U/L 16             Results from last 7 days   Lab Units 07/08/22  1421   BLOOD CULTURE  No Growth at 24 hrs  No Growth at 24 hrs  Nutrition:  Diet Tristan/CHO Controlled; Diabetic CL Liquids  Radiology Results:   CT abdomen pelvis wo contrast   Final Result by Bushra Kendall MD (07/09 1218)      1  Postsurgical changes of right nephrectomy with a moderate amount of retroperitoneal and peritoneal fluid and hyperdense blood, most concentrated in/adjacent to the right nephrectomy bed, more than expected for standard postoperative appearance  2   Foci of peritoneal and retroperitoneal gas tracking down both inguinal canals into the scrotum, as well as extensive subcutaneous emphysema in the anterior abdominal wall, likely postoperative  3  Small right rectus sheath hematoma with a tiny amount of peritoneal gas tracking into the right abdominal wall, likely at a trocar site  4  Moderate right and small left pleural effusions and bibasilar atelectasis        I personally discussed this study with Wilbert Barboza on 7/9/2022 at 12:12 PM                Workstation performed: [id number]         NM lung ventilation / perfusion   Final Result by Ryan Arce MD (07/08 2029)      The probability for pulmonary embolus is low  Workstation performed: [id number]         XR chest 1 view   Final Result by Esthela Brooks MD (07/08 1444)      Small left pleural effusion with adjacent atelectasis  Workstation performed: [id number]         US kidney and bladder   Final Result by Kayden Mccullough MD (07/08 1318)      Status post right nephrectomy with complex fluid collection identified within the right nephrectomy bed  Normal left kidney  Workstation performed: [id number]         XR abdomen 1 view kub   Final Result by Donna Spicer MD (07/08 0333)      Mildly distended stomach and small bowel loops, most likely postoperative ileus given history  Small bowel obstruction cannot be excluded  Large amount of subcutaneous emphysema along the lower body wall, an expected recent postoperative finding                    Workstation performed: [id number]           Scheduled Medications:  acetaminophen, 650 mg, Q6H [address]  cefepime, 1,000 mg, Q12H  docusate sodium, 100 mg, BID  heparin (porcine), 5,000 Units, Q12H ERICA  insulin lispro, 1-6 Units, Q6H [address]  neomycin-bacitracin-polymyxin b, 1 small application, BID  pantoprazole, 40 mg, Q12H ERICA  senna, 1 tablet, HS        PRN MEDS:  hydrALAZINE, 5 mg, Q6H PRN  ondansetron, 4 mg, Q6H PRN  oxyCODONE, 2 5 mg, Q4H PRN  oxyCODONE, 5 mg, Q4H PRN  simethicone, 80 mg, Q6H PRN  vancomycin, 15 mg/kg (Adjusted), Once PRN        Last 24 Hours Medication List:   Current Facility-Administered Medications   Medication Dose Route Frequency Provider Last Rate    acetaminophen  650 mg Oral Q6H [address] Franca Buchanan MD      cefepime  1,000 mg Intravenous Q12H Daysi Bell MD Stopped (07/10/22 0519)    docusate sodium  100 mg Oral BID Franca Buchanan MD      heparin (porcine)  5,000 Units Subcutaneous Q12H [address] Anjana Santiago MD      hydrALAZINE  5 mg Intravenous Q6H PRN Adriana Batres Ben Ang MD      insulin lispro  1-6 Units Subcutaneous Q6H [facility] Keila Szymanski, [place]      neomycin-bacitracin-polymyxin b  1 small application Topical BID Bridget Chakraborty MD      ondansetron  4 mg Intravenous Q6H PRN Bridget Chakraborty MD      oxyCODONE  2 5 mg Oral Q4H PRN Bridget Chakraborty MD      oxyCODONE  5 mg Oral Q4H PRN Bridget Chakraborty MD      pantoprazole  40 mg Intravenous Q12H 270 Benny Bartlett DO      senna  1 tablet Oral HS Keila Nessa, PA-C      simethicone  80 mg Oral Q6H PRN Keila Nessa, PA-C      sodium chloride  50 mL/hr Intravenous Continuous [place], PA-C 50 mL/hr (07/10/22 0519)    vancomycin  15 mg/kg (Adjusted) Intravenous Once PRN Tarah Martin MD            Time Spent for Care: 30 mins spent in total   More than 50% of total time spent on counseling and coordination of care as described above  Current Length of Stay: 4 day(s)      Code Status: Level 1 - Full Code          ** Please Note: This note is constructed using a voice recognition dictation system   **

## 2022-07-10 NOTE — ASSESSMENT & PLAN NOTE
"Patient s/p right nephrectomy developed tachycardia, worsening leukocytosis, hypoxia with no clear source of infection at this time  · Procalcitonin: 1 21-> May be elevated in the setting of renal dysfunction  · Continue to monitor periodically  · lactic acid: Negative  · UA small leukocyte, pyuria 7/7/22  · TSH measured within reference range  · COVID/RSV/Influenza NEGATIVE  · Blood culture collected on 07/08 showed NGTD  · MRSA cultures collected on 07/08 NEGATIVE     · Urinalysis on 07/07 suggestive of acute cystitis with hematuria  · Continue Cefepime (D4)  · Patient remains afebrile with improved Leukocytosis  · Can likely D/C Abx tomorrow  · Continue to monitor CBC/ Fever curve

## 2022-07-10 NOTE — ASSESSMENT & PLAN NOTE
· Resolved  · Likely secondary to sepsis with physiological response to hypotension  · TTE performed on 07/09 showed an EF of 55% with grade 1 diastolic dysfunction  · See plans above

## 2022-07-10 NOTE — CONSULTS
Vancomycin IV Pharmacy-to-Dose Consultation    Marisa Resendiz is a 78 y o  male who was receiving Vancomycin IV with management by the Pharmacy Consult service for treatment of other sepsis  The patients Vancomycin therapy has been completed / discontinued  Thank you for allowing us to take part in this patient's care  Pharmacy will sign-off now; please call or re-consult if there are any questions        Clotilde Gandara, ShannonD

## 2022-07-10 NOTE — ASSESSMENT & PLAN NOTE
Lab Results   Component Value Date    EGFR 18 07/10/2022    EGFR 16 07/09/2022    EGFR 14 07/08/2022    CREATININE 3 12 (H) 07/10/2022    CREATININE 3 45 (H) 07/09/2022    CREATININE 3 84 (H) 07/08/2022     · s/p right nephrectomy  · Nephrology consulted; appreciate assistance  · C/w Light IVF  · Avoid/Limit nephrotoxins and IV Contrast  · Avoid hypotension and fluctuations in BP  · Continue to monitor renal function while admitted

## 2022-07-10 NOTE — ASSESSMENT & PLAN NOTE
· Patient is s/p right nephrectomy POD#5  · Urology Primary Team; continue management  · PENDING pathology collected on 07/06

## 2022-07-10 NOTE — PLAN OF CARE
Problem: PAIN - ADULT  Goal: Verbalizes/displays adequate comfort level or baseline comfort level  Description: Interventions:  - Encourage patient to monitor pain and request assistance  - Assess pain using appropriate pain scale  - Administer analgesics based on type and severity of pain and evaluate response  - Implement non-pharmacological measures as appropriate and evaluate response  - Consider cultural and social influences on pain and pain management  - Notify physician/advanced practitioner if interventions unsuccessful or patient reports new pain  Outcome: Progressing     Problem: INFECTION - ADULT  Goal: Absence or prevention of progression during hospitalization  Description: INTERVENTIONS:  - Assess and monitor for signs and symptoms of infection  - Monitor lab/diagnostic results  - Monitor all insertion sites, i e  indwelling lines, tubes, and drains  - Monitor endotracheal if appropriate and nasal secretions for changes in amount and color  - Boss appropriate cooling/warming therapies per order  - Administer medications as ordered  - Instruct and encourage patient and family to use good hand hygiene technique  - Identify and instruct in appropriate isolation precautions for identified infection/condition  Outcome: Progressing     Problem: DISCHARGE PLANNING  Goal: Discharge to home or other facility with appropriate resources  Description: INTERVENTIONS:  - Identify barriers to discharge w/patient and caregiver  - Arrange for needed discharge resources and transportation as appropriate  - Identify discharge learning needs (meds, wound care, etc )  - Arrange for interpretive services to assist at discharge as needed  - Refer to Case Management Department for coordinating discharge planning if the patient needs post-hospital services based on physician/advanced practitioner order or complex needs related to functional status, cognitive ability, or social support system  Outcome: Progressing Problem: Knowledge Deficit  Goal: Patient/family/caregiver demonstrates understanding of disease process, treatment plan, medications, and discharge instructions  Description: Complete learning assessment and assess knowledge base    Interventions:  - Provide teaching at level of understanding  - Provide teaching via preferred learning methods  Outcome: Progressing     Problem: Prexisting or High Potential for Compromised Skin Integrity  Goal: Skin integrity is maintained or improved  Description: INTERVENTIONS:  - Identify patients at risk for skin breakdown  - Assess and monitor skin integrity  - Assess and monitor nutrition and hydration status  - Monitor labs   - Assess for incontinence   - Turn and reposition patient  - Assist with mobility/ambulation  - Relieve pressure over bony prominences  - Avoid friction and shearing  - Provide appropriate hygiene as needed including keeping skin clean and dry  - Evaluate need for skin moisturizer/barrier cream  - Collaborate with interdisciplinary team   - Patient/family teaching  - Consider wound care consult   Outcome: Progressing     Problem: GENITOURINARY - ADULT  Goal: Absence of urinary retention  Description: INTERVENTIONS:  - Assess patients ability to void and empty bladder  - Monitor I/O  - Bladder scan as needed  - Discuss with physician/AP medications to alleviate retention as needed  - Discuss catheterization for long term situations as appropriate  Outcome: Progressing  Goal: Urinary catheter remains patent  Description: INTERVENTIONS:  - Assess patency of urinary catheter  - If patient has a chronic hernández, consider changing catheter if non-functioning  - Follow guidelines for intermittent irrigation of non-functioning urinary catheter  Outcome: Progressing     Problem: METABOLIC, FLUID AND ELECTROLYTES - ADULT  Goal: Electrolytes maintained within normal limits  Description: INTERVENTIONS:  - Monitor labs and assess patient for signs and symptoms of electrolyte imbalances  - Administer electrolyte replacement as ordered  - Monitor response to electrolyte replacements, including repeat lab results as appropriate  - Instruct patient on fluid and nutrition as appropriate  Outcome: Progressing     Problem: SKIN/TISSUE INTEGRITY - ADULT  Goal: Incision(s), wounds(s) or drain site(s) healing without S/S of infection  Description: INTERVENTIONS  - Assess and document dressing, incision, wound bed, drain sites and surrounding tissue  - Provide patient and family education  Outcome: Progressing

## 2022-07-10 NOTE — ASSESSMENT & PLAN NOTE
Patient with an episode of coffee-ground emesis evaluated by GI was to undergo upper endoscopy this morning, procedure canceled due to hypoxia and tachycardia  · Currently hemodynamically stable  · Hgb remains stable  · C/w PPI  · Gastroenterology consulted; appreciate assistance  · Will hold off on inpatient evaluation at this time  If emesis re-occurs can re-evaluate EGD inpatient

## 2022-07-10 NOTE — PROGRESS NOTES
[facility]  Progress Note - Jeanette Gutierrez 1945, 78 y o  male MRN: [id number]  Unit/Bed#: [facility] 809-01 Encounter: [id number]  Primary Care Provider: KAYODE Christensen   Date and time admitted to hospital: 7/6/2022 11:12 AM    Tachycardia  Assessment & Plan  Resolved  Likely secondary to sepsis with physiological response to hypotension  TTE performed on 07/09 showed an EF of 55% with grade 1 diastolic dysfunction  See plans above    Sepsis [facility])  Assessment & Plan  "Patient s/p right nephrectomy developed tachycardia, worsening leukocytosis, hypoxia with no clear source of infection at this time  -check procalcitonin, lactic acid, chest x-ray  -UA small leukocyte, pyuria 7/7/22  -start empiric cefepime and vanco"    TSH measured within reference range  COVID/RSV/Influenza NEGATIVE  Blood culture collected on 07/08 showed NGTD  MRSA cultures collected on 07/08 NEGATIVE     Urinalysis on 07/07 suggestive of acute cystitis with hematuria  Continue empiric Cefepime and IV Vancomycin    GI bleed  Assessment & Plan  Patient with an episode of coffee-ground emesis evaluated by GI was to undergo upper endoscopy this morning, procedure canceled due to hypoxia and tachycardia  -currently hemodynamically stable  -on PPI"    Gastroenterology consulted; appreciate assistance  Pending workup for hypoxia and tachycardia prior to endoscopy    Renal cell cancer, right [facility])  Assessment & Plan  Patient is s/p right nephrectomy POD#4  Urology Primary Team; continue management  PENDING pathology collected on 07/06    Stage 3a chronic kidney disease [facility])  Assessment & Plan  Lab Results   Component Value Date    EGFR 18 07/10/2022    EGFR 16 07/09/2022    EGFR 14 07/08/2022    CREATININE 3 12 (H) 07/10/2022    CREATININE 3 45 (H) 07/09/2022    CREATININE 3 84 (H) 07/08/2022     s/p right nephrectomy  Nephrology consulted; appreciate assistance  Continue recs per Nephrology    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 chronic kidney disease, without long-term current use of insulin [facility])  Assessment & Plan  Lab Results   Component Value Date    HGBA1C 7 2 (A) 05/16/2022   Diabetes fairly controlled with A1c 7 2  Hold metformin   On ISS accu cheks    Recent Labs     07/09/22  2355 07/10/22  0615 07/10/22  1058 07/10/22  1612   POCGLU 165* 140 179* 185*       Blood Sugar Average: Last 72 hrs:  (P) 941 [id number]   Hyperglycemia relatively well controlled  Continue SSIP and FSBS while inpatient  Continue Diabetic Diet and Hypoglycemic protocol    Essential hypertension  Assessment & Plan  Well controlled  Continue home for Lopressor 25 mg BID  Home lisinopril and HCTZ held due to prior hypotension  Ordered PRN IV Hydralazine  Continue to monitor     * Acute respiratory failure with hypoxia (HCC)  Assessment & Plan  Stable  "-Patient developed tachycardia and hypoxia prior to undergoing EGD to evaluate upper GI bleed  -Endoscopy canceled due to hypoxia, pox  89% RA    -clinically patient does not appear volume overload, no wheezing, able to speak in full sentences"    CXR on 07/08 identified small atelectasis  V/Q scan showed low probability for PE  With mildly elevated troponin likely elevated due to reduced renal clearance in the setting of ESRD  Elevated procalcitonin on 07/08  See plan below Sepsis        VTE Pharmacologic Prophylaxis: VTE Score: 7 Moderate Risk (Score 3-4) - Pharmacological DVT Prophylaxis Ordered: heparin  Patient Centered Rounds: I performed bedside rounds with nursing staff today  Discussions with Specialists or Other Care Team Provider: Nurse and      Education and Discussions with Family / Patient: Defer to primary team      Time Spent for Care: 30 minutes  More than 50% of total time spent on counseling and coordination of care as described above  Current Length of Stay: 4 day(s)  Current Patient Status: Inpatient   Certification Statement:  The patient will continue to require additional inpatient hospital stay due to pending sepsis workup  Discharge Plan: [facility] is following this patient on consult  They are not yet medically stable for discharge secondary to Sepsis workup  Code Status: Level 1 - Full Code    Subjective:   Patient denies any complaints and tries to eat as much lunch as he can  Pain is well controlled  He is passing gas  He denies bowel movements, fevers, chills, or palpitations  Objective:     Vitals:   Temp (24hrs), Av 7 °F (37 1 °C), Min:98 1 °F (36 7 °C), Max:99 3 °F (37 4 °C)    Temp:  [98 1 °F (36 7 °C)-99 3 °F (37 4 °C)] 98 7 °F (37 1 °C)  HR:  [63-77] 64  Resp:  [16-20] 20  BP: (137-147)/(63-64) 142/64  SpO2:  [93 %-97 %] 95 %  Body mass index is 32 01 kg/m²  Input and Output Summary (last 24 hours): Intake/Output Summary (Last 24 hours) at 7/10/2022 1741  Last data filed at 7/10/2022 1100  Gross per 24 hour   Intake 1584 21 ml   Output 1000 ml   Net 584 21 ml       Physical Exam:   Physical Exam  Vitals and nursing note reviewed  Constitutional:       General: He is not in acute distress  Appearance: Normal appearance  He is normal weight  He is ill-appearing  Comments: Frail in appearance   HENT:      Head: Normocephalic and atraumatic  Right Ear: External ear normal       Left Ear: External ear normal       Nose: Nose normal       Mouth/Throat:      Mouth: Mucous membranes are dry  Eyes:      General:         Right eye: No discharge  Left eye: No discharge  Extraocular Movements: Extraocular movements intact  Conjunctiva/sclera: Conjunctivae normal    Cardiovascular:      Rate and Rhythm: Normal rate  Rhythm irregular  Heart sounds: No friction rub  No gallop  Pulmonary:      Effort: Pulmonary effort is normal  No respiratory distress  Breath sounds: No wheezing or rales  Abdominal:      General: Bowel sounds are normal  There is no distension  Tenderness: There is abdominal tenderness  There is no guarding or rebound  Genitourinary:     Comments: Baird in place draining yellow urine  Musculoskeletal:         General: No deformity  Cervical back: Normal range of motion  Right lower leg: No edema  Left lower leg: No edema  Skin:     General: Skin is warm and dry  Coloration: Skin is not pale  Findings: No bruising or erythema  Neurological:      Mental Status: He is alert and oriented to person, place, and time  Mental status is at baseline  Cranial Nerves: No cranial nerve deficit  Motor: No weakness  Psychiatric:         Mood and Affect: Mood normal          Behavior: Behavior normal          Thought Content:  Thought content normal          Judgment: Judgment normal           Additional Data:     Labs:  Results from last 7 days   Lab Units 07/10/22  1406 07/10/22  0458   WBC Thousand/uL  --  7 87   HEMOGLOBIN g/dL 8 1* 7 7*   HEMATOCRIT % 24 9* 23 3*   PLATELETS Thousands/uL  --  142*   NEUTROS PCT %  --  66   LYMPHS PCT %  --  16   MONOS PCT %  --  12   EOS PCT %  --  5     Results from last 7 days   Lab Units 07/10/22  0458   SODIUM mmol/L 142   POTASSIUM mmol/L 4 0   CHLORIDE mmol/L 112*   CO2 mmol/L 24   BUN mg/dL 48*   CREATININE mg/dL 3 12*   ANION GAP mmol/L 6   CALCIUM mg/dL 7 7*   ALBUMIN g/dL 2 2*   TOTAL BILIRUBIN mg/dL 1 79*   ALK PHOS U/L 46   ALT U/L 16   AST U/L 16   GLUCOSE RANDOM mg/dL 137         Results from last 7 days   Lab Units 07/10/22  1612 07/10/22  1058 07/10/22  0615 07/09/22  2355 07/09/22  1555 07/09/22  1057 07/09/22  0546 07/09/22  0021 07/08/22  1701 07/08/22  0800 07/08/22  0106 07/07/22  2112   POC GLUCOSE mg/dl 185* 179* 140 165* 151* 199* 168* 174* 216* 192* 182* 212*         Results from last 7 days   Lab Units 07/09/22  0017 07/08/22  1420   LACTIC ACID mmol/L 1 5  --    PROCALCITONIN ng/ml  --  1 21*       Lines/Drains:  Invasive Devices  Report    Peripheral Intravenous Line  Duration           Peripheral IV 07/09/22 Right;Upper;Ventral (anterior) Arm 1 day          Drain  Duration           Urethral Catheter Latex 16 Fr  4 days              Urinary Catheter:  Goal for removal: Remove after 48 hrs of I/O monitoring           Telemetry:  Telemetry Orders (From admission, onward)             48 Hour Telemetry Monitoring  Continuous x 48 hours        References:    Telemetry Guidelines   Question:  Reason for 48 Hour Telemetry  Answer:  Arrhythmias Requiring Medical Therapy (eg  SVT, Vtach/fib, Bradycardia, Uncontrolled A-fib)                 Telemetry Reviewed: Normal Sinus Rhythm  Indication for Continued Telemetry Use: Acute CHF on >200 mg lasix/day or equivalent dose or with new reduced EF  Imaging: No pertinent imaging reviewed  Recent Cultures (last 7 days):   Results from last 7 days   Lab Units 07/08/22  1421   BLOOD CULTURE  No Growth at 48 hrs  No Growth at 48 hrs         Last 24 Hours Medication List:   Current Facility-Administered Medications   Medication Dose Route Frequency Provider Last Rate    acetaminophen  650 mg Oral Q6H [address] Nick Wells MD      cefepime  1,000 mg Intravenous Q12H Varun Ragland MD 1,000 mg (07/10/22 1708)    docusate sodium  100 mg Oral BID Nick Wells MD      heparin (porcine)  5,000 Units Subcutaneous Q12H [address] Mari Oscar MD      hydrALAZINE  5 mg Intravenous Q6H PRN Mari Oscar MD      insulin lispro  1-6 Units Subcutaneous Q6H [address] Trish Moulton [place]      neomycin-bacitracin-polymyxin b  1 small application Topical BID Nick Wells MD      ondansetron  4 mg Intravenous Q6H PRN Nick Wells MD      oxyCODONE  2 5 mg Oral Q4H PRN Nick Wells MD      oxyCODONE  5 mg Oral Q4H PRN Nick Wells MD      pantoprazole  40 mg Intravenous Q12H [address], DO      senna  1 tablet Oral HS Trish Moulton PA-C      simethicone  80 mg Oral Q6H PRN Keila Szymanski PA-C      sodium chloride  50 mL/hr Intravenous Continuous Katheryn Jordan, PA-C 50 mL/hr (07/10/22 0519)        Today, Patient Was Seen By: Raphael Pinedo MD    **Please Note: This note may have been constructed using a voice recognition system  **

## 2022-07-10 NOTE — ASSESSMENT & PLAN NOTE
· BP reviewed and remains stable  · Continue home medication:  · Lopressor 25 mg BID  · lisinopril and HCTZ held due to prior hypotension and RUPAL  · Ordered PRN IV Hydralazine  · Continue to monitor

## 2022-07-10 NOTE — ASSESSMENT & PLAN NOTE
Resolved  "-Patient developed tachycardia and hypoxia prior to undergoing EGD to evaluate upper GI bleed  -Endoscopy canceled due to hypoxia, pox  89% RA    -clinically patient does not appear volume overload, no wheezing, able to speak in full sentences"    · CXR on 07/08 identified small atelectasis  · V/Q scan showed low probability for PE  · With mildly elevated troponin likely elevated due to reduced renal clearance in the setting of ESRD  · Elevated procalcitonin on 07/08  · Patient remains free of respiratory distress, resting comfortably on room air  · Continue to monitor respiratory status  · See plan below under Sepsis

## 2022-07-10 NOTE — PROGRESS NOTES
Progress Note - Urology  Ellard Kawasaki 1945, 78 y o  male MRN: [id number]    Unit/Bed#: [facility] 809-01 Encounter: [id number]    Right renal mass  · POD # 4 robot assisted laparoscopic radical right nephrectomy   · Surgery complicated by bleeding from nephrectomy bed, small right rectus sheath hematoma and subcutaneous emphysema  · Hgb 8 1 to 7 7, continue to trend   · Appetite good, tolerating clears, no nausea or vomiting   · Expected incision tenderness with palpation  · Urine clear yellow    GI bleed  · GI following  One episode of coffee ground emesis   · Unable to have EGD 7/8 due to hypoxia  To be done on 7/11    RUPAL  · Creatinine 7/7 2 3 trended up to 3 8 now trending down to 3 1  · Nephrology following   · Urine clear yellow, maintain hernández cath     Subjective:   HPI:  Patient states he is feeling much better than yesterday  No pain at rest   Has some tenderness around incision with palpation  Appetite is good and tolerating clear liquids, no nausea or vomiting  States he is passing gas  Had small bowel movement days ago  No issues with hernández catheter overnight, draining clear yellow urine  Objective:  Vitals: Blood pressure 140/63, pulse 64, temperature 98 5 °F (36 9 °C), resp  rate 20, height 6' (1 829 m), weight 107 kg (236 lb), SpO2 93 %  ,Body mass index is 32 01 kg/m²  Physical Exam  Constitutional:       General: He is not in acute distress  Appearance: He is normal weight  He is ill-appearing and diaphoretic  He is not toxic-appearing  Comments: Frail and clammy in appearance   HENT:      Head: Normocephalic and atraumatic  Cardiovascular:      Pulses: Normal pulses  Pulmonary:      Effort: Pulmonary effort is normal  No respiratory distress  Breath sounds: No wheezing, rhonchi or rales  Abdominal:      General: Bowel sounds are normal  There is no distension  Palpations: Abdomen is soft  Tenderness: There is abdominal tenderness        Comments: Surgical incisions closed with staples, intact, dry, no sign of wound dehiscence or underlying infection, bowel sounds present although decreased throughout  Abdomen is soft  Appropriate post op tenderness around incision  Genitourinary:     Comments: Urethral Baird catheter in place draining clear yellow urine  Musculoskeletal:         General: Normal range of motion  Cervical back: Normal range of motion  Skin:     General: Skin is warm  Neurological:      General: No focal deficit present  Mental Status: He is alert and oriented to person, place, and time  Psychiatric:         Mood and Affect: Mood normal          Behavior: Behavior normal          Thought Content:  Thought content normal          Judgment: Judgment normal              Labs:  Recent Labs     07/08/22  0451 07/08/22  1420 07/09/22  0450 07/09/22  1233 07/10/22  0458   WBC 21 98* 17 50* 12 11* 12 92* 7 87       Recent Labs     07/08/22  0451 07/08/22  1420 07/09/22  0450 07/09/22  1233 07/09/22  1915 07/10/22  0002 07/10/22  0458   HGB 10 2* 9 2* 7 6* 8 4* 8 4* 8 1* 7 7*     Recent Labs     07/08/22  0451 07/08/22  1420 07/09/22  0450 07/09/22  1233 07/09/22  1915 07/10/22  0002 07/10/22  0458   HCT 29 8* 28 1* 23 5* 25 4* 25 2* 25 2* 23 3*     Recent Labs     07/08/22  0451 07/08/22  1420 07/09/22  0450 07/10/22  0458   CREATININE 3 55* 3 84* 3 45* 3 12*         History:    Past Medical History:   Diagnosis Date    Diabetes mellitus ([facility] [address] )     Hypertension     TIA (transient ischemic attack)      Social History     Socioeconomic History    Marital status: /Civil Union     Spouse name: None    Number of children: None    Years of education: None    Highest education level: None   Occupational History    Occupation: Retired 2015 -     Tobacco Use    Smoking status: Never Smoker    Smokeless tobacco: Never Used   Vaping Use    Vaping Use: Never used   Substance and Sexual Activity    Alcohol use: Never    Drug use: Never    Sexual activity: None   Other Topics Concern    None   Social History Narrative    Denies drug use - As per [employer]    Consumes on average 1 cup of regular coffee per day      Social Determinants of Health     Financial Resource Strain: Not on file   Food Insecurity: No Food Insecurity    Worried About Running Out of Food in the Last Year: Never true    [place] of Food in the Last Year: Never true   Transportation Needs: No Transportation Needs    Lack of Transportation (Medical): No    Lack of Transportation (Non-Medical):  No   Physical Activity: Not on file   Stress: Not on file   Social Connections: Not on file   Intimate Partner Violence: Not on file   Housing Stability: Low Risk     Unable to Pay for Housing in the Last Year: No    Number of Places Lived in the Last Year: 1    Unstable Housing in the Last Year: No     Past Surgical History:   Procedure Laterality Date    CYSTECTOMY      Per patient cyst removal from his back    LASIK      SD LAP, RADICAL NEPHRECTOMY Right 7/6/2022    Procedure: NEPHRECTOMY RADICAL LAPAROSCOPIC W/ ROBOTICS, poss open;  Surgeon: Virgilio Verdugo MD;  Location: [facility];  Service: Urology     Family History   Problem Relation Age of Onset    Colon cancer Mother     Diabetes type II Mother     Heart disease Mother     Prostate cancer Father        KAYODE Hansen  Date: 7/10/2022 Time: 1:36 PM

## 2022-07-10 NOTE — PROGRESS NOTES
NEPHROLOGY PROGRESS NOTE   Jeanette Gutierrez 78 y o  male MRN: [id number]  Unit/Bed#: [facility] 809-01 Encounter: [id number]        ASSESSMENT & PLAN    70-year-old male with past medical history of CKD, hypertension, type 2 diabetes, chronic lower extremity edema who presented for a right renal mass nephrectomy     1  Acute kidney injury on stage IIIA chronic kidney disease-baseline creatinine is 1 3-1 4 thought to be secondary to diabetic kidney disease and hypertensive nephrosclerosis creatinine did trend up and peaked at 3 8 patient is nonoliguric with intravenous fluid patient's creatinine has now improved down to 3 1 he is tolerating normal saline we will continue    2  Primary hypertension-his stay lisinopril hydrochlorothiazide is on hold no episodes of hypotension     3  Leukocytosis/tachycardia-now on vancomycin and cefepime ongoing workup for sepsis                4  Anemia-status post surgery workup with potential EGD was tachycardic and hypoxic so on hold, concern for upper GI bleed     5  Sinus tachycardia-no episodes of AFib per Cardiology     6  Right renal mass status post radical laparoscopic nephrectomy-ongoing postoperative care      SUBJECTIVE:    Patient was seen today  Denies any chest pain or shortness of breath no fevers or chills    12 point review of systems was otherwise negative besides what is mentioned above      Medications:    Current Facility-Administered Medications:     acetaminophen (TYLENOL) tablet 650 mg, 650 mg, Oral, Q6H [address], George Locke MD, 650 mg at 07/10/22 1126    cefepime (MAXIPIME) 1,000 mg in dextrose 5 % 50 mL IVPB, 1,000 mg, Intravenous, Q12H, Bria Bishop MD, Stopped at 07/10/22 0519    docusate sodium (COLACE) capsule 100 mg, 100 mg, Oral, BID, George Locke MD, 100 mg at 07/10/22 0846    heparin (porcine) subcutaneous injection 5,000 Units, 5,000 Units, Subcutaneous, Q12H [address], Josiah Lobato MD, 5,000 Units at 07/10/22 0219   hydrALAZINE (APRESOLINE) injection 5 mg, 5 mg, Intravenous, Q6H PRN, Lexy Ferrer MD    insulin lispro (HumaLOG) 100 units/mL subcutaneous injection 1-6 Units, 1-6 Units, Subcutaneous, Q6H [address], 1 Units at 07/10/22 1127 **AND** Fingerstick Glucose (POCT), , , Q6H, Duana Boxer, PA-C    neomycin-bacitracin-polymyxin b (NEOSPORIN) ointment 1 small application, 1 small application, Topical, BID, Bryan Ball MD, 1 small application at 34/71/65 0846    ondansetron (ZOFRAN) injection 4 mg, 4 mg, Intravenous, Q6H PRN, Bryan Ball MD, 4 mg at 07/07/22 0106    oxyCODONE (ROXICODONE) IR tablet 2 5 mg, 2 5 mg, Oral, Q4H PRN, Bryan Ball MD    oxyCODONE (ROXICODONE) IR tablet 5 mg, 5 mg, Oral, Q4H PRN, Bryan Ball MD, 5 mg at 07/07/22 0254    pantoprazole (PROTONIX) injection 40 mg, 40 mg, Intravenous, Q12H [address], Von Quiñones DO, 40 mg at 07/10/22 0846    senna (SENOKOT) tablet 8 6 mg, 1 tablet, Oral, HS, Duana Boxer, PA-C, 8 6 mg at 07/09/22 2103    simethicone (MYLICON) chewable tablet 80 mg, 80 mg, Oral, Q6H PRN, Duana Boxer, PA-C    sodium chloride 0 9 % infusion, 50 mL/hr, Intravenous, Continuous, Louis Huggins PA-C, Last Rate: 50 mL/hr at 07/10/22 0519, 50 mL/hr at 07/10/22 0519    OBJECTIVE:    Vitals:    07/10/22 0247 07/10/22 0702 07/10/22 0702 07/10/22 1108   BP: 137/63 141/63 141/63 140/63   Pulse: 64 63 64 64   Resp: 16   20   Temp: 99 3 °F (37 4 °C) 98 1 °F (36 7 °C) 98 1 °F (36 7 °C) 98 5 °F (36 9 °C)   TempSrc:       SpO2: 96% 97% 97% 93%   Weight:       Height:            Temp:  [98 1 °F (36 7 °C)-99 3 °F (37 4 °C)] 98 5 °F (36 9 °C)  HR:  [62-77] 64  Resp:  [16-20] 20  BP: (120-147)/(63-70) 140/63  SpO2:  [93 %-97 %] 93 %     Body mass index is 32 01 kg/m²  Weight (last 2 days)     Date/Time Weight    07/09/22 0711 107 (236)          I/O last 3 completed shifts: In: 2440 9 [P O :420; I V :1370 8;  Blood:350; IV Piggyback:300 1]  Out: 1875 [PEQOU:3434]    I/O this shift:  In: -   Out: 300 [Urine:300]      Physical exam:    General: no acute distress, cooperative  Eyes: conjunctivae pink, anicteric sclerae  ENT: lips and mucous membranes moist, no exudates, normal external ears  Neck: ROM intact, no JVD  Chest: No respiratory distress, no accessory muscle use  CVS: normal rate, non pericardial friction rub  Abdomen: soft, non-tender, non-distended, normoactive bowel sounds  Extremities: no edema of both legs  Skin: no rash  Neuro: awake, alert, oriented, grossly intact  Psych:  Pleasant affect    Invasive Devices:   Urethral Catheter Latex 16 Fr   (Active)   Reasons to continue Urinary Catheter  Post-operative urological requirements 07/10/22 0725   Site Assessment Clean;Skin intact 07/10/22 0725   Baird Care Done 07/10/22 0900   Collection Container Standard drainage bag 07/10/22 0725   Securement Method Securing device (Describe) 07/10/22 0725   Output (mL) 350 mL 07/10/22 0450     Lab Results:   Results from last 7 days   Lab Units 07/10/22  0458 07/10/22  0002 07/09/22  1915 07/09/22  1233 07/09/22  0450 07/08/22  1421 07/08/22  1420 07/08/22  0451 07/07/22  1623 07/07/22  1115   WBC Thousand/uL 7 87  --   --  12 92* 12 11*  --  17 50* 21 98*  --   --    HEMOGLOBIN g/dL 7 7* 8 1* 8 4* 8 4* 7 6*  --  9 2* 10 2*  --   --    HEMATOCRIT % 23 3* 25 2* 25 2* 25 4* 23 5*  --  28 1* 29 8*  --   --    PLATELETS Thousands/uL 142*  --   --  158 146*  --  160 187  --   --    POTASSIUM mmol/L 4 0  --   --   --  4 4  --  4 6 4 8  --  4 7   CHLORIDE mmol/L 112*  --   --   --  111*  --  108 105  --  103   CO2 mmol/L 24  --   --   --  23  --  25 26  --  26   BUN mg/dL 48*  --   --   --  47*  --  45* 43*  --  32*   CREATININE mg/dL 3 12*  --   --   --  3 45*  --  3 84* 3 55*  --  2 95*   CALCIUM mg/dL 7 7*  --   --   --  7 6*  --  8 0* 8 1*  --  8 3   MAGNESIUM mg/dL 1 9  --   --   --   --   --   --   --   --   --    PHOSPHORUS mg/dL 2 4 --   --   --   --   --   --   --   --   --    ALK PHOS U/L 46  --   --   --   --   --   --   --   --   --    ALT U/L 16  --   --   --   --   --   --   --   --   --    AST U/L 16  --   --   --   --   --   --   --   --   --    BLOOD CULTURE   --   --   --   --   --  No Growth at 24 hrs  No Growth at 24 hrs   --   --   --   --    LEUKOCYTES UA   --   --   --   --   --   --   --   --  Small*  --    BLOOD UA   --   --   --   --   --   --   --   --  Small*  --          Portions of the record may have been created with voice recognition software  Occasional wrong word or "sound a like" substitutions may have occurred due to the inherent limitations of voice recognition software  Read the chart carefully and recognize, using context, where substitutions have occurred  If you have any questions, please contact the dictating provider

## 2022-07-10 NOTE — CASE MANAGEMENT
Case Management Discharge Planning Note    Patient name Elvin Tai  Location [address] 809/[facility] 623-88 MRN [id number]  : 1945 Date 7/10/2022       Current Admission Date: 2022  Current Admission Diagnosis:Acute respiratory failure with hypoxia [facility])   Patient Active Problem List    Diagnosis Date Noted    Acute respiratory failure with hypoxia ([facility] ) 2022    GI bleed 2022    Sepsis ([facility] ) 2022    Tachycardia 2022    Renal cell cancer, right ([facility] ) 2022    Hypercalcemia 2022    Stage 3a chronic kidney disease ([place] [address] ) 2022    Kidney cyst, acquired 2022    Chronic pain syndrome 10/05/2020    Chronic right-sided low back pain with right-sided sciatica 2020    Lumbar radiculopathy 2020    Polyarticular arthritis 2019    Class 1 obesity due to excess calories with serious comorbidity and body mass index (BMI) of 32 0 to 32 9 in adult 2019    Essential hypertension 09/10/2019    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 chronic kidney disease, without long-term current use of insulin ([place] [address] ) 09/10/2019    Left-sided weakness 07/10/2019    Cerebral aneurysm, nonruptured 2019    TIA (transient ischemic attack) 2019      LOS (days): 4  Geometric Mean LOS (GMLOS) (days): 2 10  Days to GMLOS:-1 5     OBJECTIVE:  Risk of Unplanned Readmission Score: 17 51         Current admission status: Inpatient   Preferred Pharmacy:   [address] [facility] [address]   [place], [address]  [facility] [address]  Phone: [phone] Fax: [phone]    Primary Care Provider: Fortino Trevino    Primary Insurance: THE [facility]  Secondary Insurance:     DISCHARGE DETAILS:    Discharge planning discussed with[de-identified] Pt  Freedom of Choice: Yes  Comments - Freedom of Choice: Pt was agreeable to STR, no preference at this time, agreed to blanket referrals being sent  CM contacted family/caregiver?: No- see comments (Pt alert and oriented)  Were Treatment Team discharge recommendations reviewed with patient/caregiver?: Yes  Did patient/caregiver verbalize understanding of patient care needs?: Yes  Were patient/caregiver advised of the risks associated with not following Treatment Team discharge recommendations?: Yes                   Other Referral/Resources/Interventions Provided:  Referral Comments: STR blanket referrals sent         Treatment Team Recommendation: Short Term Rehab  Discharge Destination Plan[de-identified] Short Term Rehab

## 2022-07-11 ENCOUNTER — TELEPHONE (OUTPATIENT)
Dept: GASTROENTEROLOGY | Facility: CLINIC | Age: 77
End: 2022-07-11

## 2022-07-11 LAB
ABO GROUP BLD BPU: NORMAL
ABO GROUP BLD BPU: NORMAL
ALBUMIN SERPL BCP-MCNC: 2.2 G/DL (ref 3.5–5)
ALP SERPL-CCNC: 49 U/L (ref 46–116)
ALT SERPL W P-5'-P-CCNC: 15 U/L (ref 12–78)
ANION GAP SERPL CALCULATED.3IONS-SCNC: 8 MMOL/L (ref 4–13)
AST SERPL W P-5'-P-CCNC: 15 U/L (ref 5–45)
BILIRUB SERPL-MCNC: 1.76 MG/DL (ref 0.2–1)
BPU ID: NORMAL
BPU ID: NORMAL
BUN SERPL-MCNC: 43 MG/DL (ref 5–25)
CALCIUM ALBUM COR SERPL-MCNC: 9.4 MG/DL (ref 8.3–10.1)
CALCIUM SERPL-MCNC: 8 MG/DL (ref 8.3–10.1)
CHLORIDE SERPL-SCNC: 111 MMOL/L (ref 100–108)
CO2 SERPL-SCNC: 22 MMOL/L (ref 21–32)
CREAT SERPL-MCNC: 2.57 MG/DL (ref 0.6–1.3)
CROSSMATCH: NORMAL
CROSSMATCH: NORMAL
ERYTHROCYTE [DISTWIDTH] IN BLOOD BY AUTOMATED COUNT: 13 % (ref 11.6–15.1)
GFR SERPL CREATININE-BSD FRML MDRD: 23 ML/MIN/1.73SQ M
GLUCOSE SERPL-MCNC: 129 MG/DL (ref 65–140)
GLUCOSE SERPL-MCNC: 142 MG/DL (ref 65–140)
GLUCOSE SERPL-MCNC: 153 MG/DL (ref 65–140)
GLUCOSE SERPL-MCNC: 168 MG/DL (ref 65–140)
GLUCOSE SERPL-MCNC: 178 MG/DL (ref 65–140)
GLUCOSE SERPL-MCNC: 257 MG/DL (ref 65–140)
HCT VFR BLD AUTO: 25.9 % (ref 36.5–49.3)
HCT VFR BLD AUTO: 26.4 % (ref 36.5–49.3)
HGB BLD-MCNC: 8.5 G/DL (ref 12–17)
HGB BLD-MCNC: 8.8 G/DL (ref 12–17)
MAGNESIUM SERPL-MCNC: 2.1 MG/DL (ref 1.6–2.6)
MCH RBC QN AUTO: 31 PG (ref 26.8–34.3)
MCHC RBC AUTO-ENTMCNC: 32.8 G/DL (ref 31.4–37.4)
MCV RBC AUTO: 95 FL (ref 82–98)
PHOSPHATE SERPL-MCNC: 2 MG/DL (ref 2.3–4.1)
PLATELET # BLD AUTO: 177 THOUSANDS/UL (ref 149–390)
PMV BLD AUTO: 11.1 FL (ref 8.9–12.7)
POTASSIUM SERPL-SCNC: 3.8 MMOL/L (ref 3.5–5.3)
PROT SERPL-MCNC: 5.2 G/DL (ref 6.4–8.2)
RBC # BLD AUTO: 2.74 MILLION/UL (ref 3.88–5.62)
SODIUM SERPL-SCNC: 141 MMOL/L (ref 136–145)
UNIT DISPENSE STATUS: NORMAL
UNIT DISPENSE STATUS: NORMAL
UNIT PRODUCT CODE: NORMAL
UNIT PRODUCT CODE: NORMAL
UNIT PRODUCT VOLUME: 350 ML
UNIT PRODUCT VOLUME: 350 ML
UNIT RH: NORMAL
UNIT RH: NORMAL
WBC # BLD AUTO: 7.33 THOUSAND/UL (ref 4.31–10.16)

## 2022-07-11 PROCEDURE — 83735 ASSAY OF MAGNESIUM: CPT | Performed by: STUDENT IN AN ORGANIZED HEALTH CARE EDUCATION/TRAINING PROGRAM

## 2022-07-11 PROCEDURE — C9113 INJ PANTOPRAZOLE SODIUM, VIA: HCPCS | Performed by: STUDENT IN AN ORGANIZED HEALTH CARE EDUCATION/TRAINING PROGRAM

## 2022-07-11 PROCEDURE — 99232 SBSQ HOSP IP/OBS MODERATE 35: CPT

## 2022-07-11 PROCEDURE — 80053 COMPREHEN METABOLIC PANEL: CPT | Performed by: STUDENT IN AN ORGANIZED HEALTH CARE EDUCATION/TRAINING PROGRAM

## 2022-07-11 PROCEDURE — 84100 ASSAY OF PHOSPHORUS: CPT | Performed by: STUDENT IN AN ORGANIZED HEALTH CARE EDUCATION/TRAINING PROGRAM

## 2022-07-11 PROCEDURE — 82948 REAGENT STRIP/BLOOD GLUCOSE: CPT

## 2022-07-11 PROCEDURE — 85014 HEMATOCRIT: CPT | Performed by: STUDENT IN AN ORGANIZED HEALTH CARE EDUCATION/TRAINING PROGRAM

## 2022-07-11 PROCEDURE — 85018 HEMOGLOBIN: CPT | Performed by: STUDENT IN AN ORGANIZED HEALTH CARE EDUCATION/TRAINING PROGRAM

## 2022-07-11 PROCEDURE — 99232 SBSQ HOSP IP/OBS MODERATE 35: CPT | Performed by: INTERNAL MEDICINE

## 2022-07-11 PROCEDURE — 99024 POSTOP FOLLOW-UP VISIT: CPT | Performed by: PHYSICIAN ASSISTANT

## 2022-07-11 PROCEDURE — 85027 COMPLETE CBC AUTOMATED: CPT | Performed by: STUDENT IN AN ORGANIZED HEALTH CARE EDUCATION/TRAINING PROGRAM

## 2022-07-11 RX ORDER — INSULIN LISPRO 100 [IU]/ML
1-6 INJECTION, SOLUTION INTRAVENOUS; SUBCUTANEOUS
Status: DISCONTINUED | OUTPATIENT
Start: 2022-07-11 | End: 2022-07-18 | Stop reason: HOSPADM

## 2022-07-11 RX ADMIN — PANTOPRAZOLE SODIUM 40 MG: 40 INJECTION, POWDER, FOR SOLUTION INTRAVENOUS at 21:14

## 2022-07-11 RX ADMIN — HEPARIN SODIUM 5000 UNITS: 5000 INJECTION INTRAVENOUS; SUBCUTANEOUS at 21:15

## 2022-07-11 RX ADMIN — INSULIN LISPRO 1 UNITS: 100 INJECTION, SOLUTION INTRAVENOUS; SUBCUTANEOUS at 11:23

## 2022-07-11 RX ADMIN — BACITRACIN ZINC, NEOMYCIN, POLYMYXIN B 1 SMALL APPLICATION: 400; 3.5; 5 OINTMENT TOPICAL at 17:03

## 2022-07-11 RX ADMIN — PANTOPRAZOLE SODIUM 40 MG: 40 INJECTION, POWDER, FOR SOLUTION INTRAVENOUS at 08:14

## 2022-07-11 RX ADMIN — INSULIN LISPRO 1 UNITS: 100 INJECTION, SOLUTION INTRAVENOUS; SUBCUTANEOUS at 16:41

## 2022-07-11 RX ADMIN — CEFEPIME HYDROCHLORIDE 1000 MG: 1 INJECTION, POWDER, FOR SOLUTION INTRAMUSCULAR; INTRAVENOUS at 05:03

## 2022-07-11 RX ADMIN — INSULIN LISPRO 3 UNITS: 100 INJECTION, SOLUTION INTRAVENOUS; SUBCUTANEOUS at 21:15

## 2022-07-11 RX ADMIN — ACETAMINOPHEN 650 MG: 325 TABLET, FILM COATED ORAL at 05:11

## 2022-07-11 RX ADMIN — DOCUSATE SODIUM 100 MG: 100 CAPSULE, LIQUID FILLED ORAL at 08:14

## 2022-07-11 RX ADMIN — ACETAMINOPHEN 650 MG: 325 TABLET, FILM COATED ORAL at 17:03

## 2022-07-11 RX ADMIN — BACITRACIN ZINC, NEOMYCIN, POLYMYXIN B 1 SMALL APPLICATION: 400; 3.5; 5 OINTMENT TOPICAL at 08:14

## 2022-07-11 RX ADMIN — SODIUM CHLORIDE 50 ML/HR: 0.9 INJECTION, SOLUTION INTRAVENOUS at 18:31

## 2022-07-11 RX ADMIN — ACETAMINOPHEN 650 MG: 325 TABLET, FILM COATED ORAL at 11:23

## 2022-07-11 RX ADMIN — DOCUSATE SODIUM 100 MG: 100 CAPSULE, LIQUID FILLED ORAL at 17:03

## 2022-07-11 RX ADMIN — ACETAMINOPHEN 650 MG: 325 TABLET, FILM COATED ORAL at 23:01

## 2022-07-11 RX ADMIN — SENNOSIDES 8.6 MG: 8.6 TABLET, FILM COATED ORAL at 21:15

## 2022-07-11 RX ADMIN — HEPARIN SODIUM 5000 UNITS: 5000 INJECTION INTRAVENOUS; SUBCUTANEOUS at 08:14

## 2022-07-11 NOTE — CASE MANAGEMENT
Case Management Discharge Planning Note    Patient name Melva Aguilar  Location [address] [place] [address] [facility] [id number]  : 1945 Date 2022       Current Admission Date: 2022  Current Admission Diagnosis:Acute respiratory failure with hypoxia [facility])   Patient Active Problem List    Diagnosis Date Noted    Acute respiratory failure with hypoxia ([facility] ) 2022    GI bleed 2022    Sepsis ([facility] ) 2022    Tachycardia 2022    Renal cell cancer, right ([facility] ) 2022    Hypercalcemia 2022    Stage 3a chronic kidney disease ([facility] ) 2022    Kidney cyst, acquired 2022    Chronic pain syndrome 10/05/2020    Chronic right-sided low back pain with right-sided sciatica 2020    Lumbar radiculopathy 2020    Polyarticular arthritis 2019    Class 1 obesity due to excess calories with serious comorbidity and body mass index (BMI) of 32 0 to 32 9 in adult 2019    Essential hypertension 09/10/2019    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 chronic kidney disease, without long-term current use of insulin [facility] ) 09/10/2019    Left-sided weakness 07/10/2019    Cerebral aneurysm, nonruptured 2019    TIA (transient ischemic attack) 2019      LOS (days): 5  Geometric Mean LOS (GMLOS) (days): 2 10  Days to GMLOS:-2 5     OBJECTIVE:  Risk of Unplanned Readmission Score: 17 58         Current admission status: Inpatient   Preferred Pharmacy:   [facility]  [address] [place] [address]  Phone: [phone] Fax: [phone]    Primary Care Provider: Griffin Canavan    Primary Insurance: THE [facility]  Secondary Insurance:     DISCHARGE DETAILS:       IMM Given (Date):: 22  IMM Given to[de-identified] Patient

## 2022-07-11 NOTE — QUICK NOTE
After further discussion with Urology team will hold off on EGD tomorrow as patients repeat hemoglobin in the afternoon was stable and he has reported no further episodes of vomiting since Thursday with no overt bleeding rectally  Will arrange outpt follow up to discuss possible endoscopic intervention  GI will be available if clinical status changes or there are signs of significant bleeding  Please reach out to us with questions or concern

## 2022-07-11 NOTE — PROGRESS NOTES
[address] [facility]  Progress Note - Deja Canela 1945, 78 y o  male MRN: [id number]  Unit/Bed#: [facility] 809-01 Encounter: [id number]  Primary Care Provider: KAYODE Ochoa   Date and time admitted to hospital: 7/6/2022 11:12 AM    Renal cell cancer, right [facility])  Assessment & Plan  · Patient is s/p right nephrectomy POD#5  · Urology Primary Team; continue management  · PENDING pathology collected on 07/06    Sepsis [facility])  Assessment & Plan  "Patient s/p right nephrectomy developed tachycardia, worsening leukocytosis, hypoxia with no clear source of infection at this time  · Procalcitonin: 1 21-> May be elevated in the setting of renal dysfunction  · Continue to monitor periodically  · lactic acid: Negative  · UA small leukocyte, pyuria 7/7/22  · TSH measured within reference range  · COVID/RSV/Influenza NEGATIVE  · Blood culture collected on 07/08 showed NGTD  · MRSA cultures collected on 07/08 NEGATIVE     · Urinalysis on 07/07 suggestive of acute cystitis with hematuria  · Continue Cefepime (D4)  · Patient remains afebrile with improved Leukocytosis  · Can likely D/C Abx tomorrow  · Continue to monitor CBC/ Fever curve    * Acute respiratory failure with hypoxia (HCC)  Assessment & Plan  Resolved  "-Patient developed tachycardia and hypoxia prior to undergoing EGD to evaluate upper GI bleed  -Endoscopy canceled due to hypoxia, pox  89% RA    -clinically patient does not appear volume overload, no wheezing, able to speak in full sentences"    · CXR on 07/08 identified small atelectasis  · V/Q scan showed low probability for PE  · With mildly elevated troponin likely elevated due to reduced renal clearance in the setting of ESRD  · Elevated procalcitonin on 07/08  · Patient remains free of respiratory distress, resting comfortably on room air  · Continue to monitor respiratory status  · See plan below under Sepsis    GI bleed  Assessment & Plan  Patient with an episode of coffee-ground emesis evaluated by GI was to undergo upper endoscopy this morning, procedure canceled due to hypoxia and tachycardia  · Currently hemodynamically stable  · Hgb remains stable  · C/w PPI  · Gastroenterology consulted; appreciate assistance  · Will hold off on inpatient evaluation at this time  If emesis re-occurs can re-evaluate EGD inpatient       Stage 3a chronic kidney disease [facility])  Assessment & Plan  Lab Results   Component Value Date    EGFR 18 07/10/2022    EGFR 16 07/09/2022    EGFR 14 07/08/2022    CREATININE 3 12 (H) 07/10/2022    CREATININE 3 45 (H) 07/09/2022    CREATININE 3 84 (H) 07/08/2022     · s/p right nephrectomy  · Nephrology consulted; appreciate assistance  · C/w Light IVF  · Avoid/Limit nephrotoxins and IV Contrast  · Avoid hypotension and fluctuations in BP  · Continue to monitor renal function while admitted    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 chronic kidney disease, without long-term current use of insulin [facility])  Assessment & Plan  Lab Results   Component Value Date    HGBA1C 7 2 (A) 05/16/2022       Recent Labs     07/09/22  2355 07/10/22  0615 07/10/22  1058 07/10/22  1612   POCGLU 165* 140 179* 185*       Blood Sugar Average: Last 72 hrs:  (P) 323 [id number]     · Hold Home medication->Metformin while admitted  · Hyperglycemia relatively well controlled  · C/w Accu-checks and SSI AC/HS  · Continue Diabetic Diet and Hypoglycemic protocol    Tachycardia  Assessment & Plan  · Resolved  · Likely secondary to sepsis with physiological response to hypotension  · TTE performed on 07/09 showed an EF of 55% with grade 1 diastolic dysfunction  · See plans above    Essential hypertension  Assessment & Plan  · BP reviewed and remains stable  · Continue home medication:  · Lopressor 25 mg BID  · lisinopril and HCTZ held due to prior hypotension and RUPAL  · Ordered PRN IV Hydralazine  · Continue to monitor         VTE Pharmacologic Prophylaxis: VTE Score: 7 High Risk (Score >/= 5) - Pharmacological DVT Prophylaxis Ordered: heparin  Sequential Compression Devices Ordered  Patient Centered Rounds: I performed bedside rounds with nursing staff today  Discussions with Specialists or Other Care Team Provider: Nephrology    Education and Discussions with Family / Patient: Patient declined call to   Time Spent for Care: 30 minutes  More than 50% of total time spent on counseling and coordination of care as described above  Current Length of Stay: 5 day(s)  Current Patient Status: Inpatient   Certification Statement: The patient will continue to require additional inpatient hospital stay due to sepsis requiring IV antibiotics, RUPAL on CKD requring IV hydration and nephrology consultation, and anemia requiring close monitoring  Discharge Plan: [facility] is following this patient on consult  They are not yet medically stable for discharge secondary to sepsis requriing IV antibiotics, renal dysfunction requiring IV hydration and nephrology consultation and anemia requiring close monitoring  Code Status: Level 1 - Full Code    Subjective:   Patient stated he feels very tired today  Patient denies any chest pain or worsening shortness of breath  Patient stated he has some mild abdominal pain  Patient denies any nausea or vomiting  Objective:     Vitals:   Temp (24hrs), Av 7 °F (37 1 °C), Min:98 °F (36 7 °C), Max:99 5 °F (37 5 °C)    Temp:  [98 °F (36 7 °C)-99 5 °F (37 5 °C)] 98 °F (36 7 °C)  HR:  [64-82] 82  Resp:  [16-20] 16  BP: (142-149)/(63-66) 149/66  SpO2:  [95 %-97 %] 96 %  Body mass index is 32 01 kg/m²  Input and Output Summary (last 24 hours): Intake/Output Summary (Last 24 hours) at 2022 1314  Last data filed at 2022 1100  Gross per 24 hour   Intake 1826 67 ml   Output 2225 ml   Net -398 33 ml       Physical Exam:   Physical Exam  Vitals and nursing note reviewed  Constitutional:       General: He is not in acute distress       Appearance: He is obese  HENT:      Head: Normocephalic  Nose: Nose normal  No congestion  Mouth/Throat:      Mouth: Mucous membranes are dry  Pharynx: Oropharynx is clear  Cardiovascular:      Rate and Rhythm: Normal rate and regular rhythm  Pulses: Normal pulses  Heart sounds: Normal heart sounds  No murmur heard  Pulmonary:      Effort: Pulmonary effort is normal  No respiratory distress  Breath sounds: Decreased breath sounds present  Abdominal:      General: Bowel sounds are normal  There is no distension  Palpations: Abdomen is soft  Tenderness: There is abdominal tenderness  Comments: Abd incisions, [place] CDI   Musculoskeletal:         General: Swelling (B/L UE (R>L)) present  Cervical back: Normal range of motion  Skin:     General: Skin is warm and dry  Capillary Refill: Capillary refill takes less than 2 seconds  Neurological:      Mental Status: He is alert and oriented to person, place, and time  Mental status is at baseline  Motor: Weakness (Generalized) present  Psychiatric:         Mood and Affect: Affect is flat  Speech: Speech normal          Behavior: Behavior is cooperative  Judgment: Judgment normal           Additional Data:     Labs:  Results from last 7 days   Lab Units 07/11/22  0456 07/10/22  1406 07/10/22  0458   WBC Thousand/uL 7 33  --  7 87   HEMOGLOBIN g/dL 8 5*   < > 7 7*   HEMATOCRIT % 25 9*   < > 23 3*   PLATELETS Thousands/uL 177  --  142*   NEUTROS PCT %  --   --  66   LYMPHS PCT %  --   --  16   MONOS PCT %  --   --  12   EOS PCT %  --   --  5    < > = values in this interval not displayed       Results from last 7 days   Lab Units 07/11/22  0456   SODIUM mmol/L 141   POTASSIUM mmol/L 3 8   CHLORIDE mmol/L 111*   CO2 mmol/L 22   BUN mg/dL 43*   CREATININE mg/dL 2 57*   ANION GAP mmol/L 8   CALCIUM mg/dL 8 0*   ALBUMIN g/dL 2 2*   TOTAL BILIRUBIN mg/dL 1 76*   ALK PHOS U/L 49   ALT U/L 15   AST U/L 15 GLUCOSE RANDOM mg/dL 129         Results from last 7 days   Lab Units 07/11/22  1136 07/11/22  0713 07/11/22  0511 07/10/22  2303 07/10/22  1612 07/10/22  1058 07/10/22  0615 07/09/22  2355 07/09/22  1555 07/09/22  1057 07/09/22  0546 07/09/22  0021   POC GLUCOSE mg/dl 168* 153* 142* 144* 185* 179* 140 165* 151* 199* 168* 174*         Results from last 7 days   Lab Units 07/09/22  0017 07/08/22  1420   LACTIC ACID mmol/L 1 5  --    PROCALCITONIN ng/ml  --  1 21*       Lines/Drains:  Invasive Devices  Report    Peripheral Intravenous Line  Duration           Peripheral IV 07/10/22 Left;Ventral (anterior) Forearm <1 day          Drain  Duration           Urethral Catheter Latex 16 Fr  4 days              Urinary Catheter:  Goal for removal: Voiding trial when ambulation improves               Imaging: Reviewed radiology reports from this admission including: abdominal/pelvic CT    Recent Cultures (last 7 days):   Results from last 7 days   Lab Units 07/08/22  1421   BLOOD CULTURE  No Growth at 48 hrs  No Growth at 48 hrs         Last 24 Hours Medication List:   Current Facility-Administered Medications   Medication Dose Route Frequency Provider Last Rate    acetaminophen  650 mg Oral Q6H [address] Leslie Stevenson MD      cefepime  1,000 mg Intravenous Q12H Nazia Mandujano MD Stopped (07/11/22 0533)    docusate sodium  100 mg Oral BID Leslie Stevenson MD      heparin (porcine)  5,000 Units Subcutaneous Q12H [address] Ban Tee MD      hydrALAZINE  5 mg Intravenous Q6H PRN Ban Tee MD      insulin lispro  1-6 Units Subcutaneous TID AC Henery Innocent, CRNP      insulin lispro  1-6 Units Subcutaneous HS Henery Innocent, CRNP      neomycin-bacitracin-polymyxin b  1 small application Topical BID Leslie Stevenson MD      ondansetron  4 mg Intravenous Q6H PRN Leslie Stevenson MD      oxyCODONE  2 5 mg Oral Q4H PRN Leslie Stevenson MD      oxyCODONE  5 mg Oral Q4H PRN Hattie Lacey Clotilde Danielle MD      pantoprazole  40 mg Intravenous Q12H [facility] Alma Berg DO      senna  1 tablet Oral HS Emily Jerez PA-C      simethicone  80 mg Oral Q6H PRN Emily Jerez PA-C      sodium chloride  50 mL/hr Intravenous Continuous Daniel SASHA Ruiz 50 mL/hr (07/11/22 0533)        Today, Patient Was Seen By: Lieutenant Hatchet, CRNP    **Please Note: This note may have been constructed using a voice recognition system  **

## 2022-07-11 NOTE — TELEPHONE ENCOUNTER
----- Message from Saray Harrell MA sent at 7/11/2022  6:58 AM EDT -----    ----- Message -----  From: Roya Nolan MD  Sent: 7/10/2022   8:10 PM EDT  To: , #    Please arrange outpt f/o withiin 1-2 months for hospital f/u for anemia  Thank you

## 2022-07-11 NOTE — PLAN OF CARE
Problem: PAIN - ADULT  Goal: Verbalizes/displays adequate comfort level or baseline comfort level  Description: Interventions:  - Encourage patient to monitor pain and request assistance  - Assess pain using appropriate pain scale  - Administer analgesics based on type and severity of pain and evaluate response  - Implement non-pharmacological measures as appropriate and evaluate response  - Consider cultural and social influences on pain and pain management  - Notify physician/advanced practitioner if interventions unsuccessful or patient reports new pain  Outcome: Progressing     Problem: INFECTION - ADULT  Goal: Absence or prevention of progression during hospitalization  Description: INTERVENTIONS:  - Assess and monitor for signs and symptoms of infection  - Monitor lab/diagnostic results  - Monitor all insertion sites, i e  indwelling lines, tubes, and drains  - Monitor endotracheal if appropriate and nasal secretions for changes in amount and color  - [place] appropriate cooling/warming therapies per order  - Administer medications as ordered  - Instruct and encourage patient and family to use good hand hygiene technique  - Identify and instruct in appropriate isolation precautions for identified infection/condition  Outcome: Progressing     Problem: DISCHARGE PLANNING  Goal: Discharge to home or other facility with appropriate resources  Description: INTERVENTIONS:  - Identify barriers to discharge w/patient and caregiver  - Arrange for needed discharge resources and transportation as appropriate  - Identify discharge learning needs (meds, wound care, etc )  - Arrange for interpretive services to assist at discharge as needed  - Refer to Case Management Department for coordinating discharge planning if the patient needs post-hospital services based on physician/advanced practitioner order or complex needs related to functional status, cognitive ability, or social support system  Outcome: Progressing Problem: Knowledge Deficit  Goal: Patient/family/caregiver demonstrates understanding of disease process, treatment plan, medications, and discharge instructions  Description: Complete learning assessment and assess knowledge base    Interventions:  - Provide teaching at level of understanding  - Provide teaching via preferred learning methods  Outcome: Progressing     Problem: Prexisting or High Potential for Compromised Skin Integrity  Goal: Skin integrity is maintained or improved  Description: INTERVENTIONS:  - Identify patients at risk for skin breakdown  - Assess and monitor skin integrity  - Assess and monitor nutrition and hydration status  - Monitor labs   - Assess for incontinence   - Turn and reposition patient  - Assist with mobility/ambulation  - Relieve pressure over bony prominences  - Avoid friction and shearing  - Provide appropriate hygiene as needed including keeping skin clean and dry  - Evaluate need for skin moisturizer/barrier cream  - Collaborate with interdisciplinary team   - Patient/family teaching  - Consider wound care consult   Outcome: Progressing     Problem: GENITOURINARY - ADULT  Goal: Absence of urinary retention  Description: INTERVENTIONS:  - Assess patients ability to void and empty bladder  - Monitor I/O  - Bladder scan as needed  - Discuss with physician/AP medications to alleviate retention as needed  - Discuss catheterization for long term situations as appropriate  Outcome: Progressing  Goal: Urinary catheter remains patent  Description: INTERVENTIONS:  - Assess patency of urinary catheter  - If patient has a chronic hernández, consider changing catheter if non-functioning  - Follow guidelines for intermittent irrigation of non-functioning urinary catheter  Outcome: Progressing     Problem: METABOLIC, FLUID AND ELECTROLYTES - ADULT  Goal: Electrolytes maintained within normal limits  Description: INTERVENTIONS:  - Monitor labs and assess patient for signs and symptoms of electrolyte imbalances  - Administer electrolyte replacement as ordered  - Monitor response to electrolyte replacements, including repeat lab results as appropriate  - Instruct patient on fluid and nutrition as appropriate  Outcome: Progressing     Problem: SKIN/TISSUE INTEGRITY - ADULT  Goal: Incision(s), wounds(s) or drain site(s) healing without S/S of infection  Description: INTERVENTIONS  - Assess and document dressing, incision, wound bed, drain sites and surrounding tissue  - Provide patient and family education  Outcome: Progressing

## 2022-07-11 NOTE — PROGRESS NOTES
Spiritual Care Progress Note    2022  Patient: Milly Cedillo : 1945  Admission Date & Time: 2022 1112  MRN: [id number] CSN: [id number]       visited patient during unit rounds   facilitated life review, explored meaningful events in patient's life, explored theological concerns and beliefs, and provided prayer per pt request  Patient described himself as [employer] and stated, "My john is stronger now than it's ever been " Patient noted his wife and son as support systems  Patient showed signs of fatigue but was engaged in conversation and expressed gratitude for  presence  Spiritual care will remain available  Chaplaincy Interventions Utilized:   Empowerment: Provided chaplaincy education    Exploration: Explored spiritual needs & resources and Facilitated life review    Relationship Building: Cultivated a relationship of care and support and Listened empathically    Ritual: Provided prayer      Chaplaincy Outcomes Achieved:  Expressed gratitude and Verbally processed emotions      Spiritual Coping Strategies Utilized:   Spiritual empowerment     [address] (general)   Spiritual Beliefs/Perceptions   Concept of God Accepting   God's Role in Disease Natural   Relationship with God Close   Spiritual Strengths Patient identified strong john   Support Systems Spouse/significant other; Son   Coping Responses   Patient Coping Accepting;Open/discussion   Plan of Care   Assessment Completed by: Unit visit

## 2022-07-11 NOTE — TELEPHONE ENCOUNTER
Left message for patient to call back and schedule a follow up appt  Spoke with pt. Pt aware. Dr. Downs is out of the office until next week. Pt declined a sooner appt with another provider. Pt will wait to scheduled appointment with Dr. Downs to further discuss her concerns.

## 2022-07-11 NOTE — PROGRESS NOTES
NEPHROLOGY PROGRESS NOTE    Melva Aguilar 78 y o  male MRN: [id number]  Unit/Bed#: [facility] 809-01 Encounter: [id number]  Reason for Consult:  Acute on chronic kidney disease    Patient is awake and alert says he still having some discomfort in his right lower quadrant of his abdomen but was able to eat today for his 1st meal   He also feels he is slowly getting stronger  ASSESSMENT/PLAN:  1  Renal    Patient has mild renal insufficiency baseline creatinine was 1 4 prior to his nephrectomy  The patient underwent nephrectomy developed acute kidney injury at this point creatinine is starting to decline on a daily basis and today is 2 5  There is no metabolic acidosis and potassium is 3 8  Hopefully will see his creatinine declined closer to his prior baseline but there was some element of nephrosclerosis preoperatively  In time remnant nephrons can hyper filter and enlarged slightly so may see improvement of renal function for some time after his surgery  On supportive care with IV fluids  Continuing to hold lisinopril HCTZ  Continue slow IV fluids until eating adequately  BMP a m     2  Status post right radical nephrectomy done laparoscopically  Postop care per Urology  SUBJECTIVE:  Review of Systems   Constitutional: Positive for malaise/fatigue  Negative for chills, diaphoresis and fever  Told me that he had his 1st renal meal today  HENT: Negative  Eyes: Negative  Cardiovascular: Negative  Negative for chest pain, dyspnea on exertion, orthopnea and palpitations  Respiratory: Negative  Negative for cough, shortness of breath, sputum production and wheezing  Gastrointestinal: Negative for diarrhea, nausea and vomiting  Mild right-sided lower abdominal pain  Genitourinary: Baird catheter in  Neurological: Negative for dizziness, focal weakness, headaches and weakness     Psychiatric/Behavioral: Negative for altered mental status, depression, hallucinations and hypervigilance  OBJECTIVE:  Current Weight: Weight - Scale: 107 kg (236 lb)  Vitals:Temp (24hrs), Av 7 °F (37 1 °C), Min:98 °F (36 7 °C), Max:99 5 °F (37 5 °C)  Current: Temperature: 98 °F (36 7 °C)   Blood pressure 149/66, pulse 82, temperature 98 °F (36 7 °C), temperature source Oral, resp  rate 16, height 6' (1 829 m), weight 107 kg (236 lb), SpO2 96 %  , Body mass index is 32 01 kg/m²  Intake/Output Summary (Last 24 hours) at 2022 1059  Last data filed at 2022 0533  Gross per 24 hour   Intake 1826 67 ml   Output 1825 ml   Net 1 67 ml       Physical Exam: /66 (BP Location: Right arm)   Pulse 82   Temp 98 °F (36 7 °C) (Oral)   Resp 16   Ht 6' (1 829 m)   Wt 107 kg (236 lb)   SpO2 96%   BMI 32 01 kg/m²   Physical Exam  Constitutional:       General: He is not in acute distress  Appearance: He is not toxic-appearing or diaphoretic  HENT:      Head: Normocephalic and atraumatic  Mouth/Throat:      Mouth: Mucous membranes are dry  Eyes:      General: No scleral icterus  Extraocular Movements: Extraocular movements intact  Cardiovascular:      Rate and Rhythm: Normal rate and regular rhythm  Heart sounds: No friction rub  No gallop  Comments: Positive edema  Pulmonary:      Effort: Pulmonary effort is normal  No respiratory distress  Breath sounds: No wheezing, rhonchi or rales  Abdominal:      General: Bowel sounds are normal  There is no distension  Palpations: Abdomen is soft  Tenderness: There is no rebound  Comments: Mild tenderness right lower quadrant  Musculoskeletal:      Cervical back: Normal range of motion and neck supple  Neurological:      General: No focal deficit present  Mental Status: He is alert and oriented to person, place, and time  Mental status is at baseline  Psychiatric:         Mood and Affect: Mood normal          Behavior: Behavior normal          Thought Content:  Thought content normal  Judgment: Judgment normal          Medications:    Current Facility-Administered Medications:     acetaminophen (TYLENOL) tablet 650 mg, 650 mg, Oral, Q6H [address], Monse Luna MD, 650 mg at 22 0511    cefepime (MAXIPIME) 1,000 mg in dextrose 5 % 50 mL IVPB, 1,000 mg, Intravenous, Q12H, Hardy Alonzo MD, Stopped at 22 0533    docusate sodium (COLACE) capsule 100 mg, 100 mg, Oral, BID, Monse Luna MD, 100 mg at 22 0814    heparin (porcine) subcutaneous injection 5,000 Units, 5,000 Units, Subcutaneous, Q12H [address], Baljeet Avitia MD, 5,000 Units at 22 0814    hydrALAZINE (APRESOLINE) injection 5 mg, 5 mg, Intravenous, Q6H PRN, Baljeet Avitia MD    insulin lispro (HumaLOG) 100 units/mL subcutaneous injection 1-6 Units, 1-6 Units, Subcutaneous, Q6H [address], 1 Units at 07/10/22 1707 **AND** Fingerstick Glucose (POCT), , , Q6H, Uri Chavez PA-C    neomycin-bacitracin-polymyxin b (NEOSPORIN) ointment 1 small application, 1 small application, Topical, BID, Monse Luna MD, 1 small application at  0814    ondansetron (ZOFRAN) injection 4 mg, 4 mg, Intravenous, Q6H PRN, Monse Luna MD, 4 mg at 22 0106    oxyCODONE (ROXICODONE) IR tablet 2 5 mg, 2 5 mg, Oral, Q4H PRN, Monse Luna MD    oxyCODONE (ROXICODONE) IR tablet 5 mg, 5 mg, Oral, Q4H PRN, Monse Luna MD, 5 mg at 22 0254    pantoprazole (PROTONIX) injection 40 mg, 40 mg, Intravenous, Q12H [address], Kaiden Berg DO, 40 mg at 22 0814    senna (SENOKOT) tablet 8 6 mg, 1 tablet, Oral, HS, [facility] [employer], PA-C, 8 6 mg at 07/10/22 2221    simethicone (MYLICON) chewable tablet 80 mg, 80 mg, Oral, Q6H PRN, Don[facility]ell [employer], PA-C    sodium chloride 0 9 % infusion, 50 mL/hr, Intravenous, Continuous, Prabhakar Rowe PA-C, Last Rate: 50 mL/hr at 22 0533, 50 mL/hr at 22 0533    Laboratory Results:  Lab Results   Component Value Date    WBC 7 33 07/11/2022    HGB 8 5 (L) 07/11/2022    HCT 25 9 (L) 07/11/2022    MCV 95 07/11/2022     07/11/2022     Lab Results   Component Value Date    SODIUM 141 07/11/2022    K 3 8 07/11/2022     (H) 07/11/2022    CO2 22 07/11/2022    BUN 43 (H) 07/11/2022    CREATININE 2 57 (H) 07/11/2022    GLUC 129 07/11/2022    CALCIUM 8 0 (L) 07/11/2022     Lab Results   Component Value Date    CALCIUM 8 0 (L) 07/11/2022    PHOS 2 0 (L) 07/11/2022     No results found for: LABPROT

## 2022-07-11 NOTE — PROGRESS NOTES
Progress Note - Urology  Tej Szymanski 1945, 78 y o  male MRN: [id number]    Unit/Bed#: [facility] 809-01 Encounter: [id number]    Assessment & Plan  Robot assisted laparoscopic right radical nephrectomy 7/6/22  Nephrectomy bed hematoma  Blood loss anemia- recovered after PRBCs over weekend, H/H stable 8 5/25 9  Pleural effusion- good oxygen saturation on room air without dyspnea  Coffee ground emesis- resolved, continue PPI  Diabetes- stable, continue SSI and diabetic diet  RUPAL on CKD (cr 2 5 today down from peak 3 8)    H&H stable, one more serial q12h check this evening, then resume daily cbc  Full diet  Flatus and multiple nonbloody/nontarry BMs  Incentive spirometry  DVT prophylaxis heparin and SCDs  PT/OT  Case mgmt consultation for rehab placement, anticipate ready for discharge by 7/13  UOP just over 1 liter/day  Cr improving, keep hernández one more day to ensure continues to improve, then remove prior to discharge  Staple removal and pathology review @ 2 weeks        Subjective: reports 0 pain  ATC tylenol working well  Eating solids no nausea or vomiting  Urine clear via hernández  Breathing easy  No fevers  Not OOB/walking yet  Review of Systems   Constitutional: Positive for activity change and appetite change  Negative for chills, fatigue, fever and unexpected weight change  HENT: Negative  Respiratory: Negative  Negative for cough and shortness of breath  Cardiovascular: Negative  Negative for chest pain, palpitations and leg swelling  Gastrointestinal: Negative for abdominal pain, diarrhea, nausea and vomiting  Endocrine: Negative  Genitourinary: Negative for decreased urine volume, difficulty urinating, dysuria, flank pain, frequency, hematuria, testicular pain and urgency  Musculoskeletal: Negative for back pain and gait problem  Skin: Negative  Allergic/Immunologic: Negative  Neurological: Negative  Hematological: Negative for adenopathy  Does not bruise/bleed easily  Objective:  Vitals: Blood pressure 149/66, pulse 82, temperature 98 °F (36 7 °C), temperature source Oral, resp  rate 16, height 6' (1 829 m), weight 107 kg (236 lb), SpO2 96 %  ,Body mass index is 32 01 kg/m²  Intake/Output Summary (Last 24 hours) at 7/11/2022 1434  Last data filed at 7/11/2022 1100  Gross per 24 hour   Intake 1826 67 ml   Output 1825 ml   Net 1 67 ml     Invasive Devices  Report    Peripheral Intravenous Line  Duration           Peripheral IV 07/10/22 Left;Ventral (anterior) Forearm <1 day          Drain  Duration           Urethral Catheter Latex 16 Fr  5 days                Physical Exam  Vitals and nursing note reviewed  Constitutional:       Appearance: He is well-developed  He is obese  He is not ill-appearing or diaphoretic  HENT:      Head: Normocephalic and atraumatic  Cardiovascular:      Rate and Rhythm: Normal rate and regular rhythm  Heart sounds: Normal heart sounds  No murmur heard  Pulmonary:      Effort: Pulmonary effort is normal       Breath sounds: Normal breath sounds  Abdominal:      General: Abdomen is flat  Bowel sounds are normal  There is no distension  Palpations: Abdomen is soft  Tenderness: There is no abdominal tenderness  Comments: Surgical incisions RUQ clean and dry, staples intact  No drain  Genitourinary:     Penis: Normal        Testes: Normal       Comments: Baird per urethra draining clear yellow urine  Musculoskeletal:         General: Normal range of motion  Right lower leg: No edema  Left lower leg: No edema  Skin:     General: Skin is warm and dry  Capillary Refill: Capillary refill takes less than 2 seconds  Coloration: Skin is not pale  Neurological:      General: No focal deficit present  Mental Status: He is alert and oriented to person, place, and time     Psychiatric:         Mood and Affect: Mood normal          Labs:  Recent Labs     07/09/22  0450 07/09/22  1233 07/10/22  0458 07/11/22  0456   WBC 12 11* 12 92* 7 87 7 33     Recent Labs     07/09/22  0450 07/09/22  1233 07/09/22  1915 07/10/22  0002 07/10/22  0458 07/10/22  1406 07/10/22  2209 07/11/22  0456   HGB 7 6* 8 4* 8 4* 8 1* 7 7* 8 1* 8 4* 8 5*       Recent Labs     07/09/22  0450 07/10/22  0458 07/11/22  0456   CREATININE 3 45* 3 12* 2 57*       History:    Past Medical History:   Diagnosis Date    Diabetes mellitus ([address] )     Hypertension     TIA (transient ischemic attack)      Past Surgical History:   Procedure Laterality Date    CYSTECTOMY      Per patient cyst removal from his back    LASIK      ME LAP, RADICAL NEPHRECTOMY Right 7/6/2022    Procedure: NEPHRECTOMY RADICAL LAPAROSCOPIC W/ ROBOTICS, poss open;  Surgeon: Franca Buchanan MD;  Location: [facility];  Service: Urology     Family History   Problem Relation Age of Onset    Colon cancer Mother     Diabetes type II Mother     Heart disease Mother     Prostate cancer Father      Social History     Socioeconomic History    Marital status: /Civil Union     Spouse name: None    Number of children: None    Years of education: None    Highest education level: None   Occupational History    Occupation: Retired 2015 -     Tobacco Use    Smoking status: Never Smoker    Smokeless tobacco: Never Used   Vaping Use    Vaping Use: Never used   Substance and Sexual Activity    Alcohol use: Never    Drug use: Never    Sexual activity: None   Other Topics Concern    None   Social History Narrative    Denies drug use - As per [address]    Consumes on average 1 cup of regular coffee per day      Social Determinants of Health     Financial Resource Strain: Not on file   Food Insecurity: No Food Insecurity    Worried About Running Out of Food in the Last Year: Never true    [place] of Food in the Last Year: Never true   Transportation Needs: No Transportation Needs    Lack of Transportation (Medical):  No    Lack of Transportation (Non-Medical):  No   Physical Activity: Not on file   Stress: Not on file   Social Connections: Not on file   Intimate Partner Violence: Not on file   Housing Stability: Low Risk     Unable to Pay for Housing in the Last Year: No    Number of Places Lived in the Last Year: 1    Unstable Housing in the Last Year: No         Jose Angel Friend  Date: 7/11/2022 Time: 2:34 PM

## 2022-07-12 PROBLEM — R65.10 SIRS (SYSTEMIC INFLAMMATORY RESPONSE SYNDROME) (HCC): Status: ACTIVE | Noted: 2022-07-08

## 2022-07-12 PROBLEM — N28.89 RIGHT RENAL MASS: Status: ACTIVE | Noted: 2022-07-06

## 2022-07-12 LAB
ANION GAP SERPL CALCULATED.3IONS-SCNC: 6 MMOL/L (ref 4–13)
ATRIAL RATE: 124 BPM
BASOPHILS # BLD AUTO: 0.03 THOUSANDS/ΜL (ref 0–0.1)
BASOPHILS NFR BLD AUTO: 0 % (ref 0–1)
BUN SERPL-MCNC: 43 MG/DL (ref 5–25)
CALCIUM SERPL-MCNC: 8 MG/DL (ref 8.3–10.1)
CHLORIDE SERPL-SCNC: 109 MMOL/L (ref 100–108)
CO2 SERPL-SCNC: 24 MMOL/L (ref 21–32)
CREAT SERPL-MCNC: 2.42 MG/DL (ref 0.6–1.3)
EOSINOPHIL # BLD AUTO: 0.31 THOUSAND/ΜL (ref 0–0.61)
EOSINOPHIL NFR BLD AUTO: 4 % (ref 0–6)
ERYTHROCYTE [DISTWIDTH] IN BLOOD BY AUTOMATED COUNT: 12.9 % (ref 11.6–15.1)
GFR SERPL CREATININE-BSD FRML MDRD: 25 ML/MIN/1.73SQ M
GLUCOSE SERPL-MCNC: 143 MG/DL (ref 65–140)
GLUCOSE SERPL-MCNC: 161 MG/DL (ref 65–140)
GLUCOSE SERPL-MCNC: 188 MG/DL (ref 65–140)
GLUCOSE SERPL-MCNC: 188 MG/DL (ref 65–140)
GLUCOSE SERPL-MCNC: 202 MG/DL (ref 65–140)
HCT VFR BLD AUTO: 25.9 % (ref 36.5–49.3)
HGB BLD-MCNC: 8.7 G/DL (ref 12–17)
IMM GRANULOCYTES # BLD AUTO: 0.14 THOUSAND/UL (ref 0–0.2)
IMM GRANULOCYTES NFR BLD AUTO: 2 % (ref 0–2)
LYMPHOCYTES # BLD AUTO: 1.02 THOUSANDS/ΜL (ref 0.6–4.47)
LYMPHOCYTES NFR BLD AUTO: 12 % (ref 14–44)
MCH RBC QN AUTO: 30.4 PG (ref 26.8–34.3)
MCHC RBC AUTO-ENTMCNC: 33.6 G/DL (ref 31.4–37.4)
MCV RBC AUTO: 91 FL (ref 82–98)
MONOCYTES # BLD AUTO: 1.24 THOUSAND/ΜL (ref 0.17–1.22)
MONOCYTES NFR BLD AUTO: 15 % (ref 4–12)
NEUTROPHILS # BLD AUTO: 5.49 THOUSANDS/ΜL (ref 1.85–7.62)
NEUTS SEG NFR BLD AUTO: 67 % (ref 43–75)
NRBC BLD AUTO-RTO: 0 /100 WBCS
P AXIS: 52 DEGREES
PLATELET # BLD AUTO: 177 THOUSANDS/UL (ref 149–390)
PMV BLD AUTO: 11.2 FL (ref 8.9–12.7)
POTASSIUM SERPL-SCNC: 3.5 MMOL/L (ref 3.5–5.3)
PR INTERVAL: 138 MS
QRS AXIS: -22 DEGREES
QRSD INTERVAL: 82 MS
QT INTERVAL: 306 MS
QTC INTERVAL: 439 MS
RBC # BLD AUTO: 2.86 MILLION/UL (ref 3.88–5.62)
SODIUM SERPL-SCNC: 139 MMOL/L (ref 136–145)
T WAVE AXIS: 8 DEGREES
VENTRICULAR RATE: 124 BPM
WBC # BLD AUTO: 8.23 THOUSAND/UL (ref 4.31–10.16)

## 2022-07-12 PROCEDURE — 97112 NEUROMUSCULAR REEDUCATION: CPT

## 2022-07-12 PROCEDURE — C9113 INJ PANTOPRAZOLE SODIUM, VIA: HCPCS | Performed by: STUDENT IN AN ORGANIZED HEALTH CARE EDUCATION/TRAINING PROGRAM

## 2022-07-12 PROCEDURE — 97535 SELF CARE MNGMENT TRAINING: CPT

## 2022-07-12 PROCEDURE — 97116 GAIT TRAINING THERAPY: CPT

## 2022-07-12 PROCEDURE — 93010 ELECTROCARDIOGRAM REPORT: CPT | Performed by: INTERNAL MEDICINE

## 2022-07-12 PROCEDURE — 99024 POSTOP FOLLOW-UP VISIT: CPT | Performed by: UROLOGY

## 2022-07-12 PROCEDURE — 80048 BASIC METABOLIC PNL TOTAL CA: CPT | Performed by: INTERNAL MEDICINE

## 2022-07-12 PROCEDURE — 99233 SBSQ HOSP IP/OBS HIGH 50: CPT | Performed by: INTERNAL MEDICINE

## 2022-07-12 PROCEDURE — 99232 SBSQ HOSP IP/OBS MODERATE 35: CPT | Performed by: INTERNAL MEDICINE

## 2022-07-12 PROCEDURE — 97530 THERAPEUTIC ACTIVITIES: CPT

## 2022-07-12 PROCEDURE — 82948 REAGENT STRIP/BLOOD GLUCOSE: CPT

## 2022-07-12 PROCEDURE — 85025 COMPLETE CBC W/AUTO DIFF WBC: CPT

## 2022-07-12 RX ORDER — PANTOPRAZOLE SODIUM 40 MG/1
40 TABLET, DELAYED RELEASE ORAL
Status: DISCONTINUED | OUTPATIENT
Start: 2022-07-13 | End: 2022-07-18 | Stop reason: HOSPADM

## 2022-07-12 RX ORDER — INSULIN GLARGINE 100 [IU]/ML
10 INJECTION, SOLUTION SUBCUTANEOUS
Status: DISCONTINUED | OUTPATIENT
Start: 2022-07-12 | End: 2022-07-15

## 2022-07-12 RX ADMIN — ACETAMINOPHEN 650 MG: 325 TABLET, FILM COATED ORAL at 16:57

## 2022-07-12 RX ADMIN — DOCUSATE SODIUM 100 MG: 100 CAPSULE, LIQUID FILLED ORAL at 16:57

## 2022-07-12 RX ADMIN — ACETAMINOPHEN 650 MG: 325 TABLET, FILM COATED ORAL at 05:12

## 2022-07-12 RX ADMIN — ACETAMINOPHEN 650 MG: 325 TABLET, FILM COATED ORAL at 23:08

## 2022-07-12 RX ADMIN — BACITRACIN ZINC, NEOMYCIN, POLYMYXIN B 1 SMALL APPLICATION: 400; 3.5; 5 OINTMENT TOPICAL at 16:57

## 2022-07-12 RX ADMIN — INSULIN LISPRO 2 UNITS: 100 INJECTION, SOLUTION INTRAVENOUS; SUBCUTANEOUS at 21:38

## 2022-07-12 RX ADMIN — HEPARIN SODIUM 5000 UNITS: 5000 INJECTION INTRAVENOUS; SUBCUTANEOUS at 21:38

## 2022-07-12 RX ADMIN — HEPARIN SODIUM 5000 UNITS: 5000 INJECTION INTRAVENOUS; SUBCUTANEOUS at 08:12

## 2022-07-12 RX ADMIN — INSULIN LISPRO 1 UNITS: 100 INJECTION, SOLUTION INTRAVENOUS; SUBCUTANEOUS at 16:57

## 2022-07-12 RX ADMIN — INSULIN LISPRO 1 UNITS: 100 INJECTION, SOLUTION INTRAVENOUS; SUBCUTANEOUS at 08:12

## 2022-07-12 RX ADMIN — SENNOSIDES 8.6 MG: 8.6 TABLET, FILM COATED ORAL at 21:38

## 2022-07-12 RX ADMIN — PANTOPRAZOLE SODIUM 40 MG: 40 INJECTION, POWDER, FOR SOLUTION INTRAVENOUS at 08:12

## 2022-07-12 RX ADMIN — DOCUSATE SODIUM 100 MG: 100 CAPSULE, LIQUID FILLED ORAL at 08:12

## 2022-07-12 RX ADMIN — INSULIN LISPRO 1 UNITS: 100 INJECTION, SOLUTION INTRAVENOUS; SUBCUTANEOUS at 11:05

## 2022-07-12 RX ADMIN — BACITRACIN ZINC, NEOMYCIN, POLYMYXIN B 1 SMALL APPLICATION: 400; 3.5; 5 OINTMENT TOPICAL at 08:12

## 2022-07-12 RX ADMIN — ACETAMINOPHEN 650 MG: 325 TABLET, FILM COATED ORAL at 11:05

## 2022-07-12 RX ADMIN — INSULIN GLARGINE 10 UNITS: 100 INJECTION, SOLUTION SUBCUTANEOUS at 21:54

## 2022-07-12 NOTE — ASSESSMENT & PLAN NOTE
Lab Results   Component Value Date    HGBA1C 7 2 (A) 05/16/2022       Recent Labs     07/11/22  1622 07/11/22 2052 07/12/22  0709 07/12/22  1046   POCGLU 178* 257* 161* 188*       Blood Sugar Average: Last 72 hrs:  (P) 172     · Hold Home medication->Metformin while admitted  · Hyperglycemia relatively well controlled  · C/w Accu-checks and SSI AC/HS  · Continue Diabetic Diet and Hypoglycemic protocol  · Add Lantus q h s

## 2022-07-12 NOTE — OCCUPATIONAL THERAPY NOTE
Occupational Therapy Progress Note     Patient Name: Shaan Sadler  JRSUD'T Date: 7/12/2022  Problem List  Principal Problem:    Acute respiratory failure with hypoxia ([facility] [address] )  Active Problems:    Essential hypertension    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 chronic kidney disease, without long-term current use of insulin (HCC)    Stage 3a chronic kidney disease (HCC)    Right renal mass    GI bleed    SIRS (systemic inflammatory response syndrome) (HCC)    Tachycardia    RUPAL (acute kidney injury) ([facility] [address] )    Chronic renal impairment          07/12/22 1045   OT Last Visit   OT Visit Date 07/12/22   Note Type   Note Type Treatment   Restrictions/Precautions   Weight Bearing Precautions Per Order No   Other Precautions Cognitive; Chair Alarm; Bed Alarm; Fall Risk;Pain   General   Response to Previous Treatment Patient with no complaints from previous session   Lifestyle   Autonomy I with ADL's   Reciprocal Relationships spouse   Service to Others retired   [address] enjoys spending time w/ his cat   Pain Assessment   Pain Assessment Tool 0-10   Pain Score No Pain   ADL   Where Assessed Edge of bed   Grooming Assistance 5  Supervision/Setup   Grooming Deficit Wash/dry hands; Wash/dry face   Grooming Comments seated w/ L lateral lean   UB Bathing Assistance 4  Minimal Assistance   UB Bathing Deficit Right arm;Left arm; Abdomen   UB Bathing Comments assist w/ back   LB Bathing Assistance 3  Moderate Assistance   LB Bathing Deficit Right lower leg including foot; Left lower leg including foot   LB Bathing Comments bringing foot up to opposite knee   UB Dressing Assistance 4  Minimal Assistance   UB Dressing Deficit Thread RUE; Thread LUE   UB Dressing Comments assist w/ fasteners   LB Dressing Assistance 3  Moderate Assistance   LB Dressing Deficit Don/doff R sock; Don/doff L sock   Toileting Assistance  2  Maximal Assistance   Toileting Deficit Perineal hygiene   Bed Mobility   Supine to Sit 3  Moderate assistance   Additional items Assist x 1; Increased time required;Verbal cues   Transfers   Sit to Stand 3  Moderate assistance   Additional items Assist x 1; Increased time required;Verbal cues   Stand to Sit 3  Moderate assistance   Additional items Assist x 1; Increased time required;Verbal cues   Stand pivot 3  Moderate assistance   Additional items Assist x 1; Increased time required;Verbal cues   Functional Mobility   Functional Mobility 3  Moderate assistance   Additional items Rolling walker   Cognition   Overall Cognitive Status Impaired   Arousal/Participation Cooperative   Attention Attends with cues to redirect   Orientation Level Oriented X4   Memory Decreased recall of precautions;Decreased recall of recent events   Following Commands Follows one step commands with increased time or repetition   Comments slow to process some commands at times- pt's spouse reporting he is close ot his baseline cognition   Activity Tolerance   Activity Tolerance Patient tolerated treatment well   Medical Staff Made Aware PT Demetrice 2* medical complexity/comorbidities   Assessment   Assessment Patient participated in Skilled OT session this date with interventions consisting of ADL re training with the use of correct body mechnaics, Energy Conservation techniques and safety awareness and fall prevention techniques   Patient agreeable to OT treatment session, upon arrival patient was found seated OOB to Chair  In comparison to previous session, patient with improvements in activity tolerance and transfers*   Patient requiring frequent rest periods and ocassional safety reminders  Patient continues to be functioning below baseline level, occupational performance remains limited secondary to factors listed above and increased risk for falls and injury  From OT standpoint, recommendation at time of d/c would be Short Term Rehab     Patient to benefit from continued Occupational Therapy treatment while in the hospital to address deficits as defined above and maximize level of functional independence with ADLs and functional mobility  The patient's raw score on the AM-PAC Daily Activity inpatient short form is 16, standardized score is 35 96, less than 39 4  Patients at this level are likely to benefit from discharge to post-acute rehabilitation services  Please refer to the recommendation of the Occupational Therapist for safe discharge planning  Plan   Treatment Interventions ADL retraining;Functional transfer training; Endurance training;Patient/family training   Goal Expiration Date 07/22/22   OT Treatment Day 1   OT Frequency 3-5x/wk   Recommendation   OT Discharge Recommendation Post acute rehabilitation services   [facility] Daily Activity Inpatient   Lower Body Dressing 2   Bathing 2   Toileting 2   Upper Body Dressing 3   Grooming 3   Eating 4   Daily Activity Raw Score 16   Daily Activity Standardized Score (Calc for Raw Score >=11) 35 96   AM-[facility] Applied Cognition Inpatient   Following a Speech/Presentation 4   Understanding Ordinary Conversation 3   Taking Medications 2   Remembering Where Things Are Placed or Put Away 2   Remembering List of 4-5 Errands 2   Taking Care of Complicated Tasks 2   Applied Cognition Raw Score 15   Applied Cognition Standardized Score 33 54   Modified Webster Scale   Modified Drake Scale 4       Amara Carey MS, OTR/L

## 2022-07-12 NOTE — ASSESSMENT & PLAN NOTE
"Patient s/p right nephrectomy developed tachycardia, worsening leukocytosis, hypoxia with no clear source of infection at this time  · Procalcitonin: 1 21-> May be elevated in the setting of renal dysfunction  · Continue to monitor periodically  · lactic acid: Negative  · UA small leukocyte, pyuria 7/7/22  · TSH measured within reference range  · COVID/RSV/Influenza NEGATIVE  · Blood culture collected on 07/08 showed NGTD  · MRSA cultures collected on 07/08 NEGATIVE     · Urinalysis on 07/07 suggestive of acute cystitis with hematuria  · Completed 4 day course of IV cefepime  · Patient remains afebrile with improved Leukocytosis  · Continue to monitor CBC/ Fever curve

## 2022-07-12 NOTE — ASSESSMENT & PLAN NOTE
Highly suspicious for renal cell carcinoma based on CT and MRI criteria    · Patient is s/p right nephrectomy on 07/06   · Urology Primary Team; continue management  · PENDING pathology collected on 07/06

## 2022-07-12 NOTE — RESTORATIVE TECHNICIAN NOTE
Restorative Technician Note      Patient Name: Jose Roberto Santos     Restorative Tech Visit Date: 7/12/2022  Note Type: Mobility  Patient Position Upon Consult: Bedside chair  Patient Position at End of Consult: Bedside chair; All needs within reach      Pt declined mobility at this time, stated he "wanted to rest"  Pt aware that he can call for assistance whenever he's ready      Luis Perez, Restorative Technician

## 2022-07-12 NOTE — PLAN OF CARE
Problem: PHYSICAL THERAPY ADULT  Goal: Performs mobility at highest level of function for planned discharge setting  See evaluation for individualized goals  Description: Treatment/Interventions: Functional transfer training, LE strengthening/ROM, Patient/family training, Bed mobility, Gait training, Spoke to nursing, Spoke to case management, OT          See flowsheet documentation for full assessment, interventions and recommendations  Outcome: Progressing  Note: Prognosis: Good  Problem List: Decreased strength, Decreased endurance, Impaired balance, Decreased mobility, Decreased cognition, Decreased safety awareness  Assessment: Pt with significant functional improvements this session, very motivated to participate, requires rest breaks between activities, however vitals recover appropriately  Pt able to ambulate short distance with RW and chair follow, tachycardic  Multiple STS trials with cues for LE positioning and hip/knee extension into standing  Pt's sposue present at end of session and very supportive/encouraging  Pt able to perform seated ADL's at EOB unsupported with CGA for sitting balance due to L lateral lean with fatigue, pt states he has noticed this since hx TIA  Pt will benefit from continued skilled PT intervention during course of hospital stay to address the above mentioned impairments  Recommend IP rehab upon hospital D/C  Barriers to Discharge: Inaccessible home environment     PT Discharge Recommendation: Post acute rehabilitation services    See flowsheet documentation for full assessment

## 2022-07-12 NOTE — PLAN OF CARE
Problem: OCCUPATIONAL THERAPY ADULT  Goal: Performs self-care activities at highest level of function for planned discharge setting  See evaluation for individualized goals  Description: Treatment Interventions: ADL retraining, Functional transfer training, UE strengthening/ROM, Endurance training, Cognitive reorientation, Patient/family training, Equipment evaluation/education, Compensatory technique education, Energy conservation, Activityengagement          See flowsheet documentation for full assessment, interventions and recommendations  Outcome: Progressing  Note: Limitation: Decreased ADL status, Decreased UE ROM, Decreased UE strength, Decreased Safe judgement during ADL, Decreased cognition, Decreased endurance, Decreased self-care trans, Decreased high-level ADLs, Decreased fine motor control  Prognosis: Fair  Assessment: Patient participated in Skilled OT session this date with interventions consisting of ADL re training with the use of correct body mechnaics, Energy Conservation techniques and safety awareness and fall prevention techniques   Patient agreeable to OT treatment session, upon arrival patient was found seated OOB to Chair  In comparison to previous session, patient with improvements in activity tolerance and transfers*   Patient requiring frequent rest periods and ocassional safety reminders  Patient continues to be functioning below baseline level, occupational performance remains limited secondary to factors listed above and increased risk for falls and injury  From OT standpoint, recommendation at time of d/c would be Short Term Rehab  Patient to benefit from continued Occupational Therapy treatment while in the hospital to address deficits as defined above and maximize level of functional independence with ADLs and functional mobility  The patient's raw score on the AM-PAC Daily Activity inpatient short form is 16, standardized score is 35 96, less than 39 4   Patients at this level are likely to benefit from discharge to post-acute rehabilitation services  Please refer to the recommendation of the Occupational Therapist for safe discharge planning       OT Discharge Recommendation: Post acute rehabilitation services  OT - OK to Discharge- Retiring Row: Yes

## 2022-07-12 NOTE — PROGRESS NOTES
Postoperative day number 6 status post robot assisted laparoscopic right radical nephrectomy secondary to central renal mass  His postoperative course is remarkable for acute blood loss anemia  A postoperative CT scan did show blood within the right renal fossa as well as tracking into the pelvis  He developed tachycardia  Consultation with Cardiology revealed sinus tachycardia with frequent PACs without atrial fibrillation  Decision was made to hold anticoagulation secondary to his retroperitoneal bleeding  He also had hypoxia initially postoperatively  A V/Q scan showed low probability of pulmonary embolus  He developed chronic kidney disease with a peak creatinine just under 4  Consultation with Nephrology was obtained  His creatinine today is noted to be  2 42  BP (!) 171/85   Pulse 78   Temp 98 4 °F (36 9 °C)   Resp 20   Ht 6' (1 829 m)   Wt 107 kg (236 lb)   SpO2 95%   BMI 32 01 kg/m²     Urine output 2500/700    On examination he is in no acute distress  His abdomen is soft nontender nondistended    Incisions are clean dry and intact with staples in place    Impression:  Right renal mass status post robot assisted laparoscopic right radical nephrectomy, acute blood loss anemia, resolved tachycardia    Plan:  Out of bed and ambulate  Continue to monitor creatinine level  Regular diet  Discontinue antibiotics per medical team as fever has resolved without obvious source  Discharge planning likely to short-term rehab

## 2022-07-12 NOTE — PHYSICAL THERAPY NOTE
Physical Therapy Treatment Note    Patient's Name: Jj Blackman  : 437       22 1046   PT Last Visit   PT Visit Date 22   Note Type   Note Type Treatment   Pain Assessment   Pain Assessment Tool 0-10   Pain Score No Pain   Restrictions/Precautions   Weight Bearing Precautions Per Order No   Other Precautions Cognitive; Chair Alarm; Bed Alarm;Multiple lines; Fall Risk   General   Chart Reviewed Yes   Family/Caregiver Present Yes   Cognition   Overall Cognitive Status Impaired   Arousal/Participation Alert   Attention Attends with cues to redirect   Orientation Level Oriented X4   Following Commands Follows one step commands with increased time or repetition   Comments Pt very pleasant and cooperative, motivated to participate, requires increased time for processing, cues for safety   Bed Mobility   Supine to Sit 3  Moderate assistance   Additional items Assist x 1; Increased time required;Verbal cues   Additional Comments VC's for sequencing of bed mobility, modA for trunk control and scooting toward EOB  Transfers   Sit to Stand 3  Moderate assistance   Additional items Assist x 1; Increased time required;Verbal cues   Stand to Sit 3  Moderate assistance   Additional items Assist x 1; Increased time required;Verbal cues   Additional Comments Initially with B/L HHA, improved to use of RW  Performed multiple trials from bed and bedside chair, VC's for hand placement, LE positioning prior to standing, hip/knee extension into standing  Ambulation/Elevation   Gait pattern Excessively slow; Short stride;Decreased foot clearance; Wide MAGALY; Forward Flexion   Gait Assistance 3  Moderate assist   Additional items Assist x 1  (+2 for chair follow)   Assistive Device Rolling walker   Distance 5 ft to chair + rest break + 20 ft with chair follow  VC's for forward eye gaze, proximity to RW    HR max 112 bpm, O2 sats stable on RA, moderate FLORES, required seated rest break for recovery   Balance   Static Sitting Fair -   Dynamic Sitting Poor +   Static Standing Poor +   Dynamic Standing Poor   Ambulatory Poor   Activity Tolerance   Activity Tolerance Patient limited by fatigue   Nurse Made Aware RN updated  Chair alarm engaged at end of session   Exercises   Balance training  Pt with L lateral lean in sitting unsupported at EOB, required tactile cues for orientation to COG multiple times while performing ADL's at EOB with OT  Able to sit EOB approximately 10-13 minutes with CGA  Assessment   Prognosis Good   Problem List Decreased strength;Decreased endurance; Impaired balance;Decreased mobility; Decreased cognition;Decreased safety awareness   Assessment Pt with significant functional improvements this session, very motivated to participate, requires rest breaks between activities, however vitals recover appropriately  Pt able to ambulate short distance with RW and chair follow, tachycardic  Multiple STS trials with cues for LE positioning and hip/knee extension into standing  Pt's sposue present at end of session and very supportive/encouraging  Pt able to perform seated ADL's at EOB unsupported with CGA for sitting balance due to L lateral lean with fatigue, pt states he has noticed this since hx TIA  Pt will benefit from continued skilled PT intervention during course of hospital stay to address the above mentioned impairments  Recommend IP rehab upon hospital D/C  Goals   Patient Goals to walk   STG Expiration Date 07/22/22   PT Treatment Day 1   Plan   Treatment/Interventions Functional transfer training;LE strengthening/ROM; Elevations; Therapeutic exercise; Endurance training;Cognitive reorientation;Patient/family training;Equipment eval/education; Bed mobility;Gait training   Progress Progressing toward goals   PT Frequency 3-5x/wk   Recommendation   PT Discharge Recommendation Post acute rehabilitation services   AM-PAC Basic Mobility Inpatient Turning in Bed Without Bedrails 3   Lying on Back to Sitting on Edge of Flat Bed 2   Moving Bed to Chair 2   Standing Up From Chair 2   Walk in Room 2   Climb 3-5 Stairs 1   Basic Mobility Inpatient Raw Score 12   Basic Mobility Standardized Score 32 23   Highest Level Of Mobility   -HLM Goal 4: Move to chair/commode   JH-HLM Achieved 6: Walk 10 steps or more       Casie Quispe, PT, DPT, GCS

## 2022-07-12 NOTE — ASSESSMENT & PLAN NOTE
Lab Results   Component Value Date    EGFR 25 07/12/2022    EGFR 23 07/11/2022    EGFR 18 07/10/2022    CREATININE 2 42 (H) 07/12/2022    CREATININE 2 57 (H) 07/11/2022    CREATININE 3 12 (H) 07/10/2022     · s/p right nephrectomy  · Nephrology consulted; appreciate assistance  · Hydrated with IV fluid  · Avoid/Limit nephrotoxins and IV Contrast  · Avoid hypotension and fluctuations in BP  · Continue to monitor renal function while admitted

## 2022-07-12 NOTE — PROGRESS NOTES
NEPHROLOGY PROGRESS NOTE    Andreina Fried 78 y o  male MRN: [id number]  Unit/Bed#: [facility] 809-01 Encounter: [id number]  Reason for Consult:  Acute on chronic kidney disease    Patient is resting in bed says he a little bit really has no acute changes overnight  Still some mild right lower quadrant abdominal pain  ASSESSMENT/PLAN:  1  Renal    Patient had acute renal failure after nephrectomy with baseline creatinine 1 4  Creatinine continues to improve now on a daily basis and is down to 2 4  Urine output is excellent  He is on slow IV fluids but at this point can discontinue as he starting to eat any appears euvolemic to slightly overloaded  Will monitor and will need follow-up once discharged  Discontinue IV fluids  Monitor clinically    2  Status post right radical nephrectomy  Postop per Urology  SUBJECTIVE:  Review of Systems   Constitutional: Positive for malaise/fatigue  Negative for chills, diaphoresis and fever  HENT: Negative  Eyes: Negative  Cardiovascular: Negative for chest pain, dyspnea on exertion, orthopnea and palpitations  Respiratory: Negative  Negative for cough, shortness of breath, sputum production and wheezing  Gastrointestinal: Negative for diarrhea, nausea and vomiting  Mild right lower quadrant discomfort  Genitourinary: Baird catheter in  Neurological: Negative for dizziness, focal weakness, headaches and light-headedness  Psychiatric/Behavioral: Negative for altered mental status, depression, hallucinations and hypervigilance  OBJECTIVE:  Current Weight: Weight - Scale: 107 kg (236 lb)  Vitals:Temp (24hrs), Av 5 °F (36 9 °C), Min:98 4 °F (36 9 °C), Max:98 6 °F (37 °C)  Current: Temperature: 98 6 °F (37 °C)   Blood pressure 160/67, pulse 62, temperature 98 6 °F (37 °C), resp  rate 16, height 6' (1 829 m), weight 107 kg (236 lb), SpO2 94 %  , Body mass index is 32 01 kg/m²        Intake/Output Summary (Last 24 hours) at 2022 Eddie Ortiz filed at 7/12/2022 0516  Gross per 24 hour   Intake 120 ml   Output 2500 ml   Net -2380 ml       Physical Exam: /67   Pulse 62   Temp 98 6 °F (37 °C)   Resp 16   Ht 6' (1 829 m)   Wt 107 kg (236 lb)   SpO2 94%   BMI 32 01 kg/m²   Physical Exam  Constitutional:       General: He is not in acute distress  Appearance: He is not toxic-appearing or diaphoretic  HENT:      Head: Normocephalic and atraumatic  Mouth/Throat:      Mouth: Mucous membranes are moist    Eyes:      General: No scleral icterus  Extraocular Movements: Extraocular movements intact  Cardiovascular:      Rate and Rhythm: Normal rate and regular rhythm  Heart sounds: No friction rub  No gallop  Pulmonary:      Effort: Pulmonary effort is normal  No respiratory distress  Breath sounds: No wheezing, rhonchi or rales  Abdominal:      General: Bowel sounds are normal  There is no distension  Palpations: Abdomen is soft  Tenderness: There is no abdominal tenderness  There is no rebound  Musculoskeletal:      Cervical back: Normal range of motion and neck supple  Neurological:      Mental Status: He is alert and oriented to person, place, and time  Mental status is at baseline  Psychiatric:         Mood and Affect: Mood normal          Behavior: Behavior normal          Thought Content:  Thought content normal          Judgment: Judgment normal          Medications:    Current Facility-Administered Medications:     acetaminophen (TYLENOL) tablet 650 mg, 650 mg, Oral, Q6H [address], Jayesh Carrington MD, 650 mg at 07/12/22 0512    docusate sodium (COLACE) capsule 100 mg, 100 mg, Oral, BID, Jayesh Carrington MD, 100 mg at 07/12/22 0012    heparin (porcine) subcutaneous injection 5,000 Units, 5,000 Units, Subcutaneous, Q12H [address], Cristi Ward MD, 5,000 Units at 07/12/22 4636    hydrALAZINE (APRESOLINE) injection 5 mg, 5 mg, Intravenous, Q6H PRN, Cristi Ward, MD    insulin lispro (HumaLOG) 100 units/mL subcutaneous injection 1-6 Units, 1-6 Units, Subcutaneous, TID AC, 1 Units at 07/12/22 0812 **AND** Fingerstick Glucose (POCT), , , TID AC, KAYODE Gastelum    insulin lispro (HumaLOG) 100 units/mL subcutaneous injection 1-6 Units, 1-6 Units, Subcutaneous, HS, KAYODE Gastelum, 3 Units at 07/11/22 2115    neomycin-bacitracin-polymyxin b (NEOSPORIN) ointment 1 small application, 1 small application, Topical, BID, Dotty Mccracken MD, 1 small application at 75/26/67 0812    ondansetron (ZOFRAN) injection 4 mg, 4 mg, Intravenous, Q6H PRN, Dotty Mccracken MD, 4 mg at 07/07/22 0106    oxyCODONE (ROXICODONE) IR tablet 2 5 mg, 2 5 mg, Oral, Q4H PRN, Dotty Mccracken MD    oxyCODONE (ROXICODONE) IR tablet 5 mg, 5 mg, Oral, Q4H PRN, Dotty Mccracken MD, 5 mg at 07/07/22 0254    pantoprazole (PROTONIX) injection 40 mg, 40 mg, Intravenous, Q12H [facility], 40 mg at 07/12/22 9119    senna (SENOKOT) tablet 8 6 mg, 1 tablet, Oral, HS, Debora Ash PA-C, 8 6 mg at 07/11/22 2115    simethicone (MYLICON) chewable tablet 80 mg, 80 mg, Oral, Q6H PRN, Debora Ash PA-C    sodium chloride 0 9 % infusion, 50 mL/hr, Intravenous, Continuous, Izabel Crow PA-C, Last Rate: 50 mL/hr at 07/11/22 1831, 50 mL/hr at 07/11/22 1831    Laboratory Results:  Lab Results   Component Value Date    WBC 8 23 07/12/2022    HGB 8 7 (L) 07/12/2022    HCT 25 9 (L) 07/12/2022    MCV 91 07/12/2022     07/12/2022     Lab Results   Component Value Date    SODIUM 139 07/12/2022    K 3 5 07/12/2022     (H) 07/12/2022    CO2 24 07/12/2022    BUN 43 (H) 07/12/2022    CREATININE 2 42 (H) 07/12/2022    GLUC 143 (H) 07/12/2022    CALCIUM 8 0 (L) 07/12/2022     Lab Results   Component Value Date    CALCIUM 8 0 (L) 07/12/2022    PHOS 2 0 (L) 07/11/2022     No results found for: LABPROT

## 2022-07-12 NOTE — ASSESSMENT & PLAN NOTE
"-Patient developed tachycardia and hypoxia prior to undergoing EGD to evaluate upper GI bleed  -Endoscopy canceled due to hypoxia, pox  89% RA    -clinically patient does not appear volume overload, no wheezing, able to speak in full sentences"    · CXR on 07/08 identified small atelectasis  · V/Q scan showed low probability for PE  · With mildly elevated troponin likely elevated due to reduced renal clearance in the setting of ESRD  · Elevated procalcitonin on 07/08  · Patient remains free of respiratory distress, resting comfortably on room air  · Continue to monitor respiratory status  · Resolved

## 2022-07-12 NOTE — PLAN OF CARE
Problem: PAIN - ADULT  Goal: Verbalizes/displays adequate comfort level or baseline comfort level  Description: Interventions:  - Encourage patient to monitor pain and request assistance  - Assess pain using appropriate pain scale  - Administer analgesics based on type and severity of pain and evaluate response  - Implement non-pharmacological measures as appropriate and evaluate response  - Consider cultural and social influences on pain and pain management  - Notify physician/advanced practitioner if interventions unsuccessful or patient reports new pain  Outcome: Progressing     Problem: INFECTION - ADULT  Goal: Absence or prevention of progression during hospitalization  Description: INTERVENTIONS:  - Assess and monitor for signs and symptoms of infection  - Monitor lab/diagnostic results  - Monitor all insertion sites, i e  indwelling lines, tubes, and drains  - Monitor endotracheal if appropriate and nasal secretions for changes in amount and color  - [place] appropriate cooling/warming therapies per order  - Administer medications as ordered  - Instruct and encourage patient and family to use good hand hygiene technique  - Identify and instruct in appropriate isolation precautions for identified infection/condition  Outcome: Progressing     Problem: DISCHARGE PLANNING  Goal: Discharge to home or other facility with appropriate resources  Description: INTERVENTIONS:  - Identify barriers to discharge w/patient and caregiver  - Arrange for needed discharge resources and transportation as appropriate  - Identify discharge learning needs (meds, wound care, etc )  - Arrange for interpretive services to assist at discharge as needed  - Refer to Case Management Department for coordinating discharge planning if the patient needs post-hospital services based on physician/advanced practitioner order or complex needs related to functional status, cognitive ability, or social support system  Outcome: Progressing Problem: Knowledge Deficit  Goal: Patient/family/caregiver demonstrates understanding of disease process, treatment plan, medications, and discharge instructions  Description: Complete learning assessment and assess knowledge base    Interventions:  - Provide teaching at level of understanding  - Provide teaching via preferred learning methods  Outcome: Progressing     Problem: Prexisting or High Potential for Compromised Skin Integrity  Goal: Skin integrity is maintained or improved  Description: INTERVENTIONS:  - Identify patients at risk for skin breakdown  - Assess and monitor skin integrity  - Assess and monitor nutrition and hydration status  - Monitor labs   - Assess for incontinence   - Turn and reposition patient  - Assist with mobility/ambulation  - Relieve pressure over bony prominences  - Avoid friction and shearing  - Provide appropriate hygiene as needed including keeping skin clean and dry  - Evaluate need for skin moisturizer/barrier cream  - Collaborate with interdisciplinary team   - Patient/family teaching  - Consider wound care consult   Outcome: Progressing     Problem: GENITOURINARY - ADULT  Goal: Absence of urinary retention  Description: INTERVENTIONS:  - Assess patients ability to void and empty bladder  - Monitor I/O  - Bladder scan as needed  - Discuss with physician/AP medications to alleviate retention as needed  - Discuss catheterization for long term situations as appropriate  Outcome: Progressing  Goal: Urinary catheter remains patent  Description: INTERVENTIONS:  - Assess patency of urinary catheter  - If patient has a chronic hernández, consider changing catheter if non-functioning  - Follow guidelines for intermittent irrigation of non-functioning urinary catheter  Outcome: Progressing     Problem: METABOLIC, FLUID AND ELECTROLYTES - ADULT  Goal: Electrolytes maintained within normal limits  Description: INTERVENTIONS:  - Monitor labs and assess patient for signs and symptoms of electrolyte imbalances  - Administer electrolyte replacement as ordered  - Monitor response to electrolyte replacements, including repeat lab results as appropriate  - Instruct patient on fluid and nutrition as appropriate  Outcome: Progressing     Problem: SKIN/TISSUE INTEGRITY - ADULT  Goal: Incision(s), wounds(s) or drain site(s) healing without S/S of infection  Description: INTERVENTIONS  - Assess and document dressing, incision, wound bed, drain sites and surrounding tissue  - Provide patient and family education  Outcome: Progressing

## 2022-07-12 NOTE — ASSESSMENT & PLAN NOTE
Patient with an episode of coffee-ground emesis evaluated by GI was to undergo upper endoscopy this morning, procedure canceled due to hypoxia and tachycardia  · Currently hemodynamically stable  · Hgb remains stable  · C/w PPI  · Gastroenterology consulted; appreciate assistance  · Will hold off on inpatient evaluation at this time  If emesis re-occurs can re-evaluate EGD inpatient     · Stable H&H

## 2022-07-12 NOTE — PROGRESS NOTES
[facility]  Progress Note - Rosalind Asa 1945, 78 y o  male MRN: [id number]  Unit/Bed#: [facility] 809-01 Encounter: [id number]  Primary Care Provider: KAYODE Peralta   Date and time admitted to hospital: 7/6/2022 11:12 AM    Right renal mass  Assessment & Plan  Highly suspicious for renal cell carcinoma based on CT and MRI criteria  · Patient is s/p right nephrectomy on 07/06   · Urology Primary Team; continue management  · PENDING pathology collected on 07/06    SIRS (systemic inflammatory response syndrome) [facility])  Assessment & Plan  "Patient s/p right nephrectomy developed tachycardia, worsening leukocytosis, hypoxia with no clear source of infection at this time  · Procalcitonin: 1 21-> May be elevated in the setting of renal dysfunction  · Continue to monitor periodically  · lactic acid: Negative  · UA small leukocyte, pyuria 7/7/22  · TSH measured within reference range  · COVID/RSV/Influenza NEGATIVE  · Blood culture collected on 07/08 showed NGTD  · MRSA cultures collected on 07/08 NEGATIVE     · Urinalysis on 07/07 suggestive of acute cystitis with hematuria  · Completed 4 day course of IV cefepime  · Patient remains afebrile with improved Leukocytosis  · Continue to monitor CBC/ Fever curve    GI bleed  Assessment & Plan  Patient with an episode of coffee-ground emesis evaluated by GI was to undergo upper endoscopy this morning, procedure canceled due to hypoxia and tachycardia  · Currently hemodynamically stable  · Hgb remains stable  · C/w PPI  · Gastroenterology consulted; appreciate assistance  · Will hold off on inpatient evaluation at this time  If emesis re-occurs can re-evaluate EGD inpatient     · Stable H&H    Stage 3a chronic kidney disease [facility])  Assessment & Plan  Lab Results   Component Value Date    EGFR 25 07/12/2022    EGFR 23 07/11/2022    EGFR 18 07/10/2022    CREATININE 2 42 (H) 07/12/2022    CREATININE 2 57 (H) 07/11/2022    CREATININE 3 12 (H) 07/10/2022     · s/p right nephrectomy  · Nephrology consulted; appreciate assistance  · Hydrated with IV fluid  · Avoid/Limit nephrotoxins and IV Contrast  · Avoid hypotension and fluctuations in BP  · Continue to monitor renal function while admitted    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 chronic kidney disease, without long-term current use of insulin [facility])  Assessment & Plan  Lab Results   Component Value Date    HGBA1C 7 2 (A) 05/16/2022       Recent Labs     07/11/22  1622 07/11/22  2052 07/12/22  0709 07/12/22  1046   POCGLU 178* 257* 161* 188*       Blood Sugar Average: Last 72 hrs:  (P) 172     · Hold Home medication->Metformin while admitted  · Hyperglycemia relatively well controlled  · C/w Accu-checks and SSI AC/HS  · Continue Diabetic Diet and Hypoglycemic protocol  · Add Lantus q h s  Essential hypertension  Assessment & Plan  · BP reviewed and remains stable  · Continue home medication:  · Lopressor 25 mg BID  · lisinopril and HCTZ held due to prior hypotension and RUPAL  · Ordered PRN IV Hydralazine  · Continue to monitor     * Acute respiratory failure with hypoxia [facility])  Assessment & Plan    "-Patient developed tachycardia and hypoxia prior to undergoing EGD to evaluate upper GI bleed  -Endoscopy canceled due to hypoxia, pox  89% RA    -clinically patient does not appear volume overload, no wheezing, able to speak in full sentences"    · CXR on 07/08 identified small atelectasis  · V/Q scan showed low probability for PE  · With mildly elevated troponin likely elevated due to reduced renal clearance in the setting of ESRD  · Elevated procalcitonin on 07/08  · Patient remains free of respiratory distress, resting comfortably on room air  · Continue to monitor respiratory status  · Resolved      VTE Pharmacologic Prophylaxis: VTE Score: 7 High Risk (Score >/= 5) - Pharmacological DVT Prophylaxis Ordered: heparin  Sequential Compression Devices Ordered  Patient Centered Rounds:  I performed bedside rounds with nursing staff today  Discussions with Specialists or Other Care Team Provider:     Education and Discussions with Family / Patient: Updated  (wife) at bedside  Time Spent for Care: 45 minutes  More than 50% of total time spent on counseling and coordination of care as described above  Current Length of Stay: 6 day(s)  Current Patient Status: Inpatient     Code Status: Level 1 - Full Code    Subjective:   Patient is comfortable sitting in chair  No chest pain or shortness of breath  No nausea vomiting or diarrhea  Tolerating oral diet  Objective:     Vitals:   Temp (24hrs), Av 5 °F (36 9 °C), Min:98 4 °F (36 9 °C), Max:98 6 °F (37 °C)    Temp:  [98 4 °F (36 9 °C)-98 6 °F (37 °C)] 98 6 °F (37 °C)  HR:  [62-87] 62  Resp:  [16-20] 16  BP: (160-166)/(67-78) 160/67  SpO2:  [94 %-98 %] 94 %  Body mass index is 32 01 kg/m²  Input and Output Summary (last 24 hours):      Intake/Output Summary (Last 24 hours) at 2022 1136  Last data filed at 2022 0516  Gross per 24 hour   Intake 120 ml   Output 1800 ml   Net -1680 ml       Physical Exam:   Physical Exam   Patient is awake alert oriented in no acute distress  Lung clear to auscultation bilateral  Heart positive S1-S2 no murmur  Abdomen soft  Baird catheter in place  Lower extremities no edema    Additional Data:     Labs:  Results from last 7 days   Lab Units 22  0511   WBC Thousand/uL 8 23   HEMOGLOBIN g/dL 8 7*   HEMATOCRIT % 25 9*   PLATELETS Thousands/uL 177   NEUTROS PCT % 67   LYMPHS PCT % 12*   MONOS PCT % 15*   EOS PCT % 4     Results from last 7 days   Lab Units 22  0511 22  0456   SODIUM mmol/L 139 141   POTASSIUM mmol/L 3 5 3 8   CHLORIDE mmol/L 109* 111*   CO2 mmol/L 24 22   BUN mg/dL 43* 43*   CREATININE mg/dL 2 42* 2 57*   ANION GAP mmol/L 6 8   CALCIUM mg/dL 8 0* 8 0*   ALBUMIN g/dL  --  2 2*   TOTAL BILIRUBIN mg/dL  --  1 76*   ALK PHOS U/L  --  49   ALT U/L  --  15   AST U/L --  15   GLUCOSE RANDOM mg/dL 143* 129         Results from last 7 days   Lab Units 07/12/22  1046 07/12/22  0709 07/11/22  2052 07/11/22  1622 07/11/22  1136 07/11/22  0713 07/11/22  0511 07/10/22  2303 07/10/22  1612 07/10/22  1058 07/10/22  0615 07/09/22  2355   POC GLUCOSE mg/dl 188* 161* 257* 178* 168* 153* 142* 144* 185* 179* 140 165*         Results from last 7 days   Lab Units 07/09/22  0017 07/08/22  1420   LACTIC ACID mmol/L 1 5  --    PROCALCITONIN ng/ml  --  1 21*       Lines/Drains:  Invasive Devices  Report    Peripheral Intravenous Line  Duration           Peripheral IV 07/10/22 Left;Ventral (anterior) Forearm 1 day          Drain  Duration           Urethral Catheter Latex 16 Fr  5 days              Urinary Catheter:  Goal for removal: Per primary               Imaging: No pertinent imaging reviewed  Recent Cultures (last 7 days):   Results from last 7 days   Lab Units 07/08/22  1421   BLOOD CULTURE  No Growth at 72 hrs  No Growth at 72 hrs         Last 24 Hours Medication List:   Current Facility-Administered Medications   Medication Dose Route Frequency Provider Last Rate    acetaminophen  650 mg Oral Q6H [address] Bryan Ball MD      docusate sodium  100 mg Oral BID Bryan Ball MD      heparin (porcine)  5,000 Units Subcutaneous Q12H [address] Lexy Ferrer MD      hydrALAZINE  5 mg Intravenous Q6H PRN Lexy Ferrer MD      insulin glargine  10 Units Subcutaneous HS Diego Vargas DO      insulin lispro  1-6 Units Subcutaneous TID AC KAYODE Gastelum      insulin lispro  1-6 Units Subcutaneous HS Velia Spruce, CRNP      neomycin-bacitracin-polymyxin b  1 small application Topical BID Bryan Ball MD      ondansetron  4 mg Intravenous Q6H PRN Bryan Ball MD      oxyCODONE  2 5 mg Oral Q4H PRN Bryan Ball MD      oxyCODONE  5 mg Oral Q4H PRN Bryan Ball MD      [START ON 7/13/2022] pantoprazole  40 mg Oral Early Morning Sana Payne DO      senna  1 tablet Oral HS Jamia Olguin PA-C      simethicone  80 mg Oral Q6H PRN Jamia Olguin PA-C          Today, Patient Was Seen By: Sana Payne DO    **Please Note: This note may have been constructed using a voice recognition system  **

## 2022-07-12 NOTE — UTILIZATION REVIEW
Continued Stay Review    Date: 07/12/22                          Current Patient Class: IP  Current Level of Care: Med/Surg    HPI:76 y o  male initially admitted on 7/6 for R lap radical nephrectomy  On 7/8, pt became hypoxic when being evaluated w/ EGD for upper GI bleed, EGD cancelled  Hgb remained relatively stable since, pt can follow up w/ GI outpatient  Completed four day course of IV ABX for suggestive cystitis  Assessment/Plan: Pt tolerating PO diet, no n/v/d  On exam, hernández in place  Crt 2 42 stable  Plan: continue meds, SSI w/ accuchecks ACHS, start lantus qHS, discontinue IVF  PT/OT recommending acute rehab  Vital Signs:   Date/Time Temp Pulse Resp BP MAP (mmHg) SpO2 O2 Device   07/12/22 15:03:02 98 4 °F (36 9 °C) 78 20 171/85 Abnormal  114 95 % --   07/12/22 07:15:01 98 6 °F (37 °C) 62 16 160/67 98 94 % --   07/11/22 22:11:01 98 4 °F (36 9 °C) 87 16 166/73 104 98 % --   07/11/22 16:01:07 98 6 °F (37 °C) 75 20 162/78 106 96 % None (Room air)   07/11/22 07:28:17 98 °F (36 7 °C) 82 16 149/66 94 96 % None (Room air)   07/10/22 21:52:41 99 5 °F (37 5 °C) 66 17 145/63 90 97 % --         Pertinent Labs/Diagnostic Results:   Results from last 7 days   Lab Units 07/09/22  0340   SARS-COV-2  Negative     Results from last 7 days   Lab Units 07/12/22  0511 07/11/22  1822 07/11/22  0456 07/10/22  2209 07/10/22  1406 07/10/22  0458 07/08/22  1420 07/08/22  0451   WBC Thousand/uL 8 23  --  7 33  --   --  7 87   < > 21 98*   HEMOGLOBIN g/dL 8 7* 8 8* 8 5* 8 4* 8 1* 7 7*   < > 10 2*   HEMATOCRIT % 25 9* 26 4* 25 9* 25 9* 24 9* 23 3*   < > 29 8*   PLATELETS Thousands/uL 177  --  177  --   --  142*   < > 187   NEUTROS ABS Thousands/µL 5 49  --   --   --   --  5 21  --  17 12*    < > = values in this interval not displayed           Results from last 7 days   Lab Units 07/12/22  0511 07/11/22  0456 07/10/22  0458 07/09/22  0450 07/08/22  1420   SODIUM mmol/L 139 141 142 142 138   POTASSIUM mmol/L 3 5 3 8 4 0 4 4 4  6   CHLORIDE mmol/L 109* 111* 112* 111* 108   CO2 mmol/L 24 22 24 23 25   ANION GAP mmol/L 6 8 6 8 5   BUN mg/dL 43* 43* 48* 47* 45*   CREATININE mg/dL 2 42* 2 57* 3 12* 3 45* 3 84*   EGFR ml/min/1 73sq m 25 23 18 16 14   CALCIUM mg/dL 8 0* 8 0* 7 7* 7 6* 8 0*   MAGNESIUM mg/dL  --  2 1 1 9  --   --    PHOSPHORUS mg/dL  --  2 0* 2 4  --   --      Results from last 7 days   Lab Units 07/11/22  0456 07/10/22  0458   AST U/L 15 16   ALT U/L 15 16   ALK PHOS U/L 49 46   TOTAL PROTEIN g/dL 5 2* 5 1*   ALBUMIN g/dL 2 2* 2 2*   TOTAL BILIRUBIN mg/dL 1 76* 1 79*     Results from last 7 days   Lab Units 07/12/22  1046 07/12/22  0709 07/11/22  2052 07/11/22  1622 07/11/22  1136 07/11/22  0713 07/11/22  0511 07/10/22  2303 07/10/22  1612 07/10/22  1058 07/10/22  0615 07/09/22  2355   POC GLUCOSE mg/dl 188* 161* 257* 178* 168* 153* 142* 144* 185* 179* 140 165*     Results from last 7 days   Lab Units 07/12/22  0511 07/11/22  0456 07/10/22  0458 07/09/22  0450 07/08/22  1420 07/08/22  0451 07/07/22  1115 07/07/22  0817 07/06/22  1843   GLUCOSE RANDOM mg/dL 143* 129 137 165* 209* 168* 311* 1,277* 202*     Results from last 7 days   Lab Units 07/08/22  1410   PH ART  7 461*   PCO2 ART mm Hg 35 3*   PO2 ART mm Hg 61 5*   HCO3 ART mmol/L 24 6   BASE EXC ART mmol/L 0 9   O2 CONTENT ART mL/dL 12 0*   O2 HGB, ARTERIAL % 90 7*   ABG SOURCE  Radial, Right     Results from last 7 days   Lab Units 07/08/22  1420   TSH 3RD GENERATON uIU/mL 0 546     Results from last 7 days   Lab Units 07/08/22  1420   PROCALCITONIN ng/ml 1 21*     Results from last 7 days   Lab Units 07/09/22  0017   LACTIC ACID mmol/L 1 5     Results from last 7 days   Lab Units 07/09/22  0450   NT-PRO BNP pg/mL 1,122*     Results from last 7 days   Lab Units 07/07/22  1623   CLARITY UA  Clear   COLOR UA  Light Yellow   SPEC GRAV UA  1 016   PH UA  5 0   GLUCOSE UA mg/dl >=1000 (1%)*   KETONES UA mg/dl Trace*   BLOOD UA  Small*   PROTEIN UA mg/dl Trace*   NITRITE UA Negative   BILIRUBIN UA  Negative   UROBILINOGEN UA (BE) mg/dl <2 0   LEUKOCYTES UA  Small*   WBC UA /hpf 10-20*   RBC UA /hpf 4-10*   BACTERIA UA /hpf Occasional   EPITHELIAL CELLS WET PREP /hpf Occasional   MUCUS THREADS  Occasional*     Results from last 7 days   Lab Units 07/09/22  0340   INFLUENZA A PCR  Negative   INFLUENZA B PCR  Negative   RSV PCR  Negative     Results from last 7 days   Lab Units 07/08/22  1421   BLOOD CULTURE  No Growth at 72 hrs  No Growth at 72 hrs  Medications:   Scheduled Medications:  acetaminophen, 650 mg, Oral, Q6H ERICA  docusate sodium, 100 mg, Oral, BID  heparin (porcine), 5,000 Units, Subcutaneous, Q12H ERICA  insulin glargine, 10 Units, Subcutaneous, HS  insulin lispro, 1-6 Units, Subcutaneous, TID AC  insulin lispro, 1-6 Units, Subcutaneous, HS  neomycin-bacitracin-polymyxin b, 1 small application, Topical, BID  [START ON 7/13/2022] pantoprazole, 40 mg, Oral, Early Morning  senna, 1 tablet, Oral, HS    Continuous IV Infusions:    sodium chloride 0 9 %, 50 mL/hr, Intravenous, Continuous; End: 07/12/22 0937    PRN Meds:  hydrALAZINE, 5 mg, Intravenous, Q6H PRN  ondansetron, 4 mg, Intravenous, Q6H PRN  oxyCODONE, 2 5 mg, Oral, Q4H PRN  oxyCODONE, 5 mg, Oral, Q4H PRN  simethicone, 80 mg, Oral, Q6H PRN        Discharge Plan: D    Network Utilization Review Department  ATTENTION: Please call with any questions or concerns to [phone] and carefully listen to the prompts so that you are directed to the right person  All voicemails are confidential   Staci Arnold all requests for admission clinical reviews, approved or denied determinations and any other requests to dedicated fax number below belonging to the campus where the patient is receiving treatment   List of dedicated fax numbers for the Facilities:  [address] [place] [address] DENIALS (Administrative/Medical Necessity) [phone]   [address] (Maternity/NICU/Pediatrics) [phone]   Ariel Lovell 902 [facility] 40 125 [facility]  [phone]   [facility] [address] [facility] [place] [address] [id number] [place] [address] [address] [facility] [address] [phone]

## 2022-07-13 PROBLEM — N17.9 ACUTE RENAL FAILURE SUPERIMPOSED ON STAGE 3 CHRONIC KIDNEY DISEASE (HCC): Status: ACTIVE | Noted: 2022-02-21

## 2022-07-13 PROBLEM — N18.30 ACUTE RENAL FAILURE SUPERIMPOSED ON STAGE 3 CHRONIC KIDNEY DISEASE (HCC): Status: ACTIVE | Noted: 2022-02-21

## 2022-07-13 LAB
ANION GAP SERPL CALCULATED.3IONS-SCNC: 7 MMOL/L (ref 4–13)
BACTERIA BLD CULT: NORMAL
BACTERIA BLD CULT: NORMAL
BUN SERPL-MCNC: 38 MG/DL (ref 5–25)
CALCIUM SERPL-MCNC: 8 MG/DL (ref 8.3–10.1)
CHLORIDE SERPL-SCNC: 108 MMOL/L (ref 100–108)
CO2 SERPL-SCNC: 24 MMOL/L (ref 21–32)
CREAT SERPL-MCNC: 2.2 MG/DL (ref 0.6–1.3)
ERYTHROCYTE [DISTWIDTH] IN BLOOD BY AUTOMATED COUNT: 13.2 % (ref 11.6–15.1)
GFR SERPL CREATININE-BSD FRML MDRD: 28 ML/MIN/1.73SQ M
GLUCOSE SERPL-MCNC: 113 MG/DL (ref 65–140)
GLUCOSE SERPL-MCNC: 121 MG/DL (ref 65–140)
GLUCOSE SERPL-MCNC: 140 MG/DL (ref 65–140)
GLUCOSE SERPL-MCNC: 167 MG/DL (ref 65–140)
GLUCOSE SERPL-MCNC: 205 MG/DL (ref 65–140)
HCT VFR BLD AUTO: 27.2 % (ref 36.5–49.3)
HGB BLD-MCNC: 8.8 G/DL (ref 12–17)
MCH RBC QN AUTO: 30.3 PG (ref 26.8–34.3)
MCHC RBC AUTO-ENTMCNC: 32.4 G/DL (ref 31.4–37.4)
MCV RBC AUTO: 94 FL (ref 82–98)
PLATELET # BLD AUTO: 202 THOUSANDS/UL (ref 149–390)
PMV BLD AUTO: 11.6 FL (ref 8.9–12.7)
POTASSIUM SERPL-SCNC: 3.3 MMOL/L (ref 3.5–5.3)
RBC # BLD AUTO: 2.9 MILLION/UL (ref 3.88–5.62)
SODIUM SERPL-SCNC: 139 MMOL/L (ref 136–145)
WBC # BLD AUTO: 9.48 THOUSAND/UL (ref 4.31–10.16)

## 2022-07-13 PROCEDURE — 85027 COMPLETE CBC AUTOMATED: CPT | Performed by: INTERNAL MEDICINE

## 2022-07-13 PROCEDURE — 99233 SBSQ HOSP IP/OBS HIGH 50: CPT | Performed by: INTERNAL MEDICINE

## 2022-07-13 PROCEDURE — 80048 BASIC METABOLIC PNL TOTAL CA: CPT | Performed by: INTERNAL MEDICINE

## 2022-07-13 PROCEDURE — 82948 REAGENT STRIP/BLOOD GLUCOSE: CPT

## 2022-07-13 PROCEDURE — 99024 POSTOP FOLLOW-UP VISIT: CPT | Performed by: PHYSICIAN ASSISTANT

## 2022-07-13 PROCEDURE — 99232 SBSQ HOSP IP/OBS MODERATE 35: CPT | Performed by: INTERNAL MEDICINE

## 2022-07-13 RX ORDER — POTASSIUM CHLORIDE 20 MEQ/1
20 TABLET, EXTENDED RELEASE ORAL 2 TIMES DAILY
Status: COMPLETED | OUTPATIENT
Start: 2022-07-13 | End: 2022-07-13

## 2022-07-13 RX ADMIN — SENNOSIDES 8.6 MG: 8.6 TABLET, FILM COATED ORAL at 21:37

## 2022-07-13 RX ADMIN — PANTOPRAZOLE SODIUM 40 MG: 40 TABLET, DELAYED RELEASE ORAL at 05:25

## 2022-07-13 RX ADMIN — ACETAMINOPHEN 650 MG: 325 TABLET, FILM COATED ORAL at 17:44

## 2022-07-13 RX ADMIN — INSULIN LISPRO 2 UNITS: 100 INJECTION, SOLUTION INTRAVENOUS; SUBCUTANEOUS at 17:44

## 2022-07-13 RX ADMIN — HEPARIN SODIUM 5000 UNITS: 5000 INJECTION INTRAVENOUS; SUBCUTANEOUS at 08:27

## 2022-07-13 RX ADMIN — BACITRACIN ZINC, NEOMYCIN, POLYMYXIN B 1 SMALL APPLICATION: 400; 3.5; 5 OINTMENT TOPICAL at 08:27

## 2022-07-13 RX ADMIN — ACETAMINOPHEN 650 MG: 325 TABLET, FILM COATED ORAL at 12:27

## 2022-07-13 RX ADMIN — HEPARIN SODIUM 5000 UNITS: 5000 INJECTION INTRAVENOUS; SUBCUTANEOUS at 21:33

## 2022-07-13 RX ADMIN — POTASSIUM CHLORIDE 20 MEQ: 1500 TABLET, EXTENDED RELEASE ORAL at 17:44

## 2022-07-13 RX ADMIN — DOCUSATE SODIUM 100 MG: 100 CAPSULE, LIQUID FILLED ORAL at 08:27

## 2022-07-13 RX ADMIN — BACITRACIN ZINC, NEOMYCIN, POLYMYXIN B 1 SMALL APPLICATION: 400; 3.5; 5 OINTMENT TOPICAL at 17:44

## 2022-07-13 RX ADMIN — ACETAMINOPHEN 650 MG: 325 TABLET, FILM COATED ORAL at 05:25

## 2022-07-13 RX ADMIN — INSULIN GLARGINE 10 UNITS: 100 INJECTION, SOLUTION SUBCUTANEOUS at 21:32

## 2022-07-13 RX ADMIN — POTASSIUM CHLORIDE 20 MEQ: 1500 TABLET, EXTENDED RELEASE ORAL at 12:27

## 2022-07-13 RX ADMIN — INSULIN LISPRO 1 UNITS: 100 INJECTION, SOLUTION INTRAVENOUS; SUBCUTANEOUS at 21:33

## 2022-07-13 NOTE — CASE MANAGEMENT
Case Management Discharge Planning Note    Patient name Rhonda Felty  [facility] [id number]  : 1945 Date 2022       Current Admission Date: 2022  Current Admission Diagnosis:Acute respiratory failure with hypoxia [facility])   Patient Active Problem List    Diagnosis Date Noted    RUPAL (acute kidney injury) ([facility] )     Chronic renal impairment     Acute respiratory failure with hypoxia ([facility] ) 2022    GI bleed 2022    SIRS (systemic inflammatory response syndrome) ([place] [address] ) 2022    Tachycardia 2022    Right renal mass 2022    Hypercalcemia 2022    Acute renal failure superimposed on stage 3 chronic kidney disease ([place] [address] ) 2022    Kidney cyst, acquired 2022    Chronic pain syndrome 10/05/2020    Chronic right-sided low back pain with right-sided sciatica 2020    Lumbar radiculopathy 2020    Polyarticular arthritis 2019    Class 1 obesity due to excess calories with serious comorbidity and body mass index (BMI) of 32 0 to 32 9 in adult 2019    Essential hypertension 09/10/2019    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 chronic kidney disease, without long-term current use of insulin ([place] [address] ) 09/10/2019    Left-sided weakness 07/10/2019    Cerebral aneurysm, nonruptured 2019    TIA (transient ischemic attack) 2019      LOS (days): 7  Geometric Mean LOS (GMLOS) (days): 2 10  Days to GMLOS:-4 6     OBJECTIVE:  Risk of Unplanned Readmission Score: 19 15         Current admission status: Inpatient   Preferred Pharmacy:   [address]   [place], [place] [address] [facility] [address]  Phone: [phone] Fax: [phone]    Primary Care Provider: Justin Frederick    Primary Insurance: THE [facility]  Secondary Insurance:     DISCHARGE DETAILS:                                          Other Referral/Resources/Interventions Provided:  Referral Comments: CM spoke with pt and spouse Mookie Wahl  Pt and and spouse requested [facility]  Pt and family aware accepting facilities of [facility] and [facility] can't hold post-acute beds  CM made referrals and will follow

## 2022-07-13 NOTE — PLAN OF CARE
Problem: PAIN - ADULT  Goal: Verbalizes/displays adequate comfort level or baseline comfort level  Description: Interventions:  - Encourage patient to monitor pain and request assistance  - Assess pain using appropriate pain scale  - Administer analgesics based on type and severity of pain and evaluate response  - Implement non-pharmacological measures as appropriate and evaluate response  - Consider cultural and social influences on pain and pain management  - Notify physician/advanced practitioner if interventions unsuccessful or patient reports new pain  Outcome: Progressing     Problem: INFECTION - ADULT  Goal: Absence or prevention of progression during hospitalization  Description: INTERVENTIONS:  - Assess and monitor for signs and symptoms of infection  - Monitor lab/diagnostic results  - Monitor all insertion sites, i e  indwelling lines, tubes, and drains  - Monitor endotracheal if appropriate and nasal secretions for changes in amount and color  - [place] appropriate cooling/warming therapies per order  - Administer medications as ordered  - Instruct and encourage patient and family to use good hand hygiene technique  - Identify and instruct in appropriate isolation precautions for identified infection/condition  Outcome: Progressing     Problem: DISCHARGE PLANNING  Goal: Discharge to home or other facility with appropriate resources  Description: INTERVENTIONS:  - Identify barriers to discharge w/patient and caregiver  - Arrange for needed discharge resources and transportation as appropriate  - Identify discharge learning needs (meds, wound care, etc )  - Arrange for interpretive services to assist at discharge as needed  - Refer to Case Management Department for coordinating discharge planning if the patient needs post-hospital services based on physician/advanced practitioner order or complex needs related to functional status, cognitive ability, or social support system  Outcome: Progressing Problem: Knowledge Deficit  Goal: Patient/family/caregiver demonstrates understanding of disease process, treatment plan, medications, and discharge instructions  Description: Complete learning assessment and assess knowledge base    Interventions:  - Provide teaching at level of understanding  - Provide teaching via preferred learning methods  Outcome: Progressing     Problem: Prexisting or High Potential for Compromised Skin Integrity  Goal: Skin integrity is maintained or improved  Description: INTERVENTIONS:  - Identify patients at risk for skin breakdown  - Assess and monitor skin integrity  - Assess and monitor nutrition and hydration status  - Monitor labs   - Assess for incontinence   - Turn and reposition patient  - Assist with mobility/ambulation  - Relieve pressure over bony prominences  - Avoid friction and shearing  - Provide appropriate hygiene as needed including keeping skin clean and dry  - Evaluate need for skin moisturizer/barrier cream  - Collaborate with interdisciplinary team   - Patient/family teaching  - Consider wound care consult   Outcome: Progressing     Problem: GENITOURINARY - ADULT  Goal: Absence of urinary retention  Description: INTERVENTIONS:  - Assess patients ability to void and empty bladder  - Monitor I/O  - Bladder scan as needed  - Discuss with physician/AP medications to alleviate retention as needed  - Discuss catheterization for long term situations as appropriate  Outcome: Progressing  Goal: Urinary catheter remains patent  Description: INTERVENTIONS:  - Assess patency of urinary catheter  - If patient has a chronic hernández, consider changing catheter if non-functioning  - Follow guidelines for intermittent irrigation of non-functioning urinary catheter  Outcome: Progressing     Problem: METABOLIC, FLUID AND ELECTROLYTES - ADULT  Goal: Electrolytes maintained within normal limits  Description: INTERVENTIONS:  - Monitor labs and assess patient for signs and symptoms of electrolyte imbalances  - Administer electrolyte replacement as ordered  - Monitor response to electrolyte replacements, including repeat lab results as appropriate  - Instruct patient on fluid and nutrition as appropriate  Outcome: Progressing

## 2022-07-13 NOTE — ASSESSMENT & PLAN NOTE
Lab Results   Component Value Date    HGBA1C 7 2 (A) 05/16/2022       Recent Labs     07/12/22  1046 07/12/22  1550 07/12/22 2055 07/13/22  0708   POCGLU 188* 188* 202* 121       Blood Sugar Average: Last 72 hrs:  (P) 171 [id number]     · Hold Home medication->Metformin while admitted  · Hyperglycemia relatively well controlled  · C/w Accu-checks and SSI AC/HS  · Continue Diabetic Diet and Hypoglycemic protocol  · Continue Lantus q h s

## 2022-07-13 NOTE — ASSESSMENT & PLAN NOTE
Lab Results   Component Value Date    EGFR 28 07/13/2022    EGFR 25 07/12/2022    EGFR 23 07/11/2022    CREATININE 2 20 (H) 07/13/2022    CREATININE 2 42 (H) 07/12/2022    CREATININE 2 57 (H) 07/11/2022     Patient acute on chronic kidney disease baseline creatinine is 1 4 prior to his nephrectomy    · s/p right nephrectomy  · Nephrology consulted; appreciate assistance  · Hydrated with IV fluid  · Avoid/Limit nephrotoxins and IV Contrast  · Avoid hypotension and fluctuations in BP  · Improving

## 2022-07-13 NOTE — PROGRESS NOTES
[facility]  Progress Note - Jose Roberto Santos 1945, 78 y o  male MRN: [id number]  Unit/Bed#: [facility] 809-01 Encounter: [id number]  Primary Care Provider: KAYODE Gupta   Date and time admitted to hospital: 7/6/2022 11:12 AM    Right renal mass  Assessment & Plan  Highly suspicious for renal cell carcinoma based on CT and MRI criteria  · Patient is s/p right nephrectomy on 07/06   · Urology Primary Team; continue management  · PENDING pathology collected on 07/06    SIRS (systemic inflammatory response syndrome) [facility])  Assessment & Plan  "Patient s/p right nephrectomy developed tachycardia, worsening leukocytosis, hypoxia with no clear source of infection at this time  · Procalcitonin: 1 21-> May be elevated in the setting of renal dysfunction  · Continue to monitor periodically  · lactic acid: Negative  · UA small leukocyte, pyuria 7/7/22  · TSH measured within reference range  · COVID/RSV/Influenza NEGATIVE  · Blood culture collected on 07/08 showed NGTD  · MRSA cultures collected on 07/08 NEGATIVE     · Urinalysis on 07/07 suggestive of acute cystitis with hematuria  · Completed 4 day course of IV cefepime  · Patient remains afebrile with improved Leukocytosis  · Continue to monitor CBC/ Fever curve    GI bleed  Assessment & Plan  Patient with an episode of coffee-ground emesis evaluated by GI was to undergo upper endoscopy this morning, procedure canceled due to hypoxia and tachycardia  · Currently hemodynamically stable  · Hgb remains stable  · C/w PPI  · Gastroenterology consulted; appreciate assistance  · Will hold off on inpatient evaluation at this time  If emesis re-occurs can re-evaluate EGD inpatient     · Stable H&H    Acute renal failure superimposed on stage 3 chronic kidney disease [facility])  Assessment & Plan  Lab Results   Component Value Date    EGFR 28 07/13/2022    EGFR 25 07/12/2022    EGFR 23 07/11/2022    CREATININE 2 20 (H) 07/13/2022 CREATININE 2 42 (H) 07/12/2022    CREATININE 2 57 (H) 07/11/2022     Patient acute on chronic kidney disease baseline creatinine is 1 4 prior to his nephrectomy  · s/p right nephrectomy  · Nephrology consulted; appreciate assistance  · Hydrated with IV fluid  · Avoid/Limit nephrotoxins and IV Contrast  · Avoid hypotension and fluctuations in BP  · Improving    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 chronic kidney disease, without long-term current use of insulin [facility])  Assessment & Plan  Lab Results   Component Value Date    HGBA1C 7 2 (A) 05/16/2022       Recent Labs     07/12/22  1046 07/12/22  1550 07/12/22  2055 07/13/22  0708   POCGLU 188* 188* 202* 121       Blood Sugar Average: Last 72 hrs:  (P) 171 [id number]     · Hold Home medication->Metformin while admitted  · Hyperglycemia relatively well controlled  · C/w Accu-checks and SSI AC/HS  · Continue Diabetic Diet and Hypoglycemic protocol  · Continue Lantus q h s  * Acute respiratory failure with hypoxia [facility])  Assessment & Plan    "-Patient developed tachycardia and hypoxia prior to undergoing EGD to evaluate upper GI bleed  -Endoscopy canceled due to hypoxia, pox  89% RA    -clinically patient does not appear volume overload, no wheezing, able to speak in full sentences"    · CXR on 07/08 identified small atelectasis  · V/Q scan showed low probability for PE  · With mildly elevated troponin likely elevated due to reduced renal clearance in the setting of ESRD  · Elevated procalcitonin on 07/08  · Patient remains free of respiratory distress, resting comfortably on room air  · Continue to monitor respiratory status  · Resolved      VTE Pharmacologic Prophylaxis: VTE Score: 7 High Risk (Score >/= 5) - Pharmacological DVT Prophylaxis Ordered: heparin  Sequential Compression Devices Ordered  Patient Centered Rounds: I performed bedside rounds with nursing staff today    Discussions with Specialists or Other Care Team Provider:     Education and Discussions with Family / Patient: Updated  (wife) at bedside  Time Spent for Care: 45 minutes  More than 50% of total time spent on counseling and coordination of care as described above  Current Length of Stay: 7 day(s)  Current Patient Status: Inpatient   Discharge Plan: Stable for discharge from [facility] standpoint    Code Status: Level 1 - Full Code    Subjective:   Patient is comfortable in chair  No event overnight  Wife at bedside    Objective:     Vitals:   Temp (24hrs), Av 7 °F (37 1 °C), Min:98 4 °F (36 9 °C), Max:99 1 °F (37 3 °C)    Temp:  [98 4 °F (36 9 °C)-99 1 °F (37 3 °C)] 98 5 °F (36 9 °C)  HR:  [61-78] 61  Resp:  [16-20] 18  BP: (158-171)/(78-85) 158/78  SpO2:  [95 %-98 %] 98 %  Body mass index is 32 01 kg/m²  Input and Output Summary (last 24 hours):      Intake/Output Summary (Last 24 hours) at 2022 1141  Last data filed at 2022 0825  Gross per 24 hour   Intake --   Output 1800 ml   Net -1800 ml       Physical Exam:   Physical Exam   Patient is awake alert oriented in no acute distress  Lung clear to auscultation bilateral  Heart positive S1-S2 no murmur  Abdomen soft  Lower extremities no edema    Additional Data:     Labs:  Results from last 7 days   Lab Units 22  0522  0511   WBC Thousand/uL 9 48 8 23   HEMOGLOBIN g/dL 8 8* 8 7*   HEMATOCRIT % 27 2* 25 9*   PLATELETS Thousands/uL 202 177   NEUTROS PCT %  --  67   LYMPHS PCT %  --  12*   MONOS PCT %  --  15*   EOS PCT %  --  4     Results from last 7 days   Lab Units 22  0519 22  0511 22  0456   SODIUM mmol/L 139   < > 141   POTASSIUM mmol/L 3 3*   < > 3 8   CHLORIDE mmol/L 108   < > 111*   CO2 mmol/L 24   < > 22   BUN mg/dL 38*   < > 43*   CREATININE mg/dL 2 20*   < > 2 57*   ANION GAP mmol/L 7   < > 8   CALCIUM mg/dL 8 0*   < > 8 0*   ALBUMIN g/dL  --   --  2 2*   TOTAL BILIRUBIN mg/dL  --   --  1 76*   ALK PHOS U/L  --   --  49   ALT U/L  --   --  15   AST U/L  --   --  15 GLUCOSE RANDOM mg/dL 113   < > 129    < > = values in this interval not displayed  Results from last 7 days   Lab Units 07/13/22  1050 07/13/22  0708 07/12/22  2055 07/12/22  1550 07/12/22  1046 07/12/22  0709 07/11/22  2052 07/11/22  1622 07/11/22  1136 07/11/22  0713 07/11/22  0511 07/10/22  2303   POC GLUCOSE mg/dl 140 121 202* 188* 188* 161* 257* 178* 168* 153* 142* 144*         Results from last 7 days   Lab Units 07/09/22  0017 07/08/22  1420   LACTIC ACID mmol/L 1 5  --    PROCALCITONIN ng/ml  --  1 21*       Lines/Drains:  Invasive Devices  Report    Peripheral Intravenous Line  Duration           Peripheral IV 07/10/22 Left;Ventral (anterior) Forearm 2 days                      Imaging: No pertinent imaging reviewed  Recent Cultures (last 7 days):   Results from last 7 days   Lab Units 07/08/22  1421   BLOOD CULTURE  No Growth After 4 Days  No Growth After 4 Days         Last 24 Hours Medication List:   Current Facility-Administered Medications   Medication Dose Route Frequency Provider Last Rate    acetaminophen  650 mg Oral Q6H [address] Tanja Flores MD      docusate sodium  100 mg Oral BID Tanja Flores MD      heparin (porcine)  5,000 Units Subcutaneous Q12H [address] Yareli Yancey MD      hydrALAZINE  5 mg Intravenous Q6H PRN Yareli Yancey MD      insulin glargine  10 Units Subcutaneous HS Judah Button, DO      insulin lispro  1-6 Units Subcutaneous TID AC KAYODE Gastelum      insulin lispro  1-6 Units Subcutaneous HS Merline Schlein, CRNP      neomycin-bacitracin-polymyxin b  1 small application Topical BID Tanja Flores MD      ondansetron  4 mg Intravenous Q6H PRN Tanja Flores MD      oxyCODONE  2 5 mg Oral Q4H PRN Tanja Flores MD      oxyCODONE  5 mg Oral Q4H PRN Tanja Flores MD      pantoprazole  40 mg Oral Early Morning Judah Button, DO      potassium chloride  20 mEq Oral BID MD Lora Downing  1 tablet Oral HS Jessie Filter, PA-C      simethicone  80 mg Oral Q6H PRN Jessie Filter, PA-C          Today, Patient Was Seen By: Debora Owusu DO    **Please Note: This note may have been constructed using a voice recognition system  **

## 2022-07-13 NOTE — PROGRESS NOTES
Progress Note - urology  Zoe Arriola 78 y o  male MRN: [id number]  Unit/Bed#: [facility] 809-01 Encounter: [id number]    Assessment & Plan:    Right renal mass:  -postop day 7 robotic assisted laparoscopic radical nephrectomy secondary to central mass  , progressing well  -postop day 1 developed coffee-ground emesis, GI consulted, patient originally scheduled for inpatient EGD but this was discontinued due to hypoxia  Current coffee ground emesis improved/resolved and plan for outpatient EGD with GI  Internal Medicine was consulted for further evaluation  Patient underwent PE workup which was negative V/Q scan low probability of possible embolism  -patient also developed tachycardia to which Medicine was consulted and EKG revealed sinus tact with frequent PACs without atrial fibrillation   -internal Medicine additionally following for blood sugar control in setting of diabetes, appreciate their assistance with managing this patient   -patient has history of CKD postoperatively nephrology was following baseline creatinine 1 4, continue to trend upwards and now trending downward at 2 2 in improving  Nephrology following appreciate their recommendations  Nephrology corrected mild hypokalemia  -PT and OT ordered, currently recommending short-term rehab, current discharge planning currently pending   -encourage incentive spirometer  -provided good bowel regimen  -urethral Baird catheter removed yesterday, patient currently waiting, will obtain PVR to ensure adequate voiding   -will maintain staples for additional week, urology to remove on outpatient  -/today creatinine 2 20, hemoglobin 8 8, no leukocytosis,, patient afebrile and vital signs are within normal limits  Discharge pending, awaiting placement    Subjective/Objective   Chief Complaint:  None    Subjective:   Patient sitting comfortably in chair no acute distress denying any nausea, vomiting, fevers, chills, flank pain or abdominal pain    Reports he is ambulating the halls with physical therapy  Reports he is doing well  Feels much improved even compared to yesterday  He reports that he is passing flatus and had a small bowel movement  He is tolerating advanced diet  He is patiently awaiting placement    Objective:     Blood pressure 158/78, pulse 61, temperature 98 5 °F (36 9 °C), resp  rate 18, height 6' (1 829 m), weight 107 kg (236 lb), SpO2 98 %  ,Body mass index is 32 01 kg/m²  Intake/Output Summary (Last 24 hours) at 7/13/2022 1242  Last data filed at 7/13/2022 0825  Gross per 24 hour   Intake --   Output 1800 ml   Net -1800 ml       Invasive Devices  Report    Peripheral Intravenous Line  Duration           Peripheral IV 07/10/22 Left;Ventral (anterior) Forearm 2 days                Physical Exam  Constitutional:       General: He is not in acute distress  Appearance: He is obese  He is not ill-appearing, toxic-appearing or diaphoretic  HENT:      Head: Normocephalic and atraumatic  Right Ear: External ear normal       Left Ear: External ear normal       Nose: Nose normal       Mouth/Throat:      Pharynx: Oropharynx is clear  Eyes:      Conjunctiva/sclera: Conjunctivae normal    Cardiovascular:      Rate and Rhythm: Normal rate and regular rhythm  Pulses: Normal pulses  Heart sounds: No murmur heard  No friction rub  No gallop  Pulmonary:      Effort: Pulmonary effort is normal  No respiratory distress  Breath sounds: No wheezing, rhonchi or rales  Abdominal:      General: Bowel sounds are normal  There is no distension  Tenderness: There is no abdominal tenderness  Comments: Surgical incisions intact, dry staples in proper position, no notable drainage, no signs of infections or wound dehiscence    Bowel sounds present throughout, abdomen is soft and nontender   Genitourinary:     Comments: Mild scrotal swelling most likely dependent due to mildly fluid overloaded  Musculoskeletal:         General: Normal range of motion  Cervical back: Normal range of motion  Skin:     General: Skin is warm and dry  Neurological:      General: No focal deficit present  Mental Status: He is alert and oriented to person, place, and time  Psychiatric:         Mood and Affect: Mood normal          Behavior: Behavior normal          Thought Content: Thought content normal          Judgment: Judgment normal          Lab, Imaging and other studies:I have personally reviewed pertinent lab results    Lab Results   Component Value Date    WBC 9 48 07/13/2022    HGB 8 8 (L) 07/13/2022    HCT 27 2 (L) 07/13/2022    MCV 94 07/13/2022     07/13/2022     Lab Results   Component Value Date    SODIUM 139 07/13/2022    K 3 3 (L) 07/13/2022     07/13/2022    CO2 24 07/13/2022    BUN 38 (H) 07/13/2022    CREATININE 2 20 (H) 07/13/2022    GLUC 113 07/13/2022    CALCIUM 8 0 (L) 07/13/2022         VTE Pharmacologic Prophylaxis: Heparin  VTE Mechanical Prophylaxis: sequential compression device      Noreen Decker PA-C

## 2022-07-13 NOTE — PROGRESS NOTES
NEPHROLOGY PROGRESS NOTE    Tenzin Flynn 78 y o  male MRN: [id number]  Unit/Bed#: [facility] 809-01 Encounter: [id number]  Reason for Consult:  Acute on chronic kidney disease    Patient is awake alert sitting in the chair Baird catheter is out says he is urinating well and actually look stronger today and feeling better  Otherwise no acute changes or complaints  ASSESSMENT/PLAN:  1  Renal    Patient acute on chronic kidney disease baseline creatinine is 1 4 prior to his nephrectomy  He had acute renal failure after his nephrectomy in creatinine has been improving now and today is 2 2  Hopefully will continue to recover in approach prior to baseline with a solitary kidney  Mild hypokalemia present so will order KCl 20 mEq p o  X2 doses today and check in a m  Svetlana Blend Baird catheter out monitor urination and patient feels like he is emptying his bladder completely  Otherwise stable with no changes  BMP a m   KCl 20 mEq p o  X2 doses today    2  Status post right radical nephrectomy  Patient improving continue with physical therapy postop care per Urology  Baird catheter is out  SUBJECTIVE:  Review of Systems   Constitutional: Negative for chills, diaphoresis, fever and night sweats  Feeling a little stronger  HENT: Negative  Eyes: Negative  Cardiovascular: Negative for chest pain, dyspnea on exertion, leg swelling and orthopnea  Respiratory: Negative  Negative for cough, shortness of breath, sputum production and wheezing  Gastrointestinal: Negative for abdominal pain, diarrhea, nausea and vomiting  Genitourinary: Negative for dysuria, flank pain, hematuria and incomplete emptying  Baird catheter out  Neurological: Negative for dizziness, focal weakness, headaches and weakness  Psychiatric/Behavioral: Negative for altered mental status, depression, hallucinations and hypervigilance         OBJECTIVE:  Current Weight: Weight - Scale: 107 kg (236 lb)  Vitals:Temp (24hrs), Av 7 °F (37 1 °C), Min:98 4 °F (36 9 °C), Max:99 1 °F (37 3 °C)  Current: Temperature: 98 5 °F (36 9 °C)   Blood pressure 158/78, pulse 61, temperature 98 5 °F (36 9 °C), resp  rate 18, height 6' (1 829 m), weight 107 kg (236 lb), SpO2 98 %  , Body mass index is 32 01 kg/m²  Intake/Output Summary (Last 24 hours) at 2022 0931  Last data filed at 2022 0155  Gross per 24 hour   Intake --   Output 1450 ml   Net -1450 ml       Physical Exam: /78   Pulse 61   Temp 98 5 °F (36 9 °C)   Resp 18   Ht 6' (1 829 m)   Wt 107 kg (236 lb)   SpO2 98%   BMI 32 01 kg/m²   Physical Exam  Constitutional:       General: He is not in acute distress  Appearance: He is not toxic-appearing or diaphoretic  HENT:      Head: Normocephalic and atraumatic  Mouth/Throat:      Mouth: Mucous membranes are moist    Eyes:      General: No scleral icterus  Extraocular Movements: Extraocular movements intact  Cardiovascular:      Rate and Rhythm: Normal rate and regular rhythm  Heart sounds: No friction rub  No gallop  Comments: Mild pretibial edema  Pulmonary:      Effort: Pulmonary effort is normal  No respiratory distress  Breath sounds: No wheezing, rhonchi or rales  Abdominal:      General: Bowel sounds are normal  There is no distension  Palpations: Abdomen is soft  Tenderness: There is no abdominal tenderness  There is no rebound  Musculoskeletal:      Cervical back: Normal range of motion and neck supple  Neurological:      General: No focal deficit present  Mental Status: He is alert and oriented to person, place, and time  Mental status is at baseline  Psychiatric:         Mood and Affect: Mood normal          Behavior: Behavior normal          Thought Content:  Thought content normal          Judgment: Judgment normal          Medications:    Current Facility-Administered Medications:     acetaminophen (TYLENOL) tablet 650 mg, 650 mg, Oral, Q6H [facility], Tanja Flores MD, 650 mg at 07/13/22 0525    docusate sodium (COLACE) capsule 100 mg, 100 mg, Oral, BID, Tanja Flores MD, 100 mg at 07/13/22 0827    heparin (porcine) subcutaneous injection 5,000 Units, 5,000 Units, Subcutaneous, Q12H [address], Yareli Yancey MD, 5,000 Units at 07/13/22 0827    hydrALAZINE (APRESOLINE) injection 5 mg, 5 mg, Intravenous, Q6H PRN, Yareli Yancey MD    insulin glargine (LANTUS) subcutaneous injection 10 Units 0 1 mL, 10 Units, Subcutaneous, HS, Judah Button, DO, 10 Units at 07/12/22 2154    insulin lispro (HumaLOG) 100 units/mL subcutaneous injection 1-6 Units, 1-6 Units, Subcutaneous, TID AC, 1 Units at 07/12/22 1657 **AND** Fingerstick Glucose (POCT), , , TID AC, KAYODE Gastelum    insulin lispro (HumaLOG) 100 units/mL subcutaneous injection 1-6 Units, 1-6 Units, Subcutaneous, HS, KAYODE Gastelum, 2 Units at 07/12/22 2138    neomycin-bacitracin-polymyxin b (NEOSPORIN) ointment 1 small application, 1 small application, Topical, BID, Tanja Flores MD, 1 small application at 25/60/46 0827    ondansetron (ZOFRAN) injection 4 mg, 4 mg, Intravenous, Q6H PRN, Tanja Flores MD, 4 mg at 07/07/22 0106    oxyCODONE (ROXICODONE) IR tablet 2 5 mg, 2 5 mg, Oral, Q4H PRN, Tanja Flores MD    oxyCODONE (ROXICODONE) IR tablet 5 mg, 5 mg, Oral, Q4H PRN, Tanja Flores MD, 5 mg at 07/07/22 0254    pantoprazole (PROTONIX) EC tablet 40 mg, 40 mg, Oral, Early Morning, Judah Button, DO, 40 mg at 07/13/22 0525    senna (SENOKOT) tablet 8 6 mg, 1 tablet, Oral, HS, Josefina Minor, PA-C, 8 6 mg at 07/12/22 2138    simethicone (MYLICON) chewable tablet 80 mg, 80 mg, Oral, Q6H PRN, Josefina Sprague PA-C    Laboratory Results:  Lab Results   Component Value Date    WBC 9 48 07/13/2022    HGB 8 8 (L) 07/13/2022    HCT 27 2 (L) 07/13/2022    MCV 94 07/13/2022     07/13/2022     Lab Results   Component Value Date    SODIUM 139 07/13/2022    K 3 3 (L) 07/13/2022     07/13/2022    CO2 24 07/13/2022    BUN 38 (H) 07/13/2022    CREATININE 2 20 (H) 07/13/2022    GLUC 113 07/13/2022    CALCIUM 8 0 (L) 07/13/2022     Lab Results   Component Value Date    CALCIUM 8 0 (L) 07/13/2022    PHOS 2 0 (L) 07/11/2022     No results found for: LABPROT

## 2022-07-13 NOTE — [facility] ADMISSION
Referral received for consideration of patient for Inpatient Acute Rehab  Reviewed patients case with [facility] physician, patient has been approved for [facility] pending insurance authorization approval  [facility] Admissions will submit for insurance authorization, continue to follow patient and notify CM with any updates  Patient will need a Covid test prior to admitting to [facility]  Provided update with same to [address] [place] [address] through ISRAEL Warner

## 2022-07-14 LAB
ANION GAP SERPL CALCULATED.3IONS-SCNC: 4 MMOL/L (ref 4–13)
BUN SERPL-MCNC: 35 MG/DL (ref 5–25)
CALCIUM SERPL-MCNC: 8.2 MG/DL (ref 8.3–10.1)
CHLORIDE SERPL-SCNC: 110 MMOL/L (ref 100–108)
CO2 SERPL-SCNC: 24 MMOL/L (ref 21–32)
CREAT SERPL-MCNC: 2.13 MG/DL (ref 0.6–1.3)
GFR SERPL CREATININE-BSD FRML MDRD: 29 ML/MIN/1.73SQ M
GLUCOSE SERPL-MCNC: 119 MG/DL (ref 65–140)
GLUCOSE SERPL-MCNC: 124 MG/DL (ref 65–140)
GLUCOSE SERPL-MCNC: 148 MG/DL (ref 65–140)
GLUCOSE SERPL-MCNC: 163 MG/DL (ref 65–140)
GLUCOSE SERPL-MCNC: 207 MG/DL (ref 65–140)
POTASSIUM SERPL-SCNC: 3.9 MMOL/L (ref 3.5–5.3)
SODIUM SERPL-SCNC: 138 MMOL/L (ref 136–145)

## 2022-07-14 PROCEDURE — 99233 SBSQ HOSP IP/OBS HIGH 50: CPT | Performed by: INTERNAL MEDICINE

## 2022-07-14 PROCEDURE — 99024 POSTOP FOLLOW-UP VISIT: CPT | Performed by: PHYSICIAN ASSISTANT

## 2022-07-14 PROCEDURE — 82948 REAGENT STRIP/BLOOD GLUCOSE: CPT

## 2022-07-14 PROCEDURE — 80048 BASIC METABOLIC PNL TOTAL CA: CPT | Performed by: INTERNAL MEDICINE

## 2022-07-14 PROCEDURE — 99232 SBSQ HOSP IP/OBS MODERATE 35: CPT | Performed by: INTERNAL MEDICINE

## 2022-07-14 RX ADMIN — PANTOPRAZOLE SODIUM 40 MG: 40 TABLET, DELAYED RELEASE ORAL at 05:43

## 2022-07-14 RX ADMIN — ACETAMINOPHEN 650 MG: 325 TABLET, FILM COATED ORAL at 17:44

## 2022-07-14 RX ADMIN — INSULIN GLARGINE 10 UNITS: 100 INJECTION, SOLUTION SUBCUTANEOUS at 21:46

## 2022-07-14 RX ADMIN — ACETAMINOPHEN 650 MG: 325 TABLET, FILM COATED ORAL at 11:23

## 2022-07-14 RX ADMIN — DOCUSATE SODIUM 100 MG: 100 CAPSULE, LIQUID FILLED ORAL at 17:44

## 2022-07-14 RX ADMIN — INSULIN LISPRO 2 UNITS: 100 INJECTION, SOLUTION INTRAVENOUS; SUBCUTANEOUS at 17:45

## 2022-07-14 RX ADMIN — ACETAMINOPHEN 650 MG: 325 TABLET, FILM COATED ORAL at 05:43

## 2022-07-14 RX ADMIN — HEPARIN SODIUM 5000 UNITS: 5000 INJECTION INTRAVENOUS; SUBCUTANEOUS at 11:23

## 2022-07-14 RX ADMIN — HEPARIN SODIUM 5000 UNITS: 5000 INJECTION INTRAVENOUS; SUBCUTANEOUS at 21:46

## 2022-07-14 RX ADMIN — ACETAMINOPHEN 650 MG: 325 TABLET, FILM COATED ORAL at 01:07

## 2022-07-14 RX ADMIN — INSULIN LISPRO 1 UNITS: 100 INJECTION, SOLUTION INTRAVENOUS; SUBCUTANEOUS at 21:46

## 2022-07-14 RX ADMIN — DOCUSATE SODIUM 100 MG: 100 CAPSULE, LIQUID FILLED ORAL at 11:23

## 2022-07-14 NOTE — PROGRESS NOTES
NEPHROLOGY PROGRESS NOTE    Mir Chawla 78 y o  male MRN: [id number]  Unit/Bed#: [facility] 809-01 Encounter: [id number]  Reason for Consult:  Acute on chronic kidney disease    Patient is awake and alert he really looks well sitting up in a chair states that he is doing well with physical therapy  Eating better in each day he looks a little bit stronger and is feeling better  ASSESSMENT/PLAN:  1  Renal    Patient's chronic kidney disease with baseline creatinine 1 4 which was prior to his nephrectomy  He was underwent nephrectomy developed acute renal failure postoperatively likely due to ATN  His creatinine is been slowly improving each day during this week and is actually 2 1 today  I told him I am not sure how low it will go in time but certainly improved  Potassium is normal no metabolic acidosis  Given supplementation of potassium yesterday  Continue to monitor clinically  No changes    2  Status post right nephrectomy  Postop care per Urology  Baird catheter is out he is urinating well creatinine is improving  SUBJECTIVE:  Review of Systems   Constitutional: Negative for chills, diaphoresis, fever and night sweats  Feeling stronger  HENT: Negative  Eyes: Negative  Cardiovascular: Negative for chest pain, dyspnea on exertion, orthopnea and palpitations  Respiratory: Negative  Negative for cough, shortness of breath, sputum production and wheezing  Gastrointestinal: Negative for diarrhea, nausea and vomiting  Mild postop discomfort right lower quadrant  Genitourinary: Negative for dysuria, flank pain, hematuria and incomplete emptying  Neurological: Negative for dizziness, focal weakness, headaches and weakness  Psychiatric/Behavioral: Negative for altered mental status, depression, hallucinations and hypervigilance         OBJECTIVE:  Current Weight: Weight - Scale: 107 kg (236 lb)  Vitals:Temp (24hrs), Av 7 °F (37 1 °C), Min:98 7 °F (37 1 °C), Max:98 7 °F (37 1 °C)  Current: Temperature: 98 7 °F (37 1 °C)   Blood pressure 169/86, pulse 68, temperature 98 7 °F (37 1 °C), resp  rate 18, height 6' (1 829 m), weight 107 kg (236 lb), SpO2 95 %  , Body mass index is 32 01 kg/m²  Intake/Output Summary (Last 24 hours) at 7/14/2022 1126  Last data filed at 7/14/2022 0810  Gross per 24 hour   Intake 300 ml   Output 600 ml   Net -300 ml       Physical Exam: /86   Pulse 68   Temp 98 7 °F (37 1 °C)   Resp 18   Ht 6' (1 829 m)   Wt 107 kg (236 lb)   SpO2 95%   BMI 32 01 kg/m²   Physical Exam  Constitutional:       General: He is not in acute distress  Appearance: He is not toxic-appearing or diaphoretic  HENT:      Head: Normocephalic and atraumatic  Nose: Nose normal       Mouth/Throat:      Mouth: Mucous membranes are moist    Eyes:      General: No scleral icterus  Extraocular Movements: Extraocular movements intact  Cardiovascular:      Rate and Rhythm: Normal rate and regular rhythm  Heart sounds: No friction rub  No gallop  Comments: Positive edema  Pulmonary:      Effort: Pulmonary effort is normal  No respiratory distress  Breath sounds: No wheezing, rhonchi or rales  Abdominal:      General: Bowel sounds are normal  There is no distension  Palpations: Abdomen is soft  Tenderness: There is no abdominal tenderness  There is no rebound  Musculoskeletal:      Cervical back: Normal range of motion and neck supple  Skin:     Comments: Bruise on right posterior flank and buttock region   Neurological:      General: No focal deficit present  Mental Status: He is alert and oriented to person, place, and time  Mental status is at baseline  Psychiatric:         Mood and Affect: Mood normal          Behavior: Behavior normal          Thought Content:  Thought content normal          Judgment: Judgment normal          Medications:    Current Facility-Administered Medications:     acetaminophen (TYLENOL) tablet 650 mg, 650 mg, Oral, Q6H [facility], Christal Sanford MD, 650 mg at 07/14/22 0543    docusate sodium (COLACE) capsule 100 mg, 100 mg, Oral, BID, Christal Sanford MD, 100 mg at 07/13/22 0827    heparin (porcine) subcutaneous injection 5,000 Units, 5,000 Units, Subcutaneous, Q12H [facility], Cliff Cartagena MD, 5,000 Units at 07/13/22 2133    hydrALAZINE (APRESOLINE) injection 5 mg, 5 mg, Intravenous, Q6H PRN, Cliff Cartagena MD    insulin glargine (LANTUS) subcutaneous injection 10 Units 0 1 mL, 10 Units, Subcutaneous, HS, Karin Franco DO, 10 Units at 07/13/22 2132    insulin lispro (HumaLOG) 100 units/mL subcutaneous injection 1-6 Units, 1-6 Units, Subcutaneous, TID AC, 2 Units at 07/13/22 1744 **AND** Fingerstick Glucose (POCT), , , TID AC, KAYODE Gastelum    insulin lispro (HumaLOG) 100 units/mL subcutaneous injection 1-6 Units, 1-6 Units, Subcutaneous, HS, KAYODE Gastelum, 1 Units at 07/13/22 2133    neomycin-bacitracin-polymyxin b (NEOSPORIN) ointment 1 small application, 1 small application, Topical, BID, Christal Sanford MD, 1 small application at 94/88/74 1744    ondansetron (ZOFRAN) injection 4 mg, 4 mg, Intravenous, Q6H PRN, Christal Sanford MD, 4 mg at 07/07/22 0106    oxyCODONE (ROXICODONE) IR tablet 2 5 mg, 2 5 mg, Oral, Q4H PRN, Christal Sanford MD    oxyCODONE (ROXICODONE) IR tablet 5 mg, 5 mg, Oral, Q4H PRN, Christal Sanofrd MD, 5 mg at 07/07/22 0254    pantoprazole (PROTONIX) EC tablet 40 mg, 40 mg, Oral, Early Morning, Karin Franco DO, 40 mg at 07/14/22 0543    senna (SENOKOT) tablet 8 6 mg, 1 tablet, Oral, HS, Antwon Ruiz PA-C, 8 6 mg at 07/13/22 2137    simethicone (MYLICON) chewable tablet 80 mg, 80 mg, Oral, Q6H PRN, Antwon Ruiz PA-C    Laboratory Results:  Lab Results   Component Value Date    WBC 9 48 07/13/2022    HGB 8 8 (L) 07/13/2022    HCT 27 2 (L) 07/13/2022    MCV 94 07/13/2022     07/13/2022     Lab Results   Component Value Date    SODIUM 138 07/14/2022    K 3 9 07/14/2022     (H) 07/14/2022    CO2 24 07/14/2022    BUN 35 (H) 07/14/2022    CREATININE 2 13 (H) 07/14/2022    GLUC 119 07/14/2022    CALCIUM 8 2 (L) 07/14/2022     Lab Results   Component Value Date    CALCIUM 8 2 (L) 07/14/2022    PHOS 2 0 (L) 07/11/2022     No results found for: LABPROT

## 2022-07-14 NOTE — ASSESSMENT & PLAN NOTE
Lab Results   Component Value Date    EGFR 29 07/14/2022    EGFR 28 07/13/2022    EGFR 25 07/12/2022    CREATININE 2 13 (H) 07/14/2022    CREATININE 2 20 (H) 07/13/2022    CREATININE 2 42 (H) 07/12/2022     Patient acute on chronic kidney disease baseline creatinine is 1 4 prior to his nephrectomy    · s/p right nephrectomy  · Nephrology consulted; appreciate assistance  · Hydrated with IV fluid  · Avoid/Limit nephrotoxins and IV Contrast  · Avoid hypotension and fluctuations in BP  · Improving

## 2022-07-14 NOTE — PLAN OF CARE
Problem: PAIN - ADULT  Goal: Verbalizes/displays adequate comfort level or baseline comfort level  Description: Interventions:  - Encourage patient to monitor pain and request assistance  - Assess pain using appropriate pain scale  - Administer analgesics based on type and severity of pain and evaluate response  - Implement non-pharmacological measures as appropriate and evaluate response  - Consider cultural and social influences on pain and pain management  - Notify physician/advanced practitioner if interventions unsuccessful or patient reports new pain  Outcome: Progressing     Problem: INFECTION - ADULT  Goal: Absence or prevention of progression during hospitalization  Description: INTERVENTIONS:  - Assess and monitor for signs and symptoms of infection  - Monitor lab/diagnostic results  - Monitor all insertion sites, i e  indwelling lines, tubes, and drains  - Monitor endotracheal if appropriate and nasal secretions for changes in amount and color  - Gregory appropriate cooling/warming therapies per order  - Administer medications as ordered  - Instruct and encourage patient and family to use good hand hygiene technique  - Identify and instruct in appropriate isolation precautions for identified infection/condition  Outcome: Progressing

## 2022-07-14 NOTE — PROGRESS NOTES
[address] [facility]  Progress Note - Marisel Alfonso 1945, 78 y o  male MRN: [id number]  Unit/Bed#: [facility] 809-01 Encounter: [id number]  Primary Care Provider: KAYODE aGy   Date and time admitted to hospital: 7/6/2022 11:12 AM    Right renal mass  Assessment & Plan  Highly suspicious for renal cell carcinoma based on CT and MRI criteria  · Patient is s/p right nephrectomy on 07/06   · Urology Primary Team; continue management  · PENDING pathology collected on 07/06    GI bleed  Assessment & Plan  Patient with an episode of coffee-ground emesis evaluated by GI was to undergo upper endoscopy this morning, procedure canceled due to hypoxia and tachycardia  · Currently hemodynamically stable  · Hgb remains stable  · C/w PPI  · Gastroenterology consulted; appreciate assistance  · Will hold off on inpatient evaluation at this time  If emesis re-occurs can re-evaluate EGD inpatient  · Stable H&H    Acute renal failure superimposed on stage 3 chronic kidney disease [facility])  Assessment & Plan  Lab Results   Component Value Date    EGFR 29 07/14/2022    EGFR 28 07/13/2022    EGFR 25 07/12/2022    CREATININE 2 13 (H) 07/14/2022    CREATININE 2 20 (H) 07/13/2022    CREATININE 2 42 (H) 07/12/2022     Patient acute on chronic kidney disease baseline creatinine is 1 4 prior to his nephrectomy    · s/p right nephrectomy  · Nephrology consulted; appreciate assistance  · Hydrated with IV fluid  · Avoid/Limit nephrotoxins and IV Contrast  · Avoid hypotension and fluctuations in BP  · Improving    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 chronic kidney disease, without long-term current use of insulin [facility])  Assessment & Plan  Lab Results   Component Value Date    HGBA1C 7 2 (A) 05/16/2022       Recent Labs     07/13/22  1606 07/13/22  2119 07/14/22  0541 07/14/22  1124   POCGLU 205* 167* 124 148*       Blood Sugar Average: Last 72 hrs:  (P) 113 [id number]     · Auguste Dr Galan 15 medication->Metformin while admitted  · Hyperglycemia relatively well controlled  · C/w Accu-checks and SSI AC/HS  · Continue Diabetic Diet and Hypoglycemic protocol  · Continue Lantus q h s  Essential hypertension  Assessment & Plan  · BP reviewed and remains stable  · Continue home medication:  · Lopressor 25 mg BID  · lisinopril and HCTZ held due to prior hypotension and RUPAL  · Ordered PRN IV Hydralazine  · Continue to monitor       VTE Pharmacologic Prophylaxis: VTE Score: 7 High Risk (Score >/= 5) - Pharmacological DVT Prophylaxis Ordered: heparin  Sequential Compression Devices Ordered  Patient Centered Rounds: I performed bedside rounds with nursing staff today  Discussions with Specialists or Other Care Team Provider:     Education and Discussions with Family / Patient:    Time Spent for Care: 45 minutes  More than 50% of total time spent on counseling and coordination of care as described above  Current Length of Stay: 8 day(s)  Current Patient Status: Inpatient   Certification Statement: The patient will continue to require additional inpatient hospital stay due to Awaiting rehab placement  Discharge Plan: Awaiting rehab placement    Code Status: Level 1 - Full Code    Subjective:   Patient is comfortable sitting in chair  No chest pain or shortness of breath    Objective:     Vitals:   No data recorded  Body mass index is 32 01 kg/m²  Input and Output Summary (last 24 hours):      Intake/Output Summary (Last 24 hours) at 7/14/2022 1650  Last data filed at 7/14/2022 1650  Gross per 24 hour   Intake 300 ml   Output 1000 ml   Net -700 ml       Physical Exam:   Physical Exam     Patient is awake alert oriented in no acute distress  Lung clear to auscultation bilateral  Heart positive S1-S2 no murmur  Abdomen soft, nontender, surgical site is clean, staples in place  Lower extremities no edema  Additional Data:     Labs:  Results from last 7 days   Lab Units 07/13/22  0519 07/12/22  0511 WBC Thousand/uL 9 48 8 23   HEMOGLOBIN g/dL 8 8* 8 7*   HEMATOCRIT % 27 2* 25 9*   PLATELETS Thousands/uL 202 177   NEUTROS PCT %  --  67   LYMPHS PCT %  --  12*   MONOS PCT %  --  15*   EOS PCT %  --  4     Results from last 7 days   Lab Units 07/14/22  0623 07/12/22  0511 07/11/22  0456   SODIUM mmol/L 138   < > 141   POTASSIUM mmol/L 3 9   < > 3 8   CHLORIDE mmol/L 110*   < > 111*   CO2 mmol/L 24   < > 22   BUN mg/dL 35*   < > 43*   CREATININE mg/dL 2 13*   < > 2 57*   ANION GAP mmol/L 4   < > 8   CALCIUM mg/dL 8 2*   < > 8 0*   ALBUMIN g/dL  --   --  2 2*   TOTAL BILIRUBIN mg/dL  --   --  1 76*   ALK PHOS U/L  --   --  49   ALT U/L  --   --  15   AST U/L  --   --  15   GLUCOSE RANDOM mg/dL 119   < > 129    < > = values in this interval not displayed  Results from last 7 days   Lab Units 07/14/22  1647 07/14/22  1124 07/14/22  0541 07/13/22  2119 07/13/22  1606 07/13/22  1050 07/13/22  0708 07/12/22  2055 07/12/22  1550 07/12/22  1046 07/12/22  0709 07/11/22  2052   POC GLUCOSE mg/dl 207* 148* 124 167* 205* 140 121 202* 188* 188* 161* 257*         Results from last 7 days   Lab Units 07/09/22  0017 07/08/22  1420   LACTIC ACID mmol/L 1 5  --    PROCALCITONIN ng/ml  --  1 21*       Lines/Drains:  Invasive Devices  Report    Peripheral Intravenous Line  Duration           Peripheral IV 07/10/22 Left;Ventral (anterior) Forearm 3 days                      Imaging: No pertinent imaging reviewed  Recent Cultures (last 7 days):   Results from last 7 days   Lab Units 07/08/22  1421   BLOOD CULTURE  No Growth After 5 Days  No Growth After 5 Days         Last 24 Hours Medication List:   Current Facility-Administered Medications   Medication Dose Route Frequency Provider Last Rate    acetaminophen  650 mg Oral Q6H [address] Tanja Flores MD      docusate sodium  100 mg Oral BID Tanja Flores MD      heparin (porcine)  5,000 Units Subcutaneous Q12H [address] Yareli Yancey MD      hydrALAZINE  5 mg Intravenous Q6H PRN Lexy Ferrer MD      insulin glargine  10 Units Subcutaneous HS Diego Vargas DO      insulin lispro  1-6 Units Subcutaneous TID AC KAYODE Gastelum      insulin lispro  1-6 Units Subcutaneous HS KAYODE Tay      neomycin-bacitracin-polymyxin b  1 small application Topical BID Bryan Ball MD      ondansetron  4 mg Intravenous Q6H PRN Bryan Ball MD      oxyCODONE  2 5 mg Oral Q4H PRN Bryan Ball MD      oxyCODONE  5 mg Oral Q4H PRN Bryan Ball MD      pantoprazole  40 mg Oral Early Morning Diego Vargas DO      simethicone  80 mg Oral Q6H PRN Duana Boxer, PA-C          Today, Patient Was Seen By: Diego Vargas DO    **Please Note: This note may have been constructed using a voice recognition system  **

## 2022-07-14 NOTE — PROGRESS NOTES
Progress Note - Urology  Saritha  1945, 78 y o  male MRN: [id number]    Unit/Bed#: [facility] 809-01 Encounter: [id number]      Assessment  Right renal mass  Prolonged postoperative recovery, deconditioning    Plan  POD#8 right radical nephrectomy  Postop period complicated by perinephric hematoma, tachycardia and anemia related to that, resolved with conservative measures, and 1 unit PRBCs transfusion over the weekend  Single episode of coffee ground emesis, also resolved, on PPI, no plans for GI interventions  Acute kidney injury is improving, Baird catheter removed 07/12/2022, creatinine continues to trend down, he is voiding well, postvoid residual minimal 10 mL  Tolerating regular diet for the last four days  Working with physical therapy  Analgesic doing well with acetaminophen only  Stable for discharge to rehab today  Peer to peer was requested for acuity of rehab placement  I completed this and pt is appropriate for subacute rehab, but not acute rehab  This affects his location of choice  CM updated with same and adjust placement  Subjective: voiding well  Explosive diarrhea last night, again cramping this morning but passed some small solid small stool balls  Eating well good appetite  Walking in the room, not yet in halls but feels would need walker  No leg pain/cramping/swelling  Review of Systems   Constitutional: Positive for fatigue  Negative for activity change, appetite change, chills and fever  Respiratory: Negative for cough and shortness of breath  Cardiovascular: Negative for chest pain  Gastrointestinal: Positive for abdominal pain and diarrhea  Negative for abdominal distention, anal bleeding, blood in stool, constipation, nausea, rectal pain and vomiting  Genitourinary: Negative for decreased urine volume, difficulty urinating, dysuria, frequency, hematuria, penile pain, penile swelling, scrotal swelling and urgency  Musculoskeletal: Positive for gait problem  Negative for back pain  Skin: Negative for rash and wound  Neurological: Negative for dizziness, seizures and light-headedness  Hematological: Does not bruise/bleed easily  Objective:  Vitals: Blood pressure 169/86, pulse 68, temperature 98 7 °F (37 1 °C), resp  rate 18, height 6' (1 829 m), weight 107 kg (236 lb), SpO2 95 %  ,Body mass index is 32 01 kg/m²  Intake/Output Summary (Last 24 hours) at 7/14/2022 1313  Last data filed at 7/14/2022 0810  Gross per 24 hour   Intake 300 ml   Output 600 ml   Net -300 ml     Invasive Devices  Report    Peripheral Intravenous Line  Duration           Peripheral IV 07/10/22 Left;Ventral (anterior) Forearm 3 days                Physical Exam  Vitals and nursing note reviewed  Constitutional:       Appearance: He is well-developed  Comments: Obese white male seated in bedside chair eating lunch   HENT:      Head: Normocephalic and atraumatic  Cardiovascular:      Rate and Rhythm: Normal rate and regular rhythm  Heart sounds: Normal heart sounds  No murmur heard  Pulmonary:      Effort: Pulmonary effort is normal       Breath sounds: Normal breath sounds  Abdominal:      General: Bowel sounds are normal  There is no distension  Palpations: Abdomen is soft  There is no mass  Tenderness: There is no abdominal tenderness  There is no right CVA tenderness or left CVA tenderness  Hernia: No hernia is present  Comments: Right-sided surgical incisions are clean and dry, staples intact  Genitourinary:     Comments: Penis normal, patent meatus, no scrotal edema  Musculoskeletal:         General: Normal range of motion  Skin:     General: Skin is warm and dry  Capillary Refill: Capillary refill takes less than 2 seconds  Coloration: Skin is not pale  Neurological:      Mental Status: He is alert and oriented to person, place, and time  Gait: Gait abnormal (Slow with rolling walker)     Psychiatric:         Mood and Affect: Mood normal          Labs:  Recent Labs     07/12/22  0511 07/13/22  0519   WBC 8 23 9 48     Recent Labs     07/11/22  1822 07/12/22  0511 07/13/22  0519   HGB 8 8* 8 7* 8 8*       Recent Labs     07/12/22  0511 07/13/22  0519 07/14/22  0623   CREATININE 2 42* 2 20* 2 13*       History:    Past Medical History:   Diagnosis Date    Diabetes mellitus ([address] )     Hypertension     TIA (transient ischemic attack)      Past Surgical History:   Procedure Laterality Date    CYSTECTOMY      Per patient cyst removal from his back    LASIK      MS LAP, RADICAL NEPHRECTOMY Right 7/6/2022    Procedure: NEPHRECTOMY RADICAL LAPAROSCOPIC W/ ROBOTICS, poss open;  Surgeon: Melissa Miguel MD;  Location: [facility];  Service: Urology     Family History   Problem Relation Age of Onset    Colon cancer Mother     Diabetes type II Mother     Heart disease Mother     Prostate cancer Father      Social History     Socioeconomic History    Marital status: /Civil Union     Spouse name: None    Number of children: None    Years of education: None    Highest education level: None   Occupational History    Occupation: Retired 2015 -     Tobacco Use    Smoking status: Never Smoker    Smokeless tobacco: Never Used   Vaping Use    Vaping Use: Never used   Substance and Sexual Activity    Alcohol use: Never    Drug use: Never    Sexual activity: None   Other Topics Concern    None   Social History Narrative    Denies drug use - As per [address]    Consumes on average 1 cup of regular coffee per day      Social Determinants of Health     Financial Resource Strain: Not on file   Food Insecurity: No Food Insecurity    Worried About [address] in the Last Year: Never true    [facility] in the Last Year: Never true   Transportation Needs: No Transportation Needs    Lack of Transportation (Medical): No    Lack of Transportation (Non-Medical):  No   Physical Activity: Not on file Stress: Not on file   Social Connections: Not on file   Intimate Partner Violence: Not on file   Housing Stability: Low Risk     Unable to Pay for Housing in the Last Year: No    Number of Places Lived in the Last Year: 1    Unstable Housing in the Last Year: No         Jose Angel Lucero  Date: 7/14/2022 Time: 1:13 PM

## 2022-07-14 NOTE — ARC ADMISSION
Call received from [facility]  stating authorization request for acute inpatient rehab has been denied by their medical director, they will approved Subacute rehab and offered a Peer to Peer review  For Peer to Peer review call [phone] Ext  [phone], Reference number [id number]  Peer to Peer call must be made before 3:00 PM on Friday, 7/15/22  Provided update with same to CM through [employer]

## 2022-07-14 NOTE — PLAN OF CARE
Problem: PAIN - ADULT  Goal: Verbalizes/displays adequate comfort level or baseline comfort level  Description: Interventions:  - Encourage patient to monitor pain and request assistance  - Assess pain using appropriate pain scale  - Administer analgesics based on type and severity of pain and evaluate response  - Implement non-pharmacological measures as appropriate and evaluate response  - Consider cultural and social influences on pain and pain management  - Notify physician/advanced practitioner if interventions unsuccessful or patient reports new pain  Outcome: Progressing     Problem: Knowledge Deficit  Goal: Patient/family/caregiver demonstrates understanding of disease process, treatment plan, medications, and discharge instructions  Description: Complete learning assessment and assess knowledge base    Interventions:  - Provide teaching at level of understanding  - Provide teaching via preferred learning methods  Outcome: Progressing     Problem: Prexisting or High Potential for Compromised Skin Integrity  Goal: Skin integrity is maintained or improved  Description: INTERVENTIONS:  - Identify patients at risk for skin breakdown  - Assess and monitor skin integrity  - Assess and monitor nutrition and hydration status  - Monitor labs   - Assess for incontinence   - Turn and reposition patient  - Assist with mobility/ambulation  - Relieve pressure over bony prominences  - Avoid friction and shearing  - Provide appropriate hygiene as needed including keeping skin clean and dry  - Evaluate need for skin moisturizer/barrier cream  - Collaborate with interdisciplinary team   - Patient/family teaching  - Consider wound care consult   Outcome: Progressing     Problem: GENITOURINARY - ADULT  Goal: Absence of urinary retention  Description: INTERVENTIONS:  - Assess patients ability to void and empty bladder  - Monitor I/O  - Bladder scan as needed  - Discuss with physician/AP medications to alleviate retention as needed  - Discuss catheterization for long term situations as appropriate  Outcome: Progressing  Goal: Urinary catheter remains patent  Description: INTERVENTIONS:  - Assess patency of urinary catheter  - If patient has a chronic hernández, consider changing catheter if non-functioning  - Follow guidelines for intermittent irrigation of non-functioning urinary catheter  Outcome: Progressing

## 2022-07-14 NOTE — ASSESSMENT & PLAN NOTE
Lab Results   Component Value Date    HGBA1C 7 2 (A) 05/16/2022       Recent Labs     07/13/22  1606 07/13/22  2119 07/14/22  0541 07/14/22  1124   POCGLU 205* 167* 124 148*       Blood Sugar Average: Last 72 hrs:  (P) 353 [id number]     · Hold Home medication->Metformin while admitted  · Hyperglycemia relatively well controlled  · C/w Accu-checks and SSI AC/HS  · Continue Diabetic Diet and Hypoglycemic protocol  · Continue Lantus q h s

## 2022-07-15 LAB
ANION GAP SERPL CALCULATED.3IONS-SCNC: 5 MMOL/L (ref 4–13)
BUN SERPL-MCNC: 36 MG/DL (ref 5–25)
CALCIUM SERPL-MCNC: 8.7 MG/DL (ref 8.3–10.1)
CHLORIDE SERPL-SCNC: 108 MMOL/L (ref 100–108)
CO2 SERPL-SCNC: 26 MMOL/L (ref 21–32)
CREAT SERPL-MCNC: 2.05 MG/DL (ref 0.6–1.3)
ERYTHROCYTE [DISTWIDTH] IN BLOOD BY AUTOMATED COUNT: 13.3 % (ref 11.6–15.1)
GFR SERPL CREATININE-BSD FRML MDRD: 30 ML/MIN/1.73SQ M
GLUCOSE SERPL-MCNC: 119 MG/DL (ref 65–140)
GLUCOSE SERPL-MCNC: 128 MG/DL (ref 65–140)
GLUCOSE SERPL-MCNC: 153 MG/DL (ref 65–140)
GLUCOSE SERPL-MCNC: 166 MG/DL (ref 65–140)
GLUCOSE SERPL-MCNC: 83 MG/DL (ref 65–140)
GLUCOSE SERPL-MCNC: 87 MG/DL (ref 65–140)
HCT VFR BLD AUTO: 30.1 % (ref 36.5–49.3)
HGB BLD-MCNC: 10.1 G/DL (ref 12–17)
MCH RBC QN AUTO: 30.4 PG (ref 26.8–34.3)
MCHC RBC AUTO-ENTMCNC: 33.6 G/DL (ref 31.4–37.4)
MCV RBC AUTO: 91 FL (ref 82–98)
PLATELET # BLD AUTO: 280 THOUSANDS/UL (ref 149–390)
PMV BLD AUTO: 10.4 FL (ref 8.9–12.7)
POTASSIUM SERPL-SCNC: 3.4 MMOL/L (ref 3.5–5.3)
RBC # BLD AUTO: 3.32 MILLION/UL (ref 3.88–5.62)
SODIUM SERPL-SCNC: 139 MMOL/L (ref 136–145)
WBC # BLD AUTO: 10.05 THOUSAND/UL (ref 4.31–10.16)

## 2022-07-15 PROCEDURE — 85027 COMPLETE CBC AUTOMATED: CPT | Performed by: INTERNAL MEDICINE

## 2022-07-15 PROCEDURE — 80048 BASIC METABOLIC PNL TOTAL CA: CPT | Performed by: INTERNAL MEDICINE

## 2022-07-15 PROCEDURE — 82948 REAGENT STRIP/BLOOD GLUCOSE: CPT

## 2022-07-15 PROCEDURE — 99024 POSTOP FOLLOW-UP VISIT: CPT | Performed by: UROLOGY

## 2022-07-15 PROCEDURE — 97116 GAIT TRAINING THERAPY: CPT

## 2022-07-15 PROCEDURE — 97535 SELF CARE MNGMENT TRAINING: CPT

## 2022-07-15 PROCEDURE — 97530 THERAPEUTIC ACTIVITIES: CPT

## 2022-07-15 PROCEDURE — 99232 SBSQ HOSP IP/OBS MODERATE 35: CPT | Performed by: INTERNAL MEDICINE

## 2022-07-15 PROCEDURE — 97110 THERAPEUTIC EXERCISES: CPT

## 2022-07-15 PROCEDURE — 99233 SBSQ HOSP IP/OBS HIGH 50: CPT | Performed by: INTERNAL MEDICINE

## 2022-07-15 RX ORDER — INSULIN GLARGINE 100 [IU]/ML
8 INJECTION, SOLUTION SUBCUTANEOUS
Status: DISCONTINUED | OUTPATIENT
Start: 2022-07-15 | End: 2022-07-18 | Stop reason: HOSPADM

## 2022-07-15 RX ORDER — POTASSIUM CHLORIDE 20 MEQ/1
20 TABLET, EXTENDED RELEASE ORAL ONCE
Status: COMPLETED | OUTPATIENT
Start: 2022-07-15 | End: 2022-07-15

## 2022-07-15 RX ADMIN — DOCUSATE SODIUM 100 MG: 100 CAPSULE, LIQUID FILLED ORAL at 17:32

## 2022-07-15 RX ADMIN — ACETAMINOPHEN 650 MG: 325 TABLET, FILM COATED ORAL at 12:15

## 2022-07-15 RX ADMIN — INSULIN LISPRO 1 UNITS: 100 INJECTION, SOLUTION INTRAVENOUS; SUBCUTANEOUS at 17:34

## 2022-07-15 RX ADMIN — BACITRACIN ZINC, NEOMYCIN, POLYMYXIN B 1 SMALL APPLICATION: 400; 3.5; 5 OINTMENT TOPICAL at 10:12

## 2022-07-15 RX ADMIN — HEPARIN SODIUM 5000 UNITS: 5000 INJECTION INTRAVENOUS; SUBCUTANEOUS at 10:07

## 2022-07-15 RX ADMIN — INSULIN GLARGINE 8 UNITS: 100 INJECTION, SOLUTION SUBCUTANEOUS at 21:47

## 2022-07-15 RX ADMIN — ACETAMINOPHEN 650 MG: 325 TABLET, FILM COATED ORAL at 17:32

## 2022-07-15 RX ADMIN — INSULIN LISPRO 1 UNITS: 100 INJECTION, SOLUTION INTRAVENOUS; SUBCUTANEOUS at 21:47

## 2022-07-15 RX ADMIN — HEPARIN SODIUM 5000 UNITS: 5000 INJECTION INTRAVENOUS; SUBCUTANEOUS at 21:46

## 2022-07-15 RX ADMIN — DOCUSATE SODIUM 100 MG: 100 CAPSULE, LIQUID FILLED ORAL at 10:07

## 2022-07-15 RX ADMIN — ACETAMINOPHEN 650 MG: 325 TABLET, FILM COATED ORAL at 05:26

## 2022-07-15 RX ADMIN — BACITRACIN ZINC, NEOMYCIN, POLYMYXIN B 1 SMALL APPLICATION: 400; 3.5; 5 OINTMENT TOPICAL at 17:32

## 2022-07-15 RX ADMIN — POTASSIUM CHLORIDE 20 MEQ: 1500 TABLET, EXTENDED RELEASE ORAL at 10:07

## 2022-07-15 RX ADMIN — PANTOPRAZOLE SODIUM 40 MG: 40 TABLET, DELAYED RELEASE ORAL at 05:26

## 2022-07-15 NOTE — OCCUPATIONAL THERAPY NOTE
Occupational Therapy Progress Note     Patient Name: Bro Jung  USEMW'P Date: 7/15/2022  Problem List  Principal Problem:    Right renal mass  Active Problems:    Essential hypertension    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 chronic kidney disease, without long-term current use of insulin (HCC)    Acute renal failure superimposed on stage 3 chronic kidney disease (HCC)    GI bleed    SIRS (systemic inflammatory response syndrome) (HCC)    Tachycardia    RUPAL (acute kidney injury) ([facility] [address] )    Chronic renal impairment              07/15/22 0921   OT Last Visit   OT Visit Date 07/15/22   Note Type   Note Type Treatment   Restrictions/Precautions   Weight Bearing Precautions Per Order No   Other Precautions Cognitive; Chair Alarm; Bed Alarm;Pain; Fall Risk   Lifestyle   Autonomy I with ADL's   Reciprocal Relationships spouse   Service to Others retired   [address] enjoys spending time w/ his cat   Pain Assessment   Pain Assessment Tool 0-10   Pain Score No Pain   ADL   Where Assessed Chair   Eating Assistance 5  Supervision/Setup   Grooming Assistance 5  Supervision/Setup   Grooming Deficit Teeth care;Setup   UB Bathing Assistance 4  Minimal Assistance   UB Bathing Comments Simulated   LB Bathing Assistance 4  Minimal Assistance   LB Bathing Comments Simulated   UB Dressing Assistance 4  Minimal Assistance   UB Dressing Deficit Setup; Increased time to complete;Supervision/safety; Fasteners   [address] 4  Minimal Assistance   LB Dressing Deficit Setup;Don/doff R sock; Don/doff L sock; Thread RLE into pants; Thread LLE into pants; Thread RLE into underwear; Thread LLE into underwear; Increased time to complete;Supervision/safety   Toileting Comments Pt declined at this time  Bed Mobility   Additional Comments Pt greeted OOB in recliner chair and left OOB in recliner chair at end of session  All needs met and call bell nearby     Transfers   Sit to Stand 5 Supervision   Stand to Sit 5  Supervision   Additional Comments with RW   Functional Mobility   Functional Mobility 4  Minimal assistance   Additional Comments Min AX1 with RW  Pt with use of SPC at beginning of session at min A level  Additional items SPC   Cognition   Overall Cognitive Status WFL   Arousal/Participation Alert; Cooperative   Attention Attends with cues to redirect   Orientation Level Oriented X4   Memory Decreased recall of precautions;Decreased recall of recent events   Following Commands Follows one step commands with increased time or repetition   Comments Pt pleasant and cooperative during OT session  Pt with decreased safety awareness and lack of insight into functional deficits  Spoke with Pt's wife on phone (Pt approved) and provided education reguarding Pt's functional status at this time  Activity Tolerance   Activity Tolerance Patient tolerated treatment well   Medical Staff Made Aware RN cleared/updated  CM updated  Assessment   Assessment Pt greeted bedside for OT treatment on 7/15/2022 focusing on maximizing independence with ADLs  Pt completes ADL routine seated in chair/standing with RW: Min A with UB bathing/dressing and min A with LB bathing/dressing  Pt completes grooming routine standing sinkside at S level  Pt completes functional mobility with RW at min A  Limitations that impact functional performance include decreased ADL status, decreased UE ROM, decreased UE strength, decreased safe judgement during ADLs, decreased cognition, decreased endurance, decreased self care transfers, decreased high level ADLs and pain  Occupational performance areas to address ADL retraining, functional transfer training, UE strengthening/ROM, endurance training, cognitive reorientation, Pt/caregiver education, equipment evaluation/education, compensatory technique education, energy conservation and activity engagement    Pt would benefit from continued skilled OT services while in hospital to maximize independence with ADLs  Will continue to follow Pt's goals and progress  Pt would benefit from post acute rehabilitation services upon DC to maximize safety and independence with ADLs and functional tasks of choice  Plan   Treatment Interventions ADL retraining;Functional transfer training;UE strengthening/ROM; Endurance training;Cognitive reorientation;Patient/family training;Equipment evaluation/education; Compensatory technique education; Energy conservation; Activityengagement   Goal Expiration Date 07/22/22   OT Treatment Day 2   OT Frequency 3-5x/wk   Recommendation   OT Discharge Recommendation Post acute rehabilitation services   Additional Comments  The patient's raw score on the AM-PAC Daily Activity inpatient short form is 18, standardized score is 38 66, less than 39 4  Patients at this level are likely to benefit from discharge to post-acute rehabilitation services  Please refer to the recommendation of the Occupational Therapist for safe discharge planning     AM-PAC Daily Activity Inpatient   Lower Body Dressing 2   Bathing 3   Toileting 3   Upper Body Dressing 3   Grooming 3   Eating 4   Daily Activity Raw Score 18   Daily Activity Standardized Score (Calc for Raw Score >=11) 38 66   [facility]   Following a Speech/Presentation 3   Understanding Ordinary Conversation 4   Taking Medications 3   Remembering Where Things Are Placed or Put Away 3   Remembering List of 4-5 Errands 3   Taking Care of Complicated Tasks 2   Applied Cognition Raw Score 18   Applied Cognition Standardized Score 38 07       Marietta Galindo MS, OTR/L

## 2022-07-15 NOTE — PLAN OF CARE
Problem: OCCUPATIONAL THERAPY ADULT  Goal: Performs self-care activities at highest level of function for planned discharge setting  See evaluation for individualized goals  Description: Treatment Interventions: ADL retraining, Functional transfer training, UE strengthening/ROM, Endurance training, Cognitive reorientation, Patient/family training, Equipment evaluation/education, Compensatory technique education, Energy conservation, Activityengagement          See flowsheet documentation for full assessment, interventions and recommendations  Outcome: Progressing  Note: Limitation: Decreased ADL status, Decreased UE ROM, Decreased UE strength, Decreased Safe judgement during ADL, Decreased cognition, Decreased endurance, Decreased self-care trans, Decreased high-level ADLs, Decreased fine motor control  Prognosis: Fair  Assessment: Pt greeted bedside for OT treatment on 7/15/2022 focusing on maximizing independence with ADLs  Pt completes ADL routine seated in chair/standing with RW: Min A with UB bathing/dressing and min A with LB bathing/dressing  Pt completes grooming routine standing sinkside at S level  Pt completes functional mobility with RW at min A  Limitations that impact functional performance include decreased ADL status, decreased UE ROM, decreased UE strength, decreased safe judgement during ADLs, decreased cognition, decreased endurance, decreased self care transfers, decreased high level ADLs and pain  Occupational performance areas to address ADL retraining, functional transfer training, UE strengthening/ROM, endurance training, cognitive reorientation, Pt/caregiver education, equipment evaluation/education, compensatory technique education, energy conservation and activity engagement   Pt would benefit from continued skilled OT services while in hospital to maximize independence with ADLs  Will continue to follow Pt's goals and progress   Pt would benefit from post acute rehabilitation services upon DC to maximize safety and independence with ADLs and functional tasks of choice       OT Discharge Recommendation: Post acute rehabilitation services  OT - OK to Discharge- Retiring Row: Yes

## 2022-07-15 NOTE — ASSESSMENT & PLAN NOTE
Lab Results   Component Value Date    HGBA1C 7 2 (A) 05/16/2022       Recent Labs     07/14/22  2144 07/15/22  0742 07/15/22  0811 07/15/22  1152   POCGLU 163* 83 128 119       Blood Sugar Average: Last 72 hrs:  (P) 534 [id number]     · Hold Home medication->Metformin while admitted  · Hyperglycemia relatively well controlled  · C/w Accu-checks and SSI AC/HS  · Continue Diabetic Diet and Hypoglycemic protocol  · Continue Lantus q h s

## 2022-07-15 NOTE — PROGRESS NOTES
Progress Note - urology  Snehal Galdamez 78 y o  male MRN: [id number]  Unit/Bed#: [facility] 809-01 Encounter: [id number]    Assessment & Plan:    Right renal mass:  -postop day 9 robotic assisted laparoscopic radical nephrectomy secondary to central mass  , progressing well  -postop day 1 developed coffee-ground emesis, GI consulted, patient originally scheduled for inpatient EGD but this was discontinued due to hypoxia  Current coffee ground emesis improved/resolved and plan for outpatient EGD with GI  Internal Medicine was consulted for further evaluation  Patient underwent PE workup which was negative V/Q scan low probability of possible embolism  -patient also developed tachycardia to which Medicine was consulted and EKG revealed sinus tact with frequent PACs without atrial fibrillation   -internal Medicine additionally following for blood sugar control in setting of diabetes, appreciate their assistance with managing this patient   -patient has history of CKD postoperatively nephrology was following baseline creatinine 1 4, continue to trend upwards and now trending downward at 2 05 today  Nephrology signing off  With outpatient follow-up  as patient's creatinine continues to trend downward  -PT and OT ordered, currently recommending short-term rehab, patient accepted to facility  Plan for discharge tomorrow at 10:00 a m  Karrie Nissen -encourage incentive spirometer  -provided good bowel regimen  -urethral Baird catheter removed patient voiding on his own without any difficulties  PVR 10  -will maintain staples for additional week, urology to remove on outpatient      Discharge tomorrow to facility  Subjective/Objective   Chief Complaint:  None    Subjective:   Patient sitting comfortably in chair no acute distress denying any nausea, vomiting, fevers, chills, flank pain or abdominal pain  Reports he is ambulating the halls with physical therapy  Reports he is doing well    Feeling much improved but continues to feel as though he is unstable  He reports he is passing gas  Having bowel movements  Objective:     Blood pressure 152/82, pulse 68, temperature 98 1 °F (36 7 °C), temperature source Oral, resp  rate 18, height 6' (1 829 m), weight 107 kg (236 lb), SpO2 95 %  ,Body mass index is 32 01 kg/m²  Intake/Output Summary (Last 24 hours) at 7/15/2022 1427  Last data filed at 7/14/2022 1650  Gross per 24 hour   Intake --   Output 400 ml   Net -400 ml       Invasive Devices  Report    Peripheral Intravenous Line  Duration           Peripheral IV 07/10/22 Left;Ventral (anterior) Forearm 4 days                Physical Exam  Constitutional:       General: He is not in acute distress  Appearance: He is obese  He is not ill-appearing, toxic-appearing or diaphoretic  HENT:      Head: Normocephalic and atraumatic  Right Ear: External ear normal       Left Ear: External ear normal       Nose: Nose normal       Mouth/Throat:      Pharynx: Oropharynx is clear  Eyes:      Conjunctiva/sclera: Conjunctivae normal    Cardiovascular:      Rate and Rhythm: Normal rate and regular rhythm  Pulses: Normal pulses  Heart sounds: No murmur heard  No friction rub  No gallop  Pulmonary:      Effort: Pulmonary effort is normal  No respiratory distress  Breath sounds: No wheezing, rhonchi or rales  Abdominal:      General: Bowel sounds are normal  There is no distension  Tenderness: There is no abdominal tenderness  Comments: Surgical incisions intact, dry staples in proper position, no notable drainage, no signs of infections or wound dehiscence  Bowel sounds present throughout, abdomen is soft and nontender   Genitourinary:     Comments: Mild scrotal swelling most likely dependent due to mildly fluid overloaded  Musculoskeletal:         General: Normal range of motion  Cervical back: Normal range of motion  Skin:     General: Skin is warm and dry     Neurological:      General: No focal deficit present  Mental Status: He is alert and oriented to person, place, and time  Psychiatric:         Mood and Affect: Mood normal          Behavior: Behavior normal          Thought Content: Thought content normal          Judgment: Judgment normal          Lab, Imaging and other studies:I have personally reviewed pertinent lab results    Lab Results   Component Value Date    WBC 10 05 07/15/2022    HGB 10 1 (L) 07/15/2022    HCT 30 1 (L) 07/15/2022    MCV 91 07/15/2022     07/15/2022     Lab Results   Component Value Date    SODIUM 139 07/15/2022    K 3 4 (L) 07/15/2022     07/15/2022    CO2 26 07/15/2022    BUN 36 (H) 07/15/2022    CREATININE 2 05 (H) 07/15/2022    GLUC 87 07/15/2022    CALCIUM 8 7 07/15/2022         VTE Pharmacologic Prophylaxis: Heparin  VTE Mechanical Prophylaxis: sequential compression device      Skip Hansen PA-C

## 2022-07-15 NOTE — PLAN OF CARE
Problem: PAIN - ADULT  Goal: Verbalizes/displays adequate comfort level or baseline comfort level  Description: Interventions:  - Encourage patient to monitor pain and request assistance  - Assess pain using appropriate pain scale  - Administer analgesics based on type and severity of pain and evaluate response  - Implement non-pharmacological measures as appropriate and evaluate response  - Consider cultural and social influences on pain and pain management  - Notify physician/advanced practitioner if interventions unsuccessful or patient reports new pain  Outcome: Progressing     Problem: INFECTION - ADULT  Goal: Absence or prevention of progression during hospitalization  Description: INTERVENTIONS:  - Assess and monitor for signs and symptoms of infection  - Monitor lab/diagnostic results  - Monitor all insertion sites, i e  indwelling lines, tubes, and drains  - Monitor endotracheal if appropriate and nasal secretions for changes in amount and color  - [place] appropriate cooling/warming therapies per order  - Administer medications as ordered  - Instruct and encourage patient and family to use good hand hygiene technique  - Identify and instruct in appropriate isolation precautions for identified infection/condition  Outcome: Progressing     Problem: Knowledge Deficit  Goal: Patient/family/caregiver demonstrates understanding of disease process, treatment plan, medications, and discharge instructions  Description: Complete learning assessment and assess knowledge base    Interventions:  - Provide teaching at level of understanding  - Provide teaching via preferred learning methods  Outcome: Progressing     Problem: Prexisting or High Potential for Compromised Skin Integrity  Goal: Skin integrity is maintained or improved  Description: INTERVENTIONS:  - Identify patients at risk for skin breakdown  - Assess and monitor skin integrity  - Assess and monitor nutrition and hydration status  - Monitor labs   - Assess for incontinence   - Turn and reposition patient  - Assist with mobility/ambulation  - Relieve pressure over bony prominences  - Avoid friction and shearing  - Provide appropriate hygiene as needed including keeping skin clean and dry  - Evaluate need for skin moisturizer/barrier cream  - Collaborate with interdisciplinary team   - Patient/family teaching  - Consider wound care consult   Outcome: Progressing

## 2022-07-15 NOTE — PLAN OF CARE
Problem: PHYSICAL THERAPY ADULT  Goal: Performs mobility at highest level of function for planned discharge setting  See evaluation for individualized goals  Description: Treatment/Interventions: Functional transfer training, LE strengthening/ROM, Patient/family training, Bed mobility, Gait training, Spoke to nursing, Spoke to case management, OT          See flowsheet documentation for full assessment, interventions and recommendations  Note: Prognosis: Good  Problem List: Decreased strength, Decreased range of motion, Decreased endurance, Impaired balance, Decreased mobility, Impaired judgement, Decreased safety awareness, Obesity, Decreased skin integrity  Assessment: pt continues to make significant gains w/ PT  S ambualtion w/ RW limtied distnaces of 40' prior to onset of L foot drag- able to correct w/ VC  progression to ambualtion w/ SPC w/ LOB x2 to L requiring Kacey for correction  Pt has poor insight into deficits - feeling he can ambualte self despit overt LOB x2  Pt placed on chair alarm for safety post session  despite deficits- pt does show excellet potential to gain strength; endurance and improve balance w/ ongoing comprehensive rehab program  PT recommending inpt rehab on d/c prior to d/c home to prevent falls and hospital readmission  Barriers to Discharge: Inaccessible home environment     PT Discharge Recommendation: Post acute rehabilitation services    See flowsheet documentation for full assessment

## 2022-07-15 NOTE — ARC ADMISSION
Per chart review Physician Peer to Peer completed yesterday, denial upheld, patient was approved for subacute and CM was updated

## 2022-07-15 NOTE — ASSESSMENT & PLAN NOTE
Highly suspicious for renal cell carcinoma based on CT and MRI criteria    · Patient is s/p right nephrectomy on 07/06   · Urology Primary Team; continue management  · PENDING pathology collected on 07/06  · Per urology

## 2022-07-15 NOTE — CASE MANAGEMENT
Case Management Discharge Planning Note    Patient name Zoe Arriola  Location [address] [place] [address] [facility] [id number]  : 1945 Date 7/15/2022       Current Admission Date: 2022  Current Admission Diagnosis:Right renal mass   Patient Active Problem List    Diagnosis Date Noted    RUPAL [facility]acute kidney injury) ([facility] )     Chronic renal impairment     Acute respiratory failure with hypoxia ([facility] ) 2022    GI bleed 2022    SIRS (systemic inflammatory response syndrome) ([facility] ) 2022    Tachycardia 2022    Right renal mass 2022    Hypercalcemia 2022    Acute renal failure superimposed on stage 3 chronic kidney disease ([place] [address] ) 2022    Kidney cyst, acquired 2022    Chronic pain syndrome 10/05/2020    Chronic right-sided low back pain with right-sided sciatica 2020    Lumbar radiculopathy 2020    Polyarticular arthritis 2019    Class 1 obesity due to excess calories with serious comorbidity and body mass index (BMI) of 32 0 to 32 9 in adult 2019    Essential hypertension 09/10/2019    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 chronic kidney disease, without long-term current use of insulin ([place] [address] ) 09/10/2019    Left-sided weakness 07/10/2019    Cerebral aneurysm, nonruptured 2019    TIA (transient ischemic attack) 2019      LOS (days): 9  Geometric Mean LOS (GMLOS) (days): 2 10  Days to GMLOS:-6 7     OBJECTIVE:  Risk of Unplanned Readmission Score: 15 58         Current admission status: Inpatient   Preferred Pharmacy:   [address] [address]   [place], [address] [address]  Phone: [phone] Fax: [phone]    Primary Care Provider: KAYODE Fernandez    Primary Insurance: [facility]  Secondary Insurance:     DISCHARGE DETAILS:         Other Referral/Resources/Interventions Provided:  Referral Comments: NOE spoke with Edgardo Cherry from [facility], who confirmed pt can be sent to facility tomorrow  CM updated Urology and submitted transportation referral for [address]   Pt agreeable to potentially paying [facility] for WC van  CM will follow              IMM Given to[de-identified] Patient

## 2022-07-15 NOTE — ASSESSMENT & PLAN NOTE
· BP reviewed and remains stable  · Continue to hold lisinopril and HCTZ after discharge  · Ordered PRN IV Hydralazine  · Continue to monitor

## 2022-07-15 NOTE — CASE MANAGEMENT
Case Management Discharge Planning Note    Patient name Melva Aguilar  Location [address] [place] [address] Rd [facility] [id number]  : 1945 Date 7/15/2022       Current Admission Date: 2022  Current Admission Diagnosis:Right renal mass   Patient Active Problem List    Diagnosis Date Noted    RUPAL (acute kidney injury) ([facility] )     Chronic renal impairment     Acute respiratory failure with hypoxia ([facility] ) 2022    GI bleed 2022    SIRS (systemic inflammatory response syndrome) ([facility] ) 2022    Tachycardia 2022    Right renal mass 2022    Hypercalcemia 2022    Acute renal failure superimposed on stage 3 chronic kidney disease ([place] [address] ) 2022    Kidney cyst, acquired 2022    Chronic pain syndrome 10/05/2020    Chronic right-sided low back pain with right-sided sciatica 2020    Lumbar radiculopathy 2020    Polyarticular arthritis 2019    Class 1 obesity due to excess calories with serious comorbidity and body mass index (BMI) of 32 0 to 32 9 in adult 2019    Essential hypertension 09/10/2019    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 chronic kidney disease, without long-term current use of insulin ([place] [address] ) 09/10/2019    Left-sided weakness 07/10/2019    Cerebral aneurysm, nonruptured 2019    TIA (transient ischemic attack) 2019      LOS (days): 9  Geometric Mean LOS (GMLOS) (days): 2 10  Days to GMLOS:-6 6     OBJECTIVE:  Risk of Unplanned Readmission Score: 15 54         Current admission status: Inpatient   Preferred Pharmacy:   [address] [facility]  [address]   [place], [address]  [address] [facility] [address]  Phone: [phone] Fax: [phone]    Primary Care Provider: Griffin Canavan    Primary Insurance: [facility]  Secondary Insurance:     DISCHARGE DETAILS:                                                                                                               Facility Insurance Auth Number: [facility] [address]  NPI: [id number]  Dr Venita Sanders  NPI: [id number]   Clinicals faxed to [phone]

## 2022-07-15 NOTE — ASSESSMENT & PLAN NOTE
Lab Results   Component Value Date    EGFR 30 07/15/2022    EGFR 29 07/14/2022    EGFR 28 07/13/2022    CREATININE 2 05 (H) 07/15/2022    CREATININE 2 13 (H) 07/14/2022    CREATININE 2 20 (H) 07/13/2022     Patient acute on chronic kidney disease baseline creatinine is 1 4 prior to his nephrectomy    · s/p right nephrectomy  · Nephrology consulted; appreciate assistance  · Hydrated with IV fluid  · Avoid/Limit nephrotoxins and IV Contrast  · Avoid hypotension and fluctuations in BP  · Improving

## 2022-07-15 NOTE — PLAN OF CARE
Problem: DISCHARGE PLANNING  Goal: Discharge to home or other facility with appropriate resources  Description: INTERVENTIONS:  - Identify barriers to discharge w/patient and caregiver  - Arrange for needed discharge resources and transportation as appropriate  - Identify discharge learning needs (meds, wound care, etc )  - Arrange for interpretive services to assist at discharge as needed  - Refer to Case Management Department for coordinating discharge planning if the patient needs post-hospital services based on physician/advanced practitioner order or complex needs related to functional status, cognitive ability, or social support system  Outcome: Progressing     Problem: Prexisting or High Potential for Compromised Skin Integrity  Goal: Skin integrity is maintained or improved  Description: INTERVENTIONS:  - Identify patients at risk for skin breakdown  - Assess and monitor skin integrity  - Assess and monitor nutrition and hydration status  - Monitor labs   - Assess for incontinence   - Turn and reposition patient  - Assist with mobility/ambulation  - Relieve pressure over bony prominences  - Avoid friction and shearing  - Provide appropriate hygiene as needed including keeping skin clean and dry  - Evaluate need for skin moisturizer/barrier cream  - Collaborate with interdisciplinary team   - Patient/family teaching  - Consider wound care consult   Outcome: Progressing     Problem: GENITOURINARY - ADULT  Goal: Absence of urinary retention  Description: INTERVENTIONS:  - Assess patients ability to void and empty bladder  - Monitor I/O  - Bladder scan as needed  - Discuss with physician/AP medications to alleviate retention as needed  - Discuss catheterization for long term situations as appropriate  Outcome: Progressing  Goal: Urinary catheter remains patent  Description: INTERVENTIONS:  - Assess patency of urinary catheter  - If patient has a chronic hernándze, consider changing catheter if non-functioning  - Follow guidelines for intermittent irrigation of non-functioning urinary catheter  Outcome: Progressing     Problem: METABOLIC, FLUID AND ELECTROLYTES - ADULT  Goal: Electrolytes maintained within normal limits  Description: INTERVENTIONS:  - Monitor labs and assess patient for signs and symptoms of electrolyte imbalances  - Administer electrolyte replacement as ordered  - Monitor response to electrolyte replacements, including repeat lab results as appropriate  - Instruct patient on fluid and nutrition as appropriate  Outcome: Progressing     Problem: SKIN/TISSUE INTEGRITY - ADULT  Goal: Incision(s), wounds(s) or drain site(s) healing without S/S of infection  Description: INTERVENTIONS  - Assess and document dressing, incision, wound bed, drain sites and surrounding tissue  - Provide patient and family education   Outcome: Progressing

## 2022-07-15 NOTE — PROGRESS NOTES
NEPHROLOGY PROGRESS NOTE    Melva Aguilar 78 y o  male MRN: [id number]  Unit/Bed#: [facility] 809-01 Encounter: [id number]  Reason for Consult:  Acute on chronic renal insufficiency    Patient is awake and alert when I initially saw him he was up ambulating in the [place] with no assistance except his cane  Says he is feeling much better eating better is hoping to be able to be discharged home soon  No other problems overnight  ASSESSMENT/PLAN:  1  Renal    Patient's chronic renal insufficiency baseline creatinine is 1 4 knee does have a nephrologist where he lives  He developed acute kidney injury probably from ATN post nephrectomy  Fortunately creatinine is been improving on a daily basis and is down to 2 today  Unsure where he will settle out it likely will be slightly above his prior baseline  Once he is discharged she can resume follow-up with his primary nephrologist   Blood pressures are mildly elevated but I would discharge him off of his ACE-inhibitor HCTZ combination medication  He will then follow-up with his primary doctor primary nephrologist   Will give 20 mEq KCL p o  For mild hypokalemia  Stable clinically  Once discharged will follow-up with primary nephrologist    2  Status post right nephrectomy  Patient doing much better abdominal discomfort improved postop care and follow-up per Urology  SUBJECTIVE:  Review of Systems   Constitutional: Negative for chills, diaphoresis, fever and night sweats  HENT: Negative  Eyes: Negative  Cardiovascular: Negative for chest pain, dyspnea on exertion, orthopnea and palpitations  Respiratory: Negative  Negative for cough, shortness of breath, sputum production and wheezing  Gastrointestinal: Negative for abdominal pain, diarrhea, nausea and vomiting  Genitourinary: Negative for dysuria, flank pain, hematuria and incomplete emptying  Neurological: Negative for dizziness, focal weakness, headaches and weakness  Psychiatric/Behavioral: Negative for altered mental status, depression, hallucinations and hypervigilance  OBJECTIVE:  Current Weight: Weight - Scale: 107 kg (236 lb)  Vitals:Temp (24hrs), Av 1 °F (36 7 °C), Min:98 1 °F (36 7 °C), Max:98 1 °F (36 7 °C)  Current: Temperature: 98 1 °F (36 7 °C)   Blood pressure 152/82, pulse 68, temperature 98 1 °F (36 7 °C), temperature source Oral, resp  rate 18, height 6' (1 829 m), weight 107 kg (236 lb), SpO2 95 %  , Body mass index is 32 01 kg/m²  Intake/Output Summary (Last 24 hours) at 7/15/2022 0914  Last data filed at 2022 1650  Gross per 24 hour   Intake --   Output 400 ml   Net -400 ml       Physical Exam: /82   Pulse 68   Temp 98 1 °F (36 7 °C) (Oral)   Resp 18   Ht 6' (1 829 m)   Wt 107 kg (236 lb)   SpO2 95%   BMI 32 01 kg/m²   Physical Exam  Constitutional:       General: He is not in acute distress  Appearance: He is not toxic-appearing or diaphoretic  HENT:      Head: Normocephalic and atraumatic  Nose: Nose normal       Mouth/Throat:      Mouth: Mucous membranes are moist    Eyes:      General: No scleral icterus  Extraocular Movements: Extraocular movements intact  Cardiovascular:      Rate and Rhythm: Normal rate and regular rhythm  Heart sounds: No friction rub  No gallop  Pulmonary:      Effort: Pulmonary effort is normal  No respiratory distress  Breath sounds: No wheezing, rhonchi or rales  Abdominal:      General: Bowel sounds are normal  There is no distension  Palpations: Abdomen is soft  Tenderness: There is no abdominal tenderness  There is no rebound  Musculoskeletal:      Cervical back: Normal range of motion and neck supple  Neurological:      General: No focal deficit present  Mental Status: He is alert and oriented to person, place, and time  Mental status is at baseline     Psychiatric:         Mood and Affect: Mood normal          Behavior: Behavior normal  Thought Content:  Thought content normal          Judgment: Judgment normal          Medications:    Current Facility-Administered Medications:     acetaminophen (TYLENOL) tablet 650 mg, 650 mg, Oral, Q6H [address], Michael Escamilla MD, 650 mg at 07/15/22 0526    docusate sodium (COLACE) capsule 100 mg, 100 mg, Oral, BID, Michael Escamilla MD, 100 mg at 07/14/22 1744    heparin (porcine) subcutaneous injection 5,000 Units, 5,000 Units, Subcutaneous, Q12H [address], Jena Cool MD, 5,000 Units at 07/14/22 2146    hydrALAZINE (APRESOLINE) injection 5 mg, 5 mg, Intravenous, Q6H PRN, Jena Cool MD    insulin glargine (LANTUS) subcutaneous injection 10 Units 0 1 mL, 10 Units, Subcutaneous, HS, Víctor Amos, DO, 10 Units at 07/14/22 2146    insulin lispro (HumaLOG) 100 units/mL subcutaneous injection 1-6 Units, 1-6 Units, Subcutaneous, TID AC, 2 Units at 07/14/22 1745 **AND** Fingerstick Glucose (POCT), , , TID AC, KAYODE Gastelmu    insulin lispro (HumaLOG) 100 units/mL subcutaneous injection 1-6 Units, 1-6 Units, Subcutaneous, HS, KAYODE Gastelum, 1 Units at 07/14/22 2146    neomycin-bacitracin-polymyxin b (NEOSPORIN) ointment 1 small application, 1 small application, Topical, BID, Michael Escamilla MD, 1 small application at 65/55/86 1744    ondansetron (ZOFRAN) injection 4 mg, 4 mg, Intravenous, Q6H PRN, Michael Escamilla MD, 4 mg at 07/07/22 0106    oxyCODONE (ROXICODONE) IR tablet 2 5 mg, 2 5 mg, Oral, Q4H PRN, Michael Escamilla MD    oxyCODONE (ROXICODONE) IR tablet 5 mg, 5 mg, Oral, Q4H PRN, Michael Escamilla MD, 5 mg at 07/07/22 0254    pantoprazole (PROTONIX) EC tablet 40 mg, 40 mg, Oral, Early Morning, Víctor Bette, DO, 40 mg at 07/15/22 0526    simethicone (MYLICON) chewable tablet 80 mg, 80 mg, Oral, Q6H PRN, Rekha Arevalo PA-C    Laboratory Results:  Lab Results   Component Value Date    WBC 10 05 07/15/2022    HGB 10 1 (L) 07/15/2022    HCT 30 1 (L) 07/15/2022    MCV 91 07/15/2022     07/15/2022     Lab Results   Component Value Date    SODIUM 139 07/15/2022    K 3 4 (L) 07/15/2022     07/15/2022    CO2 26 07/15/2022    BUN 36 (H) 07/15/2022    CREATININE 2 05 (H) 07/15/2022    GLUC 87 07/15/2022    CALCIUM 8 7 07/15/2022     Lab Results   Component Value Date    CALCIUM 8 7 07/15/2022    PHOS 2 0 (L) 07/11/2022     No results found for: LABPROT

## 2022-07-15 NOTE — PHYSICAL THERAPY NOTE
PHYSICAL THERAPY TREATMENT  NAME:  Syed Diallo  DATE: 07/15/22    AGE:   78 y o  Mrn:   [id number]  ADMIT DX:  Malignant neoplasm of right kidney (HCC) [C64 1]    Past Medical History:   Diagnosis Date    Diabetes mellitus ([address] )     Hypertension     TIA (transient ischemic attack)      Past Surgical History:   Procedure Laterality Date    CYSTECTOMY      Per patient cyst removal from his back    LASIK      DE LAP, RADICAL NEPHRECTOMY Right 7/6/2022    Procedure: NEPHRECTOMY RADICAL LAPAROSCOPIC W/ ROBOTICS, poss open;  Surgeon: Melissa Miguel MD;  [facility]: [facility];  Service: Urology       Length Of Stay: 9     07/15/22 1021   Note Type   Note Type Treatment   End of Consult   Patient Position at End of Consult Bedside chair; All needs within reach;Bed/Chair alarm activated  (LE elevated for edema control)   Pain Assessment   Pain Assessment Tool 0-10   Pain Score No Pain   Restrictions/Precautions   Weight Bearing Precautions Per Order No   Braces or Orthoses   (none)   Other Precautions Impulsive;Cognitive; Chair Alarm; Bed Alarm; Fall Risk  (LE edema- very poor insigt into deficits)   General   Chart Reviewed Yes   Family/Caregiver Present No   Cognition   Overall Cognitive Status Impaired   Arousal/Participation Alert; Cooperative   Attention Attends with cues to redirect   Orientation Level Oriented X4   Memory Decreased recall of precautions;Decreased recall of recent events   Following Commands Follows one step commands with increased time or repetition   Comments pt is pleasant and cooperative; motivated to participate- however has very limited insight into deficits and poor judgement despite education- feels he can continue to attempt ambualtion on own despite PT letting pt attempt x2 w/ LOB each attempt x2)     Transfers   Toilet transfer 4  Minimal assistance   Additional Comments Tx initiated w/ pt  on toilet- attepting to stnad by self- requiring Kacey + A for hygiene and balance during same (Kacey)   Ambulation/Elevation   Gait Assistance 3  Moderate assist   Additional items Assist x 1   Assistive Device Rolling walker;SPC   Distance 36' x2 w/ RW then 30+30 trials w/ SPC w/ LOB x2 w/ Kacey for correction to prevent from falling -   Balance   Static Sitting Fair -   Dynamic Sitting Poor +   Static Standing Poor +   Dynamic Standing Poor +   Ambulatory Poor   Endurance Deficit   Endurance Deficit Yes   Activity Tolerance   Activity Tolerance Patient limited by fatigue  (SOB and LOB to L w/ inc fatigue)   Nurse Made Aware RN sandys pt for session   Exercises   Marching Standing;15 reps  (L and R w/ support on wall rail)   Balance training  alt toe taps up step w/ single UE support on SPC 2x15 Sit <>stnad from lowered bed surface w/o UE support 5 reps- TC for fowward weight shift over MAGALY   Assessment   Prognosis Good   Problem List Decreased strength;Decreased range of motion;Decreased endurance; Impaired balance;Decreased mobility; Impaired judgement;Decreased safety awareness; Obesity; Decreased skin integrity   Assessment pt continues to make significant gains w/ PT  S ambualtion w/ RW limtied distnaces of 40' prior to onset of L foot drag- able to correct w/ VC  progression to ambualtion w/ SPC w/ LOB x2 to L requiring Kacey for correction  Pt has poor insight into deficits - feeling he can ambualte self despit overt LOB x2  Pt placed on chair alarm for safety post session  despite deficits- pt does show excellet potential to gain strength; endurance and improve balance w/ ongoing comprehensive rehab program  PT recommending inpt rehab on d/c prior to d/c home to prevent falls and hospital readmission  Barriers to Discharge Inaccessible home environment   Goals   Patient Goals to walk withoout can or walker   STG Expiration Date 07/22/22   PT Treatment Day 2   Plan   Treatment/Interventions Functional transfer training;LE strengthening/ROM; Elevations; Therapeutic exercise; Endurance training;Cognitive reorientation;Patient/family training;Equipment eval/education; Bed mobility;Gait training; Compensatory technique education;Continued evaluation;Spoke to MD;Spoke to nursing;Spoke to case management;OT   Progress Progressing toward goals   PT Frequency 3-5x/wk   Recommendation   PT Discharge Recommendation Post acute rehabilitation services   Equipment Recommended   (TBD in rehab)   [address]   Turning in Bed Without Bedrails 3   Lying on Back to Sitting on Edge of Flat Bed 3   Moving Bed to Chair 3   Standing Up From Chair 3   Walk in Room 2   Climb 3-5 Stairs 1   Basic Mobility Inpatient Raw Score 15   Basic Mobility Standardized Score 36 97   Highest Level Of Mobility   -[facility] Goal 4: Move to chair/commode     The patient's AM-PAC Basic Mobility Inpatient Short Form Raw Score is 15  A Raw score of less than or equal to 16 suggests the patient may benefit from discharge to post-acute rehabilitation services  Please also refer to the recommendation of the Physical Therapist for safe discharge planning              Brant Kvein, PT

## 2022-07-15 NOTE — CASE MANAGEMENT
Case Management Discharge Planning Note    Patient name Shaan Sadler  Location [address] [place] [address] Rd 809/PPHP [id number] MRN [id number]  : 1945 Date 7/15/2022       Current Admission Date: 2022  Current Admission Diagnosis:Right renal mass   Patient Active Problem List    Diagnosis Date Noted    [facility]     Chronic renal impairment     Acute respiratory failure with hypoxia ([facility] ) 2022    GI bleed 2022    SIRS (systemic inflammatory response syndrome) ([facility] ) 2022    Tachycardia 2022    Right renal mass 2022    Hypercalcemia 2022    Acute renal failure superimposed on stage 3 chronic kidney disease ([place] [address] ) 2022    Kidney cyst, acquired 2022    Chronic pain syndrome 10/05/2020    Chronic right-sided low back pain with right-sided sciatica 2020    Lumbar radiculopathy 2020    Polyarticular arthritis 2019    Class 1 obesity due to excess calories with serious comorbidity and body mass index (BMI) of 32 0 to 32 9 in adult 2019    Essential hypertension 09/10/2019    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 chronic kidney disease, without long-term current use of insulin ([place] [address] ) 09/10/2019    Left-sided weakness 07/10/2019    Cerebral aneurysm, nonruptured 2019    TIA (transient ischemic attack) 2019      LOS (days): 9  Geometric Mean LOS (GMLOS) (days): 2 10  Days to GMLOS:-6 7     OBJECTIVE:  Risk of Unplanned Readmission Score: 15 58         Current admission status: Inpatient   Preferred Pharmacy:   [address]   [facility], [address]  [address] [facility] [address]  Phone: [phone] Fax: [phone]    Primary Care Provider: Addis Sethi    Primary Insurance: [facility] REP  Secondary Insurance:     DISCHARGE DETAILS:                                                                                                               Facility Insurance Auth Number: approved [facility] auth # [id number] for [facility]: 7/15  NRD: 2 business days  F: [phone]   Any questions, call Graciela Ortiz @ [phone]

## 2022-07-15 NOTE — PROGRESS NOTES
[address] [facility]  Progress Note - Jeanette Gutierrez 1945, 78 y o  male MRN: [id number]  Unit/Bed#: [facility] 809-01 Encounter: [id number]  Primary Care Provider: KAYODE Christensen   Date and time admitted to hospital: 7/6/2022 11:12 AM    * Right renal mass  Assessment & Plan  Highly suspicious for renal cell carcinoma based on CT and MRI criteria  · Patient is s/p right nephrectomy on 07/06   · Urology Primary Team; continue management  · PENDING pathology collected on 07/06  · Per urology    GI bleed  Assessment & Plan  Patient with an episode of coffee-ground emesis evaluated by GI was to undergo upper endoscopy this morning, procedure canceled due to hypoxia and tachycardia  · Currently hemodynamically stable  · Hgb remains stable  · C/w PPI  · Gastroenterology consulted; appreciate assistance  · Will hold off on inpatient evaluation at this time  If emesis re-occurs can re-evaluate EGD inpatient  · Stable H&H    Acute renal failure superimposed on stage 3 chronic kidney disease [facility])  Assessment & Plan  Lab Results   Component Value Date    EGFR 30 07/15/2022    EGFR 29 07/14/2022    EGFR 28 07/13/2022    CREATININE 2 05 (H) 07/15/2022    CREATININE 2 13 (H) 07/14/2022    CREATININE 2 20 (H) 07/13/2022     Patient acute on chronic kidney disease baseline creatinine is 1 4 prior to his nephrectomy    · s/p right nephrectomy  · Nephrology consulted; appreciate assistance  · Hydrated with IV fluid  · Avoid/Limit nephrotoxins and IV Contrast  · Avoid hypotension and fluctuations in BP  · Improving    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 chronic kidney disease, without long-term current use of insulin [facility])  Assessment & Plan  Lab Results   Component Value Date    HGBA1C 7 2 (A) 05/16/2022       Recent Labs     07/14/22  2144 07/15/22  0742 07/15/22  0811 07/15/22  1152   POCGLU 163* 83 128 119       Blood Sugar Average: Last 72 hrs:  (P) 610 [id number]     · Hold Home medication->Metformin while admitted  · Hyperglycemia relatively well controlled  · C/w Accu-checks and SSI AC/HS  · Continue Diabetic Diet and Hypoglycemic protocol  · Continue Lantus q h s  Essential hypertension  Assessment & Plan  · BP reviewed and remains stable  · Continue to hold lisinopril and HCTZ after discharge  · Ordered PRN IV Hydralazine  · Continue to monitor       VTE Pharmacologic Prophylaxis: VTE Score: 7 High Risk (Score >/= 5) - Pharmacological DVT Prophylaxis Ordered: heparin  Sequential Compression Devices Ordered  Patient Centered Rounds: I performed bedside rounds with nursing staff today  Discussions with Specialists or Other Care Team Provider:     Education and Discussions with Family / Patient: Updated  (wife) at bedside  Time Spent for Care: 45 minutes  More than 50% of total time spent on counseling and coordination of care as described above  Current Length of Stay: 9 day(s)  Current Patient Status: Inpatient   Certification Statement: The patient will continue to require additional inpatient hospital stay due to Awaiting rehab placement  Discharge Plan: Awaiting rehab placement    Code Status: Level 1 - Full Code    Subjective:   Patient is comfortable sitting in chair  No chest pain or shortness of breath  No event overnight    Objective:     Vitals:   Temp (24hrs), Av 1 °F (36 7 °C), Min:98 1 °F (36 7 °C), Max:98 1 °F (36 7 °C)    Temp:  [98 1 °F (36 7 °C)] 98 1 °F (36 7 °C)  Resp:  [18] 18  BP: (152)/(82) 152/82  Body mass index is 32 01 kg/m²  Input and Output Summary (last 24 hours):      Intake/Output Summary (Last 24 hours) at 7/15/2022 1225  Last data filed at 2022 1650  Gross per 24 hour   Intake --   Output 400 ml   Net -400 ml       Physical Exam:   Physical Exam   Patient is awake alert oriented in no acute distress  Lung clear to auscultation bilateral  Heart positive S1-S2 no murmur  Abdomen soft, nontender, surgical site is clean, staples in place  Bilateral lower extremities edema    Additional Data:     Labs:  Results from last 7 days   Lab Units 07/15/22  0543 07/13/22  0519 07/12/22  0511   WBC Thousand/uL 10 05   < > 8 23   HEMOGLOBIN g/dL 10 1*   < > 8 7*   HEMATOCRIT % 30 1*   < > 25 9*   PLATELETS Thousands/uL 280   < > 177   NEUTROS PCT %  --   --  67   LYMPHS PCT %  --   --  12*   MONOS PCT %  --   --  15*   EOS PCT %  --   --  4    < > = values in this interval not displayed  Results from last 7 days   Lab Units 07/15/22  0543 07/12/22  0511 07/11/22  0456   SODIUM mmol/L 139   < > 141   POTASSIUM mmol/L 3 4*   < > 3 8   CHLORIDE mmol/L 108   < > 111*   CO2 mmol/L 26   < > 22   BUN mg/dL 36*   < > 43*   CREATININE mg/dL 2 05*   < > 2 57*   ANION GAP mmol/L 5   < > 8   CALCIUM mg/dL 8 7   < > 8 0*   ALBUMIN g/dL  --   --  2 2*   TOTAL BILIRUBIN mg/dL  --   --  1 76*   ALK PHOS U/L  --   --  49   ALT U/L  --   --  15   AST U/L  --   --  15   GLUCOSE RANDOM mg/dL 87   < > 129    < > = values in this interval not displayed  Results from last 7 days   Lab Units 07/15/22  1152 07/15/22  0811 07/15/22  0742 07/14/22  2144 07/14/22  1647 07/14/22  1124 07/14/22  0541 07/13/22  2119 07/13/22  1606 07/13/22  1050 07/13/22  0708 07/12/22  2055   POC GLUCOSE mg/dl 119 128 83 163* 207* 148* 124 167* 205* 140 121 202*         Results from last 7 days   Lab Units 07/09/22  0017 07/08/22  1420   LACTIC ACID mmol/L 1 5  --    PROCALCITONIN ng/ml  --  1 21*       Lines/Drains:  Invasive Devices  Report    Peripheral Intravenous Line  Duration           Peripheral IV 07/10/22 Left;Ventral (anterior) Forearm 4 days                      Imaging: No pertinent imaging reviewed  Recent Cultures (last 7 days):   Results from last 7 days   Lab Units 07/08/22  1421   BLOOD CULTURE  No Growth After 5 Days  No Growth After 5 Days         Last 24 Hours Medication List:   Current Facility-Administered Medications   Medication Dose Route Frequency Provider Last Rate    acetaminophen  650 mg Oral Q6H [address] Stephan Morales MD      docusate sodium  100 mg Oral BID Stephan Morales MD      heparin (porcine)  5,000 Units Subcutaneous Q12H [address] Rajeev Garcia MD      hydrALAZINE  5 mg Intravenous Q6H PRN Rajeev Garcia MD      insulin glargine  8 Units Subcutaneous HS Emory Lipoma, DO      insulin lispro  1-6 Units Subcutaneous TID AC KAYODE Gastelum      insulin lispro  1-6 Units Subcutaneous HS KAYODE Allen      neomycin-bacitracin-polymyxin b  1 small application Topical BID Stephan Morales MD      ondansetron  4 mg Intravenous Q6H PRN Stephan Morales MD      oxyCODONE  2 5 mg Oral Q4H PRN Stephan Morales MD      oxyCODONE  5 mg Oral Q4H PRN Stephan Morales MD      pantoprazole  40 mg Oral Early Morning Emory Gilliland, DO      simethicone  80 mg Oral Q6H PRN Argenis Mitchell PA-C          Today, Patient Was Seen By: Emory Gilliland DO    **Please Note: This note may have been constructed using a voice recognition system  **

## 2022-07-16 ENCOUNTER — TELEPHONE (OUTPATIENT)
Dept: OTHER | Facility: HOSPITAL | Age: 77
End: 2022-07-16

## 2022-07-16 LAB
ANION GAP SERPL CALCULATED.3IONS-SCNC: 6 MMOL/L (ref 4–13)
BUN SERPL-MCNC: 33 MG/DL (ref 5–25)
CALCIUM SERPL-MCNC: 8.5 MG/DL (ref 8.3–10.1)
CHLORIDE SERPL-SCNC: 108 MMOL/L (ref 100–108)
CO2 SERPL-SCNC: 26 MMOL/L (ref 21–32)
CREAT SERPL-MCNC: 2.17 MG/DL (ref 0.6–1.3)
GFR SERPL CREATININE-BSD FRML MDRD: 28 ML/MIN/1.73SQ M
GLUCOSE SERPL-MCNC: 129 MG/DL (ref 65–140)
GLUCOSE SERPL-MCNC: 129 MG/DL (ref 65–140)
GLUCOSE SERPL-MCNC: 131 MG/DL (ref 65–140)
GLUCOSE SERPL-MCNC: 157 MG/DL (ref 65–140)
GLUCOSE SERPL-MCNC: 170 MG/DL (ref 65–140)
POTASSIUM SERPL-SCNC: 4 MMOL/L (ref 3.5–5.3)
SODIUM SERPL-SCNC: 140 MMOL/L (ref 136–145)

## 2022-07-16 PROCEDURE — 99232 SBSQ HOSP IP/OBS MODERATE 35: CPT | Performed by: INTERNAL MEDICINE

## 2022-07-16 PROCEDURE — 80048 BASIC METABOLIC PNL TOTAL CA: CPT | Performed by: INTERNAL MEDICINE

## 2022-07-16 PROCEDURE — 99024 POSTOP FOLLOW-UP VISIT: CPT | Performed by: UROLOGY

## 2022-07-16 PROCEDURE — 99233 SBSQ HOSP IP/OBS HIGH 50: CPT | Performed by: INTERNAL MEDICINE

## 2022-07-16 PROCEDURE — 82948 REAGENT STRIP/BLOOD GLUCOSE: CPT

## 2022-07-16 RX ORDER — DOCUSATE SODIUM 100 MG/1
100 CAPSULE, LIQUID FILLED ORAL 2 TIMES DAILY
Qty: 20 CAPSULE | Refills: 0 | OUTPATIENT
Start: 2022-07-16 | End: 2022-07-26

## 2022-07-16 RX ADMIN — BACITRACIN ZINC, NEOMYCIN, POLYMYXIN B 1 SMALL APPLICATION: 400; 3.5; 5 OINTMENT TOPICAL at 09:31

## 2022-07-16 RX ADMIN — INSULIN GLARGINE 8 UNITS: 100 INJECTION, SOLUTION SUBCUTANEOUS at 22:18

## 2022-07-16 RX ADMIN — ACETAMINOPHEN 650 MG: 325 TABLET, FILM COATED ORAL at 05:21

## 2022-07-16 RX ADMIN — INSULIN LISPRO 1 UNITS: 100 INJECTION, SOLUTION INTRAVENOUS; SUBCUTANEOUS at 17:45

## 2022-07-16 RX ADMIN — PANTOPRAZOLE SODIUM 40 MG: 40 TABLET, DELAYED RELEASE ORAL at 05:21

## 2022-07-16 RX ADMIN — ACETAMINOPHEN 650 MG: 325 TABLET, FILM COATED ORAL at 11:35

## 2022-07-16 RX ADMIN — INSULIN LISPRO 1 UNITS: 100 INJECTION, SOLUTION INTRAVENOUS; SUBCUTANEOUS at 22:18

## 2022-07-16 RX ADMIN — DOCUSATE SODIUM 100 MG: 100 CAPSULE, LIQUID FILLED ORAL at 17:45

## 2022-07-16 RX ADMIN — HEPARIN SODIUM 5000 UNITS: 5000 INJECTION INTRAVENOUS; SUBCUTANEOUS at 22:18

## 2022-07-16 RX ADMIN — BACITRACIN ZINC, NEOMYCIN, POLYMYXIN B 1 SMALL APPLICATION: 400; 3.5; 5 OINTMENT TOPICAL at 17:45

## 2022-07-16 RX ADMIN — ACETAMINOPHEN 650 MG: 325 TABLET, FILM COATED ORAL at 17:45

## 2022-07-16 RX ADMIN — HEPARIN SODIUM 5000 UNITS: 5000 INJECTION INTRAVENOUS; SUBCUTANEOUS at 09:33

## 2022-07-16 NOTE — PLAN OF CARE
Problem: PAIN - ADULT  Goal: Verbalizes/displays adequate comfort level or baseline comfort level  Description: Interventions:  - Encourage patient to monitor pain and request assistance  - Assess pain using appropriate pain scale  - Administer analgesics based on type and severity of pain and evaluate response  - Implement non-pharmacological measures as appropriate and evaluate response  - Consider cultural and social influences on pain and pain management  - Notify physician/advanced practitioner if interventions unsuccessful or patient reports new pain  Outcome: Progressing     Problem: INFECTION - ADULT  Goal: Absence or prevention of progression during hospitalization  Description: INTERVENTIONS:  - Assess and monitor for signs and symptoms of infection  - Monitor lab/diagnostic results  - Monitor all insertion sites, i e  indwelling lines, tubes, and drains  - Monitor endotracheal if appropriate and nasal secretions for changes in amount and color  - Mobile appropriate cooling/warming therapies per order  - Administer medications as ordered  - Instruct and encourage patient and family to use good hand hygiene technique  - Identify and instruct in appropriate isolation precautions for identified infection/condition  Outcome: Progressing     Problem: DISCHARGE PLANNING  Goal: Discharge to home or other facility with appropriate resources  Description: INTERVENTIONS:  - Identify barriers to discharge w/patient and caregiver  - Arrange for needed discharge resources and transportation as appropriate  - Identify discharge learning needs (meds, wound care, etc )  - Arrange for interpretive services to assist at discharge as needed  - Refer to Case Management Department for coordinating discharge planning if the patient needs post-hospital services based on physician/advanced practitioner order or complex needs related to functional status, cognitive ability, or social support system  Outcome: Progressing Problem: Knowledge Deficit  Goal: Patient/family/caregiver demonstrates understanding of disease process, treatment plan, medications, and discharge instructions  Description: Complete learning assessment and assess knowledge base    Interventions:  - Provide teaching at level of understanding  - Provide teaching via preferred learning methods  Outcome: Progressing     Problem: Prexisting or High Potential for Compromised Skin Integrity  Goal: Skin integrity is maintained or improved  Description: INTERVENTIONS:  - Identify patients at risk for skin breakdown  - Assess and monitor skin integrity  - Assess and monitor nutrition and hydration status  - Monitor labs   - Assess for incontinence   - Turn and reposition patient  - Assist with mobility/ambulation  - Relieve pressure over bony prominences  - Avoid friction and shearing  - Provide appropriate hygiene as needed including keeping skin clean and dry  - Evaluate need for skin moisturizer/barrier cream  - Collaborate with interdisciplinary team   - Patient/family teaching  - Consider wound care consult   Outcome: Progressing     Problem: GENITOURINARY - ADULT  Goal: Absence of urinary retention  Description: INTERVENTIONS:  - Assess patients ability to void and empty bladder  - Monitor I/O  - Bladder scan as needed  - Discuss with physician/AP medications to alleviate retention as needed  - Discuss catheterization for long term situations as appropriate  Outcome: Progressing  Goal: Urinary catheter remains patent  Description: INTERVENTIONS:  - Assess patency of urinary catheter  - If patient has a chronic hernández, consider changing catheter if non-functioning  - Follow guidelines for intermittent irrigation of non-functioning urinary catheter  Outcome: Progressing     Problem: METABOLIC, FLUID AND ELECTROLYTES - ADULT  Goal: Electrolytes maintained within normal limits  Description: INTERVENTIONS:  - Monitor labs and assess patient for signs and symptoms of electrolyte imbalances  - Administer electrolyte replacement as ordered  - Monitor response to electrolyte replacements, including repeat lab results as appropriate  - Instruct patient on fluid and nutrition as appropriate  Outcome: Progressing     Problem: SKIN/TISSUE INTEGRITY - ADULT  Goal: Incision(s), wounds(s) or drain site(s) healing without S/S of infection  Description: INTERVENTIONS  - Assess and document dressing, incision, wound bed, drain sites and surrounding tissue  - Provide patient and family education  - Perform skin care/dressing changes every shift  Outcome: Progressing

## 2022-07-16 NOTE — PROGRESS NOTES
[address] [facility]  Progress Note - Dolphus Oz 1945, 78 y o  male MRN: [id number]  Unit/Bed#: [facility] 809-01 Encounter: [id number]  Primary Care Provider: KAYODE Islas   Date and time admitted to hospital: 7/6/2022 11:12 AM    * Right renal mass  Assessment & Plan  Highly suspicious for renal cell carcinoma based on CT and MRI criteria  · Patient is s/p right nephrectomy on 07/06   · Urology Primary Team; continue management  · PENDING pathology collected on 07/06  · Per urology    Acute renal failure superimposed on stage 3 chronic kidney disease [facility])  Assessment & Plan  Lab Results   Component Value Date    EGFR 28 07/16/2022    EGFR 30 07/15/2022    EGFR 29 07/14/2022    CREATININE 2 17 (H) 07/16/2022    CREATININE 2 05 (H) 07/15/2022    CREATININE 2 13 (H) 07/14/2022     Patient acute on chronic kidney disease baseline creatinine is 1 4 prior to his nephrectomy  · s/p right nephrectomy  · Nephrology consulted; appreciate assistance  · Hydrated with IV fluid  · Avoid/Limit nephrotoxins and IV Contrast  · Avoid hypotension and fluctuations in BP  · Improving    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 chronic kidney disease, without long-term current use of insulin [facility])  Assessment & Plan  Lab Results   Component Value Date    HGBA1C 7 2 (A) 05/16/2022       Recent Labs     07/15/22  1600 07/15/22  2059 07/16/22  0936 07/16/22  1115   POCGLU 166* 153* 131 129       Blood Sugar Average: Last 72 hrs:  (P) 145 6     · Hold Home medication->Metformin while admitted  · Hyperglycemia relatively well controlled  · C/w Accu-checks and SSI AC/HS  · Continue Diabetic Diet and Hypoglycemic protocol  · Continue Lantus q h s      Essential hypertension  Assessment & Plan  · BP reviewed and remains stable  · Continue to hold lisinopril and HCTZ after discharge  · Ordered PRN IV Hydralazine  · Continue to monitor       VTE Pharmacologic Prophylaxis: VTE Score: 7 High Risk (Score >/= 5) - Pharmacological DVT Prophylaxis Ordered: heparin  Sequential Compression Devices Ordered  Patient Centered Rounds: I performed bedside rounds with nursing staff today  Discussions with Specialists or Other Care Team Provider:     Education and Discussions with Family / Patient:  Patient  Time Spent for Care: 45 minutes  More than 50% of total time spent on counseling and coordination of care as described above  Current Length of Stay: 10 day(s)  Current Patient Status: Inpatient   Certification Statement:  Awaiting rehab placement      Code Status: Level 1 - Full Code    Subjective:   Patient is comfortable sitting in chair  No event overnight   no chest pain or shortness of breath    Objective:     Vitals:   Temp (24hrs), Av °F (36 7 °C), Min:97 8 °F (36 6 °C), Max:98 1 °F (36 7 °C)    Temp:  [97 8 °F (36 6 °C)-98 1 °F (36 7 °C)] 98 1 °F (36 7 °C)  HR:  [69-74] 74  Resp:  [16-18] 18  BP: (140-146)/(68-70) 140/70  Body mass index is 32 01 kg/m²  Input and Output Summary (last 24 hours): Intake/Output Summary (Last 24 hours) at 2022 1355  Last data filed at 7/15/2022 1900  Gross per 24 hour   Intake 360 ml   Output --   Net 360 ml       Physical Exam:   Physical Exam     Patient is awake alert oriented in no acute distress  Lung clear to auscultation bilateral  Heart positive S1-S2 no murmur  Abdomen soft, obese, nontender  Bilateral lower extremities edema  Additional Data:     Labs:  Results from last 7 days   Lab Units 07/15/22  0543 22  0519 22  0511   WBC Thousand/uL 10 05   < > 8 23   HEMOGLOBIN g/dL 10 1*   < > 8 7*   HEMATOCRIT % 30 1*   < > 25 9*   PLATELETS Thousands/uL 280   < > 177   NEUTROS PCT %  --   --  67   LYMPHS PCT %  --   --  12*   MONOS PCT %  --   --  15*   EOS PCT %  --   --  4    < > = values in this interval not displayed       Results from last 7 days   Lab Units 22  1011 22  0511 22  0456   SODIUM mmol/L 140   < > 141 POTASSIUM mmol/L 4 0   < > 3 8   CHLORIDE mmol/L 108   < > 111*   CO2 mmol/L 26   < > 22   BUN mg/dL 33*   < > 43*   CREATININE mg/dL 2 17*   < > 2 57*   ANION GAP mmol/L 6   < > 8   CALCIUM mg/dL 8 5   < > 8 0*   ALBUMIN g/dL  --   --  2 2*   TOTAL BILIRUBIN mg/dL  --   --  1 76*   ALK PHOS U/L  --   --  49   ALT U/L  --   --  15   AST U/L  --   --  15   GLUCOSE RANDOM mg/dL 129   < > 129    < > = values in this interval not displayed  Results from last 7 days   Lab Units 07/16/22  1115 07/16/22  0936 07/15/22  2059 07/15/22  1600 07/15/22  1152 07/15/22  0811 07/15/22  0742 07/14/22  2144 07/14/22  1647 07/14/22  1124 07/14/22  0541 07/13/22  2119   POC GLUCOSE mg/dl 129 131 153* 166* 119 128 83 163* 207* 148* 124 167*               Lines/Drains:  Invasive Devices  Report    None                       Imaging: No pertinent imaging reviewed      Recent Cultures (last 7 days):         Last 24 Hours Medication List:   Current Facility-Administered Medications   Medication Dose Route Frequency Provider Last Rate    acetaminophen  650 mg Oral Q6H [address] Stephan Morales MD      docusate sodium  100 mg Oral BID Stephan Morales MD      heparin (porcine)  5,000 Units Subcutaneous Q12H [address] Rajeev Garcia MD      hydrALAZINE  5 mg Intravenous Q6H PRN Rajeev Garcia MD      insulin glargine  8 Units Subcutaneous HS Rhoderick Lipoma, DO      insulin lispro  1-6 Units Subcutaneous TID AC KAYODE Gastelum      insulin lispro  1-6 Units Subcutaneous HS KAYODE Allen      neomycin-bacitracin-polymyxin b  1 small application Topical BID Stephan Morales MD      ondansetron  4 mg Intravenous Q6H PRN Stephan Morales MD      oxyCODONE  2 5 mg Oral Q4H PRN Stephan Morales MD      oxyCODONE  5 mg Oral Q4H PRN Stephan Morales MD      pantoprazole  40 mg Oral Early Morning Rhoderick Lipoma, DO      simethicone  80 mg Oral Q6H PRN Argenis Mitchell PA-C          Today, Patient Was Seen By: Celina Zelaya DO    **Please Note: This note may have been constructed using a voice recognition system  **

## 2022-07-16 NOTE — TELEPHONE ENCOUNTER
Patient is a 51-year-old male status post radical nephrectomy with Dr Nika Mcginnis  Plans for discharge shortly to short-term rehab  He has staples in place  He is currently postop day 10  Dr Nika Mcginnis would like to have staples removed postop day 14  Please assist with scheduling once patient has been discharged

## 2022-07-16 NOTE — CASE MANAGEMENT
Case Management Discharge Planning Note    Patient name Jose Roberto Santos  [facility] [address] [facility] [id number]  : 1945 Date 2022       Current Admission Date: 2022  Current Admission Diagnosis:Right renal mass   Patient Active Problem List    Diagnosis Date Noted    [facility]AL [facility]) ([facility] )     Chronic renal impairment     Acute respiratory failure with hypoxia ([place] [address] ) 2022    GI bleed 2022    SIRS (systemic inflammatory response syndrome) ([place] [address] ) 2022    Tachycardia 2022    Right renal mass 2022    Hypercalcemia 2022    Acute renal failure superimposed on stage 3 chronic kidney disease ([place] [address] ) 2022    Kidney cyst, acquired 2022    Chronic pain syndrome 10/05/2020    Chronic right-sided low back pain with right-sided sciatica 2020    Lumbar radiculopathy 2020    Polyarticular arthritis 2019    Class 1 obesity due to excess calories with serious comorbidity and body mass index (BMI) of 32 0 to 32 9 in adult 2019    Essential hypertension 09/10/2019    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 chronic kidney disease, without long-term current use of insulin ([place] [address] ) 09/10/2019    Left-sided weakness 07/10/2019    Cerebral aneurysm, nonruptured 2019    TIA (transient ischemic attack) 2019      LOS (days): 10  Geometric Mean LOS (GMLOS) (days): 2 10  Days to GMLOS:-7 5     OBJECTIVE:  Risk of Unplanned Readmission Score: 15 78         Current admission status: Inpatient   Preferred Pharmacy:   [address] [facility] [address]   Loyda Solano, [address] [address]  Phone: [phone] Fax: [phone]    Primary Care Provider: Alexa Leslie    Primary Insurance: [facility] [place]  Secondary Insurance:     DISCHARGE DETAILS:              Other Referral/Resources/Interventions Provided:  Referral Comments: [facility] [place], however after speaking with facility Latisha Prom, [address] was informed that the facility can't admit the pt until Monday due to staffing issues over the weekend  CM will follow

## 2022-07-16 NOTE — PROGRESS NOTES
Progress Note - Nephrology   Dolph Oz 78 y o  male MRN: [id number]  Unit/Bed#: [facility] 809-01 Encounter: [id number]      Assessment / Plan:  1  RUPAL on CKD stage 3a, sCr stable in low 2s and stable  -b/l sCr 1 3-1 4  -CKD in setting of DM/HTN  -monitor BMP  -ACEi/HCTZ on hold    2  HTN - BP acceptable, controlled on prn hydralazine    3  Anemia - Hgb 10 1, monitor CBC    4  Right renal mass s/p radical laparoscopic nephrectomy -       Subjective:   Patient is sleeping in chair  Denies chest pain or shortness of breath  Objective:     Vitals: Blood pressure 140/70, pulse 74, temperature 98 1 °F (36 7 °C), temperature source Oral, resp  rate 18, height 6' (1 829 m), weight 107 kg (236 lb), SpO2 95 %  ,Body mass index is 32 01 kg/m²  Temp (24hrs), Av °F (36 7 °C), Min:97 8 °F (36 6 °C), Max:98 1 °F (36 7 °C)      Weight (last 2 days)     None            Intake/Output Summary (Last 24 hours) at 2022 1427  Last data filed at 7/15/2022 1900  Gross per 24 hour   Intake 360 ml   Output --   Net 360 ml     I/O last 24 hours: In: 360 [P O :360]  Out: -         Physical Exam:   Physical Exam  Vitals reviewed  Constitutional:       General: He is not in acute distress  Appearance: He is well-developed  He is not diaphoretic  Comments: Thin, sleeping in chair   HENT:      Head: Normocephalic and atraumatic  Mouth/Throat:      Pharynx: No oropharyngeal exudate  Eyes:      General: No scleral icterus  Right eye: No discharge  Left eye: No discharge  Neck:      Thyroid: No thyromegaly  Cardiovascular:      Rate and Rhythm: Normal rate and regular rhythm  Heart sounds: Normal heart sounds  Pulmonary:      Effort: Pulmonary effort is normal       Breath sounds: Normal breath sounds  No wheezing or rales  Abdominal:      General: Bowel sounds are normal  There is no distension  Palpations: Abdomen is soft  Tenderness: There is no abdominal tenderness  Musculoskeletal:         General: Swelling (b/l LE) present  Normal range of motion  Cervical back: Neck supple  Lymphadenopathy:      Cervical: No cervical adenopathy  Skin:     General: Skin is warm and dry  Findings: No rash  Neurological:      Mental Status: He is alert        Comments: awake   Psychiatric:         Behavior: Behavior normal          Invasive Devices  Report    None                 Medications:    Scheduled Meds:  Current Facility-Administered Medications   Medication Dose Route Frequency Provider Last Rate    acetaminophen  650 mg Oral Q6H [facility] Rufus Ruiz MD      docusate sodium  100 mg Oral BID Rufus Ruiz MD      heparin (porcine)  5,000 Units Subcutaneous Q12H [facility] Sierra Mukherjee MD      hydrALAZINE  5 mg Intravenous Q6H PRN Sierra Mukherjee MD      insulin glargine  8 Units Subcutaneous HS Sana Payne,       insulin lispro  1-6 Units Subcutaneous TID AC KAYODE Gastelum      insulin lispro  1-6 Units Subcutaneous HS KAYODE Pollack      neomycin-bacitracin-polymyxin b  1 small application Topical BID Rufus Ruiz MD      ondansetron  4 mg Intravenous Q6H PRN Rufus Ruiz MD      oxyCODONE  2 5 mg Oral Q4H PRN Rufus Ruiz MD      oxyCODONE  5 mg Oral Q4H PRN Rufus Ruiz MD      pantoprazole  40 mg Oral Early Morning Sana Payne,       simethicone  80 mg Oral Q6H PRN Jamia Olguin PA-C         PRN Meds:   hydrALAZINE    ondansetron    oxyCODONE    oxyCODONE    simethicone    Continuous Infusions:         LAB RESULTS:      Results from last 7 days   Lab Units 07/16/22  1011 07/15/22  0543 07/14/22  0623 07/13/22  0519 07/12/22  0511 07/11/22  1822 07/11/22  0456 07/10/22  2209 07/10/22  1406 07/10/22  0458   WBC Thousand/uL  --  10 05  --  9 48 8 23  --  7 33  --   --  7 87   HEMOGLOBIN g/dL  --  10 1*  --  8 8* 8 7* 8 8* 8 5* 8 4* 8 1* 7 7*   HEMATOCRIT %  --  30 1*  -- 27 2* 25 9* 26 4* 25 9* 25 9* 24 9* 23 3*   PLATELETS Thousands/uL  --  280  --  202 177  --  177  --   --  142*   NEUTROS PCT %  --   --   --   --  67  --   --   --   --  66   LYMPHS PCT %  --   --   --   --  12*  --   --   --   --  16   MONOS PCT %  --   --   --   --  15*  --   --   --   --  12   EOS PCT %  --   --   --   --  4  --   --   --   --  5   POTASSIUM mmol/L 4 0 3 4* 3 9 3 3* 3 5  --  3 8  --   --  4 0   CHLORIDE mmol/L 108 108 110* 108 109*  --  111*  --   --  112*   CO2 mmol/L 26 26 24 24 24  --  22  --   --  24   BUN mg/dL 33* 36* 35* 38* 43*  --  43*  --   --  48*   CREATININE mg/dL 2 17* 2 05* 2 13* 2 20* 2 42*  --  2 57*  --   --  3 12*   CALCIUM mg/dL 8 5 8 7 8 2* 8 0* 8 0*  --  8 0*  --   --  7 7*   ALK PHOS U/L  --   --   --   --   --   --  49  --   --  46   ALT U/L  --   --   --   --   --   --  15  --   --  16   AST U/L  --   --   --   --   --   --  15  --   --  16   MAGNESIUM mg/dL  --   --   --   --   --   --  2 1  --   --  1 9   PHOSPHORUS mg/dL  --   --   --   --   --   --  2 0*  --   --  2 4       CUTURES:  Lab Results   Component Value Date    BLOODCX No Growth After 5 Days  07/08/2022    BLOODCX No Growth After 5 Days  07/08/2022    BLOODCX No Growth After 5 Days  09/28/2019    BLOODCX No Growth After 5 Days  09/28/2019    URINECX No Growth <1000 cfu/mL 06/15/2022    URINECX >100,000 cfu/ml Klebsiella pneumoniae (A) 04/12/2019                 Portions of the record may have been created with voice recognition software  Occasional wrong word or "sound a like" substitutions may have occurred due to the inherent limitations of voice recognition software  Read the chart carefully and recognize, using context, where substitutions have occurred  If you have any questions, please contact the dictating provider

## 2022-07-16 NOTE — ASSESSMENT & PLAN NOTE
Lab Results   Component Value Date    EGFR 28 07/16/2022    EGFR 30 07/15/2022    EGFR 29 07/14/2022    CREATININE 2 17 (H) 07/16/2022    CREATININE 2 05 (H) 07/15/2022    CREATININE 2 13 (H) 07/14/2022     Patient acute on chronic kidney disease baseline creatinine is 1 4 prior to his nephrectomy    · s/p right nephrectomy  · Nephrology consulted; appreciate assistance  · Hydrated with IV fluid  · Avoid/Limit nephrotoxins and IV Contrast  · Avoid hypotension and fluctuations in BP  · Improving

## 2022-07-16 NOTE — PROGRESS NOTES
UROLOGY PROGRESS NOTE   Patient Identifiers: Tacho Canales (MRN [id number])  Date of Service: 7/16/2022        Assessment:   1  postop day 8 robotic assisted laparoscopic radical nephrectomy secondary to central mass  ,     2  Upper GI bleed  - resolved    3  Acute on chronic renal insufficiency  - creatinine stabilized    Patient is medically stable for discharge  I was told from our case management team that he is pending discharge on Monday to a rehab facility        Plan:   -continue diabetic diet, insulin  -continue DVT prophylaxis, GI prophylaxis  -out of bed ambulate pulmonary toilet  -patient is medically stable for discharge, pending placement at rehab, scheduled for Monday per case management        Subjective:     24 HR EVENTS:   no significant events  Patient has  no complaints    States he is eager for discharge    Objective:     VITALS:    Vitals:    07/15/22 2100   BP: 140/70   Pulse: 74   Resp: 18   Temp: 98 1 °F (36 7 °C)   SpO2:        INS & OUTS:  [unfilled]    LABS:  Lab Results   Component Value Date    HGB 10 1 (L) 07/15/2022    HCT 30 1 (L) 07/15/2022    WBC 10 05 07/15/2022     07/15/2022   ]    Lab Results   Component Value Date    K 3 4 (L) 07/15/2022     07/15/2022    CO2 26 07/15/2022    BUN 36 (H) 07/15/2022    CREATININE 2 05 (H) 07/15/2022    CALCIUM 8 7 07/15/2022   ]    INPATIENT MEDS:    Current Facility-Administered Medications:     acetaminophen (TYLENOL) tablet 650 mg, 650 mg, Oral, Q6H [address], Leslie Stevenson MD, 650 mg at 07/16/22 0521    docusate sodium (COLACE) capsule 100 mg, 100 mg, Oral, BID, Leslie Stevenson MD, 100 mg at 07/15/22 1732    heparin (porcine) subcutaneous injection 5,000 Units, 5,000 Units, Subcutaneous, Q12H [address], Ban Tee MD, 5,000 Units at 07/15/22 2146    hydrALAZINE (APRESOLINE) injection 5 mg, 5 mg, Intravenous, Q6H PRN, Ban Tee MD    insulin glargine (LANTUS) subcutaneous injection 8 Units 0 08 mL, 8 Units, Subcutaneous, HS, Rigoberto Repress, DO, 8 Units at 07/15/22 2147    insulin lispro (HumaLOG) 100 units/mL subcutaneous injection 1-6 Units, 1-6 Units, Subcutaneous, TID AC, 1 Units at 07/15/22 1734 **AND** Fingerstick Glucose (POCT), , , TID AC, Ahmet Sweet, CRNP    insulin lispro (HumaLOG) 100 units/mL subcutaneous injection 1-6 Units, 1-6 Units, Subcutaneous, HS, Ahmet Sweet, CRNP, 1 Units at 07/15/22 2147    neomycin-bacitracin-polymyxin b (NEOSPORIN) ointment 1 small application, 1 small application, Topical, BID, Sarah Stahl MD, 1 small application at 23/42/78 1732    ondansetron (ZOFRAN) injection 4 mg, 4 mg, Intravenous, Q6H PRN, Sarah Stahl MD, 4 mg at 07/07/22 0106    oxyCODONE (ROXICODONE) IR tablet 2 5 mg, 2 5 mg, Oral, Q4H PRN, Sarah Stahl MD    oxyCODONE (ROXICODONE) IR tablet 5 mg, 5 mg, Oral, Q4H PRN, Sarah Stahl MD, 5 mg at 07/07/22 0254    pantoprazole (PROTONIX) EC tablet 40 mg, 40 mg, Oral, Early Morning, Rigoberto Repress, DO, 40 mg at 07/16/22 0521    simethicone (MYLICON) chewable tablet 80 mg, 80 mg, Oral, Q6H PRN, Edu Bran PA-C      Physical Exam:   /70 (BP Location: Left arm)   Pulse 74   Temp 98 1 °F (36 7 °C) (Oral)   Resp 18   Ht 6' (1 829 m)   Wt 107 kg (236 lb)   SpO2 95%   BMI 32 01 kg/m²   GEN: resting comfortably  RESP: breathing comfortably with no accessory muscle use  CV: no significant peripheral edema  ABD: soft, non-tender, non-distended  INCISION: clean dry and intact  DRAINS: none  JULIEN: none

## 2022-07-17 VITALS
HEART RATE: 81 BPM | BODY MASS INDEX: 31.97 KG/M2 | RESPIRATION RATE: 16 BRPM | WEIGHT: 236 LBS | HEIGHT: 72 IN | TEMPERATURE: 98 F | SYSTOLIC BLOOD PRESSURE: 114 MMHG | OXYGEN SATURATION: 95 % | DIASTOLIC BLOOD PRESSURE: 65 MMHG

## 2022-07-17 LAB
ANION GAP SERPL CALCULATED.3IONS-SCNC: 8 MMOL/L (ref 4–13)
BUN SERPL-MCNC: 35 MG/DL (ref 5–25)
CALCIUM SERPL-MCNC: 8.9 MG/DL (ref 8.3–10.1)
CHLORIDE SERPL-SCNC: 105 MMOL/L (ref 100–108)
CO2 SERPL-SCNC: 27 MMOL/L (ref 21–32)
CREAT SERPL-MCNC: 2.14 MG/DL (ref 0.6–1.3)
GFR SERPL CREATININE-BSD FRML MDRD: 29 ML/MIN/1.73SQ M
GLUCOSE SERPL-MCNC: 102 MG/DL (ref 65–140)
GLUCOSE SERPL-MCNC: 102 MG/DL (ref 65–140)
GLUCOSE SERPL-MCNC: 108 MG/DL (ref 65–140)
GLUCOSE SERPL-MCNC: 130 MG/DL (ref 65–140)
GLUCOSE SERPL-MCNC: 181 MG/DL (ref 65–140)
POTASSIUM SERPL-SCNC: 3.8 MMOL/L (ref 3.5–5.3)
SODIUM SERPL-SCNC: 140 MMOL/L (ref 136–145)

## 2022-07-17 PROCEDURE — 99024 POSTOP FOLLOW-UP VISIT: CPT | Performed by: UROLOGY

## 2022-07-17 PROCEDURE — 80048 BASIC METABOLIC PNL TOTAL CA: CPT | Performed by: INTERNAL MEDICINE

## 2022-07-17 PROCEDURE — 99233 SBSQ HOSP IP/OBS HIGH 50: CPT | Performed by: INTERNAL MEDICINE

## 2022-07-17 PROCEDURE — 82948 REAGENT STRIP/BLOOD GLUCOSE: CPT

## 2022-07-17 RX ADMIN — ACETAMINOPHEN 650 MG: 325 TABLET, FILM COATED ORAL at 00:38

## 2022-07-17 RX ADMIN — ACETAMINOPHEN 650 MG: 325 TABLET, FILM COATED ORAL at 17:23

## 2022-07-17 RX ADMIN — ACETAMINOPHEN 650 MG: 325 TABLET, FILM COATED ORAL at 05:51

## 2022-07-17 RX ADMIN — INSULIN LISPRO 1 UNITS: 100 INJECTION, SOLUTION INTRAVENOUS; SUBCUTANEOUS at 22:44

## 2022-07-17 RX ADMIN — BACITRACIN ZINC, NEOMYCIN, POLYMYXIN B 1 SMALL APPLICATION: 400; 3.5; 5 OINTMENT TOPICAL at 08:17

## 2022-07-17 RX ADMIN — INSULIN GLARGINE 8 UNITS: 100 INJECTION, SOLUTION SUBCUTANEOUS at 22:44

## 2022-07-17 RX ADMIN — HEPARIN SODIUM 5000 UNITS: 5000 INJECTION INTRAVENOUS; SUBCUTANEOUS at 08:16

## 2022-07-17 RX ADMIN — BACITRACIN ZINC, NEOMYCIN, POLYMYXIN B 1 SMALL APPLICATION: 400; 3.5; 5 OINTMENT TOPICAL at 17:23

## 2022-07-17 RX ADMIN — PANTOPRAZOLE SODIUM 40 MG: 40 TABLET, DELAYED RELEASE ORAL at 05:51

## 2022-07-17 RX ADMIN — ACETAMINOPHEN 650 MG: 325 TABLET, FILM COATED ORAL at 12:20

## 2022-07-17 RX ADMIN — DOCUSATE SODIUM 100 MG: 100 CAPSULE, LIQUID FILLED ORAL at 17:23

## 2022-07-17 RX ADMIN — DOCUSATE SODIUM 100 MG: 100 CAPSULE, LIQUID FILLED ORAL at 08:16

## 2022-07-17 RX ADMIN — HEPARIN SODIUM 5000 UNITS: 5000 INJECTION INTRAVENOUS; SUBCUTANEOUS at 22:44

## 2022-07-17 NOTE — PLAN OF CARE
Problem: PAIN - ADULT  Goal: Verbalizes/displays adequate comfort level or baseline comfort level  Description: Interventions:  - Encourage patient to monitor pain and request assistance  - Assess pain using appropriate pain scale  - Administer analgesics based on type and severity of pain and evaluate response  - Implement non-pharmacological measures as appropriate and evaluate response  - Consider cultural and social influences on pain and pain management  - Notify physician/advanced practitioner if interventions unsuccessful or patient reports new pain  Outcome: Progressing     Problem: INFECTION - ADULT  Goal: Absence or prevention of progression during hospitalization  Description: INTERVENTIONS:  - Assess and monitor for signs and symptoms of infection  - Monitor lab/diagnostic results  - Monitor all insertion sites, i e  indwelling lines, tubes, and drains  - Monitor endotracheal if appropriate and nasal secretions for changes in amount and color  - [place] appropriate cooling/warming therapies per order  - Administer medications as ordered  - Instruct and encourage patient and family to use good hand hygiene technique  - Identify and instruct in appropriate isolation precautions for identified infection/condition  Outcome: Progressing     Problem: Knowledge Deficit  Goal: Patient/family/caregiver demonstrates understanding of disease process, treatment plan, medications, and discharge instructions  Description: Complete learning assessment and assess knowledge base    Interventions:  - Provide teaching at level of understanding  - Provide teaching via preferred learning methods  Outcome: Progressing

## 2022-07-17 NOTE — PLAN OF CARE
Problem: PAIN - ADULT  Goal: Verbalizes/displays adequate comfort level or baseline comfort level  Description: Interventions:  - Encourage patient to monitor pain and request assistance  - Assess pain using appropriate pain scale  - Administer analgesics based on type and severity of pain and evaluate response  - Implement non-pharmacological measures as appropriate and evaluate response  - Consider cultural and social influences on pain and pain management  - Notify physician/advanced practitioner if interventions unsuccessful or patient reports new pain  Outcome: Progressing     Problem: INFECTION - ADULT  Goal: Absence or prevention of progression during hospitalization  Description: INTERVENTIONS:  - Assess and monitor for signs and symptoms of infection  - Monitor lab/diagnostic results  - Monitor all insertion sites, i e  indwelling lines, tubes, and drains  - Monitor endotracheal if appropriate and nasal secretions for changes in amount and color  - [place] appropriate cooling/warming therapies per order  - Administer medications as ordered  - Instruct and encourage patient and family to use good hand hygiene technique  - Identify and instruct in appropriate isolation precautions for identified infection/condition  Outcome: Progressing     Problem: DISCHARGE PLANNING  Goal: Discharge to home or other facility with appropriate resources  Description: INTERVENTIONS:  - Identify barriers to discharge w/patient and caregiver  - Arrange for needed discharge resources and transportation as appropriate  - Identify discharge learning needs (meds, wound care, etc )  - Arrange for interpretive services to assist at discharge as needed  - Refer to Case Management Department for coordinating discharge planning if the patient needs post-hospital services based on physician/advanced practitioner order or complex needs related to functional status, cognitive ability, or social support system  Outcome: Progressing Problem: Knowledge Deficit  Goal: Patient/family/caregiver demonstrates understanding of disease process, treatment plan, medications, and discharge instructions  Description: Complete learning assessment and assess knowledge base    Interventions:  - Provide teaching at level of understanding  - Provide teaching via preferred learning methods  Outcome: Progressing     Problem: GENITOURINARY - ADULT  Goal: Absence of urinary retention  Description: INTERVENTIONS:  - Assess patients ability to void and empty bladder  - Monitor I/O  - Bladder scan as needed  - Discuss with physician/AP medications to alleviate retention as needed  - Discuss catheterization for long term situations as appropriate  Outcome: Progressing  Goal: Urinary catheter remains patent  Description: INTERVENTIONS:  - Assess patency of urinary catheter  - If patient has a chronic hernández, consider changing catheter if non-functioning  - Follow guidelines for intermittent irrigation of non-functioning urinary catheter  Outcome: Progressing     Problem: METABOLIC, FLUID AND ELECTROLYTES - ADULT  Goal: Electrolytes maintained within normal limits  Description: INTERVENTIONS:  - Monitor labs and assess patient for signs and symptoms of electrolyte imbalances  - Administer electrolyte replacement as ordered  - Monitor response to electrolyte replacements, including repeat lab results as appropriate  - Instruct patient on fluid and nutrition as appropriate  Outcome: Progressing

## 2022-07-17 NOTE — ASSESSMENT & PLAN NOTE
Lab Results   Component Value Date    EGFR 29 07/17/2022    EGFR 28 07/16/2022    EGFR 30 07/15/2022    CREATININE 2 14 (H) 07/17/2022    CREATININE 2 17 (H) 07/16/2022    CREATININE 2 05 (H) 07/15/2022     Patient acute on chronic kidney disease baseline creatinine is 1 4 prior to his nephrectomy    · s/p right nephrectomy  · Nephrology consulted; appreciate assistance  · Hydrated with IV fluid  · Improving  · Cleared for discharge by Nephrology

## 2022-07-17 NOTE — PROGRESS NOTES
[address] [facility]  Progress Note - Naresh Avila 1945, 78 y o  male MRN: [id number]  Unit/Bed#: [facility] 809-01 Encounter: [id number]  Primary Care Provider: KAYDOE Hammonds   Date and time admitted to hospital: 7/6/2022 11:12 AM    * Right renal mass  Assessment & Plan  Highly suspicious for renal cell carcinoma based on CT and MRI criteria  · Patient is s/p right nephrectomy on 07/06   · Urology Primary Team; continue management  · PENDING pathology collected on 07/06  · Per urology    Acute renal failure superimposed on stage 3 chronic kidney disease [facility])  Assessment & Plan  Lab Results   Component Value Date    EGFR 29 07/17/2022    EGFR 28 07/16/2022    EGFR 30 07/15/2022    CREATININE 2 14 (H) 07/17/2022    CREATININE 2 17 (H) 07/16/2022    CREATININE 2 05 (H) 07/15/2022     Patient acute on chronic kidney disease baseline creatinine is 1 4 prior to his nephrectomy  · s/p right nephrectomy  · Nephrology consulted; appreciate assistance  · Hydrated with IV fluid  · Improving  · Cleared for discharge by Nephrology    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 chronic kidney disease, without long-term current use of insulin [facility])  Assessment & Plan  Lab Results   Component Value Date    HGBA1C 7 2 (A) 05/16/2022       Recent Labs     07/16/22  1115 07/16/22  1621 07/16/22  2044 07/17/22  0640   POCGLU 129 170* 157* 102       Blood Sugar Average: Last 72 hrs:  (P) 141 [id number]     · Hold Home medication->Metformin while admitted  · Hyperglycemia relatively well controlled  · C/w Accu-checks and SSI AC/HS  · Continue Diabetic Diet and Hypoglycemic protocol  · Continue Lantus q h s      Essential hypertension  Assessment & Plan  · BP reviewed and remains stable  · Continue to hold lisinopril and HCTZ after discharge  · Ordered PRN IV Hydralazine  · Continue to monitor       VTE Pharmacologic Prophylaxis: VTE Score: 7 High Risk (Score >/= 5) - Pharmacological DVT Prophylaxis Ordered: heparin  Sequential Compression Devices Ordered  Patient Centered Rounds: I performed bedside rounds with nursing staff today  Discussions with Specialists or Other Care Team Provider:     Education and Discussions with Family / Patient: Patient  Time Spent for Care: 45 minutes  More than 50% of total time spent on counseling and coordination of care as described above  Current Length of Stay: 11 day(s)  Current Patient Status: Inpatient   Certification Statement: The patient will continue to require additional inpatient hospital stay due to Awaiting rehab placement  Discharge Plan: Anticipate discharge tomorrow to rehab facility  Code Status: Level 1 - Full Code    Subjective:   Patient is comfortable ambulating in the hallway  No chest pain or shortness of breath  No nausea vomiting or diarrhea    Objective:     Vitals:   Temp (24hrs), Av 1 °F (36 7 °C), Min:98 °F (36 7 °C), Max:98 2 °F (36 8 °C)    Temp:  [98 °F (36 7 °C)-98 2 °F (36 8 °C)] 98 °F (36 7 °C)  HR:  [66-81] 81  Resp:  [16-17] 16  BP: (130-133)/(66-89) 133/89  SpO2:  [95 %] 95 %  Body mass index is 32 01 kg/m²  Input and Output Summary (last 24 hours):      Intake/Output Summary (Last 24 hours) at 2022 0926  Last data filed at 2022 1801  Gross per 24 hour   Intake 300 ml   Output --   Net 300 ml       Physical Exam:   Physical Exam   Patient is awake alert oriented in no acute distress  Ambulating in the hallway with a cane  Lung clear to auscultation bilateral  Heart positive S1-S2 no murmur  Abdomen soft, obese, nontender  Bilateral lower extremities edema    Additional Data:     Labs:  Results from last 7 days   Lab Units 07/15/22  0543 22  0519 22  0511   WBC Thousand/uL 10 05   < > 8 23   HEMOGLOBIN g/dL 10 1*   < > 8 7*   HEMATOCRIT % 30 1*   < > 25 9*   PLATELETS Thousands/uL 280   < > 177   NEUTROS PCT %  --   --  67   LYMPHS PCT %  --   --  12*   MONOS PCT %  --   --  15*   EOS PCT %  --   --  4    < > = values in this interval not displayed  Results from last 7 days   Lab Units 07/17/22  0454 07/12/22  0511 07/11/22  0456   SODIUM mmol/L 140   < > 141   POTASSIUM mmol/L 3 8   < > 3 8   CHLORIDE mmol/L 105   < > 111*   CO2 mmol/L 27   < > 22   BUN mg/dL 35*   < > 43*   CREATININE mg/dL 2 14*   < > 2 57*   ANION GAP mmol/L 8   < > 8   CALCIUM mg/dL 8 9   < > 8 0*   ALBUMIN g/dL  --   --  2 2*   TOTAL BILIRUBIN mg/dL  --   --  1 76*   ALK PHOS U/L  --   --  49   ALT U/L  --   --  15   AST U/L  --   --  15   GLUCOSE RANDOM mg/dL 102   < > 129    < > = values in this interval not displayed  Results from last 7 days   Lab Units 07/17/22  0640 07/16/22  2044 07/16/22  1621 07/16/22  1115 07/16/22  0936 07/15/22  2059 07/15/22  1600 07/15/22  1152 07/15/22  0811 07/15/22  0742 07/14/22  2144 07/14/22  1647   POC GLUCOSE mg/dl 102 157* 170* 129 131 153* 166* 119 128 83 163* 207*               Lines/Drains:  Invasive Devices  Report    None                       Imaging: No pertinent imaging reviewed      Recent Cultures (last 7 days):         Last 24 Hours Medication List:   Current Facility-Administered Medications   Medication Dose Route Frequency Provider Last Rate    acetaminophen  650 mg Oral Q6H [address] Prashant Bond MD      docusate sodium  100 mg Oral BID Prashant Bond MD      heparin (porcine)  5,000 Units Subcutaneous Q12H [address] Ulysses Miguel MD      hydrALAZINE  5 mg Intravenous Q6H PRN Ulysses Miguel MD      insulin glargine  8 Units Subcutaneous HS Siomara Charles DO      insulin lispro  1-6 Units Subcutaneous TID AC KAYODE Gastelum      insulin lispro  1-6 Units Subcutaneous HS KAYODE Villatoro      neomycin-bacitracin-polymyxin b  1 small application Topical BID Prashant Bond MD      ondansetron  4 mg Intravenous Q6H PRN Prashant Bond MD      oxyCODONE  2 5 mg Oral Q4H PRN Prashant Bond MD      oxyCODONE  5 mg Oral Q4H PRN Orlando Pham MD      pantoprazole  40 mg Oral Early Morning Gary Dyer DO      simethicone  80 mg Oral Q6H PRN Haydee Shearer PA-C          Today, Patient Was Seen By: Gary Dyer DO    **Please Note: This note may have been constructed using a voice recognition system  **

## 2022-07-17 NOTE — ASSESSMENT & PLAN NOTE
Lab Results   Component Value Date    HGBA1C 7 2 (A) 05/16/2022       Recent Labs     07/16/22  1115 07/16/22  1621 07/16/22  2044 07/17/22  0640   POCGLU 129 170* 157* 102       Blood Sugar Average: Last 72 hrs:  (P) 141 [id number]     · Hold Home medication->Metformin while admitted  · Hyperglycemia relatively well controlled  · C/w Accu-checks and SSI AC/HS  · Continue Diabetic Diet and Hypoglycemic protocol  · Continue Lantus q h s

## 2022-07-17 NOTE — QUICK NOTE
Although serum creatinine remains above baseline and low 2s, creatinine is stable  ACE inhibitor and HCTZ remain on hold  Baseline creatinine 1 3-1 4  Patient should have Nephrology follow-up upon discharge  If patient is still inpatient tomorrow, Nephrology to see in follow-up  Okay for discharge from renal perspective

## 2022-07-17 NOTE — PROGRESS NOTES
UROLOGY PROGRESS NOTE   Patient Identifiers: Kana Hammond (MRN [id number])  Date of Service: 7/17/2022        Assessment:   1  postop day 6 robotic assisted laparoscopic radical nephrectomy secondary to central mass  ,     2  Upper GI bleed  - resolved    3  Acute on chronic renal insufficiency  - creatinine stabilized    Patient is medically stable for discharge  I was told from our case management team that he is pending discharge on Monday to a rehab facility        Plan:   -continue diabetic diet, insulin  -continue DVT prophylaxis, GI prophylaxis  -out of bed ambulate pulmonary toilet  -patient is medically stable for discharge, pending placement at rehab, scheduled for Monday per case management        Subjective:     24 HR EVENTS:   no significant events  Patient has  no complaints        Objective:     VITALS:    Vitals:    07/17/22 0733   BP: 133/89   Pulse: 81   Resp: 16   Temp: 98 °F (36 7 °C)   SpO2:        INS & OUTS:  [unfilled]    LABS:  Lab Results   Component Value Date    HGB 10 1 (L) 07/15/2022    HCT 30 1 (L) 07/15/2022    WBC 10 05 07/15/2022     07/15/2022   ]    Lab Results   Component Value Date    K 3 8 07/17/2022     07/17/2022    CO2 27 07/17/2022    BUN 35 (H) 07/17/2022    CREATININE 2 14 (H) 07/17/2022    CALCIUM 8 9 07/17/2022   ]    INPATIENT MEDS:    Current Facility-Administered Medications:     acetaminophen (TYLENOL) tablet 650 mg, 650 mg, Oral, Q6H [address], Michael Escamilla MD, 650 mg at 07/17/22 0551    docusate sodium (COLACE) capsule 100 mg, 100 mg, Oral, BID, Michael Escamilla MD, 100 mg at 07/17/22 0816    heparin (porcine) subcutaneous injection 5,000 Units, 5,000 Units, Subcutaneous, Q12H [address], Jena Cool MD, 5,000 Units at 07/17/22 0816    hydrALAZINE (APRESOLINE) injection 5 mg, 5 mg, Intravenous, Q6H PRN, Jena Cool MD    insulin glargine (LANTUS) subcutaneous injection 8 Units 0 08 mL, 8 Units, Subcutaneous, HS, Leonid Vernon, DO, 8 Units at 07/16/22 2218    insulin lispro (HumaLOG) 100 units/mL subcutaneous injection 1-6 Units, 1-6 Units, Subcutaneous, TID AC, 1 Units at 07/16/22 1745 **AND** Fingerstick Glucose (POCT), , , TID AC, KAOYDE Gastelum    insulin lispro (HumaLOG) 100 units/mL subcutaneous injection 1-6 Units, 1-6 Units, Subcutaneous, HS, KAYODE Gastelum, 1 Units at 07/16/22 2218    neomycin-bacitracin-polymyxin b (NEOSPORIN) ointment 1 small application, 1 small application, Topical, BID, Ileana Sims MD, 1 small application at 01/47/16 0817    ondansetron (ZOFRAN) injection 4 mg, 4 mg, Intravenous, Q6H PRN, Ileana Sims MD, 4 mg at 07/07/22 0106    oxyCODONE (ROXICODONE) IR tablet 2 5 mg, 2 5 mg, Oral, Q4H PRN, Ileana Sims MD    oxyCODONE (ROXICODONE) IR tablet 5 mg, 5 mg, Oral, Q4H PRN, Ileana Sims MD, 5 mg at 07/07/22 0254    pantoprazole (PROTONIX) EC tablet 40 mg, 40 mg, Oral, Early Morning, Leonid Vernon DO, 40 mg at 07/17/22 0551    simethicone (MYLICON) chewable tablet 80 mg, 80 mg, Oral, Q6H PRN, Garcia Harmon PA-C      Physical Exam:   /89   Pulse 81   Temp 98 °F (36 7 °C) (Oral)   Resp 16   Ht 6' (1 829 m)   Wt 107 kg (236 lb)   SpO2 95%   BMI 32 01 kg/m²   GEN: resting comfortably  RESP: breathing comfortably with no accessory muscle use  CV: no significant peripheral edema  ABD: soft, non-tender, non-distended  INCISION: clean dry and intact  DRAINS: none  JULIEN: none

## 2022-07-18 ENCOUNTER — HOME HEALTH ADMISSION (OUTPATIENT)
Dept: HOME HEALTH SERVICES | Facility: HOME HEALTHCARE | Age: 77
End: 2022-07-18

## 2022-07-18 LAB
ANION GAP SERPL CALCULATED.3IONS-SCNC: 8 MMOL/L (ref 4–13)
BUN SERPL-MCNC: 30 MG/DL (ref 5–25)
CALCIUM SERPL-MCNC: 8.7 MG/DL (ref 8.3–10.1)
CHLORIDE SERPL-SCNC: 108 MMOL/L (ref 100–108)
CO2 SERPL-SCNC: 23 MMOL/L (ref 21–32)
CREAT SERPL-MCNC: 2.19 MG/DL (ref 0.6–1.3)
GFR SERPL CREATININE-BSD FRML MDRD: 28 ML/MIN/1.73SQ M
GLUCOSE SERPL-MCNC: 100 MG/DL (ref 65–140)
GLUCOSE SERPL-MCNC: 74 MG/DL (ref 65–140)
GLUCOSE SERPL-MCNC: 74 MG/DL (ref 65–140)
POTASSIUM SERPL-SCNC: 3.9 MMOL/L (ref 3.5–5.3)
SODIUM SERPL-SCNC: 139 MMOL/L (ref 136–145)

## 2022-07-18 PROCEDURE — 80048 BASIC METABOLIC PNL TOTAL CA: CPT | Performed by: INTERNAL MEDICINE

## 2022-07-18 PROCEDURE — 99024 POSTOP FOLLOW-UP VISIT: CPT | Performed by: PHYSICIAN ASSISTANT

## 2022-07-18 PROCEDURE — NC001 PR NO CHARGE: Performed by: PHYSICIAN ASSISTANT

## 2022-07-18 PROCEDURE — 82948 REAGENT STRIP/BLOOD GLUCOSE: CPT

## 2022-07-18 RX ADMIN — BACITRACIN ZINC, NEOMYCIN, POLYMYXIN B 1 SMALL APPLICATION: 400; 3.5; 5 OINTMENT TOPICAL at 08:12

## 2022-07-18 RX ADMIN — ACETAMINOPHEN 650 MG: 325 TABLET, FILM COATED ORAL at 06:19

## 2022-07-18 RX ADMIN — HEPARIN SODIUM 5000 UNITS: 5000 INJECTION INTRAVENOUS; SUBCUTANEOUS at 08:12

## 2022-07-18 RX ADMIN — PANTOPRAZOLE SODIUM 40 MG: 40 TABLET, DELAYED RELEASE ORAL at 06:19

## 2022-07-18 NOTE — DISCHARGE INSTRUCTIONS
Nephrectomy   WHAT YOU NEED TO KNOW:   A nephrectomy is surgery to remove part or all of your kidney  DISCHARGE INSTRUCTIONS:   Call your local emergency number (911 in the [address]) if:   You have sudden chest pain or trouble breathing  Seek care immediately if:   Blood soaks through your bandage  Your stitches come apart  You cannot urinate  Call your doctor or surgeon if:   You have a fever  You have blood in your urine  Your surgery area is red, swollen, or draining pus  You have chills, a cough, or feel weak and achy  You have questions or concerns about your condition or care  Medicines: You may need any of the following:  Prescription pain medicine  may be given  Ask your healthcare provider how to take this medicine safely  Some prescription pain medicines contain acetaminophen  Do not take other medicines that contain acetaminophen without talking to your healthcare provider  Too much acetaminophen may cause liver damage  Prescription pain medicine may cause constipation  Ask your healthcare provider how to prevent or treat constipation  Antibiotics  may be given to prevent or treat an infection caused by bacteria  Bowel movement softeners  may be given to help prevent constipation  Take your medicine as directed  Contact your healthcare provider if you think your medicine is not helping or if you have side effects  Tell him or her if you are allergic to any medicine  Keep a list of the medicines, vitamins, and herbs you take  Include the amounts, and when and why you take them  Bring the list or the pill bottles to follow-up visits  Carry your medicine list with you in case of an emergency  Care for the surgery area:  Do not get your stitches wet unless your surgeon says it is okay  When you are allowed to shower, carefully wash the wound with soap and water  Gently pat the area dry and put on new, clean bandages as directed   Change your bandages when they get wet or dirty  Do not  sit in bathtubs, pools, or hot tubs until your surgeon says it is okay  Drink liquids as directed: This will help prevent constipation and help your other kidney work correctly  Ask your healthcare provider how much liquid to drink each day and which liquids are best for you  Do not drink alcohol  Alcohol can damage your other kidney  Nutrition:  You will be given instructions on food to limit or avoid after surgery to protect your other kidney  You may be told to have more vegetables and fruits to help protect the kidney  Activity:  Do not lift, pull, or push heavy objects  You may also need to limit movement, such as bending your back or twisting  Ask your healthcare provider when to can return to work or play sports  Follow up with your doctor or surgeon as directed: You will need to return to have your wound checked and stitches removed  Write down your questions so you remember to ask them during your visits  © [employer] 2022 Information is for End User's use only and may not be sold, redistributed or otherwise used for commercial purposes  All illustrations and images included in CareNotes® are the copyrighted property of A D A M , Inc  or [facility] Tim Ibarra   The above information is an  only  It is not intended as medical advice for individual conditions or treatments  Talk to your doctor, nurse or pharmacist before following any medical regimen to see if it is safe and effective for you

## 2022-07-18 NOTE — DISCHARGE SUMMARY
Discharge Summary - Syed Diallo 78 y o  male MRN: [id number]    Unit/Bed#: [facility] 809-01 Encounter: [id number]    Admission Date: 7/6/2022     Discharge Date: 07/18/22    HPI: Syed Diallo is a 78 y o  male who presented for scheduled right nephrectomy by Dr Emery Jones  Procedure(s):  NEPHRECTOMY RADICAL LAPAROSCOPIC W/ ROBOTICS, poss open  Surgeon(s):  Joycelyn Staggers, MD Smiley Collum, MD Joesphine Awkward, PA-C W Romayne Lamar, PA-C Elise Echevaria, MD  7/6/2022    Hospital Course: Paola Tucker underwent right radical nephrectomy on July 6th  This complicated by postoperative anemia and nephrectomy bed hematoma  This was treated conservatively, he did receive 1 unit of packed red blood cells, his hemoglobin was stable in mid eight range for the week following that  He had an isolated episode of coffee-ground emesis, evaluated by the GI team, treated with intravenous PPI, and resolved without recurrence, the EGD with canceled  Gradual his diet was advanced off of clears and tolerated solid food for the last week  He had some tachycardia without fevers, most likely related to anemia however he did receive three days of IV antibiotics for meeting SIRS criteria, which also resolved and no further therapy was needed  Acute kidney injury on chronic kidney disease as noted, his HCTZ-lisinopril has been held since surgery date  For creatinine is 2 1 on day of discharge  He has been voiding since his Baird catheter was removed last week  Bladder scans are less than 10 mL  He will have a follow-up BMP in one week with Nephrology visit thereafter  Having solid bowel movements the last few days  Physical therapy and occupational therapy saw patient while in the hospital initially meeting criteria for post acute rehabilitation placement    Acceptance to facility was delayed by staffing, ultimately the following three days here in the hospital he has gained further strength, and is independent with all activities of daily living on the day of discharge  He is due for discharge to his home, accompanied by his wife and visiting nurses therapy will be ordered for the house  Changes and disposition where discussed at length with the case management team over the past week  His incisional staples are due to come out later this week  Pathology discussion with Dr Bisi Rey next week  Discharge Diagnosis: Right renal mass    Condition at Discharge: good    Discharge Medications:  See after visit summary for reconciled discharge medications provided to patient and family  Patient was discharged home on home medications with the addition of home medications, over-the-counter Tylenol, and one week BMP and nephrology follow-up  Smitha Hansen Discharge instructions/Information to patient and family:   See after visit summary for written and verbal information which has been provided to patient and family  Provisions for Follow-Up Care:  See after visit summary for information related to follow-up care and any pertinent home health orders  Disposition: Home    Planned Readmission: No    Discharge Statement   I spent 30 minutes discharging the patient  This time was spent on the day of discharge  I had direct contact with the patient on the day of discharge  Additional documentation is required if more than 30 minutes were spent on discharge  Signature:    Markus Barragan PA-C  Date: 7/18/2022 Time: 1:11 PM

## 2022-07-18 NOTE — PROGRESS NOTES
Progress Note - Urology  Latanya Stafford 1945, 78 y o  male MRN: [id number]    Unit/Bed#: [facility] 809-01 Encounter: [id number]      Assessment  Right renal mass  S/p RIGHT nephrectomy 7/6/22   Pathology confirmed pT1a ccRCC 3 9cm, organ confined, with negative surgical margins    Plan  POD#12 right nephrectomy  Postoperative anemia- s/p 1u PRBCs over a week ago, hgb up to 10 1 now  RUPAL on CKD- Lisinopril-hctz on hold- repeat BMP in 1 week, see nephrology, creatinine stable 2 1 now  Deconditioning- resolved, ambulatory without cane today, performing ADLs independently  Discharge - SNF cancelled no longer needed; he will go home to private residence today with wife with VNA/therapy at home  Subjective: feels good today, wants to go home  He is dressed and walking around the room  Had solid BM yesterday  Voiding adequately  No abdominal pain  Good appetite  Was in between rehab, but now feels by today he can do everything he needs to do independently at home with his wife driving him  Review of Systems   Constitutional: Negative for activity change, appetite change, chills, fever and unexpected weight change  HENT: Negative  Respiratory: Negative  Negative for cough and shortness of breath  Cardiovascular: Negative  Negative for chest pain  Gastrointestinal: Negative for abdominal pain, diarrhea, nausea and vomiting  Endocrine: Negative  Genitourinary: Negative for decreased urine volume, difficulty urinating, dysuria, flank pain, frequency, hematuria, penile pain, penile swelling, scrotal swelling and urgency  Musculoskeletal: Negative for back pain and gait problem  Skin: Negative  Allergic/Immunologic: Negative  Neurological: Negative  Hematological: Negative for adenopathy  Does not bruise/bleed easily  Objective:  Vitals: Blood pressure 114/65, pulse 81, temperature 98 °F (36 7 °C), temperature source Oral, resp   rate 16, height 6' (1 829 m), weight 107 kg (236 lb), SpO2 95 %  ,Body mass index is 32 01 kg/m²  Intake/Output Summary (Last 24 hours) at 7/18/2022 1305  Last data filed at 7/18/2022 0601  Gross per 24 hour   Intake --   Output 600 ml   Net -600 ml     Invasive Devices  Report    None                 Physical Exam  Vitals and nursing note reviewed  Constitutional:       Appearance: He is well-developed  He is obese  He is not ill-appearing  Comments: White male standing fully dressed in his hospital room   HENT:      Head: Normocephalic and atraumatic  Cardiovascular:      Rate and Rhythm: Normal rate and regular rhythm  Heart sounds: Normal heart sounds  No murmur heard  Pulmonary:      Effort: Pulmonary effort is normal       Breath sounds: Normal breath sounds  Abdominal:      General: Bowel sounds are normal       Palpations: Abdomen is soft  There is no mass  Tenderness: There is no abdominal tenderness  There is no right CVA tenderness or left CVA tenderness  Hernia: No hernia is present  Comments: Right abdomen surgical incisions clean and dry, staples intact without drainage   Musculoskeletal:         General: Normal range of motion  Right lower leg: No edema  Left lower leg: No edema  Skin:     General: Skin is warm and dry  Capillary Refill: Capillary refill takes less than 2 seconds  Coloration: Skin is not pale  Neurological:      General: No focal deficit present  Mental Status: He is alert and oriented to person, place, and time  Gait: Gait normal          Labs:  No results for input(s): WBC in the last 72 hours  No results for input(s): HGB in the last 72 hours      Recent Labs     07/16/22  1011 07/17/22  0454 07/18/22  0511   CREATININE 2 17* 2 14* 2 19*       History:    Past Medical History:   Diagnosis Date    Diabetes mellitus ([facility] [address] )     Hypertension     TIA (transient ischemic attack)      Past Surgical History:   Procedure Laterality Date    CYSTECTOMY      Per patient cyst removal from his back    LASIK      DE LAP, RADICAL NEPHRECTOMY Right 7/6/2022    Procedure: NEPHRECTOMY RADICAL LAPAROSCOPIC W/ ROBOTICS, poss open;  Surgeon: Justin Hollingsworth MD;  [facility]: [facility];  Service: Urology     Family History   Problem Relation Age of Onset    Colon cancer Mother     Diabetes type II Mother     Heart disease Mother     Prostate cancer Father      Social History     Socioeconomic History    Marital status: /Civil Union     Spouse name: None    Number of children: None    Years of education: None    Highest education level: None   Occupational History    Occupation: Retired 2015 -     Tobacco Use    Smoking status: Never Smoker    Smokeless tobacco: Never Used   Vaping Use    Vaping Use: Never used   Substance and Sexual Activity    Alcohol use: Never    Drug use: Never    Sexual activity: None   Other Topics Concern    None   Social History Narrative    Denies drug use - As per [address]    Consumes on average 1 cup of regular coffee per day      Social Determinants of Health     Financial Resource Strain: Not on file   Food Insecurity: No Food Insecurity    Worried About Running Out of Food in the Last Year: Never true    [address] in the Last Year: Never true   Transportation Needs: No Transportation Needs    Lack of Transportation (Medical): No    Lack of Transportation (Non-Medical):  No   Physical Activity: Not on file   Stress: Not on file   Social Connections: Not on file   Intimate Partner Violence: Not on file   Housing Stability: Low Risk     Unable to Pay for Housing in the Last Year: No    Number of Places Lived in the Last Year: 1    Unstable Housing in the Last Year: No         Jose Angel Leblanc  Date: 7/18/2022 Time: 1:05 PM

## 2022-07-18 NOTE — CASE MANAGEMENT
Case Management Discharge Planning Note    Patient name Sandra Bhagat  Location [address] [place] [address] [address]/[facility] 468-10 MRN [id number]  : 1945 Date 2022       Current Admission Date: 2022  Current Admission Diagnosis:Right renal mass   Patient Active Problem List    Diagnosis Date Noted    [facility]UPAL (acute kidney injury) ([facility] )     Chronic renal impairment     Acute respiratory failure with hypoxia ([facility] ) 2022    GI bleed 2022    SIRS (systemic inflammatory response syndrome) ([facility] ) 2022    Tachycardia 2022    Right renal mass 2022    Hypercalcemia 2022    Acute renal failure superimposed on stage 3 chronic kidney disease ([place] [address] ) 2022    Kidney cyst, acquired 2022    Chronic pain syndrome 10/05/2020    Chronic right-sided low back pain with right-sided sciatica 2020    Lumbar radiculopathy 2020    Polyarticular arthritis 2019    Class 1 obesity due to excess calories with serious comorbidity and body mass index (BMI) of 32 0 to 32 9 in adult 2019    Essential hypertension 09/10/2019    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 chronic kidney disease, without long-term current use of insulin ([place] [address] ) 09/10/2019    Left-sided weakness 07/10/2019    Cerebral aneurysm, nonruptured 2019    TIA (transient ischemic attack) 2019      LOS (days): 12  Geometric Mean LOS (GMLOS) (days): 2 10  Days to GMLOS:-9 6     OBJECTIVE:  Risk of Unplanned Readmission Score: 14 82         Current admission status: Inpatient   Preferred Pharmacy:   [facility] [address]   [facility], [address] [address]  Phone: [phone] Fax: [phone]    Primary Care Provider: Crescencio Glass    Primary Insurance: THE [facility]  Secondary Insurance:     DISCHARGE DETAILS:                                          Other Referral/Resources/Interventions Provided:  Referral Comments: CM spoke with the pt, who now wants to go home with home PT/OT  Pt was agreeable to referral being sent to [facility]  CM alerted Mary to cancel referral  Pt being transported by his spouse           Treatment Team Recommendation: Home with [facility]  Discharge Destination Plan[de-identified] Home with Johanna at Discharge : Family                             IMM Given (Date):: 07/18/22  IMM Given to[de-identified] Patient

## 2022-07-18 NOTE — PLAN OF CARE
Problem: PAIN - ADULT  Goal: Verbalizes/displays adequate comfort level or baseline comfort level  Description: Interventions:  - Encourage patient to monitor pain and request assistance  - Assess pain using appropriate pain scale  - Administer analgesics based on type and severity of pain and evaluate response  - Implement non-pharmacological measures as appropriate and evaluate response  - Consider cultural and social influences on pain and pain management  - Notify physician/advanced practitioner if interventions unsuccessful or patient reports new pain  Outcome: Progressing     Problem: INFECTION - ADULT  Goal: Absence or prevention of progression during hospitalization  Description: INTERVENTIONS:  - Assess and monitor for signs and symptoms of infection  - Monitor lab/diagnostic results  - Monitor all insertion sites, i e  indwelling lines, tubes, and drains  - Monitor endotracheal if appropriate and nasal secretions for changes in amount and color  - [place] appropriate cooling/warming therapies per order  - Administer medications as ordered  - Instruct and encourage patient and family to use good hand hygiene technique  - Identify and instruct in appropriate isolation precautions for identified infection/condition  Outcome: Progressing

## 2022-07-18 NOTE — PLAN OF CARE
Problem: PAIN - ADULT  Goal: Verbalizes/displays adequate comfort level or baseline comfort level  Description: Interventions:  - Encourage patient to monitor pain and request assistance  - Assess pain using appropriate pain scale  - Administer analgesics based on type and severity of pain and evaluate response  - Implement non-pharmacological measures as appropriate and evaluate response  - Consider cultural and social influences on pain and pain management  - Notify physician/advanced practitioner if interventions unsuccessful or patient reports new pain  Outcome: Progressing     Problem: INFECTION - ADULT  Goal: Absence or prevention of progression during hospitalization  Description: INTERVENTIONS:  - Assess and monitor for signs and symptoms of infection  - Monitor lab/diagnostic results  - Monitor all insertion sites, i e  indwelling lines, tubes, and drains  - Monitor endotracheal if appropriate and nasal secretions for changes in amount and color  - [place] appropriate cooling/warming therapies per order  - Administer medications as ordered  - Instruct and encourage patient and family to use good hand hygiene technique  - Identify and instruct in appropriate isolation precautions for identified infection/condition  Outcome: Progressing     Problem: DISCHARGE PLANNING  Goal: Discharge to home or other facility with appropriate resources  Description: INTERVENTIONS:  - Identify barriers to discharge w/patient and caregiver  - Arrange for needed discharge resources and transportation as appropriate  - Identify discharge learning needs (meds, wound care, etc )  - Arrange for interpretive services to assist at discharge as needed  - Refer to Case Management Department for coordinating discharge planning if the patient needs post-hospital services based on physician/advanced practitioner order or complex needs related to functional status, cognitive ability, or social support system  Outcome: Progressing Problem: Knowledge Deficit  Goal: Patient/family/caregiver demonstrates understanding of disease process, treatment plan, medications, and discharge instructions  Description: Complete learning assessment and assess knowledge base    Interventions:  - Provide teaching at level of understanding  - Provide teaching via preferred learning methods  Outcome: Progressing     Problem: GENITOURINARY - ADULT  Goal: Absence of urinary retention  Description: INTERVENTIONS:  - Assess patients ability to void and empty bladder  - Monitor I/O  - Bladder scan as needed  - Discuss with physician/AP medications to alleviate retention as needed  - Discuss catheterization for long term situations as appropriate  Outcome: Progressing  Goal: Urinary catheter remains patent  Description: INTERVENTIONS:  - Assess patency of urinary catheter  - If patient has a chronic hernández, consider changing catheter if non-functioning  - Follow guidelines for intermittent irrigation of non-functioning urinary catheter  Outcome: Progressing     Problem: METABOLIC, FLUID AND ELECTROLYTES - ADULT  Goal: Electrolytes maintained within normal limits  Description: INTERVENTIONS:  - Monitor labs and assess patient for signs and symptoms of electrolyte imbalances  - Administer electrolyte replacement as ordered  - Monitor response to electrolyte replacements, including repeat lab results as appropriate  - Instruct patient on fluid and nutrition as appropriate  Outcome: Progressing

## 2022-07-19 ENCOUNTER — TRANSITIONAL CARE MANAGEMENT (OUTPATIENT)
Dept: FAMILY MEDICINE CLINIC | Facility: CLINIC | Age: 77
End: 2022-07-19

## 2022-07-19 ENCOUNTER — HOME CARE VISIT (OUTPATIENT)
Dept: HOME HEALTH SERVICES | Facility: HOME HEALTHCARE | Age: 77
End: 2022-07-19

## 2022-07-19 DIAGNOSIS — C64.1 RENAL CANCER, RIGHT (HCC): ICD-10-CM

## 2022-07-19 DIAGNOSIS — N17.9 ACUTE RENAL FAILURE SUPERIMPOSED ON STAGE 3 CHRONIC KIDNEY DISEASE, UNSPECIFIED ACUTE RENAL FAILURE TYPE, UNSPECIFIED WHETHER STAGE 3A OR 3B CKD (HCC): Primary | ICD-10-CM

## 2022-07-19 DIAGNOSIS — N18.30 ACUTE RENAL FAILURE SUPERIMPOSED ON STAGE 3 CHRONIC KIDNEY DISEASE, UNSPECIFIED ACUTE RENAL FAILURE TYPE, UNSPECIFIED WHETHER STAGE 3A OR 3B CKD (HCC): Primary | ICD-10-CM

## 2022-07-19 PROCEDURE — 3066F NEPHROPATHY DOC TX: CPT | Performed by: NURSE PRACTITIONER

## 2022-07-19 NOTE — UTILIZATION REVIEW
Notification of Discharge   This is a Notification of Discharge from our facility [address]  Please be advised that this patient has been discharge from our facility  Below you will find the admission and discharge date and time including the patients disposition  UTILIZATION REVIEW CONTACT:  Mikaela De León  Utilization   Network Utilization Review Department  Phone: [phone] x carefully listen to the prompts  All voicemails are confidential   Email: [email or web address]     PHYSICIAN ADVISORY SERVICES:  FOR [id number] REVIEW - MEDICAL NECESSITY DENIAL  Phone: [phone]  Fax: [phone]  Email: [email or web address]     PRESENTATION DATE: 7/6/2022 11:12 AM  OBERVATION ADMISSION DATE:   INPATIENT ADMISSION DATE: 7/6/22  7:10 PM   DISCHARGE DATE: 7/18/2022 12:22 PM  DISPOSITION: Home with [place] with Home Health Care      IMPORTANT INFORMATION:  Send all requests for admission clinical reviews, approved or denied determinations and any other requests to dedicated fax number below belonging to the campus where the patient is receiving treatment   List of dedicated fax numbers:  [address] DENIALS (Administrative/Medical Necessity) [phone]   [address] (Maternity/NICU/Pediatrics) [phone]   [facility] [phone]   130 [facility] [phone]   [address] [place] [address] [phone]   [address] [phone]   [facility]  [phone]   [address] [place] [address], [facility]  2401 [facility] 1000 [address] [facility] [phone]

## 2022-07-19 NOTE — TELEPHONE ENCOUNTER
Post Op Note    Syed Diallo is a 78 y o  male s/p NEPHRECTOMY RADICAL LAPAROSCOPIC W/ ROBOTICS, poss open (Right) performed 7/6/2022  Syed Diallo is a patient of Dr Emery Jones and is seen at the [place] [address] office  How would you rate your pain on a scale from 1 to 10, 10 being the worst pain ever? 0  Have you had a fever? No  Have your bowel movements been regular? Yes  Do you have any difficulty urinating? No  If the patient has an incision- do you have any redness around the incision or any drainage from the incision? No  Do you have any other questions or concerns that I can address at this time? None at this time    Scheduled patient for staple removal  Patient wanted to go to [facility] but I advised no nurse is there yet  Patient then wanted to go to [facility] as [facility] choice  Scheduled and provided address  No other questions at this time      Patient has follow up 7/27 at 11:30 at [place] [address]

## 2022-07-19 NOTE — CASE COMMUNICATION
Patient declining initial homecare visit scheduled for  7/20/22,  due to having an appt with the surgeon  Patient requesting initial evaluation be moved to Friday or later due to having an appt with his pcp on Thursday

## 2022-07-20 ENCOUNTER — PROCEDURE VISIT (OUTPATIENT)
Dept: UROLOGY | Facility: CLINIC | Age: 77
End: 2022-07-20

## 2022-07-20 VITALS
RESPIRATION RATE: 18 BRPM | SYSTOLIC BLOOD PRESSURE: 158 MMHG | WEIGHT: 236 LBS | DIASTOLIC BLOOD PRESSURE: 92 MMHG | BODY MASS INDEX: 31.97 KG/M2 | HEIGHT: 72 IN

## 2022-07-20 DIAGNOSIS — N28.89 RIGHT RENAL MASS: Primary | ICD-10-CM

## 2022-07-20 PROCEDURE — 99024 POSTOP FOLLOW-UP VISIT: CPT

## 2022-07-20 NOTE — UTILIZATION REVIEW
Continued Stay Review    Date: 10/2                         Current Patient Class: INPATIENT  Current Level of Care: med/surg    HPI:73 y o  male initially admitted as inpatient on 9/28 due to sepsis with concern for possible tick borne illness, polyarticular arthritis  Assessment/Plan:   10/2 Continued fevers  Blood cx negative to date  Continue doxycycline  On exam, L hand edema, redness over MCPs, tenderness  ID teleconsult:  Sepsis like syndrome, acute febrile illness, polyarthralgia  Concern remains for possible viral syndrome and/or tick borne illness  Non infectious causes include vasculitis/connective tissue disease versus crystal arthropathy such as gout  Check uric acid, EBV and CMV serology  Consult ortho  Differential includes early Lyme disease which may explain negative initial titers  Would also need to rule out gout  No other clinical features to suggest connective tissue disorder      Pertinent Labs/Diagnostic Results:   Results from last 7 days   Lab Units 10/02/19  0438 10/01/19  0531 09/30/19  0511 09/29/19  0544 09/28/19  1133   WBC Thousand/uL 9 70 10 50 9 40 12 10* 14 00*   HEMOGLOBIN g/dL 12 2* 12 3* 12 4* 13 1* 14 7   HEMATOCRIT % 36 3* 36 6* 36 0* 39 0* 43 0   PLATELETS Thousands/uL 207 197 169 168 174   NEUTROS ABS Thousands/µL 6 90* 8 20*  --  9 10* 11 20*         Results from last 7 days   Lab Units 10/02/19  0438 10/01/19  0531 09/30/19  0511 09/29/19  0544 09/28/19  1133   SODIUM mmol/L 133* 137 135 136 134   POTASSIUM mmol/L 3 6 3 6 3 5 3 5 3 7   CHLORIDE mmol/L 100 104 103 99 98   CO2 mmol/L 24 24 25 29 26   ANION GAP mmol/L 9 9 7 8 10   BUN mg/dL 20 24 25 23 20   CREATININE mg/dL 0 95 1 02 1 12 1 31* 1 11   EGFR ml/min/1 73sq m 80 74 66 54 66   CALCIUM mg/dL 8 5* 8 7 8 4* 8 8 9 7   MAGNESIUM mg/dL  --   --   --   --  1 5*     Results from last 7 days   Lab Units 09/30/19  0511 09/29/19  0544 09/28/19  1133   AST U/L 7* 7* 9*   ALT U/L 8 9 12   ALK PHOS U/L 42* 44* 56 TOTAL PROTEIN g/dL 5 7* 6 3* 7 2   ALBUMIN g/dL 3 0* 3 4* 4 0   TOTAL BILIRUBIN mg/dL 0 80 1 60* 1 70*     Results from last 7 days   Lab Units 10/02/19  1112 10/02/19  0634 10/01/19  2015 10/01/19  1636 10/01/19  1106 10/01/19  0607 09/30/19 2001 09/30/19  1634 09/30/19  1034 09/30/19  0655   POC GLUCOSE mg/dl 258* 184* 228* 202* 238* 201* 206* 251* 233* 169*     Results from last 7 days   Lab Units 10/02/19  0438 10/01/19  0531 09/30/19  0511 09/29/19  0544 09/28/19  1133   GLUCOSE RANDOM mg/dL 185* 203* 166* 163* 208*       Results from last 7 days   Lab Units 09/29/19  0544   CK TOTAL U/L 23*     Results from last 7 days   Lab Units 09/28/19  1133   TROPONIN I ng/mL <0 03         Results from last 7 days   Lab Units 09/28/19  1133   PROTIME seconds 14 3*   INR  1 23   PTT seconds 40*         Results from last 7 days   Lab Units 10/02/19  0439   PROCALCITONIN ng/ml 0 23     Results from last 7 days   Lab Units 09/28/19  1133   LACTIC ACID mmol/L 1 4     Results from last 7 days   Lab Units 09/28/19  1133   LIPASE u/L 11     Results from last 7 days   Lab Units 09/28/19  1133   SED RATE mm/hour 41*         Results from last 7 days   Lab Units 09/28/19  1321   CLARITY UA  Clear   COLOR UA  Yellow   SPEC GRAV UA  >=1 030*   PH UA  5 0   GLUCOSE UA mg/dl Negative   KETONES UA mg/dl Trace*   BLOOD UA  1+*   PROTEIN UA mg/dl 2+*   NITRITE UA  Negative   BILIRUBIN UA  Negative   UROBILINOGEN UA E U /dl 0 2   LEUKOCYTES UA  Negative   WBC UA /hpf 1-2*   RBC UA /hpf 1-2*   BACTERIA UA /hpf Occasional   EPITHELIAL CELLS WET PREP /hpf Occasional   MUCUS THREADS  Occasional*     Results from last 7 days   Lab Units 09/28/19  1133   INFLUENZA A PCR  Not Detected   INFLUENZA B PCR  Not Detected   RSV PCR  Not Detected       Results from last 7 days   Lab Units 09/28/19  1133   BLOOD CULTURE  No Growth at 72 hrs  No Growth at 72 hrs       10/2 Xray L hand:  Mild degenerative changes with diffuse soft tissue swelling    No acute osseous abnormality  Vital Signs:   10/02/19 0718  101 °F (38 3 °C)Abnormal   100  18  166/98  94 %  None (Room air)   10/02/19 0100  99 2 °F (37 3 °C)             10/01/19 2346  102 7 °F (39 3 °C)Abnormal   97  18  155/73  94 %  None (Room air)       Medications:   Scheduled Meds:   Current Facility-Administered Medications:  acetaminophen 650 mg Oral Q6H PRN x1   aspirin 324 mg Oral Daily   cloNIDine 0 1 mg Oral Q3H PRN   doxycycline hyclate 100 mg Oral Q12H ERICA   enoxaparin 40 mg Subcutaneous Daily   famotidine 20 mg Oral BID   HYDROcodone-acetaminophen 1 tablet Oral Q6H PRN x1   ibuprofen 600 mg Oral Q8H [facility]   insulin glargine 15 Units Subcutaneous HS   insulin lispro 1-5 Units Subcutaneous TID AC   insulin lispro 1-5 Units Subcutaneous HS   lisinopril 20 mg Oral Daily   nystatin  Topical BID   sodium chloride 100 mL/hr Intravenous Continuous       Discharge Plan: TBD    Network Utilization Review Department  Phone: [phone]; Fax [phone]  [email or web address]  ATTENTION: Please call with any questions or concerns to [phone]  and carefully listen to the prompts so that you are directed to the right person  Send all requests for admission clinical reviews, approved or denied determinations and any other requests to fax [phone]   All voicemails are confidential  no

## 2022-07-21 ENCOUNTER — OFFICE VISIT (OUTPATIENT)
Dept: FAMILY MEDICINE CLINIC | Facility: CLINIC | Age: 77
End: 2022-07-21
Payer: COMMERCIAL

## 2022-07-21 ENCOUNTER — HOME CARE VISIT (OUTPATIENT)
Dept: HOME HEALTH SERVICES | Facility: HOME HEALTHCARE | Age: 77
End: 2022-07-21

## 2022-07-21 VITALS
TEMPERATURE: 97.8 F | HEIGHT: 72 IN | WEIGHT: 235 LBS | OXYGEN SATURATION: 98 % | BODY MASS INDEX: 31.83 KG/M2 | DIASTOLIC BLOOD PRESSURE: 68 MMHG | RESPIRATION RATE: 20 BRPM | SYSTOLIC BLOOD PRESSURE: 136 MMHG | HEART RATE: 98 BPM

## 2022-07-21 DIAGNOSIS — I10 ESSENTIAL HYPERTENSION: ICD-10-CM

## 2022-07-21 DIAGNOSIS — C64.1 MALIGNANT NEOPLASM OF RIGHT KIDNEY (HCC): Primary | ICD-10-CM

## 2022-07-21 DIAGNOSIS — D64.9 ANEMIA, UNSPECIFIED TYPE: ICD-10-CM

## 2022-07-21 PROCEDURE — 99496 TRANSJ CARE MGMT HIGH F2F 7D: CPT | Performed by: NURSE PRACTITIONER

## 2022-07-21 PROCEDURE — 1111F DSCHRG MED/CURRENT MED MERGE: CPT | Performed by: NURSE PRACTITIONER

## 2022-07-21 RX ORDER — LISINOPRIL AND HYDROCHLOROTHIAZIDE 20; 12.5 MG/1; MG/1
1 TABLET ORAL 2 TIMES DAILY
Qty: 90 TABLET | Refills: 3
Start: 2022-07-21 | End: 2022-07-29

## 2022-07-21 NOTE — PATIENT INSTRUCTIONS
Patient restarted Lisinopril/HCTZ yesterday, was unsure if he was supposed to be off of this   It was removed from his medlist  He is going to continue this daily and recheck labs on Monday  Keep follow up appointments with Urology and Nephrology  Get labs done on Monday (BMP and CBC)

## 2022-07-21 NOTE — PROGRESS NOTES
[facility]        NAME: Andreina Fried is a 78 y o  male  : 1945    MRN: [id number]  DATE: 2022  TIME: 3:06 PM    Assessment and Plan   Malignant neoplasm of right kidney ([facility] [address] ) [C64 1]  1  Malignant neoplasm of right kidney [facility])  Basic metabolic panel   2  Anemia, unspecified type  CBC and differential   3  Essential hypertension  lisinopril-hydrochlorothiazide (PRINZIDE,ZESTORETIC) 20-12 5 MG per tablet         Patient Instructions     Patient Instructions   Patient restarted Lisinopril/HCTZ yesterday, was unsure if he was supposed to be off of this  It was removed from his medlist  He is going to continue this daily and recheck labs on Monday  Keep follow up appointments with Urology and Nephrology  Get labs done on Monday (BMP and CBC)          Chief Complaint     Chief Complaint   Patient presents with    Transition of Care Management         History of Present Illness       Admission Date: 2022      Discharge Date: 22     HPI: Andreina Fried is a 78 y o  male who presented for scheduled right nephrectomy by Dr Tiffany Shaikh  Procedure(s):  NEPHRECTOMY RADICAL LAPAROSCOPIC W/ ROBOTICS, poss open  Surgeon(s):  Prabhjot Murry MD  [facility], MD Stacie Saucier, PA-C W Romayne Lamar, PA-C Ginny Georges, MD  2022     Hospital Course: [facility] underwent right radical nephrectomy on   This complicated by postoperative anemia and nephrectomy bed hematoma  This was treated conservatively, he did receive 1 unit of packed red blood cells, his hemoglobin was stable in mid eight range for the week following that  He had an isolated episode of coffee-ground emesis, evaluated by the GI team, treated with intravenous PPI, and resolved without recurrence, the EGD with canceled  Gradual his diet was advanced off of clears and tolerated solid food for the last week    He had some tachycardia without fevers, most likely related to anemia however he did receive three days of IV antibiotics for meeting SIRS criteria, which also resolved and no further therapy was needed  Acute kidney injury on chronic kidney disease as noted, his HCTZ-lisinopril has been held since surgery date  For creatinine is 2 1 on day of discharge  He has been voiding since his Baird catheter was removed last week  Bladder scans are less than 10 mL  He will have a follow-up BMP in one week with Nephrology visit thereafter  Having solid bowel movements the last few days  Physical therapy and occupational therapy saw patient while in the hospital initially meeting criteria for post acute rehabilitation placement  Acceptance to facility was delayed by staffing, ultimately the following three days here in the hospital he has gained further strength, and is independent with all activities of daily living on the day of discharge  He is due for discharge to his home, accompanied by his wife and visiting nurses therapy will be ordered for the house  Changes and disposition where discussed at length with the case management team over the past week  His incisional staples are due to come out later this week  Pathology discussion with Dr Dena Amaro next week      Discharge Diagnosis: Right renal mass     Condition at Discharge: good     Discharge Medications:  See after visit summary for reconciled discharge medications provided to patient and family  Patient was discharged home on home medications with the addition of home medications, over-the-counter Tylenol, and one week BMP and nephrology follow-up          TCM Call     Date and time call was made  7/19/2022 11:03 AM    Hospital care reviewed  Records reviewed    Patient was hospitialized at  [facility]    Date of Admission  07/06/22    Date of discharge  07/18/22    Diagnosis  Right renal mass    Disposition  Home    Were the patients medications reviewed and updated  Yes    Current Symptoms  None      TCM Call     Post hospital issues  None    Should patient be enrolled in anticoag monitoring? No    Scheduled for follow up? Yes    Did you obtain your prescribed medications  Yes    Do you need help managing your prescriptions or medications  No    Is transportation to your appointment needed  No    I have advised the patient to call PCP with any new or worsening symptoms    Teri TEJADA    Living Arrangements  Spouse or Significiant other    Support System  Spouse    The type of support provided  Emotional; Physical; Other (comment)    Do you have social support  Yes, as much as I need    Are you recieving any outpatient services  Yes    Are you recieving home care services  Yes    Types of home care services  Home PT    Are you using any community resources  No    Current waiver services  No    Have you fallen in the last 12 months  No    Interperter language line needed  No    Counseling  Patient            Review of Systems   Review of Systems   Constitutional: Positive for fatigue  Negative for activity change, diaphoresis and fever  HENT: Negative for congestion, facial swelling, hearing loss, rhinorrhea, sinus pressure, sinus pain, sneezing, sore throat and voice change  Eyes: Negative for discharge and visual disturbance  Respiratory: Negative for cough, choking, chest tightness, shortness of breath, wheezing and stridor  Cardiovascular: Positive for leg swelling  Negative for chest pain and palpitations  Gastrointestinal: Negative for abdominal distention, abdominal pain (tenderness at incision sites, improving), constipation, diarrhea, nausea and vomiting  Endocrine: Negative for polydipsia, polyphagia and polyuria  Genitourinary: Negative for difficulty urinating, dysuria, frequency and urgency  Musculoskeletal: Negative for arthralgias, back pain, gait problem, joint swelling, myalgias, neck pain and neck stiffness  Skin: Negative for color change, rash and wound  Neurological: Negative for dizziness, syncope, speech difficulty, weakness, light-headedness and headaches  Hematological: Negative for adenopathy  Does not bruise/bleed easily  Psychiatric/Behavioral: Negative for agitation, behavioral problems, confusion, hallucinations, sleep disturbance and suicidal ideas  The patient is not nervous/anxious            Current Medications       Current Outpatient Medications:     Ascorbic Acid (vitamin C) 100 MG tablet, Take 100 mg by mouth daily, Disp: , Rfl:     aspirin (ECOTRIN LOW STRENGTH) 81 mg EC tablet, Take 81 mg by mouth daily, Disp: , Rfl:     cholecalciferol (VITAMIN D3) 1,000 units tablet, Take 1,000 Units by mouth daily, Disp: , Rfl:     lisinopril-hydrochlorothiazide (PRINZIDE,ZESTORETIC) 20-12 5 MG per tablet, Take 1 tablet by mouth 2 (two) times a day, Disp: 90 tablet, Rfl: 3    metFORMIN (GLUCOPHAGE) 500 mg tablet, take 2 tablets by mouth every morning then take 1 tablet every evening, Disp: 270 tablet, Rfl: 3    Multiple Vitamins-Minerals (MICHELLE MULTI MEN) TABS, Take by mouth 2 (two) times a day, Disp: , Rfl:     Current Allergies     Allergies as of 07/21/2022 - Reviewed 07/21/2022   Allergen Reaction Noted    Plavix [clopidogrel] Rash 09/28/2019            The following portions of the patient's history were reviewed and updated as appropriate: allergies, current medications, past family history, past medical history, past social history, past surgical history and problem list      Past Medical History:   Diagnosis Date    Diabetes mellitus ([address] )     Hypertension     TIA (transient ischemic attack)        Past Surgical History:   Procedure Laterality Date    CYSTECTOMY      Per patient cyst removal from his back    LASIK      IN LAP, RADICAL NEPHRECTOMY Right 7/6/2022    Procedure: NEPHRECTOMY RADICAL LAPAROSCOPIC W/ ROBOTICS, poss open;  Surgeon: Brian Alas MD;  [facility];  Service: Urology       Family History Problem Relation Age of Onset    Colon cancer Mother     Diabetes type II Mother     Heart disease Mother     Prostate cancer Father          Medications have been verified  Objective   /68   Pulse 98   Temp 97 8 °F (36 6 °C) (Tympanic)   Resp 20   Ht 6' (1 829 m)   Wt 107 kg (235 lb)   SpO2 98%   BMI 31 87 kg/m²        Physical Exam     Physical Exam  Vitals and nursing note reviewed  Exam conducted with a chaperone present (wife)  Constitutional:       General: He is not in acute distress  Appearance: He is well-developed  He is not diaphoretic  Neck:      Thyroid: No thyromegaly  Trachea: No tracheal deviation  Cardiovascular:      Rate and Rhythm: Normal rate  Rhythm irregular  Heart sounds: Normal heart sounds  No murmur heard  Comments: Patient declines EKG today or as outpatient  Pulmonary:      Effort: Pulmonary effort is normal  No respiratory distress  Breath sounds: Normal breath sounds  No wheezing  Musculoskeletal:         General: No tenderness or deformity  Normal range of motion  Cervical back: Normal range of motion and neck supple  Right lower leg: Edema (pitting bilateral +3) present  Left lower leg: Edema (pitting bilateral +3) present  Skin:     General: Skin is warm and dry  Coloration: Skin is pale  Neurological:      Mental Status: He is alert and oriented to person, place, and time  Psychiatric:         Mood and Affect: Mood normal          Speech: Speech normal          Behavior: Behavior normal          Thought Content:  Thought content normal          Judgment: Judgment normal

## 2022-07-21 NOTE — PROGRESS NOTES
7/20/2022    Roc Tijerina is a 78 y o  male  [id number]    Diagnosis:  Chief Complaint     Renal Cancer          Patient presents for staple removal s/p NEPHRECTOMY RADICAL LAPAROSCOPIC W/ ROBOTICS, poss open (Right Abdomen) on 7/6/22 with Dr Virgilio Santos:  Patient will follow up for post op appt as scheduled  Procedure:    Patient and wife arrived to office  Patient reports he is feeling well after surgery  Incisions inspected prior to staple removal, they were clean, dry and intact  Staples removed at 4 robot sites and right sided abdominal incision  Betadine and steri strips placed  Patient tolerated procedure well  Post op restrictions reviewed as well       Vitals:    07/20/22 1322   BP: 158/92   Resp: 18   Weight: 107 kg (236 lb)   Height: 6' (1 829 m)         Brandy Collins RN BSN

## 2022-07-21 NOTE — CASE COMMUNICATION
Called and spoke with wife who reports that pt was seen by surgeon's office yesterday and had his staples removed  Today pt is driving and out getting his hair cut  States his wishes for [facility] is that he is not in need as he is feeling well, getting around and almost back to where he was prior to surgery  Referral for [facility] canceled as of this date

## 2022-07-25 ENCOUNTER — APPOINTMENT (OUTPATIENT)
Dept: LAB | Facility: CLINIC | Age: 77
End: 2022-07-25
Payer: COMMERCIAL

## 2022-07-25 DIAGNOSIS — N17.9 AKI (ACUTE KIDNEY INJURY) (HCC): ICD-10-CM

## 2022-07-25 DIAGNOSIS — C64.1 MALIGNANT NEOPLASM OF RIGHT KIDNEY (HCC): ICD-10-CM

## 2022-07-25 DIAGNOSIS — D64.9 ANEMIA, UNSPECIFIED TYPE: ICD-10-CM

## 2022-07-25 PROCEDURE — 36415 COLL VENOUS BLD VENIPUNCTURE: CPT

## 2022-07-25 PROCEDURE — 80048 BASIC METABOLIC PNL TOTAL CA: CPT

## 2022-07-25 PROCEDURE — 85025 COMPLETE CBC W/AUTO DIFF WBC: CPT

## 2022-07-26 LAB
ANION GAP SERPL CALCULATED.3IONS-SCNC: 4 MMOL/L (ref 4–13)
BASOPHILS # BLD AUTO: 0.05 THOUSANDS/ΜL (ref 0–0.1)
BASOPHILS NFR BLD AUTO: 1 % (ref 0–1)
BUN SERPL-MCNC: 27 MG/DL (ref 5–25)
CALCIUM SERPL-MCNC: 9.4 MG/DL (ref 8.3–10.1)
CHLORIDE SERPL-SCNC: 104 MMOL/L (ref 96–108)
CO2 SERPL-SCNC: 28 MMOL/L (ref 21–32)
CREAT SERPL-MCNC: 2.28 MG/DL (ref 0.6–1.3)
EOSINOPHIL # BLD AUTO: 0.32 THOUSAND/ΜL (ref 0–0.61)
EOSINOPHIL NFR BLD AUTO: 4 % (ref 0–6)
ERYTHROCYTE [DISTWIDTH] IN BLOOD BY AUTOMATED COUNT: 13.3 % (ref 11.6–15.1)
GFR SERPL CREATININE-BSD FRML MDRD: 27 ML/MIN/1.73SQ M
GLUCOSE P FAST SERPL-MCNC: 111 MG/DL (ref 65–99)
HCT VFR BLD AUTO: 34.3 % (ref 36.5–49.3)
HGB BLD-MCNC: 10.7 G/DL (ref 12–17)
IMM GRANULOCYTES # BLD AUTO: 0.05 THOUSAND/UL (ref 0–0.2)
IMM GRANULOCYTES NFR BLD AUTO: 1 % (ref 0–2)
LYMPHOCYTES # BLD AUTO: 1.64 THOUSANDS/ΜL (ref 0.6–4.47)
LYMPHOCYTES NFR BLD AUTO: 21 % (ref 14–44)
MCH RBC QN AUTO: 30.2 PG (ref 26.8–34.3)
MCHC RBC AUTO-ENTMCNC: 31.2 G/DL (ref 31.4–37.4)
MCV RBC AUTO: 97 FL (ref 82–98)
MONOCYTES # BLD AUTO: 0.93 THOUSAND/ΜL (ref 0.17–1.22)
MONOCYTES NFR BLD AUTO: 12 % (ref 4–12)
NEUTROPHILS # BLD AUTO: 4.94 THOUSANDS/ΜL (ref 1.85–7.62)
NEUTS SEG NFR BLD AUTO: 61 % (ref 43–75)
NRBC BLD AUTO-RTO: 0 /100 WBCS
PLATELET # BLD AUTO: 285 THOUSANDS/UL (ref 149–390)
PMV BLD AUTO: 12.7 FL (ref 8.9–12.7)
POTASSIUM SERPL-SCNC: 4.4 MMOL/L (ref 3.5–5.3)
RBC # BLD AUTO: 3.54 MILLION/UL (ref 3.88–5.62)
SODIUM SERPL-SCNC: 136 MMOL/L (ref 135–147)
WBC # BLD AUTO: 7.93 THOUSAND/UL (ref 4.31–10.16)

## 2022-07-27 ENCOUNTER — OFFICE VISIT (OUTPATIENT)
Dept: UROLOGY | Facility: CLINIC | Age: 77
End: 2022-07-27

## 2022-07-27 VITALS
WEIGHT: 228 LBS | OXYGEN SATURATION: 99 % | SYSTOLIC BLOOD PRESSURE: 140 MMHG | HEART RATE: 61 BPM | DIASTOLIC BLOOD PRESSURE: 80 MMHG | HEIGHT: 72 IN | BODY MASS INDEX: 30.88 KG/M2

## 2022-07-27 DIAGNOSIS — C64.1 MALIGNANT NEOPLASM OF RIGHT KIDNEY (HCC): Primary | ICD-10-CM

## 2022-07-27 PROCEDURE — 99024 POSTOP FOLLOW-UP VISIT: CPT | Performed by: UROLOGY

## 2022-07-27 NOTE — PROGRESS NOTES
Molly Talbot is a 80-year-old male with a 4-5 cm right renal mass highly suspicious for renal cell carcinoma  The mass was intra fit echo in based on size and location was not amenable to a partial nephrectomy  He underwent a robot assisted laparoscopic right radical nephrectomy recently performed a [address]  His postoperative course was prolonged  He has small postoperative bleed manage conservatively  He had low-grade fever and tachycardia  A workup for pulmonary embolus was negative  He was ultimately discharged home in stable condition  He returns in follow-up today  He is without complaints  Pathology reveals a 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

## 2022-07-29 ENCOUNTER — OFFICE VISIT (OUTPATIENT)
Dept: NEPHROLOGY | Facility: CLINIC | Age: 77
End: 2022-07-29
Payer: COMMERCIAL

## 2022-07-29 VITALS
HEIGHT: 72 IN | WEIGHT: 226.8 LBS | OXYGEN SATURATION: 98 % | HEART RATE: 85 BPM | SYSTOLIC BLOOD PRESSURE: 126 MMHG | BODY MASS INDEX: 30.72 KG/M2 | DIASTOLIC BLOOD PRESSURE: 74 MMHG

## 2022-07-29 DIAGNOSIS — N18.31 STAGE 3A CHRONIC KIDNEY DISEASE (HCC): ICD-10-CM

## 2022-07-29 DIAGNOSIS — E83.52 HYPERCALCEMIA: ICD-10-CM

## 2022-07-29 DIAGNOSIS — R80.1 PERSISTENT PROTEINURIA: ICD-10-CM

## 2022-07-29 DIAGNOSIS — R60.0 BILATERAL LOWER EXTREMITY EDEMA: ICD-10-CM

## 2022-07-29 DIAGNOSIS — N17.9 AKI (ACUTE KIDNEY INJURY) (HCC): Primary | ICD-10-CM

## 2022-07-29 DIAGNOSIS — N18.31 TYPE 2 DIABETES MELLITUS WITH STAGE 3A CHRONIC KIDNEY DISEASE, WITHOUT LONG-TERM CURRENT USE OF INSULIN (HCC): ICD-10-CM

## 2022-07-29 DIAGNOSIS — I10 PRIMARY HYPERTENSION: ICD-10-CM

## 2022-07-29 DIAGNOSIS — E11.22 TYPE 2 DIABETES MELLITUS WITH STAGE 3A CHRONIC KIDNEY DISEASE, WITHOUT LONG-TERM CURRENT USE OF INSULIN (HCC): ICD-10-CM

## 2022-07-29 DIAGNOSIS — C64.1 MALIGNANT NEOPLASM OF RIGHT KIDNEY (HCC): ICD-10-CM

## 2022-07-29 DIAGNOSIS — Z90.5 H/O RIGHT NEPHRECTOMY: ICD-10-CM

## 2022-07-29 PROCEDURE — 1160F RVW MEDS BY RX/DR IN RCRD: CPT | Performed by: NURSE PRACTITIONER

## 2022-07-29 PROCEDURE — 1111F DSCHRG MED/CURRENT MED MERGE: CPT | Performed by: NURSE PRACTITIONER

## 2022-07-29 PROCEDURE — 99213 OFFICE O/P EST LOW 20 MIN: CPT | Performed by: NURSE PRACTITIONER

## 2022-07-29 RX ORDER — HYDROCHLOROTHIAZIDE 12.5 MG/1
12.5 CAPSULE, GELATIN COATED ORAL DAILY
Qty: 90 CAPSULE | Refills: 3 | Status: SHIPPED | OUTPATIENT
Start: 2022-07-29

## 2022-07-29 RX ORDER — AMLODIPINE BESYLATE 5 MG/1
5 TABLET ORAL DAILY
Qty: 90 TABLET | Refills: 3 | Status: SHIPPED | OUTPATIENT
Start: 2022-07-29

## 2022-07-29 NOTE — PROGRESS NOTES
Nephrology   Office [facility] 78 y o  male MRN: [id number]    Encounter: [id number]        Vaughn Ruff was seen in the [place] office today  All diagnoses and orders for visit:     1  RUPAL (acute kidney injury) ([address] )  · Unresolved from acute care  Peak creatinine 3 8 mg/dL -> 2 2 mg/dL day of discharge, 2 3 mg/dL on outpatient lab work  Discontinue lisinopril and repeat lab work 2-3 weeks  Continue to avoid nephrotoxins, particularly NSAIDs     -     Comprehensive metabolic panel; Future; Expected date: 08/15/2022  2  Stage 3a chronic kidney disease (HCC)  · Baseline creatinine prior to nephrectomy 1 3-1 4 mg/dL attributed to hypertensive nephrosclerosis and diabetic nephropathy now with reduced nephron mass  No split kidney study to review  New baseline to be established  Focus on blood pressure and glucose control  3  Malignant neoplasm of right kidney [facility])   -     Ambulatory Referral to Nephrology  4  H/O right nephrectomy  · Right radical laparoscopic nephrectomy 7/6/22 with Dr Enedelia Bangura  5  Primary hypertension  · Blood pressure goal <130/80 mmHg  · Stop lisinopril and start amlodipine in its place   -     hydrochlorothiazide (MICROZIDE) 12 5 mg capsule; Take 1 capsule (12 5 mg total) by mouth daily   -     amLODIPine (NORVASC) 5 mg tablet; Take 1 tablet (5 mg total) by mouth daily  6  Hypercalcemia  · Monitor with HCTZ  7  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 chronic kidney disease, without long-term current use of insulin (HCC)  · Ace inhibitor on hold due to RUPAL  8  Persistent proteinuria  · Rule out monoclonal gammopathy if indicated        HPI: Latanya Stafford is a 78 y o  male who is here for hospital follow-up  Lorna Jett follows with Dr Mortimer Scurry for CKD 3a baseline creatinine 1 3-1 4 mg/dL, hypertension, T2DM with grade A2 albuminuria, RCC s/p right radical laparoscopic nephrectomy 7/6/22 with Dr Enedelia Bangura   [facility] course was complicated by episode of hematemesis requiring 1 unit PRBC + PPI, +SIRS, RUPAL  Peak creatinine 3 8 mg/dL -> 2 2 mg/dL day of discharge, 2 3 mg/dL on outpatient lab work  Remains in acute kidney injury and will switch lisinopril to amlodipine  Continue low dose hydrochlorothiazide as patient states it improved his lower extremity edema  Repeat lab work 2-3 weeks  Hopefully kidney function will continue to improve  New baseline creatinine needs to be established post nephrectomy  ROS:   Review of Systems   Constitutional: Negative for chills and fever  HENT: Negative for ear pain and sore throat  Eyes: Negative for pain and visual disturbance  Respiratory: Negative for cough and shortness of breath  Cardiovascular: Negative for chest pain and palpitations  Gastrointestinal: Negative for abdominal pain and vomiting  Genitourinary: Negative for dysuria and hematuria  Musculoskeletal: Negative for arthralgias and back pain  Skin: Negative for color change and rash  Neurological: Negative for seizures and syncope  All other systems reviewed and are negative        Allergies: Plavix [clopidogrel]    Medications:   Current Outpatient Medications:     amLODIPine (NORVASC) 5 mg tablet, Take 1 tablet (5 mg total) by mouth daily, Disp: 90 tablet, Rfl: 3    Ascorbic Acid (vitamin C) 100 MG tablet, Take 100 mg by mouth daily, Disp: , Rfl:     aspirin (ECOTRIN LOW STRENGTH) 81 mg EC tablet, Take 81 mg by mouth daily, Disp: , Rfl:     cholecalciferol (VITAMIN D3) 1,000 units tablet, Take 1,000 Units by mouth daily, Disp: , Rfl:     hydrochlorothiazide (MICROZIDE) 12 5 mg capsule, Take 1 capsule (12 5 mg total) by mouth daily, Disp: 90 capsule, Rfl: 3    metFORMIN (GLUCOPHAGE) 500 mg tablet, take 2 tablets by mouth every morning then take 1 tablet every evening, Disp: 270 tablet, Rfl: 3    Multiple Vitamins-Minerals (MICHELLE MULTI MEN) TABS, Take by mouth 2 (two) times a day, Disp: , Rfl:     Past Medical History: Diagnosis Date    Diabetes mellitus ([facility] [address] )     Hypertension     TIA (transient ischemic attack)      Past Surgical History:   Procedure Laterality Date    CYSTECTOMY      Per patient cyst removal from his back    LASIK      LA LAP, RADICAL NEPHRECTOMY Right 7/6/2022    Procedure: NEPHRECTOMY RADICAL LAPAROSCOPIC W/ ROBOTICS, poss open;  Surgeon: Glaids Meneses MD;  [facility];  Service: Urology     Family History   Problem Relation Age of Onset    Colon cancer Mother     Diabetes type II Mother     Heart disease Mother     Prostate cancer Father       reports that he has never smoked  He has never used smokeless tobacco  He reports that he does not drink alcohol and does not use drugs  Physical Exam:   Vitals:    07/29/22 1119   BP: 126/74   Pulse: 85   SpO2: 98%   Weight: 103 kg (226 lb 12 8 oz)   Height: 6' (1 829 m)     Body mass index is 30 76 kg/m²  General: conscious, cooperative, in no acute distress, appears stated age  Eyes: conjunctivae pale, anicteric sclerae  ENT: lips and mucous membranes moist  Neck: supple, no JVD, no masses  Chest:  essentially clear breath sounds bilaterally, no crackles, ronchus or wheezings  CVS: S1 & S2, normal rate, regular rhythm  Abdomen: soft, non-tender, non-distended, normoactive bowel sounds, rounded  Extremities: bilateral lower extremity edema of both legs, left greater than right  Skin: no rash   Neuro: awake, alert, oriented       Diagnostic Data:  Lab: I have personally reviewed pertinent lab results  ,   CBC:  Results from last 7 days   Lab Units 07/25/22  1023   WBC Thousand/uL 7 93   HEMOGLOBIN g/dL 10 7*   HEMATOCRIT % 34 3*   PLATELETS Thousands/uL 285      CMP: No results found for: SODIUM, K, CL, CO2, ANIONGAP, BUN, CREATININE, GLUCOSE, CALCIUM, AST, ALT, ALKPHOS, PROT, BILITOT, EGFR,   PT/INR: No results found for: PT, INR,   Magnesium: No components found for: MAG,  Phosphorous: No results found for: PHOS    Patient Instructions   Stop lisinopril-hctz combination  pill  Start amlodipine 5 mg daily and hctz 12 5 mg daily  Repeat blood work in 2-3 weeks  Low sodium diet (Avoid processed foods, white bread, pasta, rice, lunch meat)  Only take tylenol for pain      Portions of the record may have been created with voice recognition software  Occasional wrong word or "sound a like" substitutions may have occurred due to the inherent limitations of voice recognition software  Read the chart carefully and recognize, using context, where substitutions have occurred  If you have any questions, please contact the dictating provider

## 2022-07-29 NOTE — PATIENT INSTRUCTIONS
Stop lisinopril-hctz combination  pill  Start amlodipine 5 mg daily and hctz 12 5 mg daily  Repeat blood work in 2-3 weeks  Low sodium diet (Avoid processed foods, white bread, pasta, rice, lunch meat)  Only take tylenol for pain

## 2022-08-15 ENCOUNTER — APPOINTMENT (OUTPATIENT)
Dept: LAB | Facility: CLINIC | Age: 77
End: 2022-08-15
Payer: COMMERCIAL

## 2022-08-15 DIAGNOSIS — N17.9 AKI (ACUTE KIDNEY INJURY) (HCC): Primary | ICD-10-CM

## 2022-08-15 DIAGNOSIS — N18.31 STAGE 3A CHRONIC KIDNEY DISEASE (HCC): ICD-10-CM

## 2022-08-15 DIAGNOSIS — E11.22 TYPE 2 DIABETES MELLITUS WITH STAGE 3A CHRONIC KIDNEY DISEASE, WITHOUT LONG-TERM CURRENT USE OF INSULIN (HCC): ICD-10-CM

## 2022-08-15 DIAGNOSIS — E78.2 MIXED HYPERLIPIDEMIA: ICD-10-CM

## 2022-08-15 DIAGNOSIS — Z12.5 SCREENING FOR PROSTATE CANCER: ICD-10-CM

## 2022-08-15 DIAGNOSIS — N17.9 AKI (ACUTE KIDNEY INJURY) (HCC): ICD-10-CM

## 2022-08-15 DIAGNOSIS — N18.31 TYPE 2 DIABETES MELLITUS WITH STAGE 3A CHRONIC KIDNEY DISEASE, WITHOUT LONG-TERM CURRENT USE OF INSULIN (HCC): ICD-10-CM

## 2022-08-15 LAB
ALBUMIN SERPL BCP-MCNC: 3.4 G/DL (ref 3.5–5)
ALP SERPL-CCNC: 74 U/L (ref 46–116)
ALT SERPL W P-5'-P-CCNC: 18 U/L (ref 12–78)
ANION GAP SERPL CALCULATED.3IONS-SCNC: 4 MMOL/L (ref 4–13)
AST SERPL W P-5'-P-CCNC: 15 U/L (ref 5–45)
BILIRUB SERPL-MCNC: 0.62 MG/DL (ref 0.2–1)
BUN SERPL-MCNC: 34 MG/DL (ref 5–25)
CALCIUM ALBUM COR SERPL-MCNC: 10 MG/DL (ref 8.3–10.1)
CALCIUM SERPL-MCNC: 9.5 MG/DL (ref 8.3–10.1)
CHLORIDE SERPL-SCNC: 107 MMOL/L (ref 96–108)
CO2 SERPL-SCNC: 27 MMOL/L (ref 21–32)
CREAT SERPL-MCNC: 2.32 MG/DL (ref 0.6–1.3)
GFR SERPL CREATININE-BSD FRML MDRD: 26 ML/MIN/1.73SQ M
GLUCOSE P FAST SERPL-MCNC: 119 MG/DL (ref 65–99)
POTASSIUM SERPL-SCNC: 3.9 MMOL/L (ref 3.5–5.3)
PROT SERPL-MCNC: 7.2 G/DL (ref 6.4–8.4)
SODIUM SERPL-SCNC: 138 MMOL/L (ref 135–147)

## 2022-08-15 PROCEDURE — 36415 COLL VENOUS BLD VENIPUNCTURE: CPT

## 2022-08-15 PROCEDURE — 80053 COMPREHEN METABOLIC PANEL: CPT

## 2022-08-15 PROCEDURE — 3066F NEPHROPATHY DOC TX: CPT | Performed by: INTERNAL MEDICINE

## 2022-08-18 ENCOUNTER — TELEPHONE (OUTPATIENT)
Dept: GASTROENTEROLOGY | Facility: CLINIC | Age: 77
End: 2022-08-18

## 2022-08-18 ENCOUNTER — OFFICE VISIT (OUTPATIENT)
Dept: GASTROENTEROLOGY | Facility: CLINIC | Age: 77
End: 2022-08-18
Payer: COMMERCIAL

## 2022-08-18 VITALS
HEART RATE: 79 BPM | TEMPERATURE: 97.3 F | OXYGEN SATURATION: 96 % | DIASTOLIC BLOOD PRESSURE: 76 MMHG | HEIGHT: 72 IN | WEIGHT: 221.6 LBS | BODY MASS INDEX: 30.02 KG/M2 | SYSTOLIC BLOOD PRESSURE: 142 MMHG

## 2022-08-18 DIAGNOSIS — K92.0 GASTROINTESTINAL HEMORRHAGE WITH HEMATEMESIS: Primary | ICD-10-CM

## 2022-08-18 DIAGNOSIS — Z80.0 FAMILY HISTORY OF COLON CANCER: ICD-10-CM

## 2022-08-18 PROCEDURE — 1160F RVW MEDS BY RX/DR IN RCRD: CPT | Performed by: INTERNAL MEDICINE

## 2022-08-18 PROCEDURE — 99214 OFFICE O/P EST MOD 30 MIN: CPT | Performed by: INTERNAL MEDICINE

## 2022-08-18 NOTE — TELEPHONE ENCOUNTER
Scheduled date of EGD/colonoscopy (as of today):10/7/22  Physician performing EGD/colonoscopy:Johnny  Location of EGD/colonoscopy:[place]  Desired bowel prep reviewed with patient:Miralax  Instructions reviewed with patient by:Magno  Clearances:  diabetic

## 2022-08-18 NOTE — PROGRESS NOTES
[facility] - Outpatient Follow-up Note  Deja Canela 78 y o  male MRN: [id number]  Encounter: [id number]          ASSESSMENT AND PLAN:      1  Gastrointestinal hemorrhage with hematemesis    This is a 24-year-old white male with history of hematemesis and a family history of colon cancer  Possibilities include peptic ulcer disease as well as colon polyps and there is also some risk of colon cancer because of his family history  Risks of perforation bleeding were discussed with the patient he is agreeable to proceed with the procedures  Thank you so much for referring this patient     ______________________________________________________________________    SUBJECTIVE:  Samara Mendiola is a 24-year-old white male with history of renal cell carcinoma status post nephrectomy  He was in the hospital and had an episode of hematemesis  Endoscopy was deferred initially because of his blood pressure and then it was decided that further follow-up can be done as an outpatient since his hemoglobin was stable  He does have a family history of colon cancer and has some mild constipation  He denies any rectal bleeding  He has had no further hematemesis since discharge from the hospital   He is on low-dose aspirin otherwise no other NSAID use  He denies any smoking or alcohol use  He has never had a colonoscopy  REVIEW OF SYSTEMS IS OTHERWISE NEGATIVE        Historical Information   Past Medical History:   Diagnosis Date    Diabetes mellitus ([address] )     Hypertension     TIA (transient ischemic attack)      Past Surgical History:   Procedure Laterality Date    CYSTECTOMY      Per patient cyst removal from his back    LASIK      OR LAP, RADICAL NEPHRECTOMY Right 7/6/2022    Procedure: NEPHRECTOMY RADICAL LAPAROSCOPIC W/ ROBOTICS, poss open;  Surgeon: Jayesh Carrington MD;  [facility];  Service: Urology     Social History   Social History     Substance and Sexual Activity Alcohol Use Never     Social History     Substance and Sexual Activity   Drug Use Never     Social History     Tobacco Use   Smoking Status Never Smoker   Smokeless Tobacco Never Used     Family History   Problem Relation Age of Onset    Colon cancer Mother     Diabetes type II Mother     Heart disease Mother     Prostate cancer Father        Meds/Allergies       Current Outpatient Medications:     amLODIPine (NORVASC) 5 mg tablet    Ascorbic Acid (vitamin C) 100 MG tablet    aspirin (ECOTRIN LOW STRENGTH) 81 mg EC tablet    cholecalciferol (VITAMIN D3) 1,000 units tablet    hydrochlorothiazide (MICROZIDE) 12 5 mg capsule    metFORMIN (GLUCOPHAGE) 500 mg tablet    Multiple Vitamins-Minerals (MICHELLE MULTI MEN) TABS    Allergies   Allergen Reactions    Plavix [Clopidogrel] Rash     Stopped two days ago because got a rash and doctors thought it was from this            Objective     Blood pressure 142/76, pulse 79, temperature (!) 97 3 °F (36 3 °C), temperature source Tympanic, height 6' (1 829 m), weight 101 kg (221 lb 9 6 oz), SpO2 96 %  Body mass index is 30 05 kg/m²  PHYSICAL EXAM:      General Appearance:   Alert, cooperative, no distress   HEENT:   Normocephalic, atraumatic, anicteric  Neck:  Supple, symmetrical, trachea midline   Lungs:   Clear to auscultation bilaterally; no rales, rhonchi or wheezing; respirations unlabored    Heart[de-identified]   Regular rate and rhythm; no murmur, rub, or gallop  Abdomen:   Soft, non-tender, non-distended; normal bowel sounds; no masses, no organomegaly    Genitalia:   Deferred    Rectal:   Deferred    Extremities:  No cyanosis, clubbing or edema    Pulses:  2+ and symmetric    Skin:  No jaundice, rashes, or lesions    Lymph nodes:  No palpable cervical lymphadenopathy        Lab Results:   No visits with results within 1 Day(s) from this visit     Latest known visit with results is:   Appointment on 08/15/2022   Component Date Value    Sodium 08/15/2022 138     Potassium 08/15/2022 3 9     Chloride 08/15/2022 107     CO2 08/15/2022 27     ANION GAP 08/15/2022 4     BUN 08/15/2022 34 (A)    Creatinine 08/15/2022 2 32 (A)    Glucose, Fasting 08/15/2022 119 (A)    Calcium 08/15/2022 9 5     Corrected Calcium 08/15/2022 10 0     AST 08/15/2022 15     ALT 08/15/2022 18     Alkaline Phosphatase 08/15/2022 74     Total Protein 08/15/2022 7 2     Albumin 08/15/2022 3 4 (A)    Total Bilirubin 08/15/2022 0 62     eGFR 08/15/2022 26          Radiology Results:   No results found

## 2022-08-24 NOTE — TELEPHONE ENCOUNTER
Pt called to cancel Colonoscopy portion of procedure, I cancelled and left vm letting pt know Egd still scheduled

## 2022-09-15 ENCOUNTER — APPOINTMENT (OUTPATIENT)
Dept: LAB | Facility: CLINIC | Age: 77
End: 2022-09-15
Payer: COMMERCIAL

## 2022-09-15 DIAGNOSIS — N17.9 AKI (ACUTE KIDNEY INJURY) (HCC): ICD-10-CM

## 2022-09-15 LAB
ALBUMIN SERPL BCP-MCNC: 3.4 G/DL (ref 3.5–5)
ALP SERPL-CCNC: 73 U/L (ref 46–116)
ALT SERPL W P-5'-P-CCNC: 17 U/L (ref 12–78)
ANION GAP SERPL CALCULATED.3IONS-SCNC: 5 MMOL/L (ref 4–13)
AST SERPL W P-5'-P-CCNC: 12 U/L (ref 5–45)
BACTERIA UR QL AUTO: ABNORMAL /HPF
BASOPHILS # BLD AUTO: 0.04 THOUSANDS/ΜL (ref 0–0.1)
BASOPHILS NFR BLD AUTO: 1 % (ref 0–1)
BILIRUB SERPL-MCNC: 0.8 MG/DL (ref 0.2–1)
BILIRUB UR QL STRIP: NEGATIVE
BUN SERPL-MCNC: 35 MG/DL (ref 5–25)
CALCIUM ALBUM COR SERPL-MCNC: 9.6 MG/DL (ref 8.3–10.1)
CALCIUM SERPL-MCNC: 9.1 MG/DL (ref 8.3–10.1)
CHLORIDE SERPL-SCNC: 106 MMOL/L (ref 96–108)
CHOLEST SERPL-MCNC: 204 MG/DL
CLARITY UR: CLEAR
CO2 SERPL-SCNC: 28 MMOL/L (ref 21–32)
COLOR UR: YELLOW
CREAT SERPL-MCNC: 2.37 MG/DL (ref 0.6–1.3)
CREAT UR-MCNC: 105 MG/DL
EOSINOPHIL # BLD AUTO: 0.23 THOUSAND/ΜL (ref 0–0.61)
EOSINOPHIL NFR BLD AUTO: 3 % (ref 0–6)
ERYTHROCYTE [DISTWIDTH] IN BLOOD BY AUTOMATED COUNT: 12.8 % (ref 11.6–15.1)
EST. AVERAGE GLUCOSE BLD GHB EST-MCNC: 128 MG/DL
GFR SERPL CREATININE-BSD FRML MDRD: 26 ML/MIN/1.73SQ M
GLUCOSE P FAST SERPL-MCNC: 127 MG/DL (ref 65–99)
GLUCOSE UR STRIP-MCNC: NEGATIVE MG/DL
HBA1C MFR BLD: 6.1 %
HCT VFR BLD AUTO: 39.7 % (ref 36.5–49.3)
HDLC SERPL-MCNC: 36 MG/DL
HGB BLD-MCNC: 13 G/DL (ref 12–17)
HGB UR QL STRIP.AUTO: NEGATIVE
IMM GRANULOCYTES # BLD AUTO: 0.04 THOUSAND/UL (ref 0–0.2)
IMM GRANULOCYTES NFR BLD AUTO: 1 % (ref 0–2)
KETONES UR STRIP-MCNC: NEGATIVE MG/DL
LDLC SERPL CALC-MCNC: 129 MG/DL (ref 0–100)
LEUKOCYTE ESTERASE UR QL STRIP: NEGATIVE
LYMPHOCYTES # BLD AUTO: 1.29 THOUSANDS/ΜL (ref 0.6–4.47)
LYMPHOCYTES NFR BLD AUTO: 17 % (ref 14–44)
MAGNESIUM SERPL-MCNC: 2 MG/DL (ref 1.6–2.6)
MCH RBC QN AUTO: 30.5 PG (ref 26.8–34.3)
MCHC RBC AUTO-ENTMCNC: 32.7 G/DL (ref 31.4–37.4)
MCV RBC AUTO: 93 FL (ref 82–98)
MICROALBUMIN UR-MCNC: 187 MG/L (ref 0–20)
MICROALBUMIN/CREAT 24H UR: 178 MG/G CREATININE (ref 0–30)
MONOCYTES # BLD AUTO: 0.74 THOUSAND/ΜL (ref 0.17–1.22)
MONOCYTES NFR BLD AUTO: 10 % (ref 4–12)
NEUTROPHILS # BLD AUTO: 5.17 THOUSANDS/ΜL (ref 1.85–7.62)
NEUTS SEG NFR BLD AUTO: 68 % (ref 43–75)
NITRITE UR QL STRIP: NEGATIVE
NON-SQ EPI CELLS URNS QL MICRO: ABNORMAL /HPF
NONHDLC SERPL-MCNC: 168 MG/DL
NRBC BLD AUTO-RTO: 0 /100 WBCS
PH UR STRIP.AUTO: 6 [PH]
PHOSPHATE SERPL-MCNC: 3.3 MG/DL (ref 2.3–4.1)
PLATELET # BLD AUTO: 192 THOUSANDS/UL (ref 149–390)
PMV BLD AUTO: 12.2 FL (ref 8.9–12.7)
POTASSIUM SERPL-SCNC: 3.9 MMOL/L (ref 3.5–5.3)
PROT SERPL-MCNC: 7.2 G/DL (ref 6.4–8.4)
PROT UR STRIP-MCNC: ABNORMAL MG/DL
PSA SERPL-MCNC: 1.2 NG/ML (ref 0–4)
PTH-INTACT SERPL-MCNC: 25.7 PG/ML (ref 18.4–80.1)
RBC # BLD AUTO: 4.26 MILLION/UL (ref 3.88–5.62)
RBC #/AREA URNS AUTO: ABNORMAL /HPF
SODIUM SERPL-SCNC: 139 MMOL/L (ref 135–147)
SP GR UR STRIP.AUTO: 1.02 (ref 1–1.03)
TRIGL SERPL-MCNC: 195 MG/DL
UROBILINOGEN UR STRIP-ACNC: <2 MG/DL
WBC # BLD AUTO: 7.51 THOUSAND/UL (ref 4.31–10.16)
WBC #/AREA URNS AUTO: ABNORMAL /HPF

## 2022-09-15 PROCEDURE — 83735 ASSAY OF MAGNESIUM: CPT

## 2022-09-15 PROCEDURE — 3060F POS MICROALBUMINURIA REV: CPT | Performed by: INTERNAL MEDICINE

## 2022-09-15 PROCEDURE — 84100 ASSAY OF PHOSPHORUS: CPT

## 2022-09-15 PROCEDURE — 82570 ASSAY OF URINE CREATININE: CPT

## 2022-09-15 PROCEDURE — G0103 PSA SCREENING: HCPCS

## 2022-09-15 PROCEDURE — 83036 HEMOGLOBIN GLYCOSYLATED A1C: CPT

## 2022-09-15 PROCEDURE — 85025 COMPLETE CBC W/AUTO DIFF WBC: CPT

## 2022-09-15 PROCEDURE — 83970 ASSAY OF PARATHORMONE: CPT

## 2022-09-15 PROCEDURE — 80061 LIPID PANEL: CPT

## 2022-09-15 PROCEDURE — 82043 UR ALBUMIN QUANTITATIVE: CPT

## 2022-09-15 PROCEDURE — 80053 COMPREHEN METABOLIC PANEL: CPT

## 2022-09-15 PROCEDURE — 36415 COLL VENOUS BLD VENIPUNCTURE: CPT

## 2022-09-15 PROCEDURE — 81001 URINALYSIS AUTO W/SCOPE: CPT

## 2022-09-15 PROCEDURE — 3044F HG A1C LEVEL LT 7.0%: CPT | Performed by: INTERNAL MEDICINE

## 2022-10-06 ENCOUNTER — ANESTHESIA (OUTPATIENT)
Dept: ANESTHESIOLOGY | Facility: HOSPITAL | Age: 77
End: 2022-10-06

## 2022-10-06 ENCOUNTER — ANESTHESIA EVENT (OUTPATIENT)
Dept: ANESTHESIOLOGY | Facility: HOSPITAL | Age: 77
End: 2022-10-06

## 2022-10-06 RX ORDER — SODIUM CHLORIDE, SODIUM LACTATE, POTASSIUM CHLORIDE, CALCIUM CHLORIDE 600; 310; 30; 20 MG/100ML; MG/100ML; MG/100ML; MG/100ML
125 INJECTION, SOLUTION INTRAVENOUS CONTINUOUS
Status: CANCELLED | OUTPATIENT
Start: 2022-10-06

## 2022-10-07 ENCOUNTER — ANESTHESIA EVENT (OUTPATIENT)
Dept: GASTROENTEROLOGY | Facility: HOSPITAL | Age: 77
End: 2022-10-07

## 2022-10-07 ENCOUNTER — ANESTHESIA (OUTPATIENT)
Dept: GASTROENTEROLOGY | Facility: HOSPITAL | Age: 77
End: 2022-10-07

## 2022-10-07 ENCOUNTER — HOSPITAL ENCOUNTER (OUTPATIENT)
Dept: GASTROENTEROLOGY | Facility: HOSPITAL | Age: 77
Setting detail: OUTPATIENT SURGERY
End: 2022-10-07
Attending: INTERNAL MEDICINE
Payer: COMMERCIAL

## 2022-10-07 VITALS
OXYGEN SATURATION: 96 % | HEIGHT: 72 IN | SYSTOLIC BLOOD PRESSURE: 153 MMHG | WEIGHT: 214 LBS | BODY MASS INDEX: 28.99 KG/M2 | TEMPERATURE: 97.4 F | HEART RATE: 82 BPM | RESPIRATION RATE: 18 BRPM | DIASTOLIC BLOOD PRESSURE: 82 MMHG

## 2022-10-07 DIAGNOSIS — K92.0 GASTROINTESTINAL HEMORRHAGE WITH HEMATEMESIS: ICD-10-CM

## 2022-10-07 LAB — GLUCOSE SERPL-MCNC: 154 MG/DL (ref 65–140)

## 2022-10-07 PROCEDURE — 43239 EGD BIOPSY SINGLE/MULTIPLE: CPT | Performed by: INTERNAL MEDICINE

## 2022-10-07 PROCEDURE — 82948 REAGENT STRIP/BLOOD GLUCOSE: CPT

## 2022-10-07 PROCEDURE — 88305 TISSUE EXAM BY PATHOLOGIST: CPT | Performed by: PATHOLOGY

## 2022-10-07 PROCEDURE — 45380 COLONOSCOPY AND BIOPSY: CPT | Performed by: INTERNAL MEDICINE

## 2022-10-07 RX ORDER — PROPOFOL 10 MG/ML
INJECTION, EMULSION INTRAVENOUS AS NEEDED
Status: DISCONTINUED | OUTPATIENT
Start: 2022-10-07 | End: 2022-10-07

## 2022-10-07 RX ORDER — SODIUM CHLORIDE, SODIUM LACTATE, POTASSIUM CHLORIDE, CALCIUM CHLORIDE 600; 310; 30; 20 MG/100ML; MG/100ML; MG/100ML; MG/100ML
125 INJECTION, SOLUTION INTRAVENOUS CONTINUOUS
Status: DISCONTINUED | OUTPATIENT
Start: 2022-10-07 | End: 2022-10-11 | Stop reason: HOSPADM

## 2022-10-07 RX ORDER — FAMOTIDINE 20 MG/1
20 TABLET, FILM COATED ORAL 2 TIMES DAILY
Qty: 30 TABLET | Refills: 1 | Status: SHIPPED | OUTPATIENT
Start: 2022-10-07

## 2022-10-07 RX ORDER — LIDOCAINE HYDROCHLORIDE 20 MG/ML
INJECTION, SOLUTION EPIDURAL; INFILTRATION; INTRACAUDAL; PERINEURAL AS NEEDED
Status: DISCONTINUED | OUTPATIENT
Start: 2022-10-07 | End: 2022-10-07

## 2022-10-07 RX ADMIN — PROPOFOL 20 MG: 10 INJECTION, EMULSION INTRAVENOUS at 07:51

## 2022-10-07 RX ADMIN — SODIUM CHLORIDE, SODIUM LACTATE, POTASSIUM CHLORIDE, AND CALCIUM CHLORIDE 125 ML/HR: .6; .31; .03; .02 INJECTION, SOLUTION INTRAVENOUS at 07:00

## 2022-10-07 RX ADMIN — LIDOCAINE HYDROCHLORIDE 100 MG: 20 INJECTION, SOLUTION EPIDURAL; INFILTRATION; INTRACAUDAL; PERINEURAL at 07:47

## 2022-10-07 RX ADMIN — PROPOFOL 100 MG: 10 INJECTION, EMULSION INTRAVENOUS at 07:47

## 2022-10-07 NOTE — ANESTHESIA POSTPROCEDURE EVALUATION
Post-Op Assessment Note    CV Status:  Stable    Pain management: adequate     Mental Status:  Alert and awake   Hydration Status:  Euvolemic   PONV Controlled:  Controlled   Airway Patency:  Patent      Post Op Vitals Reviewed: Yes      Staff: CRNA, Anesthesiologist         No complications documented      /77 (10/07/22 0758)    Temp (!) 97 4 °F (36 3 °C) (10/07/22 0758)    Pulse 88 (10/07/22 0758)   Resp 18 (10/07/22 0758)    SpO2 98 % (10/07/22 0758)

## 2022-10-18 PROCEDURE — 88305 TISSUE EXAM BY PATHOLOGIST: CPT | Performed by: PATHOLOGY

## 2022-11-12 ENCOUNTER — RA CDI HCC (OUTPATIENT)
Dept: OTHER | Facility: HOSPITAL | Age: 77
End: 2022-11-12

## 2022-11-12 NOTE — PROGRESS NOTES
[facility]  coding opportunities    [phone]      Chart Reviewed number of suggestions sent to Provider: 1     Patients Insurance     Medicare Insurance: [address] [place] [address]

## 2022-11-15 ENCOUNTER — OFFICE VISIT (OUTPATIENT)
Dept: FAMILY MEDICINE CLINIC | Facility: CLINIC | Age: 77
End: 2022-11-15

## 2022-11-15 VITALS
HEIGHT: 72 IN | DIASTOLIC BLOOD PRESSURE: 76 MMHG | TEMPERATURE: 98 F | BODY MASS INDEX: 30.88 KG/M2 | RESPIRATION RATE: 20 BRPM | OXYGEN SATURATION: 97 % | SYSTOLIC BLOOD PRESSURE: 136 MMHG | HEART RATE: 89 BPM | WEIGHT: 228 LBS

## 2022-11-15 DIAGNOSIS — I10 ESSENTIAL HYPERTENSION: ICD-10-CM

## 2022-11-15 DIAGNOSIS — E66.09 CLASS 1 OBESITY DUE TO EXCESS CALORIES WITH SERIOUS COMORBIDITY AND BODY MASS INDEX (BMI) OF 30.0 TO 30.9 IN ADULT: ICD-10-CM

## 2022-11-15 DIAGNOSIS — E78.2 MIXED HYPERLIPIDEMIA: ICD-10-CM

## 2022-11-15 DIAGNOSIS — Z00.00 MEDICARE ANNUAL WELLNESS VISIT, SUBSEQUENT: Primary | ICD-10-CM

## 2022-11-15 DIAGNOSIS — N18.31 TYPE 2 DIABETES MELLITUS WITH STAGE 3A CHRONIC KIDNEY DISEASE, WITHOUT LONG-TERM CURRENT USE OF INSULIN (HCC): ICD-10-CM

## 2022-11-15 DIAGNOSIS — E11.22 TYPE 2 DIABETES MELLITUS WITH STAGE 3A CHRONIC KIDNEY DISEASE, WITHOUT LONG-TERM CURRENT USE OF INSULIN (HCC): ICD-10-CM

## 2022-11-15 NOTE — PATIENT INSTRUCTIONS
Medicare Preventive Visit Patient Instructions  Thank you for completing your Welcome to Medicare Visit or Medicare Annual Wellness Visit today  Your next wellness visit will be due in one year (11/16/2023)  The screening/preventive services that you may require over the next 5-10 years are detailed below  Some tests may not apply to you based off risk factors and/or age  Screening tests ordered at today's visit but not completed yet may show as past due  Also, please note that scanned in results may not display below  Preventive Screenings:  Service Recommendations Previous Testing/Comments   Colorectal Cancer Screening  · Colonoscopy    · Fecal Occult Blood Test (FOBT)/Fecal Immunochemical Test (FIT)  · Fecal DNA/Cologuard Test  · Flexible Sigmoidoscopy Age: 39-70 years old   Colonoscopy: every 10 years (May be performed more frequently if at higher risk)  OR  FOBT/FIT: every 1 year  OR  Cologuard: every 3 years  OR  Sigmoidoscopy: every 5 years  Screening may be recommended earlier than age 39 if at higher risk for colorectal cancer  Also, an individualized decision between you and your healthcare provider will decide whether screening between the ages of 74-80 would be appropriate   Colonoscopy: Not on file  FOBT/FIT: Not on file  Cologuard: Not on file  Sigmoidoscopy: Not on file          Prostate Cancer Screening Individualized decision between patient and health care provider in men between ages of 53-78   Medicare will cover every 12 months beginning on the day after your 50th birthday PSA: 1 2 ng/mL     Screening Not Indicated     Hepatitis C Screening Once for adults born between 1945 and 1965  More frequently in patients at high risk for Hepatitis C Hep C Antibody: 09/30/2020    Screening Current   Diabetes Screening 1-2 times per year if you're at risk for diabetes or have pre-diabetes Fasting glucose: 127 mg/dL (9/15/2022)  A1C: 6 1 % (9/15/2022)  Screening Not Indicated  History Diabetes Cholesterol Screening Once every 5 years if you don't have a lipid disorder  May order more often based on risk factors  Lipid panel: 09/15/2022  Screening Not Indicated  History Lipid Disorder      Other Preventive Screenings Covered by Medicare:  1  Abdominal Aortic Aneurysm (AAA) Screening: covered once if your at risk  You're considered to be at risk if you have a family history of AAA or a male between the age of 73-68 who smoking at least 100 cigarettes in your lifetime  2  Lung Cancer Screening: covers low dose CT scan once per year if you meet all of the following conditions: (1) Age 50-69; (2) No signs or symptoms of lung cancer; (3) Current smoker or have quit smoking within the last 15 years; (4) You have a tobacco smoking history of at least 20 pack years (packs per day x number of years you smoked); (5) You get a written order from a healthcare provider  3  Glaucoma Screening: covered annually if you're considered high risk: (1) You have diabetes OR (2) Family history of glaucoma OR (3)  aged 48 and older OR (3)  American aged 72 and older  3  Osteoporosis Screening: covered every 2 years if you meet one of the following conditions: (1) Have a vertebral abnormality; (2) On glucocorticoid therapy for more than 3 months; (3) Have primary hyperparathyroidism; (4) On osteoporosis medications and need to assess response to drug therapy  5  HIV Screening: covered annually if you're between the age of 12-76  Also covered annually if you are younger than 13 and older than 72 with risk factors for HIV infection  For pregnant patients, it is covered up to 3 times per pregnancy      Immunizations:  Immunization Recommendations   Influenza Vaccine Annual influenza vaccination during flu season is recommended for all persons aged >= 6 months who do not have contraindications   Pneumococcal Vaccine   * Pneumococcal conjugate vaccine = PCV13 (Prevnar 13), PCV15 (Vaxneuvance), PCV20 (Prevnar 20)  * Pneumococcal polysaccharide vaccine = PPSV23 (Pneumovax) Adults 2364 years old: 1-3 doses may be recommended based on certain risk factors  Adults 72 years old: 1-2 doses may be recommended based off what pneumonia vaccine you previously received   Hepatitis B Vaccine 3 dose series if at intermediate or high risk (ex: diabetes, end stage renal disease, liver disease)   Tetanus (Td) Vaccine - COST NOT COVERED BY MEDICARE PART B Following completion of primary series, a booster dose should be given every 10 years to maintain immunity against tetanus  Td may also be given as tetanus wound prophylaxis  Tdap Vaccine - COST NOT COVERED BY MEDICARE PART B Recommended at least once for all adults  For pregnant patients, recommended with each pregnancy  Shingles Vaccine (Shingrix) - COST NOT COVERED BY MEDICARE PART B  2 shot series recommended in those aged 48 and above     Health Maintenance Due:      Topic Date Due   • Colorectal Cancer Screening  06/14/2022   • Hepatitis C Screening  Completed     Immunizations Due:      Topic Date Due   • Influenza Vaccine (1) 09/01/2022     Advance Directives   What are advance directives? Advance directives are legal documents that state your wishes and plans for medical care  These plans are made ahead of time in case you lose your ability to make decisions for yourself  Advance directives can apply to any medical decision, such as the treatments you want, and if you want to donate organs  What are the types of advance directives? There are many types of advance directives, and each state has rules about how to use them  You may choose a combination of any of the following:  · Living will: This is a written record of the treatment you want  You can also choose which treatments you do not want, which to limit, and which to stop at a certain time  This includes surgery, medicine, IV fluid, and tube feedings  · Durable power of  for healthcare [facility]):   This is a written record that states who you want to make healthcare choices for you when you are unable to make them for yourself  This person, called a proxy, is usually a family member or a friend  You may choose more than 1 proxy  · Do not resuscitate (DNR) order:  A DNR order is used in case your heart stops beating or you stop breathing  It is a request not to have certain forms of treatment, such as CPR  A DNR order may be included in other types of advance directives  · Medical directive: This covers the care that you want if you are in a coma, near death, or unable to make decisions for yourself  You can list the treatments you want for each condition  Treatment may include pain medicine, surgery, blood transfusions, dialysis, IV or tube feedings, and a ventilator (breathing machine)  · Values history: This document has questions about your views, beliefs, and how you feel and think about life  This information can help others choose the care that you would choose  Why are advance directives important? An advance directive helps you control your care  Although spoken wishes may be used, it is better to have your wishes written down  Spoken wishes can be misunderstood, or not followed  Treatments may be given even if you do not want them  An advance directive may make it easier for your family to make difficult choices about your care  Weight Management   Why it is important to manage your weight:  Being overweight increases your risk of health conditions such as heart disease, high blood pressure, type 2 diabetes, and certain types of cancer  It can also increase your risk for osteoarthritis, sleep apnea, and other respiratory problems  Aim for a slow, steady weight loss  Even a small amount of weight loss can lower your risk of health problems  How to lose weight safely:  A safe and healthy way to lose weight is to eat fewer calories and get regular exercise   You can lose up about 1 pound a week by decreasing the number of calories you eat by 500 calories each day  Healthy meal plan for weight management:  A healthy meal plan includes a variety of foods, contains fewer calories, and helps you stay healthy  A healthy meal plan includes the following:  · Eat whole-grain foods more often  A healthy meal plan should contain fiber  Fiber is the part of grains, fruits, and vegetables that is not broken down by your body  Whole-grain foods are healthy and provide extra fiber in your diet  Some examples of whole-grain foods are whole-wheat breads and pastas, oatmeal, brown rice, and bulgur  · Eat a variety of vegetables every day  Include dark, leafy greens such as spinach, kale, mukund greens, and mustard greens  Eat yellow and orange vegetables such as carrots, sweet potatoes, and winter squash  · Eat a variety of fruits every day  Choose fresh or canned fruit (canned in its own juice or light syrup) instead of juice  Fruit juice has very little or no fiber  · Eat low-fat dairy foods  Drink fat-free (skim) milk or 1% milk  Eat fat-free yogurt and low-fat cottage cheese  Try low-fat cheeses such as mozzarella and other reduced-fat cheeses  · Choose meat and other protein foods that are low in fat  Choose beans or other legumes such as split peas or lentils  Choose fish, skinless poultry (chicken or turkey), or lean cuts of red meat (beef or pork)  Before you cook meat or poultry, cut off any visible fat  · Use less fat and oil  Try baking foods instead of frying them  Add less fat, such as margarine, sour cream, regular salad dressing and mayonnaise to foods  Eat fewer high-fat foods  Some examples of high-fat foods include french fries, doughnuts, ice cream, and cakes  · Eat fewer sweets  Limit foods and drinks that are high in sugar  This includes candy, cookies, regular soda, and sweetened drinks  Exercise:  Exercise at least 30 minutes per day on most days of the week   Some examples of exercise include walking, biking, dancing, and swimming  You can also fit in more physical activity by taking the stairs instead of the elevator or parking farther away from stores  Ask your healthcare provider about the best exercise plan for you  © Copyright [employer] 2018 Information is for End User's use only and may not be sold, redistributed or otherwise used for commercial purposes   All illustrations and images included in CareNotes® are the copyrighted property of A D A M , Inc  or [address] [place] [address]

## 2022-11-15 NOTE — PROGRESS NOTES
Assessment and Plan:     Problem List Items Addressed This Visit        Endocrine    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 chronic kidney disease, without long-term current use of insulin ([address] )    Relevant Orders    HEMOGLOBIN A1C W/ EAG ESTIMATION    Microalbumin / creatinine urine ratio       Cardiovascular and Mediastinum    Essential hypertension    Relevant Orders    CBC and differential    Comprehensive metabolic panel      Other Visit Diagnoses     Medicare annual wellness visit, subsequent    -  Primary    Mixed hyperlipidemia        Relevant Orders    Lipid panel    Class 1 obesity due to excess calories with serious comorbidity and body mass index (BMI) of 30 0 to 30 9 in adult            BMI Counseling: Body mass index is 30 92 kg/m²  The BMI is above normal  Nutrition recommendations include decreasing portion sizes, encouraging healthy choices of fruits and vegetables, decreasing fast food intake, consuming healthier snacks, limiting drinks that contain sugar, moderation in carbohydrate intake and increasing intake of lean protein  Rationale for BMI follow-up plan is due to patient being overweight or obese  Depression Screening and Follow-up Plan: Patient was screened for depression during today's encounter  They screened negative with a PHQ-2 score of 0  Preventive health issues were discussed with patient, and age appropriate screening tests were ordered as noted in patient's After Visit Summary  Personalized health advice and appropriate referrals for health education or preventive services given if needed, as noted in patient's After Visit Summary  History of Present Illness:     Patient presents for a Medicare Wellness Visit    Here for 6 month pablo-  Recently seen by Nephrology- had his medications adjusted due to his kidney function     Diabetes- taking Metformin 2 in the morning and 1 in the evening, HgA1c in September 6 1       Patient Care Team:  Ander Sky as PCP - General (Family Medicine)     Review of Systems:     Review of Systems   Constitutional: Positive for activity change ("sleeping a lot more") and fatigue  Negative for diaphoresis and fever  HENT: Negative for congestion, facial swelling, hearing loss, rhinorrhea, sinus pressure, sinus pain, sneezing, sore throat and voice change  Eyes: Negative for discharge and visual disturbance  Respiratory: Negative for cough, choking, chest tightness, shortness of breath, wheezing and stridor  Cardiovascular: Negative for chest pain and palpitations  Gastrointestinal: Negative for abdominal distention, abdominal pain, constipation, diarrhea, nausea and vomiting  Endocrine: Negative for polydipsia, polyphagia and polyuria  Genitourinary: Negative for difficulty urinating, dysuria, frequency and urgency  Musculoskeletal: Negative for arthralgias, back pain, gait problem, joint swelling, myalgias, neck pain and neck stiffness  Skin: Negative for color change, rash and wound  Neurological: Negative for dizziness, syncope, speech difficulty, weakness, light-headedness and headaches  Hematological: Negative for adenopathy  Does not bruise/bleed easily  Psychiatric/Behavioral: Negative for agitation, behavioral problems, confusion, hallucinations, sleep disturbance and suicidal ideas  The patient is not nervous/anxious           Problem List:     Patient Active Problem List   Diagnosis   • Cerebral aneurysm, nonruptured   • TIA (transient ischemic attack)   • Left-sided weakness   • Essential hypertension   • 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 chronic kidney disease, without long-term current use of insulin ([facility])   • Class 1 obesity due to excess calories with serious comorbidity and body mass index (BMI) of 32 0 to 32 9 in adult   • Polyarticular arthritis   • Chronic right-sided low back pain with right-sided sciatica   • Lumbar radiculopathy   • Chronic pain syndrome   • Hypercalcemia   • Acute renal failure superimposed on stage 3 chronic kidney disease (HCC)   • Kidney cyst, acquired   • Right renal mass   • Acute respiratory failure with hypoxia (HCC)   • GI bleed   • SIRS (systemic inflammatory response syndrome) (HCC)   • Tachycardia   • RUPAL (acute kidney injury) (HCC)   • Chronic renal impairment   • Persistent proteinuria   • Stage 3a chronic kidney disease (HCC)   • Bilateral lower extremity edema      Past Medical and Surgical History:     Past Medical History:   Diagnosis Date   • Diabetes mellitus ([address] )    • Hypertension    • TIA (transient ischemic attack)      Past Surgical History:   Procedure Laterality Date   • CYSTECTOMY      Per patient cyst removal from his back   • LASIK     • [place] LAP, RADICAL NEPHRECTOMY Right 7/6/2022    Procedure: NEPHRECTOMY RADICAL LAPAROSCOPIC W/ ROBOTICS, poss open;  Surgeon: Tanja Flores MD;  [facility];  Service: Urology      Family History:     Family History   Problem Relation Age of Onset   • Colon cancer Mother    • Diabetes type II Mother    • Heart disease Mother    • Prostate cancer Father       Social History:     Social History     Socioeconomic History   • Marital status: /Civil Union     Spouse name: None   • Number of children: None   • Years of education: None   • Highest education level: None   Occupational History   • Occupation: Retired 2015 -     Tobacco Use   • Smoking status: Never Smoker   • Smokeless tobacco: Never Used   Vaping Use   • Vaping Use: Never used   Substance and Sexual Activity   • Alcohol use: Never   • Drug use: Never   • Sexual activity: None   Other Topics Concern   • None   Social History Narrative    Denies drug use - As per [address]    Consumes on average 1 cup of regular coffee per day      Social Determinants of Health     Financial Resource Strain: Low Risk    • Difficulty of Paying Living Expenses: Not very hard   Food Insecurity: No Food Insecurity   • Worried About Running Out of Food in the Last Year: Never true   • Ran Out of Food in the Last Year: Never true   Transportation Needs: No Transportation Needs   • Lack of Transportation (Medical): No   • Lack of Transportation (Non-Medical): No   Physical Activity: Not on file   Stress: Not on file   Social Connections: Not on file   Intimate Partner Violence: Not on file   Housing Stability: Low Risk    • Unable to Pay for Housing in the Last Year: No   • Number of Places Lived in the Last Year: 1   • Unstable Housing in the Last Year: No      Medications and Allergies:     Current Outpatient Medications   Medication Sig Dispense Refill   • amLODIPine (NORVASC) 5 mg tablet Take 1 tablet (5 mg total) by mouth daily 90 tablet 3   • Ascorbic Acid (vitamin C) 100 MG tablet Take 100 mg by mouth daily     • aspirin (ECOTRIN LOW STRENGTH) 81 mg EC tablet Take 81 mg by mouth daily     • cholecalciferol (VITAMIN D3) 1,000 units tablet Take 1,000 Units by mouth daily     • hydrochlorothiazide (MICROZIDE) 12 5 mg capsule Take 1 capsule (12 5 mg total) by mouth daily 90 capsule 3   • metFORMIN (GLUCOPHAGE) 500 mg tablet take 2 tablets by mouth every morning then take 1 tablet every evening 270 tablet 3   • Multiple Vitamins-Minerals (MICHELLE MULTI MEN) TABS Take by mouth 2 (two) times a day       No current facility-administered medications for this visit       Allergies   Allergen Reactions   • Plavix [Clopidogrel] Rash     Stopped two days ago because got a rash and doctors thought it was from this       Immunizations:     Immunization History   Administered Date(s) Administered   • COVID-19 PFIZER VACCINE 0 3 ML IM 03/23/2021, 04/14/2021, 11/30/2021   • COVID-19 Pfizer vac (Irineo-sucrose, gray cap) 12 yr+ IM 06/03/2022   • INFLUENZA 11/03/2014, 09/14/2015   • Influenza, high dose seasonal 0 7 mL 10/30/2019, 09/29/2020   • Pneumococcal Conjugate 13-Valent 01/03/2017   • Pneumococcal Polysaccharide PPV23 11/25/2014, 10/17/2017      Health Maintenance: Topic Date Due   • Colorectal Cancer Screening  06/14/2022   • Hepatitis C Screening  Completed         Topic Date Due   • Influenza Vaccine (1) 09/01/2022      Medicare Screening Tests and Risk Assessments:     Marcin Keys is here for his Subsequent Wellness visit  Health Risk Assessment:   Patient rates overall health as fair  Patient feels that their physical health rating is slightly better  Patient is satisfied with their life  Eyesight was rated as same  Hearing was rated as same  Patient feels that their emotional and mental health rating is same  Patients states they are never, rarely angry  Patient states they are often unusually tired/fatigued  Pain experienced in the last 7 days has been none  Patient states that he has experienced weight loss or gain in last 6 months  Due To Surgery    Depression Screening:   PHQ-2 Score: 0      Fall Risk Screening: In the past year, patient has experienced: no history of falling in past year      Home Safety:  Patient does not have trouble with stairs inside or outside of their home  Patient has working smoke alarms and has working carbon monoxide detector  Home safety hazards include: none  Nutrition:   Current diet is Regular  Medications:   Patient is currently taking over-the-counter supplements  OTC medications include: see medication list  Patient is able to manage medications  Activities of Daily Living (ADLs)/Instrumental Activities of Daily Living (IADLs):   Walk and transfer into and out of bed and chair?: Yes  Dress and groom yourself?: Yes    Bathe or shower yourself?: Yes    Feed yourself? Yes  Do your laundry/housekeeping?: Yes  Manage your money, pay your bills and track your expenses?: Yes  Make your own meals?: Yes    Do your own shopping?: Yes    Previous Hospitalizations:   Any hospitalizations or ED visits within the last 12 months?: No      Advance Care Planning:   Living will: Yes    Durable POA for healthcare:  Yes    Advanced directive: Yes      PREVENTIVE SCREENINGS      Cardiovascular Screening:    General: Screening Not Indicated and History Lipid Disorder    Due for: Lipid Panel      Diabetes Screening:     General: Screening Not Indicated and History Diabetes    Due for: Blood Glucose      Colorectal Cancer Screening:       Due for: Colonoscopy - Low Risk      Prostate Cancer Screening:    General: Screening Not Indicated      Abdominal Aortic Aneurysm (AAA) Screening:    Risk factors include: age between 73-67 yo        Lung Cancer Screening:     General: Screening Not Indicated      Hepatitis C Screening:    General: Screening Current    Screening, Brief Intervention, and Referral to Treatment (SBIRT)    Screening  Typical number of drinks in a day: 0  Typical number of drinks in a week: 0  Interpretation: Low risk drinking behavior  Single Item Drug Screening:  How often have you used an illegal drug (including marijuana) or a prescription medication for non-medical reasons in the past year? never    Single Item Drug Screen Score: 0  Interpretation: Negative screen for possible drug use disorder    No exam data present     Physical Exam:     /76   Pulse 89   Temp 98 °F (36 7 °C) (Tympanic)   Resp 20   Ht 6' (1 829 m)   Wt 103 kg (228 lb)   SpO2 97%   BMI 30 92 kg/m²     Physical Exam  Vitals and nursing note reviewed  Exam conducted with a chaperone present (wife)  Constitutional:       General: He is not in acute distress  Appearance: Normal appearance  He is well-developed  He is not ill-appearing, toxic-appearing or diaphoretic  Cardiovascular:      Rate and Rhythm: Normal rate and regular rhythm  Pulses:           Dorsalis pedis pulses are 2+ on the right side and 2+ on the left side  Heart sounds: Normal heart sounds  Pulmonary:      Effort: Pulmonary effort is normal  No respiratory distress  Breath sounds: Normal breath sounds     Musculoskeletal:         General: Normal range of motion  Cervical back: Normal range of motion and neck supple  Right lower leg: No edema  Left lower leg: Edema (chronc- at baseline +2 pitting) present  Feet:      Right foot:      Skin integrity: No ulcer, skin breakdown, erythema, warmth, callus or dry skin  Left foot:      Skin integrity: No ulcer, skin breakdown, erythema, warmth, callus or dry skin  Skin:     Coloration: Skin is not pale  Neurological:      Mental Status: He is alert and oriented to person, place, and time  Psychiatric:         Mood and Affect: Mood normal  Mood is not anxious  Speech: Speech normal          Behavior: Behavior normal  Behavior is cooperative  Thought Content:  Thought content normal          Judgment: Judgment normal           KAYODE Cochran

## 2023-01-01 ENCOUNTER — NURSING HOME VISIT (OUTPATIENT)
Dept: GERIATRICS | Facility: OTHER | Age: 78
End: 2023-01-01
Payer: COMMERCIAL

## 2023-01-01 ENCOUNTER — HOSPITAL ENCOUNTER (EMERGENCY)
Facility: HOSPITAL | Age: 78
End: 2023-12-11
Attending: STUDENT IN AN ORGANIZED HEALTH CARE EDUCATION/TRAINING PROGRAM
Payer: COMMERCIAL

## 2023-01-01 ENCOUNTER — APPOINTMENT (EMERGENCY)
Dept: RADIOLOGY | Facility: HOSPITAL | Age: 78
End: 2023-01-01
Payer: COMMERCIAL

## 2023-01-01 VITALS
DIASTOLIC BLOOD PRESSURE: 42 MMHG | OXYGEN SATURATION: 95 % | HEART RATE: 44 BPM | BODY MASS INDEX: 27.78 KG/M2 | RESPIRATION RATE: 24 BRPM | TEMPERATURE: 97.8 F | WEIGHT: 204.8 LBS | SYSTOLIC BLOOD PRESSURE: 58 MMHG

## 2023-01-01 VITALS
DIASTOLIC BLOOD PRESSURE: 58 MMHG | TEMPERATURE: 98.1 F | HEART RATE: 122 BPM | WEIGHT: 220.46 LBS | OXYGEN SATURATION: 90 % | SYSTOLIC BLOOD PRESSURE: 137 MMHG | RESPIRATION RATE: 17 BRPM | BODY MASS INDEX: 29.9 KG/M2

## 2023-01-01 DIAGNOSIS — R41.89 UNRESPONSIVE: Primary | ICD-10-CM

## 2023-01-01 DIAGNOSIS — I95.9 HYPOTENSION: ICD-10-CM

## 2023-01-01 DIAGNOSIS — N18.4 CHRONIC RENAL DISEASE, STAGE IV (HCC): ICD-10-CM

## 2023-01-01 DIAGNOSIS — I46.9 CARDIOPULMONARY ARREST (HCC): Primary | ICD-10-CM

## 2023-01-01 DIAGNOSIS — I63.9 CEREBROVASCULAR ACCIDENT (CVA), UNSPECIFIED MECHANISM (HCC): ICD-10-CM

## 2023-01-01 DIAGNOSIS — E44.0 MODERATE PROTEIN-CALORIE MALNUTRITION (HCC): ICD-10-CM

## 2023-01-01 LAB
ALBUMIN SERPL BCP-MCNC: 3.3 G/DL (ref 3.5–5)
ALP SERPL-CCNC: 94 U/L (ref 34–104)
ALT SERPL W P-5'-P-CCNC: 24 U/L (ref 7–52)
ANION GAP SERPL CALCULATED.3IONS-SCNC: 13 MMOL/L
APAP SERPL-MCNC: <2 UG/ML (ref 10–20)
AST SERPL W P-5'-P-CCNC: 32 U/L (ref 13–39)
BASOPHILS # BLD AUTO: 0.06 THOUSANDS/ÂΜL (ref 0–0.1)
BASOPHILS NFR BLD AUTO: 1 % (ref 0–1)
BILIRUB SERPL-MCNC: 0.87 MG/DL (ref 0.2–1)
BUN SERPL-MCNC: 29 MG/DL (ref 5–25)
CALCIUM ALBUM COR SERPL-MCNC: 9.5 MG/DL (ref 8.3–10.1)
CALCIUM SERPL-MCNC: 8.9 MG/DL (ref 8.4–10.2)
CARDIAC TROPONIN I PNL SERPL HS: 28 NG/L
CHLORIDE SERPL-SCNC: 105 MMOL/L (ref 96–108)
CO2 SERPL-SCNC: 21 MMOL/L (ref 21–32)
CREAT SERPL-MCNC: 1.99 MG/DL (ref 0.6–1.3)
EOSINOPHIL # BLD AUTO: 0.34 THOUSAND/ÂΜL (ref 0–0.61)
EOSINOPHIL NFR BLD AUTO: 3 % (ref 0–6)
ERYTHROCYTE [DISTWIDTH] IN BLOOD BY AUTOMATED COUNT: 13.1 % (ref 11.6–15.1)
ETHANOL SERPL-MCNC: <10 MG/DL
GFR SERPL CREATININE-BSD FRML MDRD: 31 ML/MIN/1.73SQ M
GLUCOSE SERPL-MCNC: 348 MG/DL (ref 65–140)
GLUCOSE SERPL-MCNC: 348 MG/DL (ref 65–140)
HCT VFR BLD AUTO: 36.7 % (ref 36.5–49.3)
HGB BLD-MCNC: 11.7 G/DL (ref 12–17)
IMM GRANULOCYTES # BLD AUTO: 0.14 THOUSAND/UL (ref 0–0.2)
IMM GRANULOCYTES NFR BLD AUTO: 1 % (ref 0–2)
LYMPHOCYTES # BLD AUTO: 3.47 THOUSANDS/ÂΜL (ref 0.6–4.47)
LYMPHOCYTES NFR BLD AUTO: 27 % (ref 14–44)
MCH RBC QN AUTO: 30.4 PG (ref 26.8–34.3)
MCHC RBC AUTO-ENTMCNC: 31.9 G/DL (ref 31.4–37.4)
MCV RBC AUTO: 95 FL (ref 82–98)
MONOCYTES # BLD AUTO: 0.83 THOUSAND/ÂΜL (ref 0.17–1.22)
MONOCYTES NFR BLD AUTO: 6 % (ref 4–12)
NEUTROPHILS # BLD AUTO: 8.18 THOUSANDS/ÂΜL (ref 1.85–7.62)
NEUTS SEG NFR BLD AUTO: 62 % (ref 43–75)
NRBC BLD AUTO-RTO: 0 /100 WBCS
PLATELET # BLD AUTO: 175 THOUSANDS/UL (ref 149–390)
PMV BLD AUTO: 11.7 FL (ref 8.9–12.7)
POTASSIUM SERPL-SCNC: 4.1 MMOL/L (ref 3.5–5.3)
PROT SERPL-MCNC: 6 G/DL (ref 6.4–8.4)
RBC # BLD AUTO: 3.85 MILLION/UL (ref 3.88–5.62)
SALICYLATES SERPL-MCNC: <5 MG/DL (ref 3–20)
SODIUM SERPL-SCNC: 139 MMOL/L (ref 135–147)
WBC # BLD AUTO: 13.02 THOUSAND/UL (ref 4.31–10.16)

## 2023-01-01 PROCEDURE — 99310 SBSQ NF CARE HIGH MDM 45: CPT | Performed by: NURSE PRACTITIONER

## 2023-01-01 PROCEDURE — 84484 ASSAY OF TROPONIN QUANT: CPT

## 2023-01-01 PROCEDURE — 92950 HEART/LUNG RESUSCITATION CPR: CPT

## 2023-01-01 PROCEDURE — 96365 THER/PROPH/DIAG IV INF INIT: CPT

## 2023-01-01 PROCEDURE — 31500 INSERT EMERGENCY AIRWAY: CPT

## 2023-01-01 PROCEDURE — 80053 COMPREHEN METABOLIC PANEL: CPT

## 2023-01-01 PROCEDURE — 80179 DRUG ASSAY SALICYLATE: CPT

## 2023-01-01 PROCEDURE — 94760 N-INVAS EAR/PLS OXIMETRY 1: CPT

## 2023-01-01 PROCEDURE — 96375 TX/PRO/DX INJ NEW DRUG ADDON: CPT

## 2023-01-01 PROCEDURE — 82948 REAGENT STRIP/BLOOD GLUCOSE: CPT

## 2023-01-01 PROCEDURE — 94150 VITAL CAPACITY TEST: CPT

## 2023-01-01 PROCEDURE — 71045 X-RAY EXAM CHEST 1 VIEW: CPT

## 2023-01-01 PROCEDURE — 80143 DRUG ASSAY ACETAMINOPHEN: CPT

## 2023-01-01 PROCEDURE — 94002 VENT MGMT INPAT INIT DAY: CPT

## 2023-01-01 PROCEDURE — 85025 COMPLETE CBC W/AUTO DIFF WBC: CPT

## 2023-01-01 PROCEDURE — 36415 COLL VENOUS BLD VENIPUNCTURE: CPT

## 2023-01-01 PROCEDURE — 93005 ELECTROCARDIOGRAM TRACING: CPT

## 2023-01-01 PROCEDURE — 99285 EMERGENCY DEPT VISIT HI MDM: CPT

## 2023-01-01 PROCEDURE — 82077 ASSAY SPEC XCP UR&BREATH IA: CPT

## 2023-01-01 RX ORDER — FENTANYL CITRATE 50 UG/ML
100 INJECTION, SOLUTION INTRAMUSCULAR; INTRAVENOUS ONCE
Status: DISCONTINUED | OUTPATIENT
Start: 2023-01-01 | End: 2023-01-01 | Stop reason: HOSPADM

## 2023-01-01 RX ORDER — SUCCINYLCHOLINE/SOD CL,ISO/PF 100 MG/5ML
100 SYRINGE (ML) INTRAVENOUS ONCE
Status: COMPLETED | OUTPATIENT
Start: 2023-01-01 | End: 2023-01-01

## 2023-01-01 RX ORDER — ONDANSETRON 2 MG/ML
4 INJECTION INTRAMUSCULAR; INTRAVENOUS ONCE
Status: COMPLETED | OUTPATIENT
Start: 2023-01-01 | End: 2023-01-01

## 2023-01-01 RX ORDER — EPINEPHRINE 0.1 MG/ML
INJECTION INTRAVENOUS CODE/TRAUMA/SEDATION MEDICATION
Status: COMPLETED | OUTPATIENT
Start: 2023-01-01 | End: 2023-01-01

## 2023-01-01 RX ORDER — ONDANSETRON 2 MG/ML
INJECTION INTRAMUSCULAR; INTRAVENOUS
Status: COMPLETED
Start: 2023-01-01 | End: 2023-01-01

## 2023-01-01 RX ORDER — KETAMINE HCL IN NACL, ISO-OSM 100MG/10ML
100 SYRINGE (ML) INJECTION ONCE
Status: COMPLETED | OUTPATIENT
Start: 2023-01-01 | End: 2023-01-01

## 2023-01-01 RX ORDER — KETAMINE HYDROCHLORIDE 50 MG/ML
INJECTION, SOLUTION INTRAMUSCULAR; INTRAVENOUS
Status: COMPLETED
Start: 2023-01-01 | End: 2023-01-01

## 2023-01-01 RX ORDER — FENTANYL CITRATE 50 UG/ML
INJECTION, SOLUTION INTRAMUSCULAR; INTRAVENOUS
Status: DISCONTINUED
Start: 2023-01-01 | End: 2023-01-01 | Stop reason: HOSPADM

## 2023-01-01 RX ADMIN — Medication 100 MG: at 10:21

## 2023-01-01 RX ADMIN — ONDANSETRON 4 MG: 2 INJECTION INTRAMUSCULAR; INTRAVENOUS at 10:18

## 2023-01-01 RX ADMIN — EPINEPHRINE 1 MG: 0.1 INJECTION INTRAVENOUS at 10:46

## 2023-01-01 RX ADMIN — ONDANSETRON 4 MG: 2 INJECTION INTRAMUSCULAR; INTRAVENOUS at 10:21

## 2023-01-01 RX ADMIN — SODIUM BICARBONATE 50 MEQ: 84 INJECTION, SOLUTION INTRAVENOUS at 10:46

## 2023-01-01 RX ADMIN — NOREPINEPHRINE BITARTRATE 20 MCG/MIN: 1 INJECTION, SOLUTION, CONCENTRATE INTRAVENOUS at 10:10

## 2023-01-01 RX ADMIN — EPINEPHRINE 1 MG: 0.1 INJECTION INTRAVENOUS at 10:57

## 2023-01-01 RX ADMIN — EPINEPHRINE 1 MG: 0.1 INJECTION INTRAVENOUS at 11:07

## 2023-01-01 RX ADMIN — SODIUM CHLORIDE 1000 ML: 0.9 INJECTION, SOLUTION INTRAVENOUS at 10:36

## 2023-01-01 RX ADMIN — EPINEPHRINE 1 MG: 0.1 INJECTION INTRAVENOUS at 11:02

## 2023-01-01 RX ADMIN — SODIUM BICARBONATE 50 MEQ: 84 INJECTION, SOLUTION INTRAVENOUS at 10:45

## 2023-01-01 RX ADMIN — EPINEPHRINE 10 MCG/MIN: 1 INJECTION INTRAMUSCULAR; INTRAVENOUS; SUBCUTANEOUS at 11:04

## 2023-01-01 RX ADMIN — EPINEPHRINE 1 MG: 0.1 INJECTION INTRAVENOUS at 10:39

## 2023-01-01 RX ADMIN — EPINEPHRINE 1 MG: 0.1 INJECTION INTRAVENOUS at 10:42

## 2023-01-01 RX ADMIN — KETAMINE HYDROCHLORIDE: 50 INJECTION INTRAMUSCULAR; INTRAVENOUS at 10:20

## 2023-01-01 RX ADMIN — Medication 100 MG: at 10:20

## 2023-01-04 NOTE — H&P
Colposcopy    Date/Time: 1/4/2023 3:19 PM  Performed by: Joseph Cardoso DO  Authorized by: Joseph Cardoso DO     Pre-Procedure  Colposcopy of cervix and upper vagina was performed. Gross examination of the cervix revealed: normal to inspection    Colposcopic Findings  After gross examination through the colposcope, acetic acid was applied and colposcopic evaluation was again performed.  Colposcopic examination of the cervix was adequate. The squamocolumnar junction was seen 360 degrees.  Any lesion noted was seen in its entirety.       Acetowhite epithelium was present at   6 o'clock, 7 o'clock and 8 o'clock  No punctation was present      No mosaicism was present     No abnormal vessels were present     Biopsies  Punch biopsies were taken at  6 o'clock. ECC was performed with a sharp curette and submitted for pathologic evaluation.       Post-Procedure  Katy tolerated the procedure well. There were no complications.    Instructions were given to patient regarding aftercare and follow up.        History and Physical - [facility]  Rhonda Felty 78 y o  male MRN: [id number]                  HPI: Rhonda Felty is a 78y o  year old male who presents for hematemesis      REVIEW OF SYSTEMS: Per the HPI, and otherwise unremarkable      Historical Information   Past Medical History:   Diagnosis Date    Diabetes mellitus ([address] )     Hypertension     TIA (transient ischemic attack)      Past Surgical History:   Procedure Laterality Date    CYSTECTOMY      Per patient cyst removal from his back    LASIK      [place] LAP, RADICAL NEPHRECTOMY Right 7/6/2022    Procedure: NEPHRECTOMY RADICAL LAPAROSCOPIC W/ ROBOTICS, poss open;  Surgeon: Suellen Naqvi MD;  [facility];  Service: Urology     Social History   Social History     Substance and Sexual Activity   Alcohol Use Never     Social History     Substance and Sexual Activity   Drug Use Never     Social History     Tobacco Use   Smoking Status Never Smoker   Smokeless Tobacco Never Used     Family History   Problem Relation Age of Onset    Colon cancer Mother     Diabetes type II Mother     Heart disease Mother     Prostate cancer Father        Meds/Allergies       Current Outpatient Medications:     amLODIPine (NORVASC) 5 mg tablet    Ascorbic Acid (vitamin C) 100 MG tablet    aspirin (ECOTRIN LOW STRENGTH) 81 mg EC tablet    cholecalciferol (VITAMIN D3) 1,000 units tablet    hydrochlorothiazide (MICROZIDE) 12 5 mg capsule    metFORMIN (GLUCOPHAGE) 500 mg tablet    Multiple Vitamins-Minerals (MICHELLE MULTI MEN) TABS    Current Facility-Administered Medications:     lactated ringers infusion, 125 mL/hr, Intravenous, Continuous, 125 mL/hr at 10/07/22 0700    Allergies   Allergen Reactions    Plavix [Clopidogrel] Rash     Stopped two days ago because got a rash and doctors thought it was from this        Objective     /88   Pulse 95   Temp 98 7 °F (37 1 °C) (Temporal)   Resp 16   Ht 6' (1 829 m)   Wt 97 1 kg (214 lb)   SpO2 99%   BMI 29 02 kg/m²       PHYSICAL EXAM    Gen: NAD  Head: NCAT  CV: RRR  CHEST: Clear  ABD: soft, NT/ND  EXT: no edema      ASSESSMENT/PLAN:  This is a 78y o  year old male here for endoscopy, and he is stable and optimized for his procedure

## 2023-01-27 ENCOUNTER — APPOINTMENT (OUTPATIENT)
Dept: LAB | Facility: MEDICAL CENTER | Age: 78
End: 2023-01-27

## 2023-01-27 ENCOUNTER — HOSPITAL ENCOUNTER (OUTPATIENT)
Dept: ULTRASOUND IMAGING | Facility: MEDICAL CENTER | Age: 78
Discharge: HOME | End: 2023-01-27

## 2023-01-27 ENCOUNTER — APPOINTMENT (OUTPATIENT)
Dept: RADIOLOGY | Facility: MEDICAL CENTER | Age: 78
End: 2023-01-27

## 2023-01-27 DIAGNOSIS — C64.1 MALIGNANT NEOPLASM OF RIGHT KIDNEY (HCC): ICD-10-CM

## 2023-01-27 LAB
ANION GAP SERPL CALCULATED.3IONS-SCNC: 2 MMOL/L (ref 4–13)
BUN SERPL-MCNC: 35 MG/DL (ref 5–25)
CALCIUM SERPL-MCNC: 9.4 MG/DL (ref 8.3–10.1)
CHLORIDE SERPL-SCNC: 104 MMOL/L (ref 96–108)
CO2 SERPL-SCNC: 30 MMOL/L (ref 21–32)
CREAT SERPL-MCNC: 2.21 MG/DL (ref 0.6–1.3)
GFR SERPL CREATININE-BSD FRML MDRD: 28 ML/MIN/1.73SQ M
GLUCOSE SERPL-MCNC: 127 MG/DL (ref 65–140)
POTASSIUM SERPL-SCNC: 4.1 MMOL/L (ref 3.5–5.3)
SODIUM SERPL-SCNC: 136 MMOL/L (ref 135–147)

## 2023-01-31 ENCOUNTER — OFFICE VISIT (OUTPATIENT)
Dept: FAMILY MEDICINE CLINIC | Facility: CLINIC | Age: 78
End: 2023-01-31

## 2023-01-31 VITALS
SYSTOLIC BLOOD PRESSURE: 142 MMHG | OXYGEN SATURATION: 97 % | RESPIRATION RATE: 20 BRPM | TEMPERATURE: 98.3 F | HEIGHT: 72 IN | DIASTOLIC BLOOD PRESSURE: 78 MMHG | HEART RATE: 86 BPM | BODY MASS INDEX: 29.53 KG/M2 | WEIGHT: 218 LBS

## 2023-01-31 DIAGNOSIS — Z23 ENCOUNTER FOR IMMUNIZATION: ICD-10-CM

## 2023-01-31 DIAGNOSIS — N18.4 CHRONIC RENAL DISEASE, STAGE IV (HCC): ICD-10-CM

## 2023-01-31 DIAGNOSIS — N18.31 TYPE 2 DIABETES MELLITUS WITH STAGE 3A CHRONIC KIDNEY DISEASE, WITHOUT LONG-TERM CURRENT USE OF INSULIN (HCC): ICD-10-CM
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
## 2023-01-31 DIAGNOSIS — E11.22 TYPE 2 DIABETES MELLITUS WITH STAGE 3A CHRONIC KIDNEY DISEASE, WITHOUT LONG-TERM CURRENT USE OF INSULIN (HCC): ICD-10-CM

## 2023-01-31 DIAGNOSIS — S81.012A LACERATION OF LEFT KNEE, INITIAL ENCOUNTER: ICD-10-CM

## 2023-01-31 DIAGNOSIS — S01.511A LIP LACERATION, INITIAL ENCOUNTER: Primary | ICD-10-CM

## 2023-01-31 NOTE — PATIENT INSTRUCTIONS
Rinse mouth with salt water or listerine after eating/drinking  Cold foods/liquids only for 24 hours  Ice to lip and left knee  Bacitracin and bandaid applied to left knee  Tdap today for open cuts  Call for problems/concerns

## 2023-01-31 NOTE — PROGRESS NOTES
[facility]        NAME: Itzel Mata is a 78 y o  male  : 1945    MRN: [id number]  DATE: 2023  TIME: 3:31 PM    Assessment and Plan   Lip laceration, initial encounter [G44 291D]  1  Lip laceration, initial encounter        2  Encounter for immunization  TDAP VACCINE GREATER THAN OR EQUAL TO 6YO IM      3  Laceration of left knee, initial encounter        4  Fall, initial encounter        5  Chronic renal disease, stage IV ([facility] )        6  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 chronic kidney disease, without long-term current use of insulin ([facility])  Microalbumin / creatinine urine ratio            Patient Instructions     Patient Instructions   Rinse mouth with salt water or listerine after eating/drinking  Cold foods/liquids only for 24 hours  Ice to lip and left knee  Bacitracin and bandaid applied to left knee  Tdap today for open cuts  Call for problems/concerns          Chief Complaint     Chief Complaint   Patient presents with   • Follow-up         History of Present Illness       Here for a fall onto his deck while carrying groceries  Broke his front dentures, has a cut on his top lip and a cut on his left knee  Denies LOC, did not hit head other than teeth  Denies pain elsewhere      Review of Systems   Review of Systems   Constitutional: Negative for activity change, diaphoresis, fatigue and fever  HENT: Positive for dental problem  Negative for congestion, facial swelling, hearing loss, rhinorrhea, sinus pressure, sinus pain, sneezing, sore throat and voice change  Eyes: Negative for discharge and visual disturbance  Respiratory: Negative for cough, choking, chest tightness, shortness of breath, wheezing and stridor  Cardiovascular: Negative for chest pain, palpitations and leg swelling  Gastrointestinal: Negative for abdominal distention, abdominal pain, constipation, diarrhea, nausea and vomiting     Endocrine: Negative for polydipsia, polyphagia and polyuria  Genitourinary: Negative for difficulty urinating, dysuria, frequency and urgency  Musculoskeletal: Negative for arthralgias, back pain, gait problem, joint swelling, myalgias, neck pain and neck stiffness  Skin: Positive for wound  Negative for color change and rash  Neurological: Negative for dizziness, syncope, speech difficulty, weakness, light-headedness and headaches  Hematological: Negative for adenopathy  Does not bruise/bleed easily  Psychiatric/Behavioral: Negative for agitation, behavioral problems, confusion, hallucinations, sleep disturbance and suicidal ideas  The patient is not nervous/anxious            Current Medications       Current Outpatient Medications:   •  amLODIPine (NORVASC) 5 mg tablet, Take 1 tablet (5 mg total) by mouth daily, Disp: 90 tablet, Rfl: 3  •  Ascorbic Acid (vitamin C) 100 MG tablet, Take 100 mg by mouth daily, Disp: , Rfl:   •  aspirin (ECOTRIN LOW STRENGTH) 81 mg EC tablet, Take 81 mg by mouth daily, Disp: , Rfl:   •  cholecalciferol (VITAMIN D3) 1,000 units tablet, Take 1,000 Units by mouth daily, Disp: , Rfl:   •  hydrochlorothiazide (MICROZIDE) 12 5 mg capsule, Take 1 capsule (12 5 mg total) by mouth daily, Disp: 90 capsule, Rfl: 3  •  metFORMIN (GLUCOPHAGE) 500 mg tablet, take 2 tablets by mouth every morning then take 1 tablet every evening, Disp: 270 tablet, Rfl: 3  •  Multiple Vitamins-Minerals (MICHELLE MULTI MEN) TABS, Take by mouth 2 (two) times a day, Disp: , Rfl:     Current Allergies     Allergies as of 01/31/2023 - Reviewed 01/31/2023   Allergen Reaction Noted   • Plavix [clopidogrel] Rash 09/28/2019            The following portions of the patient's history were reviewed and updated as appropriate: allergies, current medications, past family history, past medical history, past social history, past surgical history and problem list      Past Medical History:   Diagnosis Date   • Hypertension    • TIA (transient ischemic attack)        Past Surgical History:   Procedure Laterality Date   • CYSTECTOMY      Per patient cyst removal from his back   • LASIK     • [place] LAPAROSCOPY RADICAL NEPHRECTOMY Right 7/6/2022    Procedure: NEPHRECTOMY RADICAL LAPAROSCOPIC W/ ROBOTICS, poss open;  Surgeon: Thiago Kebede MD;  [facility];  Service: Urology       Family History   Problem Relation Age of Onset   • Colon cancer Mother    • Diabetes type II Mother    • Heart disease Mother    • Prostate cancer Father          Medications have been verified  Objective   /78   Pulse 86   Temp 98 3 °F (36 8 °C) (Tympanic)   Resp 20   Ht 6' (1 829 m)   Wt 98 9 kg (218 lb)   SpO2 97%   BMI 29 57 kg/m²        Physical Exam     Physical Exam  Vitals and nursing note reviewed  Exam conducted with a chaperone present (wife)  Constitutional:       General: He is not in acute distress  Appearance: He is well-developed  He is not ill-appearing, toxic-appearing or diaphoretic  HENT:      Head:     Pulmonary:      Effort: Pulmonary effort is normal  No respiratory distress  Musculoskeletal:         General: Tenderness (left knee with open small laceration/abrasion, bacitracin and bandaid applied) and signs of injury present  No swelling or deformity  Skin:     Coloration: Skin is not pale  Neurological:      Mental Status: He is alert and oriented to person, place, and time  Psychiatric:         Mood and Affect: Mood normal          Speech: Speech normal          Behavior: Behavior normal  Behavior is cooperative  Thought Content:  Thought content normal          Judgment: Judgment normal

## 2023-02-06 ENCOUNTER — OFFICE VISIT (OUTPATIENT)
Dept: UROLOGY | Facility: CLINIC | Age: 78
End: 2023-02-06

## 2023-02-06 ENCOUNTER — TELEPHONE (OUTPATIENT)
Dept: UROLOGY | Facility: CLINIC | Age: 78
End: 2023-02-06

## 2023-02-06 VITALS
SYSTOLIC BLOOD PRESSURE: 138 MMHG | HEIGHT: 72 IN | HEART RATE: 46 BPM | DIASTOLIC BLOOD PRESSURE: 68 MMHG | WEIGHT: 233 LBS | BODY MASS INDEX: 31.56 KG/M2

## 2023-02-06 DIAGNOSIS — C64.1 MALIGNANT NEOPLASM OF RIGHT KIDNEY (HCC): ICD-10-CM

## 2023-02-06 DIAGNOSIS — Z12.5 PROSTATE CANCER SCREENING: ICD-10-CM

## 2023-02-06 DIAGNOSIS — N18.9 CHRONIC RENAL IMPAIRMENT, UNSPECIFIED CKD STAGE: Primary | ICD-10-CM

## 2023-02-06 PROBLEM — N28.89 RIGHT RENAL MASS: Status: RESOLVED | Noted: 2022-07-06 | Resolved: 2023-02-06

## 2023-02-06 NOTE — ASSESSMENT & PLAN NOTE
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

## 2023-02-06 NOTE — PATIENT INSTRUCTIONS
Follow up with nephrology  Ultrasound and chest xray in 6 months  Chronic Kidney Disease Diet   WHAT YOU NEED TO KNOW:   What is a chronic kidney disease (CKD) diet? A CKD diet limits protein, phosphorus, sodium, and potassium  Liquids may also need to be limited in later stages of CKD  This diet can help slow down the rate of damage to your kidneys  Your diet may change over time as your health condition changes  You may also need to make other diet changes if you have other health problems, such as diabetes  What kind of diet changes are needed? There are 5 stages of CKD  The diet changes you need to make are based on your stage of kidney disease  Work with your dietitian or healthcare provider to plan meals that are right for you  You may need any of the following:  Limit protein  in all stages of kidney disease  Limit the portion sizes of protein you eat to limit the amount of work your kidneys have to do  Foods that are high in protein are meat, poultry (chicken and turkey), fish, eggs, and dairy (milk, cheese, yogurt)  Your healthcare provider will tell you how much protein to eat each day  Limit sodium  as directed  You may need to limit sodium to less than 2,300 milligrams (mg) each day  Ask your dietitian or healthcare provider how much sodium you can have each day  The amount depends on your stage of kidney disease  Table salt, canned foods, soups, salted snacks, and processed meats, like deli meats and sausage, are high in sodium  Limit the amount of phosphorus  you eat  Your kidneys cannot get rid of extra phosphorus that builds up in your blood  This may cause your bones to lose calcium and weaken  Foods that are high in phosphorus are dairy products, beans, peas, nuts, and whole grains  Phosphorus is also found in cocoa, beer, and cola drinks  Your healthcare provider will tell you how much phosphorus you can have each day  Limit potassium  if your potassium blood levels are too high  Your dietitian or healthcare provider will tell you if you need to limit potassium  Potassium is found in fruits and vegetables  Limit liquids  as directed  Your healthcare provider may recommend that you limit liquids in stages 4 and 5 of kidney disease  If your body retains fluids, you will have swelling and fluid may build up in your lungs  This can cause other health problems, such as shortness of breath  What foods can I include? Your dietitian will tell you how many servings you can have from each of the food groups below  The approximate amount of these nutrients is listed next to each food group  Read the food label to find the exact amount  Bread, cereal, and grains: These foods contain about 80 calories, 2 grams (g) of protein, 150 mg of sodium, 50 mg of potassium, and 30 mg of phosphorus  1 slice (1 ounce) of bread ([place], [place], raisin, light rye, or sourdough white), small dinner roll, or 6-inch tortilla    ½ of a hamburger bun, hot dog bun, or English muffin or ¼ of a bagel    1 cup of unsweetened cereal or ½ cup of cooked cereal, such as cream of wheat    ? cup of cooked pasta (noodles, macaroni, or spaghetti) or rice    4 (2-inch) unsalted crackers or 3 squares of todd crackers    3 cups of air-popped, unsalted popcorn    ¾ ounce of unsalted pretzels    Vegetables:  A serving of these foods contains about 30 calories, 2 g of protein, 50 mg of sodium, and 50 mg of phosphorus      Low potassium (less than 150 mg):      ½ cup cooked green beans, cabbage, cauliflower, beets, or corn    1 cup raw cucumber, endive, alfalfa sprouts, cabbage, cauliflower, or watercress    1 cup of all types of lettuce    ¼ cup cooked or ½ cup raw mushrooms or onions    1 cup cooked eggplant    Medium potassium (150 to 250 mg):      1 cup raw broccoli, celery, or zucchini    ½ cup cooked broccoli, celery, green peas, summer squash, zucchini, or peppers    1 cup cooked kale or turnips    Fruits:  A serving of these foods contains about 60 calories, 0 g protein, 0 mg sodium, and 150 mg of phosphorus  Each serving is ½ cup, unless another amount is given  Low potassium (less than 150 mg): Apple juice, applesauce, or 1 small apple    Blueberries    Cranberries or cranberry juice cocktail    Fresh or canned pears (light syrup or packed in water)    Grapes or grape juice    Canned peaches (light syrup or packed in water)    Pineapple or strawberries    1 tangerine    Watermelon    Medium potassium (150 to 250 mg):      Fresh peaches or pears    Cherries    Cantaloupe, dante, or papaya    Grapefruit or grapefruit juice    Meat, poultry, and fish: These foods have about 75 calories, 7 g of protein, an average of 65 mg of sodium, 115 mg of potassium, and 70 mg of phosphorus  Do not use salt to prepare these foods  1 ounce of cooked beef, pork, or poultry    1 ounce of any fresh or frozen fish, lobster, shrimp, crab, clams, tuna, unsalted canned salmon, or unsalted sardines    Other protein foods: These foods have about 90 calories, 7 g of protein, an average of 100 mg of sodium, 100 mg of potassium, and 120 mg of phosphorus  1 large whole egg or ¼ cup of low-cholesterol egg substitute    1 ounce of cheese    ¼ cup of cottage cheese or tofu    1 ounce of unsalted nuts or 2 tablespoons of peanut butter    Fats: These foods have very little protein and about 45 calories, 55 mg of sodium, 10 mg of potassium, and 5 mg of phosphorus  Include healthy fats, such as unsaturated fats, which are listed below  1 teaspoon margarine or mayonnaise    1 teaspoon oil (safflower, sunflower, corn, soybean, olive, peanut, canola)    1 tablespoon oil-based salad dressing (such as [place]) or 2 tablespoons mayonnaise-based salad dressing (such as ranch)    What foods should I limit or avoid? Starches: The following foods have higher amounts of sodium, potassium, or phosphorus      Biscuits, muffins, pancakes, and waffles    Cake and cornbread from boxed mixes    Oatmeal and whole-wheat cereals    Salted pretzel sticks or rings and sandwich cookies    Meat and protein foods: The following are high in sodium and phosphorus  Deli-style meat, such as roast beef, ham, and turkey    Canned salmon and sardines    Processed cheese, such as American cheese and cheese spreads    Smoked or cured meat, such as corned beef, [place], ham, hot dogs, and sausage    Legumes: These foods have about 90 calories, 6 g of protein, less than 10 mg of sodium, 250 mg of potassium, and 100 mg of phosphorus  ? cup of black beans, red kidney beans, black-eye peas, garbanzos, and lentils    ¼ cup of green or mature soybeans    Dairy: The following foods have about 8 g of protein, an average of 120 mg of sodium, 350 mg of potassium, and 220 mg of phosphorus  1 cup of milk (fat-free, low-fat, whole, buttermilk, or chocolate milk)    1 cup of low-fat plain or sugar-free yogurt or ice cream    ½ cup of pudding or custard    Nondairy milk substitutes: These foods have 75 calories, 1 gram of protein, and an average of 40 mg of sodium, 60 mg of potassium, and 60 mg of phosphorus  A serving is ½ cup of almond, rice, or soy milk, or nondairy creamer  Vegetables: The following vegetables are high in potassium  Each serving has more than 250 mg of potassium  A serving is ½ cup, unless another amount is given  Artichoke or ¼ of a medium avocado    [place] sprouts, beets, chard, mukund or mustard greens    Potatoes, sweet potatoes, pumpkin, and yams    ¾ cup of okra    Raw tomatoes and low-sodium tomato juice, or tomato sauce    Winter squash, cooked asparagus, and cooked spinach    Fruit:  The following fruits are high in potassium  Each serving has more than 250 mg of potassium      3 fresh apricots    1 small nectarine (2 inches across)    1 small orange and ½ cup of orange juice    ¼ cup of dates    ? of a small honeydew melon    1 six-inch banana    ½ cup of prune juice or prunes and kiwifruit    Fats:  Limit unhealthy fats, such as saturated fats, which are listed below  Butter, lard, cream cheese, whipped cream, and sour cream    Powdered coffee creamer    Other: The following foods are high in sodium  Frozen dinners, soups, and fast foods, such as hamburgers and pizza (see the food label for serving sizes)    Table salt and seasoned salts, such as onion or garlic salt    Barbecue sauce, ketchup, mustard, soy sauce, steak sauce, and teriyaki sauce    When should I call my nephrologist or dietitian? You are gaining or losing weight very quickly  You have shortness of breath  You have nausea and are vomiting  You feel very weak and tired  You are having trouble following the CKD diet  CARE AGREEMENT:   You have the right to help plan your care  Discuss treatment options with your healthcare provider to decide what care you want to receive  You always have the right to refuse treatment  The above information is an  only  It is not intended as medical advice for individual conditions or treatments  Talk to your doctor, nurse or pharmacist before following any medical regimen to see if it is safe and effective for you  © Copyright [address] 2022 Information is for End User's use only and may not be sold, redistributed or otherwise used for commercial purposes   All illustrations and images included in CareNotes® are the copyrighted property of [employer]  or [address] [place] [address]

## 2023-02-06 NOTE — TELEPHONE ENCOUNTER
Pt s/p nephrectomy with creat of 2 21; last seen in July 2022   Please schedule follow up in the near future

## 2023-02-06 NOTE — ASSESSMENT & PLAN NOTE
Lab Results   Component Value Date    EGFR 28 01/27/2023    EGFR 26 09/15/2022    EGFR 26 08/15/2022    CREATININE 2 21 (H) 01/27/2023    CREATININE 2 37 (H) 09/15/2022    CREATININE 2 32 (H) 08/15/2022   · follow up with nephrology, message sent to office

## 2023-02-06 NOTE — PROGRESS NOTES
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; pT1a N0  · Creat 2 21, following with nephrology  · Ultrasound kidney and bladder: No mass or fluid collection  · Ultrasound/chest x-ray 6 months  · Follow-up 6 months  · 7 x 5 x 5 mm hypoechoic focus midpole which could represent cortical calcification  · Ultrasound kidney and bladder 6 months  · Follow-up 6 months    Chronic renal impairment  Lab Results   Component Value Date    EGFR 28 01/27/2023    EGFR 26 09/15/2022    EGFR 26 08/15/2022    CREATININE 2 21 (H) 01/27/2023    CREATININE 2 37 (H) 09/15/2022    CREATININE 2 32 (H) 08/15/2022   · follow up with nephrology, message sent to office    Prostate cancer screening  · PSA ordered for September 2023  · CLAUDIA next visit        KAYODE Chu    History of Present Illness     Kasia Tineo is a 78 y o  male patient of Dr Peyman Coy a history of a 4-5 cm right renal mass highly suspicious for renal cell carcinoma  He underwent a robot assisted laparoscopic right radical nephrectomy 7/6/2022 His postoperative course was prolonged  He has small postoperative bleed manage conservatively  Pathology revealed a 4 cm clear-cell renal cell carcinoma with negative surgical margins  Due to elevated creatinine over 2 he is following up with renal ultrasound and chest x-ray prior    Component       Creatinine   Latest Ref Rng & Units       0 60 - 1 30 mg/dL   6/15/2022       1 37 (H)   7/6/2022       1 80 (H)   7/7/2022      8:17 AM 2 39 (H)   7/7/2022      11:15 AM 2 95 (H)   7/8/2022      4:51 AM 3 55 (H)   7/8/2022      2:20 PM 3 84 (H)   7/9/2022       3 45 (H)   7/10/2022       3 12 (H)   7/11/2022       2 57 (H)   7/12/2022       2 42 (H)   7/13/2022       2 20 (H)   7/14/2022       2 13 (H)   7/15/2022       2 05 (H)   7/16/2022       2 17 (H)   7/17/2022 2 14 (H)   7/18/2022       2 19 (H)   7/25/2022       2 28 (H)   8/15/2022       2 32 (H)   9/15/2022       2 37 (H)   1/27/2023       2 21 (H)     Laboratory     Lab Results   Component Value Date    BUN 35 (H) 01/27/2023    CREATININE 2 21 (H) 01/27/2023       No components found for: GFR    Lab Results   Component Value Date    CALCIUM 9 4 01/27/2023    K 4 1 01/27/2023    CO2 30 01/27/2023     01/27/2023       Lab Results   Component Value Date    WBC 7 51 09/15/2022    HGB 13 0 09/15/2022    HCT 39 7 09/15/2022    MCV 93 09/15/2022     09/15/2022       Lab Results   Component Value Date    PSA 1 2 09/15/2022    PSA 1 1 10/07/2021    PSA 0 9 09/30/2020     Case Report   Surgical Pathology Report                         Case: [id number]                                    Authorizing Provider: Minda Resendiz MD  Collected:           07/06/2022 1719               Ordering Location:     [facility]      Received:            07/06/2022 2200                                      Hospital Operating Room                                                       Pathologist:           Maricel Edmond MD                                                                 Specimen:    Kidney, Right, RIGHT KIDNEY                                                                Final Diagnosis   A  Right kidney (radical nephrectomy):     - Clear cell renal cell carcinoma (3 9 cm, WHO grade 3 of 4)  - Tumor is organ-confined  - Surgical margin negative for tumor       - One (1) lymph node, negative for carcinoma  Electronically signed by Maricel Edmond MD on 7/19/2022 at 11:18 AM   Comments: This is an appended report  These results have been appended to a previously preliminary verified report  Preliminary Diagnosis   Intradepartmental review pending     A  Right kidney (radical nephrectomy):     - Clear cell renal cell carcinoma (3 9 cm, WHO grade 3 of 4)  - Tumor is organ-confined  - Surgical margin negative for tumor  Preliminary result electronically signed by Kae Evans MD on 7/18/2022 at  8:11 AM   Microscopic Description    - Representative section: A3    - Non-neoplastic renal parenchyma with patchy increased glomerular scarring, mild interstitial chronic inflammation and mild hypertensive vascular change  - Immunohistochemical studies (with appropriate controls) demonstrate:     Positive CA-9; Negative CK7   Note    - Intradepartmental consultation concurs with the diagnosis  Comment: This is an appended report  These results have been appended to a previously preliminary verified report  Additional Information    All reported additional testing was performed with appropriately reactive controls   These tests were developed and their performance characteristics determined by [facility] or appropriate performing facility, though some tests may be performed on tissues which have not been validated for performance characteristics (such as staining performed on alcohol exposed cell blocks and decalcified tissues)   Results should be interpreted with caution and in the context of the patients’ clinical condition  These tests may not be cleared or approved by the [place]  Food and Drug Administration, though the FDA has determined that such clearance or approval is not necessary  These tests are used for clinical purposes and they should not be regarded as investigational or for research  This laboratory has been approved by CLIA 88, designated as a high-complexity laboratory and is qualified to perform these tests  Interpretation performed at [facility], [address] [place] [address]    Synoptic Checklist   KIDNEY: Nephrectomy  8th Edition - Protocol posted: 6/30/2021  KIDNEY: NEPHRECTOMY, PARTIAL OR RADICAL - All Specimens  SPECIMEN   Procedure  Radical nephrectomy    Specimen Laterality  Right    TUMOR   Tumor Focality  Unifocal    Tumor Site Lower pole    Tumor Size  Greatest Dimension (Centimeters): 3 9 cm   Additional Dimension (Centimeters)  2 5 cm     2 cm   Histologic Type  Clear cell renal cell carcinoma    Histologic Grade (WHO / ISUP)  G3 (nucleoli conspicuous and eosinophilic at 274Y magnification)    Tumor Extent  Limited to kidney    Sarcomatoid Features  Not identified    Rhabdoid Features  Not identified    Tumor Necrosis  Not identified    Lymphovascular Invasion  Not identified    MARGINS   Margin Status  All margins negative for invasive carcinoma    REGIONAL LYMPH NODES   Regional Lymph Node Status  All regional lymph nodes negative for tumor    Number of Lymph Nodes Examined  1    PATHOLOGIC STAGE CLASSIFICATION (pTNM, AJCC 8th Edition)   Reporting of pT, pN, and (when applicable) pM categories is based on information available to the pathologist at the time the report is issued  As per the AJCC (Chapter 1, 8th Ed ) it is the managing physician’s responsibility to establish the final pathologic stage based upon all pertinent information, including but potentially not limited to this pathology report  Primary Tumor (pT)  pT1a    Regional Lymph Nodes (pN)  pN0    ADDITIONAL FINDINGS   Additional Findings in Nonneoplastic Kidney  Patchy increased glomerular scarring, mild interstitial chronic inflammation and mild hypertensive vascular change    Comment(s)  AJCC stage I      No results found for this or any previous visit (from the past 1 hour(s))  @RESULT(URINEMICROSCOPIC)@    @RESULT(URINECULTURE)@    Radiology   CHEST 1/27/2023     INDICATION:   C64 1: Malignant neoplasm of right kidney, except renal pelvis      COMPARISON:  9/28/2019, July 8, 2022     EXAM PERFORMED/VIEWS:  XR CHEST PA & LATERAL  The frontal view was performed utilizing dual energy radiographic technique  Images: 4     FINDINGS:     Cardiomediastinal silhouette appears unremarkable      Calcified granulomata in the left upper lobe    The lungs are otherwise clear  No pneumothorax or pleural effusion      Age-appropriate degenerative changes are noted in the spine        IMPRESSION:     No acute cardiopulmonary disease  RENAL ULTRASOUND 1/27/2023     INDICATION:   C64 1: Malignant neoplasm of right kidney, except renal pelvis      COMPARISON: 7/8/2022     TECHNIQUE:   Ultrasound of the retroperitoneum was performed with a curvilinear transducer utilizing volumetric sweeps and still imaging techniques       FINDINGS:     KIDNEYS:  Symmetric and normal size  Right kidney: Surgically absent  Left kidney:  11 9 x 6 6 x 5 1 cm  Volume 209 3 mL     Right kidney  Surgically absent  No mass or fluid collection in the right renal fossa      Left kidney  Normal echogenicity and contour  No mass is identified  There is a 3 1 x 3 2 x 2 6 cm simple cyst in the lower pole  7 x 5 x 5 mm hyperechoic focus in the midpole which could represent a cortical calcification  This was not definitively seen on the prior exam   No hydronephrosis  No shadowing calculi  No perinephric fluid collections      URETERS:  Nonvisualized      BLADDER:   Normally distended  No focal thickening or mass lesions  Bilateral ureteral jets detected      Prostate gland measures 3 3 x 3 0 x 2 9 cm and appear grossly unremarkable      IMPRESSION:     3 2 cm left lower pole renal cyst is not significantly changed compared to the previous exam   7 x 5 x 5 mm hyperechoic focus in the midpole which could represent a cortical calcification, which was not definitively seen on the prior study  Right kidney   is surgically absent       Workstation performed: [id number]  Review of Systems     Review of Systems   Constitutional: Negative for activity change, appetite change, chills, fatigue, fever and unexpected weight change  HENT: Negative for facial swelling  Eyes: Negative for discharge  Respiratory: Negative  Negative for cough and shortness of breath      Cardiovascular: Negative for chest pain and leg swelling  Gastrointestinal: Negative  Negative for abdominal distention, abdominal pain, constipation, diarrhea, nausea and vomiting  Endocrine: Negative  Genitourinary: Negative  Negative for decreased urine volume, difficulty urinating, dysuria, enuresis, flank pain, frequency, genital sores, hematuria and urgency  Musculoskeletal: Negative for back pain and myalgias  Skin: Negative for pallor and rash  Allergic/Immunologic: Negative  Negative for immunocompromised state  Neurological: Negative for facial asymmetry and speech difficulty  Psychiatric/Behavioral: Negative for agitation and confusion  Allergies     Allergies   Allergen Reactions   • Plavix [Clopidogrel] Rash     Stopped two days ago because got a rash and doctors thought it was from this        Physical Exam     Physical Exam  Vitals reviewed  Constitutional:       General: He is not in acute distress  Appearance: Normal appearance  He is not ill-appearing, toxic-appearing or diaphoretic  HENT:      Head: Normocephalic and atraumatic  Eyes:      General: No scleral icterus  Cardiovascular:      Rate and Rhythm: Normal rate  Pulmonary:      Effort: Pulmonary effort is normal  No respiratory distress  Abdominal:      General: Abdomen is flat  There is no distension  Palpations: Abdomen is soft  Tenderness: There is no abdominal tenderness  There is no right CVA tenderness, left CVA tenderness, guarding or rebound  Genitourinary:     Comments: Declined candis  Musculoskeletal:         General: No swelling  Cervical back: Normal range of motion  Skin:     General: Skin is warm and dry  Coloration: Skin is not jaundiced or pale  Findings: No rash  Neurological:      General: No focal deficit present  Mental Status: He is alert and oriented to person, place, and time        Gait: Gait normal    Psychiatric:         Mood and Affect: Mood normal          Behavior: Behavior normal          Thought Content:  Thought content normal          Judgment: Judgment normal          Vital Signs     Vitals:    02/06/23 1331   BP: 138/68   BP Location: Left arm   Patient Position: Sitting   Cuff Size: Large   Pulse: (!) 46   Weight: 106 kg (233 lb)   Height: 6' (1 829 m)       Current Medications       Current Outpatient Medications:   •  amLODIPine (NORVASC) 5 mg tablet, Take 1 tablet (5 mg total) by mouth daily, Disp: 90 tablet, Rfl: 3  •  Ascorbic Acid (vitamin C) 100 MG tablet, Take 100 mg by mouth daily, Disp: , Rfl:   •  aspirin (ECOTRIN LOW STRENGTH) 81 mg EC tablet, Take 81 mg by mouth daily, Disp: , Rfl:   •  cholecalciferol (VITAMIN D3) 1,000 units tablet, Take 1,000 Units by mouth daily, Disp: , Rfl:   •  hydrochlorothiazide (MICROZIDE) 12 5 mg capsule, Take 1 capsule (12 5 mg total) by mouth daily, Disp: 90 capsule, Rfl: 3  •  metFORMIN (GLUCOPHAGE) 500 mg tablet, take 2 tablets by mouth every morning then take 1 tablet every evening, Disp: 270 tablet, Rfl: 3  •  Multiple Vitamins-Minerals (MICHELLE MULTI MEN) TABS, Take by mouth 2 (two) times a day, Disp: , Rfl:     Active Problems     Patient Active Problem List   Diagnosis   • Cerebral aneurysm, nonruptured   • TIA (transient ischemic attack)   • Left-sided weakness   • Essential hypertension   • 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 chronic kidney disease, without long-term current use of insulin ([facility])   • Class 1 obesity due to excess calories with serious comorbidity and body mass index (BMI) of 32 0 to 32 9 in adult   • Polyarticular arthritis   • Chronic right-sided low back pain with right-sided sciatica   • Lumbar radiculopathy   • Chronic pain syndrome   • Hypercalcemia   • GI bleed   • SIRS (systemic inflammatory response syndrome) ([facility])   • Tachycardia   • Chronic renal impairment   • Persistent proteinuria   • Bilateral lower extremity edema   • Chronic renal disease, stage IV ([facility])   • Renal cell carcinoma of right kidney [facility])   • Prostate cancer screening       Past Medical History     Past Medical History:   Diagnosis Date   • Hypertension    • TIA (transient ischemic attack)        Surgical History     Past Surgical History:   Procedure Laterality Date   • CYSTECTOMY      Per patient cyst removal from his back   • LASIK     • [place] LAPAROSCOPY RADICAL NEPHRECTOMY Right 7/6/2022    Procedure: NEPHRECTOMY RADICAL LAPAROSCOPIC W/ ROBOTICS, poss open;  Surgeon: Ranjit Mahoney MD;  [facility];  Service: Urology       Family History     Family History   Problem Relation Age of Onset   • Colon cancer Mother    • Diabetes type II Mother    • Heart disease Mother    • Prostate cancer Father        Social History     Social History     Social History     Tobacco Use   Smoking Status Never   Smokeless Tobacco Never       Past Surgical History:   Procedure Laterality Date   • CYSTECTOMY      Per patient cyst removal from his back   • LASIK     • [place] LAPAROSCOPY RADICAL NEPHRECTOMY Right 7/6/2022    Procedure: NEPHRECTOMY RADICAL LAPAROSCOPIC W/ ROBOTICS, poss open;  Surgeon: Ranjit Mahoney MD;  Location: [facility];  Service: Urology         The following portions of the patient's history were reviewed and updated as appropriate: allergies, current medications, past family history, past medical history, past social history, past surgical history and problem list    Please note :  Voice dictation software has been used to create this document  There may be inadvertent transcription errors      [id number] [facility] [address] Miya Montes

## 2023-04-05 DIAGNOSIS — E11.9 TYPE 2 DIABETES MELLITUS WITHOUT COMPLICATION, WITHOUT LONG-TERM CURRENT USE OF INSULIN (HCC): ICD-10-CM

## 2023-04-07 PROBLEM — Z12.5 PROSTATE CANCER SCREENING: Status: RESOLVED | Noted: 2023-02-06 | Resolved: 2023-04-07

## 2023-05-17 ENCOUNTER — APPOINTMENT (OUTPATIENT)
Dept: LAB | Facility: CLINIC | Age: 78
End: 2023-05-17

## 2023-05-17 DIAGNOSIS — I10 ESSENTIAL HYPERTENSION: ICD-10-CM

## 2023-05-17 DIAGNOSIS — E11.22 TYPE 2 DIABETES MELLITUS WITH STAGE 3A CHRONIC KIDNEY DISEASE, WITHOUT LONG-TERM CURRENT USE OF INSULIN (HCC): ICD-10-CM

## 2023-05-17 DIAGNOSIS — E78.2 MIXED HYPERLIPIDEMIA: ICD-10-CM

## 2023-05-17 DIAGNOSIS — N18.31 TYPE 2 DIABETES MELLITUS WITH STAGE 3A CHRONIC KIDNEY DISEASE, WITHOUT LONG-TERM CURRENT USE OF INSULIN (HCC): ICD-10-CM

## 2023-05-17 LAB
ALBUMIN SERPL BCP-MCNC: 3.7 G/DL (ref 3.5–5)
ALP SERPL-CCNC: 74 U/L (ref 46–116)
ALT SERPL W P-5'-P-CCNC: 18 U/L (ref 12–78)
ANION GAP SERPL CALCULATED.3IONS-SCNC: 2 MMOL/L (ref 4–13)
AST SERPL W P-5'-P-CCNC: 11 U/L (ref 5–45)
BASOPHILS # BLD AUTO: 0.06 THOUSANDS/ÂΜL (ref 0–0.1)
BASOPHILS NFR BLD AUTO: 1 % (ref 0–1)
BILIRUB SERPL-MCNC: 0.82 MG/DL (ref 0.2–1)
BUN SERPL-MCNC: 31 MG/DL (ref 5–25)
CALCIUM SERPL-MCNC: 9.3 MG/DL (ref 8.3–10.1)
CHLORIDE SERPL-SCNC: 104 MMOL/L (ref 96–108)
CHOLEST SERPL-MCNC: 216 MG/DL
CO2 SERPL-SCNC: 29 MMOL/L (ref 21–32)
CREAT SERPL-MCNC: 2.19 MG/DL (ref 0.6–1.3)
CREAT UR-MCNC: 84.4 MG/DL
EOSINOPHIL # BLD AUTO: 0.28 THOUSAND/ÂΜL (ref 0–0.61)
EOSINOPHIL NFR BLD AUTO: 3 % (ref 0–6)
ERYTHROCYTE [DISTWIDTH] IN BLOOD BY AUTOMATED COUNT: 12.4 % (ref 11.6–15.1)
GFR SERPL CREATININE-BSD FRML MDRD: 28 ML/MIN/1.73SQ M
GLUCOSE P FAST SERPL-MCNC: 105 MG/DL (ref 65–99)
HCT VFR BLD AUTO: 41 % (ref 36.5–49.3)
HDLC SERPL-MCNC: 38 MG/DL
HGB BLD-MCNC: 14 G/DL (ref 12–17)
IMM GRANULOCYTES # BLD AUTO: 0.06 THOUSAND/UL (ref 0–0.2)
IMM GRANULOCYTES NFR BLD AUTO: 1 % (ref 0–2)
LDLC SERPL CALC-MCNC: 129 MG/DL (ref 0–100)
LYMPHOCYTES # BLD AUTO: 2.16 THOUSANDS/ÂΜL (ref 0.6–4.47)
LYMPHOCYTES NFR BLD AUTO: 24 % (ref 14–44)
MCH RBC QN AUTO: 31.4 PG (ref 26.8–34.3)
MCHC RBC AUTO-ENTMCNC: 34.1 G/DL (ref 31.4–37.4)
MCV RBC AUTO: 92 FL (ref 82–98)
MICROALBUMIN UR-MCNC: 624 MG/L (ref 0–20)
MICROALBUMIN/CREAT 24H UR: 739 MG/G CREATININE (ref 0–30)
MONOCYTES # BLD AUTO: 0.87 THOUSAND/ÂΜL (ref 0.17–1.22)
MONOCYTES NFR BLD AUTO: 10 % (ref 4–12)
NEUTROPHILS # BLD AUTO: 5.48 THOUSANDS/ÂΜL (ref 1.85–7.62)
NEUTS SEG NFR BLD AUTO: 61 % (ref 43–75)
NONHDLC SERPL-MCNC: 178 MG/DL
NRBC BLD AUTO-RTO: 0 /100 WBCS
PLATELET # BLD AUTO: 201 THOUSANDS/UL (ref 149–390)
PMV BLD AUTO: 12.6 FL (ref 8.9–12.7)
POTASSIUM SERPL-SCNC: 4 MMOL/L (ref 3.5–5.3)
PROT SERPL-MCNC: 7.3 G/DL (ref 6.4–8.4)
RBC # BLD AUTO: 4.46 MILLION/UL (ref 3.88–5.62)
SODIUM SERPL-SCNC: 135 MMOL/L (ref 135–147)
TRIGL SERPL-MCNC: 246 MG/DL
WBC # BLD AUTO: 8.91 THOUSAND/UL (ref 4.31–10.16)

## 2023-05-18 LAB
EST. AVERAGE GLUCOSE BLD GHB EST-MCNC: 131 MG/DL
HBA1C MFR BLD: 6.2 %

## 2023-05-22 ENCOUNTER — RA CDI HCC (OUTPATIENT)
Dept: OTHER | Facility: HOSPITAL | Age: 78
End: 2023-05-22

## 2023-05-22 NOTE — PROGRESS NOTES
[facility]  coding opportunities       [facility]  coding opportunities     [id number]     Chart Reviewed number of suggestions sent to Provider: 1   GR    Patients Insurance     Medicare Insurance: [facility] [address]

## 2023-05-23 ENCOUNTER — OFFICE VISIT (OUTPATIENT)
Dept: FAMILY MEDICINE CLINIC | Facility: CLINIC | Age: 78
End: 2023-05-23

## 2023-05-23 VITALS
DIASTOLIC BLOOD PRESSURE: 78 MMHG | RESPIRATION RATE: 18 BRPM | OXYGEN SATURATION: 97 % | WEIGHT: 235 LBS | BODY MASS INDEX: 31.83 KG/M2 | HEART RATE: 69 BPM | TEMPERATURE: 97.6 F | SYSTOLIC BLOOD PRESSURE: 138 MMHG | HEIGHT: 72 IN

## 2023-05-23 DIAGNOSIS — R80.9 MICROALBUMINURIA: ICD-10-CM

## 2023-05-23 DIAGNOSIS — E78.2 MIXED HYPERLIPIDEMIA: ICD-10-CM

## 2023-05-23 DIAGNOSIS — R80.9 TYPE 2 DIABETES MELLITUS WITH MICROALBUMINURIA, WITHOUT LONG-TERM CURRENT USE OF INSULIN (HCC): ICD-10-CM

## 2023-05-23 DIAGNOSIS — E11.29 TYPE 2 DIABETES MELLITUS WITH MICROALBUMINURIA, WITHOUT LONG-TERM CURRENT USE OF INSULIN (HCC): ICD-10-CM

## 2023-05-23 DIAGNOSIS — N18.4 STAGE 4 CHRONIC KIDNEY DISEASE (HCC): Primary | ICD-10-CM

## 2023-05-23 DIAGNOSIS — I10 ESSENTIAL HYPERTENSION: ICD-10-CM

## 2023-05-23 NOTE — PATIENT INSTRUCTIONS
Get bloodwork done in 3 months for kidney function  Get bloodwork done before visit in 6 months  Continue same medications  Return in 6 months or call sooner for problems/concerns

## 2023-05-23 NOTE — PROGRESS NOTES
[facility]        NAME: Karen Mccormack is a 78 y o  male  : 1945    MRN: [id number]  DATE: May 23, 2023  TIME: 11:13 AM    Assessment and Plan   Stage 4 chronic kidney disease ([facility] [address] ) [N18 4]  1  Stage 4 chronic kidney disease (HCC)  Basic metabolic panel    Albumin / creatinine urine ratio    Basic metabolic panel    Albumin / creatinine urine ratio      2  Microalbuminuria  Albumin / creatinine urine ratio    Albumin / creatinine urine ratio      3  Type 2 diabetes mellitus with microalbuminuria, without long-term current use of insulin (HCC)  Hemoglobin A1C    Comprehensive metabolic panel      4  Mixed hyperlipidemia  Lipid Panel with Direct LDL reflex      5  Essential hypertension              Patient Instructions     Patient Instructions   Get bloodwork done in 3 months for kidney function  Get bloodwork done before visit in 6 months  Continue same medications  Return in 6 months or call sooner for problems/concerns          Chief Complaint     Chief Complaint   Patient presents with   • Follow-up     Lab review  No issues/concersn         History of Present Illness       Here for 6 month medcheck and lab review  GFR remains stable at 28, had 1 kidney removed  Was following with Nephrology but he didn't feel like they were doing anything for him  His Microalbumin has significantly worsened  HgA1c improved to 6 2  Discussed his diet at length with him and his wife  He reports daytime fatigue- he reports some mild snoring  Declines referral to sleep medicine      Review of Systems   Review of Systems   Constitutional: Positive for activity change and fatigue  Negative for diaphoresis and fever  HENT: Negative for congestion, facial swelling, hearing loss, rhinorrhea, sinus pressure, sinus pain, sneezing, sore throat and voice change  Eyes: Negative for discharge and visual disturbance     Respiratory: Negative for cough, choking, chest tightness, shortness of breath, wheezing and stridor  Cardiovascular: Negative for chest pain, palpitations and leg swelling  Gastrointestinal: Negative for abdominal distention, abdominal pain, constipation, diarrhea, nausea and vomiting  Endocrine: Negative for polydipsia, polyphagia and polyuria  Genitourinary: Negative for difficulty urinating, dysuria, frequency and urgency  Musculoskeletal: Negative for arthralgias, back pain, gait problem, joint swelling, myalgias, neck pain and neck stiffness  Skin: Negative for color change, rash and wound  Neurological: Negative for dizziness, syncope, speech difficulty, weakness, light-headedness and headaches  Hematological: Negative for adenopathy  Does not bruise/bleed easily  Psychiatric/Behavioral: Negative for agitation, behavioral problems, confusion, hallucinations, sleep disturbance and suicidal ideas  The patient is not nervous/anxious            Current Medications       Current Outpatient Medications:   •  amLODIPine (NORVASC) 5 mg tablet, Take 1 tablet (5 mg total) by mouth daily, Disp: 90 tablet, Rfl: 3  •  Ascorbic Acid (vitamin C) 100 MG tablet, Take 100 mg by mouth daily, Disp: , Rfl:   •  aspirin (ECOTRIN LOW STRENGTH) 81 mg EC tablet, Take 81 mg by mouth daily, Disp: , Rfl:   •  cholecalciferol (VITAMIN D3) 1,000 units tablet, Take 1,000 Units by mouth daily, Disp: , Rfl:   •  hydrochlorothiazide (MICROZIDE) 12 5 mg capsule, Take 1 capsule (12 5 mg total) by mouth daily, Disp: 90 capsule, Rfl: 3  •  metFORMIN (GLUCOPHAGE) 500 mg tablet, take 2 tablets by mouth every morning then take 1 tablet every evening, Disp: 270 tablet, Rfl: 3  •  Multiple Vitamins-Minerals (MICHELLE MULTI MEN) TABS, Take by mouth 2 (two) times a day, Disp: , Rfl:     Current Allergies     Allergies as of 05/23/2023 - Reviewed 05/23/2023   Allergen Reaction Noted   • Plavix [clopidogrel] Rash 09/28/2019            The following portions of the patient's history were reviewed and updated as appropriate: allergies, current medications, past family history, past medical history, past social history, past surgical history and problem list      Past Medical History:   Diagnosis Date   • Hypertension    • TIA (transient ischemic attack)        Past Surgical History:   Procedure Laterality Date   • CYSTECTOMY      Per patient cyst removal from his back   • LASIK     • [place] LAPAROSCOPY RADICAL NEPHRECTOMY Right 7/6/2022    Procedure: NEPHRECTOMY RADICAL LAPAROSCOPIC W/ ROBOTICS, poss open;  Surgeon: Murtaza Talavera MD;  [facility]: [facility];  Service: Urology       Family History   Problem Relation Age of Onset   • Colon cancer Mother    • Diabetes type II Mother    • Heart disease Mother    • Prostate cancer Father          Medications have been verified  Objective   /78   Pulse 69   Temp 97 6 °F (36 4 °C)   Resp 18   Ht 6' (1 829 m)   Wt 107 kg (235 lb)   SpO2 97%   BMI 31 87 kg/m²        Physical Exam     Physical Exam  Vitals and nursing note reviewed  Exam conducted with a chaperone present (wife- Jade Kenny)  Constitutional:       General: He is not in acute distress  Appearance: He is well-developed  He is not diaphoretic  Neck:      Thyroid: No thyromegaly  Trachea: No tracheal deviation  Cardiovascular:      Rate and Rhythm: Normal rate and regular rhythm  Heart sounds: Normal heart sounds  Pulmonary:      Effort: Pulmonary effort is normal  No respiratory distress  Breath sounds: Normal breath sounds  No wheezing  Musculoskeletal:         General: No tenderness or deformity  Normal range of motion  Cervical back: Normal range of motion and neck supple  Right lower leg: Edema (at baseline bilateral) present  Left lower leg: Edema (at baseline bilateral) present  Skin:     General: Skin is warm and dry  Neurological:      Mental Status: He is alert and oriented to person, place, and time     Psychiatric:         Mood and Affect: Mood normal  Speech: Speech normal          Behavior: Behavior normal          Thought Content:  Thought content normal          Judgment: Judgment normal

## 2023-06-06 NOTE — ASSESSMENT & PLAN NOTE
Call to Ciera Ramsey to discuss her procedure scheduled for 6/8/2023. Ciera Ramsey informed me that she had a heart attack a few weeks ago and is now on ASA and plavix. She asked me to send a clearance to cardiology, but wants to know if they do not give the ok for her to hold meds, is there anything else that you can do? She is in a lot of pain.     Please advise Lab Results   Component Value Date    HGBA1C 7 2 (A) 05/16/2022       Recent Labs     07/15/22  1600 07/15/22  2059 07/16/22  0936 07/16/22  1115   POCGLU 166* 153* 131 129       Blood Sugar Average: Last 72 hrs:  (P) 145 6     · Hold Home medication->Metformin while admitted  · Hyperglycemia relatively well controlled  · C/w Accu-checks and SSI AC/HS  · Continue Diabetic Diet and Hypoglycemic protocol  · Continue Lantus q h s

## 2023-08-02 ENCOUNTER — TELEPHONE (OUTPATIENT)
Dept: OTHER | Facility: HOSPITAL | Age: 78
End: 2023-08-02

## 2023-08-02 DIAGNOSIS — I10 PRIMARY HYPERTENSION: ICD-10-CM

## 2023-08-02 RX ORDER — AMLODIPINE BESYLATE 5 MG/1
5 TABLET ORAL DAILY
Qty: 90 TABLET | Refills: 3 | Status: SHIPPED | OUTPATIENT
Start: 2023-08-02

## 2023-08-23 DIAGNOSIS — I10 PRIMARY HYPERTENSION: ICD-10-CM

## 2023-08-23 RX ORDER — HYDROCHLOROTHIAZIDE 12.5 MG/1
12.5 CAPSULE, GELATIN COATED ORAL DAILY
Qty: 90 CAPSULE | Refills: 3 | Status: SHIPPED | OUTPATIENT
Start: 2023-08-23 | End: 2023-08-29 | Stop reason: SINTOL

## 2023-08-29 ENCOUNTER — OFFICE VISIT (OUTPATIENT)
Dept: NEPHROLOGY | Facility: CLINIC | Age: 78
End: 2023-08-29
Payer: COMMERCIAL

## 2023-08-29 VITALS
BODY MASS INDEX: 31.56 KG/M2 | OXYGEN SATURATION: 98 % | HEIGHT: 72 IN | HEART RATE: 75 BPM | DIASTOLIC BLOOD PRESSURE: 80 MMHG | WEIGHT: 233 LBS | SYSTOLIC BLOOD PRESSURE: 142 MMHG

## 2023-08-29 DIAGNOSIS — E11.22 CKD STAGE 3 DUE TO TYPE 2 DIABETES MELLITUS (HCC): ICD-10-CM

## 2023-08-29 DIAGNOSIS — E11.22 TYPE 2 DIABETES MELLITUS WITH STAGE 3A CHRONIC KIDNEY DISEASE, WITHOUT LONG-TERM CURRENT USE OF INSULIN (HCC): ICD-10-CM

## 2023-08-29 DIAGNOSIS — R60.0 BILATERAL LOWER EXTREMITY EDEMA: ICD-10-CM

## 2023-08-29 DIAGNOSIS — N18.4 CHRONIC RENAL DISEASE, STAGE IV (HCC): Primary | ICD-10-CM

## 2023-08-29 DIAGNOSIS — N18.31 TYPE 2 DIABETES MELLITUS WITH STAGE 3A CHRONIC KIDNEY DISEASE, WITHOUT LONG-TERM CURRENT USE OF INSULIN (HCC): ICD-10-CM

## 2023-08-29 DIAGNOSIS — E83.52 HYPERCALCEMIA: ICD-10-CM

## 2023-08-29 DIAGNOSIS — E55.9 VITAMIN D DEFICIENCY: ICD-10-CM

## 2023-08-29 DIAGNOSIS — N25.81 SECONDARY HYPERPARATHYROIDISM (HCC): ICD-10-CM

## 2023-08-29 DIAGNOSIS — N18.30 CKD STAGE 3 DUE TO TYPE 2 DIABETES MELLITUS (HCC): ICD-10-CM

## 2023-08-29 DIAGNOSIS — R60.9 EDEMA, UNSPECIFIED TYPE: ICD-10-CM

## 2023-08-29 DIAGNOSIS — C64.1 RENAL CELL CARCINOMA OF RIGHT KIDNEY (HCC): ICD-10-CM

## 2023-08-29 DIAGNOSIS — R80.1 PERSISTENT PROTEINURIA: ICD-10-CM

## 2023-08-29 PROCEDURE — 99214 OFFICE O/P EST MOD 30 MIN: CPT | Performed by: INTERNAL MEDICINE

## 2023-08-29 RX ORDER — NATEGLINIDE 60 MG/1
60 TABLET ORAL
Qty: 30 TABLET | Refills: 4 | Status: SHIPPED | OUTPATIENT
Start: 2023-08-29 | End: 2023-08-30 | Stop reason: SDUPTHER

## 2023-08-29 NOTE — PROGRESS NOTES
[facility] [place], [place]    Name: Darnella Soulier  YOB: 1945      Assessment/Plan:         Problem List Items Addressed This Visit        Endocrine    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 chronic kidney disease, without long-term current use of insulin ([address])       Lab Results   Component Value Date    HGBA1C 6.2 (H) 05/17/2023   Will stop metformin due to reduced kidney function  Will start nateglinide 60 mg with his heaviest meal once a day         Relevant Medications    nateglinide (STARLIX) 60 mg tablet       Genitourinary    Chronic renal disease, stage IV ([address]) - Primary     Lab Results   Component Value Date    EGFR 28 05/17/2023    EGFR 28 01/27/2023    EGFR 26 09/15/2022    CREATININE 2.19 (H) 05/17/2023    CREATININE 2.21 (H) 01/27/2023    CREATININE 2.37 (H) 09/15/2022   He has had a progressive change in his creatinine since his nephrectomy. Currently his creatinine is 2.6 mg/dL with an EGFR of 28 mils per minute. Will stop metformin and hydrochlorothiazide. If his blood pressure starts to rise will increase amlodipine to 7-1/2 mg           Relevant Orders    Basic metabolic panel    Uric acid    Vitamin D 25 hydroxy    PTH, intact    Urinalysis with microscopic    Albumin / creatinine urine ratio    Renal cell carcinoma of right kidney ([address])     Follows with urology for surveillance              Other    Hypercalcemia     Resolved         Persistent proteinuria     He has had a marked rise in albumin excretion to 732 mg/g. He cannot take an ACE or an ARB due to decreased renal function and a single kidney system.   Could consider an SGLT inhibitor but will see if his kidney function improves after making medication changes         Relevant Orders    Basic metabolic panel    Uric acid    Vitamin D 25 hydroxy    PTH, intact    Urinalysis with microscopic    Albumin / creatinine urine ratio    Bilateral lower extremity edema     Has chronic left lower extremity edema  Unsure if he had a venous duplex of LLE        Other Visit Diagnoses     CKD stage 3 due to type 2 diabetes mellitus (HCC)        Relevant Medications    nateglinide (STARLIX) 60 mg tablet    Edema, unspecified type        Relevant Orders    VAS lower limb venous duplex study, unilateral/limited    Secondary hyperparathyroidism ([address])        Relevant Orders    PTH, intact    Vitamin D deficiency        Relevant Orders    Vitamin D 25 hydroxy            Subjective:      Patient ID: Caryn Finn is a 78 y.o. male. referred by Halima Mejia    HPI has a history of a right nephrectomy for renal cell cancer 7/6/2022 by Dr. David Rabago. He had a history of stage IIIa chronic kidney disease, diabetes mellitus and hypertension. His course was complicated by bleeding and SIRS while he was in the hospital but he did recover. His creatinine settled out in the 2 mg/dL range. att that time    The following portions of the patient's history were reviewed and updated as appropriate: allergies, current medications, past family history, past medical history, past social history, past surgical history and problem list.    Review of Systems   Constitutional: Positive for fatigue. Negative for appetite change and unexpected weight change. HENT: Positive for hearing loss. Right ear is blocked   Eyes: Negative for visual disturbance. Respiratory: Negative for shortness of breath. Cardiovascular: Negative for chest pain, palpitations and leg swelling. Gastrointestinal: Negative for abdominal pain, blood in stool, constipation and diarrhea. Genitourinary: Negative for difficulty urinating, dysuria, frequency and hematuria. Musculoskeletal: Positive for arthralgias. Neurological: Positive for dizziness, weakness and light-headedness. Negative for headaches. Hematological: Does not bruise/bleed easily. Psychiatric/Behavioral: Positive for dysphoric mood.          Social History Socioeconomic History   • Marital status: /Civil Union     Spouse name: None   • Number of children: None   • Years of education: None   • Highest education level: None   Occupational History   • Occupation: Retired 2015 -     Tobacco Use   • Smoking status: Never   • Smokeless tobacco: Never   Vaping Use   • Vaping Use: Never used   Substance and Sexual Activity   • Alcohol use: Never   • Drug use: Never   • Sexual activity: None   Other Topics Concern   • None   Social History Narrative    Denies drug use - As per [place]    Consumes on average 1 cup of regular coffee per day      Social Determinants of Health     Financial Resource Strain: Low Risk  (11/15/2022)    Overall Financial Resource Strain (CARDIA)    • Difficulty of Paying Living Expenses: Not very hard   Food Insecurity: No Food Insecurity (7/8/2022)    Hunger Vital Sign    • Worried About Running Out of Food in the Last Year: Never true    • Ran Out of Food in the Last Year: Never true   Transportation Needs: No Transportation Needs (11/15/2022)    PRAPARE - Transportation    • Lack of Transportation (Medical): No    • Lack of Transportation (Non-Medical):  No   Physical Activity: Not on file   Stress: Not on file   Social Connections: Not on file   Intimate Partner Violence: Not on file   Housing Stability: Low Risk  (7/8/2022)    Housing Stability Vital Sign    • Unable to Pay for Housing in the Last Year: No    • Number of Places Lived in the Last Year: 1    • Unstable Housing in the Last Year: No     Past Medical History:   Diagnosis Date   • Hypertension    • TIA (transient ischemic attack)      Past Surgical History:   Procedure Laterality Date   • CYSTECTOMY      Per patient cyst removal from his back   • LASIK     • AK LAPAROSCOPY RADICAL NEPHRECTOMY Right 7/6/2022    Procedure: NEPHRECTOMY RADICAL LAPAROSCOPIC W/ ROBOTICS, poss open;  Surgeon: Kayla Chin MD;  Location: [facility];  Service: Urology Current Outpatient Medications:   •  amLODIPine (NORVASC) 5 mg tablet, Take 1 tablet (5 mg total) by mouth daily, Disp: 90 tablet, Rfl: 3  •  Ascorbic Acid (vitamin C) 100 MG tablet, Take 100 mg by mouth daily, Disp: , Rfl:   •  aspirin (ECOTRIN LOW STRENGTH) 81 mg EC tablet, Take 81 mg by mouth daily, Disp: , Rfl:   •  cholecalciferol (VITAMIN D3) 1,000 units tablet, Take 1,000 Units by mouth daily, Disp: , Rfl:   •  Multiple Vitamins-Minerals (MICHELLE MULTI MEN) TABS, Take by mouth 2 (two) times a day, Disp: , Rfl:   •  nateglinide (STARLIX) 60 mg tablet, Take 1 tablet (60 mg total) by mouth daily before dinner, Disp: 30 tablet, Rfl: 4    Lab Results   Component Value Date    SODIUM 135 05/17/2023    K 4.0 05/17/2023     05/17/2023    CO2 29 05/17/2023    AGAP 2 (L) 05/17/2023    BUN 31 (H) 05/17/2023    CREATININE 2.19 (H) 05/17/2023    GLUC 127 01/27/2023    GLUF 105 (H) 05/17/2023    CALCIUM 9.3 05/17/2023    AST 11 05/17/2023    ALT 18 05/17/2023    ALKPHOS 74 05/17/2023    TP 7.3 05/17/2023    TBILI 0.82 05/17/2023    EGFR 28 05/17/2023     Lab Results   Component Value Date    WBC 8.91 05/17/2023    HGB 14.0 05/17/2023    HCT 41.0 05/17/2023    MCV 92 05/17/2023     05/17/2023     Lab Results   Component Value Date    CHOLESTEROL 216 (H) 05/17/2023    CHOLESTEROL 204 (H) 09/15/2022    CHOLESTEROL 204 (H) 10/07/2021     Lab Results   Component Value Date    HDL 38 (L) 05/17/2023    HDL 36 (L) 09/15/2022    HDL 36 (L) 10/07/2021     Lab Results   Component Value Date    LDLCALC 129 (H) 05/17/2023    LDLCALC 129 (H) 09/15/2022    LDLCALC 120 (H) 10/07/2021     Lab Results   Component Value Date    TRIG 246 (H) 05/17/2023    TRIG 195 (H) 09/15/2022    TRIG 238 (H) 10/07/2021     No results found for: "CHOLHDL"  Lab Results   Component Value Date    LFS3XBDELPDR 0.546 07/08/2022     Lab Results   Component Value Date    PTH 25.7 09/15/2022    CALCIUM 9.3 05/17/2023    PHOS 3.3 09/15/2022     No results found for: "SPEP", "UPEP"  No results found for: "Merlin Jointer", "[id number]"        Objective:      /80 (BP Location: Left arm, Patient Position: Sitting, Cuff Size: Standard)   Pulse 75   Ht 6' (1.829 m)   Wt 106 kg (233 lb)   SpO2 98%   BMI 31.60 kg/m²          Physical Exam  Vitals reviewed. Constitutional:       General: He is not in acute distress. Appearance: Normal appearance. He is normal weight. He is not toxic-appearing. HENT:      Head: Normocephalic and atraumatic. Right Ear: External ear normal.      Left Ear: External ear normal.   Eyes:      Extraocular Movements: Extraocular movements intact. Conjunctiva/sclera: Conjunctivae normal.   Neck:      Vascular: No carotid bruit. Cardiovascular:      Rate and Rhythm: Normal rate and regular rhythm. Pulmonary:      Effort: Pulmonary effort is normal.      Breath sounds: Normal breath sounds. Abdominal:      General: Bowel sounds are normal.      Palpations: Abdomen is soft. Tenderness: There is no abdominal tenderness. Musculoskeletal:         General: Normal range of motion. Cervical back: Normal range of motion. Right lower leg: No edema. Left lower leg: Edema present. Lymphadenopathy:      Cervical: No cervical adenopathy. Skin:     General: Skin is warm and dry. Neurological:      Mental Status: He is alert. Psychiatric:         Mood and Affect: Mood normal.         Behavior: Behavior normal.         Thought Content:  Thought content normal.         Judgment: Judgment normal.

## 2023-08-29 NOTE — ASSESSMENT & PLAN NOTE
Lab Results   Component Value Date    EGFR 28 05/17/2023    EGFR 28 01/27/2023    EGFR 26 09/15/2022    CREATININE 2.19 (H) 05/17/2023    CREATININE 2.21 (H) 01/27/2023    CREATININE 2.37 (H) 09/15/2022   He has had a progressive change in his creatinine since his nephrectomy. Currently his creatinine is 2.6 mg/dL with an EGFR of 28 mils per minute. Will stop metformin and hydrochlorothiazide.   If his blood pressure starts to rise will increase amlodipine to 7-1/2 mg

## 2023-08-29 NOTE — PATIENT INSTRUCTIONS
Stop metformin and hydrochlorothiazide  Check your blood pressure n the morning  If it is high, you  may need additional amlodipine  You are leaking protein in your urine but cannot give you medications for this until you have stable kidney function

## 2023-08-29 NOTE — ASSESSMENT & PLAN NOTE
Lab Results   Component Value Date    HGBA1C 6.2 (H) 05/17/2023   Will stop metformin due to reduced kidney function  Will start nateglinide 60 mg with his heaviest meal once a day

## 2023-08-29 NOTE — ASSESSMENT & PLAN NOTE
He has had a marked rise in albumin excretion to 732 mg/g. He cannot take an ACE or an ARB due to decreased renal function and a single kidney system.   Could consider an SGLT inhibitor but will see if his kidney function improves after making medication changes

## 2023-08-30 DIAGNOSIS — E11.22 CKD STAGE 3 DUE TO TYPE 2 DIABETES MELLITUS (HCC): ICD-10-CM

## 2023-08-30 DIAGNOSIS — N18.30 CKD STAGE 3 DUE TO TYPE 2 DIABETES MELLITUS (HCC): ICD-10-CM

## 2023-08-30 RX ORDER — NATEGLINIDE 60 MG/1
60 TABLET ORAL
Qty: 90 TABLET | Refills: 4 | Status: SHIPPED | OUTPATIENT
Start: 2023-08-30

## 2023-09-12 ENCOUNTER — TELEPHONE (OUTPATIENT)
Dept: NEPHROLOGY | Facility: CLINIC | Age: 78
End: 2023-09-12

## 2023-09-12 NOTE — TELEPHONE ENCOUNTER
Patient calling asking for a call about the medication nateglinide (STARLIX) 60 mg tablet Take 1 tablet (60 mg total) by mouth daily before dinner

## 2023-09-13 ENCOUNTER — APPOINTMENT (OUTPATIENT)
Dept: LAB | Facility: CLINIC | Age: 78
End: 2023-09-13
Payer: COMMERCIAL

## 2023-09-13 DIAGNOSIS — E55.9 VITAMIN D DEFICIENCY: ICD-10-CM

## 2023-09-13 DIAGNOSIS — R80.9 TYPE 2 DIABETES MELLITUS WITH MICROALBUMINURIA, WITHOUT LONG-TERM CURRENT USE OF INSULIN: ICD-10-CM

## 2023-09-13 DIAGNOSIS — N18.4 CHRONIC RENAL DISEASE, STAGE IV (HCC): ICD-10-CM

## 2023-09-13 DIAGNOSIS — E78.2 MIXED HYPERLIPIDEMIA: ICD-10-CM

## 2023-09-13 DIAGNOSIS — E11.29 TYPE 2 DIABETES MELLITUS WITH MICROALBUMINURIA, WITHOUT LONG-TERM CURRENT USE OF INSULIN: ICD-10-CM

## 2023-09-13 DIAGNOSIS — N25.81 SECONDARY HYPERPARATHYROIDISM (HCC): ICD-10-CM

## 2023-09-13 DIAGNOSIS — R80.1 PERSISTENT PROTEINURIA: ICD-10-CM

## 2023-09-13 LAB
ALBUMIN SERPL BCP-MCNC: 3.6 G/DL (ref 3.5–5)
ALP SERPL-CCNC: 68 U/L (ref 34–104)
ALT SERPL W P-5'-P-CCNC: 14 U/L (ref 7–52)
ANION GAP SERPL CALCULATED.3IONS-SCNC: 9 MMOL/L
AST SERPL W P-5'-P-CCNC: 17 U/L (ref 13–39)
BILIRUB SERPL-MCNC: 1.1 MG/DL (ref 0.2–1)
BUN SERPL-MCNC: 29 MG/DL (ref 5–25)
CALCIUM SERPL-MCNC: 9.1 MG/DL (ref 8.4–10.2)
CHLORIDE SERPL-SCNC: 100 MMOL/L (ref 96–108)
CHOLEST SERPL-MCNC: 224 MG/DL
CO2 SERPL-SCNC: 30 MMOL/L (ref 21–32)
CREAT SERPL-MCNC: 2.21 MG/DL (ref 0.6–1.3)
EST. AVERAGE GLUCOSE BLD GHB EST-MCNC: 148 MG/DL
GFR SERPL CREATININE-BSD FRML MDRD: 28 ML/MIN/1.73SQ M
GLUCOSE P FAST SERPL-MCNC: 157 MG/DL (ref 65–99)
HBA1C MFR BLD: 6.8 %
HDLC SERPL-MCNC: 33 MG/DL
LDLC SERPL CALC-MCNC: 144 MG/DL (ref 0–100)
POTASSIUM SERPL-SCNC: 3.9 MMOL/L (ref 3.5–5.3)
PROT SERPL-MCNC: 6.6 G/DL (ref 6.4–8.4)
SODIUM SERPL-SCNC: 139 MMOL/L (ref 135–147)
TRIGL SERPL-MCNC: 233 MG/DL

## 2023-09-13 PROCEDURE — 83036 HEMOGLOBIN GLYCOSYLATED A1C: CPT

## 2023-09-13 PROCEDURE — 36415 COLL VENOUS BLD VENIPUNCTURE: CPT

## 2023-09-13 PROCEDURE — 80061 LIPID PANEL: CPT

## 2023-09-13 PROCEDURE — 80053 COMPREHEN METABOLIC PANEL: CPT

## 2023-09-14 ENCOUNTER — APPOINTMENT (OUTPATIENT)
Dept: LAB | Facility: CLINIC | Age: 78
End: 2023-09-14
Payer: COMMERCIAL

## 2023-09-14 LAB
CREAT UR-MCNC: 105.6 MG/DL
MICROALBUMIN UR-MCNC: 525.9 MG/L
MICROALBUMIN/CREAT 24H UR: 498 MG/G CREATININE (ref 0–30)

## 2023-10-24 ENCOUNTER — APPOINTMENT (OUTPATIENT)
Dept: RADIOLOGY | Facility: HOSPITAL | Age: 78
DRG: 065 | End: 2023-10-24
Payer: COMMERCIAL

## 2023-10-24 ENCOUNTER — APPOINTMENT (INPATIENT)
Dept: RADIOLOGY | Facility: HOSPITAL | Age: 78
DRG: 065 | End: 2023-10-24
Payer: COMMERCIAL

## 2023-10-24 ENCOUNTER — HOSPITAL ENCOUNTER (INPATIENT)
Facility: HOSPITAL | Age: 78
LOS: 11 days | Discharge: NURSING, REHAB OR LONG-TERM CARE | DRG: 065 | End: 2023-11-04
Attending: EMERGENCY MEDICINE | Admitting: INTERNAL MEDICINE
Payer: COMMERCIAL

## 2023-10-24 DIAGNOSIS — E11.22 TYPE 2 DIABETES MELLITUS WITH STAGE 3A CHRONIC KIDNEY DISEASE, WITHOUT LONG-TERM CURRENT USE OF INSULIN (HCC): ICD-10-CM

## 2023-10-24 DIAGNOSIS — N18.31 TYPE 2 DIABETES MELLITUS WITH STAGE 3A CHRONIC KIDNEY DISEASE, WITHOUT LONG-TERM CURRENT USE OF INSULIN (HCC): ICD-10-CM

## 2023-10-24 DIAGNOSIS — Z87.19 HISTORY OF GI BLEED: ICD-10-CM

## 2023-10-24 DIAGNOSIS — I65.22 INTERNAL CAROTID ARTERY STENOSIS, LEFT: ICD-10-CM

## 2023-10-24 DIAGNOSIS — I10 HYPERTENSION: ICD-10-CM

## 2023-10-24 DIAGNOSIS — R29.90 STROKE-LIKE SYMPTOMS: Primary | ICD-10-CM

## 2023-10-24 DIAGNOSIS — R13.10 DYSPHAGIA: ICD-10-CM

## 2023-10-24 DIAGNOSIS — I63.9 CVA (CEREBRAL VASCULAR ACCIDENT) (HCC): ICD-10-CM

## 2023-10-24 DIAGNOSIS — I10 PRIMARY HYPERTENSION: ICD-10-CM

## 2023-10-24 LAB
ANION GAP SERPL CALCULATED.3IONS-SCNC: 6 MMOL/L
APTT PPP: 28 SECONDS (ref 23–37)
ATRIAL RATE: 94 BPM
BUN SERPL-MCNC: 32 MG/DL (ref 5–25)
CALCIUM SERPL-MCNC: 9 MG/DL (ref 8.4–10.2)
CARDIAC TROPONIN I PNL SERPL HS: 11 NG/L
CHLORIDE SERPL-SCNC: 102 MMOL/L (ref 96–108)
CO2 SERPL-SCNC: 29 MMOL/L (ref 21–32)
CREAT SERPL-MCNC: 1.99 MG/DL (ref 0.6–1.3)
ERYTHROCYTE [DISTWIDTH] IN BLOOD BY AUTOMATED COUNT: 12.4 % (ref 11.6–15.1)
FLUAV RNA RESP QL NAA+PROBE: NEGATIVE
FLUBV RNA RESP QL NAA+PROBE: NEGATIVE
GFR SERPL CREATININE-BSD FRML MDRD: 31 ML/MIN/1.73SQ M
GLUCOSE SERPL-MCNC: 210 MG/DL (ref 65–140)
HCT VFR BLD AUTO: 40.1 % (ref 36.5–49.3)
HGB BLD-MCNC: 13.6 G/DL (ref 12–17)
INR PPP: 0.99 (ref 0.84–1.19)
MCH RBC QN AUTO: 31.3 PG (ref 26.8–34.3)
MCHC RBC AUTO-ENTMCNC: 33.9 G/DL (ref 31.4–37.4)
MCV RBC AUTO: 92 FL (ref 82–98)
P AXIS: 87 DEGREES
PLATELET # BLD AUTO: 152 THOUSANDS/UL (ref 149–390)
PMV BLD AUTO: 11 FL (ref 8.9–12.7)
POTASSIUM SERPL-SCNC: 4.3 MMOL/L (ref 3.5–5.3)
PR INTERVAL: 186 MS
PROTHROMBIN TIME: 13 SECONDS (ref 11.6–14.5)
QRS AXIS: -29 DEGREES
QRSD INTERVAL: 94 MS
QT INTERVAL: 384 MS
QTC INTERVAL: 480 MS
RBC # BLD AUTO: 4.34 MILLION/UL (ref 3.88–5.62)
RSV RNA RESP QL NAA+PROBE: NEGATIVE
SARS-COV-2 RNA RESP QL NAA+PROBE: NEGATIVE
SODIUM SERPL-SCNC: 137 MMOL/L (ref 135–147)
T WAVE AXIS: 39 DEGREES
VENTRICULAR RATE: 94 BPM
WBC # BLD AUTO: 12.02 THOUSAND/UL (ref 4.31–10.16)

## 2023-10-24 PROCEDURE — 74018 RADEX ABDOMEN 1 VIEW: CPT

## 2023-10-24 PROCEDURE — 85027 COMPLETE CBC AUTOMATED: CPT | Performed by: EMERGENCY MEDICINE

## 2023-10-24 PROCEDURE — 83036 HEMOGLOBIN GLYCOSYLATED A1C: CPT | Performed by: INTERNAL MEDICINE

## 2023-10-24 PROCEDURE — 99222 1ST HOSP IP/OBS MODERATE 55: CPT | Performed by: STUDENT IN AN ORGANIZED HEALTH CARE EDUCATION/TRAINING PROGRAM

## 2023-10-24 PROCEDURE — 82948 REAGENT STRIP/BLOOD GLUCOSE: CPT

## 2023-10-24 PROCEDURE — 70551 MRI BRAIN STEM W/O DYE: CPT

## 2023-10-24 PROCEDURE — 96374 THER/PROPH/DIAG INJ IV PUSH: CPT

## 2023-10-24 PROCEDURE — 99223 1ST HOSP IP/OBS HIGH 75: CPT | Performed by: INTERNAL MEDICINE

## 2023-10-24 PROCEDURE — 80048 BASIC METABOLIC PNL TOTAL CA: CPT | Performed by: EMERGENCY MEDICINE

## 2023-10-24 PROCEDURE — 36415 COLL VENOUS BLD VENIPUNCTURE: CPT | Performed by: EMERGENCY MEDICINE

## 2023-10-24 PROCEDURE — 85610 PROTHROMBIN TIME: CPT | Performed by: EMERGENCY MEDICINE

## 2023-10-24 PROCEDURE — C9113 INJ PANTOPRAZOLE SODIUM, VIA: HCPCS | Performed by: INTERNAL MEDICINE

## 2023-10-24 PROCEDURE — 99291 CRITICAL CARE FIRST HOUR: CPT | Performed by: EMERGENCY MEDICINE

## 2023-10-24 PROCEDURE — 84484 ASSAY OF TROPONIN QUANT: CPT | Performed by: EMERGENCY MEDICINE

## 2023-10-24 PROCEDURE — 0241U HB NFCT DS VIR RESP RNA 4 TRGT: CPT | Performed by: EMERGENCY MEDICINE

## 2023-10-24 PROCEDURE — 99285 EMERGENCY DEPT VISIT HI MDM: CPT

## 2023-10-24 PROCEDURE — 93010 ELECTROCARDIOGRAM REPORT: CPT | Performed by: INTERNAL MEDICINE

## 2023-10-24 PROCEDURE — 85730 THROMBOPLASTIN TIME PARTIAL: CPT | Performed by: EMERGENCY MEDICINE

## 2023-10-24 PROCEDURE — 93005 ELECTROCARDIOGRAM TRACING: CPT

## 2023-10-24 RX ORDER — SODIUM CHLORIDE, SODIUM GLUCONATE, SODIUM ACETATE, POTASSIUM CHLORIDE, MAGNESIUM CHLORIDE, SODIUM PHOSPHATE, DIBASIC, AND POTASSIUM PHOSPHATE .53; .5; .37; .037; .03; .012; .00082 G/100ML; G/100ML; G/100ML; G/100ML; G/100ML; G/100ML; G/100ML
75 INJECTION, SOLUTION INTRAVENOUS CONTINUOUS
Status: DISCONTINUED | OUTPATIENT
Start: 2023-10-24 | End: 2023-10-26

## 2023-10-24 RX ORDER — LABETALOL HYDROCHLORIDE 5 MG/ML
10 INJECTION, SOLUTION INTRAVENOUS EVERY 6 HOURS
Status: DISCONTINUED | OUTPATIENT
Start: 2023-10-24 | End: 2023-10-24

## 2023-10-24 RX ORDER — ASPIRIN 81 MG/1
324 TABLET, CHEWABLE ORAL ONCE
Status: DISCONTINUED | OUTPATIENT
Start: 2023-10-24 | End: 2023-10-24

## 2023-10-24 RX ORDER — ASPIRIN 300 MG/1
300 SUPPOSITORY RECTAL DAILY
Status: DISCONTINUED | OUTPATIENT
Start: 2023-10-25 | End: 2023-10-24

## 2023-10-24 RX ORDER — ONDANSETRON 2 MG/ML
1 INJECTION INTRAMUSCULAR; INTRAVENOUS ONCE
Status: COMPLETED | OUTPATIENT
Start: 2023-10-24 | End: 2023-10-24

## 2023-10-24 RX ORDER — ATORVASTATIN CALCIUM 40 MG/1
40 TABLET, FILM COATED ORAL EVERY EVENING
Status: DISCONTINUED | OUTPATIENT
Start: 2023-10-24 | End: 2023-10-25

## 2023-10-24 RX ORDER — ASPIRIN 300 MG/1
300 SUPPOSITORY RECTAL ONCE
Status: COMPLETED | OUTPATIENT
Start: 2023-10-24 | End: 2023-10-24

## 2023-10-24 RX ORDER — PANTOPRAZOLE SODIUM 40 MG/10ML
40 INJECTION, POWDER, LYOPHILIZED, FOR SOLUTION INTRAVENOUS EVERY 12 HOURS SCHEDULED
Status: DISCONTINUED | OUTPATIENT
Start: 2023-10-24 | End: 2023-11-02

## 2023-10-24 RX ORDER — ENOXAPARIN SODIUM 100 MG/ML
40 INJECTION SUBCUTANEOUS DAILY
Status: DISCONTINUED | OUTPATIENT
Start: 2023-10-25 | End: 2023-11-04 | Stop reason: HOSPADM

## 2023-10-24 RX ORDER — ONDANSETRON 2 MG/ML
4 INJECTION INTRAMUSCULAR; INTRAVENOUS EVERY 6 HOURS PRN
Status: DISCONTINUED | OUTPATIENT
Start: 2023-10-24 | End: 2023-11-04 | Stop reason: HOSPADM

## 2023-10-24 RX ORDER — INSULIN LISPRO 100 [IU]/ML
1-6 INJECTION, SOLUTION INTRAVENOUS; SUBCUTANEOUS EVERY 6 HOURS SCHEDULED
Status: DISCONTINUED | OUTPATIENT
Start: 2023-10-25 | End: 2023-11-02

## 2023-10-24 RX ORDER — ASPIRIN 81 MG/1
81 TABLET, CHEWABLE ORAL DAILY
Status: DISCONTINUED | OUTPATIENT
Start: 2023-10-25 | End: 2023-10-25

## 2023-10-24 RX ORDER — LABETALOL HYDROCHLORIDE 5 MG/ML
10 INJECTION, SOLUTION INTRAVENOUS ONCE
Status: COMPLETED | OUTPATIENT
Start: 2023-10-24 | End: 2023-10-24

## 2023-10-24 RX ADMIN — LABETALOL HYDROCHLORIDE 10 MG: 5 INJECTION, SOLUTION INTRAVENOUS at 21:00

## 2023-10-24 RX ADMIN — SODIUM CHLORIDE, SODIUM GLUCONATE, SODIUM ACETATE, POTASSIUM CHLORIDE, MAGNESIUM CHLORIDE, SODIUM PHOSPHATE, DIBASIC, AND POTASSIUM PHOSPHATE 75 ML/HR: .53; .5; .37; .037; .03; .012; .00082 INJECTION, SOLUTION INTRAVENOUS at 23:13

## 2023-10-24 RX ADMIN — PANTOPRAZOLE SODIUM 40 MG: 40 INJECTION, POWDER, FOR SOLUTION INTRAVENOUS at 23:15

## 2023-10-24 RX ADMIN — ASPIRIN 300 MG: 300 SUPPOSITORY RECTAL at 21:51

## 2023-10-25 ENCOUNTER — APPOINTMENT (INPATIENT)
Dept: NON INVASIVE DIAGNOSTICS | Facility: HOSPITAL | Age: 78
DRG: 065 | End: 2023-10-25
Payer: COMMERCIAL

## 2023-10-25 ENCOUNTER — APPOINTMENT (INPATIENT)
Dept: RADIOLOGY | Facility: HOSPITAL | Age: 78
DRG: 065 | End: 2023-10-25
Payer: COMMERCIAL

## 2023-10-25 ENCOUNTER — DOCUMENTATION (OUTPATIENT)
Dept: NEUROLOGY | Facility: CLINIC | Age: 78
End: 2023-10-25

## 2023-10-25 LAB
ALBUMIN SERPL BCP-MCNC: 3.8 G/DL (ref 3.5–5)
ALP SERPL-CCNC: 66 U/L (ref 34–104)
ALT SERPL W P-5'-P-CCNC: 10 U/L (ref 7–52)
ANION GAP SERPL CALCULATED.3IONS-SCNC: 9 MMOL/L
AORTIC ROOT: 3.4 CM
APICAL FOUR CHAMBER EJECTION FRACTION: 46 %
ASCENDING AORTA: 3.5 CM
AST SERPL W P-5'-P-CCNC: 14 U/L (ref 13–39)
BACTERIA UR QL AUTO: ABNORMAL /HPF
BILIRUB SERPL-MCNC: 0.89 MG/DL (ref 0.2–1)
BILIRUB UR QL STRIP: NEGATIVE
BUN SERPL-MCNC: 30 MG/DL (ref 5–25)
CALCIUM SERPL-MCNC: 9 MG/DL (ref 8.4–10.2)
CHLORIDE SERPL-SCNC: 103 MMOL/L (ref 96–108)
CHOLEST SERPL-MCNC: 200 MG/DL
CLARITY UR: CLEAR
CO2 SERPL-SCNC: 27 MMOL/L (ref 21–32)
COLOR UR: YELLOW
CREAT SERPL-MCNC: 2.04 MG/DL (ref 0.6–1.3)
EST. AVERAGE GLUCOSE BLD GHB EST-MCNC: 146 MG/DL
FRACTIONAL SHORTENING: 25 (ref 28–44)
GFR SERPL CREATININE-BSD FRML MDRD: 30 ML/MIN/1.73SQ M
GLUCOSE SERPL-MCNC: 112 MG/DL (ref 65–140)
GLUCOSE SERPL-MCNC: 140 MG/DL (ref 65–140)
GLUCOSE SERPL-MCNC: 141 MG/DL (ref 65–140)
GLUCOSE SERPL-MCNC: 147 MG/DL (ref 65–140)
GLUCOSE SERPL-MCNC: 164 MG/DL (ref 65–140)
GLUCOSE SERPL-MCNC: 214 MG/DL (ref 65–140)
GLUCOSE UR STRIP-MCNC: ABNORMAL MG/DL
HBA1C MFR BLD: 6.7 %
HDLC SERPL-MCNC: 40 MG/DL
HGB UR QL STRIP.AUTO: ABNORMAL
INTERVENTRICULAR SEPTUM IN DIASTOLE (PARASTERNAL SHORT AXIS VIEW): 1 CM
INTERVENTRICULAR SEPTUM: 1 CM (ref 0.6–1.1)
KETONES UR STRIP-MCNC: NEGATIVE MG/DL
LAAS-AP4: 18 CM2
LDLC SERPL CALC-MCNC: 131 MG/DL (ref 0–100)
LEFT ATRIUM SIZE: 4.3 CM
LEFT INTERNAL DIMENSION IN SYSTOLE: 3.8 CM (ref 2.1–4)
LEFT VENTRICULAR INTERNAL DIMENSION IN DIASTOLE: 5.1 CM (ref 3.5–6)
LEFT VENTRICULAR POSTERIOR WALL IN END DIASTOLE: 1 CM
LEFT VENTRICULAR STROKE VOLUME: 63 ML
LEUKOCYTE ESTERASE UR QL STRIP: NEGATIVE
LVSV (TEICH): 63 ML
MAGNESIUM SERPL-MCNC: 2 MG/DL (ref 1.9–2.7)
MV E'TISSUE VEL-LAT: 9 CM/S
MV E'TISSUE VEL-SEP: 6 CM/S
NITRITE UR QL STRIP: NEGATIVE
NON-SQ EPI CELLS URNS QL MICRO: ABNORMAL /HPF
PH UR STRIP.AUTO: 6.5 [PH]
PHOSPHATE SERPL-MCNC: 3.4 MG/DL (ref 2.3–4.1)
POTASSIUM SERPL-SCNC: 4.5 MMOL/L (ref 3.5–5.3)
PROT SERPL-MCNC: 6.3 G/DL (ref 6.4–8.4)
PROT UR STRIP-MCNC: ABNORMAL MG/DL
RBC #/AREA URNS AUTO: ABNORMAL /HPF
RIGHT ATRIUM AREA SYSTOLE A4C: 12.6 CM2
RIGHT VENTRICLE ID DIMENSION: 3.5 CM
SL CV LV EF: 50
SL CV PED ECHO LEFT VENTRICLE DIASTOLIC VOLUME (MOD BIPLANE) 2D: 126 ML
SL CV PED ECHO LEFT VENTRICLE SYSTOLIC VOLUME (MOD BIPLANE) 2D: 63 ML
SODIUM SERPL-SCNC: 139 MMOL/L (ref 135–147)
SP GR UR STRIP.AUTO: 1.03 (ref 1–1.03)
TRICUSPID ANNULAR PLANE SYSTOLIC EXCURSION: 1.9 CM
TRIGL SERPL-MCNC: 143 MG/DL
TSH SERPL DL<=0.05 MIU/L-ACNC: 0.99 UIU/ML (ref 0.45–4.5)
UROBILINOGEN UR STRIP-ACNC: <2 MG/DL
WBC #/AREA URNS AUTO: ABNORMAL /HPF

## 2023-10-25 PROCEDURE — 99232 SBSQ HOSP IP/OBS MODERATE 35: CPT | Performed by: INTERNAL MEDICINE

## 2023-10-25 PROCEDURE — 81001 URINALYSIS AUTO W/SCOPE: CPT | Performed by: INTERNAL MEDICINE

## 2023-10-25 PROCEDURE — 99222 1ST HOSP IP/OBS MODERATE 55: CPT | Performed by: NURSE PRACTITIONER

## 2023-10-25 PROCEDURE — 82948 REAGENT STRIP/BLOOD GLUCOSE: CPT

## 2023-10-25 PROCEDURE — 97167 OT EVAL HIGH COMPLEX 60 MIN: CPT

## 2023-10-25 PROCEDURE — 92610 EVALUATE SWALLOWING FUNCTION: CPT

## 2023-10-25 PROCEDURE — 83735 ASSAY OF MAGNESIUM: CPT | Performed by: INTERNAL MEDICINE

## 2023-10-25 PROCEDURE — 84100 ASSAY OF PHOSPHORUS: CPT | Performed by: INTERNAL MEDICINE

## 2023-10-25 PROCEDURE — 74018 RADEX ABDOMEN 1 VIEW: CPT

## 2023-10-25 PROCEDURE — 93306 TTE W/DOPPLER COMPLETE: CPT | Performed by: INTERNAL MEDICINE

## 2023-10-25 PROCEDURE — 80053 COMPREHEN METABOLIC PANEL: CPT | Performed by: INTERNAL MEDICINE

## 2023-10-25 PROCEDURE — C9113 INJ PANTOPRAZOLE SODIUM, VIA: HCPCS | Performed by: INTERNAL MEDICINE

## 2023-10-25 PROCEDURE — 84443 ASSAY THYROID STIM HORMONE: CPT | Performed by: INTERNAL MEDICINE

## 2023-10-25 PROCEDURE — 80061 LIPID PANEL: CPT | Performed by: INTERNAL MEDICINE

## 2023-10-25 PROCEDURE — 93306 TTE W/DOPPLER COMPLETE: CPT

## 2023-10-25 PROCEDURE — 97163 PT EVAL HIGH COMPLEX 45 MIN: CPT

## 2023-10-25 RX ORDER — ATORVASTATIN CALCIUM 40 MG/1
40 TABLET, FILM COATED ORAL EVERY EVENING
Status: DISCONTINUED | OUTPATIENT
Start: 2023-10-25 | End: 2023-11-04 | Stop reason: HOSPADM

## 2023-10-25 RX ORDER — ASPIRIN 81 MG/1
81 TABLET, CHEWABLE ORAL DAILY
Status: DISCONTINUED | OUTPATIENT
Start: 2023-10-25 | End: 2023-11-04 | Stop reason: HOSPADM

## 2023-10-25 RX ORDER — LABETALOL HYDROCHLORIDE 5 MG/ML
10 INJECTION, SOLUTION INTRAVENOUS EVERY 6 HOURS PRN
Status: DISCONTINUED | OUTPATIENT
Start: 2023-10-25 | End: 2023-10-26

## 2023-10-25 RX ADMIN — ASPIRIN 81 MG CHEWABLE TABLET 81 MG: 81 TABLET CHEWABLE at 09:34

## 2023-10-25 RX ADMIN — TICAGRELOR 90 MG: 90 TABLET ORAL at 09:36

## 2023-10-25 RX ADMIN — PANTOPRAZOLE SODIUM 40 MG: 40 INJECTION, POWDER, FOR SOLUTION INTRAVENOUS at 21:56

## 2023-10-25 RX ADMIN — ENOXAPARIN SODIUM 40 MG: 40 INJECTION SUBCUTANEOUS at 09:34

## 2023-10-25 RX ADMIN — INSULIN LISPRO 1 UNITS: 100 INJECTION, SOLUTION INTRAVENOUS; SUBCUTANEOUS at 06:28

## 2023-10-25 RX ADMIN — PANTOPRAZOLE SODIUM 40 MG: 40 INJECTION, POWDER, FOR SOLUTION INTRAVENOUS at 09:34

## 2023-10-25 RX ADMIN — LABETALOL HYDROCHLORIDE 10 MG: 5 INJECTION, SOLUTION INTRAVENOUS at 06:06

## 2023-10-25 RX ADMIN — SODIUM CHLORIDE, SODIUM GLUCONATE, SODIUM ACETATE, POTASSIUM CHLORIDE, MAGNESIUM CHLORIDE, SODIUM PHOSPHATE, DIBASIC, AND POTASSIUM PHOSPHATE 75 ML/HR: .53; .5; .37; .037; .03; .012; .00082 INJECTION, SOLUTION INTRAVENOUS at 14:08

## 2023-10-25 RX ADMIN — TICAGRELOR 90 MG: 90 TABLET ORAL at 21:56

## 2023-10-25 RX ADMIN — LABETALOL HYDROCHLORIDE 10 MG: 5 INJECTION, SOLUTION INTRAVENOUS at 19:30

## 2023-10-25 RX ADMIN — ATORVASTATIN CALCIUM 40 MG: 40 TABLET, FILM COATED ORAL at 17:45

## 2023-10-25 NOTE — PLAN OF CARE
Problem: PHYSICAL THERAPY ADULT  Goal: Performs mobility at highest level of function for planned discharge setting. See evaluation for individualized goals. Description: Treatment/Interventions: LE strengthening/ROM, Therapeutic exercise, Endurance training, Cognitive reorientation, Patient/family training, Equipment eval/education, Bed mobility, Spoke to nursing, Spoke to case management, ST, OT, Family, Continued evaluation, Compensatory technique education, Spoke to advanced practitioner  Equipment Recommended:  (TBD)       See flowsheet documentation for full assessment, interventions and recommendations. Note: Prognosis: Guarded  Problem List: Decreased strength, Decreased endurance, Decreased mobility, Impaired balance, Decreased coordination, Decreased cognition, Impaired judgement, Decreased safety awareness, Impaired vision, Impaired sensation, Impaired tone, Pain  Assessment: Pt is a 70 y.o. male admitted to [facility] on 10/24/2023 with L facial droop, L sided weakness, slurred speech. Pt received a primary medical dx of L sided weakness. MRI revealed acute to subacute lacunar infarct of R paramedian veronique. Pt has the following comorbidities which affect their treatment: active problems listed above has a past medical history of Hypertension and TIA (transient ischemic attack). , as well as personal factors including stairs to access home. Pt has a high complexity clinical presentation due to Ongoing medical management for primary dx, Increased reliance on more restrictive AD compared to baseline, Decreased activity tolerance compared to baseline, Fall risk, Increased assistance needed from caregiver at current time, Ongoing telemetry monitoring, Cog status, Trending lab values, Diagnostic imaging pending, Continuous pulse oximetry monitoring  , and PMH. PT was consulted to evaluate pt's functional mobility and discharge needs. Upon evaluation, patient required maxAx2 for bed mobility,.  Body system impairments include impaired L sided strength and sensation, impaired cognition, vision, and attention. Pt's functional activity impairments include: impaired balance, endurance, activity tolerance, and mobility. Participation restrictions include inability to safely access home or community to perform ADLS or leisure activities. At conclusion of eval, pt remained supine in bed with bed alarm donned, phone, call bell, and all other personal needs within reach. Pt would benefit from skilled PT to address their functional mobility limitations. The patient's AM-PAC Basic Mobility Inpatient Short Form Raw Score is 6. A Raw score of less than or equal to 16 suggests the patient may benefit from discharge to post-acute rehabilitation services. Please also refer to the recommendation of the Physical Therapist for safe discharge planning. D/C recommendations are rehab. Barriers to Discharge: Decreased caregiver support, Inaccessible home environment     PT Discharge Recommendation: Post acute rehabilitation services    See flowsheet documentation for full assessment.

## 2023-10-25 NOTE — ED NOTES
MD made aware of pt's BP. RN instructed to check BP again in 30 mins.       Eve Palacios RN  10/24/23 [id number]

## 2023-10-25 NOTE — ASSESSMENT & PLAN NOTE
Neurology has told nursing staff to keep blood pressure above 200 for now (permissive hypertension). We will continue to monitor. Previous plans regarding hypertension indicated increased amlodipine to 7.5 (currently 5) milligrams if blood pressure still elevated. We will continue to monitor. Awaiting formal neurology note.

## 2023-10-25 NOTE — PLAN OF CARE
Problem: Potential for Falls  Goal: Patient will remain free of falls  Description: INTERVENTIONS:  - Educate patient/family on patient safety including physical limitations  - Instruct patient to call for assistance with activity   - Consult OT/PT to assist with strengthening/mobility   - Keep Call bell within reach  - Keep bed low and locked with side rails adjusted as appropriate  - Keep care items and personal belongings within reach  - Initiate and maintain comfort rounds  - Make Fall Risk Sign visible to staff  - Offer Toileting every 2 Hours, in advance of need  - Initiate/Maintain bed alarm  - Obtain necessary fall risk management equipment: non skid footwear  - Apply yellow socks and bracelet for high fall risk patients  - Consider moving patient to room near nurses station  Outcome: Progressing     Problem: MOBILITY - ADULT  Goal: Maintain or return to baseline ADL function  Description: INTERVENTIONS:  -  Assess patient's ability to carry out ADLs; assess patient's baseline for ADL function and identify physical deficits which impact ability to perform ADLs (bathing, care of mouth/teeth, toileting, grooming, dressing, etc.)  - Assess/evaluate cause of self-care deficits   - Assess range of motion  - Assess patient's mobility; develop plan if impaired  - Assess patient's need for assistive devices and provide as appropriate  - Encourage maximum independence but intervene and supervise when necessary  - Involve family in performance of ADLs  - Assess for home care needs following discharge   - Consider OT consult to assist with ADL evaluation and planning for discharge  - Provide patient education as appropriate  Outcome: Progressing  Goal: Maintains/Returns to pre admission functional level  Description: INTERVENTIONS:  - Perform BMAT or MOVE assessment daily.   - Set and communicate daily mobility goal to care team and patient/family/caregiver.    - Collaborate with rehabilitation services on mobility goals if consulted  - Perform Range of Motion 3 times a day. - Reposition patient every 2 hours. - Record patient progress and toleration of activity level   Outcome: Progressing     Problem: Nutrition/Hydration-ADULT  Goal: Nutrient/Hydration intake appropriate for improving, restoring or maintaining nutritional needs  Description: Monitor and assess patient's nutrition/hydration status for malnutrition. Collaborate with interdisciplinary team and initiate plan and interventions as ordered. Monitor patient's weight and dietary intake as ordered or per policy. Utilize nutrition screening tool and intervene as necessary. Determine patient's food preferences and provide high-protein, high-caloric foods as appropriate.      INTERVENTIONS:  - Monitor oral intake, urinary output, labs, and treatment plans  - Assess nutrition and hydration status and recommend course of action  - Evaluate amount of meals eaten  - Assist patient with eating if necessary   - Allow adequate time for meals  - Recommend/ encourage appropriate diets, oral nutritional supplements, and vitamin/mineral supplements  - Order, calculate, and assess calorie counts as needed  - Recommend, monitor, and adjust tube feedings and TPN/PPN based on assessed needs  - Assess need for intravenous fluids  - Provide specific nutrition/hydration education as appropriate  - Include patient/family/caregiver in decisions related to nutrition  Outcome: Progressing     Problem: SAFETY ADULT  Goal: Patient will remain free of falls  Description: INTERVENTIONS:  - Educate patient/family on patient safety including physical limitations  - Instruct patient to call for assistance with activity   - Consult OT/PT to assist with strengthening/mobility   - Keep Call bell within reach  - Keep bed low and locked with side rails adjusted as appropriate  - Keep care items and personal belongings within reach  - Initiate and maintain comfort rounds  - Make Fall Risk Sign visible to staff  - Offer Toileting every 2 Hours, in advance of need  - Initiate/Maintain bed alarm  - Obtain necessary fall risk management equipment: non skid footwear  - Apply yellow socks and bracelet for high fall risk patients  - Consider moving patient to room near nurses station  Outcome: Progressing  Goal: Maintain or return to baseline ADL function  Description: INTERVENTIONS:  -  Assess patient's ability to carry out ADLs; assess patient's baseline for ADL function and identify physical deficits which impact ability to perform ADLs (bathing, care of mouth/teeth, toileting, grooming, dressing, etc.)  - Assess/evaluate cause of self-care deficits   - Assess range of motion  - Assess patient's mobility; develop plan if impaired  - Assess patient's need for assistive devices and provide as appropriate  - Encourage maximum independence but intervene and supervise when necessary  - Involve family in performance of ADLs  - Assess for home care needs following discharge   - Consider OT consult to assist with ADL evaluation and planning for discharge  - Provide patient education as appropriate  Outcome: Progressing  Goal: Maintains/Returns to pre admission functional level  Description: INTERVENTIONS:  - Perform BMAT or MOVE assessment daily.   - Set and communicate daily mobility goal to care team and patient/family/caregiver. - Collaborate with rehabilitation services on mobility goals if consulted  - Perform Range of Motion 3 times a day. - Reposition patient every 2 hours.   - Record patient progress and toleration of activity level   Outcome: Progressing     Problem: DISCHARGE PLANNING  Goal: Discharge to home or other facility with appropriate resources  Description: INTERVENTIONS:  - Identify barriers to discharge w/patient and caregiver  - Arrange for needed discharge resources and transportation as appropriate  - Identify discharge learning needs (meds, wound care, etc.)  - Arrange for interpretive services to assist at discharge as needed  - Refer to Case Management Department for coordinating discharge planning if the patient needs post-hospital services based on physician/advanced practitioner order or complex needs related to functional status, cognitive ability, or social support system  Outcome: Progressing

## 2023-10-25 NOTE — ASSESSMENT & PLAN NOTE
Patient with history of coffee-ground material in the hospitalization last year; concerning for GI bleeding, as the patient is placed on aspirin with Brilinta; will place patient on pantoprazole prophylaxis.

## 2023-10-25 NOTE — ED ATTENDING ATTESTATION
10/24/2023  Rom Castellanos DO, saw and evaluated the patient. I have discussed the patient with the resident/non-physician practitioner and agree with the resident's/non-physician practitioner's findings, Plan of Care, and MDM as documented in the resident's/non-physician practitioner's note, except where noted. All available labs and Radiology studies were reviewed. I was present for key portions of any procedure(s) performed by the resident/non-physician practitioner and I was immediately available to provide assistance. At this point I agree with the current assessment done in the Emergency Department. I have conducted an independent evaluation of this patient a history and physical is as follows:      69 yo male BIBA from home as a pre-hospital stroke alert. Has dysarthria, L facial droop and L sided weakness. Onset 1945h. Takes ASA, no AC agents. BP was >200 per EMS,  mg/dL. Pt had multiple episodes of vomiting. Pt met and evaluated in CT suite. Images reviewed in real-time by myself - no ICH. No LVO. 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 Imp: symptoms concerning for small vessel CVA in R MCA distribution plan: stroke alert, neurology team evaluated patient. Will treat BP to be within range for thrombolysis. Will need to be admitted for further eval and tx. ED Course     Ultimately, pt and family declined thrombolysis.     Critical Care Time  CriticalCare Time    Date/Time: 10/24/2023 9:15 PM    Performed by: Jerry Castellanos DO  Authorized by: Jerry Castellanos DO    Critical care provider statement:     Critical care time (minutes):  34    Critical care time was exclusive of:  Separately billable procedures and treating other patients and teaching time    Critical care was necessary to treat or prevent imminent or life-threatening deterioration of the following conditions:  CNS failure or compromise    Critical care was time spent personally by me on the following activities:  Blood draw for specimens, obtaining history from patient or surrogate, discussions with consultants, evaluation of patient's response to treatment, examination of patient, review of old charts, re-evaluation of patient's condition, ordering and review of radiographic studies, ordering and review of laboratory studies and ordering and performing treatments and interventions

## 2023-10-25 NOTE — PLAN OF CARE
Problem: OCCUPATIONAL THERAPY ADULT  Goal: Performs self-care activities at highest level of function for planned discharge setting. See evaluation for individualized goals. Description: Treatment Interventions: ADL retraining, Visual perceptual retraining, Functional transfer training, UE strengthening/ROM, Endurance training, Cognitive reorientation, Patient/family training, Equipment evaluation/education, Neuromuscular reeducation, Fine motor coordination activities, Compensatory technique education, Continued evaluation, Energy conservation, Activityengagement          See flowsheet documentation for full assessment, interventions and recommendations. 10/25/2023 1354 by Jagdeep Moses OT  Note: Limitation: Decreased ADL status, Decreased UE ROM, Decreased UE strength, Decreased Safe judgement during ADL, Decreased cognition, Decreased endurance, Decreased sensation, Visual deficit, Decreased fine motor control, Decreased self-care trans, Decreased high-level ADLs, Non-func L UE  Prognosis: Fair  Assessment: Pt is a 70 y.o. male who was admitted to [facility] on 10/24/2023 with dysarthria, headache, dizziness, and  Left-sided weakness s/p right paramedian veronique infarct. Patient  has a past medical history of Hypertension and TIA (transient ischemic attack). At baseline pt was completing I with ADL's, shares homemaking tasks with spouse no AD with functional mobility, +drives, retired. Pt lives with spouse in a mobile home with 6 HOLLY. Currently pt requires max a/total assist for overall ADLS and max a x 2 supine<>sit for functional transfers. Pt currently presents with impairments in the following categories - activity tolerance, endurance, standing balance/tolerance, sitting balance/tolerance, UE strength, UE ROM, FMC, GMC, safety , judgement , attention , sequencing , (L) attention , proprioception , coping skills , communication, and interpersonal skills.  These impairments, as well as pt's fatigue, (L) hemiplegia, decreased caregiver support, and risk for falls  limit pt's ability to safely engage in all baseline areas of occupation, includingeating, grooming, bathing, dressing, toileting, functional mobility/transfers, community mobility, laundry , driving, house maintenance, medication management, meal prep, cleaning, social participation , and leisure activities  From OT standpoint, recommend STR upon D/C. The patient's raw score on the AM-PAC Daily Activity Inpatient Short Form is 10. A raw score of less than 19 suggests the patient may benefit from discharge to post-acute rehabilitation services. Please refer to the recommendation of the Occupational Therapist for safe discharge planning. OT will continue to follow to address the below stated goals.   Recommendation: Physiatry Consult  OT Discharge Recommendation: Post acute rehabilitation services

## 2023-10-25 NOTE — QUICK NOTE
NEUROLOGY RESIDENCY OVERNIGHT QUICK NOTE     Name: Margarita Carrillo   Age & Sex: 70 y.o. male   MRN: [id number]  Unit/Bed#: ED 26   Encounter: [id number]  Length of Stay: 0    ASSESSMENT & PLAN     Patient seen and examined by the overnight resident for non-urgent consultation. Preliminary recommendations are outlined below. Formal consultation to follow in the morning. * Left-sided weakness  Assessment & Plan  Prehospital stroke alert on 10/24/2023  8:13 PM with initial NIHSS of 8 and LKW 6pm, initial Blood Pressure: (!) 187/91. Initial presenting deficits were dysarthria, left facial droop, left-sided weakness. Thrombolytic Decision: After a discussion of risks, benefits and alternatives (including best medical therapy excluding thrombolysis) reviewing inclusion and exclusion criteria the decision was made NOT to proceed with thrombolytic therapy by patient and his wife Larry Koehler. Patient did not want to proceed with TNK due to known aneurysm and was concerned for bleeding. He understood that the current symptoms can be long lasting. 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 Severe near occlusive plaque stenosis proximal left cervical ICA > 90% stenosis, worsened compared to the prior exam. Stable 5mm left supraclinoid ICA aneurysm    Pertinent scores:  - NIHSS: 8  Stroke Modified [place] Score: 1 (No significant disability. Able to carry out all usual activities, despite some symptoms)    Impression: patient likely has lacunar stroke given multiple risk factors for small vessel disease    Plan:  Discussed plan with neurology attending, Dr. Vivek Lund  - Stroke pathway  -Recommend load the patient with ASA 325mg and Brilinta 90mg then starting tomorrow continue with ASA 81mg daily and Brilinta 90mg BID.  Patient is allergic to Plavix (causes whole body rash)  - Permissive hypertension for first 24 hours up to 200 SBP since patient's etiology of stroke is likely small vessel in the setting of uncontrolled HTN and known aneurysm  - Obtain lipids and A1c  - atorvastatin 40mg qhs  -Pending MRI brain w/o and Echo  - Maintain glucose <180, SSI for coverage if indicated  - Medical management as per primary team appreciated  - DVT ppx and SCDs  - Monitor on telemetry  - PT/OT/Speech/PM&R input appreciated  - Stroke education  -Neuro checks- Every 1 hour x 4 hours, then every 2 hours x 4 hours, then every 4 hours x 72 hours     -Stat CT Head for change in neuro status or GCS drop 2pts/1hr       Reason for Consult / Principal Problem: Prehospital stroke alert  Hx and PE limited by: None  Patient last known well: 6pm  Prehospital stroke alert called: 8:13pm  Neurology time of arrival: immediately    History of Present Illness     HPI: Margarita Carrillo is a 70 y.o. right handed male with type 2 diabetes, hypertension, HLD, CKD 3B, renal cell carcinoma of right kidney, bilateral lower extremity edema (left worse than right) presented with prehospital stroke alert due to dysarthria, left facial droop and left-sided weakness. Around 6 PM, patient was talking to his wife on the phone when he felt headache and dizziness and told his wife to call 911 since he thought he was having stroke. Last known normal 6 PM.  NIHSS 8 (detailed below). Initial /91. Glucose 214. CT head showed no acute abnormalities. CTA showed left vertebral artery occlusion similar to prior exam.  Severe near occlusive plaque in left cervical ICA and stable around 5 mm left supraclinoid ICA aneurysm. Patient is well within window for TNK and has a relative contraindication with aneurysm but no other absolute contraindication.   After extensive discussion with patient and his wife Larry Koehler (on the phone), patient decided to not get TNK due to concern for aneurysm rupture as a TNK side effect. He understood that TNK might have improved his current symptoms despite the risk of bleeding and that without TNK, the symptoms can be long-lasting. Patient is admitted under medicine for medical management and further stroke work-up. OBJECTIVE     Patient ID: Van Greene is a 70 y.o. male. Vitals:   Vitals:    10/24/23 2100 10/24/23 2115 10/24/23 2130 10/24/23 2145   BP: (!) 190/101 (!) 188/90 (!) 179/90 (!) 179/87   Pulse: 90 90 82 80   Resp: 17 18 18 18   Temp:       TempSrc:       SpO2: 99% 97% 97% 96%   Weight:          Body mass index is 31.04 kg/m². No intake or output data in the 24 hours ending 10/24/23 2356      NIHSS:  1a.Level of Consciousness: 0 = Alert   1b. LOC Questions: 0 = Answers both correctly   1c. LOC Commands: 0 = Obeys both correctly   2. Best Gaze: 0 = Normal   3. Visual: 0 = No visual field loss   4. Facial Palsy: 2=Partial paralysis (total or near total paralysis of the lower face)   5a. Motor Right Arm: 0=No drift, limb holds 90 (or 45) degrees for full 10 seconds   5b. Motor Left Arm: 1=Drift, limb holds 90 (or 45) degrees but drifts down before full 10 seconds: does not hit bed   6a. Motor Right Le=No drift, limb holds 90 (or 45) degrees for full 10 seconds   6b. Motor Left Le=Some effort against gravity, limb cannot get to or maintain (if cured) 90 (or 45) degrees, drifts down to bed, but has some effort against gravity   7. Limb Ataxia:  1=Present in one limb   8. Sensory: 0=Normal; no sensory loss   9. Best Language:  0=No aphasia, normal   10. Dysarthria: 2=Severe; patient speech is so slurred as to be unintelligible in the absence of or our of proportion to any dysphagia, or is mute/anarthric   11. Extinction and Inattention (formerly Neglect): 0=No abnormality   Total Score: 8     Time NIHSS was completed: 8:25pm    Modified [place] Score:  1 (No significant disability.  Able to carry out all usual activities, despite some symptoms)   Neurologic Exam     Mental Status   Oriented to person, place, and time. Speech: slurred   Level of consciousness: alert  Able to name object. Able to read. Able to repeat. Cranial Nerves     CN II   Visual fields full to confrontation. CN III, IV, VI   Pupils are equal, round, and reactive to light. Extraocular motions are normal.     CN V   Facial sensation intact. CN VII   Right facial weakness: none  Left facial weakness: lower facial droop. CN VIII   CN VIII normal.     CN IX, X   CN IX normal.   CN X normal.     CN XI   CN XI normal.     CN XII   Tongue deviation: left    Motor Exam   Muscle bulk: normal  Overall muscle tone: normal  Right arm pronator drift: absent  Left arm pronator drift: presentRUE and RLE: 5/5  LUE: 4/5 for deltoid and biceps, 4+/5 for triceps  LLE: 3/5     Sensory Exam   Light touch normal.   Pinprick normal.     Gait, Coordination, and Reflexes     Coordination   Finger to nose coordination: normal  Heel to shin coordination: abnormal (L heel to shin)    Reflexes   Right plantar: normal  Left plantar: normal  Right Persaud: absent  Left Persaud: presentBrisk reflexes throughout  Normal right hand . Weak left hand         LABORATORY DATA     Results from last 7 days   Lab Units 10/24/23  2054   WBC Thousand/uL 12.02*   HEMOGLOBIN g/dL 13.6   HEMATOCRIT % 40.1   PLATELETS Thousands/uL 152      Results from last 7 days   Lab Units 10/24/23  2054   POTASSIUM mmol/L 4.3   CHLORIDE mmol/L 102   CO2 mmol/L 29   BUN mg/dL 32*   CREATININE mg/dL 1.99*   CALCIUM mg/dL 9.0              Results from last 7 days   Lab Units 10/24/23  2054   INR  0.99   PTT seconds 28               IMAGING & DIAGNOSTIC TESTING     Radiology Results: I have personally reviewed pertinent reports.       CTA stroke alert (head/neck)   Final Result by Nimesh Christy MD (10/24 2058)      Occlusion of the left V3 and left V4 segments, similar to the prior exam.      Severe near occlusive plaque stenosis proximal left cervical ICA with greater than 90% stenosis, worsened compared to the prior exam.      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                     I personally communicated this study with   Dr. Dennard Boeck   on 10/24/2023 8:29 PM.                           Workstation performed: [id number]         CT stroke alert brain   Final Result by Beni Avitia MD (10/24 2032)      No acute intracranial abnormality. Chronic microangiopathic changes. I personally discussed this study with   Dr. Dennard Boeck   on 10/24/2023 8:29 PM.            Workstation performed: [id number]         MRI brain wo contrast    (Results Pending)   XR abdomen 1 vw portable    (Results Pending)       Other Diagnostic Testing: I have personally reviewed pertinent reports.       Sudhakar Nichols MD   [facility] Neurology Residency, PGY-III

## 2023-10-25 NOTE — SPEECH THERAPY NOTE
Speech Language/Pathology  Speech/Language Pathology  Assessment    Patient Name: Caryn Finn  GXCSV'C Date: 10/25/2023     Problem List  Principal Problem:    Left-sided weakness  Active Problems:    Essential hypertension    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 chronic kidney disease, without long-term current use of insulin (HCC)    Chronic renal disease, stage IV (HCC)    Renal cell carcinoma of right kidney ([address])    History of GI bleed    Past Medical History  Past Medical History:   Diagnosis Date    Hypertension     TIA (transient ischemic attack)      Past Surgical History  Past Surgical History:   Procedure Laterality Date    CYSTECTOMY      Per patient cyst removal from his back    LASIK      IA LAPAROSCOPY RADICAL NEPHRECTOMY Right 7/6/2022    Procedure: NEPHRECTOMY RADICAL LAPAROSCOPIC W/ ROBOTICS, poss open;  Surgeon: Oumar Loco MD;  Location: [facility];  Service: Urology   Speech-Language Pathology Bedside Swallow Evaluation        Patient Name: Caryn Finn    Today's Date: 10/25/2023     Problem List  Principal Problem:    Left-sided weakness  Active Problems:    Essential hypertension    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 chronic kidney disease, without long-term current use of insulin (HCC)    Chronic renal disease, stage IV ([address])    Renal cell carcinoma of right kidney ([address])    History of GI bleed         Past Medical History  Past Medical History:   Diagnosis Date    Hypertension     TIA (transient ischemic attack)        Past Surgical History  Past Surgical History:   Procedure Laterality Date    CYSTECTOMY      Per patient cyst removal from his back    LASIK      IA LAPAROSCOPY RADICAL NEPHRECTOMY Right 7/6/2022    Procedure: NEPHRECTOMY RADICAL LAPAROSCOPIC W/ ROBOTICS, poss open;  Surgeon: Oumar Loco MD;  Location: [facility];  Service: Urology       Summary    Pt presents with suspected at least moderate oropharyngeal dysphagia.  This is characterized by prolonged bolus manipulation, L side buccal pocketing and suspected reduced bolus control, suspected premature spill into the pharynx, reduced hyolaryngeal elevation and delayed swallow initiation. Also suspect pharyngeal residue, with multiple swallows per bite/sip. There was intermittent wet vocal quality observed, but no overt coughing/throat clearing. Suspect reduced airway protection, given weak and breathy vocal quality. Pt appears very fatigued. At this time risk of aspiration appears high. Recommend pt continue NPO with NGT for nutrition for now. ST to reassess in the next 24-48 hours. Pt would likely benefit from VBS/MBS when appropriate. Recommendations:   Diet: NPO with tube feeds and may have ice chips sparingly after oral care    Meds: non-oral if possible   Frequent Oral care: 4x/day  Aspiration precautions and compensatory swallowing strategies: upright posture  Other Recommendations/ considerations: ST to see for dysphagia tx, VBS when appropriate (when pt more alert, less fatigued). Current Medical Status  Copied from admission/physician notes:  Belem Pitt is a 70 y.o. male who has past medical history significant for history of nonruptured cerebral aneurysm, chronic pain, chronic kidney disease stage IV most likely secondary to nephrectomy on the right side due to renal cell carcinoma and follows with urology, chronic low back pain, class I obesity, essential hypertension, previous history of GI bleeding (coffee-ground material) polyarticular arthritis, diabetes mellitus type 2 who comes in with left-sided weakness approximately 7:00 this evening as reported to EMS, wife was able to see patient without any concerns and usual state of health. Patient suddenly presented with left-sided facial droop and upper and lower extremity weakness. He has hypertension with blood pressures in the low 200s per EMS. Stroke protocol was called.   He currently has left-sided weakness and dysphonia and dysphagia. Opted not to have thrombolytics, neurology had patient's started on Brilinta 90 mg twice daily, aspirin 300 mg rectally was administered x1 to continue 81 mg orally tomorrow. Meanwhile the patient was seen personally to have a little bit more improvement with dysphagia and dysphonia. He also does appear to have a little bit of twitching of the left upper extremity and can move left lower extremity on a single plane. He is also able to move left toe. Order received and chart reviewed. Discussed with RN. Pt has NGT in place for meds and nutrition due to dysphagia at admission. Past medical history:   Please see H&P for details    Special Studies:  CT head-No acute intracranial abnormality. Chronic microangiopathic changes. CTA head/neck-Occlusion of the left V3 and left V4 segments, similar to the prior exam.  Severe near occlusive plaque stenosis proximal left cervical ICA with greater than 90% stenosis, worsened compared to the prior exam.     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      MRI brain  Acute to early subacute lacunar infarct in the right paramedian veronique. No mass effect or hemorrhagic conversion. Abd xray-  Visualized lung bases are clear.       Social/Education/Vocational Hx:  Pt lives with family    Swallow Information   Current Risks for Dysphagia & Aspiration: CVA  Current Symptoms/Concerns: wet vocal quality and L side facial weakness/droop  Current Diet: NPO with tube feeds   Baseline Diet: regular diet and thin liquids    Baseline Assessment   Behavior/Cognition: waxing and waning arousal level  Speech/Language Status: able to participate in basic conversation and able to follow commands, speech is dysarthric with reduced intelligibility  Patient Positioning: upright in bed     Swallow Mechanism Exam   Facial: left facial droop  Labial: decreased ROM left side, decreased strength, and decreased coordination  Lingual: left sided tongue deviation, decreased ROM, bilateral decreased strength, and decreased coordination  Velum: symmetrical  Mandible: adequate ROM  Dentition: edentulous and dentures at homel  Vocal quality:weak, dysphonic, and wet at times    Volitional Cough: weak   Respiratory: RA, sats in upper 90s      Consistencies Assessed and Performance   Consistencies Administered: ice chips, thin liquids, nectar thick, and puree, small amounts of each    Oral Stage: Adequate bolus retrieval and draw from straw, slow bolus manipulation and transfer. L side pocketing. Suspect reduced bolus control and possible premature spill into pharynx. Adequate lip seal.     Pharyngeal Stage: Hyolaryngeal elevation observed and palpated, suspect reduced motion. Suspect delayed swallows. Pt appears to have some difficulty initiating a swallow, and there are multiple swallows per bite/sips, suggestive of possible pharyngeal residue. Intermittent wet vocal quality observed, not immediately after the swallow of any one consistency. Esophageal Concerns: none reported      Results Reviewed with: patient and RN   Dysphagia Goals: pt will participate in VBS and pt will tolerate LRD without s/s of aspiration. Discharge recommendation:  ? SNF, [facility]    Speech Therapy Prognosis   Prognosis: fair    Prognosis Considerations: age, medical status, cognitive status, and therapeutic potential

## 2023-10-25 NOTE — ASSESSMENT & PLAN NOTE
Recently had metformin and hydrochlorothiazide discontinued due to elevation of creatinine. Current creatinine at 1.99 with significant improvement. Being n.p.o.; will place patient on mild hydration of Isolyte 75 mL/h. Recheck metabolic profile in the morning. Also noted was that amlodipine can be increased to 7.5 mg if blood pressure is elevated; at this point, being n.p.o. and with slight dysphagia (slight improvement from initial presentation); will place patient on labetalol 10 mg every 6 hours as needed if blood pressure greater than 200. (Neuro recommends permissive hypertension up to 200 mmHg).

## 2023-10-25 NOTE — CONSULTS
Consultation - Stroke   Kirsten Palak 70 y.o. male MRN: [id number]  Unit/Bed#: [facility] 717-01 Encounter: [id number]      Assessment/Plan     * Left-sided weakness  Assessment & Plan  Prehospital stroke alert on 10/24/2023  8:13 PM with initial NIHSS of 8 and LKW 6pm, initial Blood Pressure: (!) 187/91. Initial presenting deficits were dysarthria, left facial droop, left-sided weakness. Thrombolytic Decision: After a discussion of risks, benefits and alternatives (including best medical therapy excluding thrombolysis) reviewing inclusion and exclusion criteria the decision was made NOT to proceed with thrombolytic therapy by patient and his wife Loli Barlow. Patient did not want to proceed with TNK due to known aneurysm and was concerned for bleeding. He understood that the current symptoms can be long lasting. 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 Severe near occlusive plaque stenosis proximal left cervical ICA > 90% stenosis, worsened compared to the prior exam. Stable 5mm left supraclinoid ICA aneurysm  MRI showed early subacute lacunar infarct in the right paramedian veronique. No defect/hemorrhagic conversion noted. Pertinent scores:  - NIHSS: 8  Stroke Modified [place] Score: 1 (No significant disability. Able to carry out all usual activities, despite some symptoms)    Impression: MRI confirmed lacunar stroke in setting of multiple risk factors for small vessel disease    Plan:  Discussed plan with neurology attending, Dr. Martha Pace  - Stroke pathway  -Patient received ASA 325mg and Brilinta 90 mg. Starting today continue with ASA 81mg daily and Brilinta 90mg BID.  Patient is allergic to Plavix (causes whole body rash)  - Permissive hypertension for first 24 hours up to 200 SBP since patient's etiology of stroke is likely small vessel in the setting of uncontrolled HTN and known aneurysm  - Atorvastatin 40mg qhs  - Maintain glucose <180, SSI for coverage if indicated  - DVT ppx and SCDs  - Monitor on telemetry  - PT/OT/Speech/PM&R   - Stroke education  -Neuro checks- Every 1 hour x 4 hours, then every 2 hours x 4 hours, then every 4 hours x 72 hours     -Stat CT Head for change in neuro status or GCS drop 2pts/1hr         Dispo recommendations TBD. Patient will require stroke follow up with stroke service in 6-8 weeks. History of Present Illness     Reason for Consult / Principal Problem: Prehospital stroke alert  Hx and PE limited by: None  Patient last known well: 6pm  Prehospital stroke alert called: 8:13pm  Neurology time of arrival: Immediately    HPI: Pam Jefferson is a 70 y.o. right handed male with type 2 diabetes, hypertension, HLD, CKD 3B, renal cell carcinoma of right kidney, bilateral lower extremity edema (left worse than right) presented with prehospital stroke alert due to dysarthria, left facial droop and left-sided weakness. Around 6 PM, patient was talking to his wife on the phone when he felt headache and dizziness and told his wife to call 911 since he thought he was having stroke. Last known normal 6 PM.  NIHSS 8 (detailed below). Initial /91. Glucose 214. CT head showed no acute abnormalities. CTA showed left vertebral artery occlusion similar to prior exam.  Severe near occlusive plaque in left cervical ICA and stable around 5 mm left supraclinoid ICA aneurysm. Patient is well within window for TNK and has a relative contraindication with aneurysm but no other absolute contraindication. After extensive discussion with patient and his wife Luis Daniel Ware (on the phone), patient decided to not get TNK due to concern for aneurysm rupture as a TNK side effect. He understood that TNK might have improved his current symptoms despite the risk of bleeding and that without TNK, the symptoms can be long-lasting.  Patient is admitted under medicine for medical management and further stroke work-up. Inpatient consult to Neurology  Consult performed by: Quique Flood MD  Consult ordered by: Familia Pulse, DO        Review of Systems   Constitutional:  Negative for chills and fever. HENT:  Negative for ear pain and sore throat. Eyes:  Negative for pain and visual disturbance. Respiratory:  Negative for cough and shortness of breath. Cardiovascular:  Negative for chest pain and palpitations. Gastrointestinal:  Negative for abdominal pain and vomiting. Genitourinary:  Negative for dysuria and hematuria. Musculoskeletal:  Negative for arthralgias and back pain. Skin:  Negative for color change and rash. Neurological:  Positive for dizziness, facial asymmetry, speech difficulty, weakness, numbness and headaches. Negative for tremors, seizures, syncope and light-headedness. Psychiatric/Behavioral: Negative. All other systems reviewed and are negative.       Historical Information   Past Medical History:   Diagnosis Date    Hypertension     TIA (transient ischemic attack)      Past Surgical History:   Procedure Laterality Date    CYSTECTOMY      Per patient cyst removal from his back    LASIK      LA LAPAROSCOPY RADICAL NEPHRECTOMY Right 7/6/2022    Procedure: NEPHRECTOMY RADICAL LAPAROSCOPIC W/ ROBOTICS, poss open;  Surgeon: Paty Méndez MD;  Location: [facility];  Service: Urology     Social History   Social History     Substance and Sexual Activity   Alcohol Use Never     Social History     Substance and Sexual Activity   Drug Use Never     E-Cigarette/Vaping    E-Cigarette Use Never User      E-Cigarette/Vaping Substances    Nicotine No     THC No     CBD No     Flavoring No     Other No     Unknown No      Social History     Tobacco Use   Smoking Status Never   Smokeless Tobacco Never     Family History:   Family History   Problem Relation Age of Onset    Colon cancer Mother     Diabetes type II Mother     Heart disease Mother     Prostate cancer Father          Meds/Allergies   all current active meds have been reviewed    Allergies   Allergen Reactions    Plavix [Clopidogrel] Rash     Stopped two days ago because got a rash and doctors thought it was from this        Objective   Vitals:Blood pressure 168/76, pulse 97, temperature 98.5 °F (36.9 °C), temperature source Tympanic, resp. rate 17, weight 104 kg (228 lb 13.4 oz), SpO2 94 %. ,Body mass index is 31.04 kg/m². No intake or output data in the 24 hours ending 10/25/23 0740    Invasive Devices: Invasive Devices       Peripheral Intravenous Line  Duration             Peripheral IV 10/24/23 Right Antecubital 1 day    Peripheral IV 10/24/23 Right;Dorsal (posterior) Forearm <1 day              Drain  Duration             NG/OG/Enteral Tube Nasogastric 12 Fr Right nare <1 day                    Physical Exam  Constitutional:       General: He is not in acute distress. Appearance: He is normal weight. He is ill-appearing. HENT:      Head: Normocephalic. Nose: Nose normal.   Eyes:      Extraocular Movements: Extraocular movements intact. Conjunctiva/sclera: Conjunctivae normal.      Pupils: Pupils are equal, round, and reactive to light. Abdominal:      General: There is no distension. Tenderness: There is no abdominal tenderness. Musculoskeletal:         General: No swelling, tenderness, deformity or signs of injury. Right lower leg: No edema. Left lower leg: No edema. Skin:     General: Skin is warm and dry. Neurological:      Mental Status: He is alert and oriented to person, place, and time. Sensory: Sensory deficit present. Motor: Weakness present. Coordination: Coordination abnormal. Finger-Nose-Finger Test normal.      Gait: Gait abnormal.      Deep Tendon Reflexes:      Reflex Scores:       Bicep reflexes are 3+ on the right side and 3+ on the left side.        Patellar reflexes are 3+ on the right side and 3+ on the left side. Comments: Global left-sided weakness of the upper and lower extremities with left-sided facial droop noted   Psychiatric:         Mood and Affect: Mood normal.         Speech: Speech is slurred. Behavior: Behavior normal.         Thought Content: Thought content normal.         Judgment: Judgment normal.       Neurologic Exam     Mental Status   Oriented to person, place, and time. Attention: normal. Concentration: normal.   Speech: slurred   Level of consciousness: alert  Knowledge: good. Cranial Nerves     CN II   Visual fields full to confrontation. Right visual field deficit: none  Left visual field deficit: none     CN III, IV, VI   Pupils are equal, round, and reactive to light. Right pupil: Size: 4 mm. Shape: regular. Reactivity: brisk. Consensual response: intact. Left pupil: Size: 4 mm. Shape: regular. Reactivity: brisk. Consensual response: intact.    CN III: no CN III palsy  CN VI: no CN VI palsy  Nystagmus: none   Diplopia: none  Ophthalmoparesis: none  Upgaze: normal  Downgaze: normal  Conjugate gaze: present    CN V   Right facial sensation deficit: none  Left facial sensation deficit: complete    CN VII   Right facial weakness: none  Left facial weakness: central    Motor Exam   Muscle bulk: normal  Right arm tone: normal  Left arm tone: increased  Right arm pronator drift: absent  Right leg tone: normal  Left leg tone: increased    Strength   Right deltoid: 4/5  Left deltoid: 0/5  Right biceps: 4/5  Left biceps: 0/5  Right triceps: 4/5  Left triceps: 0/5  Right quadriceps: 4/5  Left quadriceps: 0/5  Right anterior tibial: 4/5  Left anterior tibial: 0/5  Right posterior tibial: 4/5  Left posterior tibial: 0/5  Right gastroc: 4/5  Left gastroc: 0/5    Sensory Exam   Right arm light touch: normal  Left arm light touch: decreased from elbow  Right leg light touch: normal  Left leg light touch: decreased from knee  Sensory deficit distribution on left: non-dermatomal distribution    Gait, Coordination, and Reflexes     Coordination   Finger to nose coordination: normal    Tremor   Resting tremor: absent  Intention tremor: absent  Action tremor: absent    Reflexes   Right biceps: 3+  Left biceps: 3+  Right patellar: 3+  Left patellar: 3+      NIHSS:  1a.Level of Consciousness: 0 = Alert   1b. LOC Questions: 0 = Answers both correctly   1c. LOC Commands: 0 = Obeys both correctly   2. Best Gaze: 0 = Normal   3. Visual: 0 = No visual field loss   4. Facial Palsy: 2=Partial paralysis (total or near total paralysis of the lower face)   5a. Motor Right Arm: 0=No drift, limb holds 90 (or 45) degrees for full 10 seconds   5b. Motor Left Arm: 1=Drift, limb holds 90 (or 45) degrees but drifts down before full 10 seconds: does not hit bed   6a. Motor Right Le=No drift, limb holds 90 (or 45) degrees for full 10 seconds   6b. Motor Left Le=Some effort against gravity, limb cannot get to or maintain (if cured) 90 (or 45) degrees, drifts down to bed, but has some effort against gravity   7. Limb Ataxia:  1=Present in one limb   8. Sensory: 0=Normal; no sensory loss   9. Best Language:  0=No aphasia, normal   10. Dysarthria: 2=Severe; patient speech is so slurred as to be unintelligible in the absence of or our of proportion to any dysphagia, or is mute/anarthric   11. Extinction and Inattention (formerly Neglect): 0=No abnormality   Total Score: 8     Time NIHSS was completed: 8:25pm    Modified [place] Score:  1 (No significant disability. Able to carry out all usual activities, despite some symptoms)    Lab Results: I have personally reviewed pertinent reports. Imaging Studies: I have personally reviewed pertinent reports. EKG, Pathology, and Other Studies: I have personally reviewed pertinent reports.     VTE Prophylaxis: Sequential compression device (Venodyne)  and Enoxaparin (Lovenox)    Code Status: Level 1 - Full Code  Jeannette Washington MD

## 2023-10-25 NOTE — ASSESSMENT & PLAN NOTE
Currently presents with left-sided weakness. Neurology was consulted and refused thrombolytic; given aspirin 300 mg rectally. Patient also started on Brilinta 90 mg twice daily starting now  As patient has dysphagia although with slight improvement; patient thinks that he cannot swallow well hence NG tube be placed for medication administration. Stroke protocol. MRI of the brain. Echocardiogram.  Telemetry. Neurology has been consulted and formal recommendations are still pending. Case management with physical and Occupational Therapy consults. PMR.  TSH, hemoglobin A1c with lipid profile as per protocol. Atorvastatin 40 mg daily. Continue with aspirin 81 mg daily. Brilinta 90 mg 1 dose now and 90 mg twice daily. Patient was also started on Brilinta as patient has Plavix allergy.

## 2023-10-25 NOTE — DISCHARGE INSTR - DIET
Level 2 dysphagia w/ nectar thick    Assessment Summary:    Pt presents with mild-moderate oropharyngeal dysphagia characterized by reduced lingual propulsion and bolus transfers, reduced full retraction, swallow delay and reduced anterior hyoid displacement. The Epiglottis abuts the PPW and does not fully invert. The pharyngeal stripping wave is reduced as well as  poor airway protection during swallows w/  penetration to the cords with nectar and aspiration of thin prior to and after swallows. The pt was reactive to aspiration coughing immed with aspiration of thin. Does not fully clear the airway. Despite this the swallow has improved since the previous MBS>   Note: Images are available for review in PACS as desired. Recommendations:   Recommended Diet: puree/level 1 diet, honey thick liquids, Continue frequent oral care, and SLP will f/u at bedside tomorrow with nectar at bedside.       Recommended Form of Medications: crushed with puree

## 2023-10-25 NOTE — ED PROVIDER NOTES
History  Chief Complaint   Patient presents with    CVA/TIA-like Symptoms     72-year-old male with known history of hypertension and prior TIA/CVA is now presenting with acute onset left facial droop, left-sided upper extremity and lower extremity weakness, and slurred speech. According to EMS, wife last saw him in his normal state of health approximately an hour prior to his arrival in the emergency department. Per chart review and per the patient he is not currently on any anticoagulants but does take a daily aspirin. Patient arrives oriented to person, place and date. He is hypertensive with systolic's in the low 109'T per EMS. His BG was 230's on the ambulance. His symptoms are persistent. EMS reports that patient vomited several times on the way to the hospital and was given zofran. Prior to Admission Medications   Prescriptions Last Dose Informant Patient Reported? Taking?    Ascorbic Acid (vitamin C) 100 MG tablet  Self Yes No   Sig: Take 100 mg by mouth daily   Multiple Vitamins-Minerals (MICHELLE MULTI MEN) TABS  Self Yes No   Sig: Take by mouth 2 (two) times a day   amLODIPine (NORVASC) 5 mg tablet  Self No No   Sig: Take 1 tablet (5 mg total) by mouth daily   aspirin (ECOTRIN LOW STRENGTH) 81 mg EC tablet  Self Yes No   Sig: Take 81 mg by mouth daily   cholecalciferol (VITAMIN D3) 1,000 units tablet  Self Yes No   Sig: Take 1,000 Units by mouth daily   nateglinide (STARLIX) 60 mg tablet   No No   Sig: Take 1 tablet (60 mg total) by mouth daily before dinner      Facility-Administered Medications: None       Past Medical History:   Diagnosis Date    Hypertension     TIA (transient ischemic attack)        Past Surgical History:   Procedure Laterality Date    CYSTECTOMY      Per patient cyst removal from his back    LASIK      SAMAN LAPAROSCOPY RADICAL NEPHRECTOMY Right 7/6/2022    Procedure: NEPHRECTOMY RADICAL LAPAROSCOPIC W/ ROBOTICS, poss open;  Surgeon: Oumar Loco MD;  Location: [facility];  Service: Urology       Family History   Problem Relation Age of Onset    Colon cancer Mother     Diabetes type II Mother     Heart disease Mother     Prostate cancer Father      I have reviewed and agree with the history as documented. E-Cigarette/Vaping    E-Cigarette Use Never User      E-Cigarette/Vaping Substances    Nicotine No     THC No     CBD No     Flavoring No     Other No     Unknown No      Social History     Tobacco Use    Smoking status: Never    Smokeless tobacco: Never   Vaping Use    Vaping Use: Never used   Substance Use Topics    Alcohol use: Never    Drug use: Never        Review of Systems   Constitutional:  Negative for chills, fatigue and fever. HENT:  Negative for congestion and sore throat. Eyes:  Negative for pain and visual disturbance. Respiratory:  Negative for cough, chest tightness and shortness of breath. Cardiovascular:  Negative for chest pain and palpitations. Gastrointestinal:  Negative for abdominal pain, blood in stool, constipation, diarrhea, nausea and vomiting. Genitourinary:  Negative for dysuria, flank pain and hematuria. Musculoskeletal:  Negative for arthralgias, back pain and neck pain. Skin:  Negative for color change and rash. Neurological:  Positive for facial asymmetry, speech difficulty and weakness. Negative for dizziness, syncope and light-headedness. Hematological:  Negative for adenopathy. Does not bruise/bleed easily. All other systems reviewed and are negative.       Physical Exam  ED Triage Vitals   Temperature Pulse Respirations Blood Pressure SpO2   10/24/23 2030 10/24/23 2026 10/24/23 2026 10/24/23 2026 10/24/23 2026   98.5 °F (36.9 °C) 91 18 (!) 187/91 99 %      Temp Source Heart Rate Source Patient Position - Orthostatic VS BP Location FiO2 (%)   10/24/23 2030 10/24/23 2030 10/24/23 2045 10/24/23 7589 --   Tympanic Monitor Sitting Right arm       Pain Score       10/25/23 0030       No Pain             Orthostatic Vital Signs  Vitals:    10/29/23 1035 10/29/23 1051 10/29/23 1107 10/29/23 1539   BP: 126/91 125/58 152/85 145/67   Pulse: 56 80 63 76   Patient Position - Orthostatic VS:           Physical Exam  Vitals and nursing note reviewed. Constitutional:       General: He is not in acute distress. Appearance: He is well-developed. He is obese. He is not toxic-appearing or diaphoretic. HENT:      Head: Normocephalic and atraumatic. Right Ear: External ear normal.      Left Ear: External ear normal.      Nose: Nose normal. No congestion or rhinorrhea. Mouth/Throat:      Mouth: Mucous membranes are moist.      Pharynx: No oropharyngeal exudate or posterior oropharyngeal erythema. Eyes:      General: No scleral icterus. Extraocular Movements: Extraocular movements intact. Conjunctiva/sclera: Conjunctivae normal.      Pupils: Pupils are equal, round, and reactive to light. Cardiovascular:      Rate and Rhythm: Normal rate and regular rhythm. Pulses: Normal pulses. Heart sounds: No murmur heard. Pulmonary:      Effort: Pulmonary effort is normal. No respiratory distress. Breath sounds: Normal breath sounds. No wheezing or rales. Abdominal:      Palpations: Abdomen is soft. There is no mass. Tenderness: There is no abdominal tenderness. There is no right CVA tenderness, left CVA tenderness or guarding. Hernia: No hernia is present. Musculoskeletal:         General: Swelling (LLE >RLE) present. Normal range of motion. Cervical back: Normal range of motion and neck supple. Right lower leg: No edema. Left lower leg: Edema present. Skin:     General: Skin is warm and dry. Capillary Refill: Capillary refill takes less than 2 seconds. Coloration: Skin is pale. Skin is not jaundiced. Neurological:      General: No focal deficit present. Mental Status: He is alert and oriented to person, place, and time. GCS: GCS eye subscore is 4.  GCS verbal subscore is 5. GCS motor subscore is 6. Cranial Nerves: Cranial nerve deficit, dysarthria and facial asymmetry (L facial droop) present. Motor: Weakness present.    Psychiatric:         Mood and Affect: Mood normal.         Behavior: Behavior normal.         ED Medications  Medications   ondansetron (ZOFRAN) injection 4 mg (has no administration in time range)   enoxaparin (LOVENOX) subcutaneous injection 40 mg (40 mg Subcutaneous Given 10/29/23 0933)   insulin lispro (HumaLOG) 100 units/mL subcutaneous injection 1-6 Units (3 Units Subcutaneous Given 10/29/23 1343)   pantoprazole (PROTONIX) injection 40 mg (40 mg Intravenous Given 10/29/23 0933)   ticagrelor (BRILINTA) tablet 90 mg (90 mg Per NG Tube Given 10/29/23 0932)   aspirin chewable tablet 81 mg (81 mg Per NG Tube Given 10/29/23 0932)   atorvastatin (LIPITOR) tablet 40 mg (40 mg Per NG Tube Given 10/28/23 1733)   labetalol (NORMODYNE) injection 10 mg (10 mg Intravenous Given 10/27/23 0607)   amLODIPine (NORVASC) tablet 10 mg (10 mg Oral Given 10/29/23 0931)   acetaminophen (TYLENOL) oral suspension 650 mg (650 mg Oral Given 10/29/23 1121)   carvedilol (COREG) tablet 12.5 mg (has no administration in time range)   insulin glargine (LANTUS) subcutaneous injection 7 Units 0.07 mL (has no administration in time range)   ondansetron (FOR EMS ONLY) (ZOFRAN) 4 mg/2 mL injection 4 mg (0 mg Does not apply Given to EMS 10/24/23 2042)   labetalol (NORMODYNE) injection 10 mg (10 mg Intravenous Given 10/24/23 2100)   aspirin rectal suppository 300 mg (300 mg Rectal Given 10/24/23 2151)   barium sulfate (LIQUID E-Z-PAQUE) oral suspension 355 mL (355 mL Oral Given by Other 10/27/23 1340)       Diagnostic Studies  Results Reviewed       Procedure Component Value Units Date/Time    Lipid Panel with Direct LDL reflex [[id number]]  (Abnormal) Collected: 10/25/23 0617    Lab Status: Final result Specimen: Blood from Arm, Left Updated: 10/25/23 2602     Cholesterol 200 mg/dL      Triglycerides 143 mg/dL      HDL, Direct 40 mg/dL      LDL Calculated 131 mg/dL     Comprehensive metabolic panel [[id number]]  (Abnormal) Collected: 10/25/23 0617    Lab Status: Final result Specimen: Blood from Arm, Left Updated: 10/25/23 0752     Sodium 139 mmol/L      Potassium 4.5 mmol/L      Chloride 103 mmol/L      CO2 27 mmol/L      ANION GAP 9 mmol/L      BUN 30 mg/dL      Creatinine 2.04 mg/dL      Glucose 141 mg/dL      Calcium 9.0 mg/dL      AST 14 U/L      ALT 10 U/L      Alkaline Phosphatase 66 U/L      Total Protein 6.3 g/dL      Albumin 3.8 g/dL      Total Bilirubin 0.89 mg/dL      eGFR 30 ml/min/1.73sq m     Narrative:      [place] guidelines for Chronic Kidney Disease (CKD):     Stage 1 with normal or high GFR (GFR > 90 mL/min/1.73 square meters)    Stage 2 Mild CKD (GFR = 60-89 mL/min/1.73 square meters)    Stage 3A Moderate CKD (GFR = 45-59 mL/min/1.73 square meters)    Stage 3B Moderate CKD (GFR = 30-44 mL/min/1.73 square meters)    Stage 4 Severe CKD (GFR = 15-29 mL/min/1.73 square meters)    Stage 5 End Stage CKD (GFR <15 mL/min/1.73 square meters)  Note: GFR calculation is accurate only with a steady state creatinine    Magnesium [[id number]]  (Normal) Collected: 10/25/23 0617    Lab Status: Final result Specimen: Blood from Arm, Left Updated: 10/25/23 0752     Magnesium 2.0 mg/dL     Phosphorus [[id number]]  (Normal) Collected: 10/25/23 0617    Lab Status: Final result Specimen: Blood from Arm, Left Updated: 10/25/23 0752     Phosphorus 3.4 mg/dL     TSH, 3rd generation [[id number]]  (Normal) Collected: 10/25/23 0617    Lab Status: Final result Specimen: Blood from Arm, Left Updated: 10/25/23 0724     TSH 3RD GENERATON 0.993 uIU/mL     UA (URINE) with reflex to Scope [[id number]]  (Abnormal) Collected: 10/25/23 0619    Lab Status: Final result Specimen: Urine, Other Updated: 10/25/23 0704     Color, UA Yellow     Clarity, UA Clear     Specific [place], UA 1.031     pH, UA 6.5     Leukocytes, UA Negative     Nitrite, UA Negative     Protein,  (2+) mg/dl      Glucose, UA Trace mg/dl      Ketones, UA Negative mg/dl      Urobilinogen, UA <2.0 mg/dl      Bilirubin, UA Negative     Occult Blood, UA Trace    Hemoglobin A1c w/EAG Estimation (Orders if not completed within the last 90 days) [[id number]]  (Abnormal) Collected: 10/24/23 2054    Lab Status: Final result Specimen: Blood from Arm, Right Updated: 10/25/23 0555     Hemoglobin A1C 6.7 %       mg/dl     Fingerstick Glucose (POCT) [[id number]]  (Abnormal) Collected: 10/24/23 2026    Lab Status: Final result Updated: 10/25/23 0551     POC Glucose 214 mg/dl     FLU/RSV/COVID - if FLU/RSV clinically relevant [[id number]]  (Normal) Collected: 10/24/23 2054    Lab Status: Final result Specimen: Nares from Nose Updated: 10/24/23 2200     SARS-CoV-2 Negative     INFLUENZA A PCR Negative     INFLUENZA B PCR Negative     RSV PCR Negative    Narrative:      FOR PEDIATRIC PATIENTS - copy/paste COVID Guidelines URL to browser: [email or web address]    SARS-CoV-2 assay is a Nucleic Acid Amplification assay intended for the  qualitative detection of nucleic acid from SARS-CoV-2 in nasopharyngeal  swabs. Results are for the presumptive identification of SARS-CoV-2 RNA. Positive results are indicative of infection with SARS-CoV-2, the virus  causing COVID-19, but do not rule out bacterial infection or co-infection  with other viruses. Laboratories within the [facility] and its  territories are required to report all positive results to the appropriate  public health authorities. Negative results do not preclude SARS-CoV-2  infection and should not be used as the sole basis for treatment or other  patient management decisions. Negative results must be combined with  clinical observations, patient history, and epidemiological information.   This test has not been FDA cleared or approved. This test has been authorized by FDA under an Emergency Use Authorization  (EUA). This test is only authorized for the duration of time the  declaration that circumstances exist justifying the authorization of the  emergency use of an in vitro diagnostic tests for detection of SARS-CoV-2  virus and/or diagnosis of COVID-19 infection under section 564(b)(1) of  the Act, 21 U. S.C. 344UOR-9(K)(7), unless the authorization is terminated  or revoked sooner. The test has been validated but independent review by FDA  and CLIA is pending. Test performed using [employer] Gene[employer]pert: This RT-PCR assay targets N2,  a region unique to SARS-CoV-2. A conserved region in the E-gene was chosen  for pan-Sarbecovirus detection which includes SARS-CoV-2. According to CMS-2020-01-R, this platform meets the definition of high-throughput technology.     HS Troponin 0hr (reflex protocol) [[id number]]  (Normal) Collected: 10/24/23 2054    Lab Status: Final result Specimen: Blood from Arm, Right Updated: 10/24/23 2123     hs TnI 0hr 11 ng/L     Basic metabolic panel [[id number]]  (Abnormal) Collected: 10/24/23 2054    Lab Status: Final result Specimen: Blood from Arm, Right Updated: 10/24/23 2116     Sodium 137 mmol/L      Potassium 4.3 mmol/L      Chloride 102 mmol/L      CO2 29 mmol/L      ANION GAP 6 mmol/L      BUN 32 mg/dL      Creatinine 1.99 mg/dL      Glucose 210 mg/dL      Calcium 9.0 mg/dL      eGFR 31 ml/min/1.73sq m     Narrative:      [place] guidelines for Chronic Kidney Disease (CKD):     Stage 1 with normal or high GFR (GFR > 90 mL/min/1.73 square meters)    Stage 2 Mild CKD (GFR = 60-89 mL/min/1.73 square meters)    Stage 3A Moderate CKD (GFR = 45-59 mL/min/1.73 square meters)    Stage 3B Moderate CKD (GFR = 30-44 mL/min/1.73 square meters)    Stage 4 Severe CKD (GFR = 15-29 mL/min/1.73 square meters)    Stage 5 End Stage CKD (GFR <15 mL/min/1.73 square meters)  Note: GFR calculation is accurate only with a steady state creatinine    Protime-INR [[id number]]  (Normal) Collected: 10/24/23 2054    Lab Status: Final result Specimen: Blood from Arm, Right Updated: 10/24/23 2112     Protime 13.0 seconds      INR 0.99    APTT [[id number]]  (Normal) Collected: 10/24/23 2054    Lab Status: Final result Specimen: Blood from Arm, Right Updated: 10/24/23 2112     PTT 28 seconds     CBC and Platelet [[id number]]  (Abnormal) Collected: 10/24/23 2054    Lab Status: Final result Specimen: Blood from Arm, Right Updated: 10/24/23 2100     WBC 12.02 Thousand/uL      RBC 4.34 Million/uL      Hemoglobin 13.6 g/dL      Hematocrit 40.1 %      MCV 92 fL      MCH 31.3 pg      MCHC 33.9 g/dL      RDW 12.4 %      Platelets 937 Thousands/uL      MPV 11.0 fL                    FL barium swallow video w speech   Final Result by BETH KIM (10/27 5443)      XR chest portable   Final Result by Caroline Bond MD (10/27 6805)      Questionable left costophrenic angle opacification given patient positioning. Artifact versus pneumonia in this area cannot be entirely excluded. Mild right basilar linear atelectasis. Resident: Erica Sarabia, the attending radiologist, have reviewed the images and agree with the final report above. Workstation performed: [id number]         VAS lower limb venous duplex study, unilateral/limited   Final Result by Misbah Maciel DO (10/27 8507)      VAS carotid complete study   Final Result by Latrice Holley MD (10/26 4133)      XR abdomen 1 vw portable   Final Result by Raul Galvez MD (10/25 1367)      Enteric tube positioned within the distal stomach. .               Workstation performed: [id number]         MRI brain wo contrast   Final Result by Herson Marroquin MD (10/25 7958)      Acute to early subacute lacunar infarct in the right paramedian veronique. No mass effect or hemorrhagic conversion.       Workstation performed: [id number]         XR abdomen 1 vw portable   Final Result by Madiha Camarillo MD (10/25 6542)      Enteric tube coiled over the region of the mouth. This was subsequently repositioned. Workstation performed: [id number]         CTA stroke alert (head/neck)   Final Result by La Zuleta MD (10/24 2058)      Occlusion of the left V3 and left V4 segments, similar to the prior exam.      Severe near occlusive plaque stenosis proximal left cervical ICA with greater than 90% stenosis, worsened compared to the prior exam.      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                     I personally communicated this study with   Dr. Chikis Palomares   on 10/24/2023 8:29 PM.                           Workstation performed: [id number]         CT stroke alert brain   Final Result by La Zuleta MD (10/24 2032)      No acute intracranial abnormality. Chronic microangiopathic changes. I personally discussed this study with   Dr. Chikis Palomares   on 10/24/2023 8:29 PM.            Workstation performed: [id number]               Procedures  Procedures      ED Course         CRAFFT      Flowsheet Row Most Recent Value   CRAFFT Initial Screen: During the past 12 months, did you:    1. Drink any alcohol (more than a few sips)? No Filed at: 10/24/2023 2056   2. Smoke any marijuana or hashish No Filed at: 10/24/2023 2056   3. Use anything else to get high? ("anything else" includes illegal drugs, over the counter and prescription drugs, and things that you sniff or '[place]')? No Filed at: 10/24/2023 2056                            SBIRT 22yo+      Flowsheet Row Most Recent Value   Initial Alcohol Screen: US AUDIT-C     1. How often do you have a drink containing alcohol? 0 Filed at: 10/24/2023 2056   2. How many drinks containing alcohol do you have on a typical day you are drinking? 0 Filed at: 10/24/2023 2056   3a. Male UNDER 65:  How often do you have five or more drinks on one occasion? 0 Filed at: 10/24/2023 2056   3b. FEMALE Any Age, or MALE 65+: How often do you have 4 or more drinks on one occassion? 0 Filed at: 10/24/2023 2056   Audit-C Score 0 Filed at: 10/24/2023 2056   SUNNY: How many times in the past year have you. .. Used an illegal drug or used a prescription medication for non-medical reasons? Never Filed at: 10/24/2023 2056                  Medical Decision Making  Medical complexity: Patient is fairly poor historian, I do not entirely trust his timeline for onset of symptoms. He does have all the signs and symptoms of an acute stroke. Unfortunately because of his poor history, his significant risk factors for complication including severe range hypertension, and discussions with neurology team, family, and patient himself we decided against administering TNK even though based on his timeline he would be on the outside edge of the recommended window for possible attempt at thrombolytic. No indication for IR intervention at this time. Therefore patient will be admitted on stroke pathway, anticipation of MRI, will be monitored closely for evolving stroke, will allow permissive hypertension and avoid hypotension, will favor euglycemia. Neurology on board and following. Patient will be admitted to internal medicine service due to medical complexity of comorbid conditions. Amount and/or Complexity of Data Reviewed  Labs: ordered. Radiology: ordered. Risk  OTC drugs. Prescription drug management. Decision regarding hospitalization.           Disposition  Final diagnoses:   Stroke-like symptoms   CVA (cerebral vascular accident) ([address])   Hypertension   Dysphagia     Time reflects when diagnosis was documented in both MDM as applicable and the Disposition within this note       Time User Action Codes Description Comment    10/24/2023  8:13 PM Ivana Bosworth Add [R29.90] Stroke-like symptoms     10/24/2023  9:28 PM Kenard Romberg Add [I63.9] CVA (cerebral vascular accident) ([address] [facility])     10/24/2023  9:28 PM Orana laura Tyrone Add [I10] Hypertension     10/24/2023  9:28 PM Orana laura Tyrone Add [R13.10] Dysphagia     10/26/2023  9:40 AM Sylvester Riojas Add [I65.22] Internal carotid artery stenosis, left           ED Disposition       ED Disposition   Admit    Condition   Stable    Date/Time   Tue Oct 24, 2023 2128    Comment   Case was discussed with Dr. Amie Lang and the patient's admission status was agreed to be Admission Status: inpatient status to the service of Dr. Amie Lang . Follow-up Information       Follow up With Specialties Details Why Sean Dumont  Follow up Appt w/ Dr. Aurelio Quiñones Doctor: 12/5/2023 at 3:45pm [address] [place] [id number] [phone]            Current Discharge Medication List        CONTINUE these medications which have NOT CHANGED    Details   amLODIPine (NORVASC) 5 mg tablet Take 1 tablet (5 mg total) by mouth daily  Qty: 90 tablet, Refills: 3    Associated Diagnoses: Primary hypertension      Ascorbic Acid (vitamin C) 100 MG tablet Take 100 mg by mouth daily      aspirin (ECOTRIN LOW STRENGTH) 81 mg EC tablet Take 81 mg by mouth daily      cholecalciferol (VITAMIN D3) 1,000 units tablet Take 1,000 Units by mouth daily      Multiple Vitamins-Minerals (MICHELLE MULTI MEN) TABS Take by mouth 2 (two) times a day      nateglinide (STARLIX) 60 mg tablet Take 1 tablet (60 mg total) by mouth daily before dinner  Qty: 90 tablet, Refills: 4    Associated Diagnoses: CKD stage 3 due to type 2 diabetes mellitus ([address] [facility])           No discharge procedures on file. PDMP Review       None             ED Provider  Attending physically available and evaluated Avril Avelar. I managed the patient along with the ED Attending.     Electronically Signed by           Iram Davalos MD  10/29/23 3690

## 2023-10-25 NOTE — ED NOTES
Pt states he doesn't think he can swallow, Physician notified for NG order for brilinta.      Addis Oneill IV, RN  10/24/23 1381

## 2023-10-25 NOTE — PHYSICAL THERAPY NOTE
Physical Therapy Evaluation    Patient Name: Giselle Gardner    VGVAX'U Date: 10/25/2023     Problem List  Principal Problem:    Left-sided weakness  Active Problems:    Essential hypertension    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 chronic kidney disease, without long-term current use of insulin (HCC)    Chronic renal disease, stage IV (HCC)    Renal cell carcinoma of right kidney ([address])    History of GI bleed       Past Medical History  Past Medical History:   Diagnosis Date    Hypertension     TIA (transient ischemic attack)         Past Surgical History  Past Surgical History:   Procedure Laterality Date    CYSTECTOMY      Per patient cyst removal from his back    LASIK      [place] LAPAROSCOPY RADICAL NEPHRECTOMY Right 7/6/2022    Procedure: NEPHRECTOMY RADICAL LAPAROSCOPIC W/ ROBOTICS, poss open;  Surgeon: Josi Moran MD;  [facility];  Service: Urology         10/25/23 1032   PT Last Visit   PT Visit Date 10/25/23   Note Type   Note type Evaluation   Pain Assessment   Pain Assessment Tool 0-10   Pain Score 5   Pain Location/Orientation Other (Comment)  (Ear pain)   Restrictions/Precautions   Weight Bearing Precautions Per Order No   Other Precautions Fall Risk;Multiple lines;Telemetry; Visual impairment;Cognitive; Bed Alarm  (NGT, L hemiparesis, dysarthria, L facial droop, permissive BP up to 200, ? L neglect)   Home Living   Type of [facility] One level;Stairs to enter with rails; Other (Comment)  (6 stairs to enter)   Bathroom Shower/Tub Walk-in shower   Bathroom Toilet Raised   Bathroom Equipment Grab bars in [address] Walker;Cane   Prior Function   Level of [place] Independent with functional mobility; Independent with ADLs   Lives With Spouse   Receives Help From Family   IADLs Independent with medication management   Falls in the last 6 months 0   Vocational Retired   General   Family/Caregiver Present No   Cognition   Overall Cognitive Status Impaired   Arousal/Participation Cooperative   Attention Attends with cues to redirect   Orientation Level Oriented X4   Following Commands Follows one step commands without difficulty   RUE Assessment   RUE Assessment WFL   LUE Assessment   LUE Assessment   (flaccid hemiparesis)   RLE Assessment   RLE Assessment   (at least 3/5 hip flexion, knee flexion, DF)   LLE Assessment   LLE Assessment   (flaccid 0/5 throughout)   Light Touch   RLE Light Touch Grossly intact   LLE Light Touch Impaired   LLE Light Touch Comments more impaired proximally? than distally. overall decreased L side sensation compared to R   Bed Mobility   Supine to Sit 2  Maximal assistance   Additional items Assist x 2   Sit to Supine 2  Maximal assistance   Additional items Assist x 2   Additional Comments Only reached EOB due to high BP (204/95)-improved to 183/77 returning to supine. Sat EOB with modAx1   Transfers   Sit to Stand Unable to assess   Stand to Sit Unable to assess   Additional Comments due to HTN   Ambulation/Elevation   Gait pattern Not tested   Balance   Static Sitting Poor   Dynamic Sitting Poor -   Static Standing Zero   Endurance Deficit   Endurance Deficit Yes   Endurance Deficit Description L hemiparesis, fatigue   Activity Tolerance   Activity Tolerance Treatment limited secondary to medical complications (Comment)  (Limited due to High BP)   Medical Staff Made Aware OT RAS, SPT Mandeep   Nurse Made Aware RN cleared pt for treatment   Assessment   Prognosis Guarded   Problem List Decreased strength;Decreased endurance;Decreased mobility; Impaired balance;Decreased coordination;Decreased cognition; Impaired judgement;Decreased safety awareness; Impaired vision; Impaired sensation; Impaired tone;Pain   Assessment Pt is a 70 y.o. male admitted to [facility] on 10/24/2023 with L facial droop, L sided weakness, slurred speech.  Pt received a primary medical dx of L sided weakness. MRI revealed acute to subacute lacunar infarct of R paramedian veronique. Pt has the following comorbidities which affect their treatment: active problems listed above has a past medical history of Hypertension and TIA (transient ischemic attack). , as well as personal factors including stairs to access home. Pt has a high complexity clinical presentation due to Ongoing medical management for primary dx, Increased reliance on more restrictive AD compared to baseline, Decreased activity tolerance compared to baseline, Fall risk, Increased assistance needed from caregiver at current time, Ongoing telemetry monitoring, Cog status, Trending lab values, Diagnostic imaging pending, Continuous pulse oximetry monitoring  , and PMH. PT was consulted to evaluate pt's functional mobility and discharge needs. Upon evaluation, patient required maxAx2 for bed mobility,. Body system impairments include impaired L sided strength and sensation, impaired cognition, vision, and attention. Pt's functional activity impairments include: impaired balance, endurance, activity tolerance, and mobility. Participation restrictions include inability to safely access home or community to perform ADLS or leisure activities. At conclusion of eval, pt remained supine in bed with bed alarm donned, phone, call bell, and all other personal needs within reach. Pt would benefit from skilled PT to address their functional mobility limitations. The patient's AM-PAC Basic Mobility Inpatient Short Form Raw Score is 6. A Raw score of less than or equal to 16 suggests the patient may benefit from discharge to post-acute rehabilitation services. Please also refer to the recommendation of the Physical Therapist for safe discharge planning. D/C recommendations are rehab. Barriers to Discharge Decreased caregiver support; Inaccessible home environment   Goals   Patient Goals get stronger   STG Expiration Date 11/08/23   Short Term Goal #1 In 14 days, pt will: 1) perform bed mobility with modAx1 to promote functional independence and decrease caregiver burden. 2) sit EOB x20 min with S to promote QOL And assist with ADLS. 3) improve balance grades by 1/2 to promote safety with functional mobility. 4) improve strength grades by 1/2 to promote safety with functional mobility. 5) PT to see for further transfer and mobility goals. PT Treatment Day 0   Plan   Treatment/Interventions LE strengthening/ROM; Therapeutic exercise; Endurance training;Cognitive reorientation;Patient/family training;Equipment eval/education; Bed mobility;Spoke to nursing;Spoke to case management;ST;OT;Family;Continued evaluation; Compensatory technique education;Spoke to advanced practitioner   PT Frequency 3-5x/wk   Discharge Recommendation   PT Discharge Recommendation Post acute rehabilitation services   Equipment Recommended   (TBD)   AM-PAC Basic Mobility Inpatient   Turning in Flat Bed Without Bedrails 1   Lying on Back to Sitting on Edge of Flat Bed Without Bedrails 1   Moving Bed to Chair 1   Standing Up From Chair Using Arms 1   Walk in Room 1   Climb 3-5 Stairs With Railing 1   Basic Mobility Inpatient Raw Score 6   Turning Head Towards Sound 3   Follow Simple Instructions 3   Low Function Basic Mobility Raw Score  12   Low Function Basic Mobility Standardized Score  18.33   Highest Level Of Mobility   JH-HLM Goal 2: Bed activities/Dependent transfer   JH-HLM Achieved 3: Sit at edge of bed   Modified Drake Scale   Modified [place] Scale 5   Barthel Index   Feeding 5   Bathing 0   Grooming Score 0   Dressing Score 5   Bladder Score 0   Bowels Score 0   Toilet Use Score 0   Transfers (Bed/Chair) Score 0   Mobility (Level Surface) Score 0   Stairs Score 0   Barthel Index Score 10   Alexander Reed, PT, DPT

## 2023-10-25 NOTE — ASSESSMENT & PLAN NOTE
Lab Results   Component Value Date    HGBA1C 6.8 (H) 09/13/2023     On  every 6 hr insulin sliding scale with Accu-Cheks per protocol.

## 2023-10-25 NOTE — ASSESSMENT & PLAN NOTE
Lab Results   Component Value Date    HGBA1C 6.7 (H) 10/24/2023     On  every 6 hr insulin sliding scale with Accu-Cheks per protocol  (P) 166.25

## 2023-10-25 NOTE — NUTRITION
10/25/23 7607   Recommendations/Interventions   Recommendations to Provider Speech therapy recommending tube feeds. Recommend initiating Jevity 1.2 at 70 mL/hr x 24 hours with 105 mL flushes q4h. Start at 10 mL/hr and advance by 10 mL q4h or as tolerated. Provides 1680 mL, 2016 kcals, 93 g protein, 1986 mL fluid including flushes. Meets 98% fluid needs- consider adjusting IV fluids. Continue to monitor electrolytes and renal function.

## 2023-10-25 NOTE — PROGRESS NOTES
Met with patient at bedside in room 717. Introduced my role. Provided stroke education including the stroke education booklet and magnet. Family is not at bedside. Reviewed stroke-like signs/symptoms. Patient is agreeable to outreach phone calls. Per notes: specific f/u instructions are TBD. Call bell placed within reach. Patient was appreciative for the visit.

## 2023-10-25 NOTE — H&P
[facility]  H&P  Name: Frankey General 70 y.o. male I MRN: [id number]  Unit/Bed#: ED 32 I Date of Admission: 10/24/2023   Date of Service: 10/25/2023 I Hospital Day: 1      Assessment/Plan   * Left-sided weakness  Assessment & Plan  Currently presents with left-sided weakness. Neurology was consulted and refused thrombolytic; given aspirin 300 mg rectally. Patient also started on Brilinta 90 mg twice daily starting now  As patient has dysphagia although with slight improvement; patient thinks that he cannot swallow well hence NG tube be placed for medication administration. Stroke protocol. MRI of the brain. Echocardiogram.  Telemetry. Neurology has been consulted and formal recommendations are still pending. Case management with physical and Occupational Therapy consults. PMR.  TSH, hemoglobin A1c with lipid profile as per protocol. Atorvastatin 40 mg daily. Continue with aspirin 81 mg daily. Brilinta 90 mg 1 dose now and 90 mg twice daily. Patient was also started on Brilinta as patient has Plavix allergy. History of GI bleed  Assessment & Plan  Patient with history of coffee-ground material in the hospitalization last year; concerning for GI bleeding, as the patient is placed on aspirin with Brilinta; will place patient on pantoprazole prophylaxis. Renal cell carcinoma of right kidney [facility])  Assessment & Plan  Status post right nephrectomy; follows up with urology. Chronic renal disease, stage IV ([address])  Assessment & Plan  Recently had metformin and hydrochlorothiazide discontinued due to elevation of creatinine. Current creatinine at 1.99 with significant improvement. Being n.p.o.; will place patient on mild hydration of Isolyte 75 mL/h. Recheck metabolic profile in the morning.   Also noted was that amlodipine can be increased to 7.5 mg if blood pressure is elevated; at this point, being n.p.o. and with slight dysphagia (slight improvement from initial presentation); will place patient on labetalol 10 mg every 6 hours as needed if blood pressure greater than 200. (Neuro recommends permissive hypertension up to 200 mmHg). 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 chronic kidney disease, without long-term current use of insulin ([facility])  Assessment & Plan  Lab Results   Component Value Date    HGBA1C 6.8 (H) 09/13/2023     On  every 6 hr insulin sliding scale with Accu-Cheks per protocol. Essential hypertension  Assessment & Plan  Neurology has told nursing staff to keep blood pressure above 200 for now (permissive hypertension). We will continue to monitor. Previous plans regarding hypertension indicated increased amlodipine to 7.5 (currently 5) milligrams if blood pressure still elevated. We will continue to monitor. Awaiting formal neurology note. VTE Prophylaxis: Enoxaparin (Lovenox)  / sequential compression device   Code Status: Level 1 - Full Code as discussed with patient  POLST: There is no POLST form on file for this patient (pre-hospital)    Anticipated Length of Stay:  Patient will be admitted on an Inpatient basis with an anticipated length of stay of greater than 2 midnights. Justification for Hospital Stay: Please see detailed plans noted above. Chief Complaint:     Left-sided weakness  History of Present Illness:  Celia Guzman is a 70 y.o. male who has past medical history significant for history of nonruptured cerebral aneurysm, chronic pain, chronic kidney disease stage IV most likely secondary to nephrectomy on the right side due to renal cell carcinoma and follows with urology, chronic low back pain, class I obesity, essential hypertension, previous history of GI bleeding (coffee-ground material) polyarticular arthritis, diabetes mellitus type 2 who comes in with left-sided weakness approximately 7:00 this evening as reported to EMS, wife was able to see patient without any concerns and usual state of health. Patient suddenly presented with left-sided facial droop and upper and lower extremity weakness. He has hypertension with blood pressures in the low 200s per EMS. Stroke protocol was called. He currently has left-sided weakness and dysphonia and dysphagia. Opted not to have thrombolytics, neurology had patient's started on Brilinta 90 mg twice daily, aspirin 300 mg rectally was administered x1 to continue 81 mg orally tomorrow. Meanwhile the patient was seen personally to have a little bit more improvement with dysphagia and dysphonia. He also does appear to have a little bit of twitching of the left upper extremity and can move left lower extremity on a single plane. He is also able to move left toe. Review of Systems:    Constitutional:  Denies fever or chills   Eyes:  Denies change in visual acuity   HENT:  Denies nasal congestion or sore throat   Respiratory:  Denies cough or shortness of breath   Cardiovascular:  Denies chest pain or edema   GI:  Denies abdominal pain, nausea, vomiting, bloody stools or diarrhea   :  Denies dysuria   Musculoskeletal:  Denies back pain or joint pain   Integument:  Denies rash   Neurologic:  Denies headache, left-sided facial droop with left upper and lower extremity weakness. Endocrine:  Denies polyuria or polydipsia   Psychiatric:  Denies depression or anxiety     Past Medical and Surgical History:   Past Medical History:   Diagnosis Date    Hypertension     TIA (transient ischemic attack)      Past Surgical History:   Procedure Laterality Date    CYSTECTOMY      Per patient cyst removal from his back    LASIK      [place] LAPAROSCOPY RADICAL NEPHRECTOMY Right 7/6/2022    Procedure: NEPHRECTOMY RADICAL LAPAROSCOPIC W/ ROBOTICS, poss open;  Surgeon: Tatyana Lisa MD;  Location[facility];  Service: Urology       Meds/Allergies:  (Not in a hospital admission)      Allergies:    Allergies   Allergen Reactions    Plavix [Clopidogrel] Rash     Stopped two days ago because got a rash and doctors thought it was from this      History:  Marital Status: /Civil Union   Occupation: Patient used to work in security  Patient Pre-hospital Living Situation: Currently lives at home  Patient Pre-hospital Level of Mobility: Needing assistance  Patient Pre-hospital Diet Restrictions: Cardiovascular/diabetic  Substance Use History:   Social History     Substance and Sexual Activity   Alcohol Use Never     Social History     Tobacco Use   Smoking Status Never   Smokeless Tobacco Never     Social History     Substance and Sexual Activity   Drug Use Never       Family History:  Family History   Problem Relation Age of Onset    Colon cancer Mother     Diabetes type II Mother     Heart disease Mother     Prostate cancer Father        Physical Exam:     Vitals:   Blood Pressure: (!) 179/106 (10/24/23 2359)  Pulse: 85 (10/24/23 2359)  Temperature: 98.5 °F (36.9 °C) (10/24/23 2030)  Temp Source: Tympanic (10/24/23 2030)  Respirations: 20 (10/24/23 2359)  Weight - Scale: 104 kg (228 lb 13.4 oz) (10/24/23 2024)  SpO2: 94 % (10/24/23 2359)    Constitutional:  Well developed, well nourished, no acute distress, non-toxic appearance   Eyes:  PERRL, conjunctiva normal   HENT:  Atraumatic, external ears normal, nose normal, oropharynx moist, no pharyngeal exudates. Neck- normal range of motion, no tenderness, supple   Respiratory:  No respiratory distress, normal breath sounds, no rales, no wheezing   Cardiovascular:  Normal rate, normal rhythm, no murmurs, no gallops, no rubs   GI:  Soft, nondistended, normal bowel sounds, nontender, no organomegaly, no mass, no rebound, no guarding   :  No costovertebral angle tenderness   Musculoskeletal:  No edema, no tenderness, no deformities. Back- no tenderness  Integument:  Well hydrated, no rash   Lymphatic:  No lymphadenopathy noted   Neurologic:  Alert &awake, communicative, pupils equally reactive to light. EOMs intact. Left facial droop. Dysphonia. Tongue is deviated towards the left. Weakness of bite on the left. Left upper extremity 2/5. Left lower extremity 2/5. Right upper and lower extremity 5/5. Psychiatric:  Speech and behavior appropriate       Lab Results: I have personally reviewed pertinent reports. Results from last 7 days   Lab Units 10/24/23  2054   WBC Thousand/uL 12.02*   HEMOGLOBIN g/dL 13.6   HEMATOCRIT % 40.1   PLATELETS Thousands/uL 152     Results from last 7 days   Lab Units 10/24/23  2054   POTASSIUM mmol/L 4.3   CHLORIDE mmol/L 102   CO2 mmol/L 29   BUN mg/dL 32*   CREATININE mg/dL 1.99*   CALCIUM mg/dL 9.0     Results from last 7 days   Lab Units 10/24/23  2054   INR  0.99           Imaging: I have personally reviewed pertinent reports. CTA stroke alert (head/neck)    Result Date: 10/24/2023  Narrative: CTA NECK AND BRAIN WITH CONTRAST INDICATION: Stroke Alert COMPARISON:   6/21/2019 TECHNIQUE:   Post contrast imaging was performed after administration of iodinated contrast through the neck and brain. Post contrast axial 0.625 mm images timed to opacify the arterial system. 3D rendering was performed on an independent workstation. MIP reconstructions performed. Coronal reconstructions were performed of the noncontrast portion of the brain. Radiation dose length product (DLP) for this visit:  600.78 mGy-cm . This examination, like all CT scans performed in the [facility], was performed utilizing techniques to minimize radiation dose exposure, including the use of iterative  reconstruction and automated exposure control. IV Contrast: IMAGE QUALITY:   Diagnostic FINDINGS: CERVICAL VASCULATURE AORTIC ARCH AND GREAT VESSELS: Moderate atherosclerotic disease of the arch and great vessels. RIGHT VERTEBRAL ARTERY CERVICAL SEGMENT:  Normal origin. The vessel is normal in caliber throughout the neck. LEFT VERTEBRAL ARTERY CERVICAL SEGMENT:  Normal origin.  The vessel is normal in caliber throughout the neck. RIGHT EXTRACRANIAL CAROTID SEGMENT:  Normal caliber common carotid artery. Normal bifurcation and cervical internal carotid artery. No stenosis or dissection. LEFT EXTRACRANIAL CAROTID SEGMENT:  Severe atherosclerotic disease of the bifurcation. Severe near occlusive plaque stenosis proximal left cervical ICA with greater than 90% stenosis. NASCET criteria was used to determine the degree of internal carotid artery diameter stenosis. INTRACRANIAL VASCULATURE INTERNAL CAROTID ARTERIES: Laterally projecting 5.3 x 5.7 mm left supraclinoid ICA aneurysm. Otherwise, normal enhancement of the intracranial portions of the internal carotid arteries. Normal ophthalmic artery origins. ANTERIOR CIRCULATION:  Symmetric A1 segments and anterior cerebral arteries with normal enhancement. Normal anterior communicating artery. MIDDLE CEREBRAL ARTERY CIRCULATION:  M1 segment and middle cerebral artery branches demonstrate normal enhancement bilaterally. 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 .  Posterior inferior cerebellar artery origins are normal. Normal vertebral basilar junction. BASILAR ARTERY:  Basilar artery is normal in caliber. Normal superior cerebellar arteries. POSTERIOR CEREBRAL ARTERIES: Both posterior cerebral arteries arises from the basilar tip. Both arteries demonstrate normal enhancement. Normal posterior communicating arteries. VENOUS STRUCTURES:  Normal. NON VASCULAR ANATOMY BONY STRUCTURES:  No acute osseous abnormality. SOFT TISSUES OF THE NECK:  Normal. THORACIC INLET:  Unremarkable.      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 Severe near occlusive plaque stenosis proximal left cervical ICA with greater than 90% stenosis, worsened compared to the prior exam. 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 I personally communicated this study with   Dr. Tj Redding   on 10/24/2023 8:29 PM. Workstation performed: [id number]     CT stroke alert brain    Result Date: 10/24/2023  Narrative: CT BRAIN - STROKE ALERT PROTOCOL INDICATION:   Stroke Alert. COMPARISON:  None. TECHNIQUE:  CT examination of the brain was performed. In addition to axial images, coronal reformatted images were created and submitted for interpretation. Radiation dose length product (DLP) for this visit:  892.15 mGy-cm . This examination, like all CT scans performed in the [facility], was performed utilizing techniques to minimize radiation dose exposure, including the use of iterative  reconstruction and automated exposure control. IMAGE QUALITY:  Diagnostic. FINDINGS: PARENCHYMA: Decreased attenuation is noted in periventricular and subcortical white matter demonstrating an appearance that is statistically most likely to represent moderate microangiopathic change. No CT signs of acute infarction. No intracranial mass, mass effect or midline shift. No acute parenchymal hemorrhage. Severe valcular calcifications noted. VENTRICLES AND EXTRA-AXIAL SPACES:  Normal for the patient's age. VISUALIZED ORBITS: Normal visualized orbits. PARANASAL SINUSES: Normal visualized paranasal sinuses. CALVARIUM AND EXTRACRANIAL SOFT TISSUES:   Normal.     Impression: No acute intracranial abnormality. Chronic microangiopathic changes. I personally discussed this study with   Dr. Nithya Caceres   on 10/24/2023 8:29 PM. Workstation performed: [id number]         ** Please Note: Dragon 360 Dictation voice to text software was used in the creation of this document.  **

## 2023-10-25 NOTE — PROGRESS NOTES
[address] [facility]  Progress Note  Name: Judah Zamudio I  MRN: [id number]  Unit/Bed#: [facility] 059-38 I Date of Admission: 10/24/2023   Date of Service: 10/25/2023 I Hospital Day: 1    Assessment/Plan   * Left-sided weakness  Assessment & Plan  Presented with left-sided weakness, thrombolytics were declined by patient and family. CT head: No acute intracranial abnormality. Chronic microangiopathic changes. 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 No mass effect or hemorrhagic conversion. PLAN:  Currently on stroke pathway. Neurology following, continue stroke pathway, permissive hypertension, dual antiplatelet with aspirin and Brilinta for 90 days followed by aspirin monotherapy. Follow echocardiogram.  PT OT  PMR following. Currently n.p.o. with NG tube. Swallow therapy following. Atorvastatin 40 mg daily. Patient was also started on Brilinta as patient has Plavix allergy. Renal cell carcinoma of right kidney [facility])  Assessment & Plan  Status post right nephrectomy; follows up with urology. Chronic renal disease, stage IV (HCC)  Assessment & Plan  Baseline creatinine between 1.9-2.4. Currently at baseline. Trend BMP.     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 chronic kidney disease, without long-term current use of insulin ([facility])  Assessment & Plan  Lab Results   Component Value Date    HGBA1C 6.7 (H) 10/24/2023     On  every 6 hr insulin sliding scale with Accu-Cheks per protocol  (P) 166.25      Essential hypertension  Assessment & Plan  On permissive hypertension for now per neurology  Prn labetalol if BP significantly elevated             . VTE Pharmacologic Prophylaxis:   Pharmacologic: Enoxaparin (Lovenox)  Mechanical VTE Prophylaxis in Place: Yes    Patient Centered Rounds: I have performed bedside rounds with nursing staff today. Discussions with Specialists or Other Care Team Provider: , neurology    Education and Discussions with Family / Patient: Plan of care with patient. Called and left a voicemail for wife. Time Spent for Care: 30 minutes. More than 50% of total time spent on counseling and coordination of care as described above. Current Length of Stay: 1 day(s)    Current Patient Status: Inpatient   Certification Statement: The patient will continue to require additional inpatient hospital stay due to delay stable due to ongoing stroke work-up. Discharge Plan: Will need rehab on discharge when stable, will likely be here for another 48 hours at least.    Code Status: Level 1 - Full Code      Subjective:   G-tube was placed. Patient denies any acute pain. He is tired, lethargic and somnolent. Objective:     Vitals:   Temp (24hrs), Av.3 °F (36.8 °C), Min:97.8 °F (36.6 °C), Max:98.8 °F (37.1 °C)    Temp:  [97.8 °F (36.6 °C)-98.8 °F (37.1 °C)] 97.8 °F (36.6 °C)  HR:  [] 86  Resp:  [14-30] 29  BP: (165-218)/() 169/83  SpO2:  [94 %-99 %] 96 %  Body mass index is 30.92 kg/m². Input and Output Summary (last 24 hours):     No intake or output data in the 24 hours ending 10/25/23 8175    Physical Exam:     Physical Exam  Constitutional:       General: He is not in acute distress. Comments: Somnolent   Cardiovascular:      Rate and Rhythm: Normal rate and regular rhythm. Heart sounds: Normal heart sounds. No murmur heard. Pulmonary:      Effort: No respiratory distress. Breath sounds: Normal breath sounds. No wheezing or rales. Abdominal:      General: Bowel sounds are normal. There is no distension. Palpations: Abdomen is soft. Tenderness:  There is no abdominal tenderness. Skin:     General: Skin is warm. Neurological:      Mental Status: He is alert. Comments: Wakes up on calling his name. left-sided weakness with facial droop and dysarthria noted.            Additional Data:     Labs:    Results from last 7 days   Lab Units 10/24/23  2054   WBC Thousand/uL 12.02*   HEMOGLOBIN g/dL 13.6   HEMATOCRIT % 40.1   PLATELETS Thousands/uL 152     Results from last 7 days   Lab Units 10/25/23  0617   SODIUM mmol/L 139   POTASSIUM mmol/L 4.5   CHLORIDE mmol/L 103   CO2 mmol/L 27   BUN mg/dL 30*   CREATININE mg/dL 2.04*   ANION GAP mmol/L 9   CALCIUM mg/dL 9.0   ALBUMIN g/dL 3.8   TOTAL BILIRUBIN mg/dL 0.89   ALK PHOS U/L 66   ALT U/L 10   AST U/L 14   GLUCOSE RANDOM mg/dL 141*     Results from last 7 days   Lab Units 10/24/23  2054   INR  0.99     Results from last 7 days   Lab Units 10/25/23  1229 10/25/23  0612 10/25/23  0057 10/24/23  2026   POC GLUCOSE mg/dl 140 164* 147* 214*     Results from last 7 days   Lab Units 10/24/23  2054   HEMOGLOBIN A1C % 6.7*           Recent Cultures (last 7 days):           Last 24 Hours Medication List:   Current Facility-Administered Medications   Medication Dose Route Frequency Provider Last Rate    aspirin  81 mg Per NG Tube Daily Lula Jaeger MD      atorvastatin  40 mg Per NG Tube QPM Lula Jaeger MD      enoxaparin  40 mg Subcutaneous Daily Liane Walters MD      insulin lispro  1-6 Units Subcutaneous Q6H [facility] Dennis Martin MD      labetalol  10 mg Intravenous Q6H PRN Liane Walters MD      multi-electrolyte  75 mL/hr Intravenous Continuous Liane Walters MD 75 mL/hr (10/25/23 1408)    ondansetron  4 mg Intravenous Q6H PRN Liane Walters MD      pantoprazole  40 mg Intravenous Q12H [facility] Liane Walters MD      ticagrelor  90 mg Per NG Tube Q12H [facility] Lula Jaeger MD          Today, Patient Was Seen By: Lula Jaeger MD    ** Please Note: Dictation voice to text software may have been used in the creation of this document.  **

## 2023-10-25 NOTE — ASSESSMENT & PLAN NOTE
Presented with left-sided weakness, thrombolytics were declined by patient and family. CT head: No acute intracranial abnormality. Chronic microangiopathic changes. 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 No mass effect or hemorrhagic conversion. PLAN:  Currently on stroke pathway. Neurology following, continue stroke pathway, permissive hypertension, dual antiplatelet with aspirin and Brilinta for 90 days followed by aspirin monotherapy. Follow echocardiogram.  PT OT  PMR following. Currently n.p.o. with NG tube. Swallow therapy following. Atorvastatin 40 mg daily. Patient was also started on Brilinta as patient has Plavix allergy.

## 2023-10-25 NOTE — OCCUPATIONAL THERAPY NOTE
Occupational Therapy Evaluation     Patient Name: Van Greene  QPTFE'O Date: 10/25/2023  Problem List  Principal Problem:    Left-sided weakness  Active Problems:    Essential hypertension    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 chronic kidney disease, without long-term current use of insulin (HCC)    Chronic renal disease, stage IV (HCC)    Renal cell carcinoma of right kidney ([address])    History of GI bleed    Past Medical History  Past Medical History:   Diagnosis Date    Hypertension     TIA (transient ischemic attack)      Past Surgical History  Past Surgical History:   Procedure Laterality Date    CYSTECTOMY      Per patient cyst removal from his back    LASIK      RI LAPAROSCOPY RADICAL NEPHRECTOMY Right 7/6/2022    Procedure: NEPHRECTOMY RADICAL LAPAROSCOPIC W/ ROBOTICS, poss open;  Surgeon: Janae Callaway MD;  Location: [facility];  Service: Urology      10/25/23 0915   OT Last Visit   OT Visit Date 10/25/23   Note Type   Note type Evaluation   Pain Assessment   Pain Assessment Tool 0-10   Pain Score No Pain   Restrictions/Precautions   Weight Bearing Precautions Per Order No   Other Precautions (+NG tube/NPO, left Hemiplegia, left Unilateral neglect)   Home Living   Type of [facility] One level;Stairs to enter with rails  (+6STE)   Bathroom Shower/Tub Walk-in shower   Bathroom Toilet Raised   Bathroom Equipment Grab bars in [address] Walker;Cane   Prior Function   Level of [place] Independent with ADL's/I IADL's, I with mobility   Lives With Spouse   Receives Help From Family   IADLs Independent with medication management; Independent with meal prep; Independent with driving   Falls in the last 6 months 0   Vocational Retired   Lifestyle   Autonomy I with ADL's/IADL's, no AD with functional mobility +drives   Reciprocal Relationships supportive spouse home to assist as needed.    Service to Others retired   Intrinsic Gratification watching TV   General   Family/Caregiver Present No   ADL   Eating Assistance 1  Total Assistance   Eating Deficit (NPO)   Grooming Assistance 2  Maximal Assistance   UB Bathing Assistance 2  Maximal Assistance   LB Bathing Assistance 1  Total Assistance   UB Dressing Assistance 2  Maximal Assistance   LB Dressing Assistance 1  Total Assistance   Toileting Assistance  1  Total Assistance   Toileting Deficit (+[place] Catheter)   Bed Mobility   Supine to Sit 2  Maximal assistance   Additional items Assist x 2   Sit to Supine 2  Maximal assistance   Additional items Assist x 2   Additional Comments pt performed EOB sitting with mod a for static sitting balance. Transfers   Sit to Stand Unable to assess   Stand to Sit Unable to assess   Additional Comments Deferred transfers 2* to above SBP parameters 204/95 at EOB, once transferred to supine 183/77-RN notified and aware. Functional Mobility   Additional Comments unable to assess   Balance   Static Sitting Poor   Dynamic Sitting Poor -   Static Standing Zero   Activity Tolerance   Activity Tolerance Patient limited by fatigue;Patient limited by pain; Other (Comment)  (Elevated BP)   Medical Staff Made Aware PT Trae Must due to the patient's co-morbidities, clinically unstable presentation, and present impairments which are a regression from the patient's baseline.     Nurse Made Aware RN cleared pt for therapy for lt therapy 2* to elevated BP   RUE Assessment   RUE Assessment WFL   LUE Assessment   LUE Assessment X  (left UE hemiplegia -Flaccid/hypotonic)   Hand Function   Gross Motor Coordination Impaired  (left)   Fine Motor Coordination Impaired  (left)   Sensation   Light Touch Severe deficits in the LUE;Severe deficits in the LLE   Vision - Complex Assessment   Ocular Range of Motion Impaired-continue to assess visual acuity/visual perception  (Impaired left eye +extropia noted -pt able to read employee name tag.)   Tracking Impaired  (left eye) Convergence (Impaired)   Acuity Able to read employee name badge without difficulty   Perception   Inattention/Neglect Cues to attend to left side of body;Cues to maintain midline in sitting   Cognition   Overall Cognitive Status Impaired   Arousal/Participation Alert; Responsive; Cooperative   Attention Attends with cues to redirect   Orientation Level Oriented X4   Memory Decreased recall of precautions   Following Commands Follows one step commands with increased time or repetition   Comments pt pleasant and motivated, oriented, slow processing, +left facial droop, dysarthric speech , impaired sustained attention, followed 1 step simple commands with repetition. Continue to assess. Assessment   Limitation Decreased ADL status; Decreased UE ROM; Decreased UE strength;Decreased Safe judgement during ADL;Decreased cognition;Decreased endurance;Decreased sensation;Visual deficit; Decreased fine motor control;Decreased self-care trans;Decreased high-level ADLs; Non-func L UE   Prognosis Fair   Assessment Pt is a 70 y.o. male who was admitted to [address] [place] [address] on 10/24/2023 with dysarthria, headache, dizziness, and  Left-sided weakness s/p right paramedian veronique infarct. Patient  has a past medical history of Hypertension and TIA (transient ischemic attack). At baseline pt was completing I with ADL's, shares homemaking tasks with spouse no AD with functional mobility, +drives, retired. Pt lives with spouse in a mobile home with 6 HOLLY. Currently pt requires max a/total assist for overall ADLS and max a x 2 supine<>sit for functional transfers. Pt currently presents with impairments in the following categories - activity tolerance, endurance, standing balance/tolerance, sitting balance/tolerance, UE strength, UE ROM, FMC, GMC, safety , judgement , attention , sequencing , (L) attention , proprioception , coping skills , communication, and interpersonal skills.  These impairments, as well as pt's fatigue, (L) hemiplegia, decreased caregiver support, and risk for falls  limit pt's ability to safely engage in all baseline areas of occupation, includingeating, grooming, bathing, dressing, toileting, functional mobility/transfers, community mobility, laundry , driving, house maintenance, medication management, meal prep, cleaning, social participation , and leisure activities  From OT standpoint, recommend STR upon D/C. The patient's raw score on the AM-PAC Daily Activity Inpatient Short Form is 10. A raw score of less than 19 suggests the patient may benefit from discharge to post-acute rehabilitation services. Please refer to the recommendation of the Occupational Therapist for safe discharge planning. OT will continue to follow to address the below stated goals. Goals   Patient Goals get stronger   LTG Time Frame 10-14   Long Term Goal #1 see goals below   Plan   Treatment Interventions ADL retraining;Visual perceptual retraining;Functional transfer training;UE strengthening/ROM; Endurance training;Cognitive reorientation;Patient/family training;Equipment evaluation/education; Neuromuscular reeducation; Fine motor coordination activities; Compensatory technique education;Continued evaluation; Energy conservation; Activityengagement   Goal Expiration Date 11/08/23   OT Frequency 3-5x/wk   Discharge Recommendation   Recommendation Physiatry Consult   OT Discharge Recommendation Post acute rehabilitation services   AM-[facility] Daily Activity Inpatient   Lower Body Dressing 1   Bathing 2   Toileting 1   Upper Body Dressing 2   Grooming 2   Eating 2   Daily Activity Raw Score 10   Turning Head Towards Sound 3   Follow Simple Instructions 3   Low Function Daily Activity Raw Score 16   Low Function Daily Activity Standardized Score  27.65   AM-PAC Applied Cognition Inpatient   Following a Speech/Presentation 1   Understanding Ordinary Conversation 3   Taking Medications 2   Remembering Where Things Are Placed or Put Away 2   Remembering List of 4-5 Errands 1   Taking Care of Complicated Tasks 1   Applied Cognition Raw Score 10   Applied Cognition Standardized Score 24.98   Barthel Index   Feeding 0   Bathing 0   Grooming Score 0   Dressing Score 5   Bladder Score 0   Bowels Score 0   Toilet Use Score 0   Transfers (Bed/Chair) Score 0   Mobility (Level Surface) Score 0   Stairs Score 0   Barthel Index Score 5   Modified Drake Scale   Modified Douglas Scale 5   End of Consult   Patient Position at End of Consult Supine;Bed/Chair alarm activated; All needs within reach   Nurse Communication Nurse aware of consult       Occupational Therapy Goals:    *Min a  with bed mobility to engage in functional tasks. *Min a  Adl's after setup with use of AE PRN  *Min a toileting and clothing management   *Min a functional transfers to/from all surfaces with Fair + dynamic balance and safety for participation in dynamic adls and iadl tasks   *Assess DME needs   *Increase activity tolerance to 25-30 minutes for participation in adls and enjoyable activities  *Pt to participate in further cognitive testing with good attention and participation to assist with safe d/c recommendations  *Demonstrate good carryover of pt/family education and training with good tolerance for increased safety and independence with ADL's/ADl's. *Pt will improve standing balance to 4-5 minutes with functional tasks to increase I with toileting/transfers. *Patient will demonstrate 100% carryover of energy conservation techniques t/o functional I/ADL/leisure tasks w/o cues s/p skilled education to increase endurance during functional tasks  *Pt will follow 100% simple one step verbal commands and be A/Ox4 consistently t/o use of external environmental cues w/ mod I  *Pt will engage in ongoing visual perceptual assessments, screenings, and activities to improve ADL performance, as well as to assist in safety/d/c planning.    *Pt will tolerate mod manual NDT facilitation to left UE during functional ADL/leisure tasks with mod I to improve functional engagement  to increase neuroplastic recovery  *Pt will utilize LUE as stabilizer assist with all self care/mobility activities w minimal cues  *Pt will demonstrate 100% carryover of one handed dressing techniques w/ mod I s/p skilled education w/o cues   *OTR to assess functional mobility when appropriate.   Yulissa Heart MOT, OTR/L

## 2023-10-25 NOTE — ED NOTES
Per internal medicine physician, attempt PO brilinta, benefit outweighs risk. Maintain otherwise npo status, no feedings.      Vesta Marrow IV, RN  10/24/23 0057

## 2023-10-25 NOTE — UTILIZATION REVIEW
NOTIFICATION OF INPATIENT ADMISSION   AUTHORIZATION REQUEST   SERVICING FACILITY:   [address] [place] [address]  Address: [address] [place] [address]  Tax ID: [id number]  NPI: [id number] ATTENDING PROVIDER:  Attending Name and NPI#: James Mcgrath Md [[id number]]  Address: [address] [place] [address] [address] [address]  Phone: [phone]   ADMISSION INFORMATION:  Place of Service: [address] [place] [address]  Place of Service Code: 21  Inpatient Admission Date/Time: 10/24/23 10:05 PM  Discharge Date/Time: No discharge date for patient encounter. Admitting Diagnosis Code/Description:  Hypertension [I10]  CVA (cerebral vascular accident) ([address]) [I63.9]  Stroke ([address]) [I63.9]  Stroke-like symptoms [R29.90]  Dysphagia [R13.10]     UTILIZATION REVIEW CONTACT:  Rere Farrell Utilization   Network Utilization Review Department  Phone: [phone]  Fax: [phone]  Email: [email or web address]  Contact for approvals/pending authorizations, clinical reviews, and discharge. PHYSICIAN ADVISORY SERVICES:  Medical Necessity Denial & [facility] Review  Phone: [phone]  Fax: [phone]  Email: [email or web address]     DISCHARGE SUPPORT TEAM:  For Patients Discharge Needs & Updates  Phone: [phone] opt. 2 Fax: [phone]  Email: [email or web address]

## 2023-10-25 NOTE — CONSULTS
PHYSICAL MEDICINE AND REHABILITATION CONSULT NOTE  Sarah Blue 70 y.o. male MRN: [id number]  Unit/Bed#: [facility] 717-01 Encounter: [id number]    Requested by (Physician/Service): Ameya Erickson MD  Reason for Consultation:  Assessment of rehabilitation needs    Assessment:  Rehabilitation Diagnosis:   Right paramedian veronique infarct   Left hemiplegia  Dysarthria  Left hemisensory deficit   Impaired mobility and self care    Recommendations:  Rehabilitation Plan:  Therapy is pending however given deficits on exam he will require inpatient rehabilitation and may be a candidate for acute inpatient rehabilitation once medically stable. Currently he has NG tube in place and is NPO. He also continues on stroke pathway with permissive hypertension. Covid-19 Testing: [facility] inpatient rehabilitation units require testing within 48 hours of all potential admissions at this time. *Re-testing is NOT required for patients recovering from COVID-19 infection if isolation has been discontinued per CDC criteria. Medical Co-morbidities Plan:  Hypertension   Diabetes type 2  Chronic renal disease stage 3  Renal cell carcinoma of the right kidney  History of GI bleed  Hx of CVA  DVT ppx: Lovenox and SCD    Thank you for this consultation. Do not hesitate to contact service with further questions. KAYODE Craig  PM&R    I have spent a total time of 30 minutes on 10/25/23 in caring for this patient including Patient and family education, Counseling / Coordination of care, Documenting in the medical record, Reviewing / ordering tests, medicine, procedures  , Obtaining or reviewing history  , and Communicating with other healthcare professionals .     History of Present Illness:  Sarah Blue is a 70 y.o. male with a PMH of renal cell carcinoma of the right kidney, CKD stage IV, diabetes type 2, bilateral lower extremity edema and hypertension who presented to the [address] [place] [address] on 10/24/2023 with acute onset of dysarthria, left facial droop, headache, dizziness and left sided weakness. CT head showed no acute abnormalities. CTA showed left vertebral artery occlusion similar to prior exam. Severe near occlusive plaque in left cervical ICA and stable around 5 mm left supraclinoid ICA aneurysm. He was not give TNK due to concern for aneurysm rupture as a TNK side effect. MRI showed early subacute lacunar infarct in the right paramedian veronique. No defect/hemorrhagic conversion noted. PM&R are consulted for rehabilitation recommendations. The patient was seen in his room working with therapy. He was able to sit EOB with therapy however treatment was limited due to elevated blood pressure and he was returned to bed. He reports decreased sensation to touch LUE/LLE when compared to the right. He currently denies any pain but states that he tried to move his LUE/LLE but can't. Review of Systems: 10 point ROS negative except for what is noted in HPI    Function:  Prior level of function and living situation: Per patient he lives in a one level home with about 6 or 7 HOLLY. He reports being independent. He used a cane after his last stroke but has not been needing to use it recently. He lives with his spouse. Current level of function:  Physical Therapy: Pending   Occupational Therapy: Pending   Speech Therapy: NPO with tube feed and may have ice chips sparingly after oral care       Physical Exam:  BP (!) 179/82 (BP Location: Right arm)   Pulse 96   Temp 98.2 °F (36.8 °C) (Oral)   Resp (!) 30   Wt 104 kg (228 lb 13.4 oz)   SpO2 97%   BMI 31.04 kg/m²      No intake or output data in the 24 hours ending 10/25/23 0941    Body mass index is 31.04 kg/m². Physical Exam  Constitutional:       General: He is not in acute distress. Appearance: He is not toxic-appearing. HENT:      Head: Atraumatic.       Comments: Left facial droop     Right Ear: External ear normal.      Left Ear: External ear normal.      Nose:      Comments: NG tube in place      Mouth/Throat:      Mouth: Mucous membranes are moist.      Pharynx: Oropharynx is clear. Cardiovascular:      Rate and Rhythm: Rhythm irregular. Pulmonary:      Effort: Pulmonary effort is normal. No respiratory distress. Abdominal:      General: There is no distension. Musculoskeletal:      Comments: LUE/LLE: 0/5 throughout  RUE/RLE: 5/5 throughout   Mild increased tone LUE    Skin:     General: Skin is warm. Neurological:      Mental Status: He is alert and oriented to person, place, and time. Comments: Left hemiplegia, significant dysarthria, left facial droop, decreased sensation to touch LUE/LLE.     Psychiatric:         Mood and Affect: Mood normal.          Social History:    Social History     Socioeconomic History    Marital status: /Civil Union     Spouse name: Not on file    Number of children: Not on file    Years of education: Not on file    Highest education level: Not on file   Occupational History    Occupation: Retired 2015 -     Tobacco Use    Smoking status: Never    Smokeless tobacco: Never   Vaping Use    Vaping Use: Never used   Substance and Sexual Activity    Alcohol use: Never    Drug use: Never    Sexual activity: Not on file   Other Topics Concern    Not on file   Social History Narrative    Denies drug use - As per Picacho Brayden    Consumes on average 1 cup of regular coffee per day      Social Determinants of Health     Financial Resource Strain: Low Risk  (11/15/2022)    Overall Financial Resource Strain (CARDIA)     Difficulty of Paying Living Expenses: Not very hard   Food Insecurity: No Food Insecurity (7/8/2022)    Hunger Vital Sign     Worried About Running Out of Food in the Last Year: Never true     [address] in the Last Year: Never true   Transportation Needs: No Transportation Needs (11/15/2022)    PRAPARE - Transportation     Lack of Transportation (Medical): No     Lack of Transportation (Non-Medical):  No   Physical Activity: Not on file   Stress: Not on file   Social Connections: Not on file   Intimate Partner Violence: Not on file   Housing Stability: Low Risk  (7/8/2022)    Housing Stability Vital Sign     Unable to Pay for Housing in the Last Year: No     Number of Places Lived in the Last Year: 1     Unstable Housing in the Last Year: No        Family History:    Family History   Problem Relation Age of Onset    Colon cancer Mother     Diabetes type II Mother     Heart disease Mother     Prostate cancer Father          Medications:     Current Facility-Administered Medications:     aspirin chewable tablet 81 mg, 81 mg, Per NG Tube, Daily, Pooja Fuchs MD, 81 mg at 10/25/23 0934    atorvastatin (LIPITOR) tablet 40 mg, 40 mg, Per NG Tube, QPM, Pooja Fuchs MD    enoxaparin (LOVENOX) subcutaneous injection 40 mg, 40 mg, Subcutaneous, Daily, Horacio Larson MD, 40 mg at 10/25/23 0934    insulin lispro (HumaLOG) 100 units/mL subcutaneous injection 1-6 Units, 1-6 Units, Subcutaneous, Q6H [address], 1 Units at 10/25/23 0628 **AND** Fingerstick Glucose (POCT), , , Q6H, Horacio Larson MD    labetalol (NORMODYNE) injection 10 mg, 10 mg, Intravenous, Q6H PRN, Horacio Larson MD, 10 mg at 10/25/23 0606    multi-electrolyte (PLASMALYTE-A/ISOLYTE-S PH 7.4) IV solution, 75 mL/hr, Intravenous, Continuous, Horacio Larson MD, Last Rate: 75 mL/hr at 10/24/23 2313, 75 mL/hr at 10/24/23 2313    ondansetron (ZOFRAN) injection 4 mg, 4 mg, Intravenous, Q6H PRN, Horacio Larson MD    pantoprazole (PROTONIX) injection 40 mg, 40 mg, Intravenous, Q12H [address], Horacio Larson MD, 40 mg at 10/25/23 0934    ticagrelor (BRILINTA) tablet 90 mg, 90 mg, Per NG Tube, Q12H [address], Pooja Fuchs MD, 90 mg at 10/25/23 2196    Past Medical History:     Past Medical History:   Diagnosis Date    Hypertension     TIA (transient ischemic attack)         Past Surgical History:     Past Surgical History:   Procedure Laterality Date    CYSTECTOMY      Per patient cyst removal from his back    LASIK      MT LAPAROSCOPY RADICAL NEPHRECTOMY Right 7/6/2022    Procedure: NEPHRECTOMY RADICAL LAPAROSCOPIC W/ ROBOTICS, poss open;  Surgeon: Kayla Chin MD;  Location: [facility];  Service: Urology         Allergies: Allergies   Allergen Reactions    Plavix [Clopidogrel] Rash     Stopped two days ago because got a rash and doctors thought it was from this            LABORATORY RESULTS:      Lab Results   Component Value Date    HGB 13.6 10/24/2023    HCT 40.1 10/24/2023    WBC 12.02 (H) 10/24/2023     Lab Results   Component Value Date    BUN 30 (H) 10/25/2023    K 4.5 10/25/2023     10/25/2023    CREATININE 2.04 (H) 10/25/2023     Lab Results   Component Value Date    PROTIME 13.0 10/24/2023    INR 0.99 10/24/2023        DIAGNOSTIC STUDIES: Reviewed  XR abdomen 1 vw portable    Result Date: 10/25/2023  Impression: Enteric tube positioned within the distal stomach. . Workstation performed: [id number]     XR abdomen 1 vw portable    Result Date: 10/25/2023  Impression: Enteric tube coiled over the region of the mouth. This was subsequently repositioned. Workstation performed: [id number]     MRI brain wo contrast    Result Date: 10/25/2023  Impression: Acute to early subacute lacunar infarct in the right paramedian veronique. No mass effect or hemorrhagic conversion.  Workstation performed: [id number]     CTA stroke alert (head/neck)    Result Date: 10/24/2023  Impression: Occlusion of the left V3 and left V4 segments, similar to the prior exam. Severe near occlusive plaque stenosis proximal left cervical ICA with greater than 90% stenosis, worsened compared to the prior exam. 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 I personally communicated this study with   Dr. Arash Hudson   on 10/24/2023 8:29 PM. Workstation performed: [id number]     CT stroke alert brain    Result Date: 10/24/2023  Impression: No acute intracranial abnormality. Chronic microangiopathic changes.  I personally discussed this study with   Dr. Shaye Guerra   on 10/24/2023 8:29 PM. Workstation performed: [id number]

## 2023-10-26 ENCOUNTER — APPOINTMENT (INPATIENT)
Dept: NON INVASIVE DIAGNOSTICS | Facility: HOSPITAL | Age: 78
DRG: 065 | End: 2023-10-26
Payer: COMMERCIAL

## 2023-10-26 LAB
ANION GAP SERPL CALCULATED.3IONS-SCNC: 8 MMOL/L
BASOPHILS # BLD AUTO: 0.06 THOUSANDS/ÂΜL (ref 0–0.1)
BASOPHILS NFR BLD AUTO: 1 % (ref 0–1)
BUN SERPL-MCNC: 27 MG/DL (ref 5–25)
CALCIUM SERPL-MCNC: 8.4 MG/DL (ref 8.4–10.2)
CHLORIDE SERPL-SCNC: 104 MMOL/L (ref 96–108)
CO2 SERPL-SCNC: 27 MMOL/L (ref 21–32)
CREAT SERPL-MCNC: 1.94 MG/DL (ref 0.6–1.3)
EOSINOPHIL # BLD AUTO: 0.31 THOUSAND/ÂΜL (ref 0–0.61)
EOSINOPHIL NFR BLD AUTO: 2 % (ref 0–6)
ERYTHROCYTE [DISTWIDTH] IN BLOOD BY AUTOMATED COUNT: 12.6 % (ref 11.6–15.1)
GFR SERPL CREATININE-BSD FRML MDRD: 32 ML/MIN/1.73SQ M
GLUCOSE SERPL-MCNC: 120 MG/DL (ref 65–140)
GLUCOSE SERPL-MCNC: 125 MG/DL (ref 65–140)
GLUCOSE SERPL-MCNC: 125 MG/DL (ref 65–140)
GLUCOSE SERPL-MCNC: 135 MG/DL (ref 65–140)
GLUCOSE SERPL-MCNC: 149 MG/DL (ref 65–140)
HCT VFR BLD AUTO: 38.2 % (ref 36.5–49.3)
HGB BLD-MCNC: 12.9 G/DL (ref 12–17)
IMM GRANULOCYTES # BLD AUTO: 0.06 THOUSAND/UL (ref 0–0.2)
IMM GRANULOCYTES NFR BLD AUTO: 1 % (ref 0–2)
LYMPHOCYTES # BLD AUTO: 1.54 THOUSANDS/ÂΜL (ref 0.6–4.47)
LYMPHOCYTES NFR BLD AUTO: 12 % (ref 14–44)
MCH RBC QN AUTO: 31.2 PG (ref 26.8–34.3)
MCHC RBC AUTO-ENTMCNC: 33.8 G/DL (ref 31.4–37.4)
MCV RBC AUTO: 92 FL (ref 82–98)
MONOCYTES # BLD AUTO: 1.32 THOUSAND/ÂΜL (ref 0.17–1.22)
MONOCYTES NFR BLD AUTO: 10 % (ref 4–12)
NEUTROPHILS # BLD AUTO: 9.47 THOUSANDS/ÂΜL (ref 1.85–7.62)
NEUTS SEG NFR BLD AUTO: 74 % (ref 43–75)
NRBC BLD AUTO-RTO: 0 /100 WBCS
PLATELET # BLD AUTO: 167 THOUSANDS/UL (ref 149–390)
PMV BLD AUTO: 12 FL (ref 8.9–12.7)
POTASSIUM SERPL-SCNC: 3.8 MMOL/L (ref 3.5–5.3)
RBC # BLD AUTO: 4.14 MILLION/UL (ref 3.88–5.62)
SODIUM SERPL-SCNC: 139 MMOL/L (ref 135–147)
WBC # BLD AUTO: 12.76 THOUSAND/UL (ref 4.31–10.16)

## 2023-10-26 PROCEDURE — 82948 REAGENT STRIP/BLOOD GLUCOSE: CPT

## 2023-10-26 PROCEDURE — 93880 EXTRACRANIAL BILAT STUDY: CPT | Performed by: SURGERY

## 2023-10-26 PROCEDURE — C9113 INJ PANTOPRAZOLE SODIUM, VIA: HCPCS | Performed by: INTERNAL MEDICINE

## 2023-10-26 PROCEDURE — 99232 SBSQ HOSP IP/OBS MODERATE 35: CPT | Performed by: INTERNAL MEDICINE

## 2023-10-26 PROCEDURE — 99232 SBSQ HOSP IP/OBS MODERATE 35: CPT | Performed by: STUDENT IN AN ORGANIZED HEALTH CARE EDUCATION/TRAINING PROGRAM

## 2023-10-26 PROCEDURE — 93880 EXTRACRANIAL BILAT STUDY: CPT

## 2023-10-26 PROCEDURE — 92526 ORAL FUNCTION THERAPY: CPT

## 2023-10-26 PROCEDURE — 85025 COMPLETE CBC W/AUTO DIFF WBC: CPT | Performed by: INTERNAL MEDICINE

## 2023-10-26 PROCEDURE — 99223 1ST HOSP IP/OBS HIGH 75: CPT | Performed by: SURGERY

## 2023-10-26 PROCEDURE — 80048 BASIC METABOLIC PNL TOTAL CA: CPT | Performed by: INTERNAL MEDICINE

## 2023-10-26 PROCEDURE — 99232 SBSQ HOSP IP/OBS MODERATE 35: CPT | Performed by: NURSE PRACTITIONER

## 2023-10-26 PROCEDURE — 93971 EXTREMITY STUDY: CPT

## 2023-10-26 RX ORDER — SODIUM CHLORIDE, SODIUM GLUCONATE, SODIUM ACETATE, POTASSIUM CHLORIDE, MAGNESIUM CHLORIDE, SODIUM PHOSPHATE, DIBASIC, AND POTASSIUM PHOSPHATE .53; .5; .37; .037; .03; .012; .00082 G/100ML; G/100ML; G/100ML; G/100ML; G/100ML; G/100ML; G/100ML
50 INJECTION, SOLUTION INTRAVENOUS CONTINUOUS
Status: DISCONTINUED | OUTPATIENT
Start: 2023-10-26 | End: 2023-10-27

## 2023-10-26 RX ORDER — LABETALOL HYDROCHLORIDE 5 MG/ML
10 INJECTION, SOLUTION INTRAVENOUS EVERY 6 HOURS PRN
Status: DISCONTINUED | OUTPATIENT
Start: 2023-10-26 | End: 2023-10-26

## 2023-10-26 RX ORDER — AMLODIPINE BESYLATE 5 MG/1
5 TABLET ORAL DAILY
Status: DISCONTINUED | OUTPATIENT
Start: 2023-10-26 | End: 2023-10-27

## 2023-10-26 RX ORDER — LABETALOL HYDROCHLORIDE 5 MG/ML
10 INJECTION, SOLUTION INTRAVENOUS EVERY 6 HOURS PRN
Status: DISCONTINUED | OUTPATIENT
Start: 2023-10-26 | End: 2023-11-04 | Stop reason: HOSPADM

## 2023-10-26 RX ADMIN — PANTOPRAZOLE SODIUM 40 MG: 40 INJECTION, POWDER, FOR SOLUTION INTRAVENOUS at 20:49

## 2023-10-26 RX ADMIN — ENOXAPARIN SODIUM 40 MG: 40 INJECTION SUBCUTANEOUS at 08:12

## 2023-10-26 RX ADMIN — TICAGRELOR 90 MG: 90 TABLET ORAL at 08:12

## 2023-10-26 RX ADMIN — ATORVASTATIN CALCIUM 40 MG: 40 TABLET, FILM COATED ORAL at 17:55

## 2023-10-26 RX ADMIN — ASPIRIN 81 MG CHEWABLE TABLET 81 MG: 81 TABLET CHEWABLE at 08:12

## 2023-10-26 RX ADMIN — TICAGRELOR 90 MG: 90 TABLET ORAL at 20:49

## 2023-10-26 RX ADMIN — AMLODIPINE BESYLATE 5 MG: 5 TABLET ORAL at 10:00

## 2023-10-26 RX ADMIN — SODIUM CHLORIDE, SODIUM GLUCONATE, SODIUM ACETATE, POTASSIUM CHLORIDE, MAGNESIUM CHLORIDE, SODIUM PHOSPHATE, DIBASIC, AND POTASSIUM PHOSPHATE 50 ML/HR: .53; .5; .37; .037; .03; .012; .00082 INJECTION, SOLUTION INTRAVENOUS at 10:02

## 2023-10-26 RX ADMIN — PANTOPRAZOLE SODIUM 40 MG: 40 INJECTION, POWDER, FOR SOLUTION INTRAVENOUS at 08:12

## 2023-10-26 NOTE — PROGRESS NOTES
NEUROLOGY RESIDENCY PROGRESS NOTE     Name: Sarah Blue   Age & Sex: 70 y.o. male   MRN: [id number]  Unit/Bed#: [facility] 717-01   Encounter: [id number]    ASSESSMENT & PLAN     * Left-sided weakness  Assessment & Plan  Prehospital stroke alert on 10/24/2023  8:13 PM with initial NIHSS of 8 and LKW 6pm, initial Blood Pressure: (!) 187/91. Initial presenting deficits were dysarthria, left facial droop, left-sided weakness. Thrombolytic Decision: After a discussion of risks, benefits and alternatives (including best medical therapy excluding thrombolysis) reviewing inclusion and exclusion criteria the decision was made NOT to proceed with thrombolytic therapy by patient and his wife Lydia Orozco. Patient did not want to proceed with TNK due to known aneurysm and was concerned for bleeding. He understood that the current symptoms can be long lasting. Lab Results   Component Value Date    CHOLESTEROL 200 (H) 10/25/2023    TRIG 143 10/25/2023    HDL 40 10/25/2023    LDLCALC 131 (H) 10/25/2023       Vascular risk factors: diabetes, HTN, HLD, CKD, hx of renal cell carcinoma  Home meds: ASA 81mg daily    Workup:  CTH: no acute abnormality  CTA: Occlusion of the left V3 and left V4 segments, similar to the prior exam. Severe near occlusive plaque stenosis proximal left cervical ICA > 90% stenosis, worsened compared to the prior exam. Stable 5mm left supraclinoid ICA aneurysm  MRI showed early subacute lacunar infarct in the right paramedian veronique. No defect/hemorrhagic conversion noted. Pertinent scores:  - NIHSS: 8  Stroke Modified [place] Score: 1 (No significant disability. Able to carry out all usual activities, despite some symptoms)    Impression: MRI confirmed lacunar stroke in setting of multiple risk factors for small vessel disease    Plan:  Discussed plan with neurology attending, Dr. Hale Severs  - Continue stroke pathway  -Patient received ASA 325mg and Brilinta 90 mg.  Continues with ASA 81mg daily and Brilinta 90mg BID. -Patient is allergic to Plavix (causes whole body rash)  - S/p permissive HTN X24 hours  - Atorvastatin 40mg qhs  - Maintain glucose <180, SSI for coverage if indicated  - DVT ppx and SCDs  - Monitor on telemetry  - PT/OT/Speech/PM&R   - Stroke education  -Neuro checks  -A1C most recently 6.8 last month  -Stat CT Head for change in neuro status or GCS drop 2pts/1hr           SUBJECTIVE     No acute events overnight  Review of Systems   Constitutional:  Negative for chills and fever. HENT:  Negative for ear pain and sore throat. Eyes:  Negative for pain and visual disturbance. Respiratory:  Negative for cough and shortness of breath. Cardiovascular:  Negative for chest pain and palpitations. Gastrointestinal:  Negative for abdominal pain and vomiting. Genitourinary:  Negative for dysuria and hematuria. Musculoskeletal:  Negative for arthralgias and back pain. Skin:  Negative for color change and rash. Neurological:  Positive for facial asymmetry, speech difficulty, weakness and numbness. Negative for dizziness, tremors, seizures, syncope, light-headedness and headaches. Psychiatric/Behavioral: Negative. All other systems reviewed and are negative. OBJECTIVE     Patient ID: Caryn Finn is a 70 y.o. male. Vitals:    10/25/23 1500 10/25/23 1600 10/25/23 1925 10/26/23 0700   BP: 169/83 (!) 174/84 (!) 201/94 (!) 180/92   BP Location: Left arm      Pulse: 86 96  74   Resp: (!) 29 18  18   Temp: 97.8 °F (36.6 °C)   98 °F (36.7 °C)   TempSrc: Oral   Oral   SpO2: 96%   100%   Weight:       Height:          Temperature:   Temp (24hrs), Av.2 °F (36.8 °C), Min:97.8 °F (36.6 °C), Max:98.8 °F (37.1 °C)    Temperature: 98 °F (36.7 °C)      Physical Exam  Constitutional:       General: He is not in acute distress. Appearance: He is normal weight. He is ill-appearing. HENT:      Head: Normocephalic.       Nose: Nose normal.   Eyes:      Extraocular Movements: Extraocular movements intact. Conjunctiva/sclera: Conjunctivae normal.      Pupils: Pupils are equal, round, and reactive to light. Abdominal:      General: There is no distension. Tenderness: There is no abdominal tenderness. Musculoskeletal:         General: No swelling, tenderness, deformity or signs of injury. Right lower leg: No edema. Left lower leg: No edema. Skin:     General: Skin is warm and dry. Neurological:      Mental Status: He is alert and oriented to person, place, and time. Sensory: Sensory deficit present. Motor: Weakness present. Coordination: Coordination abnormal. Finger-Nose-Finger Test normal.      Gait: Gait abnormal.      Deep Tendon Reflexes:      Reflex Scores:       Bicep reflexes are 3+ on the right side and 3+ on the left side. Patellar reflexes are 3+ on the right side and 3+ on the left side. Comments: Global left-sided weakness of the upper and lower extremities with left-sided facial droop noted   Psychiatric:         Mood and Affect: Mood normal.         Speech: Speech is slurred. Behavior: Behavior normal.         Thought Content: Thought content normal.         Judgment: Judgment normal.          Neurologic Exam     Mental Status   Oriented to person, place, and time. Speech: slurred     Cranial Nerves     CN III, IV, VI   Pupils are equal, round, and reactive to light. Gait, Coordination, and Reflexes     Coordination   Finger to nose coordination: normal    Reflexes   Right biceps: 3+  Left biceps: 3+  Right patellar: 3+  Left patellar: 3+           LABORATORY DATA     Labs: I have personally reviewed pertinent reports.     Results from last 7 days   Lab Units 10/26/23  0513 10/24/23  2054   WBC Thousand/uL 12.76* 12.02*   HEMOGLOBIN g/dL 12.9 13.6   HEMATOCRIT % 38.2 40.1   PLATELETS Thousands/uL 167 152   NEUTROS PCT % 74  --    MONOS PCT % 10  --    EOS PCT % 2  --       Results from last 7 days   Lab Units 10/26/23  0513 10/25/23  0617 10/24/23  2054   SODIUM mmol/L 139 139 137   POTASSIUM mmol/L 3.8 4.5 4.3   CHLORIDE mmol/L 104 103 102   CO2 mmol/L 27 27 29   BUN mg/dL 27* 30* 32*   CREATININE mg/dL 1.94* 2.04* 1.99*   CALCIUM mg/dL 8.4 9.0 9.0   ALK PHOS U/L  --  66  --    ALT U/L  --  10  --    AST U/L  --  14  --      Results from last 7 days   Lab Units 10/25/23  0617   MAGNESIUM mg/dL 2.0     Results from last 7 days   Lab Units 10/25/23  0617   PHOSPHORUS mg/dL 3.4      Results from last 7 days   Lab Units 10/24/23  2054   INR  0.99   PTT seconds 28               IMAGING & DIAGNOSTIC TESTING     Radiology Results: I have personally reviewed pertinent reports. XR abdomen 1 vw portable   Final Result by Madiha Camarillo MD (10/25 2397)      Enteric tube positioned within the distal stomach. .               Workstation performed: [id number]         MRI brain wo contrast   Final Result by Ace Stahl MD (10/25 0113)      Acute to early subacute lacunar infarct in the right paramedian veronique. No mass effect or hemorrhagic conversion. Workstation performed: [id number]         XR abdomen 1 vw portable   Final Result by Madiha Camarillo MD (10/25 0088)      Enteric tube coiled over the region of the mouth. This was subsequently repositioned.             Workstation performed: [id number]         CTA stroke alert (head/neck)   Final Result by La Zuleta MD (10/24 2058)      Occlusion of the left V3 and left V4 segments, similar to the prior exam.      Severe near occlusive plaque stenosis proximal left cervical ICA with greater than 90% stenosis, worsened compared to the prior exam.      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                     I personally communicated this study with   Dr. Chikis Palomares   on 10/24/2023 8:29 PM.                           Workstation performed: [id number]         CT stroke alert brain   Final Result by La Zuleta MD (10/24 2032) No acute intracranial abnormality. Chronic microangiopathic changes. I personally discussed this study with   Dr. Coco Dunaway   on 10/24/2023 8:29 PM.            Workstation performed: [id number]             Other Diagnostic Testing: I have personally reviewed pertinent reports. ACTIVE MEDICATIONS     Current Facility-Administered Medications   Medication Dose Route Frequency    amLODIPine (NORVASC) tablet 5 mg  5 mg Oral Daily    aspirin chewable tablet 81 mg  81 mg Per NG Tube Daily    atorvastatin (LIPITOR) tablet 40 mg  40 mg Per NG Tube QPM    enoxaparin (LOVENOX) subcutaneous injection 40 mg  40 mg Subcutaneous Daily    insulin lispro (HumaLOG) 100 units/mL subcutaneous injection 1-6 Units  1-6 Units Subcutaneous Q6H [facility]    labetalol (NORMODYNE) injection 10 mg  10 mg Intravenous Q6H PRN    multi-electrolyte (PLASMALYTE-A/ISOLYTE-S PH 7.4) IV solution  50 mL/hr Intravenous Continuous    ondansetron (ZOFRAN) injection 4 mg  4 mg Intravenous Q6H PRN    pantoprazole (PROTONIX) injection 40 mg  40 mg Intravenous Q12H ERICA    ticagrelor (BRILINTA) tablet 90 mg  90 mg Per NG Tube Q12H [facility]       Prior to Admission medications    Medication Sig Start Date End Date Taking?  Authorizing Provider   amLODIPine (NORVASC) 5 mg tablet Take 1 tablet (5 mg total) by mouth daily 8/2/23   KAYODE Christianson   Ascorbic Acid (vitamin C) 100 MG tablet Take 100 mg by mouth daily    Historical Provider, MD   aspirin (ECOTRIN LOW STRENGTH) 81 mg EC tablet Take 81 mg by mouth daily    Historical Provider, MD   cholecalciferol (VITAMIN D3) 1,000 units tablet Take 1,000 Units by mouth daily    Historical Provider, MD   Multiple Vitamins-Minerals (MICHELLE MULTI MEN) TABS Take by mouth 2 (two) times a day    Historical Provider, MD   nateglinide (STARLIX) 60 mg tablet Take 1 tablet (60 mg total) by mouth daily before dinner 8/30/23   Raul Brush MD         VTE Pharmacologic Prophylaxis: Enoxaparin (Lovenox)  VTE Mechanical Prophylaxis: sequential compression device    ======        Thank you for allowing me to participate in the care of your patient, Van Greene.     MD Sean Saucedo [facility], PGY- 3

## 2023-10-26 NOTE — CASE MANAGEMENT
Case Management Assessment & Discharge Planning Note    Patient name Rolando Rubalcava  Location [address] 717/[facility] 802-86 MRN [id number]  : 1945 Date 10/26/2023       Current Admission Date: 10/24/2023  Current Admission Diagnosis:Left-sided weakness   Patient Active Problem List    Diagnosis Date Noted    History of GI bleed 10/24/2023    Renal cell carcinoma of right kidney ([address]) 2023    Chronic renal disease, stage IV ([address]) 2023    Persistent proteinuria 2022    Bilateral lower extremity edema 2022    Chronic renal impairment     GI bleed 2022    SIRS (systemic inflammatory response syndrome) ([address]) 2022    Tachycardia 2022    Hypercalcemia 2022    Chronic pain syndrome 10/05/2020    Chronic right-sided low back pain with right-sided sciatica 2020    Lumbar radiculopathy 2020    Polyarticular arthritis 2019    Class 1 obesity due to excess calories with serious comorbidity and body mass index (BMI) of 32.0 to 32.9 in adult 2019    Essential hypertension 09/10/2019    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 chronic kidney disease, without long-term current use of insulin ([address]) 09/10/2019    Left-sided weakness 07/10/2019    Cerebral aneurysm, nonruptured 2019    TIA (transient ischemic attack) 2019      LOS (days): 2  Geometric Mean LOS (GMLOS) (days): 2.90  Days to GMLOS:1.3     OBJECTIVE:    Risk of Unplanned Readmission Score: 14.35         Current admission status: Inpatient       Preferred Pharmacy:   [facility] [address] [address]  Phone: [phone] Fax: [phone]    Primary Care Provider: KAYODE Boudreaux    Primary Insurance: THE [facility]  Secondary Insurance:     ASSESSMENT:  [place] [employer]    There are no active Health Care Proxies on file.                  Readmission Root Cause  30 Day Readmission: No    Patient Information  Admitted from[de-identified] Home  Mental Status: Alert  During Assessment patient was accompanied by: Not accompanied during assessment  Assessment information provided by[de-identified] Spouse  Primary Caregiver: Self  Support Systems: Spouse/significant other  [place] of Residence: [address] [place] [address] do you live in?: [address] [place] [address] entry access options.  Select all that apply.: Stairs  Number of steps to enter home.: 4  Type of Current Residence: [facility]  In the last 12 months, was there a time when you were not able to pay the mortgage or rent on time?: No  In the last 12 months, was there a time when you did not have a steady place to sleep or slept in a shelter (including now)?: No  Homeless/housing insecurity resource given?: N/A  Living Arrangements: Lives w/ Spouse/significant other  Is patient a ?: Yes  Is patient active with [facility])?: No    Activities of Daily Living Prior to Admission  Functional Status: Independent  Completes ADLs independently?: Yes  Ambulates independently?: Yes  Does patient use assisted devices?: No  Does patient currently own DME?: No  Does patient have a history of Outpatient Therapy (PT/OT)?: Yes  Does the patient have a history of Short-Term Rehab?: Yes  Does patient have a history of HHC?: Yes ([facility])  Does patient currently have [address]?: No         Patient Information Continued  Income Source: Pension/group home  Does patient have prescription coverage?: Yes  Within the past 12 months, you worried that your food would run out before you got the money to buy more.: Never true  Within the past 12 months, the food you bought just didn't last and you didn't have money to get more.: Never true  Food insecurity resource given?: N/A  Does patient receive dialysis treatments?: No  Does patient have a history of substance abuse?: No  Does patient have a history of Mental Health Diagnosis?: No         Means of Transportation  Means of Transport to [facility][de-identified] Drives Self  In the past 12 months, has lack of transportation kept you from medical appointments or from getting medications?: No  In the past 12 months, has lack of transportation kept you from meetings, work, or from getting things needed for daily living?: No  Was application for public transport provided?: N/A        DISCHARGE DETAILS:    Discharge planning discussed with[de-identified] patient's wife via phone  Freedom of Choice: Yes  Comments - Freedom of Choice: would like referral to [address] [place] [address] contacted family/caregiver?: Yes  Were Treatment Team discharge recommendations reviewed with patient/caregiver?: Yes  Did patient/caregiver verbalize understanding of patient care needs?: Yes  Were patient/caregiver advised of the risks associated with not following Treatment Team discharge recommendations?: Yes    Contacts  Patient Contacts: Drearos Marin, wife  Relationship to Patient[de-identified] Family  Contact Method: Phone  Phone Number: [phone]  Reason/Outcome: Continuity of Care, Emergency Contact, Discharge Planning                        Treatment Team Recommendation: Short Term Rehab  Discharge Destination Plan[de-identified] Short Term Rehab  Transport at Discharge : Other (Comment) (TBD)               Additional Comments: Introduced self and role to patient's wife via phone. Patient independent at baseline, does not use DME, remote history of C and brief stay at [facility]. Discussed current rehab recommendation, wife would like referral to [facility]. Referral placed, assigned CM to follow.

## 2023-10-26 NOTE — ARC ADMISSION
Referral received for patient consideration of [facility] placement for rehab. [facility] will continue to follow for medical stability and improved tolerance and participation with therapy. CM has been updated in [facility].

## 2023-10-26 NOTE — ASSESSMENT & PLAN NOTE
Prehospital stroke alert on 10/24/2023  8:13 PM with initial NIHSS of 8 and LKW 6pm, initial Blood Pressure: (!) 187/91. Initial presenting deficits were dysarthria, left facial droop, left-sided weakness. Thrombolytic Decision: After a discussion of risks, benefits and alternatives (including best medical therapy excluding thrombolysis) reviewing inclusion and exclusion criteria the decision was made NOT to proceed with thrombolytic therapy by patient and his wife Loli Barlow. Patient did not want to proceed with TNK due to known aneurysm and was concerned for bleeding. He understood that the current symptoms can be long lasting. Lab Results   Component Value Date    CHOLESTEROL 200 (H) 10/25/2023    TRIG 143 10/25/2023    HDL 40 10/25/2023    LDLCALC 131 (H) 10/25/2023       Vascular risk factors: diabetes, HTN, HLD, CKD, hx of renal cell carcinoma  Home meds: ASA 81mg daily    Workup:  CTH: no acute abnormality  CTA: Occlusion of the left V3 and left V4 segments, similar to the prior exam. Severe near occlusive plaque stenosis proximal left cervical ICA > 90% stenosis, worsened compared to the prior exam. Stable 5mm left supraclinoid ICA aneurysm  MRI showed early subacute lacunar infarct in the right paramedian veronique. No defect/hemorrhagic conversion noted. Pertinent scores:  - NIHSS: 8  Stroke Modified Drake Score: 1 (No significant disability. Able to carry out all usual activities, despite some symptoms)    Impression: MRI confirmed lacunar stroke in setting of multiple risk factors for small vessel disease    Plan:  Discussed plan with neurology attending, Dr. Juan Kelley  - Continue stroke pathway  -Patient received ASA 325mg and Brilinta 90 mg. Continues with ASA 81mg daily and Brilinta 90mg BID.    -Patient is allergic to Plavix (causes whole body rash)  - S/p permissive HTN X24 hours  - Atorvastatin 40mg qhs  - Maintain glucose <180, SSI for coverage if indicated  - DVT ppx and SCDs  - Monitor on telemetry  - PT/OT/Speech/PM&R   - Stroke education  -Neuro checks  -A1C most recently 6.8 last month  -Stat CT Head for change in neuro status or GCS drop 2pts/1hr

## 2023-10-26 NOTE — ASSESSMENT & PLAN NOTE
Lab Results   Component Value Date    HGBA1C 6.7 (H) 10/24/2023     On  every 6 hr insulin sliding scale with Accu-Cheks per protocol  (P) 146.375

## 2023-10-26 NOTE — PROGRESS NOTES
[address] [facility]  Progress Note  Name: Shivani Ceron I  MRN: [id number]  Unit/Bed#: [facility] 036-11 I Date of Admission: 10/24/2023   Date of Service: 10/26/2023 I Hospital Day: 2    Assessment/Plan   * Left-sided weakness  Assessment & Plan  Presented with left-sided weakness, thrombolytics were declined by patient and family. CT head: No acute intracranial abnormality. Chronic microangiopathic changes. 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 No mass effect or hemorrhagic conversion. Echo: EF 50% with grade 1 DD    PLAN:  Currently on stroke pathway. Neurology following, dual antiplatelet with aspirin and Brilinta for 90 days followed by aspirin monotherapy. Now off permissive hypertension  PT OT  PMR following.>. rehab on dc. Currently n.p.o. with NG tube. Swallow therapy following. VBS requested. Atorvastatin 40 mg daily. Patient was started on Brilinta as patient has Plavix allergy. Vascular surgery evaluated, OP follow up for ICA stenosis. History of GI bleed  Assessment & Plan  Patient with history of coffee-ground material in the hospitalization last year; concerning for GI bleeding, as the patient is placed on aspirin with Brilinta; will place patient on pantoprazole prophylaxis. Renal cell carcinoma of right kidney [facility])  Assessment & Plan  Status post right nephrectomy; follows up with urology. Chronic renal disease, stage IV (HCC)  Assessment & Plan  Baseline creatinine between 1.9-2.4. Currently at baseline. Trend BMP.     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 chronic kidney disease, without long-term current use of insulin (HCC)  Assessment & Plan  Lab Results   Component Value Date    HGBA1C 6.7 (H) 10/24/2023     On  every 6 hr insulin sliding scale with Accu-Cheks per protocol  (P) 146.375      Essential hypertension  Assessment & Plan  Was On permissive hypertension, now completed  Restarted home dose amlodipine  Prn labetalol           VTE Pharmacologic Prophylaxis:   Pharmacologic: Enoxaparin (Lovenox)  Mechanical VTE Prophylaxis in Place: Yes    Patient Centered Rounds: I have performed bedside rounds with nursing staff today. Discussions with Specialists or Other Care Team Provider: CM    Education and Discussions with Family / Patient: plan of care, patient  and wife and sister at bedside,    Time Spent for Care: 30 minutes. More than 50% of total time spent on counseling and coordination of care as described above. Current Length of Stay: 2 day(s)    Current Patient Status: Inpatient   Certification Statement: The patient will continue to require additional inpatient hospital stay due to not medically satble    Discharge Plan: pending VBS, nutrition plan, rehab on dc    Code Status: Level 1 - Full Code      Subjective:   No overnight events. Still somnolent but more awake and communicative as compared to yesterday,s till with NG tube. Denies acute pain. Objective:     Vitals:   Temp (24hrs), Av °F (36.7 °C), Min:98 °F (36.7 °C), Max:98 °F (36.7 °C)    Temp:  [98 °F (36.7 °C)] 98 °F (36.7 °C)  HR:  [74] 74  Resp:  [18] 18  BP: (156-201)/(74-94) 156/74  SpO2:  [100 %] 100 %  Body mass index is 30.92 kg/m². Input and Output Summary (last 24 hours): Intake/Output Summary (Last 24 hours) at 10/26/2023 1753  Last data filed at 10/26/2023 0549  Gross per 24 hour   Intake 1978.75 ml   Output 700 ml   Net 1278.75 ml       Physical Exam:     Physical Exam  onstitutional:       General: He is not in acute distress.      Comments: Somnolent   Cardiovascular:      Rate and Rhythm: Normal rate and regular rhythm. Heart sounds: Normal heart sounds. No murmur heard. Pulmonary:      Effort: No respiratory distress. Breath sounds: Normal breath sounds. No wheezing or rales. Abdominal:      General: Bowel sounds are normal. There is no distension. Palpations: Abdomen is soft. Tenderness: There is no abdominal tenderness. Skin:     General: Skin is warm. Neurological:      Mental Status: He is alert. Comments: Wakes up on calling his name. left-sided weakness with facial droop and dysarthria noted.      Additional Data:     Labs:    Results from last 7 days   Lab Units 10/26/23  0513   WBC Thousand/uL 12.76*   HEMOGLOBIN g/dL 12.9   HEMATOCRIT % 38.2   PLATELETS Thousands/uL 167   NEUTROS PCT % 74   LYMPHS PCT % 12*   MONOS PCT % 10   EOS PCT % 2     Results from last 7 days   Lab Units 10/26/23  0513 10/25/23  0617   SODIUM mmol/L 139 139   POTASSIUM mmol/L 3.8 4.5   CHLORIDE mmol/L 104 103   CO2 mmol/L 27 27   BUN mg/dL 27* 30*   CREATININE mg/dL 1.94* 2.04*   ANION GAP mmol/L 8 9   CALCIUM mg/dL 8.4 9.0   ALBUMIN g/dL  --  3.8   TOTAL BILIRUBIN mg/dL  --  0.89   ALK PHOS U/L  --  66   ALT U/L  --  10   AST U/L  --  14   GLUCOSE RANDOM mg/dL 125 141*     Results from last 7 days   Lab Units 10/24/23  2054   INR  0.99     Results from last 7 days   Lab Units 10/26/23  1751 10/26/23  1216 10/26/23  0608 10/26/23  0006 10/25/23  1744 10/25/23  1229 10/25/23  0612 10/25/23  0057 10/24/23  2026   POC GLUCOSE mg/dl 135 149* 120 125 112 140 164* 147* 214*     Results from last 7 days   Lab Units 10/24/23  2054   HEMOGLOBIN A1C % 6.7*               Recent Cultures (last 7 days):           Last 24 Hours Medication List:   Current Facility-Administered Medications   Medication Dose Route Frequency Provider Last Rate    amLODIPine  5 mg Oral Daily Trena Otto MD      aspirin  81 mg Per NG Tube Daily Trena Otto MD      atorvastatin  40 mg Per NG Tube QPM Trena Otto MD      enoxaparin  40 mg Subcutaneous Daily Liane Walters MD      insulin lispro  1-6 Units Subcutaneous Q6H [facility] Dennis Martin MD      labetalol  10 mg Intravenous Q6H PRN Lula Jaeger MD      multi-electrolyte  50 mL/hr Intravenous Continuous Lula Jaeger MD 50 mL/hr (10/26/23 1002)    ondansetron  4 mg Intravenous Q6H PRN Liane Walters MD      pantoprazole  40 mg Intravenous Q12H Gaston Vazquez MD      ticagrelor  90 mg Per NG Tube Q12H [facility] Lula Jaeger MD          Today, Patient Was Seen By: Lula Jaeger MD    ** Please Note: Dictation voice to text software may have been used in the creation of this document.  **

## 2023-10-26 NOTE — PROGRESS NOTES
PHYSICAL MEDICINE AND REHABILITATION PROGRESS NOTE  Luna Ba 70 y.o. male MRN: [id number]  Unit/Bed#: [facility] 717-01 Encounter: [id number]    Requested by (Physician/Service): Nguyen Soler MD  Reason for Consultation:  Assessment of rehabilitation needs    Assessment:  Rehabilitation Diagnosis:   Right paramedian veronique infarct   Left hemiplegia   Dysarthria   Impaired mobility and self care  Impaired cognition     Recommendations:  Rehabilitation Plan:  Continue PT/OT (SLP) while on acute care. The patient remain in NG tube in place and remains NPO after speech assessment. Agree with MBS when appropriate and may require PEG tube placement if unable to advance diet. He may be a candidate for acute inpatient rehabilitation if shows improved tolerance and ability to participate. Covid-19 Testing: [facility] inpatient rehabilitation units require testing within 48 hours of all potential admissions at this time. *Re-testing is NOT required for patients recovering from COVID-19 infection if isolation has been discontinued per CDC criteria. Medical Co-morbidities Plan:  Chronic renal disease stage IV  Renal cell carcinoma of the right kidney  Diabetes type 2   Hypertension   DVT ppx: Lovenox and SCD    Thank you for this consultation. Do not hesitate to contact service with further questions. KAYODE Carbajal  PM&R    I have spent a total time of 15 minutes on 10/26/23 in caring for this patient including Patient and family education, Counseling / Coordination of care, Documenting in the medical record, Obtaining or reviewing history  , and Communicating with other healthcare professionals . Subjective/Interval history:  The patient remain with NG tube in place and continues to be NPO. He is very fatigued today but reports sleeping well last night. He continues with left hemiparesis and significant dysarthria.  Currently he is on dual antiplatelet  with ASA/Brilinta for 90 days after which time he will be on ASA monotherapy. He is noted to have plavix allergy with rash. Review of Systems: 10 point ROS negative except for what is noted in HPI    Current level of function:  Physical Therapy: Maximal assist for bed mobility   Occupational Therapy: Maximal to total assist for ADLs  Speech Therapy: NPO with NG tube in place       Physical Exam:  /74   Pulse 74   Temp 98 °F (36.7 °C) (Oral)   Resp 18   Ht 6' (1.829 m)   Wt 103 kg (228 lb)   SpO2 100%   BMI 30.92 kg/m²        Intake/Output Summary (Last 24 hours) at 10/26/2023 1228  Last data filed at 10/26/2023 0549  Gross per 24 hour   Intake 1978.75 ml   Output 700 ml   Net 1278.75 ml       Body mass index is 30.92 kg/m². Physical Exam  Constitutional:       General: He is not in acute distress. Appearance: He is not toxic-appearing. HENT:      Head: Normocephalic and atraumatic. Right Ear: External ear normal.      Left Ear: External ear normal.      Nose: Nose normal.      Comments: NG tube in place      Mouth/Throat:      Mouth: Mucous membranes are moist.      Pharynx: Oropharynx is clear. Eyes:      Extraocular Movements: Extraocular movements intact. Cardiovascular:      Rate and Rhythm: Normal rate and regular rhythm. Heart sounds: Normal heart sounds. Pulmonary:      Effort: Pulmonary effort is normal. No respiratory distress. Breath sounds: Normal breath sounds. Abdominal:      General: There is no distension. Palpations: Abdomen is soft. Skin:     General: Skin is warm and dry. Neurological:      Mental Status: He is alert and oriented to person, place, and time.       Comments: Left hemiplegia, dysarthria, dysphagia               Social History:    Social History     Socioeconomic History    Marital status: /Civil Union     Spouse name: Not on file    Number of children: Not on file    Years of education: Not on file    Highest education level: Not on file   Occupational History Occupation: Retired 2015 -     Tobacco Use    Smoking status: Never    Smokeless tobacco: Never   Vaping Use    Vaping Use: Never used   Substance and Sexual Activity    Alcohol use: Never    Drug use: Never    Sexual activity: Not on file   Other Topics Concern    Not on file   Social History Narrative    Denies drug use - As per [place]    Consumes on average 1 cup of regular coffee per day      Social Determinants of Health     Financial Resource Strain: Low Risk  (11/15/2022)    Overall Financial Resource Strain (CARDIA)     Difficulty of Paying Living Expenses: Not very hard   Food Insecurity: No Food Insecurity (7/8/2022)    Hunger Vital Sign     Worried About Running Out of Food in the Last Year: Never true     [address] in the Last Year: Never true   Transportation Needs: No Transportation Needs (11/15/2022)    PRAPARE - Transportation     Lack of Transportation (Medical): No     Lack of Transportation (Non-Medical):  No   Physical Activity: Not on file   Stress: Not on file   Social Connections: Not on file   Intimate Partner Violence: Not on file   Housing Stability: Low Risk  (7/8/2022)    Housing Stability Vital Sign     Unable to Pay for Housing in the Last Year: No     Number of Places Lived in the Last Year: 1     Unstable Housing in the Last Year: No        Family History:    Family History   Problem Relation Age of Onset    Colon cancer Mother     Diabetes type II Mother     Heart disease Mother     Prostate cancer Father          Medications:     Current Facility-Administered Medications:     amLODIPine (NORVASC) tablet 5 mg, 5 mg, Oral, Daily, Chris Tesfaye MD, 5 mg at 10/26/23 1000    aspirin chewable tablet 81 mg, 81 mg, Per NG Tube, Daily, Chris Tesfaye MD, 81 mg at 10/26/23 0812    atorvastatin (LIPITOR) tablet 40 mg, 40 mg, Per NG Tube, QPM, Chris Tesfaye MD, 40 mg at 10/25/23 8765    enoxaparin (LOVENOX) subcutaneous injection 40 mg, 40 mg, Subcutaneous, Daily, [facility] Renzo Lowery MD, 40 mg at 10/26/23 0812    insulin lispro (HumaLOG) 100 units/mL subcutaneous injection 1-6 Units, 1-6 Units, Subcutaneous, Q6H [address], 1 Units at 10/25/23 0628 **AND** Fingerstick Glucose (POCT), , , Q6H, Dennis Lowery MD    labetalol (NORMODYNE) injection 10 mg, 10 mg, Intravenous, Q6H PRN, Bryce Chavez MD    multi-electrolyte (PLASMALYTE-A/ISOLYTE-S PH 7.4) IV solution, 50 mL/hr, Intravenous, Continuous, Bryce Chavez MD, Last Rate: 50 mL/hr at 10/26/23 1002, 50 mL/hr at 10/26/23 1002    ondansetron (ZOFRAN) injection 4 mg, 4 mg, Intravenous, Q6H PRN, Nemo Chavarria MD    pantoprazole (PROTONIX) injection 40 mg, 40 mg, Intravenous, Q12H [address], Nemo Chavarria MD, 40 mg at 10/26/23 2171    ticagrelor (BRILINTA) tablet 90 mg, 90 mg, Per NG Tube, Q12H [address], Bryce Chavez MD, 90 mg at 10/26/23 5223    Past Medical History:     Past Medical History:   Diagnosis Date    Hypertension     TIA (transient ischemic attack)         Past Surgical History:     Past Surgical History:   Procedure Laterality Date    CYSTECTOMY      Per patient cyst removal from his back    LASIK      CT LAPAROSCOPY RADICAL NEPHRECTOMY Right 7/6/2022    Procedure: NEPHRECTOMY RADICAL LAPAROSCOPIC W/ ROBOTICS, poss open;  Surgeon: Isiah Hinton MD;  Location: [facility];  Service: Urology         Allergies:      Allergies   Allergen Reactions    Plavix [Clopidogrel] Rash     Stopped two days ago because got a rash and doctors thought it was from this            LABORATORY RESULTS:      Lab Results   Component Value Date    HGB 12.9 10/26/2023    HCT 38.2 10/26/2023    WBC 12.76 (H) 10/26/2023     Lab Results   Component Value Date    BUN 27 (H) 10/26/2023    K 3.8 10/26/2023     10/26/2023    CREATININE 1.94 (H) 10/26/2023     Lab Results   Component Value Date    PROTIME 13.0 10/24/2023    INR 0.99 10/24/2023        DIAGNOSTIC STUDIES: Reviewed  XR abdomen 1 vw portable    Result Date: 10/25/2023  Impression: Enteric tube positioned within the distal stomach. . Workstation performed: [id number]     XR abdomen 1 vw portable    Result Date: 10/25/2023  Impression: Enteric tube coiled over the region of the mouth. This was subsequently repositioned. Workstation performed: [id number]     MRI brain wo contrast    Result Date: 10/25/2023  Impression: Acute to early subacute lacunar infarct in the right paramedian veronique. No mass effect or hemorrhagic conversion. Workstation performed: [id number]     CTA stroke alert (head/neck)    Result Date: 10/24/2023  Impression: Occlusion of the left V3 and left V4 segments, similar to the prior exam. Severe near occlusive plaque stenosis proximal left cervical ICA with greater than 90% stenosis, worsened compared to the prior exam. 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 I personally communicated this study with   Dr. Teresa Villarreal   on 10/24/2023 8:29 PM. Workstation performed: [id number]     CT stroke alert brain    Result Date: 10/24/2023  Impression: No acute intracranial abnormality. Chronic microangiopathic changes.  I personally discussed this study with   Dr. Teresa Villarreal   on 10/24/2023 8:29 PM. Workstation performed: [id number]

## 2023-10-26 NOTE — CONSULTS
179.8 Consultation - Vascular Surgery   Luna Ba 70 y.o. 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 [facility] Fellow, Dr. Inder Garcia    _______________________________________________________________  Physician Requesting Consult: Nguyen Soler MD    Additional consultants:   Neurology  PMR    Reason for Consult / Principal Problem:   CVA  Left ICA stenosis    HPI: Luna Ba is a 70y.o. year old male with diabetes (A1c 6.8, 9/13/2023), hypertension, hyperlipidemia, CKD 3B/4, right renal cell carcinoma (clear-cell) s/p right nephrectomy 7/6/2022 by Dr. Angeles Gonzalez, BLE edema (L > R), h/o GIB, and plavix allergy who presented to [facility] with complaint of headache and dizziness. Upon admission he was found to be with left facial droop and left-sided weakness. He did not qualify for thrombolytic therapy. Work-up has confirmed right lacunar infarct. CTA imaging revealed evidence of left ICA stenosis for which vascular surgery was consulted. Lawrence Carvajal is able to describe a severe frontal headache prompting admission. He notes left-sided weakness. He notes history of prior mini stroke affecting his left vision.   Obtaining history is difficult for patient has slurred/garbled speech and some delay in response. Historical Information   Past Medical History:   Diagnosis Date    Hypertension     TIA (transient ischemic attack)      Past Surgical History:   Procedure Laterality Date    CYSTECTOMY      Per patient cyst removal from his back    LASIK      ME LAPAROSCOPY RADICAL NEPHRECTOMY Right 7/6/2022    Procedure: NEPHRECTOMY RADICAL LAPAROSCOPIC W/ ROBOTICS, poss open;  Surgeon: Milagro Llamas MD;  Location: [facility];  Service: Urology     Social History   Social History     Substance and Sexual Activity   Alcohol Use Never     Social History     Substance and Sexual Activity   Drug Use Never     Social History     Tobacco Use   Smoking Status Never   Smokeless Tobacco Never     Family History:   Family History   Problem Relation Age of Onset    Colon cancer Mother     Diabetes type II Mother     Heart disease Mother     Prostate cancer Father    }    Meds/Allergies   Home meds:   Prior to Admission medications    Medication Sig Start Date End Date Taking?  Authorizing Provider   amLODIPine (NORVASC) 5 mg tablet Take 1 tablet (5 mg total) by mouth daily 8/2/23   KAYODE Groves   Ascorbic Acid (vitamin C) 100 MG tablet Take 100 mg by mouth daily    Historical Provider, MD   aspirin (ECOTRIN LOW STRENGTH) 81 mg EC tablet Take 81 mg by mouth daily    Historical Provider, MD   cholecalciferol (VITAMIN D3) 1,000 units tablet Take 1,000 Units by mouth daily    Historical Provider, MD   Multiple Vitamins-Minerals (MICHELLE MULTI MEN) TABS Take by mouth 2 (two) times a day    Historical Provider, MD   nateglinide (STARLIX) 60 mg tablet Take 1 tablet (60 mg total) by mouth daily before dinner 8/30/23   Amanda Verduzco MD     Scheduled Meds:  Current Facility-Administered Medications   Medication Dose Route Frequency Provider Last Rate    amLODIPine  5 mg Oral Daily Julissa Bryson MD      aspirin  81 mg Per NG Tube Daily Julissa Bryson MD      atorvastatin  40 mg Per NG Tube QPM Julissa Bryson MD enoxaparin  40 mg Subcutaneous Daily Horacio Larson MD      insulin lispro  1-6 Units Subcutaneous Q6H [facility] Dennis Bustos MD      labetalol  10 mg Intravenous Q6H PRN Pooja Fuchs MD      multi-electrolyte  50 mL/hr Intravenous Continuous Pooja Fuchs MD 50 mL/hr (10/26/23 1002)    ondansetron  4 mg Intravenous Q6H PRN Horacio Larson MD      pantoprazole  40 mg Intravenous Q12H [facility] Horacio Larson MD      ticagrelor  90 mg Per NG Tube Q12H [facility] Pooja Fuchs MD       Continuous Infusions:multi-electrolyte, 50 mL/hr, Last Rate: 50 mL/hr (10/26/23 1002)      PRN Meds:    labetalol    ondansetron    ALLERGIES:   Allergies   Allergen Reactions    Plavix [Clopidogrel] Rash     Stopped two days ago because got a rash and doctors thought it was from this        Review of Systems:  General: positive for  - as noted in HPI  Cardiovascular: no chest pain or dyspnea on exertion  Respiratory: positive for - cough  Gastrointestinal: no abdominal pain, change in bowel habits, or black or bloody stools  Genitourinary: positive for -condom catheter  Musculoskeletal: positive for - swelling in leg - left and L sided weakness  Neurological: positive for - speech problems and weakness  Hematological and Lymphatic: negative  Dermatological : negative  Psychological: negative  Ophthalmic: negative  ENT: negative      Objective   Vitals:  Blood pressure 156/74, pulse 74, temperature 98 °F (36.7 °C), temperature source Oral, resp. rate 18, height 6' (1.829 m), weight 103 kg (228 lb), SpO2 100 %. Body mass index is 30.92 kg/m².         I/Os:  I/O         10/24 0701  10/25 0700 10/25 0701  10/26 0700 10/26 0701  10/27 0700    I.V. (mL/kg)  1858.8 (18)     NG/GT  120     Total Intake(mL/kg)  1978.8 (19.2)     Urine (mL/kg/hr)  700 (0.3)     Total Output  700     Net  +1278.8                    Invasive Lines/Tubes:  Invasive Devices       Peripheral Intravenous Line  Duration             Peripheral IV 10/24/23 Right;Dorsal (posterior) Forearm 1 day    Peripheral IV 10/26/23 Left;Ventral (anterior) Forearm <1 day              Drain  Duration             NG/OG/Enteral Tube Nasogastric 12 Fr Right nare 1 day    External Urinary Catheter <1 day                    Physical Exam  General appearance: appears stated age and cooperative. Somnolent but arousable. Frail appearing. +NGT. L facial droop  Head: Normocephalic, without obvious abnormality, atraumatic  Eyes: conjunctivae/corneas clear. PERRL, EOM's intact. Throat: lips, mucosa, and tongue normal-- mild L tongue deviation  Neck: no adenopathy, no carotid bruit, no JVD, and supple, symmetrical, trachea midline  Lungs: clear to auscultation bilaterally, ant- lat  Chest wall: no tenderness  Heart[de-identified] regular rate and rhythm, S1, S2 normal, no murmur, click, rub or gallop  Abdomen: soft, non-tender; bowel sounds normal; no masses,  no organomegaly  Genitalia:  condum cath  Rectal: deferred  Extremities: LLE pitting edema. Skin: Skin color, texture, turgor normal. No rashes or lesions  Neurologic: Grossly intact except Dysarthric. L facial asymmetry/ droop. Mild L tongue deviation. L sided UE/ LE hemiparesis.  (Defer to complete Neuro service eval)      Pulse exam:  Radial: Right: 2+               Left: 2+    DP: Right: 2+          Left: 2+  PT: Right: 2+         Left: 2+    Lab Results and Cultures:   COVID:   Last COVID19 Screening Values       None           CBC:   Results from last 7 days   Lab Units 10/26/23  0513 10/24/23  2054   WBC Thousand/uL 12.76* 12.02*   HEMOGLOBIN g/dL 12.9 13.6   HEMATOCRIT % 38.2 40.1   PLATELETS Thousands/uL 167 152     BMP/CMP:  Results from last 7 days   Lab Units 10/26/23  0513 10/25/23  0617 10/24/23  2054   POTASSIUM mmol/L 3.8 4.5 4.3   CHLORIDE mmol/L 104 103 102   CO2 mmol/L 27 27 29   BUN mg/dL 27* 30* 32*   CREATININE mg/dL 1.94* 2.04* 1.99*   CALCIUM mg/dL 8.4 9.0 9.0     Coags:   Results from last 7 days   Lab Units 10/24/23  2054   INR  0.99   PTT seconds 28     Lipid panel:   Results from last 7 days   Lab Units 10/25/23  0617   TRIGLYCERIDES mg/dL 143   HDL mg/dL 40     HgbA1c:   Lab Results   Component Value Date    HGBA1C 6.7 (H) 10/24/2023    HGBA1C 6.8 (H) 09/13/2023    HGBA1C 6.2 (H) 05/17/2023    HGBA1C 6.1 (H) 09/15/2022    HGBA1C 7.2 (A) 05/16/2022           Imaging Studies:    10/24 CT H- chr microangiopathic dz  10/24 CTA-- L severe, >90% prox cervical stenosis. Occl of V3, V4 segment. 5.3x 5.7mm L supraclinoid ICA aneurysm. 10/25 MRI- acute- early subacute lacunar infarct R paramedian veronique  10/25 Echo- EF 50%. (-) wma. AV sclerosis. TR MR. Ao root 3.4cm. Asc ao 3.5cm. I have personally reviewed pertinent reports.     EKG, Pathology, and Other Studies:     VTE Prophylaxis: Enoxaparin (Lovenox)     Code Status: Level 1 - Full Code  Advance Directive and Living Will:      Power of :    POLST:          Arianna Kuhn PA-C  10/26/2023

## 2023-10-27 ENCOUNTER — APPOINTMENT (INPATIENT)
Dept: RADIOLOGY | Facility: HOSPITAL | Age: 78
DRG: 065 | End: 2023-10-27
Attending: INTERNAL MEDICINE
Payer: COMMERCIAL

## 2023-10-27 PROBLEM — R05.9 COUGH: Status: ACTIVE | Noted: 2023-10-27

## 2023-10-27 LAB
ANION GAP SERPL CALCULATED.3IONS-SCNC: 9 MMOL/L
BASOPHILS # BLD MANUAL: 0 THOUSAND/UL (ref 0–0.1)
BASOPHILS NFR MAR MANUAL: 0 % (ref 0–1)
BUN SERPL-MCNC: 26 MG/DL (ref 5–25)
CALCIUM SERPL-MCNC: 8.4 MG/DL (ref 8.4–10.2)
CHLORIDE SERPL-SCNC: 101 MMOL/L (ref 96–108)
CO2 SERPL-SCNC: 27 MMOL/L (ref 21–32)
CREAT SERPL-MCNC: 1.82 MG/DL (ref 0.6–1.3)
EOSINOPHIL # BLD MANUAL: 0.32 THOUSAND/UL (ref 0–0.4)
EOSINOPHIL NFR BLD MANUAL: 3 % (ref 0–6)
ERYTHROCYTE [DISTWIDTH] IN BLOOD BY AUTOMATED COUNT: 12.5 % (ref 11.6–15.1)
GFR SERPL CREATININE-BSD FRML MDRD: 35 ML/MIN/1.73SQ M
GLUCOSE SERPL-MCNC: 121 MG/DL (ref 65–140)
GLUCOSE SERPL-MCNC: 123 MG/DL (ref 65–140)
GLUCOSE SERPL-MCNC: 131 MG/DL (ref 65–140)
GLUCOSE SERPL-MCNC: 132 MG/DL (ref 65–140)
GLUCOSE SERPL-MCNC: 142 MG/DL (ref 65–140)
HCT VFR BLD AUTO: 40.6 % (ref 36.5–49.3)
HGB BLD-MCNC: 13.5 G/DL (ref 12–17)
LYMPHOCYTES # BLD AUTO: 1.59 THOUSAND/UL (ref 0.6–4.47)
LYMPHOCYTES # BLD AUTO: 9 % (ref 14–44)
MCH RBC QN AUTO: 30.7 PG (ref 26.8–34.3)
MCHC RBC AUTO-ENTMCNC: 33.3 G/DL (ref 31.4–37.4)
MCV RBC AUTO: 92 FL (ref 82–98)
MONOCYTES # BLD AUTO: 0.95 THOUSAND/UL (ref 0–1.22)
MONOCYTES NFR BLD: 9 % (ref 4–12)
NEUTROPHILS # BLD MANUAL: 7.72 THOUSAND/UL (ref 1.85–7.62)
NEUTS BAND NFR BLD MANUAL: 1 % (ref 0–8)
NEUTS SEG NFR BLD AUTO: 72 % (ref 43–75)
PLATELET # BLD AUTO: 157 THOUSANDS/UL (ref 149–390)
PLATELET BLD QL SMEAR: ADEQUATE
PMV BLD AUTO: 11.6 FL (ref 8.9–12.7)
POLYCHROMASIA BLD QL SMEAR: PRESENT
POTASSIUM SERPL-SCNC: 3.6 MMOL/L (ref 3.5–5.3)
RBC # BLD AUTO: 4.4 MILLION/UL (ref 3.88–5.62)
RBC MORPH BLD: PRESENT
SODIUM SERPL-SCNC: 137 MMOL/L (ref 135–147)
VARIANT LYMPHS # BLD AUTO: 6 %
WBC # BLD AUTO: 10.58 THOUSAND/UL (ref 4.31–10.16)

## 2023-10-27 PROCEDURE — 99232 SBSQ HOSP IP/OBS MODERATE 35: CPT | Performed by: INTERNAL MEDICINE

## 2023-10-27 PROCEDURE — 82948 REAGENT STRIP/BLOOD GLUCOSE: CPT

## 2023-10-27 PROCEDURE — 85007 BL SMEAR W/DIFF WBC COUNT: CPT | Performed by: INTERNAL MEDICINE

## 2023-10-27 PROCEDURE — 85027 COMPLETE CBC AUTOMATED: CPT | Performed by: INTERNAL MEDICINE

## 2023-10-27 PROCEDURE — 97535 SELF CARE MNGMENT TRAINING: CPT

## 2023-10-27 PROCEDURE — 92611 MOTION FLUOROSCOPY/SWALLOW: CPT

## 2023-10-27 PROCEDURE — 80048 BASIC METABOLIC PNL TOTAL CA: CPT | Performed by: INTERNAL MEDICINE

## 2023-10-27 PROCEDURE — 93971 EXTREMITY STUDY: CPT | Performed by: SURGERY

## 2023-10-27 PROCEDURE — C9113 INJ PANTOPRAZOLE SODIUM, VIA: HCPCS | Performed by: INTERNAL MEDICINE

## 2023-10-27 PROCEDURE — 74230 X-RAY XM SWLNG FUNCJ C+: CPT

## 2023-10-27 PROCEDURE — 71045 X-RAY EXAM CHEST 1 VIEW: CPT

## 2023-10-27 PROCEDURE — 97530 THERAPEUTIC ACTIVITIES: CPT

## 2023-10-27 PROCEDURE — 97112 NEUROMUSCULAR REEDUCATION: CPT

## 2023-10-27 PROCEDURE — 92526 ORAL FUNCTION THERAPY: CPT

## 2023-10-27 PROCEDURE — 97110 THERAPEUTIC EXERCISES: CPT

## 2023-10-27 RX ORDER — AMLODIPINE BESYLATE 10 MG/1
10 TABLET ORAL DAILY
Status: DISCONTINUED | OUTPATIENT
Start: 2023-10-27 | End: 2023-10-30

## 2023-10-27 RX ADMIN — AMLODIPINE BESYLATE 10 MG: 10 TABLET ORAL at 08:54

## 2023-10-27 RX ADMIN — TICAGRELOR 90 MG: 90 TABLET ORAL at 21:08

## 2023-10-27 RX ADMIN — SODIUM CHLORIDE, SODIUM GLUCONATE, SODIUM ACETATE, POTASSIUM CHLORIDE, MAGNESIUM CHLORIDE, SODIUM PHOSPHATE, DIBASIC, AND POTASSIUM PHOSPHATE 50 ML/HR: .53; .5; .37; .037; .03; .012; .00082 INJECTION, SOLUTION INTRAVENOUS at 06:45

## 2023-10-27 RX ADMIN — ATORVASTATIN CALCIUM 40 MG: 40 TABLET, FILM COATED ORAL at 17:44

## 2023-10-27 RX ADMIN — ENOXAPARIN SODIUM 40 MG: 40 INJECTION SUBCUTANEOUS at 08:55

## 2023-10-27 RX ADMIN — LABETALOL HYDROCHLORIDE 10 MG: 5 INJECTION, SOLUTION INTRAVENOUS at 06:07

## 2023-10-27 RX ADMIN — ASPIRIN 81 MG CHEWABLE TABLET 81 MG: 81 TABLET CHEWABLE at 08:54

## 2023-10-27 RX ADMIN — PANTOPRAZOLE SODIUM 40 MG: 40 INJECTION, POWDER, FOR SOLUTION INTRAVENOUS at 21:07

## 2023-10-27 RX ADMIN — PANTOPRAZOLE SODIUM 40 MG: 40 INJECTION, POWDER, FOR SOLUTION INTRAVENOUS at 08:55

## 2023-10-27 RX ADMIN — TICAGRELOR 90 MG: 90 TABLET ORAL at 09:00

## 2023-10-27 NOTE — ASSESSMENT & PLAN NOTE
Lab Results   Component Value Date    HGBA1C 6.7 (H) 10/24/2023     On  every 6 hr insulin sliding scale with Accu-Cheks per protocol  (P) 141

## 2023-10-27 NOTE — SPEECH THERAPY NOTE
Speech Language/Pathology    Speech/Language Pathology Progress Note    Patient Name: Caryn ARAUJOU Date: 10/27/2023     Problem List  Principal Problem:    Left-sided weakness  Active Problems:    Essential hypertension    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 chronic kidney disease, without long-term current use of insulin (HCC)    Chronic renal disease, stage IV (HCC)    Renal cell carcinoma of right kidney (HCC)    History of GI bleed    Cough and secretions       Past Medical History  Past Medical History:   Diagnosis Date    Hypertension     TIA (transient ischemic attack)         Past Surgical History  Past Surgical History:   Procedure Laterality Date    CYSTECTOMY      Per patient cyst removal from his back    LASIK      MT LAPAROSCOPY RADICAL NEPHRECTOMY Right 7/6/2022    Procedure: NEPHRECTOMY RADICAL LAPAROSCOPIC W/ ROBOTICS, poss open;  Surgeon: Oumar Loco MD;  Location: [facility];  Service: Urology         Subjective:  Pt seen for dysphagia education. Pt alert and sitting up in bed. Wife and HARJINDER present as well. Objective:  Reviewed results and recommendations from [facility] in detail with pt and family. Verbal explanation and anatomical drawings were used. Explained reason and rationale for NPO diagnosis. Discussed importance of oral care including cleaning dentures and rinsing his mouth at least twice a day. Discussed POC and goals. Pt and family participated in goal planning, including the primary goal that pt can begin eating by mouth again. Answered many questions during the session. All verbalized understanding of the POC and recommendations. Assessment:  Pt and family appear to understanding recommendations for NPO, frequent oral care, and plan for dysphagia tx, as well as possible need for more long term nutrition (messaged with physician, pt will have NGT feedings for now). Plan/Recommendations:  NPO with alternate nutrition. Frequent oral care.  Continue dysphagia tx. Pt will require post acute tx.

## 2023-10-27 NOTE — PROGRESS NOTES
[address] [facility]  Progress Note  Name: Magaly Santos I  MRN: [id number]  Unit/Bed#: [facility] 707-05 I Date of Admission: 10/24/2023   Date of Service: 10/27/2023 I Hospital Day: 3    Assessment/Plan   * Left-sided weakness  Assessment & Plan  Presented with left-sided weakness, thrombolytics were declined by patient and family. CT head: No acute intracranial abnormality. Chronic microangiopathic changes. 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 No mass effect or hemorrhagic conversion. Echo: EF 50% with grade 1 DD    PLAN:  Currently on stroke pathway. Neurology following, dual antiplatelet with aspirin and Brilinta for 90 days followed by aspirin monotherapy. Now off permissive hypertension  PT OT  PMR following.>. rehab on dc. Currently n.p.o. with NG tube. Swallow therapy following. VBS requested. Will start tube feeds meanwhile  Atorvastatin 40 mg daily. Patient was started on Brilinta as patient has Plavix allergy. Vascular surgery evaluated, OP follow up for ICA stenosis. Cough and secretions  Assessment & Plan  Suspect aspiration vs mild fluid overload   On RA. No fever. Leukocytosis improving. Hold IVF  Start tube feeds  Check CXR    History of GI bleed  Assessment & Plan  Patient with history of coffee-ground material in the hospitalization last year; concerning for GI bleeding, as the patient is placed on aspirin with Brilinta; will place patient on pantoprazole prophylaxis. Renal cell carcinoma of right kidney [facility])  Assessment & Plan  Status post right nephrectomy; follows up with urology.     Chronic renal disease, stage IV ([address])  Assessment & Plan  Baseline creatinine between 1.9-2.4. Currently at baseline. Trend BMP. 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 chronic kidney disease, without long-term current use of insulin ([facility])  Assessment & Plan  Lab Results   Component Value Date    HGBA1C 6.7 (H) 10/24/2023     On  every 6 hr insulin sliding scale with Accu-Cheks per protocol  (P) 141      Essential hypertension  Assessment & Plan  Was On permissive hypertension, now completed  Restarted home dose amlodipine, increased dose to 10 mg amlodipine daily  Prn labetalol           VTE Pharmacologic Prophylaxis:   Pharmacologic: Enoxaparin (Lovenox)  Mechanical VTE Prophylaxis in Place: Yes    Patient Centered Rounds: I have performed bedside rounds with nursing staff today. Discussions with Specialists or Other Care Team Provider: CM, neurology    Education and Discussions with Family / Patient: plan of care, patient and family at bedside. Time Spent for Care: 30 minutes. More than 50% of total time spent on counseling and coordination of care as described above. Current Length of Stay: 3 day(s)    Current Patient Status: Inpatient   Certification Statement: The patient will continue to require additional inpatient hospital stay due to not medically satble, VBS today    Discharge Plan: rehab when stable    Code Status: Level 1 - Full Code      Subjective:   No overnight events. Patient is more awake and communicative today. He says he is now able to swallow mucus a little better. Reports cough and some secretions. On RA. Objective:     Vitals:   Temp (24hrs), Av.5 °F (36.9 °C), Min:98.3 °F (36.8 °C), Max:98.7 °F (37.1 °C)    Temp:  [98.3 °F (36.8 °C)-98.7 °F (37.1 °C)] 98.3 °F (36.8 °C)  HR:  [67-95] 82  Resp:  [16] 16  BP: (148-184)/() 174/84  SpO2:  [97 %-98 %] 97 %  Body mass index is 30.92 kg/m².      Input and Output Summary (last 24 hours):     No intake or output data in the 24 hours ending 10/27/23 1350    Physical Exam:     Physical Exam  Constitutional:       General: He is not in acute distress. Cardiovascular:      Rate and Rhythm: Normal rate and regular rhythm. Heart sounds: Normal heart sounds. No murmur heard. Pulmonary:      Effort: No respiratory distress. Breath sounds:coarse breath sounds. No wheezing or rales. Abdominal:      General: Bowel sounds are normal. There is no distension. Palpations: Abdomen is soft. Tenderness: There is no abdominal tenderness. Skin:     General: Skin is warm. Neurological:      Mental Status: He is alert. Comments: awake and alert. left-sided weakness with facial droop and dysarthria noted. Additional Data:     Labs:    Results from last 7 days   Lab Units 10/27/23  0511 10/26/23  0513   WBC Thousand/uL 10.58* 12.76*   HEMOGLOBIN g/dL 13.5 12.9   HEMATOCRIT % 40.6 38.2   PLATELETS Thousands/uL 157 167   BANDS PCT % 1  --    NEUTROS PCT %  --  74   LYMPHS PCT %  --  12*   LYMPHO PCT % 9*  --    MONOS PCT %  --  10   MONO PCT % 9  --    EOS PCT % 3 2     Results from last 7 days   Lab Units 10/27/23  0511 10/26/23  0513 10/25/23  0617   SODIUM mmol/L 137   < > 139   POTASSIUM mmol/L 3.6   < > 4.5   CHLORIDE mmol/L 101   < > 103   CO2 mmol/L 27   < > 27   BUN mg/dL 26*   < > 30*   CREATININE mg/dL 1.82*   < > 2.04*   ANION GAP mmol/L 9   < > 9   CALCIUM mg/dL 8.4   < > 9.0   ALBUMIN g/dL  --   --  3.8   TOTAL BILIRUBIN mg/dL  --   --  0.89   ALK PHOS U/L  --   --  66   ALT U/L  --   --  10   AST U/L  --   --  14   GLUCOSE RANDOM mg/dL 121   < > 141*    < > = values in this interval not displayed.      Results from last 7 days   Lab Units 10/24/23  2054   INR  0.99     Results from last 7 days   Lab Units 10/27/23  1215 10/27/23  0604 10/27/23  0032 10/26/23  1751 10/26/23  1216 10/26/23  8551 10/26/23  0006 10/25/23  1744 10/25/23  1229 10/25/23  0612 10/25/23  0057 10/24/23  2026   POC GLUCOSE mg/dl 131 123 132 135 149* 120 125 112 140 164* 147* 214*     Results from last 7 days   Lab Units 10/24/23  2054   HEMOGLOBIN A1C % 6.7*               Recent Cultures (last 7 days):           Last 24 Hours Medication List:   Current Facility-Administered Medications   Medication Dose Route Frequency Provider Last Rate    amLODIPine  10 mg Oral Daily Agustin Granados MD      aspirin  81 mg Per NG Tube Daily Agustin Granados MD      atorvastatin  40 mg Per NG Tube QPM Agustin Granados MD      enoxaparin  40 mg Subcutaneous Daily Trena Li MD      insulin lispro  1-6 Units Subcutaneous Q6H [facility] Trena Li MD      labetalol  10 mg Intravenous Q6H PRN Agustin Granados MD      ondansetron  4 mg Intravenous Q6H PRN Trena Li MD      pantoprazole  40 mg Intravenous Q12H [facility] Trena Li MD      ticagrelor  90 mg Per NG Tube Q12H [facility] Agustin Granados MD          Today, Patient Was Seen By: Agustin Granados MD    ** Please Note: Dictation voice to text software may have been used in the creation of this document.  **

## 2023-10-27 NOTE — PLAN OF CARE
Problem: OCCUPATIONAL THERAPY ADULT  Goal: Performs self-care activities at highest level of function for planned discharge setting. See evaluation for individualized goals. Description: Treatment Interventions: ADL retraining, Visual perceptual retraining, Functional transfer training, UE strengthening/ROM, Endurance training, Cognitive reorientation, Patient/family training, Equipment evaluation/education, Neuromuscular reeducation, Fine motor coordination activities, Compensatory technique education, Continued evaluation, Energy conservation, Activityengagement          See flowsheet documentation for full assessment, interventions and recommendations. Outcome: Progressing  Note: Limitation: Decreased ADL status, Decreased UE ROM, Decreased UE strength, Decreased Safe judgement during ADL, Decreased cognition, Decreased endurance, Decreased sensation, Visual deficit, Decreased fine motor control, Decreased self-care trans, Decreased high-level ADLs, Non-func L UE  Prognosis: Fair  Assessment: Pt seen for Occupational Therapy session with focus on activity tolerance, bed mob, sitting balance and tolerance for pt engagement in UB self-care tasks . Pt cleared by Paresh Concepcion for pt participated in OT session. Pt presented supine/HOB raised pt awake/alert and agreeable to participate in therapy following pt identifiers confirmed. Pt did not report his therapy goal this session however pleasant and cooperative with all therapy requests. Pt required assist for bed mob and for unsupported sitting balance 2* decreased strength, balance and limited activity tolerance. He was able to tolerate completing donning his R LE sock with backward chaining assist from OT. Pt will require post acute rehab service to continue to address these above noted pt deficit which currently impair pt ADL and functional mob. Pt return to bed post session bed alarm activated and all needs within reach.   Recommendation: Physiatry Consult  OT Discharge Recommendation: Post acute rehabilitation services

## 2023-10-27 NOTE — ASSESSMENT & PLAN NOTE
Was On permissive hypertension, now completed  Restarted home dose amlodipine, increased dose to 10 mg amlodipine daily  Prn labetalol

## 2023-10-27 NOTE — OCCUPATIONAL THERAPY NOTE
Occupational Therapy Progress Note     Patient Name: Tee Caballero  JCSGU'R Date: 10/27/2023  Problem List  Principal Problem:    Left-sided weakness  Active Problems:    Essential hypertension    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 chronic kidney disease, without long-term current use of insulin (HCC)    Chronic renal disease, stage IV (HCC)    Renal cell carcinoma of right kidney ([address])    History of GI bleed    Cough and secretions         Occupational Therapy Treatment Note       10/27/23 1111   OT Last Visit   OT Visit Date 10/27/23   Note Type   Note Type Treatment   Pain Assessment   Pain Assessment Tool 0-10   Pain Score No Pain   Restrictions/Precautions   Weight Bearing Precautions Per Order No   Other Precautions Cognitive; Bed Alarm;Multiple lines;Telemetry; Fall Risk;Pain;Visual impairment   Lifestyle   Autonomy I with ADL's/iaDL's, no AD with functional mobility +drives   Reciprocal Relationships supportive spouse home to assist as needed. Service to Others retired   Intrinsic Gratification watching TV   ADL   Where Assessed Edge of bed   [address] [facility] 2  Maximal Assistance   LB Dressing Deficit Don/doff R sock; Don/doff L sock   Toileting Assistance  1  Total Assistance   Toileting Deficit Perineal hygiene   Bed Mobility   Rolling R 2  Maximal assistance   Additional items Assist x 2   Rolling L 2  Maximal assistance   Additional items Assist x 2   Supine to Sit 2  Maximal assistance   Additional items Assist x 2   Sit to Supine 2  Maximal assistance   Additional items Assist x 2   Transfers   Sit to Stand Unable to assess   Subjective   Subjective pt stated "That leg doesn't move"   Cognition   Overall Cognitive Status Impaired   Arousal/Participation Cooperative   Attention Attends with cues to redirect   Orientation Level Oriented X4   Memory Decreased recall of precautions   Following Commands Follows one step commands without difficulty   Activity Tolerance   Activity Tolerance Patient tolerated treatment well   Assessment   Assessment Pt seen for Occupational Therapy session with focus on activity tolerance, bed mob, sitting balance and tolerance for pt engagement in UB self-care tasks . Pt cleared by Everett Gann for pt participated in OT session. Pt presented supine/HOB raised pt awake/alert and agreeable to participate in therapy following pt identifiers confirmed. Pt did not report his therapy goal this session however pleasant and cooperative with all therapy requests. Pt required assist for bed mob and for unsupported sitting balance 2* decreased strength, balance and limited activity tolerance. He was able to tolerate completing donning his R LE sock with backward chaining assist from OT. Pt will require post acute rehab service to continue to address these above noted pt deficit which currently impair pt ADL and functional mob. Pt return to bed post session bed alarm activated and all needs within reach.    Plan   Treatment Interventions ADL retraining   Goal Expiration Date 11/08/23   OT Treatment Day 1   OT Frequency 3-5x/wk   Discharge Recommendation   OT Discharge Recommendation Post acute rehabilitation services   AM-PAC Daily Activity Inpatient   Lower Body Dressing 1   Bathing 2   Toileting 1   Upper Body Dressing 2   Grooming 2   Eating 2   Daily Activity Raw Score 10   Turning Head Towards Sound 3   Follow Simple Instructions 3   Low Function Daily Activity Raw Score 16   Low Function Daily Activity Standardized Score  27.65   AM-PAC Applied Cognition Inpatient   Following a Speech/Presentation 1   Understanding Ordinary Conversation 3   Taking Medications 2   Remembering Where Things Are Placed or Put Away 2   Remembering List of 4-5 Errands 1   Taking Care of Complicated Tasks 1   Applied Cognition Raw Score 10   Applied Cognition Standardized Score 24.98   Barthel Index   Feeding 5   Bathing 0   Grooming Score 0   Dressing Score 5   Bladder Score 0   Bowels Score 0 Toilet Use Score 0   Transfers (Bed/Chair) Score 0   Mobility (Level Surface) Score 0   Stairs Score 0   Barthel Index Score 10   Modified [place]wood Scale   Modified Drake Scale 5     Dimitri PINO

## 2023-10-27 NOTE — PROCEDURES
Video Swallow Study      Patient Name: Chiqui Bruno  VCWLM'Y Date: 10/27/2023        Past Medical History  Past Medical History:   Diagnosis Date    Hypertension     TIA (transient ischemic attack)         Past Surgical History  Past Surgical History:   Procedure Laterality Date    CYSTECTOMY      Per patient cyst removal from his back    LASIK      TX LAPAROSCOPY RADICAL NEPHRECTOMY Right 7/6/2022    Procedure: NEPHRECTOMY RADICAL LAPAROSCOPIC W/ ROBOTICS, poss open;  Surgeon: Faith Christianson MD;  Location: [facility];  Service: Urology         Modified (Video) Barium Swallow Study    Summary:  Images are on PACS for review. *Study was difficult, due to patient requiring frequent repositioning to an upright position, and increasing fatigue as study progressed. Pt is at high risk for aspiration at this time. May need to consider longer term nutrition. Oral stage: Mod-severe impairment, characterized by lingual incoordination, weakness and reduced ROM. This results in reduced bolus control, reduced bolus formation and cohesion, prolonged tongue pumping transfers, and frequent diffuse residue that is difficult to clear (pt has difficulty initiating dry swallows). There is reduced lip seal with anterior spillage and reduced oral containment. Pharyngeal stage: Severe impairment, characterized by reduced BOT contraction, reduced PPW contraction, with no to min inversion of the epiglottis. This results in poor airway protection/poor closure of the laryngeal vestibule. There was penetration of thin, NTL, and HTL even via teaspoon, either before or during the swallow, and epiglottic undercoating of the epiglottis resulting in further penetration. Aspiration of thin liquids was observed, resulting in strong coughing. ?aspiration of HTL during esophageal scan.  Cued, volitional cough is weak and ineffective at clearing penetrated/aspirated material.  There is mod-severe residue in the vallecula and pyriforms after the swallow; pt has difficulty initiating a second swallow to clear residue. Pureed resulted in the least amount of pharyngeal residue of consistencies assessed. Strategies including head turn L and chin tuck were not effective at preventing aspiration. In addition, suspect vocal fold dysfunction and reduced closure of the airway (weak, breathy vocal quality). Per gross esophageal screen: Difficult to see due to positioning, ?mild dysmotility. Recommendations:  Diet: NPO  -trial feeding of puree with SLP only during tx  Liquids: NPO  Meds: Nonoral  Frequent oral care  Upright position  F/u ST tx: Yes  Therapy Prognosis: Fair  Prognosis considerations: Age, potential  Aspiration Precautions  Reflux Precautions  Consider consult with: ENT for suspected vocal cord dysfunction  Results reviewed with: pt, nursing, family, physician  Repeat MBS as necessary  If a dedicated assessment of the esophagus is desired:  Consider esophagram/routine barium swallow w/ barium tablet administration   or EGD        Goals:  Pt will participate in trial feeding of small amounts of pureed with SLP only during tx, without overt s/s of aspiration. Pt will tolerate least restrictive diet w/out s/s aspiration or oral/pharyngeal difficulties. Patient's goal:  Wants to eat by mouth. Pt is a 71 y/o male seen today for MBS. Pt admitted with recent CVA, L side weakness and L side facial droop.  He is currently NPO.       H&P/pertinent provider notes: (PMH noted above)  Margarita Carrillo is a 70 y.o. male who has past medical history significant for history of nonruptured cerebral aneurysm, chronic pain, chronic kidney disease stage IV most likely secondary to nephrectomy on the right side due to renal cell carcinoma and follows with urology, chronic low back pain, class I obesity, essential hypertension, previous history of GI bleeding (coffee-ground material) polyarticular arthritis, diabetes mellitus type 2 who comes in with left-sided weakness approximately 7:00 this evening as reported to EMS, wife was able to see patient without any concerns and usual state of health. Patient suddenly presented with left-sided facial droop and upper and lower extremity weakness. He has hypertension with blood pressures in the low 200s per EMS. Stroke protocol was called. He currently has left-sided weakness and dysphonia and dysphagia. Opted not to have thrombolytics, neurology had patient's started on Brilinta 90 mg twice daily, aspirin 300 mg rectally was administered x1 to continue 81 mg orally tomorrow. Meanwhile the patient was seen personally to have a little bit more improvement with dysphagia and dysphonia. He also does appear to have a little bit of twitching of the left upper extremity and can move left lower extremity on a single plane. He is also able to move left toe. Special Studies:  CT head-  No acute intracranial abnormality. Chronic microangiopathic changes. MRI brain-  Linear focus of mildly restricted diffusion in the right paramedian veronique consistent with an acute to subacute lacunar infarct. Chronic lacunar infarct in the left hemipons. CTA head/neck-  Occlusion of the left V3 and left V4 segments, similar to the prior exam.     Severe near occlusive plaque stenosis proximal left cervical ICA with greater than 90% stenosis, worsened compared to the prior exam.     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      Previous MBS:  None        Does the pt have pain? None reported  If yes, was nursing made aware/was it addressed?       Food allergies: No   Current diet: NPO   Premorbid diet: Regular and thin liquids   Dentition: Full dentures   O2 requirement: Room air   Oral mech: L side facial/labial droop   Vocal quality/speech: Dysphonic, breathy, weak   Cognitive status: Able to follow simple commands     Precautions:  Fall, aspiration      Consistencies administered: Pudding, and thin, nectar, honey thick liquids. Liquids presented via teaspoon, cup, straw. Barium tablet and cookie not presented due to severity of dysphagia and increasing fatigue during the study. Pt was viewed seated laterally at 90 degrees. Unable to complete AP view due to pt positioning. Oral stage:  Mod-severe impairment  Lip closure: Reduced, with intermittent anterior spillage from L side  Mastication: Not assessed  Bolus formation: Reduced cohesion, prolonged  Bolus control: Significantly impaired  Transfer: Prolonged, tongue pumping  Residue: Diffuse    Pharyngeal stage:  Severe impairment  Swallow initiation: Fairly timely to mildly delayed  Velar elevation: WNL  Laryngeal elevation: Severely impaired  Anterior hyoid excursion: Severely impaired  Epiglottic inversion: Does not invert/minimal movement  Laryngeal vestibular closure: epiglottis does not invert in order to close the vestibule  Pharyngeal stripping wave/constriction: Reduced  Tongue base retraction: Impaired  Vallecular retention: Mod-severe  Pyriform retention: Mod-severe  PPW coating: yes  Osteophytes: ?C6-7  CP prominence: Difficult to assess due to pt positioning  Epiglottic undercoat: Yes, consistent  Penetration: All liquid consistencies, ?puree on last teaspoon during esophageal scan  Aspiration: Thin liquids, during swallow  Strategies:   Chin down with NTL by teaspoon did not prevent deep laryngeal penetration. Head turn L did not prevent aspiration of thin liquids via teaspoon. Pt with difficulty initiating an effortful swallow. Dry swallows were somewhat effective at reducing oropharyngeal residue. Cued cough/throat clear was weak and ineffective at clearing penetrated/aspirated material  Response to aspiration: Strong cough      Screening of Esophageal stage:  Screen of the esophagus was done with teaspoon of pureed.  Motility was difficult to assess due to pt positioning, unable to lift arms. ? at least some dysmotility.

## 2023-10-27 NOTE — PLAN OF CARE
Problem: PHYSICAL THERAPY ADULT  Goal: Performs mobility at highest level of function for planned discharge setting. See evaluation for individualized goals. Description: Treatment/Interventions: LE strengthening/ROM, Therapeutic exercise, Endurance training, Cognitive reorientation, Patient/family training, Equipment eval/education, Bed mobility, Spoke to nursing, Spoke to case management, ST, OT, Family, Continued evaluation, Compensatory technique education, Spoke to advanced practitioner  Equipment Recommended:  (TBD)       See flowsheet documentation for full assessment, interventions and recommendations. Outcome: Progressing  Note: Prognosis: Guarded  Problem List: Decreased strength, Decreased endurance, Impaired balance, Decreased mobility, Decreased coordination, Decreased cognition, Impaired judgement, Decreased safety awareness, Impaired vision, Impaired sensation, Impaired tone, Orthopedic restrictions  Assessment: Pt agreeable to participate in PT session. Pt performed functional mobility and therex as outlined above. Trace muscle contraction noted L quad and anterior tibialis. L side impaired sensation compared to R however does feel some light touch. Sitting EOB, focus on upright posture and lateral weight shifting. LUE supported throughout and whiteboard updated to not pull arm to prevent further subluxation. Unable to attempt STS today due to PVCs and associated pt fatigued. Returned to bed safely. Pt left supine in bed with bed alarm, call bell, phone, and all personal needs within reach. Pt will continue to benefit from skilled acute care PT to further address their functional mobility limitations. D/C recommendations remain rehab. Barriers to Discharge: Inaccessible home environment, Decreased caregiver support     PT Discharge Recommendation: Post acute rehabilitation services    See flowsheet documentation for full assessment.

## 2023-10-27 NOTE — ASSESSMENT & PLAN NOTE
Presented with left-sided weakness, thrombolytics were declined by patient and family. CT head: No acute intracranial abnormality. Chronic microangiopathic changes. 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 No mass effect or hemorrhagic conversion. Echo: EF 50% with grade 1 DD    PLAN:  Currently on stroke pathway. Neurology following, dual antiplatelet with aspirin and Brilinta for 90 days followed by aspirin monotherapy. Now off permissive hypertension  PT OT  PMR following.>. rehab on dc. Currently n.p.o. with NG tube. Swallow therapy following. VBS requested. Will start tube feeds meanwhile  Atorvastatin 40 mg daily. Patient was started on Brilinta as patient has Plavix allergy. Vascular surgery evaluated, OP follow up for ICA stenosis.

## 2023-10-27 NOTE — PHYSICAL THERAPY NOTE
PHYSICAL THERAPY NOTE          Patient Name: Sarah Blue  DNRTG'A Date: 10/27/2023       10/27/23 1109   PT Last Visit   PT Visit Date 10/27/23   Note Type   Note Type Treatment   Pain Assessment   Pain Assessment Tool 0-10   Pain Score No Pain   Restrictions/Precautions   Weight Bearing Precautions Per Order No   Other Precautions Cognitive; Bed Alarm;Multiple lines;Telemetry; Fall Risk;Visual impairment  (NGT, L hemiparesis, LUE subluxation (1/2 finger), dysarthria, L facial droop, SBP<185)   General   Chart Reviewed Yes   Response to Previous Treatment Patient with no complaints from previous session. Family/Caregiver Present No   Cognition   Overall Cognitive Status Impaired   Arousal/Participation Cooperative   Attention Attends with cues to redirect   Orientation Level Oriented X4   Following Commands Follows one step commands without difficulty   Bed Mobility   Rolling R 2  Maximal assistance   Additional items Assist x 2; Increased time required;Verbal cues;LE management   Rolling L 2  Maximal assistance   Additional items Assist x 2; Increased time required;Verbal cues;LE management   Supine to Sit 2  Maximal assistance   Additional items Assist x 2; Increased time required;LE management;Verbal cues   Sit to Supine 2  Maximal assistance   Additional items Assist x 2; Increased time required;Verbal cues;LE management   Transfers   Sit to Stand Unable to assess   Balance   Static Sitting Poor +   Dynamic Sitting Poor   Static Standing Zero   Endurance Deficit   Endurance Deficit Yes   Endurance Deficit Description L hemiparesis, fatigue   Activity Tolerance   Activity Tolerance Treatment limited secondary to medical complications (Comment); Patient limited by fatigue  (PVCs)   Medical Staff Made Aware co-tx with Diana Morgan due to medical complexity and decreased activity tolernace; [facility]   Nurse Made Aware yes-cleared to mobilize   Exercises   Knee AROM Long Arc Quad Sitting;10 reps;PROM; Left  (trace in quads noted)   Ankle Pumps Sitting;10 reps;PROM; Left  (quick stretch; trace anterior tibialis)   Neuro re-ed lateral WS L and R x3 trials each with modAx1   Balance training  sitting EOB x25 min with CGA-minAx1   Assessment   Prognosis Guarded   Problem List Decreased strength;Decreased endurance; Impaired balance;Decreased mobility; Decreased coordination;Decreased cognition; Impaired judgement;Decreased safety awareness; Impaired vision; Impaired sensation; Impaired tone;Orthopedic restrictions   Assessment Pt agreeable to participate in PT session. Pt performed functional mobility and therex as outlined above. Trace muscle contraction noted L quad and anterior tibialis. L side impaired sensation compared to R however does feel some light touch. Sitting EOB, focus on upright posture and lateral weight shifting. LUE supported throughout and whiteboard updated to not pull arm to prevent further subluxation. Unable to attempt STS today due to PVCs and associated pt fatigued. Returned to bed safely. Pt left supine in bed with bed alarm, call bell, phone, and all personal needs within reach. Pt will continue to benefit from skilled acute care PT to further address their functional mobility limitations. D/C recommendations remain rehab. Barriers to Discharge Inaccessible home environment;Decreased caregiver support   Goals   Patient Goals to improve   STG Expiration Date 11/08/23   PT Treatment Day 1   Plan   Treatment/Interventions LE strengthening/ROM; Endurance training; Therapeutic exercise;Cognitive reorientation;Patient/family training;Equipment eval/education; Bed mobility;Spoke to nursing;Spoke to case management;OT   Progress Slow progress, medical status limitations   PT Frequency 3-5x/wk   Discharge Recommendation   PT Discharge Recommendation Post acute rehabilitation services   AM-PAC Basic Mobility Inpatient   Turning in Flat Bed Without Bedrails 1   Lying on Back to Sitting on Edge of Flat Bed Without Bedrails 1   Moving Bed to Chair 1   Standing Up From Chair Using Arms 1   Walk in Room 1   Climb 3-5 Stairs With Railing 1   Basic Mobility Inpatient Raw Score 6   Turning Head Towards Sound 3   Follow Simple Instructions 3   Low Function Basic Mobility Raw Score  12   Low Function Basic Mobility Standardized Score  18.33   Highest Level Of Mobility   JH-HLM Goal 2: Bed activities/Dependent transfer   JH-HLM Achieved 3: Sit at edge of bed     Dominic Zarate PT, DPT

## 2023-10-27 NOTE — RESTORATIVE TECHNICIAN NOTE
Restorative Technician Note      Patient Name: Alejandro Cueto     Restorative Tech Visit Date: 10/27/23  Note Type: Bracing, Initial consult  Patient Position Upon Consult: Supine  Brace Applied: Multipodous Boot (Left Foot)  Patient Position When Brace Applied: Supine  Patient Position at End of Consult: Supine; All needs within reach  Nurse Communication: Nurse aware of consult, application of brace    Please contact Mobility Coordinator on Tiger text "SLB-PT-Restorative Tech" role in regards to bracing instruction and/or adjustment.     Ayleen Green, Restorative Technician

## 2023-10-27 NOTE — ASSESSMENT & PLAN NOTE
Suspect aspiration vs mild fluid overload   On RA. No fever. Leukocytosis improving.   Hold IVF  Start tube feeds  Check CXR

## 2023-10-27 NOTE — RESTORATIVE TECHNICIAN NOTE
Restorative Technician Note      Patient Name: Sarah Blue     Note Type: Mobility (Pt off the unit.)    Aleyda Ceballos BS, Restorative Technician, [employer]

## 2023-10-28 LAB
GLUCOSE SERPL-MCNC: 145 MG/DL (ref 65–140)
GLUCOSE SERPL-MCNC: 180 MG/DL (ref 65–140)
GLUCOSE SERPL-MCNC: 193 MG/DL (ref 65–140)
GLUCOSE SERPL-MCNC: 208 MG/DL (ref 65–140)

## 2023-10-28 PROCEDURE — C9113 INJ PANTOPRAZOLE SODIUM, VIA: HCPCS | Performed by: INTERNAL MEDICINE

## 2023-10-28 PROCEDURE — 99232 SBSQ HOSP IP/OBS MODERATE 35: CPT | Performed by: INTERNAL MEDICINE

## 2023-10-28 PROCEDURE — 82948 REAGENT STRIP/BLOOD GLUCOSE: CPT

## 2023-10-28 RX ORDER — CARVEDILOL 6.25 MG/1
6.25 TABLET ORAL 2 TIMES DAILY WITH MEALS
Status: DISCONTINUED | OUTPATIENT
Start: 2023-10-28 | End: 2023-10-29

## 2023-10-28 RX ORDER — ACETAMINOPHEN 160 MG/5ML
650 SUSPENSION ORAL EVERY 6 HOURS PRN
Status: DISCONTINUED | OUTPATIENT
Start: 2023-10-28 | End: 2023-10-30

## 2023-10-28 RX ORDER — ACETAMINOPHEN 325 MG/1
650 TABLET ORAL EVERY 6 HOURS PRN
Status: DISCONTINUED | OUTPATIENT
Start: 2023-10-28 | End: 2023-10-28

## 2023-10-28 RX ADMIN — CARVEDILOL 6.25 MG: 6.25 TABLET, FILM COATED ORAL at 17:33

## 2023-10-28 RX ADMIN — ENOXAPARIN SODIUM 40 MG: 40 INJECTION SUBCUTANEOUS at 09:34

## 2023-10-28 RX ADMIN — ACETAMINOPHEN 650 MG: 650 SUSPENSION ORAL at 17:32

## 2023-10-28 RX ADMIN — PANTOPRAZOLE SODIUM 40 MG: 40 INJECTION, POWDER, FOR SOLUTION INTRAVENOUS at 22:01

## 2023-10-28 RX ADMIN — ATORVASTATIN CALCIUM 40 MG: 40 TABLET, FILM COATED ORAL at 17:33

## 2023-10-28 RX ADMIN — ASPIRIN 81 MG CHEWABLE TABLET 81 MG: 81 TABLET CHEWABLE at 09:34

## 2023-10-28 RX ADMIN — PANTOPRAZOLE SODIUM 40 MG: 40 INJECTION, POWDER, FOR SOLUTION INTRAVENOUS at 09:34

## 2023-10-28 RX ADMIN — TICAGRELOR 90 MG: 90 TABLET ORAL at 09:35

## 2023-10-28 RX ADMIN — ACETAMINOPHEN 650 MG: 650 SUSPENSION ORAL at 09:34

## 2023-10-28 RX ADMIN — INSULIN LISPRO 1 UNITS: 100 INJECTION, SOLUTION INTRAVENOUS; SUBCUTANEOUS at 06:43

## 2023-10-28 RX ADMIN — INSULIN LISPRO 2 UNITS: 100 INJECTION, SOLUTION INTRAVENOUS; SUBCUTANEOUS at 17:33

## 2023-10-28 RX ADMIN — CARVEDILOL 6.25 MG: 6.25 TABLET, FILM COATED ORAL at 09:33

## 2023-10-28 RX ADMIN — AMLODIPINE BESYLATE 10 MG: 10 TABLET ORAL at 09:33

## 2023-10-28 RX ADMIN — INSULIN LISPRO 1 UNITS: 100 INJECTION, SOLUTION INTRAVENOUS; SUBCUTANEOUS at 13:34

## 2023-10-28 RX ADMIN — TICAGRELOR 90 MG: 90 TABLET ORAL at 22:01

## 2023-10-28 NOTE — PLAN OF CARE
Problem: NEUROSENSORY - ADULT  Goal: Achieves stable or improved neurological status  Description: INTERVENTIONS  - Monitor and report changes in neurological status  - Monitor vital signs such as temperature, blood pressure, glucose, and any other labs ordered   - Initiate measures to prevent increased intracranial pressure  - Monitor for seizure activity and implement precautions if appropriate      Outcome: Progressing  Goal: Achieves maximal functionality and self care  Description: INTERVENTIONS  - Monitor swallowing and airway patency with patient fatigue and changes in neurological status  - Encourage and assist patient to increase activity and self care.    - Encourage visually impaired, hearing impaired and aphasic patients to use assistive/communication devices  Outcome: Progressing

## 2023-10-28 NOTE — ASSESSMENT & PLAN NOTE
Was On permissive hypertension, now completed  Restarted home dose amlodipine, increased dose to 10 mg amlodipine daily  Added low dose coreg  Prn labetalol

## 2023-10-28 NOTE — ASSESSMENT & PLAN NOTE
Suspect aspiration vs mild fluid overload   On RA. No fever. Leukocytosis improving. Hold IVF  Started tube feeds  CXR: Questionable left costophrenic angle opacification given patient positioning. Artifact versus pneumonia in this area cannot be entirely excluded. Mild right basilar linear atelectasis. Monitor off abx for now as leukocytosis improving and patient is afebrile.

## 2023-10-28 NOTE — ASSESSMENT & PLAN NOTE
Presented with left-sided weakness, thrombolytics were declined by patient and family. CT head: No acute intracranial abnormality. Chronic microangiopathic changes. 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 No mass effect or hemorrhagic conversion. Echo: EF 50% with grade 1 DD    PLAN:  Currently on stroke pathway. Neurology following, dual antiplatelet with aspirin and Brilinta for 90 days followed by aspirin monotherapy. Now off permissive hypertension  PT OT  PMR following.>. rehab on dc. Currently n.p.o. with NG tube. Swallow therapy following. VBS requested. Will start tube feeds meanwhile  Atorvastatin 40 mg daily. Patient was started on Brilinta as patient has Plavix allergy. Vascular surgery evaluated, OP follow up for ICA stenosis. Failed VBS. On tube feeds through NG tube. Plan is for repeat swallow eval on Monday, fi failed again, will consult GI for PEG evaluation. Swallow team also suggests ENT eval for vocal cord dysfunction.

## 2023-10-28 NOTE — PROGRESS NOTES
[address] [facility]  Progress Note  Name: Judah Zamudio I  MRN: [id number]  [facility] Date of Admission: 10/24/2023   Date of Service: 10/28/2023 I Hospital Day: 4    Assessment/Plan   * Left-sided weakness  Assessment & Plan  Presented with left-sided weakness, thrombolytics were declined by patient and family. CT head: No acute intracranial abnormality. Chronic microangiopathic changes. 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 No mass effect or hemorrhagic conversion. Echo: EF 50% with grade 1 DD    PLAN:  Currently on stroke pathway. Neurology following, dual antiplatelet with aspirin and Brilinta for 90 days followed by aspirin monotherapy. Now off permissive hypertension  PT OT  PMR following.>. rehab on dc. Currently n.p.o. with NG tube. Swallow therapy following. VBS requested. Will start tube feeds meanwhile  Atorvastatin 40 mg daily. Patient was started on Brilinta as patient has Plavix allergy. Vascular surgery evaluated, OP follow up for ICA stenosis. Failed VBS. On tube feeds through NG tube. Plan is for repeat swallow eval on Monday, fi failed again, will consult GI for PEG evaluation. Swallow team also suggests ENT eval for vocal cord dysfunction. Cough and secretions  Assessment & Plan  Suspect aspiration vs mild fluid overload   On RA. No fever. Leukocytosis improving. Hold IVF  Started tube feeds  CXR: Questionable left costophrenic angle opacification given patient positioning. Artifact versus pneumonia in this area cannot be entirely excluded. Mild right basilar linear atelectasis. Monitor off abx for now as leukocytosis improving and patient is afebrile. History of GI bleed  Assessment & Plan  Patient with history of coffee-ground material in the hospitalization last year; concerning for GI bleeding, as the patient is placed on aspirin with Brilinta; will place patient on pantoprazole prophylaxis. Renal cell carcinoma of right kidney [facility])  Assessment & Plan  Status post right nephrectomy; follows up with urology. Chronic renal disease, stage IV (HCC)  Assessment & Plan  Baseline creatinine between 1.9-2.4. Currently at baseline. Trend BMP. 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 chronic kidney disease, without long-term current use of insulin ([facility])  Assessment & Plan  Lab Results   Component Value Date    HGBA1C 6.7 (H) 10/24/2023     On  every 6 hr insulin sliding scale with Accu-Cheks per protocol  (P) [id number]      Essential hypertension  Assessment & Plan  Was On permissive hypertension, now completed  Restarted home dose amlodipine, increased dose to 10 mg amlodipine daily  Added low dose coreg  Prn labetalol         VTE Pharmacologic Prophylaxis:   Pharmacologic: Enoxaparin (Lovenox)  Mechanical VTE Prophylaxis in Place: Yes    Patient Centered Rounds: I have performed bedside rounds with nursing staff today. Discussions with Specialists or Other Care Team Provider: CM    Education and Discussions with Family / Patient: plan of care, patient and also called and updated wife. Time Spent for Care: 30 minutes. More than 50% of total time spent on counseling and coordination of care as described above.     Current Length of Stay: 4 day(s)    Current Patient Status: Inpatient   Certification Statement: The patient will continue to require additional inpatient hospital stay due to not medically table pending nutrition and swallow plan    Discharge Plan: rehab on discharge when medically stable    Code Status: Level 1 - Full Code      Subjective:   No overnight events. Patient reports cough. Had some headache. More awake and communicative. Objective:     Vitals:   Temp (24hrs), Av.2 °F (36.8 °C), Min:97.7 °F (36.5 °C), Max:99 °F (37.2 °C)    Temp:  [97.7 °F (36.5 °C)-99 °F (37.2 °C)] 98.1 °F (36.7 °C)  HR:  [83-87] 85  Resp:  [18-25] 18  BP: (155-190)/() 157/92  SpO2:  [94 %-98 %] 94 %  Body mass index is 30.92 kg/m². Input and Output Summary (last 24 hours): Intake/Output Summary (Last 24 hours) at 10/28/2023 1316  Last data filed at 10/28/2023 0601  Gross per 24 hour   Intake 392 ml   Output 800 ml   Net -408 ml       Physical Exam:     Physical Exam  Constitutional:       General: He is not in acute distress. Cardiovascular:      Rate and Rhythm: Normal rate and regular rhythm. PVC present. Heart sounds: Normal heart sounds. No murmur heard. Pulmonary:      Effort: No respiratory distress. Breath sounds:coarse breath sounds. No wheezing or rales. Abdominal:      General: Bowel sounds are normal. There is no distension. Palpations: Abdomen is soft. Tenderness: There is no abdominal tenderness. Skin:     General: Skin is warm. Neurological:      Mental Status: He is alert. Comments: awake and alert. left-sided weakness with facial droop and dysarthria noted.        Additional Data:     Labs:    Results from last 7 days   Lab Units 10/27/23  0511 10/26/23  0513   WBC Thousand/uL 10.58* 12.76*   HEMOGLOBIN g/dL 13.5 12.9   HEMATOCRIT % 40.6 38.2   PLATELETS Thousands/uL 157 167   BANDS PCT % 1  --    NEUTROS PCT %  --  74   LYMPHS PCT %  --  12*   LYMPHO PCT % 9*  --    MONOS PCT %  --  10   MONO PCT % 9  --    EOS PCT % 3 2     Results from last 7 days   Lab Units 10/27/23  0511 10/26/23  0513 10/25/23  0617   SODIUM mmol/L 137   < > 139   POTASSIUM mmol/L 3.6   < > 4.5   CHLORIDE mmol/L 101   < > 103   CO2 mmol/L 27   < > 27   BUN mg/dL 26*   < > 30*   CREATININE mg/dL 1.82*   < > 2.04*   ANION GAP mmol/L 9   < > 9   CALCIUM mg/dL 8.4   < > 9.0   ALBUMIN g/dL  --   --  3.8   TOTAL BILIRUBIN mg/dL  --   --  0.89   ALK PHOS U/L  --   --  66   ALT U/L  --   --  10   AST U/L  --   --  14   GLUCOSE RANDOM mg/dL 121   < > 141*    < > = values in this interval not displayed. Results from last 7 days   Lab Units 10/24/23  2054   INR  0.99     Results from last 7 days   Lab Units 10/28/23  1230 10/28/23  0642 10/28/23  0020 10/27/23  1705 10/27/23  1215 10/27/23  0604 10/27/23  0032 10/26/23  1751 10/26/23  1216 10/26/23  0608 10/26/23  0006 10/25/23  1744   POC GLUCOSE mg/dl 193* 180* 145* 142* 131 123 132 135 149* 120 125 112     Results from last 7 days   Lab Units 10/24/23  2054   HEMOGLOBIN A1C % 6.7*               Recent Cultures (last 7 days):           Last 24 Hours Medication List:   Current Facility-Administered Medications   Medication Dose Route Frequency Provider Last Rate    acetaminophen  650 mg Oral Q6H PRN Nancy Mccarthy PA-C      amLODIPine  10 mg Oral Daily Tish Mariscal MD      aspirin  81 mg Per NG Tube Daily Tish Mariscal MD      atorvastatin  40 mg Per NG Tube QPM Tish Mariscal MD      carvedilol  6.25 mg Oral BID With Meals Tish Mariscal MD      enoxaparin  40 mg Subcutaneous Daily Siddharth Rodriguez MD      insulin lispro  1-6 Units Subcutaneous Q6H [facility] Dennis Enrique MD      labetalol  10 mg Intravenous Q6H PRN Tish Mariscal MD      ondansetron  4 mg Intravenous Q6H PRN Siddharth Rodriguez MD      pantoprazole  40 mg Intravenous Q12H [facility] Siddharth Rodriguez MD      ticagrelor  90 mg Per NG Tube Q12H [facility] Tish Mariscal MD          Today, Patient Was Seen By: Tish Mariscal MD    ** Please Note: Dictation voice to text software may have been used in the creation of this document.  **

## 2023-10-29 LAB
ANION GAP SERPL CALCULATED.3IONS-SCNC: 7 MMOL/L
BASOPHILS # BLD AUTO: 0.03 THOUSANDS/ÂΜL (ref 0–0.1)
BASOPHILS NFR BLD AUTO: 0 % (ref 0–1)
BUN SERPL-MCNC: 36 MG/DL (ref 5–25)
CALCIUM SERPL-MCNC: 8.2 MG/DL (ref 8.4–10.2)
CHLORIDE SERPL-SCNC: 104 MMOL/L (ref 96–108)
CO2 SERPL-SCNC: 27 MMOL/L (ref 21–32)
CREAT SERPL-MCNC: 1.95 MG/DL (ref 0.6–1.3)
EOSINOPHIL # BLD AUTO: 0.43 THOUSAND/ÂΜL (ref 0–0.61)
EOSINOPHIL NFR BLD AUTO: 5 % (ref 0–6)
ERYTHROCYTE [DISTWIDTH] IN BLOOD BY AUTOMATED COUNT: 12.4 % (ref 11.6–15.1)
GFR SERPL CREATININE-BSD FRML MDRD: 32 ML/MIN/1.73SQ M
GLUCOSE SERPL-MCNC: 185 MG/DL (ref 65–140)
GLUCOSE SERPL-MCNC: 202 MG/DL (ref 65–140)
GLUCOSE SERPL-MCNC: 209 MG/DL (ref 65–140)
GLUCOSE SERPL-MCNC: 213 MG/DL (ref 65–140)
GLUCOSE SERPL-MCNC: 238 MG/DL (ref 65–140)
HCT VFR BLD AUTO: 38.6 % (ref 36.5–49.3)
HGB BLD-MCNC: 13.5 G/DL (ref 12–17)
IMM GRANULOCYTES # BLD AUTO: 0.04 THOUSAND/UL (ref 0–0.2)
IMM GRANULOCYTES NFR BLD AUTO: 0 % (ref 0–2)
LYMPHOCYTES # BLD AUTO: 1.24 THOUSANDS/ÂΜL (ref 0.6–4.47)
LYMPHOCYTES NFR BLD AUTO: 14 % (ref 14–44)
MCH RBC QN AUTO: 31.9 PG (ref 26.8–34.3)
MCHC RBC AUTO-ENTMCNC: 35 G/DL (ref 31.4–37.4)
MCV RBC AUTO: 91 FL (ref 82–98)
MONOCYTES # BLD AUTO: 1.18 THOUSAND/ÂΜL (ref 0.17–1.22)
MONOCYTES NFR BLD AUTO: 13 % (ref 4–12)
NEUTROPHILS # BLD AUTO: 6.28 THOUSANDS/ÂΜL (ref 1.85–7.62)
NEUTS SEG NFR BLD AUTO: 68 % (ref 43–75)
NRBC BLD AUTO-RTO: 0 /100 WBCS
PLATELET # BLD AUTO: 168 THOUSANDS/UL (ref 149–390)
PMV BLD AUTO: 11.8 FL (ref 8.9–12.7)
POTASSIUM SERPL-SCNC: 3.6 MMOL/L (ref 3.5–5.3)
PROCALCITONIN SERPL-MCNC: 0.13 NG/ML
RBC # BLD AUTO: 4.23 MILLION/UL (ref 3.88–5.62)
SODIUM SERPL-SCNC: 138 MMOL/L (ref 135–147)
WBC # BLD AUTO: 9.2 THOUSAND/UL (ref 4.31–10.16)

## 2023-10-29 PROCEDURE — 82948 REAGENT STRIP/BLOOD GLUCOSE: CPT

## 2023-10-29 PROCEDURE — C9113 INJ PANTOPRAZOLE SODIUM, VIA: HCPCS | Performed by: INTERNAL MEDICINE

## 2023-10-29 PROCEDURE — 80048 BASIC METABOLIC PNL TOTAL CA: CPT | Performed by: INTERNAL MEDICINE

## 2023-10-29 PROCEDURE — 84145 PROCALCITONIN (PCT): CPT | Performed by: INTERNAL MEDICINE

## 2023-10-29 PROCEDURE — 99232 SBSQ HOSP IP/OBS MODERATE 35: CPT | Performed by: INTERNAL MEDICINE

## 2023-10-29 PROCEDURE — 85025 COMPLETE CBC W/AUTO DIFF WBC: CPT | Performed by: INTERNAL MEDICINE

## 2023-10-29 RX ORDER — CARVEDILOL 12.5 MG/1
12.5 TABLET ORAL 2 TIMES DAILY WITH MEALS
Status: DISCONTINUED | OUTPATIENT
Start: 2023-10-29 | End: 2023-11-04 | Stop reason: HOSPADM

## 2023-10-29 RX ORDER — INSULIN GLARGINE 100 [IU]/ML
7 INJECTION, SOLUTION SUBCUTANEOUS
Status: DISCONTINUED | OUTPATIENT
Start: 2023-10-29 | End: 2023-10-30

## 2023-10-29 RX ADMIN — CARVEDILOL 6.25 MG: 6.25 TABLET, FILM COATED ORAL at 09:31

## 2023-10-29 RX ADMIN — ACETAMINOPHEN 650 MG: 650 SUSPENSION ORAL at 11:21

## 2023-10-29 RX ADMIN — INSULIN LISPRO 2 UNITS: 100 INJECTION, SOLUTION INTRAVENOUS; SUBCUTANEOUS at 06:34

## 2023-10-29 RX ADMIN — TICAGRELOR 90 MG: 90 TABLET ORAL at 09:32

## 2023-10-29 RX ADMIN — PANTOPRAZOLE SODIUM 40 MG: 40 INJECTION, POWDER, FOR SOLUTION INTRAVENOUS at 09:33

## 2023-10-29 RX ADMIN — AMLODIPINE BESYLATE 10 MG: 10 TABLET ORAL at 09:31

## 2023-10-29 RX ADMIN — INSULIN LISPRO 3 UNITS: 100 INJECTION, SOLUTION INTRAVENOUS; SUBCUTANEOUS at 13:43

## 2023-10-29 RX ADMIN — PANTOPRAZOLE SODIUM 40 MG: 40 INJECTION, POWDER, FOR SOLUTION INTRAVENOUS at 22:31

## 2023-10-29 RX ADMIN — INSULIN GLARGINE 7 UNITS: 100 INJECTION, SOLUTION SUBCUTANEOUS at 22:31

## 2023-10-29 RX ADMIN — ASPIRIN 81 MG CHEWABLE TABLET 81 MG: 81 TABLET CHEWABLE at 09:32

## 2023-10-29 RX ADMIN — ENOXAPARIN SODIUM 40 MG: 40 INJECTION SUBCUTANEOUS at 09:33

## 2023-10-29 RX ADMIN — INSULIN LISPRO 2 UNITS: 100 INJECTION, SOLUTION INTRAVENOUS; SUBCUTANEOUS at 17:35

## 2023-10-29 RX ADMIN — ACETAMINOPHEN 650 MG: 650 SUSPENSION ORAL at 00:09

## 2023-10-29 RX ADMIN — TICAGRELOR 90 MG: 90 TABLET ORAL at 22:31

## 2023-10-29 RX ADMIN — ATORVASTATIN CALCIUM 40 MG: 40 TABLET, FILM COATED ORAL at 17:30

## 2023-10-29 RX ADMIN — INSULIN LISPRO 2 UNITS: 100 INJECTION, SOLUTION INTRAVENOUS; SUBCUTANEOUS at 00:03

## 2023-10-29 RX ADMIN — CARVEDILOL 12.5 MG: 12.5 TABLET, FILM COATED ORAL at 17:30

## 2023-10-29 NOTE — PLAN OF CARE
Problem: Potential for Falls  Goal: Patient will remain free of falls  Description: INTERVENTIONS:  - Educate patient/family on patient safety including physical limitations  - Instruct patient to call for assistance with activity   - Consult OT/PT to assist with strengthening/mobility   - Keep Call bell within reach  - Keep bed low and locked with side rails adjusted as appropriate  - Keep care items and personal belongings within reach  - Initiate and maintain comfort rounds  - Make Fall Risk Sign visible to staff  - Offer Toileting every 2 Hours, in advance of need  - Initiate/Maintain bed alarm  - Obtain necessary fall risk management equipment: non skid footwear  - Apply yellow socks and bracelet for high fall risk patients  - Consider moving patient to room near nurses station  Outcome: Progressing     Problem: MOBILITY - ADULT  Goal: Maintain or return to baseline ADL function  Description: INTERVENTIONS:  -  Assess patient's ability to carry out ADLs; assess patient's baseline for ADL function and identify physical deficits which impact ability to perform ADLs (bathing, care of mouth/teeth, toileting, grooming, dressing, etc.)  - Assess/evaluate cause of self-care deficits   - Assess range of motion  - Assess patient's mobility; develop plan if impaired  - Assess patient's need for assistive devices and provide as appropriate  - Encourage maximum independence but intervene and supervise when necessary  - Involve family in performance of ADLs  - Assess for home care needs following discharge   - Consider OT consult to assist with ADL evaluation and planning for discharge  - Provide patient education as appropriate  Outcome: Progressing     Problem: MOBILITY - ADULT  Goal: Maintains/Returns to pre admission functional level  Description: INTERVENTIONS:  - Perform BMAT or MOVE assessment daily.   - Set and communicate daily mobility goal to care team and patient/family/caregiver.    - Collaborate with rehabilitation services on mobility goals if consulted  - Perform Range of Motion 3 times a day. - Reposition patient every 2 hours. - Record patient progress and toleration of activity level   Outcome: Progressing     Problem: Nutrition/Hydration-ADULT  Goal: Nutrient/Hydration intake appropriate for improving, restoring or maintaining nutritional needs  Description: Monitor and assess patient's nutrition/hydration status for malnutrition. Collaborate with interdisciplinary team and initiate plan and interventions as ordered. Monitor patient's weight and dietary intake as ordered or per policy. Utilize nutrition screening tool and intervene as necessary. Determine patient's food preferences and provide high-protein, high-caloric foods as appropriate.      INTERVENTIONS:  - Monitor oral intake, urinary output, labs, and treatment plans  - Assess nutrition and hydration status and recommend course of action  - Evaluate amount of meals eaten  - Assist patient with eating if necessary   - Allow adequate time for meals  - Recommend/ encourage appropriate diets, oral nutritional supplements, and vitamin/mineral supplements  - Order, calculate, and assess calorie counts as needed  - Recommend, monitor, and adjust tube feedings and TPN/PPN based on assessed needs  - Assess need for intravenous fluids  - Provide specific nutrition/hydration education as appropriate  - Include patient/family/caregiver in decisions related to nutrition  Outcome: Progressing     Problem: DISCHARGE PLANNING  Goal: Discharge to home or other facility with appropriate resources  Description: INTERVENTIONS:  - Identify barriers to discharge w/patient and caregiver  - Arrange for needed discharge resources and transportation as appropriate  - Identify discharge learning needs (meds, wound care, etc.)  - Arrange for interpretive services to assist at discharge as needed  - Refer to Case Management Department for coordinating discharge planning if the patient needs post-hospital services based on physician/advanced practitioner order or complex needs related to functional status, cognitive ability, or social support system  Outcome: Progressing     Problem: Prexisting or High Potential for Compromised Skin Integrity  Goal: Skin integrity is maintained or improved  Description: INTERVENTIONS:  - Identify patients at risk for skin breakdown  - Assess and monitor skin integrity  - Assess and monitor nutrition and hydration status  - Monitor labs   - Assess for incontinence   - Turn and reposition patient  - Assist with mobility/ambulation  - Relieve pressure over bony prominences  - Avoid friction and shearing  - Provide appropriate hygiene as needed including keeping skin clean and dry  - Evaluate need for skin moisturizer/barrier cream  - Collaborate with interdisciplinary team   - Patient/family teaching  - Consider wound care consult   Outcome: Progressing     Problem: NEUROSENSORY - ADULT  Goal: Achieves stable or improved neurological status  Description: INTERVENTIONS  - Monitor and report changes in neurological status  - Monitor vital signs such as temperature, blood pressure, glucose, and any other labs ordered   - Initiate measures to prevent increased intracranial pressure  - Monitor for seizure activity and implement precautions if appropriate      Outcome: Progressing     Problem: NEUROSENSORY - ADULT  Goal: Achieves maximal functionality and self care  Description: INTERVENTIONS  - Monitor swallowing and airway patency with patient fatigue and changes in neurological status  - Encourage and assist patient to increase activity and self care.    - Encourage visually impaired, hearing impaired and aphasic patients to use assistive/communication devices  Outcome: Progressing     Problem: PAIN - ADULT  Goal: Verbalizes/displays adequate comfort level or baseline comfort level  Description: Interventions:  - Encourage patient to monitor pain and request assistance  - Assess pain using appropriate pain scale  - Administer analgesics based on type and severity of pain and evaluate response  - Implement non-pharmacological measures as appropriate and evaluate response  - Consider cultural and social influences on pain and pain management  - Notify physician/advanced practitioner if interventions unsuccessful or patient reports new pain  Outcome: Progressing     Problem: INFECTION - ADULT  Goal: Absence or prevention of progression during hospitalization  Description: INTERVENTIONS:  - Assess and monitor for signs and symptoms of infection  - Monitor lab/diagnostic results  - Monitor all insertion sites, i.e. indwelling lines, tubes, and drains  - Monitor endotracheal if appropriate and nasal secretions for changes in amount and color  - [place] appropriate cooling/warming therapies per order  - Administer medications as ordered  - Instruct and encourage patient and family to use good hand hygiene technique  - Identify and instruct in appropriate isolation precautions for identified infection/condition  Outcome: Progressing     Problem: Knowledge Deficit  Goal: Patient/family/caregiver demonstrates understanding of disease process, treatment plan, medications, and discharge instructions  Description: Complete learning assessment and assess knowledge base. Interventions:  - Provide teaching at level of understanding  - Provide teaching via preferred learning methods  Outcome: Progressing     Problem: Potential for Aspiration  Goal: Non-ventilated patient's risk of aspiration is minimized  Description: Assess and monitor vital signs, respiratory status, and labs (WBC). Monitor for signs of aspiration (tachypnea, cough, rales, wheezing, cyanosis, fever). - Assess and monitor patient's ability to swallow. - Place patient up in chair to eat if possible. - HOB up at 90 degrees to eat if unable to get patient up into chair.  - Supervise patient during oral intake. - Instruct patient/ family to take small bites. - Instruct patient/ family to take small single sips when taking liquids.   - Follow patient-specific strategies generated by speech pathologist.  Outcome: Progressing     Problem: METABOLIC, FLUID AND ELECTROLYTES - ADULT  Goal: Electrolytes maintained within normal limits  Description: INTERVENTIONS:  - Monitor labs and assess patient for signs and symptoms of electrolyte imbalances  - Administer electrolyte replacement as ordered  - Monitor response to electrolyte replacements, including repeat lab results as appropriate  - Instruct patient on fluid and nutrition as appropriate  Outcome: Progressing     Problem: METABOLIC, FLUID AND ELECTROLYTES - ADULT  Goal: Fluid balance maintained  Description: INTERVENTIONS:  - Monitor labs   - Monitor I/O and WT  - Instruct patient on fluid and nutrition as appropriate  - Assess for signs & symptoms of volume excess or deficit  Outcome: Progressing     Problem: METABOLIC, FLUID AND ELECTROLYTES - ADULT  Goal: Glucose maintained within target range  Description: INTERVENTIONS:  - Monitor Blood Glucose as ordered  - Assess for signs and symptoms of hyperglycemia and hypoglycemia  - Administer ordered medications to maintain glucose within target range  - Assess nutritional intake and initiate nutrition service referral as needed  Outcome: Progressing     Problem: HEMATOLOGIC - ADULT  Goal: Maintains hematologic stability  Description: INTERVENTIONS  - Assess for signs and symptoms of bleeding or hemorrhage  - Monitor labs  - Administer supportive blood products/factors as ordered and appropriate  Outcome: Progressing

## 2023-10-29 NOTE — ASSESSMENT & PLAN NOTE
Was On permissive hypertension, now completed  Restarted home dose amlodipine, increased dose to 10 mg amlodipine daily  Added low dose coreg, increased dose  Prn labetalol

## 2023-10-29 NOTE — ASSESSMENT & PLAN NOTE
Lab Results   Component Value Date    HGBA1C 6.7 (H) 10/24/2023     On  every 6 hr insulin sliding scale with Accu-Cheks per protocol  Increase in lantus dose   Added humalog fr further coverage   (P) [id number]

## 2023-10-30 ENCOUNTER — TELEPHONE (OUTPATIENT)
Dept: FAMILY MEDICINE CLINIC | Facility: CLINIC | Age: 78
End: 2023-10-30

## 2023-10-30 LAB
GLUCOSE SERPL-MCNC: 211 MG/DL (ref 65–140)
GLUCOSE SERPL-MCNC: 211 MG/DL (ref 65–140)
GLUCOSE SERPL-MCNC: 219 MG/DL (ref 65–140)
GLUCOSE SERPL-MCNC: 221 MG/DL (ref 65–140)
GLUCOSE SERPL-MCNC: 238 MG/DL (ref 65–140)
GLUCOSE SERPL-MCNC: 244 MG/DL (ref 65–140)
GLUCOSE SERPL-MCNC: 247 MG/DL (ref 65–140)
GLUCOSE SERPL-MCNC: 252 MG/DL (ref 65–140)

## 2023-10-30 PROCEDURE — 99232 SBSQ HOSP IP/OBS MODERATE 35: CPT | Performed by: PHYSICAL MEDICINE & REHABILITATION

## 2023-10-30 PROCEDURE — 92526 ORAL FUNCTION THERAPY: CPT

## 2023-10-30 PROCEDURE — 82948 REAGENT STRIP/BLOOD GLUCOSE: CPT

## 2023-10-30 PROCEDURE — 99232 SBSQ HOSP IP/OBS MODERATE 35: CPT | Performed by: INTERNAL MEDICINE

## 2023-10-30 PROCEDURE — C9113 INJ PANTOPRAZOLE SODIUM, VIA: HCPCS | Performed by: INTERNAL MEDICINE

## 2023-10-30 PROCEDURE — 99222 1ST HOSP IP/OBS MODERATE 55: CPT | Performed by: INTERNAL MEDICINE

## 2023-10-30 RX ORDER — AMLODIPINE BESYLATE 10 MG/1
10 TABLET ORAL DAILY
Status: DISCONTINUED | OUTPATIENT
Start: 2023-10-31 | End: 2023-11-04 | Stop reason: HOSPADM

## 2023-10-30 RX ORDER — BISACODYL 10 MG
10 SUPPOSITORY, RECTAL RECTAL DAILY PRN
Status: DISCONTINUED | OUTPATIENT
Start: 2023-10-30 | End: 2023-11-04 | Stop reason: HOSPADM

## 2023-10-30 RX ORDER — INSULIN GLARGINE 100 [IU]/ML
10 INJECTION, SOLUTION SUBCUTANEOUS
Status: DISCONTINUED | OUTPATIENT
Start: 2023-10-30 | End: 2023-11-01

## 2023-10-30 RX ORDER — POLYETHYLENE GLYCOL 3350 17 G/17G
17 POWDER, FOR SOLUTION ORAL DAILY
Status: DISCONTINUED | OUTPATIENT
Start: 2023-10-30 | End: 2023-11-04 | Stop reason: HOSPADM

## 2023-10-30 RX ORDER — AMOXICILLIN 250 MG
1 CAPSULE ORAL 2 TIMES DAILY
Status: DISCONTINUED | OUTPATIENT
Start: 2023-10-30 | End: 2023-11-04 | Stop reason: HOSPADM

## 2023-10-30 RX ORDER — ACETAMINOPHEN 160 MG/5ML
975 SUSPENSION ORAL EVERY 8 HOURS
Status: DISCONTINUED | OUTPATIENT
Start: 2023-10-30 | End: 2023-11-04 | Stop reason: HOSPADM

## 2023-10-30 RX ORDER — INSULIN LISPRO 100 [IU]/ML
2 INJECTION, SOLUTION INTRAVENOUS; SUBCUTANEOUS EVERY 6 HOURS
Status: DISCONTINUED | OUTPATIENT
Start: 2023-10-30 | End: 2023-11-02

## 2023-10-30 RX ADMIN — CARVEDILOL 12.5 MG: 12.5 TABLET, FILM COATED ORAL at 15:53

## 2023-10-30 RX ADMIN — ATORVASTATIN CALCIUM 40 MG: 40 TABLET, FILM COATED ORAL at 17:43

## 2023-10-30 RX ADMIN — TICAGRELOR 90 MG: 90 TABLET ORAL at 20:58

## 2023-10-30 RX ADMIN — ACETAMINOPHEN 650 MG: 650 SUSPENSION ORAL at 08:45

## 2023-10-30 RX ADMIN — INSULIN LISPRO 2 UNITS: 100 INJECTION, SOLUTION INTRAVENOUS; SUBCUTANEOUS at 13:56

## 2023-10-30 RX ADMIN — INSULIN LISPRO 2 UNITS: 100 INJECTION, SOLUTION INTRAVENOUS; SUBCUTANEOUS at 01:02

## 2023-10-30 RX ADMIN — SENNOSIDES, DOCUSATE SODIUM 1 TABLET: 8.6; 5 TABLET ORAL at 08:45

## 2023-10-30 RX ADMIN — PANTOPRAZOLE SODIUM 40 MG: 40 INJECTION, POWDER, FOR SOLUTION INTRAVENOUS at 20:58

## 2023-10-30 RX ADMIN — AMLODIPINE BESYLATE 10 MG: 10 TABLET ORAL at 08:45

## 2023-10-30 RX ADMIN — INSULIN LISPRO 2 UNITS: 100 INJECTION, SOLUTION INTRAVENOUS; SUBCUTANEOUS at 20:58

## 2023-10-30 RX ADMIN — INSULIN LISPRO 2 UNITS: 100 INJECTION, SOLUTION INTRAVENOUS; SUBCUTANEOUS at 06:39

## 2023-10-30 RX ADMIN — SENNOSIDES, DOCUSATE SODIUM 1 TABLET: 8.6; 5 TABLET ORAL at 17:43

## 2023-10-30 RX ADMIN — POLYETHYLENE GLYCOL 3350 17 G: 17 POWDER, FOR SOLUTION ORAL at 08:45

## 2023-10-30 RX ADMIN — TICAGRELOR 90 MG: 90 TABLET ORAL at 08:47

## 2023-10-30 RX ADMIN — ENOXAPARIN SODIUM 40 MG: 40 INJECTION SUBCUTANEOUS at 08:45

## 2023-10-30 RX ADMIN — INSULIN GLARGINE 10 UNITS: 100 INJECTION, SOLUTION SUBCUTANEOUS at 22:56

## 2023-10-30 RX ADMIN — INSULIN LISPRO 3 UNITS: 100 INJECTION, SOLUTION INTRAVENOUS; SUBCUTANEOUS at 12:06

## 2023-10-30 RX ADMIN — ASPIRIN 81 MG CHEWABLE TABLET 81 MG: 81 TABLET CHEWABLE at 08:45

## 2023-10-30 RX ADMIN — ACETAMINOPHEN 975 MG: 650 SUSPENSION ORAL at 22:56

## 2023-10-30 RX ADMIN — PANTOPRAZOLE SODIUM 40 MG: 40 INJECTION, POWDER, FOR SOLUTION INTRAVENOUS at 08:46

## 2023-10-30 RX ADMIN — INSULIN LISPRO 3 UNITS: 100 INJECTION, SOLUTION INTRAVENOUS; SUBCUTANEOUS at 17:42

## 2023-10-30 RX ADMIN — INSULIN LISPRO 2 UNITS: 100 INJECTION, SOLUTION INTRAVENOUS; SUBCUTANEOUS at 08:46

## 2023-10-30 RX ADMIN — CARVEDILOL 12.5 MG: 12.5 TABLET, FILM COATED ORAL at 08:48

## 2023-10-30 RX ADMIN — ACETAMINOPHEN 975 MG: 650 SUSPENSION ORAL at 15:52

## 2023-10-30 NOTE — PLAN OF CARE
Problem: Potential for Falls  Goal: Patient will remain free of falls  Description: INTERVENTIONS:  - Educate patient/family on patient safety including physical limitations  - Instruct patient to call for assistance with activity   - Consult OT/PT to assist with strengthening/mobility   - Keep Call bell within reach  - Keep bed low and locked with side rails adjusted as appropriate  - Keep care items and personal belongings within reach  - Initiate and maintain comfort rounds  - Make Fall Risk Sign visible to staff  - Offer Toileting every 2 Hours, in advance of need  - Initiate/Maintain bed alarm  - Obtain necessary fall risk management equipment: non skid footwear  - Apply yellow socks and bracelet for high fall risk patients  - Consider moving patient to room near nurses station  Outcome: Progressing     Problem: MOBILITY - ADULT  Goal: Maintain or return to baseline ADL function  Description: INTERVENTIONS:  -  Assess patient's ability to carry out ADLs; assess patient's baseline for ADL function and identify physical deficits which impact ability to perform ADLs (bathing, care of mouth/teeth, toileting, grooming, dressing, etc.)  - Assess/evaluate cause of self-care deficits   - Assess range of motion  - Assess patient's mobility; develop plan if impaired  - Assess patient's need for assistive devices and provide as appropriate  - Encourage maximum independence but intervene and supervise when necessary  - Involve family in performance of ADLs  - Assess for home care needs following discharge   - Consider OT consult to assist with ADL evaluation and planning for discharge  - Provide patient education as appropriate  Outcome: Progressing  Goal: Maintains/Returns to pre admission functional level  Description: INTERVENTIONS:  - Perform BMAT or MOVE assessment daily.   - Set and communicate daily mobility goal to care team and patient/family/caregiver.    - Collaborate with rehabilitation services on mobility goals if consulted  - Perform Range of Motion 3 times a day. - Reposition patient every 2 hours. - Record patient progress and toleration of activity level   Outcome: Progressing     Problem: Nutrition/Hydration-ADULT  Goal: Nutrient/Hydration intake appropriate for improving, restoring or maintaining nutritional needs  Description: Monitor and assess patient's nutrition/hydration status for malnutrition. Collaborate with interdisciplinary team and initiate plan and interventions as ordered. Monitor patient's weight and dietary intake as ordered or per policy. Utilize nutrition screening tool and intervene as necessary. Determine patient's food preferences and provide high-protein, high-caloric foods as appropriate.      INTERVENTIONS:  - Monitor oral intake, urinary output, labs, and treatment plans  - Assess nutrition and hydration status and recommend course of action  - Evaluate amount of meals eaten  - Assist patient with eating if necessary   - Allow adequate time for meals  - Recommend/ encourage appropriate diets, oral nutritional supplements, and vitamin/mineral supplements  - Order, calculate, and assess calorie counts as needed  - Recommend, monitor, and adjust tube feedings and TPN/PPN based on assessed needs  - Assess need for intravenous fluids  - Provide specific nutrition/hydration education as appropriate  - Include patient/family/caregiver in decisions related to nutrition  Outcome: Progressing     Problem: SAFETY ADULT  Goal: Patient will remain free of falls  Description: INTERVENTIONS:  - Educate patient/family on patient safety including physical limitations  - Instruct patient to call for assistance with activity   - Consult OT/PT to assist with strengthening/mobility   - Keep Call bell within reach  - Keep bed low and locked with side rails adjusted as appropriate  - Keep care items and personal belongings within reach  - Initiate and maintain comfort rounds  - Make Fall Risk Sign visible to staff  - Offer Toileting every 2 Hours, in advance of need  - Initiate/Maintain bed alarm  - Obtain necessary fall risk management equipment: non skid footwear  - Apply yellow socks and bracelet for high fall risk patients  - Consider moving patient to room near nurses station  Outcome: Progressing  Goal: Maintain or return to baseline ADL function  Description: INTERVENTIONS:  -  Assess patient's ability to carry out ADLs; assess patient's baseline for ADL function and identify physical deficits which impact ability to perform ADLs (bathing, care of mouth/teeth, toileting, grooming, dressing, etc.)  - Assess/evaluate cause of self-care deficits   - Assess range of motion  - Assess patient's mobility; develop plan if impaired  - Assess patient's need for assistive devices and provide as appropriate  - Encourage maximum independence but intervene and supervise when necessary  - Involve family in performance of ADLs  - Assess for home care needs following discharge   - Consider OT consult to assist with ADL evaluation and planning for discharge  - Provide patient education as appropriate  Outcome: Progressing  Goal: Maintains/Returns to pre admission functional level  Description: INTERVENTIONS:  - Perform BMAT or MOVE assessment daily.   - Set and communicate daily mobility goal to care team and patient/family/caregiver. - Collaborate with rehabilitation services on mobility goals if consulted  - Perform Range of Motion 3 times a day. - Reposition patient every 2 hours.   - Record patient progress and toleration of activity level   Outcome: Progressing     Problem: DISCHARGE PLANNING  Goal: Discharge to home or other facility with appropriate resources  Description: INTERVENTIONS:  - Identify barriers to discharge w/patient and caregiver  - Arrange for needed discharge resources and transportation as appropriate  - Identify discharge learning needs (meds, wound care, etc.)  - Arrange for interpretive services to assist at discharge as needed  - Refer to Case Management Department for coordinating discharge planning if the patient needs post-hospital services based on physician/advanced practitioner order or complex needs related to functional status, cognitive ability, or social support system  Outcome: Progressing     Problem: Prexisting or High Potential for Compromised Skin Integrity  Goal: Skin integrity is maintained or improved  Description: INTERVENTIONS:  - Identify patients at risk for skin breakdown  - Assess and monitor skin integrity  - Assess and monitor nutrition and hydration status  - Monitor labs   - Assess for incontinence   - Turn and reposition patient  - Assist with mobility/ambulation  - Relieve pressure over bony prominences  - Avoid friction and shearing  - Provide appropriate hygiene as needed including keeping skin clean and dry  - Evaluate need for skin moisturizer/barrier cream  - Collaborate with interdisciplinary team   - Patient/family teaching  - Consider wound care consult   Outcome: Progressing     Problem: NEUROSENSORY - ADULT  Goal: Achieves stable or improved neurological status  Description: INTERVENTIONS  - Monitor and report changes in neurological status  - Monitor vital signs such as temperature, blood pressure, glucose, and any other labs ordered   - Initiate measures to prevent increased intracranial pressure  - Monitor for seizure activity and implement precautions if appropriate      Outcome: Progressing  Goal: Achieves maximal functionality and self care  Description: INTERVENTIONS  - Monitor swallowing and airway patency with patient fatigue and changes in neurological status  - Encourage and assist patient to increase activity and self care.    - Encourage visually impaired, hearing impaired and aphasic patients to use assistive/communication devices  Outcome: Progressing     Problem: PAIN - ADULT  Goal: Verbalizes/displays adequate comfort level or baseline comfort level  Description: Interventions:  - Encourage patient to monitor pain and request assistance  - Assess pain using appropriate pain scale  - Administer analgesics based on type and severity of pain and evaluate response  - Implement non-pharmacological measures as appropriate and evaluate response  - Consider cultural and social influences on pain and pain management  - Notify physician/advanced practitioner if interventions unsuccessful or patient reports new pain  Outcome: Progressing     Problem: INFECTION - ADULT  Goal: Absence or prevention of progression during hospitalization  Description: INTERVENTIONS:  - Assess and monitor for signs and symptoms of infection  - Monitor lab/diagnostic results  - Monitor all insertion sites, i.e. indwelling lines, tubes, and drains  - Monitor endotracheal if appropriate and nasal secretions for changes in amount and color  - Pollock appropriate cooling/warming therapies per order  - Administer medications as ordered  - Instruct and encourage patient and family to use good hand hygiene technique  - Identify and instruct in appropriate isolation precautions for identified infection/condition  Outcome: Progressing     Problem: Knowledge Deficit  Goal: Patient/family/caregiver demonstrates understanding of disease process, treatment plan, medications, and discharge instructions  Description: Complete learning assessment and assess knowledge base. Interventions:  - Provide teaching at level of understanding  - Provide teaching via preferred learning methods  Outcome: Progressing     Problem: Communication Impairment  Goal: Ability to express needs and understand communication  Description: Assess patient's communication skills and ability to understand information. Patient will demonstrate use of effective communication techniques, alternative methods of communication and understanding even if not able to speak.      - Encourage communication and provide alternate methods of communication as needed. - Collaborate with case management/ for discharge needs. - Include patient/family/caregiver in decisions related to communication. Outcome: Progressing     Problem: Potential for Aspiration  Goal: Non-ventilated patient's risk of aspiration is minimized  Description: Assess and monitor vital signs, respiratory status, and labs (WBC). Monitor for signs of aspiration (tachypnea, cough, rales, wheezing, cyanosis, fever). - Assess and monitor patient's ability to swallow. - Place patient up in chair to eat if possible. - HOB up at 90 degrees to eat if unable to get patient up into chair.  - Supervise patient during oral intake. - Instruct patient/ family to take small bites. - Instruct patient/ family to take small single sips when taking liquids.   - Follow patient-specific strategies generated by speech pathologist.  Outcome: Progressing     Problem: METABOLIC, FLUID AND ELECTROLYTES - ADULT  Goal: Electrolytes maintained within normal limits  Description: INTERVENTIONS:  - Monitor labs and assess patient for signs and symptoms of electrolyte imbalances  - Administer electrolyte replacement as ordered  - Monitor response to electrolyte replacements, including repeat lab results as appropriate  - Instruct patient on fluid and nutrition as appropriate  Outcome: Progressing  Goal: Fluid balance maintained  Description: INTERVENTIONS:  - Monitor labs   - Monitor I/O and WT  - Instruct patient on fluid and nutrition as appropriate  - Assess for signs & symptoms of volume excess or deficit  Outcome: Progressing  Goal: Glucose maintained within target range  Description: INTERVENTIONS:  - Monitor Blood Glucose as ordered  - Assess for signs and symptoms of hyperglycemia and hypoglycemia  - Administer ordered medications to maintain glucose within target range  - Assess nutritional intake and initiate nutrition service referral as needed  Outcome: Progressing     Problem: HEMATOLOGIC - ADULT  Goal: Maintains hematologic stability  Description: INTERVENTIONS  - Assess for signs and symptoms of bleeding or hemorrhage  - Monitor labs  - Administer supportive blood products/factors as ordered and appropriate  Outcome: Progressing

## 2023-10-30 NOTE — RESTORATIVE TECHNICIAN NOTE
Restorative Technician Note      Patient Name: Storm Pac     Note Type: Mobility  Patient Position Upon Consult: Supine  Activity Performed: Repositioned  Brace Applied: Multipodous Boot (L LE)  Education Provided: Yes  Patient Position at End of Consult: Supine; Bed/Chair alarm activated;  All needs within reach    [facility] LORA CAAL, Restorative Technician, [employer]

## 2023-10-30 NOTE — PLAN OF CARE
Problem: Potential for Falls  Goal: Patient will remain free of falls  Description: INTERVENTIONS:  - Educate patient/family on patient safety including physical limitations  - Instruct patient to call for assistance with activity   - Consult OT/PT to assist with strengthening/mobility   - Keep Call bell within reach  - Keep bed low and locked with side rails adjusted as appropriate  - Keep care items and personal belongings within reach  - Initiate and maintain comfort rounds  - Make Fall Risk Sign visible to staff  - Offer Toileting every 2 Hours, in advance of need  - Initiate/Maintain bed alarm  - Obtain necessary fall risk management equipment: non skid footwear  - Apply yellow socks and bracelet for high fall risk patients  - Consider moving patient to room near nurses station  Outcome: Progressing     Problem: MOBILITY - ADULT  Goal: Maintain or return to baseline ADL function  Description: INTERVENTIONS:  -  Assess patient's ability to carry out ADLs; assess patient's baseline for ADL function and identify physical deficits which impact ability to perform ADLs (bathing, care of mouth/teeth, toileting, grooming, dressing, etc.)  - Assess/evaluate cause of self-care deficits   - Assess range of motion  - Assess patient's mobility; develop plan if impaired  - Assess patient's need for assistive devices and provide as appropriate  - Encourage maximum independence but intervene and supervise when necessary  - Involve family in performance of ADLs  - Assess for home care needs following discharge   - Consider OT consult to assist with ADL evaluation and planning for discharge  - Provide patient education as appropriate  Outcome: Progressing  Goal: Maintains/Returns to pre admission functional level  Description: INTERVENTIONS:  - Perform BMAT or MOVE assessment daily.   - Set and communicate daily mobility goal to care team and patient/family/caregiver.    - Collaborate with rehabilitation services on mobility goals if consulted  - Perform Range of Motion 3 times a day. - Reposition patient every 2 hours. - Record patient progress and toleration of activity level   Outcome: Progressing     Problem: SAFETY ADULT  Goal: Patient will remain free of falls  Description: INTERVENTIONS:  - Educate patient/family on patient safety including physical limitations  - Instruct patient to call for assistance with activity   - Consult OT/PT to assist with strengthening/mobility   - Keep Call bell within reach  - Keep bed low and locked with side rails adjusted as appropriate  - Keep care items and personal belongings within reach  - Initiate and maintain comfort rounds  - Make Fall Risk Sign visible to staff  - Offer Toileting every 2 Hours, in advance of need  - Initiate/Maintain bed alarm  - Obtain necessary fall risk management equipment: non skid footwear  - Apply yellow socks and bracelet for high fall risk patients  - Consider moving patient to room near nurses station  Outcome: Progressing  Goal: Maintain or return to baseline ADL function  Description: INTERVENTIONS:  -  Assess patient's ability to carry out ADLs; assess patient's baseline for ADL function and identify physical deficits which impact ability to perform ADLs (bathing, care of mouth/teeth, toileting, grooming, dressing, etc.)  - Assess/evaluate cause of self-care deficits   - Assess range of motion  - Assess patient's mobility; develop plan if impaired  - Assess patient's need for assistive devices and provide as appropriate  - Encourage maximum independence but intervene and supervise when necessary  - Involve family in performance of ADLs  - Assess for home care needs following discharge   - Consider OT consult to assist with ADL evaluation and planning for discharge  - Provide patient education as appropriate  Outcome: Progressing  Goal: Maintains/Returns to pre admission functional level  Description: INTERVENTIONS:  - Perform BMAT or MOVE assessment daily. - Set and communicate daily mobility goal to care team and patient/family/caregiver. - Collaborate with rehabilitation services on mobility goals if consulted  - Perform Range of Motion 3 times a day. - Reposition patient every 2 hours.   - Record patient progress and toleration of activity level   Outcome: Progressing     Problem: PAIN - ADULT  Goal: Verbalizes/displays adequate comfort level or baseline comfort level  Description: Interventions:  - Encourage patient to monitor pain and request assistance  - Assess pain using appropriate pain scale  - Administer analgesics based on type and severity of pain and evaluate response  - Implement non-pharmacological measures as appropriate and evaluate response  - Consider cultural and social influences on pain and pain management  - Notify physician/advanced practitioner if interventions unsuccessful or patient reports new pain  Outcome: Progressing

## 2023-10-30 NOTE — ASSESSMENT & PLAN NOTE
Lab Results   Component Value Date    HGBA1C 6.7 (H) 10/24/2023     On  every 6 hr insulin sliding scale with Accu-Cheks per protocol  Increase in lantus dose   Added humalog fr further coverage   (P) [phone]

## 2023-10-30 NOTE — SPEECH THERAPY NOTE
Speech Language/Pathology  Speech-Language Pathology Progress Note      Patient Name: Padmini Ferrara    CMKOW'A Date: 10/30/2023      Subjective:  Pt was awake and alert. He was sitting upright in bed. Patient answered simple questions about orientation. Improved clarity, still mildly dysarthric. Objective:  Pt was seen today for dysphagia therapy. Current diet is NPO, ng in place. . Pt was on room air. Oral care was completed with use of toothbrush/toothpaste, mouth swabs, and oral care kits. Focus of today's session was to maximize PO intake safety, determine potential for diet texture advancement, and determine potential for liquid consistency advancement. Textures offered today included puree and honey thick liquid via tsp. Swallow function:   Bolus retrieval and manipulation were moderately and sluggish. At times there was lingual surface retention w/ the puree, pt needed cue to transfer and swallow. Pharyngeal swallow initiation was delayed. Hyolaryngeal excursion was reduced. Mult swallows w/ puree noted w/ mild throat clearing initially then that subsided. Pt w/ suspected delayed swallow with the honey - mult swallows, immed coughing with initial tsps, then less. Cough then became delayed and suction was used each time. Assessment:  Pt remains at high risk for poor intake given his significant oropharyngeal dysphagia. Though mildly improved w/ puree would not be enough po to be functional for ongoing rehab and recovery. Plan:  Oral care often  ST to offer puree as able  Repeat mbs in 1 week or so if any noted improvement occurs.    Alternate means of nutrition to continue

## 2023-10-30 NOTE — PROGRESS NOTES
[address] [facility]  Progress Note  Name: Anjana Cardona I  MRN: [id number]  Unit/Bed#: [facility] 701-01 I Date of Admission: 10/24/2023   Date of Service: 10/30/2023 I Hospital Day: 6    Assessment/Plan   * Left-sided weakness  Assessment & Plan  Presented with left-sided weakness, thrombolytics were declined by patient and family. CT head: No acute intracranial abnormality. Chronic microangiopathic changes. 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 No mass effect or hemorrhagic conversion. Echo: EF 50% with grade 1 DD    PLAN:  Currently on stroke pathway. Neurology following, dual antiplatelet with aspirin and Brilinta for 90 days followed by aspirin monotherapy. Now off permissive hypertension  PT OT  PMR following.>. rehab on dc. Currently n.p.o. with NG tube. Swallow therapy following. VBS requested. Will start tube feeds meanwhile  Atorvastatin 40 mg daily. Patient was started on Brilinta as patient has Plavix allergy. Vascular surgery evaluated, OP follow up for ICA stenosis. Failed VBS. On tube feeds through NG tube. Plan is for repeat swallow eval on Monday, fi failed again, will consult GI for PEG evaluation. Swallow team also suggests ENT eval for vocal cord dysfunction. Cough and secretions  Assessment & Plan  Suspect aspiration vs mild fluid overload   On RA. No fever. Leukocytosis improving. Hold IVF  Started tube feeds  CXR: Questionable left costophrenic angle opacification given patient positioning. Artifact versus pneumonia in this area cannot be entirely excluded. Mild right basilar linear atelectasis. Monitor off abx for now as leukocytosis improving and patient is afebrile. History of GI bleed  Assessment & Plan  Patient with history of coffee-ground material in the hospitalization last year; concerning for GI bleeding, as the patient is placed on aspirin with Brilinta; will place patient on pantoprazole prophylaxis. Renal cell carcinoma of right kidney [facility])  Assessment & Plan  Status post right nephrectomy; follows up with urology. Chronic renal disease, stage IV (HCC)  Assessment & Plan  Baseline creatinine between 1.9-2.4. Currently at baseline. Trend BMP. 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 chronic kidney disease, without long-term current use of insulin ([facility])  Assessment & Plan  Lab Results   Component Value Date    HGBA1C 6.7 (H) 10/24/2023     On  every 6 hr insulin sliding scale with Accu-Cheks per protocol  Increase in lantus dose   Added humalog fr further coverage   (P) [phone]      Essential hypertension  Assessment & Plan  Was On permissive hypertension, now completed  Restarted home dose amlodipine, increased dose to 10 mg amlodipine daily  Added low dose coreg, increased dose  Prn labetalol             VTE Pharmacologic Prophylaxis:   Pharmacologic: Enoxaparin (Lovenox)  Mechanical VTE Prophylaxis in Place: Yes    Patient Centered Rounds: I have performed bedside rounds with nursing staff today. Discussions with Specialists or Other Care Team Provider: cm    Education and Discussions with Family / Patient: wife and son, daughter in law  at bedside    Time Spent for Care: 30 minutes. More than 50% of total time spent on counseling and coordination of care as described above.     Current Length of Stay: 6 day(s)    Current Patient Status: Inpatient   Certification Statement: The patient will continue to require additional inpatient hospital stay due to not medically stable pending swallow eval    Discharge Plan: rehab pending nutrition plabn    Code Status: Level 1 - Full Code      Subjective:   No overnight events. Patient deneis any acute pain. Objective:     Vitals:   Temp (24hrs), Av.5 °F (36.9 °C), Min:97.8 °F (36.6 °C), Max:99.4 °F (37.4 °C)    Temp:  [97.8 °F (36.6 °C)-99.4 °F (37.4 °C)] 98.3 °F (36.8 °C)  HR:  [43-76] 58  Resp:  [16-18] 16  BP: (141-181)/(63-80) 141/63  SpO2:  [94 %-98 %] 96 %  Body mass index is 30.92 kg/m². Input and Output Summary (last 24 hours): Intake/Output Summary (Last 24 hours) at 10/30/2023 1347  Last data filed at 10/29/2023 1816  Gross per 24 hour   Intake 2597 ml   Output 550 ml   Net 2047 ml       Physical Exam:     Physical Exam  Constitutional:       General: He is not in acute distress. Cardiovascular:      Rate and Rhythm: Normal rate and regular rhythm. PVC present. Heart sounds: Normal heart sounds. No murmur heard. Pulmonary:      Effort: No respiratory distress. Breath sounds:coarse breath sounds. No wheezing or rales. Abdominal:      General: Bowel sounds are normal. There is no distension. Palpations: Abdomen is soft. Tenderness: There is no abdominal tenderness. Skin:     General: Skin is warm. Neurological:      Mental Status: He is alert. Comments: awake and alert. left-sided weakness with facial droop and dysarthria noted.      Additional Data:     Labs:    Results from last 7 days   Lab Units 10/29/23  0534 10/27/23  0511   WBC Thousand/uL 9.20 10.58*   HEMOGLOBIN g/dL 13.5 13.5   HEMATOCRIT % 38.6 40.6   PLATELETS Thousands/uL 168 157   BANDS PCT %  --  1   NEUTROS PCT % 68  --    LYMPHS PCT % 14  --    LYMPHO PCT %  --  9*   MONOS PCT % 13*  --    MONO PCT %  --  9   EOS PCT % 5 3     Results from last 7 days   Lab Units 10/29/23  0534 10/26/23  0513 10/25/23  0617   SODIUM mmol/L 138   < > 139   POTASSIUM mmol/L 3.6   < > 4.5   CHLORIDE mmol/L 104   < > 103   CO2 mmol/L 27   < > 27   BUN mg/dL 36*   < > 30*   CREATININE mg/dL 1.95*   < > 2.04*   ANION GAP mmol/L 7   < > 9   CALCIUM mg/dL 8.2*   < > 9.0   ALBUMIN g/dL  --   --  3.8   TOTAL BILIRUBIN mg/dL  --   --  0.89   ALK PHOS U/L  --   --  66   ALT U/L  --   --  10   AST U/L  --   --  14   GLUCOSE RANDOM mg/dL 185*   < > 141*    < > = values in this interval not displayed.      Results from last 7 days   Lab Units 10/24/23  2054   INR  0.99     Results from last 7 days   Lab Units 10/30/23  1217 10/30/23  1119 10/30/23  1105 10/30/23  0841 10/30/23  0616 10/30/23  0044 10/29/23  1734 10/29/23  1159 10/29/23  0606 10/29/23  0001 10/28/23  1732 10/28/23  1230   POC GLUCOSE mg/dl 244* 252* 238* 221* 211* 211* 213* 238* 202* 209* 208* 193*     Results from last 7 days   Lab Units 10/24/23  2054   HEMOGLOBIN A1C % 6.7*     Results from last 7 days   Lab Units 10/29/23  0534   PROCALCITONIN ng/ml 0.13           Recent Cultures (last 7 days):           Last 24 Hours Medication List:   Current Facility-Administered Medications   Medication Dose Route Frequency Provider Last Rate    acetaminophen  975 mg Oral Q8H Yin Lopez MD      [START ON 10/31/2023] amLODIPine  10 mg Oral Daily Yin Lopez MD      aspirin  81 mg Per NG Tube Daily Yin Lopez MD      atorvastatin  40 mg Per NG Tube QPM Yin Lopez MD      bisacodyl  10 mg Rectal Daily PRN Yin Lopez MD      carvedilol  12.5 mg Oral BID With Meals Yin Lopez MD      enoxaparin  40 mg Subcutaneous Daily Rachel Goncalves MD      insulin glargine  10 Units Subcutaneous HS Yin Lopez MD      insulin lispro  1-6 Units Subcutaneous Q6H [address] Rachel Goncalves MD      insulin lispro  2 Units Subcutaneous Q6H Yin Lopez MD      labetalol  10 mg Intravenous Q6H PRN Yin Lopez MD      ondansetron  4 mg Intravenous Q6H PRN Rachel Gonclaves MD      pantoprazole  40 mg Intravenous Q12H [address] Rachel Goncalves MD      polyethylene glycol  17 g Per NG Tube Daily Yin Lopez MD      senna-docusate sodium  1 tablet Per NG Tube BID Yin Lopez MD ticagrelor  90 mg Per NG Tube Q12H Michelle Cody MD          Today, Patient Was Seen By: Lula Jaeger MD    ** Please Note: Dictation voice to text software may have been used in the creation of this document.  **

## 2023-10-30 NOTE — PROGRESS NOTES
[address] [facility]  Progress Note  Name: Rolando Rubalcava I  MRN: [id number]  Unit/Bed#: [facility] 701-01 I Date of Admission: 10/24/2023   Date of Service: 10/30/2023 I Hospital Day: 6    Assessment/Plan   * Left-sided weakness  Assessment & Plan  Presented with left-sided weakness, thrombolytics were declined by patient and family. CT head: No acute intracranial abnormality. Chronic microangiopathic changes. 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 No mass effect or hemorrhagic conversion. Echo: EF 50% with grade 1 DD    PLAN:  Currently on stroke pathway. Neurology following, dual antiplatelet with aspirin and Brilinta for 90 days followed by aspirin monotherapy. Now off permissive hypertension  PT OT  PMR following.>. rehab on dc. Currently n.p.o. with NG tube. Swallow therapy following. VBS requested. Will start tube feeds meanwhile  Atorvastatin 40 mg daily. Patient was started on Brilinta as patient has Plavix allergy. Vascular surgery evaluated, OP follow up for ICA stenosis. Failed VBS. On tube feeds through NG tube. Plan is for repeat swallow eval on Monday, fi failed again, will consult GI for PEG evaluation. Swallow team also suggests ENT eval for vocal cord dysfunction. Cough and secretions  Assessment & Plan  Suspect aspiration vs mild fluid overload   On RA. No fever. Leukocytosis improving. Hold IVF  Started tube feeds  CXR: Questionable left costophrenic angle opacification given patient positioning. Artifact versus pneumonia in this area cannot be entirely excluded. Mild right basilar linear atelectasis. Monitor off abx for now as leukocytosis improving and patient is afebrile. History of GI bleed  Assessment & Plan  Patient with history of coffee-ground material in the hospitalization last year; concerning for GI bleeding, as the patient is placed on aspirin with Brilinta; will place patient on pantoprazole prophylaxis. Renal cell carcinoma of right kidney [facility])  Assessment & Plan  Status post right nephrectomy; follows up with urology. Chronic renal disease, stage IV (HCC)  Assessment & Plan  Baseline creatinine between 1.9-2.4. Currently at baseline. Trend BMP. 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 chronic kidney disease, without long-term current use of insulin ([facility])  Assessment & Plan  Lab Results   Component Value Date    HGBA1C 6.7 (H) 10/24/2023     On  every 6 hr insulin sliding scale with Accu-Cheks per protocol  Increase in lantus dose   Added humalog fr further coverage   (P) [phone]      Essential hypertension  Assessment & Plan  Was On permissive hypertension, now completed  Restarted home dose amlodipine, increased dose to 10 mg amlodipine daily  Added low dose coreg, increased dose  Prn labetalol         VTE Pharmacologic Prophylaxis:   Pharmacologic: Enoxaparin (Lovenox)  Mechanical VTE Prophylaxis in Place: Yes    Patient Centered Rounds: I have performed bedside rounds with nursing staff today. Discussions with Specialists or Other Care Team Provider: GI< CM, swallow therapy     Education and Discussions with Family / Patient: plan of care, patient. also called and updated wife. Time Spent for Care: 30 minutes. More than 50% of total time spent on counseling and coordination of care as described above.     Current Length of Stay: 6 day(s)    Current Patient Status: Inpatient     Certification Statement: The patient will continue to require additional inpatient hospital stay due to not medically stable epdning PEG plan    Discharge Plan: rehab when medically stable, pending PEG     Code Status: Level 1 - Full Code      Subjective:   No overnight events reported. As per nursing staff, patient had a busy morning with personal; filiberto, katie pardo. At that time, he was awake and communicative but then as really tired and became somnolent. I assessed him twice. One time was somnolent, wakes up on calking name and denied pain but was lethargic. After half hour, he was more awake and communicative. Denied any pain and verbalized understanding about PEG tube. Objective:     Vitals:   Temp (24hrs), Av.5 °F (36.9 °C), Min:97.8 °F (36.6 °C), Max:99.4 °F (37.4 °C)    Temp:  [97.8 °F (36.6 °C)-99.4 °F (37.4 °C)] 98.3 °F (36.8 °C)  HR:  [43-76] 58  Resp:  [16-18] 16  BP: (141-181)/(63-80) 141/63  SpO2:  [94 %-98 %] 96 %  Body mass index is 30.92 kg/m². Input and Output Summary (last 24 hours): Intake/Output Summary (Last 24 hours) at 10/30/2023 1337  Last data filed at 10/29/2023 1816  Gross per 24 hour   Intake 2597 ml   Output 550 ml   Net 2047 ml       Physical Exam:     Physical Exam  Constitutional:       General: He is not in acute distress. Cardiovascular:      Rate and Rhythm: Normal rate and regular rhythm. PVC present. Heart sounds: Normal heart sounds. No murmur heard. Pulmonary:      Effort: No respiratory distress. Breath sounds:coarse breath sounds. No wheezing or rales. Abdominal:      General: Bowel sounds are normal. There is no distension. Palpations: Abdomen is soft. Tenderness: There is no abdominal tenderness. Skin:     General: Skin is warm. Neurological:      Mental Status: He is alert. Comments: awake and alert. left-sided weakness with facial droop and dysarthria noted.      Additional Data:     Labs:    Results from last 7 days   Lab Units 10/29/23  0534 10/27/23  0511   WBC Thousand/uL 9.20 10.58*   HEMOGLOBIN g/dL 13.5 13.5   HEMATOCRIT % 38.6 40.6   PLATELETS Thousands/uL 168 157   BANDS PCT %  --  1 NEUTROS PCT % 68  --    LYMPHS PCT % 14  --    LYMPHO PCT %  --  9*   MONOS PCT % 13*  --    MONO PCT %  --  9   EOS PCT % 5 3     Results from last 7 days   Lab Units 10/29/23  0534 10/26/23  0513 10/25/23  0617   SODIUM mmol/L 138   < > 139   POTASSIUM mmol/L 3.6   < > 4.5   CHLORIDE mmol/L 104   < > 103   CO2 mmol/L 27   < > 27   BUN mg/dL 36*   < > 30*   CREATININE mg/dL 1.95*   < > 2.04*   ANION GAP mmol/L 7   < > 9   CALCIUM mg/dL 8.2*   < > 9.0   ALBUMIN g/dL  --   --  3.8   TOTAL BILIRUBIN mg/dL  --   --  0.89   ALK PHOS U/L  --   --  66   ALT U/L  --   --  10   AST U/L  --   --  14   GLUCOSE RANDOM mg/dL 185*   < > 141*    < > = values in this interval not displayed.      Results from last 7 days   Lab Units 10/24/23  2054   INR  0.99     Results from last 7 days   Lab Units 10/30/23  1217 10/30/23  1119 10/30/23  1105 10/30/23  0841 10/30/23  0616 10/30/23  0044 10/29/23  1734 10/29/23  1159 10/29/23  0606 10/29/23  0001 10/28/23  1732 10/28/23  1230   POC GLUCOSE mg/dl 244* 252* 238* 221* 211* 211* 213* 238* 202* 209* 208* 193*     Results from last 7 days   Lab Units 10/24/23  2054   HEMOGLOBIN A1C % 6.7*     Results from last 7 days   Lab Units 10/29/23  0534   PROCALCITONIN ng/ml 0.13           Recent Cultures (last 7 days):           Last 24 Hours Medication List:   Current Facility-Administered Medications   Medication Dose Route Frequency Provider Last Rate    acetaminophen  975 mg Oral Q8H Sheela Hodges MD      [START ON 10/31/2023] amLODIPine  10 mg Oral Daily Sheela Hodges MD      aspirin  81 mg Per NG Tube Daily Sheela Hodges MD      atorvastatin  40 mg Per NG Tube QPM Sheela Hodges MD      bisacodyl  10 mg Rectal Daily PRN Sheela Hodges MD      carvedilol  12.5 mg Oral BID With Meals Sheela Hodges MD      enoxaparin  40 mg Subcutaneous Daily Kari Cisse MD      insulin glargine  10 Units Subcutaneous HS Sheela Hodges MD      insulin lispro  1-6 Units Subcutaneous Q6H [address] Hector Sotelo MD      insulin lispro  2 Units Subcutaneous Q6H James Mcgrath MD      labetalol  10 mg Intravenous Q6H PRN James Mcgrath MD      ondansetron  4 mg Intravenous Q6H PRN Hector Sotelo MD      pantoprazole  40 mg Intravenous Q12H [address] Hector Sotelo MD      polyethylene glycol  17 g Per NG Tube Daily James Mcgrath MD      senna-docusate sodium  1 tablet Per NG Tube BID James Mcgrath MD      ticagrelor  90 mg Per NG Tube Q12H [address] James Mcgrath MD          Today, Patient Was Seen By: James Mcgrath MD    ** Please Note: Dictation voice to text software may have been used in the creation of this document.  **

## 2023-10-30 NOTE — ASSESSMENT & PLAN NOTE
Presented with left-sided weakness, thrombolytics were declined by patient and family. CT head: No acute intracranial abnormality. Chronic microangiopathic changes. 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 No mass effect or hemorrhagic conversion. Echo: EF 50% with grade 1 DD    PLAN:  Currently on stroke pathway. Neurology following, dual antiplatelet with aspirin and Brilinta for 90 days followed by aspirin monotherapy. Now off permissive hypertension  PT OT  PMR following.>. rehab on dc. Currently n.p.o. with NG tube. Swallow therapy following. VBS requested. Will start tube feeds meanwhile  Atorvastatin 40 mg daily. Patient was started on Brilinta as patient has Plavix allergy. Vascular surgery evaluated, OP follow up for ICA stenosis. Failed VBS. On tube feeds through NG tube. Plan is for repeat swallow eval on Monday, fi failed again, will consult GI for PEG evaluation. Swallow team also suggests ENT eval for vocal cord dysfunction.    GI consulted for PEG tube placement- family is worried about stopping the antiplatelets

## 2023-10-30 NOTE — CONSULTS
Consultation - [address] [place] [address] [facility]  Harini Jones 70 y.o. male MRN: [id number]  Unit/Bed#: [facility] 701-01 Encounter: [id number]            Inpatient consult to gastroenterology     Date/Time  10/30/2023 1:51 PM     Performed by  Divya Kilgore DO   Authorized by  Lula Jaeger MD           Reason for Consult / Principal Problem: Dysphagia; PEG consult    ASSESSMENT AND PLAN:      Mr. 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 Oropharyngeal Dysphagia  CVA on DAPT with ASA/Brilinta     Patient presenting with CVA which he is currently on DAPT with ASA and Brilinta. CVA unfortunately complicated by severe oropharyngeal dysphagia. Repeat VBS today demonstrated mild improvement but patient currently unable to tolerate sufficient PO intake to meet caloric needs. NG tube currently in place and PEG tube being requested. Called patient's wife to discuss PEG tube placement. Discussed risks vs benefits of PEG including risk of infection, bleeding, and aspiration. Also discussed alternatives to PEG tube placement and explained that patient would first need to have his DAPT held x5 day prior to pursuing this. At this time, wife undecided regarding her discussion. Optimistic that patient will continue to improve and thus may elect to hold off. Recommend continued goals of care discussion with patient and family in the interim.      Plan:  Continue on tube feeds at this time via NG tube; TF recs per nutrition  Appreciate ongoing speech therapy support   Reached out to neurology to determine if Brilinta can be held x5 days  Ongoing goals of care discussion with family regarding desire for PEG  Additional pain and symptom management per primary team   ______________________________________________________________________    HPI:   Avril Ledezma is a 70year old male with a PMHx of CKD IV, DMII, renal call carcinoma of right kidney s/p nephrectomy, and HTN who initially presented on 10/24 with left sided weakness and left facial drop. Patient also with dysphonia and dysphagia. CTA with Left V3/V4 occlusion and severe stenosis of the LICA >62%. MRI with early subacute lacunar infarct. No thrombolytics administered and instead patient loaded with ASA and Brillinta which he remains on at this time. As a result of the CVA, patient now with severe dysphagia. VBS performed on 10/30 which revealed severe oropharyngeal dysphagia - did not improvement but explained that oral intake would not provide sufficient nutrition to allow for ongoing rehab and recovery. Patient currently with NG tube but GI consulted for evaluation of PEG tube placement. Patient seen and examined at bedside at time of consultation. Lethargic ad not participating in conversation. Patient's wife contact via telephone to discuss risk and benefits of PEG tube placement. Of note: Patient with prior EGD 10/7/2023 - normal anatomy and exam overall unremarkable. Patient with history of right nephrectomy but with no prior surgical history. REVIEW OF SYSTEMS:  Unable to complete given patient's lethargy. Not participating in conversation.        Historical Information   Past Medical History:   Diagnosis Date    Hypertension     TIA (transient ischemic attack)      Past Surgical History:   Procedure Laterality Date    CYSTECTOMY      Per patient cyst removal from his back    LASIK      CT LAPAROSCOPY RADICAL NEPHRECTOMY Right 7/6/2022    Procedure: NEPHRECTOMY RADICAL LAPAROSCOPIC W/ ROBOTICS, poss open;  Surgeon: Janae Callaway MD;  Location: [facility];  Service: Urology     Social History   Social History     Substance and Sexual Activity   Alcohol Use Never     Social History     Substance and Sexual Activity   Drug Use Never     Social History     Tobacco Use   Smoking Status Never   Smokeless Tobacco Never     Family History   Problem Relation Age of Onset    Colon cancer Mother     Diabetes type II Mother     Heart disease Mother     Prostate cancer Father        Meds/Allergies     Medications Prior to Admission   Medication    amLODIPine (NORVASC) 5 mg tablet    Ascorbic Acid (vitamin C) 100 MG tablet    aspirin (ECOTRIN LOW STRENGTH) 81 mg EC tablet    cholecalciferol (VITAMIN D3) 1,000 units tablet    Multiple Vitamins-Minerals (MICHELLE MULTI MEN) TABS    nateglinide (STARLIX) 60 mg tablet     Current Facility-Administered Medications   Medication Dose Route Frequency    acetaminophen (TYLENOL) oral suspension 975 mg  975 mg Oral Q8H    [START ON 10/31/2023] amLODIPine (NORVASC) tablet 10 mg  10 mg Oral Daily    aspirin chewable tablet 81 mg  81 mg Per NG Tube Daily    atorvastatin (LIPITOR) tablet 40 mg  40 mg Per NG Tube QPM    bisacodyl (DULCOLAX) rectal suppository 10 mg  10 mg Rectal Daily PRN    carvedilol (COREG) tablet 12.5 mg  12.5 mg Oral BID With Meals    enoxaparin (LOVENOX) subcutaneous injection 40 mg  40 mg Subcutaneous Daily    insulin glargine (LANTUS) subcutaneous injection 10 Units 0.1 mL  10 Units Subcutaneous HS    insulin lispro (HumaLOG) 100 units/mL subcutaneous injection 1-6 Units  1-6 Units Subcutaneous Q6H [address]    insulin lispro (HumaLOG) 100 units/mL subcutaneous injection 2 Units  2 Units Subcutaneous Q6H    labetalol (NORMODYNE) injection 10 mg  10 mg Intravenous Q6H PRN    ondansetron (ZOFRAN) injection 4 mg  4 mg Intravenous Q6H PRN    pantoprazole (PROTONIX) injection 40 mg  40 mg Intravenous Q12H [facility]    polyethylene glycol (MIRALAX) packet 17 g  17 g Per NG Tube Daily    senna-docusate sodium (SENOKOT S) 8.6-50 mg per tablet 1 tablet  1 tablet Per NG Tube BID    ticagrelor (BRILINTA) tablet 90 mg  90 mg Per NG Tube Q12H [address]       Allergies   Allergen Reactions    Plavix [Clopidogrel] Rash     Stopped two days ago because got a rash and doctors thought it was from this            Objective     Blood pressure 141/63, pulse 58, temperature 98.3 °F (36.8 °C), resp. rate 16, height 6' (1.829 m), weight 103 kg (228 lb), SpO2 96 %. Body mass index is 30.92 kg/m². Intake/Output Summary (Last 24 hours) at 10/30/2023 1106  Last data filed at 10/29/2023 1816  Gross per 24 hour   Intake 2757 ml   Output 550 ml   Net 2207 ml         PHYSICAL EXAM:      General Appearance:   Lethargic   HEENT:   Normocephalic, atraumatic, anicteric. Neck:  Supple, symmetrical, trachea midline   Lungs:   Clear to auscultation bilaterally; no rales, rhonchi or wheezing; respirations unlabored    Heart[de-identified]   Regular rate and rhythm; no murmur, rub, or gallop. Abdomen:   Soft, non-tender, non-distended; normal bowel sounds; no masses, no organomegaly    Genitalia:   Deferred    Rectal:   Deferred    Extremities:  No cyanosis, clubbing or edema    Pulses:  2+ and symmetric all extremities    Skin:  No jaundice, rashes, or lesions    Lymph nodes:  No palpable cervical lymphadenopathy        Lab Results:   No results displayed because visit has over 200 results. Imaging Studies: I have personally reviewed pertinent imaging studies.

## 2023-10-30 NOTE — PROGRESS NOTES
PHYSICAL MEDICINE AND REHABILITATION CONSULT NOTE  Rolando Rubalcava 70 y.o. male MRN: [id number]  Unit/Bed#: [facility] 701-01 Encounter: [id number]    Requested by (Physician/Service): Tish Mariscal MD  Reason for Consultation:  Assessment of rehabilitation needs    Assessment:  Rehabilitation Diagnosis:   Right paramedian veronique infarct  Left hemiplegia   Dysarthria  Dysphagia with NG tube on tube feeds  Impaired mobility and self care  Impaired cognition     Recommendations:  Rehabilitation Plan:  Continue PT/OT (SLP) while on acute care. The patient is to undergo repeat VBS and if fails will likely require PEG placement. He was started on tube feed via NG tube and appears to be tolerating. On exam today he is very fatigued. Will continue to follow functional and medical progress at this time. Covid-19 Testing: [facility] inpatient rehabilitation units require testing within 48 hours of all potential admissions at this time. *Re-testing is NOT required for patients recovering from COVID-19 infection if isolation has been discontinued per CDC criteria. Medical Co-morbidities Plan:  Cough and secretions monitoring off of antibiotics   Hx of GI bleed  Hx of renal cell carcinoma of the right kidney s/p nephrectomy   Chronic renal disease stage IV  Diabetes type 2  Hypertension   Bladder: incontinent with condom cath in place   Bowel: Incontinent LBM 10/28  DVT ppx: Lovenox and SCD    Thank you for this consultation. Do not hesitate to contact service with further questions. KAYODE Mederos  PM&R    I have spent a total time of 15 minutes on 10/30/23 in caring for this patient including Patient and family education, Counseling / Coordination of care, Documenting in the medical record, Reviewing / ordering tests, medicine, procedures  , Obtaining or reviewing history  , and Communicating with other healthcare professionals . Subjective/Interval history:  The patient was seen in his room.  He is very fatigued today and falls asleep easily on exam. He continues with left hemiplegia, he does have some reflexive movement distal left LE. He continues with NG and tube feeds have been restated. He will undergo repeat VBS and if fails will likely need PEG placement with GI. He has had coughing with secretions and chest x-ray showed questionable left costophrenic angle opacification given patient positioning. Artifact versus pneumonia in this area cannot be entirely excluded. Mild right basilar linear atelectasis. He is being monitored off of antibiotics. PM&R continues to follow for rehabilitation recommendations. Review of Systems: 10 point ROS negative except for what is noted in HPI      Current level of function:  Physical Therapy: Maximal assist for bed mobility, unable to assess transfers   Occupational Therapy: maximal assist for LB dressing, total assist for toileting   Speech Therapy: NPO with NG tube on tube feeds       Physical Exam:  /71   Pulse 70   Temp 99.1 °F (37.3 °C) (Oral)   Resp 16   Ht 6' (1.829 m)   Wt 103 kg (228 lb)   SpO2 98%   BMI 30.92 kg/m²        Intake/Output Summary (Last 24 hours) at 10/30/2023 1027  Last data filed at 10/29/2023 1816  Gross per 24 hour   Intake 2757 ml   Output 700 ml   Net 2057 ml       Body mass index is 30.92 kg/m². Physical Exam  Constitutional:       General: He is not in acute distress. Appearance: He is not toxic-appearing. HENT:      Head:      Comments: Left facial droop      Right Ear: External ear normal.      Left Ear: External ear normal.      Nose: Nose normal.      Comments: NG tube in place   Eyes:      Comments: Right gaze preference    Cardiovascular:      Rate and Rhythm: Regular rhythm. Pulmonary:      Effort: Pulmonary effort is normal.      Breath sounds: Normal breath sounds. Abdominal:      General: There is no distension.    Musculoskeletal:      Comments: Left hemiplegia    Skin:     General: Skin is warm and dry.   Neurological:      Mental Status: He is alert. Comments: Dysarthria, dysphagia, left hemiplegia    Psychiatric:         Cognition and Memory: Cognition is impaired. Memory is impaired. Social History:    Social History     Socioeconomic History    Marital status: /Civil Union     Spouse name: Not on file    Number of children: Not on file    Years of education: Not on file    Highest education level: Not on file   Occupational History    Occupation: Retired 2015 -     Tobacco Use    Smoking status: Never    Smokeless tobacco: Never   Vaping Use    Vaping Use: Never used   Substance and Sexual Activity    Alcohol use: Never    Drug use: Never    Sexual activity: Not on file   Other Topics Concern    Not on file   Social History Narrative    Denies drug use - As per [place]    Consumes on average 1 cup of regular coffee per day      Social Determinants of Health     Financial Resource Strain: Low Risk  (11/15/2022)    Overall Financial Resource Strain (CARDIA)     Difficulty of Paying Living Expenses: Not very hard   Food Insecurity: No Food Insecurity (10/26/2023)    Hunger Vital Sign     Worried About Running Out of Food in the Last Year: Never true     [address] in the Last Year: Never true   Transportation Needs: No Transportation Needs (10/26/2023)    PRAPARE - Transportation     Lack of Transportation (Medical): No     Lack of Transportation (Non-Medical):  No   Physical Activity: Not on file   Stress: Not on file   Social Connections: Not on file   Intimate Partner Violence: Not on file   Housing Stability: Unknown (10/26/2023)    Housing Stability Vital Sign     Unable to Pay for Housing in the Last Year: No     Number of Places Lived in the Last Year: Not on file     Unstable Housing in the Last Year: No        Family History:    Family History   Problem Relation Age of Onset    Colon cancer Mother     Diabetes type II Mother     Heart disease Mother     Prostate cancer Father          Medications:     Current Facility-Administered Medications:     acetaminophen (TYLENOL) oral suspension 975 mg, 975 mg, Oral, Q8H, Mona Garcia MD    [START ON 10/31/2023] amLODIPine (NORVASC) tablet 10 mg, 10 mg, Oral, Daily, Mona Garcia MD    aspirin chewable tablet 81 mg, 81 mg, Per NG Tube, Daily, Mona Garcia MD, 81 mg at 10/30/23 0845    atorvastatin (LIPITOR) tablet 40 mg, 40 mg, Per NG Tube, QPM, Mona Garcia MD, 40 mg at 10/29/23 1730    bisacodyl (DULCOLAX) rectal suppository 10 mg, 10 mg, Rectal, Daily PRN, Mona Garcia MD    carvedilol (COREG) tablet 12.5 mg, 12.5 mg, Oral, BID With Meals, Mona Garcia MD, 12.5 mg at 10/30/23 0848    enoxaparin (LOVENOX) subcutaneous injection 40 mg, 40 mg, Subcutaneous, Daily, Severo Mcclintock, MD, 40 mg at 10/30/23 0845    insulin glargine (LANTUS) subcutaneous injection 10 Units 0.1 mL, 10 Units, Subcutaneous, HS, Mona Garcia MD    insulin lispro (HumaLOG) 100 units/mL subcutaneous injection 1-6 Units, 1-6 Units, Subcutaneous, Q6H [address], 2 Units at 10/30/23 0639 **AND** Fingerstick Glucose (POCT), , , Q6H, Severo Mcclintock, MD    insulin lispro (HumaLOG) 100 units/mL subcutaneous injection 2 Units, 2 Units, Subcutaneous, Q6H, Mona Garcia MD, 2 Units at 10/30/23 0846    labetalol (NORMODYNE) injection 10 mg, 10 mg, Intravenous, Q6H PRN, Mona Garcia MD, 10 mg at 10/27/23 0607    ondansetron (ZOFRAN) injection 4 mg, 4 mg, Intravenous, Q6H PRN, Severo Mcclintock, MD    pantoprazole (PROTONIX) injection 40 mg, 40 mg, Intravenous, Q12H [address], Severo Mcclintock, MD, 40 mg at 10/30/23 0846    polyethylene glycol (MIRALAX) packet 17 g, 17 g, Per NG Tube, Daily, Mona Garcia MD, 17 g at 10/30/23 0845    senna-docusate sodium (SENOKOT S) 8.6-50 mg per tablet 1 tablet, 1 tablet, Per NG Tube, BID, Mona Garcia MD, 1 tablet at 10/30/23 0845    ticagrelor (BRILINTA) tablet 90 mg, 90 mg, Per NG Tube, Q12H [address], Mona Garcia MD, 90 mg at 10/30/23 0847    Past Medical History:     Past Medical History:   Diagnosis Date    Hypertension     TIA (transient ischemic attack)         Past Surgical History:     Past Surgical History:   Procedure Laterality Date    CYSTECTOMY      Per patient cyst removal from his back    LASIK      [place] LAPAROSCOPY RADICAL NEPHRECTOMY Right 7/6/2022    Procedure: NEPHRECTOMY RADICAL LAPAROSCOPIC W/ ROBOTICS, poss open;  Surgeon: Kayla Chin MD;  Location: [facility];  Service: Urology         Allergies: Allergies   Allergen Reactions    Plavix [Clopidogrel] Rash     Stopped two days ago because got a rash and doctors thought it was from this            LABORATORY RESULTS:      Lab Results   Component Value Date    HGB 13.5 10/29/2023    HCT 38.6 10/29/2023    WBC 9.20 10/29/2023     Lab Results   Component Value Date    BUN 36 (H) 10/29/2023    K 3.6 10/29/2023     10/29/2023    CREATININE 1.95 (H) 10/29/2023     Lab Results   Component Value Date    PROTIME 13.0 10/24/2023    INR 0.99 10/24/2023        DIAGNOSTIC STUDIES: Reviewed  XR chest portable    Result Date: 10/27/2023  Impression: Questionable left costophrenic angle opacification given patient positioning. Artifact versus pneumonia in this area cannot be entirely excluded. Mild right basilar linear atelectasis. Resident: Isela Mesa, the attending radiologist, have reviewed the images and agree with the final report above. Workstation performed: [id number]     XR abdomen 1 vw portable    Result Date: 10/25/2023  Impression: Enteric tube positioned within the distal stomach. . Workstation performed: [id number]     XR abdomen 1 vw portable    Result Date: 10/25/2023  Impression: Enteric tube coiled over the region of the mouth. This was subsequently repositioned. Workstation performed: [id number]     MRI brain wo contrast    Result Date: 10/25/2023  Impression: Acute to early subacute lacunar infarct in the right paramedian veronique.  No mass effect or hemorrhagic conversion. Workstation performed: [id number]     CTA stroke alert (head/neck)    Result Date: 10/24/2023  Impression: Occlusion of the left V3 and left V4 segments, similar to the prior exam. Severe near occlusive plaque stenosis proximal left cervical ICA with greater than 90% stenosis, worsened compared to the prior exam. 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 I personally communicated this study with   Dr. Shaye Guerra   on 10/24/2023 8:29 PM. Workstation performed: [id number]     CT stroke alert brain    Result Date: 10/24/2023  Impression: No acute intracranial abnormality. Chronic microangiopathic changes.  I personally discussed this study with   Dr. Shaye Guerra   on 10/24/2023 8:29 PM. Workstation performed: [id number]

## 2023-10-31 ENCOUNTER — APPOINTMENT (INPATIENT)
Dept: RADIOLOGY | Facility: HOSPITAL | Age: 78
DRG: 065 | End: 2023-10-31
Payer: COMMERCIAL

## 2023-10-31 LAB
GLUCOSE SERPL-MCNC: 160 MG/DL (ref 65–140)
GLUCOSE SERPL-MCNC: 162 MG/DL (ref 65–140)
GLUCOSE SERPL-MCNC: 229 MG/DL (ref 65–140)
GLUCOSE SERPL-MCNC: 243 MG/DL (ref 65–140)
GLUCOSE SERPL-MCNC: 246 MG/DL (ref 65–140)
GLUCOSE SERPL-MCNC: 248 MG/DL (ref 65–140)
GLUCOSE SERPL-MCNC: 288 MG/DL (ref 65–140)

## 2023-10-31 PROCEDURE — 97112 NEUROMUSCULAR REEDUCATION: CPT

## 2023-10-31 PROCEDURE — 97530 THERAPEUTIC ACTIVITIES: CPT

## 2023-10-31 PROCEDURE — 74018 RADEX ABDOMEN 1 VIEW: CPT

## 2023-10-31 PROCEDURE — 99232 SBSQ HOSP IP/OBS MODERATE 35: CPT | Performed by: FAMILY MEDICINE

## 2023-10-31 PROCEDURE — 82948 REAGENT STRIP/BLOOD GLUCOSE: CPT

## 2023-10-31 PROCEDURE — C9113 INJ PANTOPRAZOLE SODIUM, VIA: HCPCS | Performed by: INTERNAL MEDICINE

## 2023-10-31 RX ORDER — NYSTATIN 100000 [USP'U]/G
POWDER TOPICAL 2 TIMES DAILY
Status: DISCONTINUED | OUTPATIENT
Start: 2023-10-31 | End: 2023-11-04 | Stop reason: HOSPADM

## 2023-10-31 RX ADMIN — SENNOSIDES, DOCUSATE SODIUM 1 TABLET: 8.6; 5 TABLET ORAL at 08:09

## 2023-10-31 RX ADMIN — ACETAMINOPHEN 975 MG: 650 SUSPENSION ORAL at 08:09

## 2023-10-31 RX ADMIN — ATORVASTATIN CALCIUM 40 MG: 40 TABLET, FILM COATED ORAL at 17:28

## 2023-10-31 RX ADMIN — INSULIN LISPRO 2 UNITS: 100 INJECTION, SOLUTION INTRAVENOUS; SUBCUTANEOUS at 03:15

## 2023-10-31 RX ADMIN — INSULIN LISPRO 1 UNITS: 100 INJECTION, SOLUTION INTRAVENOUS; SUBCUTANEOUS at 17:41

## 2023-10-31 RX ADMIN — INSULIN LISPRO 2 UNITS: 100 INJECTION, SOLUTION INTRAVENOUS; SUBCUTANEOUS at 20:56

## 2023-10-31 RX ADMIN — TICAGRELOR 90 MG: 90 TABLET ORAL at 10:39

## 2023-10-31 RX ADMIN — NYSTATIN: 100000 POWDER TOPICAL at 17:41

## 2023-10-31 RX ADMIN — INSULIN LISPRO 3 UNITS: 100 INJECTION, SOLUTION INTRAVENOUS; SUBCUTANEOUS at 11:53

## 2023-10-31 RX ADMIN — NYSTATIN: 100000 POWDER TOPICAL at 14:18

## 2023-10-31 RX ADMIN — INSULIN LISPRO 2 UNITS: 100 INJECTION, SOLUTION INTRAVENOUS; SUBCUTANEOUS at 14:16

## 2023-10-31 RX ADMIN — PANTOPRAZOLE SODIUM 40 MG: 40 INJECTION, POWDER, FOR SOLUTION INTRAVENOUS at 08:10

## 2023-10-31 RX ADMIN — CARVEDILOL 12.5 MG: 12.5 TABLET, FILM COATED ORAL at 08:09

## 2023-10-31 RX ADMIN — INSULIN LISPRO 2 UNITS: 100 INJECTION, SOLUTION INTRAVENOUS; SUBCUTANEOUS at 08:10

## 2023-10-31 RX ADMIN — AMLODIPINE BESYLATE 10 MG: 10 TABLET ORAL at 05:19

## 2023-10-31 RX ADMIN — POLYETHYLENE GLYCOL 3350 17 G: 17 POWDER, FOR SOLUTION ORAL at 08:09

## 2023-10-31 RX ADMIN — ENOXAPARIN SODIUM 40 MG: 40 INJECTION SUBCUTANEOUS at 08:09

## 2023-10-31 RX ADMIN — ASPIRIN 81 MG CHEWABLE TABLET 81 MG: 81 TABLET CHEWABLE at 08:09

## 2023-10-31 RX ADMIN — SENNOSIDES, DOCUSATE SODIUM 1 TABLET: 8.6; 5 TABLET ORAL at 17:28

## 2023-10-31 RX ADMIN — INSULIN LISPRO 3 UNITS: 100 INJECTION, SOLUTION INTRAVENOUS; SUBCUTANEOUS at 00:27

## 2023-10-31 RX ADMIN — ACETAMINOPHEN 975 MG: 650 SUSPENSION ORAL at 17:28

## 2023-10-31 RX ADMIN — CARVEDILOL 12.5 MG: 12.5 TABLET, FILM COATED ORAL at 17:28

## 2023-10-31 RX ADMIN — INSULIN LISPRO 3 UNITS: 100 INJECTION, SOLUTION INTRAVENOUS; SUBCUTANEOUS at 05:19

## 2023-10-31 NOTE — ASSESSMENT & PLAN NOTE
Lab Results   Component Value Date    HGBA1C 6.7 (H) 10/24/2023     On  every 6 hr insulin sliding scale with Accu-Cheks per protocol  Increase in lantus dose   Added humalog fr further coverage   (P) 222.8

## 2023-10-31 NOTE — RESTORATIVE TECHNICIAN NOTE
Restorative Technician Note      Patient Name: Van Greene     Note Type: Mobility  Patient Position Upon Consult: Supine  Activity Performed: Repositioned  Patient Position at End of Consult: Supine;  All needs within reach; Bed/Chair alarm activated      Reta CAAL, Restorative Technician, [employer]

## 2023-10-31 NOTE — PLAN OF CARE
Problem: Potential for Falls  Goal: Patient will remain free of falls  Description: INTERVENTIONS:  - Educate patient/family on patient safety including physical limitations  - Instruct patient to call for assistance with activity   - Consult OT/PT to assist with strengthening/mobility   - Keep Call bell within reach  - Keep bed low and locked with side rails adjusted as appropriate  - Keep care items and personal belongings within reach  - Initiate and maintain comfort rounds  - Make Fall Risk Sign visible to staff  - Offer Toileting every 2 Hours, in advance of need  - Initiate/Maintain bed alarm  - Obtain necessary fall risk management equipment: non skid footwear  - Apply yellow socks and bracelet for high fall risk patients  - Consider moving patient to room near nurses station  Outcome: Progressing     Problem: MOBILITY - ADULT  Goal: Maintain or return to baseline ADL function  Description: INTERVENTIONS:  -  Assess patient's ability to carry out ADLs; assess patient's baseline for ADL function and identify physical deficits which impact ability to perform ADLs (bathing, care of mouth/teeth, toileting, grooming, dressing, etc.)  - Assess/evaluate cause of self-care deficits   - Assess range of motion  - Assess patient's mobility; develop plan if impaired  - Assess patient's need for assistive devices and provide as appropriate  - Encourage maximum independence but intervene and supervise when necessary  - Involve family in performance of ADLs  - Assess for home care needs following discharge   - Consider OT consult to assist with ADL evaluation and planning for discharge  - Provide patient education as appropriate  Outcome: Progressing  Goal: Maintains/Returns to pre admission functional level  Description: INTERVENTIONS:  - Perform BMAT or MOVE assessment daily.   - Set and communicate daily mobility goal to care team and patient/family/caregiver.    - Collaborate with rehabilitation services on mobility goals if consulted  - Perform Range of Motion 3 times a day. - Reposition patient every 2 hours. - Record patient progress and toleration of activity level   Outcome: Progressing     Problem: Nutrition/Hydration-ADULT  Goal: Nutrient/Hydration intake appropriate for improving, restoring or maintaining nutritional needs  Description: Monitor and assess patient's nutrition/hydration status for malnutrition. Collaborate with interdisciplinary team and initiate plan and interventions as ordered. Monitor patient's weight and dietary intake as ordered or per policy. Utilize nutrition screening tool and intervene as necessary. Determine patient's food preferences and provide high-protein, high-caloric foods as appropriate.      INTERVENTIONS:  - Monitor oral intake, urinary output, labs, and treatment plans  - Assess nutrition and hydration status and recommend course of action  - Evaluate amount of meals eaten  - Assist patient with eating if necessary   - Allow adequate time for meals  - Recommend/ encourage appropriate diets, oral nutritional supplements, and vitamin/mineral supplements  - Order, calculate, and assess calorie counts as needed  - Recommend, monitor, and adjust tube feedings and TPN/PPN based on assessed needs  - Assess need for intravenous fluids  - Provide specific nutrition/hydration education as appropriate  - Include patient/family/caregiver in decisions related to nutrition  Outcome: Progressing     Problem: SAFETY ADULT  Goal: Patient will remain free of falls  Description: INTERVENTIONS:  - Educate patient/family on patient safety including physical limitations  - Instruct patient to call for assistance with activity   - Consult OT/PT to assist with strengthening/mobility   - Keep Call bell within reach  - Keep bed low and locked with side rails adjusted as appropriate  - Keep care items and personal belongings within reach  - Initiate and maintain comfort rounds  - Make Fall Risk Sign visible to staff  - Offer Toileting every 2 Hours, in advance of need  - Initiate/Maintain bed alarm  - Obtain necessary fall risk management equipment: non skid footwear  - Apply yellow socks and bracelet for high fall risk patients  - Consider moving patient to room near nurses station  Outcome: Progressing  Goal: Maintain or return to baseline ADL function  Description: INTERVENTIONS:  -  Assess patient's ability to carry out ADLs; assess patient's baseline for ADL function and identify physical deficits which impact ability to perform ADLs (bathing, care of mouth/teeth, toileting, grooming, dressing, etc.)  - Assess/evaluate cause of self-care deficits   - Assess range of motion  - Assess patient's mobility; develop plan if impaired  - Assess patient's need for assistive devices and provide as appropriate  - Encourage maximum independence but intervene and supervise when necessary  - Involve family in performance of ADLs  - Assess for home care needs following discharge   - Consider OT consult to assist with ADL evaluation and planning for discharge  - Provide patient education as appropriate  Outcome: Progressing  Goal: Maintains/Returns to pre admission functional level  Description: INTERVENTIONS:  - Perform BMAT or MOVE assessment daily.   - Set and communicate daily mobility goal to care team and patient/family/caregiver. - Collaborate with rehabilitation services on mobility goals if consulted  - Perform Range of Motion 3 times a day. - Reposition patient every 2 hours.   - Record patient progress and toleration of activity level   Outcome: Progressing     Problem: DISCHARGE PLANNING  Goal: Discharge to home or other facility with appropriate resources  Description: INTERVENTIONS:  - Identify barriers to discharge w/patient and caregiver  - Arrange for needed discharge resources and transportation as appropriate  - Identify discharge learning needs (meds, wound care, etc.)  - Arrange for interpretive services to assist at discharge as needed  - Refer to Case Management Department for coordinating discharge planning if the patient needs post-hospital services based on physician/advanced practitioner order or complex needs related to functional status, cognitive ability, or social support system  Outcome: Progressing     Problem: Prexisting or High Potential for Compromised Skin Integrity  Goal: Skin integrity is maintained or improved  Description: INTERVENTIONS:  - Identify patients at risk for skin breakdown  - Assess and monitor skin integrity  - Assess and monitor nutrition and hydration status  - Monitor labs   - Assess for incontinence   - Turn and reposition patient  - Assist with mobility/ambulation  - Relieve pressure over bony prominences  - Avoid friction and shearing  - Provide appropriate hygiene as needed including keeping skin clean and dry  - Evaluate need for skin moisturizer/barrier cream  - Collaborate with interdisciplinary team   - Patient/family teaching  - Consider wound care consult   Outcome: Progressing     Problem: NEUROSENSORY - ADULT  Goal: Achieves stable or improved neurological status  Description: INTERVENTIONS  - Monitor and report changes in neurological status  - Monitor vital signs such as temperature, blood pressure, glucose, and any other labs ordered   - Initiate measures to prevent increased intracranial pressure  - Monitor for seizure activity and implement precautions if appropriate      Outcome: Progressing  Goal: Achieves maximal functionality and self care  Description: INTERVENTIONS  - Monitor swallowing and airway patency with patient fatigue and changes in neurological status  - Encourage and assist patient to increase activity and self care.    - Encourage visually impaired, hearing impaired and aphasic patients to use assistive/communication devices  Outcome: Progressing     Problem: PAIN - ADULT  Goal: Verbalizes/displays adequate comfort level or baseline comfort level  Description: Interventions:  - Encourage patient to monitor pain and request assistance  - Assess pain using appropriate pain scale  - Administer analgesics based on type and severity of pain and evaluate response  - Implement non-pharmacological measures as appropriate and evaluate response  - Consider cultural and social influences on pain and pain management  - Notify physician/advanced practitioner if interventions unsuccessful or patient reports new pain  Outcome: Progressing     Problem: INFECTION - ADULT  Goal: Absence or prevention of progression during hospitalization  Description: INTERVENTIONS:  - Assess and monitor for signs and symptoms of infection  - Monitor lab/diagnostic results  - Monitor all insertion sites, i.e. indwelling lines, tubes, and drains  - Monitor endotracheal if appropriate and nasal secretions for changes in amount and color  - [place] appropriate cooling/warming therapies per order  - Administer medications as ordered  - Instruct and encourage patient and family to use good hand hygiene technique  - Identify and instruct in appropriate isolation precautions for identified infection/condition  Outcome: Progressing     Problem: Knowledge Deficit  Goal: Patient/family/caregiver demonstrates understanding of disease process, treatment plan, medications, and discharge instructions  Description: Complete learning assessment and assess knowledge base. Interventions:  - Provide teaching at level of understanding  - Provide teaching via preferred learning methods  Outcome: Progressing     Problem: Communication Impairment  Goal: Ability to express needs and understand communication  Description: Assess patient's communication skills and ability to understand information. Patient will demonstrate use of effective communication techniques, alternative methods of communication and understanding even if not able to speak.      - Encourage communication and provide alternate methods of communication as needed. - Collaborate with case management/ for discharge needs. - Include patient/family/caregiver in decisions related to communication. Outcome: Progressing     Problem: Potential for Aspiration  Goal: Non-ventilated patient's risk of aspiration is minimized  Description: Assess and monitor vital signs, respiratory status, and labs (WBC). Monitor for signs of aspiration (tachypnea, cough, rales, wheezing, cyanosis, fever). - Assess and monitor patient's ability to swallow. - Place patient up in chair to eat if possible. - HOB up at 90 degrees to eat if unable to get patient up into chair.  - Supervise patient during oral intake. - Instruct patient/ family to take small bites. - Instruct patient/ family to take small single sips when taking liquids.   - Follow patient-specific strategies generated by speech pathologist.  Outcome: Progressing     Problem: METABOLIC, FLUID AND ELECTROLYTES - ADULT  Goal: Electrolytes maintained within normal limits  Description: INTERVENTIONS:  - Monitor labs and assess patient for signs and symptoms of electrolyte imbalances  - Administer electrolyte replacement as ordered  - Monitor response to electrolyte replacements, including repeat lab results as appropriate  - Instruct patient on fluid and nutrition as appropriate  Outcome: Progressing  Goal: Fluid balance maintained  Description: INTERVENTIONS:  - Monitor labs   - Monitor I/O and WT  - Instruct patient on fluid and nutrition as appropriate  - Assess for signs & symptoms of volume excess or deficit  Outcome: Progressing  Goal: Glucose maintained within target range  Description: INTERVENTIONS:  - Monitor Blood Glucose as ordered  - Assess for signs and symptoms of hyperglycemia and hypoglycemia  - Administer ordered medications to maintain glucose within target range  - Assess nutritional intake and initiate nutrition service referral as needed  Outcome: Progressing     Problem: HEMATOLOGIC - ADULT  Goal: Maintains hematologic stability  Description: INTERVENTIONS  - Assess for signs and symptoms of bleeding or hemorrhage  - Monitor labs  - Administer supportive blood products/factors as ordered and appropriate  Outcome: Progressing

## 2023-10-31 NOTE — PLAN OF CARE
Problem: PHYSICAL THERAPY ADULT  Goal: Performs mobility at highest level of function for planned discharge setting. See evaluation for individualized goals. Description: Treatment/Interventions: LE strengthening/ROM, Therapeutic exercise, Endurance training, Cognitive reorientation, Patient/family training, Equipment eval/education, Bed mobility, Spoke to nursing, Spoke to case management, ST, OT, Family, Continued evaluation, Compensatory technique education, Spoke to advanced practitioner  Equipment Recommended:  (TBD)       See flowsheet documentation for full assessment, interventions and recommendations. Outcome: Progressing  Note: Prognosis: Guarded  Problem List: Decreased strength, Decreased range of motion, Decreased endurance, Impaired balance, Decreased mobility, Decreased coordination, Decreased cognition, Impaired judgement, Decreased safety awareness, Impaired vision, Impaired sensation, Impaired tone  Assessment: Pt agreeable to participate in PT session. Pt performed functional mobility and therex as outlined above. Improved activity tolerance and active participation although remains impaired significantly below baseline. While sitting EOB, pt with L lean, posterior lean, and L trunk rotation towards dexter side. VC, visual mirror cues, and TC for midline orientation. VC for upright head and neck position. LUE WB promote throughout. Multiple bowel and bladder incontinence episodes throughout and required rolling for cleanup. With reaching activities, encouraged eye tracking and head turning. Pt left supine in bed with bed alarm, call bell, phone, and all personal needs within reach. Pt will continue to benefit from skilled acute care PT to further address their functional mobility limitations. Based on clinical presentation and PT clinical judgement, believe pt would benefit most from inpatient rehab.   Barriers to Discharge: Inaccessible home environment, Decreased caregiver support     Rehab Resource Intensity Level, PT: I (Maximum Resource Intensity)    See flowsheet documentation for full assessment.

## 2023-10-31 NOTE — PLAN OF CARE
Problem: Potential for Falls  Goal: Patient will remain free of falls  Description: INTERVENTIONS:  - Educate patient/family on patient safety including physical limitations  - Instruct patient to call for assistance with activity   - Consult OT/PT to assist with strengthening/mobility   - Keep Call bell within reach  - Keep bed low and locked with side rails adjusted as appropriate  - Keep care items and personal belongings within reach  - Initiate and maintain comfort rounds  - Make Fall Risk Sign visible to staff  - Offer Toileting every 2 Hours, in advance of need  - Initiate/Maintain bed alarm  - Obtain necessary fall risk management equipment: non skid footwear  - Apply yellow socks and bracelet for high fall risk patients  - Consider moving patient to room near nurses station  Outcome: Progressing     Problem: MOBILITY - ADULT  Goal: Maintain or return to baseline ADL function  Description: INTERVENTIONS:  -  Assess patient's ability to carry out ADLs; assess patient's baseline for ADL function and identify physical deficits which impact ability to perform ADLs (bathing, care of mouth/teeth, toileting, grooming, dressing, etc.)  - Assess/evaluate cause of self-care deficits   - Assess range of motion  - Assess patient's mobility; develop plan if impaired  - Assess patient's need for assistive devices and provide as appropriate  - Encourage maximum independence but intervene and supervise when necessary  - Involve family in performance of ADLs  - Assess for home care needs following discharge   - Consider OT consult to assist with ADL evaluation and planning for discharge  - Provide patient education as appropriate  Outcome: Progressing  Goal: Maintains/Returns to pre admission functional level  Description: INTERVENTIONS:  - Perform BMAT or MOVE assessment daily.   - Set and communicate daily mobility goal to care team and patient/family/caregiver.    - Collaborate with rehabilitation services on mobility goals if consulted  - Perform Range of Motion 3 times a day. - Reposition patient every 2 hours. - Record patient progress and toleration of activity level   Outcome: Progressing     Problem: Nutrition/Hydration-ADULT  Goal: Nutrient/Hydration intake appropriate for improving, restoring or maintaining nutritional needs  Description: Monitor and assess patient's nutrition/hydration status for malnutrition. Collaborate with interdisciplinary team and initiate plan and interventions as ordered. Monitor patient's weight and dietary intake as ordered or per policy. Utilize nutrition screening tool and intervene as necessary. Determine patient's food preferences and provide high-protein, high-caloric foods as appropriate.      INTERVENTIONS:  - Monitor oral intake, urinary output, labs, and treatment plans  - Assess nutrition and hydration status and recommend course of action  - Evaluate amount of meals eaten  - Assist patient with eating if necessary   - Allow adequate time for meals  - Recommend/ encourage appropriate diets, oral nutritional supplements, and vitamin/mineral supplements  - Order, calculate, and assess calorie counts as needed  - Recommend, monitor, and adjust tube feedings and TPN/PPN based on assessed needs  - Assess need for intravenous fluids  - Provide specific nutrition/hydration education as appropriate  - Include patient/family/caregiver in decisions related to nutrition  Outcome: Progressing     Problem: SAFETY ADULT  Goal: Patient will remain free of falls  Description: INTERVENTIONS:  - Educate patient/family on patient safety including physical limitations  - Instruct patient to call for assistance with activity   - Consult OT/PT to assist with strengthening/mobility   - Keep Call bell within reach  - Keep bed low and locked with side rails adjusted as appropriate  - Keep care items and personal belongings within reach  - Initiate and maintain comfort rounds  - Make Fall Risk Sign visible to staff  - Offer Toileting every 2 Hours, in advance of need  - Initiate/Maintain bed alarm  - Obtain necessary fall risk management equipment: non skid footwear  - Apply yellow socks and bracelet for high fall risk patients  - Consider moving patient to room near nurses station  Outcome: Progressing  Goal: Maintain or return to baseline ADL function  Description: INTERVENTIONS:  -  Assess patient's ability to carry out ADLs; assess patient's baseline for ADL function and identify physical deficits which impact ability to perform ADLs (bathing, care of mouth/teeth, toileting, grooming, dressing, etc.)  - Assess/evaluate cause of self-care deficits   - Assess range of motion  - Assess patient's mobility; develop plan if impaired  - Assess patient's need for assistive devices and provide as appropriate  - Encourage maximum independence but intervene and supervise when necessary  - Involve family in performance of ADLs  - Assess for home care needs following discharge   - Consider OT consult to assist with ADL evaluation and planning for discharge  - Provide patient education as appropriate  Outcome: Progressing  Goal: Maintains/Returns to pre admission functional level  Description: INTERVENTIONS:  - Perform BMAT or MOVE assessment daily.   - Set and communicate daily mobility goal to care team and patient/family/caregiver. - Collaborate with rehabilitation services on mobility goals if consulted  - Perform Range of Motion 3 times a day. - Reposition patient every 2 hours.   - Record patient progress and toleration of activity level   Outcome: Progressing     Problem: DISCHARGE PLANNING  Goal: Discharge to home or other facility with appropriate resources  Description: INTERVENTIONS:  - Identify barriers to discharge w/patient and caregiver  - Arrange for needed discharge resources and transportation as appropriate  - Identify discharge learning needs (meds, wound care, etc.)  - Arrange for interpretive services to assist at discharge as needed  - Refer to Case Management Department for coordinating discharge planning if the patient needs post-hospital services based on physician/advanced practitioner order or complex needs related to functional status, cognitive ability, or social support system  Outcome: Progressing     Problem: PAIN - ADULT  Goal: Verbalizes/displays adequate comfort level or baseline comfort level  Description: Interventions:  - Encourage patient to monitor pain and request assistance  - Assess pain using appropriate pain scale  - Administer analgesics based on type and severity of pain and evaluate response  - Implement non-pharmacological measures as appropriate and evaluate response  - Consider cultural and social influences on pain and pain management  - Notify physician/advanced practitioner if interventions unsuccessful or patient reports new pain  Outcome: Progressing

## 2023-10-31 NOTE — PROGRESS NOTES
[address] [facility]  Progress Note  Name: Stephan Madison I  MRN: [id number]  Unit/Bed#: [facility]PHP 701-01 I Date of Admission: 10/24/2023   Date of Service: 10/31/2023 I Hospital Day: 7    Assessment/Plan   * Left-sided weakness  Assessment & Plan  Presented with left-sided weakness, thrombolytics were declined by patient and family. CT head: No acute intracranial abnormality. Chronic microangiopathic changes. 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 No mass effect or hemorrhagic conversion. Echo: EF 50% with grade 1 DD    PLAN:  Currently on stroke pathway. Neurology following, dual antiplatelet with aspirin and Brilinta for 90 days followed by aspirin monotherapy. Now off permissive hypertension  PT OT  PMR following.>. rehab on dc. Currently n.p.o. with NG tube. Swallow therapy following. VBS requested. Will start tube feeds meanwhile  Atorvastatin 40 mg daily. Patient was started on Brilinta as patient has Plavix allergy. Vascular surgery evaluated, OP follow up for ICA stenosis. Failed VBS. On tube feeds through NG tube. Plan is for repeat swallow eval on Monday, fi failed again, will consult GI for PEG evaluation. Swallow team also suggests ENT eval for vocal cord dysfunction. GI consulted for PEG tube placement- family is worried about stopping the antiplatelets         Cough and secretions  Assessment & Plan  Suspect aspiration vs mild fluid overload   On RA. No fever. Leukocytosis improving. Hold IVF  Started tube feeds  CXR: Questionable left costophrenic angle opacification given patient positioning.  Artifact versus pneumonia in this area cannot be entirely excluded. Mild right basilar linear atelectasis. Monitor off abx for now as leukocytosis improving and patient is afebrile. History of GI bleed  Assessment & Plan  Patient with history of coffee-ground material in the hospitalization last year; concerning for GI bleeding, as the patient is placed on aspirin with Brilinta; will place patient on pantoprazole prophylaxis. Renal cell carcinoma of right kidney [facility])  Assessment & Plan  Status post right nephrectomy; follows up with urology. Chronic renal disease, stage IV (HCC)  Assessment & Plan  Baseline creatinine between 1.9-2.4. Currently at baseline. Trend BMP. 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 chronic kidney disease, without long-term current use of insulin (HCC)  Assessment & Plan  Lab Results   Component Value Date    HGBA1C 6.7 (H) 10/24/2023     On  every 6 hr insulin sliding scale with Accu-Cheks per protocol  Increase in lantus dose   Added humalog fr further coverage   (P) 222.8      Essential hypertension  Assessment & Plan  Was On permissive hypertension, now completed  Restarted home dose amlodipine, increased dose to 10 mg amlodipine daily  Added low dose coreg, increased dose  Prn labetalol               VTE Pharmacologic Prophylaxis:   Moderate Risk (Score 3-4) - Pharmacological DVT Prophylaxis Ordered: enoxaparin (Lovenox). Patient Centered Rounds: I performed bedside rounds with nursing staff today. Discussions with Specialists or Other Care Team Provider:     Education and Discussions with Family / Patient: Updated  (wife) via phone. Total Time Spent on Date of Encounter in care of patient: 45  mins.  This time was spent on one or more of the following: performing physical exam; counseling and coordination of care; obtaining or reviewing history; documenting in the medical record; reviewing/ordering tests, medications or procedures; communicating with other healthcare professionals and discussing with patient's family/caregivers. Current Length of Stay: 7 day(s)  Current Patient Status: Inpatient   Certification Statement: The patient will continue to require additional inpatient hospital stay due to decision regarding feeding tube   Discharge Plan:     Code Status: Level 1 - Full Code    Subjective:   Patient seen and examined. No events overnight. Currently has NG tube. Patient with dysarthria  Discussed with wife about feeding tube. She is worried about stopping the antiplatelet agents for 5 days. And also she is worried about complications from the feeding tube. Informed the wife that NG tube feeding is not a long-term option. Will discuss with GI and neurology regarding stopping anticoagulation  Objective:     Vitals:   Temp (24hrs), Av.4 °F (36.9 °C), Min:98 °F (36.7 °C), Max:98.9 °F (37.2 °C)    Temp:  [98 °F (36.7 °C)-98.9 °F (37.2 °C)] 98.2 °F (36.8 °C)  HR:  [52-69] 62  Resp:  [17] 17  BP: (140-167)/(73-89) 140/73  SpO2:  [96 %-99 %] 97 %  Body mass index is 30.92 kg/m². Input and Output Summary (last 24 hours): Intake/Output Summary (Last 24 hours) at 10/31/2023 1715  Last data filed at 10/31/2023 1435  Gross per 24 hour   Intake 1611 ml   Output 1550 ml   Net 61 ml       Physical Exam:   Physical Exam  Constitutional:       General: He is not in acute distress. HENT:      Head: Normocephalic and atraumatic. Nose: Nose normal.   Eyes:      General: No scleral icterus. Neck:      Comments: Ng tube present   Cardiovascular:      Rate and Rhythm: Normal rate and regular rhythm. Pulses: Normal pulses. Pulmonary:      Effort: No respiratory distress. Comments: Decreased breath sounds bilateral   Abdominal:      General: There is no distension. Skin:     Coloration: Skin is not jaundiced. Neurological:      Mental Status: He is alert and oriented to person, place, and time. Cranial Nerves: No cranial nerve deficit. Comments: Left sided weakness. Facial droop present. Additional Data:     Labs:  Results from last 7 days   Lab Units 10/29/23  0534 10/27/23  0511   WBC Thousand/uL 9.20 10.58*   HEMOGLOBIN g/dL 13.5 13.5   HEMATOCRIT % 38.6 40.6   PLATELETS Thousands/uL 168 157   BANDS PCT %  --  1   NEUTROS PCT % 68  --    LYMPHS PCT % 14  --    LYMPHO PCT %  --  9*   MONOS PCT % 13*  --    MONO PCT %  --  9   EOS PCT % 5 3     Results from last 7 days   Lab Units 10/29/23  0534 10/26/23  0513 10/25/23  0617   SODIUM mmol/L 138   < > 139   POTASSIUM mmol/L 3.6   < > 4.5   CHLORIDE mmol/L 104   < > 103   CO2 mmol/L 27   < > 27   BUN mg/dL 36*   < > 30*   CREATININE mg/dL 1.95*   < > 2.04*   ANION GAP mmol/L 7   < > 9   CALCIUM mg/dL 8.2*   < > 9.0   ALBUMIN g/dL  --   --  3.8   TOTAL BILIRUBIN mg/dL  --   --  0.89   ALK PHOS U/L  --   --  66   ALT U/L  --   --  10   AST U/L  --   --  14   GLUCOSE RANDOM mg/dL 185*   < > 141*    < > = values in this interval not displayed. Results from last 7 days   Lab Units 10/24/23  2054   INR  0.99     Results from last 7 days   Lab Units 10/31/23  1035 10/31/23  0516 10/31/23  0310 10/31/23  0010 10/30/23  2055 10/30/23  1731 10/30/23  1217 10/30/23  1119 10/30/23  1105 10/30/23  0841 10/30/23  0616 10/30/23  0044   POC GLUCOSE mg/dl 243* 246* 288* 248* 219* 247* 244* 252* 238* 221* 211* 211*     Results from last 7 days   Lab Units 10/24/23  2054   HEMOGLOBIN A1C % 6.7*     Results from last 7 days   Lab Units 10/29/23  0534   PROCALCITONIN ng/ml 0.13       Lines/Drains:  Invasive Devices       Peripheral Intravenous Line  Duration             Peripheral IV 10/30/23 Distal;Left;Ventral (anterior) Forearm 1 day              Drain  Duration             NG/OG/Enteral Tube Nasogastric 12 Fr Right nare 6 days    External Urinary Catheter Medium 2 days                          Imaging: CT stroke alert.   MRI    Recent Cultures (last 7 days):         Last 24 Hours Medication List: Current Facility-Administered Medications   Medication Dose Route Frequency Provider Last Rate    acetaminophen  975 mg Oral Q8H Cammy Rodriguez MD      amLODIPine  10 mg Oral Daily Cammy Rodriguez MD      aspirin  81 mg Per NG Tube Daily Cammy Rodriguez MD      atorvastatin  40 mg Per NG Tube QPM Cammy Rodriguez MD      bisacodyl  10 mg Rectal Daily PRN Cammy Rodriguez MD      carvedilol  12.5 mg Oral BID With Meals Cammy Rodriguez MD      enoxaparin  40 mg Subcutaneous Daily Vanessa Stewart MD      insulin glargine  10 Units Subcutaneous HS Cammy Rodriguez MD      insulin lispro  1-6 Units Subcutaneous Q6H [address] [place] Cynthia Lowery MD      insulin lispro  2 Units Subcutaneous Q6H Cammy Rodriguez MD      labetalol  10 mg Intravenous Q6H PRN Cammy Rodriguez MD      nystatin   Topical BID Aleksandar Vasquez MD      ondansetron  4 mg Intravenous Q6H PRN Vanessa Stewart MD      pantoprazole  40 mg Intravenous Q12H [address] Vanessa Stewart MD      polyethylene glycol  17 g Per NG Tube Daily Cammy Rodriguez MD      senna-docusate sodium  1 tablet Per NG Tube BID Cammy Rodriguez MD      ticagrelor  90 mg Per NG Tube Q12H [address] Cammy Rodriguez MD          Today, Patient Was Seen By: Aleksandar Vasquez MD    **Please Note: This note may have been constructed using a voice recognition system. **

## 2023-10-31 NOTE — PHYSICAL THERAPY NOTE
PHYSICAL THERAPY NOTE          Patient Name: Avril LISA Date: 10/31/2023       10/31/23 1437   PT Last Visit   PT Visit Date 10/31/23   Note Type   Note type   (tx session)   Pain Assessment   Pain Assessment Tool 0-10   Pain Score No Pain   Restrictions/Precautions   Weight Bearing Precautions Per Order No   Other Precautions Telemetry;Multiple lines; Bed Alarm; Chair Alarm;Cognitive; Fall Risk;Visual impairment  (NGT, L hemiparesis, L GHJ subluxation)   General   Family/Caregiver Present Yes  (spouse and family)   Cognition   Overall Cognitive Status Impaired   Arousal/Participation Responsive   Attention Attends with cues to redirect   Orientation Level Oriented X4   Following Commands Follows one step commands with increased time or repetition   Comments pleasant to work with. more talkative   Subjective   Subjective agreeable   Bed Mobility   Rolling R 2  Maximal assistance   Additional items Assist x 2   Rolling L 2  Maximal assistance   Additional items Assist x 2   Supine to Sit 2  Maximal assistance   Additional items Assist x 2;HOB elevated; Increased time required;Verbal cues;LE management   Sit to Supine 2  Maximal assistance   Additional items Assist x 2; Increased time required;Verbal cues;LE management   Additional Comments L and R rolling x3 each. While sitting EOB with modAx1 x25 min pt  performed lateral WS onto elbow x6 L and R each, reaching/grasping/releasing for objects with RUE L>R and R>L x6 reps each, supine performed HS L PROM   Transfers   Sit to Stand 2  Maximal assistance   Additional items Assist x 2; Increased time required;Verbal cues   Stand to Sit 2  Maximal assistance   Additional items Assist x 2; Increased time required;Verbal cues   Additional Comments   (Only able to tolerate standing for ~5sec; L UE supported and R HHA; b/l knee block)   Balance   Static Sitting Poor   Dynamic Sitting Poor -   Static Standing Zero   Endurance Deficit   Endurance Deficit Yes   Endurance Deficit Description Increased fatigue   Activity Tolerance   Activity Tolerance Patient limited by fatigue   Medical Staff Made Aware SPT Mandeep-assisted with tx and documentation   Nurse Made Aware yes, patient was cleared for treatment   Assessment   Prognosis Guarded   Problem List Decreased strength;Decreased range of motion;Decreased endurance; Impaired balance;Decreased mobility; Decreased coordination;Decreased cognition; Impaired judgement;Decreased safety awareness; Impaired vision; Impaired sensation; Impaired tone   Assessment Pt agreeable to participate in PT session. Pt performed functional mobility and therex as outlined above. Improved activity tolerance and active participation although remains impaired significantly below baseline. While sitting EOB, pt with L lean, posterior lean, and L trunk rotation towards dexter side. VC, visual mirror cues, and TC for midline orientation. VC for upright head and neck position. LUE WB promote throughout. Multiple bowel and bladder incontinence episodes throughout and required rolling for cleanup. With reaching activities, encouraged eye tracking and head turning. Pt left supine in bed with bed alarm, call bell, phone, and all personal needs within reach. Pt will continue to benefit from skilled acute care PT to further address their functional mobility limitations. Based on clinical presentation and PT clinical judgement, believe pt would benefit most from inpatient rehab. Barriers to Discharge Inaccessible home environment;Decreased caregiver support   Goals   Patient Goals to improve   STG Expiration Date 11/08/23   PT Treatment Day 2   Plan   Treatment/Interventions LE strengthening/ROM; Functional transfer training; Endurance training;Bed mobility;Gait training;OT;Spoke to nursing;Spoke to case management;Spoke to MD;Spoke to advanced practitioner; Therapeutic exercise;Cognitive reorientation;Patient/family training;Equipment eval/education;ST   PT Frequency 3-5x/wk   Discharge Recommendation   Rehab Resource Intensity Level, PT I (Maximum Resource Intensity)   AM-PAC Basic Mobility Inpatient   Turning in Flat Bed Without Bedrails 1   Lying on Back to Sitting on Edge of Flat Bed Without Bedrails 1   Moving Bed to Chair 1   Standing Up From Chair Using Arms 1   Walk in Room 1   Climb 3-5 Stairs With Railing 1   Basic Mobility Inpatient Raw Score 6   Turning Head Towards Sound 3   Follow Simple Instructions 3   Low Function Basic Mobility Raw Score  12   Low Function Basic Mobility Standardized Score  18.33   Highest Level Of Mobility   JH-HLM Goal 2: Bed activities/Dependent transfer   JH-HLM Achieved 3: Sit at edge of bed   Modified Drake Scale   Modified Drake Scale 4   Exercises           End of Consult   Patient Position at End of Consult Supine; All needs within reach   [facility], PT, DPT

## 2023-11-01 ENCOUNTER — APPOINTMENT (INPATIENT)
Dept: RADIOLOGY | Facility: HOSPITAL | Age: 78
DRG: 065 | End: 2023-11-01
Payer: COMMERCIAL

## 2023-11-01 ENCOUNTER — APPOINTMENT (INPATIENT)
Dept: RADIOLOGY | Facility: HOSPITAL | Age: 78
DRG: 065 | End: 2023-11-01
Attending: FAMILY MEDICINE
Payer: COMMERCIAL

## 2023-11-01 PROBLEM — I63.9 CVA (CEREBRAL VASCULAR ACCIDENT) (HCC): Status: ACTIVE | Noted: 2019-07-10

## 2023-11-01 LAB
GLUCOSE SERPL-MCNC: 157 MG/DL (ref 65–140)
GLUCOSE SERPL-MCNC: 163 MG/DL (ref 65–140)
GLUCOSE SERPL-MCNC: 194 MG/DL (ref 65–140)
GLUCOSE SERPL-MCNC: 198 MG/DL (ref 65–140)
GLUCOSE SERPL-MCNC: 221 MG/DL (ref 65–140)
GLUCOSE SERPL-MCNC: 235 MG/DL (ref 65–140)
GLUCOSE SERPL-MCNC: 256 MG/DL (ref 65–140)
GLUCOSE SERPL-MCNC: 278 MG/DL (ref 65–140)

## 2023-11-01 PROCEDURE — 99232 SBSQ HOSP IP/OBS MODERATE 35: CPT | Performed by: FAMILY MEDICINE

## 2023-11-01 PROCEDURE — 82948 REAGENT STRIP/BLOOD GLUCOSE: CPT

## 2023-11-01 PROCEDURE — C9113 INJ PANTOPRAZOLE SODIUM, VIA: HCPCS | Performed by: INTERNAL MEDICINE

## 2023-11-01 PROCEDURE — 74230 X-RAY XM SWLNG FUNCJ C+: CPT

## 2023-11-01 PROCEDURE — 92611 MOTION FLUOROSCOPY/SWALLOW: CPT

## 2023-11-01 PROCEDURE — 99221 1ST HOSP IP/OBS SF/LOW 40: CPT

## 2023-11-01 PROCEDURE — 74018 RADEX ABDOMEN 1 VIEW: CPT

## 2023-11-01 RX ORDER — INSULIN GLARGINE 100 [IU]/ML
14 INJECTION, SOLUTION SUBCUTANEOUS
Status: DISCONTINUED | OUTPATIENT
Start: 2023-11-01 | End: 2023-11-02

## 2023-11-01 RX ADMIN — ACETAMINOPHEN 975 MG: 650 SUSPENSION ORAL at 06:10

## 2023-11-01 RX ADMIN — INSULIN GLARGINE 14 UNITS: 100 INJECTION, SOLUTION SUBCUTANEOUS at 22:01

## 2023-11-01 RX ADMIN — CARVEDILOL 12.5 MG: 12.5 TABLET, FILM COATED ORAL at 09:00

## 2023-11-01 RX ADMIN — PANTOPRAZOLE SODIUM 40 MG: 40 INJECTION, POWDER, FOR SOLUTION INTRAVENOUS at 09:00

## 2023-11-01 RX ADMIN — INSULIN LISPRO 2 UNITS: 100 INJECTION, SOLUTION INTRAVENOUS; SUBCUTANEOUS at 14:14

## 2023-11-01 RX ADMIN — NYSTATIN: 100000 POWDER TOPICAL at 17:02

## 2023-11-01 RX ADMIN — ATORVASTATIN CALCIUM 40 MG: 40 TABLET, FILM COATED ORAL at 17:01

## 2023-11-01 RX ADMIN — AMLODIPINE BESYLATE 10 MG: 10 TABLET ORAL at 09:00

## 2023-11-01 RX ADMIN — ACETAMINOPHEN 975 MG: 650 SUSPENSION ORAL at 15:48

## 2023-11-01 RX ADMIN — ASPIRIN 81 MG CHEWABLE TABLET 81 MG: 81 TABLET CHEWABLE at 08:59

## 2023-11-01 RX ADMIN — INSULIN LISPRO 3 UNITS: 100 INJECTION, SOLUTION INTRAVENOUS; SUBCUTANEOUS at 11:22

## 2023-11-01 RX ADMIN — INSULIN LISPRO 2 UNITS: 100 INJECTION, SOLUTION INTRAVENOUS; SUBCUTANEOUS at 22:02

## 2023-11-01 RX ADMIN — INSULIN LISPRO 2 UNITS: 100 INJECTION, SOLUTION INTRAVENOUS; SUBCUTANEOUS at 06:18

## 2023-11-01 RX ADMIN — PANTOPRAZOLE SODIUM 40 MG: 40 INJECTION, POWDER, FOR SOLUTION INTRAVENOUS at 21:59

## 2023-11-01 RX ADMIN — CARVEDILOL 12.5 MG: 12.5 TABLET, FILM COATED ORAL at 15:48

## 2023-11-01 RX ADMIN — TICAGRELOR 90 MG: 90 TABLET ORAL at 09:00

## 2023-11-01 RX ADMIN — ACETAMINOPHEN 975 MG: 650 SUSPENSION ORAL at 22:03

## 2023-11-01 RX ADMIN — INSULIN LISPRO 2 UNITS: 100 INJECTION, SOLUTION INTRAVENOUS; SUBCUTANEOUS at 17:05

## 2023-11-01 RX ADMIN — NYSTATIN 1 APPLICATION: 100000 POWDER TOPICAL at 09:00

## 2023-11-01 RX ADMIN — TICAGRELOR 90 MG: 90 TABLET ORAL at 22:03

## 2023-11-01 RX ADMIN — INSULIN LISPRO 2 UNITS: 100 INJECTION, SOLUTION INTRAVENOUS; SUBCUTANEOUS at 09:00

## 2023-11-01 RX ADMIN — ENOXAPARIN SODIUM 40 MG: 40 INJECTION SUBCUTANEOUS at 09:00

## 2023-11-01 NOTE — ASSESSMENT & PLAN NOTE
Presented with left-sided weakness, thrombolytics were declined by patient and family. CT head: No acute intracranial abnormality. Chronic microangiopathic changes. 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 No mass effect or hemorrhagic conversion. Echo: EF 50% with grade 1 diastolic dysfunction  On dual antiplatelet with aspirin and Brilinta for 90 days followed by aspirin monotherapy. PT OT  PMR following.>. rehab on dc. Currently n.p.o. with NG tube. Swallow therapy following. VBS requested. Will start tube feeds meanwhile  Atorvastatin 40 mg daily. Patient was started on Brilinta as patient has Plavix allergy. Vascular surgery evaluated, OP follow up for ICA stenosis. Failed VBS. On tube feeds through NG tube. Plan is for repeat swallow eval on Monday, fi failed again, will consult GI for PEG evaluation. Swallow team also suggests ENT eval for vocal cord dysfunction. GI consulted for PEG tube placement- family is worried about stopping the antiplatelets -discussed with speech. Doing another VBS today.   If the patient fails this VPS will be discussed with GI and neurology regarding stopping antiplatelet agents for a short time for feeding tube placement

## 2023-11-01 NOTE — PLAN OF CARE
Problem: Potential for Aspiration  Goal: Non-ventilated patient's risk of aspiration is minimized  Description: Assess and monitor vital signs, respiratory status, and labs (WBC). Monitor for signs of aspiration (tachypnea, cough, rales, wheezing, cyanosis, fever). - Assess and monitor patient's ability to swallow. - Place patient up in chair to eat if possible. - HOB up at 90 degrees to eat if unable to get patient up into chair.  - Supervise patient during oral intake. - Instruct patient/ family to take small bites. - Instruct patient/ family to take small single sips when taking liquids. - Follow patient-specific strategies generated by speech pathologist.  11/1/2023 0924 by Sharri Sullivan  Outcome: Progressing  11/1/2023 0923 by Sharri Sullivan  Outcome: Progressing  11/1/2023 0923 by Sharri Sullivan  Outcome: Progressing     Problem: Communication Impairment  Goal: Ability to express needs and understand communication  Description: Assess patient's communication skills and ability to understand information. Patient will demonstrate use of effective communication techniques, alternative methods of communication and understanding even if not able to speak. - Encourage communication and provide alternate methods of communication as needed. - Collaborate with case management/ for discharge needs. - Include patient/family/caregiver in decisions related to communication.   11/1/2023 0924 by Sharri Sullivan  Outcome: Progressing  11/1/2023 0923 by Sharri Sullivan  Outcome: Progressing  11/1/2023 0923 by Sharri Sullivan  Outcome: Progressing     Problem: HEMATOLOGIC - ADULT  Goal: Maintains hematologic stability  Description: INTERVENTIONS  - Assess for signs and symptoms of bleeding or hemorrhage  - Monitor labs  - Administer supportive blood products/factors as ordered and appropriate  11/1/2023 0924 by Sharri Sullivan  Outcome: Progressing  11/1/2023 0923 by Brittany Jeronimo [facility]  Outcome: Progressing  11/1/2023 0923 by Quique   Outcome: Progressing

## 2023-11-01 NOTE — PROGRESS NOTES
[address]  Progress Note  Name: Chiqui Bruno I  MRN: [id number]  Unit/Bed#: [facility] 701-01 I Date of Admission: 10/24/2023   Date of Service: 11/1/2023 I Hospital Day: 8    Assessment/Plan   * CVA (cerebral vascular accident) [facility])  Assessment & Plan  Presented with left-sided weakness, thrombolytics were declined by patient and family. CT head: No acute intracranial abnormality. Chronic microangiopathic changes. 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 No mass effect or hemorrhagic conversion. Echo: EF 50% with grade 1 diastolic dysfunction  On dual antiplatelet with aspirin and Brilinta for 90 days followed by aspirin monotherapy. PT OT  PMR following.>. rehab on dc. Currently n.p.o. with NG tube. Swallow therapy following. VBS requested. Will start tube feeds meanwhile  Atorvastatin 40 mg daily. Patient was started on Brilinta as patient has Plavix allergy. Vascular surgery evaluated, OP follow up for ICA stenosis. Failed VBS. On tube feeds through NG tube. Plan is for repeat swallow eval on Monday, fi failed again, will consult GI for PEG evaluation. Swallow team also suggests ENT eval for vocal cord dysfunction. GI consulted for PEG tube placement- family is worried about stopping the antiplatelets -discussed with speech. Doing another VBS today. If the patient fails this VPS will be discussed with GI and neurology regarding stopping antiplatelet agents for a short time for feeding tube placement        Cough and secretions  Assessment & Plan  Suspect aspiration vs mild fluid overload   On RA. No fever. Leukocytosis improving. Started tube feeds  CXR: Questionable left costophrenic angle opacification given patient positioning. Artifact versus pneumonia in this area cannot be entirely excluded. Mild right basilar linear atelectasis. Monitor off abx for now as leukocytosis improving and patient is afebrile. History of GI bleed  Assessment & Plan  Patient with history of coffee-ground material in the hospitalization last year; concerning for GI bleeding, as the patient is placed on aspirin with Brilinta; will place patient on pantoprazole prophylaxis. Chronic renal disease, stage IV (HCC)  Assessment & Plan  Baseline creatinine between 1.9-2.4. Currently at baseline. Trend BMP. 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 chronic kidney disease, without long-term current use of insulin (HCC)  Assessment & Plan  Lab Results   Component Value Date    HGBA1C 6.7 (H) 10/24/2023     On  every 6 hr insulin sliding scale with Accu-Cheks per protocol  Increase in lantus dose to 14 units today. Continue with sliding scale coverage      Essential hypertension  Assessment & Plan  Was On permissive hypertension with stroke  Restarted home dose amlodipine, increased dose to 10 mg amlodipine daily  Added low dose coreg, increased dose  Prn labetalol               VTE Pharmacologic Prophylaxis:   Moderate Risk (Score 3-4) - Pharmacological DVT Prophylaxis Ordered: enoxaparin (Lovenox). Patient Centered Rounds: I performed bedside rounds with nursing staff today. Discussions with Specialists or Other Care Team Provider:     Education and Discussions with Family / Patient: Attempted to update  (wife) via phone. Left voicemail. Total Time Spent on Date of Encounter in care of patient: 35 mins.  This time was spent on one or more of the following: performing physical exam; counseling and coordination of care; obtaining or reviewing history; documenting in the medical record; reviewing/ordering tests, medications or procedures; communicating with other healthcare professionals and discussing with patient's family/caregivers. Current Length of Stay: 8 day(s)  Current Patient Status: Inpatient   Certification Statement: The patient will continue to require additional inpatient hospital stay due to feeding tube placement  Discharge Plan:    Code Status: Level 1 - Full Code    Subjective:   Patient seen and examined. No events overnight. Discussed with speech. They are attempting another VPS today. Wife was very concerned about stopping antiplatelet agents for feeding tube placement. If the patient fails this VPS will need to discuss with neurology and GI regarding stopping antiplatelet agents or doing the feeding tube at least on aspirin    Objective:     Vitals:   Temp (24hrs), Av.2 °F (37.3 °C), Min:98.8 °F (37.1 °C), Max:99.5 °F (37.5 °C)    Temp:  [98.8 °F (37.1 °C)-99.5 °F (37.5 °C)] 98.8 °F (37.1 °C)  HR:  [67-86] 67  Resp:  [16-17] 17  BP: (137-160)/(69-83) 138/72  SpO2:  [95 %-99 %] 98 %  Body mass index is 30.92 kg/m². Input and Output Summary (last 24 hours): Intake/Output Summary (Last 24 hours) at 2023 1500  Last data filed at 2023 0901  Gross per 24 hour   Intake 150 ml   Output --   Net 150 ml       Physical Exam:   Physical Exam  HENT:      Head: Normocephalic. Nose: Nose normal.      Comments: Feeding tube present  Cardiovascular:      Rate and Rhythm: Regular rhythm. Pulmonary:      Comments: Decreased  breath sounds bilateral  Abdominal:      General: There is no distension. Palpations: Abdomen is soft. Musculoskeletal:         General: Normal range of motion. Skin:     General: Skin is warm. Neurological:      Mental Status: He is alert. Motor: Weakness present.       Comments: Patient with dysarthria /facial droop  Left-sided weakness         Additional Data:     Labs:  Results from last 7 days   Lab Units 10/29/23  0534 10/27/23  0511   WBC Thousand/uL 9.20 10.58*   HEMOGLOBIN g/dL 13.5 13.5   HEMATOCRIT % 38.6 40.6   PLATELETS Thousands/uL 168 157   BANDS PCT %  --  1   NEUTROS PCT % 68  --    LYMPHS PCT % 14  --    LYMPHO PCT %  --  9*   MONOS PCT % 13*  --    MONO PCT %  --  9   EOS PCT % 5 3     Results from last 7 days   Lab Units 10/29/23  0534   SODIUM mmol/L 138   POTASSIUM mmol/L 3.6   CHLORIDE mmol/L 104   CO2 mmol/L 27   BUN mg/dL 36*   CREATININE mg/dL 1.95*   ANION GAP mmol/L 7   CALCIUM mg/dL 8.2*   GLUCOSE RANDOM mg/dL 185*         Results from last 7 days   Lab Units 11/01/23  1039 11/01/23  0855 11/01/23  0605 11/01/23  0253 11/01/23  0059 10/31/23  2059 10/31/23  1733 10/31/23  1719 10/31/23  1035 10/31/23  0516 10/31/23  0310 10/31/23  0010   POC GLUCOSE mg/dl 256* 278* 221* 157* 163* 229* 162* 160* 243* 246* 288* 248*         Results from last 7 days   Lab Units 10/29/23  0534   PROCALCITONIN ng/ml 0.13       Lines/Drains:  Invasive Devices       Peripheral Intravenous Line  Duration             Peripheral IV 10/30/23 Distal;Left;Ventral (anterior) Forearm 2 days              Drain  Duration             External Urinary Catheter Medium 3 days    NG/OG/Enteral Tube Nasogastric 12 Fr Left nare <1 day                          Imaging: No new images to review    Recent Cultures (last 7 days):         Last 24 Hours Medication List:   Current Facility-Administered Medications   Medication Dose Route Frequency Provider Last Rate    acetaminophen  975 mg Oral Q8H Cammy Rodriguez MD      amLODIPine  10 mg Oral Daily Cammy Rodriguez MD      aspirin  81 mg Per NG Tube Daily Cammy Rodriguez MD      atorvastatin  40 mg Per NG Tube QPM Cammy Rodriguez MD      bisacodyl  10 mg Rectal Daily PRN Cammy Rodriguez MD      carvedilol  12.5 mg Oral BID With Meals Cammy Rodriguez MD      enoxaparin  40 mg Subcutaneous Daily Vanessa Stewart MD      insulin glargine  14 Units Subcutaneous HS Aleksandar Vasquez MD      insulin lispro  1-6 Units Subcutaneous Q6H [address] Rajesh Casey MD      insulin lispro  2 Units Subcutaneous Q6H Olga Henry MD      labetalol  10 mg Intravenous Q6H PRN Olga Henry MD      nystatin   Topical BID Marylene Rivers, MD      ondansetron  4 mg Intravenous Q6H PRN Rajesh Casey MD      pantoprazole  40 mg Intravenous Q12H [address] Rajesh Casey MD      polyethylene glycol  17 g Per NG Tube Daily Olga Henry MD      senna-docusate sodium  1 tablet Per NG Tube BID Olga Henry MD      ticagrelor  90 mg Per NG Tube Q12H [address] Olga Henry MD          Today, Patient Was Seen By: Marylene Rivers, MD    **Please Note: This note may have been constructed using a voice recognition system. **

## 2023-11-01 NOTE — ASSESSMENT & PLAN NOTE
Suspect aspiration vs mild fluid overload   On RA. No fever. Leukocytosis improving. Started tube feeds  CXR: Questionable left costophrenic angle opacification given patient positioning. Artifact versus pneumonia in this area cannot be entirely excluded. Mild right basilar linear atelectasis. Monitor off abx for now as leukocytosis improving and patient is afebrile.

## 2023-11-01 NOTE — ASSESSMENT & PLAN NOTE
Was On permissive hypertension with stroke  Restarted home dose amlodipine, increased dose to 10 mg amlodipine daily  Added low dose coreg, increased dose  Prn labetalol

## 2023-11-01 NOTE — PLAN OF CARE
Problem: Communication Impairment  Goal: Ability to express needs and understand communication  Description: Assess patient's communication skills and ability to understand information. Patient will demonstrate use of effective communication techniques, alternative methods of communication and understanding even if not able to speak. - Encourage communication and provide alternate methods of communication as needed. - Collaborate with case management/ for discharge needs. - Include patient/family/caregiver in decisions related to communication. 11/1/2023 0923 by Brant Tai  Outcome: Progressing  11/1/2023 0923 by Brant Tai  Outcome: Progressing     Problem: Potential for Aspiration  Goal: Non-ventilated patient's risk of aspiration is minimized  Description: Assess and monitor vital signs, respiratory status, and labs (WBC). Monitor for signs of aspiration (tachypnea, cough, rales, wheezing, cyanosis, fever). - Assess and monitor patient's ability to swallow. - Place patient up in chair to eat if possible. - HOB up at 90 degrees to eat if unable to get patient up into chair.  - Supervise patient during oral intake. - Instruct patient/ family to take small bites. - Instruct patient/ family to take small single sips when taking liquids.   - Follow patient-specific strategies generated by speech pathologist.  11/1/2023 0923 by Brant Tai  Outcome: Progressing  11/1/2023 0923 by Brant Tai  Outcome: Progressing

## 2023-11-01 NOTE — PROCEDURES
Speech Pathology - Modified Barium Swallow Study    Patient Name: Lux COKER Date: 11/1/2023     Problem List  Principal Problem:    CVA (cerebral vascular accident) [facility])  Active Problems:    Essential hypertension    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 chronic kidney disease, without long-term current use of insulin ([address])    Chronic renal disease, stage IV ([address])    Renal cell carcinoma of right kidney ([address])    History of GI bleed    Cough and secretions      Past Medical History  Past Medical History:   Diagnosis Date    Hypertension     TIA (transient ischemic attack)        Past Surgical History  Past Surgical History:   Procedure Laterality Date    CYSTECTOMY      Per patient cyst removal from his back    LASIK      NV LAPAROSCOPY RADICAL NEPHRECTOMY Right 7/6/2022    Procedure: NEPHRECTOMY RADICAL LAPAROSCOPIC W/ ROBOTICS, poss open;  Surgeon: Glendy Buchanan MD;  Location: [facility];  Service: Urology       Assessment Summary:    Pt presents with mild-moderate oropharyngeal dysphagia characterized by reduced lingual propulsion and bolus transfers, reduced full retraction, swallow delay and reduced anterior hyoid displacement. The Epiglottis abuts the PPW and does not fully invert. The pharyngeal stripping wave is reduced as well as  poor airway protection during swallows w/  penetration to the cords with nectar and aspiration of thin prior to and after swallows. The pt was reactive to aspiration coughing immed with aspiration of thin. Does not fully clear the airway. Despite this the swallow has improved since the previous MBS>   Note: Images are available for review in PACS as desired. Recommendations:   Recommended Diet: puree/level 1 diet, honey thick liquids, Continue frequent oral care, and SLP will f/u at bedside tomorrow with nectar at bedside.       Recommended Form of Medications: crushed with puree   Aspiration precautions and compensatory swallowing strategies: upright posture, only feed when fully alert, slow rate of feeding, small bites/sips, and alternating bites and sips  Consider referral to:    SLP Dysphagia therapy recommended:     Results Reviewed with: patient, RN, and MD   Pt/Family Education: initiated. Pt and caregivers would benefit from/require continued education. Patient Stated Goal:    Dysphagia Goals per SLP: pt will tolerate puree  with honey  without s/s of aspiration x3 and pt will demonstrate accurate use of mult swallow  strategy with 80% accuracy given min/mod cues. General Information; History of Present Illness:  Caryn Finn is a 70 y.o. male who has past medical history significant for history of nonruptured cerebral aneurysm, chronic pain, chronic kidney disease stage IV most likely secondary to nephrectomy on the right side due to renal cell carcinoma and follows with urology, chronic low back pain, class I obesity, essential hypertension, previous history of GI bleeding (coffee-ground material) polyarticular arthritis, diabetes mellitus type 2 who comes in with left-sided weakness approximately 7:00 this evening as reported to EMS, wife was able to see patient without any concerns and usual state of health. Patient suddenly presented with left-sided facial droop and upper and lower extremity weakness. He has hypertension with blood pressures in the low 200s per EMS. Stroke protocol was called. He currently has left-sided weakness and dysphonia and dysphagia. Opted not to have thrombolytics, neurology had patient's started on Brilinta 90 mg twice daily, aspirin 300 mg rectally was administered x1 to continue 81 mg orally  Pt w/ ongoing dysphagia, family undecided about PEG tube. Prior MBS:10/27/23 Summary:  Images are on PACS for review. *Study was difficult, due to patient requiring frequent repositioning to an upright position, and increasing fatigue as study progressed.    Pt is at high risk for aspiration at this time. May need to consider longer term nutrition. Oral stage: Mod-severe impairment, characterized by lingual incoordination, weakness and reduced ROM. This results in reduced bolus control, reduced bolus formation and cohesion, prolonged tongue pumping transfers, and frequent diffuse residue that is difficult to clear (pt has difficulty initiating dry swallows). There is reduced lip seal with anterior spillage and reduced oral containment. Pharyngeal stage: Severe impairment, characterized by reduced BOT contraction, reduced PPW contraction, with no to min inversion of the epiglottis. This results in poor airway protection/poor closure of the laryngeal vestibule. There was penetration of thin, NTL, and HTL even via teaspoon, either before or during the swallow, and epiglottic undercoating of the epiglottis resulting in further penetration. Aspiration of thin liquids was observed, resulting in strong coughing. ?aspiration of HTL during esophageal scan. Cued, volitional cough is weak and ineffective at clearing penetrated/aspirated material.  There is mod-severe residue in the vallecula and pyriforms after the swallow; pt has difficulty initiating a second swallow to clear residue. Pureed resulted in the least amount of pharyngeal residue of consistencies assessed. Strategies including head turn L and chin tuck were not effective at preventing aspiration. In addition, suspect vocal fold dysfunction and reduced closure of the airway (weak, breathy vocal quality). Per gross esophageal screen: Difficult to see due to positioning, ?mild dysmotility.        Oral Mechanism Exam  Facial: left facial droop  Labial: bilateral decreased ROM, decreased strength, and decreased coordination  Lingual: bilateral decreased strength and decreased coordination  Velum: unable to visualize  Mandible: adequate ROM  Dentition: edentulous  Vocal quality: breathy and weak   Volitional Cough: weak   Respiratory Status: on RA    Tracheostomy:     Pt was viewed sitting upright in the lateral and AP positions. Due to concerns for patient safety / patient refusal, trials provided deviated from the MBSImP Validated Protocol. Pt was given 5-mL thin liquid x2, 5-mL nectar thick, 5-mL honey thick, 5-mL pudding, 5-mL nectar thick in the AP position, and 5-mL pudding in the AP position. Pt was also given nectar &  thin liquids by straw. Initial view observations/comments: NGT in place      8-Point Penetration-Aspiration Scale   Thin liquid 7 - Material enters the airway, passes below the vocal folds, and is not ejected from the trachea despite effort     Nectar thick liquid 6 - Material enters the airway, passes below the vocal folds and is ejected into the larynx or out of the airway     Honey thick liquid 2 - Material enters the airway, remains above the vocal folds, and is ejected  from the airway   Puree (pudding) 1 - Material does not enter the airway   Solid 1 - Material does not enter the airway     Strategies and Efficacy: mult swallows somewhat effective but retention remains. Aspiration Response and Efficacy:  immed cough, does not clear. MBS IMP Rating    ORAL Impairment  Compinent 1--Lip Closure  Judged at any point during the swallow. 1 - Interlabial escape; no progression to anterior lip    Component 2--Tongue Control During Bolus Hold  Judged on held liquid boluses only and prior to productive tongue movement. 1 - Escape to lateral buccal cavity/floor of mouth (FOM)    Component 3--Bolus Preparation/Mastication  Judged only during presentation of 1/2 shortbread cookie coated in pudding. No solids offered    Component 4--Bolus Transport/Lingual Motion  Judged after first productive tongue movement for oral bolus transport. 2 - Slowed tongue motion     Component 5--Oral Residue  Judged after first swallow or after the last swallow of the sequential swallow task.   2 - Residue collection on oral structures   Location   A - Floor of Mouth and C - Tongue    Component 6--Initiation of Pharyngeal Swallow  Judged at first movement of the brisk superior-anterior hyoid trajectory. 3 - Bolus head in pyriforms      PHARYNGEAL Impairment  Component 7--Soft Palate Elevation  Judged during maximum displacement of soft palate. 0 - No bolus between the soft palate (SP)/pharyngeal wall (PW)    Component 8--Laryngeal Elevation  Judged when epiglottis is in its most horizontal position. 1 - Partial superior movement of thyroid cartilage/partial approximation of arytenoids to epiglottic petiole    Component 9--Anterior Hyoid Excursion  Judged at height of swallow/maximal anterior hyoid displacement. 1 - Partial anterior movement    Component 10--Epiglottic Movement  Judged at height of swallow/maximal anterior hyoid displacement. 2 - No inversion-hits the PPW    Component 11--Laryngeal Vestibular Closure  Judged at height of swallow/maximal anterior hyoid displacement. 1 - Incomplete; narrow column air/contrast in laryngeal vestibule    Component 12--Pharyngeal Stripping Wave  Judged during the full duration of the pharyngeal swallow. 1 - Present - diminished    Component 13--Pharyngeal Contraction  Judged in AP view at rest and throughout maximum movement of structures. 2 - Unilateral Bulging    Component 14--Pharyngoesophageal Segment Opening  Judged during maximum distension of PES and throughout opening and closure. 1 - Partial distension/partial duration; partial obstruction to flow    Component 15--Tongue Base (TB) Retraction  Judged during maximum retraction of the tongue base. 2 - Narrow column of contrast or air between TB and PW    Component 16--Pharyngeal Residue  Judged after first swallow or after the last swallow of the sequential swallow task.   2 - Collection of residue within or on pharyngeal structures   Location   B - Valleculae and E - Pyriform sinuses-at least mild, ESOPHAGEAL Impairment  Component 17--Esophageal Clearance Upright Position  Judged in AP view during bolus transit through the oral cavity to the LES  0 - Complete clearance; esophageal coating

## 2023-11-01 NOTE — CONSULTS
Consultation - [address] 70 y.o. 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 Managed by primary team   Will recommend preventative nursing skin care measures- including foam dressings to the b/l heels   Pressure relief- offloading of pressure with turning/repositioning as patient medically tolerates, heel elevation, foam wedges for offloading/repositioning, and waffle cushion to chair. Nutrition is following  [facility] with questions or concerns. Routine wound care follow-up while admitted. AVS updated. History of Present Illness:  Patient is a 70year old male admitted with CVA and L-sided weakness. History of TIA, HTN, renal cell carcinoma, and DM. Wound care consulted for sacral slit. Patient seen in bed, alert and awake, cooperative for the assessment, seen with nursing at the bedside. Patient unable to provide wound history. Nursing reports noticing the skin breakdown yesterday, currently using calazime cream. Patient is incontinent of bowel and bladder, nursing applied condom catheter this morning to help with incontinence. No reported fever, chills, or increased pain related to the wound.     Subjective:    Review of Systems   Unable to perform ROS: Mental status change       Historical Information   Past Medical History:   Diagnosis Date    Hypertension     TIA (transient ischemic attack)      Past Surgical History:   Procedure Laterality Date    CYSTECTOMY      Per patient cyst removal from his back    LASIK      CT LAPAROSCOPY RADICAL NEPHRECTOMY Right 7/6/2022    Procedure: NEPHRECTOMY RADICAL LAPAROSCOPIC W/ ROBOTICS, poss open;  Surgeon: Josi Mroan MD;  Location: [facility];  Service: Urology     Social History   Social History     Substance and Sexual Activity   Alcohol Use Never     Social History     Substance and Sexual Activity   Drug Use Never     E-Cigarette/Vaping    E-Cigarette Use Never User      E-Cigarette/Vaping Substances    Nicotine No     THC No     CBD No     Flavoring No     Other No     Unknown No      Social History     Tobacco Use   Smoking Status Never   Smokeless Tobacco Never     Family History:   Family History   Problem Relation Age of Onset    Colon cancer Mother     Diabetes type II Mother     Heart disease Mother     Prostate cancer Father        Meds/Allergies   current meds:   Current Facility-Administered Medications   Medication Dose Route Frequency    acetaminophen (TYLENOL) oral suspension 975 mg  975 mg Oral Q8H    amLODIPine (NORVASC) tablet 10 mg  10 mg Oral Daily    aspirin chewable tablet 81 mg  81 mg Per NG Tube Daily    atorvastatin (LIPITOR) tablet 40 mg  40 mg Per NG Tube QPM    bisacodyl (DULCOLAX) rectal suppository 10 mg  10 mg Rectal Daily PRN    carvedilol (COREG) tablet 12.5 mg  12.5 mg Oral BID With Meals    enoxaparin (LOVENOX) subcutaneous injection 40 mg  40 mg Subcutaneous Daily    insulin glargine (LANTUS) subcutaneous injection 14 Units 0.14 mL  14 Units Subcutaneous HS    insulin lispro (HumaLOG) 100 units/mL subcutaneous injection 1-6 Units  1-6 Units Subcutaneous Q6H [address]    insulin lispro (HumaLOG) 100 units/mL subcutaneous injection 2 Units  2 Units Subcutaneous Q6H    labetalol (NORMODYNE) injection 10 mg  10 mg Intravenous Q6H PRN    nystatin (MYCOSTATIN) powder   Topical BID    ondansetron (ZOFRAN) injection 4 mg  4 mg Intravenous Q6H PRN    pantoprazole (PROTONIX) injection 40 mg  40 mg Intravenous Q12H [facility]    polyethylene glycol (MIRALAX) packet 17 g  17 g Per NG Tube Daily    senna-docusate sodium (SENOKOT S) 8.6-50 mg per tablet 1 tablet  1 tablet Per NG Tube BID    ticagrelor (BRILINTA) tablet 90 mg  90 mg Per NG Tube Q12H [facility]     Allergies   Allergen Reactions    Plavix [Clopidogrel] Rash     Stopped two days ago because got a rash and doctors thought it was from this        Objective   Vitals: Blood pressure 138/72, pulse 67, temperature 98.8 °F (37.1 °C), resp. rate 17, height 6' (1.829 m), weight 103 kg (228 lb), SpO2 98 %. Wounds:     Mid sacrum - linear shaped partial thickness wound. Wound is 100% moist pink/red tissue, producing small amount of bloody drainage. Measures: 3.0x0.5x0.1cm. Edges fragile and attached with slight maceration. Periwound is dry and intact with blanchable pink-colored skin. Remainder of the skin to the bilateral sacral buttocks is otherwise unremarkable. Bilateral heels are dry and intact without redness or wounds. No induration, fluctuance, odor, warmth/temperature differences, redness, or purulence noted to the above mentioned wounds and skin areas assessed. Patient tolerated assessment well- no s/s of non-verbal pain or discomfort observed during the encounter. Physical Exam  Constitutional:       General: He is awake. He is not in acute distress. Appearance: He is not diaphoretic. HENT:      Head: Normocephalic and atraumatic. Right Ear: External ear normal.      Left Ear: External ear normal.      Nose:      Comments: NG tube in place for enteral feedings. Eyes:      Conjunctiva/sclera: Conjunctivae normal.   Pulmonary:      Effort: Pulmonary effort is normal. No respiratory distress. Genitourinary:     Comments: Condom catheter in place. Incontinent of bowel. Day-care rendered with assessment. Musculoskeletal:      Comments: Dependent for care. Max assist of one with turning for the assessment. Skin:     General: Skin is warm and dry. Findings: Wound present.       Comments: Refer to wound section for assessment details. Neurological:      Mental Status: He is alert. Gait: Gait abnormal.   Psychiatric:         Behavior: Behavior is cooperative. Lab, Imaging and other studies: I have personally reviewed pertinent reports. Code Status: Level 1 - Full Code      Counseling / Coordination of Care  Total time spent today:    Total time (face-to-face and non-face-to-face) spent on today's visit was 29 minutes. This includes preparation for the visits (H&P on 10/24/23, and SLIM note on 10/31/23 ) performance of a medically appropriate history and examination, and orders for medications/treatments or testing. Discussed assessment findings, and plan of care/recommendations with patients RN. India Burks, KAYODE, FNP-C, DEV      Portions of the record may have been created with voice recognition software. Occasional wrong word or "sound a like" substitutions may have occurred due to the inherent limitations of voice recognition software.   Read the chart carefully and recognize, using context, where substitutions have occurred

## 2023-11-01 NOTE — NURSING NOTE
RN entered patients room to find NG tube not placed at 62 cm at the nares. Nurse notified MD. Humberto Sun ordered to check placement. RN will hold feds and meds till placement is confirmed.

## 2023-11-01 NOTE — PLAN OF CARE
Problem: Potential for Falls  Goal: Patient will remain free of falls  Description: INTERVENTIONS:  - Educate patient/family on patient safety including physical limitations  - Instruct patient to call for assistance with activity   - Consult OT/PT to assist with strengthening/mobility   - Keep Call bell within reach  - Keep bed low and locked with side rails adjusted as appropriate  - Keep care items and personal belongings within reach  - Initiate and maintain comfort rounds  - Make Fall Risk Sign visible to staff  - Offer Toileting every 2 Hours, in advance of need  - Initiate/Maintain bed alarm  - Obtain necessary fall risk management equipment: non skid footwear  - Apply yellow socks and bracelet for high fall risk patients  - Consider moving patient to room near nurses station  Outcome: Progressing     Problem: MOBILITY - ADULT  Goal: Maintain or return to baseline ADL function  Description: INTERVENTIONS:  -  Assess patient's ability to carry out ADLs; assess patient's baseline for ADL function and identify physical deficits which impact ability to perform ADLs (bathing, care of mouth/teeth, toileting, grooming, dressing, etc.)  - Assess/evaluate cause of self-care deficits   - Assess range of motion  - Assess patient's mobility; develop plan if impaired  - Assess patient's need for assistive devices and provide as appropriate  - Encourage maximum independence but intervene and supervise when necessary  - Involve family in performance of ADLs  - Assess for home care needs following discharge   - Consider OT consult to assist with ADL evaluation and planning for discharge  - Provide patient education as appropriate  Outcome: Progressing  Goal: Maintains/Returns to pre admission functional level  Description: INTERVENTIONS:  - Perform BMAT or MOVE assessment daily.   - Set and communicate daily mobility goal to care team and patient/family/caregiver.    - Collaborate with rehabilitation services on mobility goals if consulted  - Perform Range of Motion 3 times a day. - Reposition patient every 2 hours. - Record patient progress and toleration of activity level   Outcome: Progressing     Problem: Nutrition/Hydration-ADULT  Goal: Nutrient/Hydration intake appropriate for improving, restoring or maintaining nutritional needs  Description: Monitor and assess patient's nutrition/hydration status for malnutrition. Collaborate with interdisciplinary team and initiate plan and interventions as ordered. Monitor patient's weight and dietary intake as ordered or per policy. Utilize nutrition screening tool and intervene as necessary. Determine patient's food preferences and provide high-protein, high-caloric foods as appropriate.      INTERVENTIONS:  - Monitor oral intake, urinary output, labs, and treatment plans  - Assess nutrition and hydration status and recommend course of action  - Evaluate amount of meals eaten  - Assist patient with eating if necessary   - Allow adequate time for meals  - Recommend/ encourage appropriate diets, oral nutritional supplements, and vitamin/mineral supplements  - Order, calculate, and assess calorie counts as needed  - Recommend, monitor, and adjust tube feedings and TPN/PPN based on assessed needs  - Assess need for intravenous fluids  - Provide specific nutrition/hydration education as appropriate  - Include patient/family/caregiver in decisions related to nutrition  Outcome: Progressing     Problem: SAFETY ADULT  Goal: Patient will remain free of falls  Description: INTERVENTIONS:  - Educate patient/family on patient safety including physical limitations  - Instruct patient to call for assistance with activity   - Consult OT/PT to assist with strengthening/mobility   - Keep Call bell within reach  - Keep bed low and locked with side rails adjusted as appropriate  - Keep care items and personal belongings within reach  - Initiate and maintain comfort rounds  - Make Fall Risk Sign visible to staff  - Offer Toileting every 2 Hours, in advance of need  - Initiate/Maintain bed alarm  - Obtain necessary fall risk management equipment: non skid footwear  - Apply yellow socks and bracelet for high fall risk patients  - Consider moving patient to room near nurses station  Outcome: Progressing  Goal: Maintain or return to baseline ADL function  Description: INTERVENTIONS:  -  Assess patient's ability to carry out ADLs; assess patient's baseline for ADL function and identify physical deficits which impact ability to perform ADLs (bathing, care of mouth/teeth, toileting, grooming, dressing, etc.)  - Assess/evaluate cause of self-care deficits   - Assess range of motion  - Assess patient's mobility; develop plan if impaired  - Assess patient's need for assistive devices and provide as appropriate  - Encourage maximum independence but intervene and supervise when necessary  - Involve family in performance of ADLs  - Assess for home care needs following discharge   - Consider OT consult to assist with ADL evaluation and planning for discharge  - Provide patient education as appropriate  Outcome: Progressing  Goal: Maintains/Returns to pre admission functional level  Description: INTERVENTIONS:  - Perform BMAT or MOVE assessment daily.   - Set and communicate daily mobility goal to care team and patient/family/caregiver. - Collaborate with rehabilitation services on mobility goals if consulted  - Perform Range of Motion 3 times a day. - Reposition patient every 2 hours.   - Record patient progress and toleration of activity level   Outcome: Progressing

## 2023-11-01 NOTE — ASSESSMENT & PLAN NOTE
Lab Results   Component Value Date    HGBA1C 6.7 (H) 10/24/2023     On  every 6 hr insulin sliding scale with Accu-Cheks per protocol  Increase in lantus dose to 14 units today.   Continue with sliding scale coverage

## 2023-11-02 LAB
ANION GAP SERPL CALCULATED.3IONS-SCNC: 6 MMOL/L
BUN SERPL-MCNC: 47 MG/DL (ref 5–25)
CALCIUM SERPL-MCNC: 8.6 MG/DL (ref 8.4–10.2)
CHLORIDE SERPL-SCNC: 103 MMOL/L (ref 96–108)
CO2 SERPL-SCNC: 29 MMOL/L (ref 21–32)
CREAT SERPL-MCNC: 2.04 MG/DL (ref 0.6–1.3)
ERYTHROCYTE [DISTWIDTH] IN BLOOD BY AUTOMATED COUNT: 12.2 % (ref 11.6–15.1)
GFR SERPL CREATININE-BSD FRML MDRD: 30 ML/MIN/1.73SQ M
GLUCOSE SERPL-MCNC: 219 MG/DL (ref 65–140)
GLUCOSE SERPL-MCNC: 242 MG/DL (ref 65–140)
GLUCOSE SERPL-MCNC: 265 MG/DL (ref 65–140)
GLUCOSE SERPL-MCNC: 269 MG/DL (ref 65–140)
GLUCOSE SERPL-MCNC: 273 MG/DL (ref 65–140)
GLUCOSE SERPL-MCNC: 277 MG/DL (ref 65–140)
GLUCOSE SERPL-MCNC: 284 MG/DL (ref 65–140)
HCT VFR BLD AUTO: 39.2 % (ref 36.5–49.3)
HGB BLD-MCNC: 12.9 G/DL (ref 12–17)
MCH RBC QN AUTO: 31.2 PG (ref 26.8–34.3)
MCHC RBC AUTO-ENTMCNC: 32.9 G/DL (ref 31.4–37.4)
MCV RBC AUTO: 95 FL (ref 82–98)
PLATELET # BLD AUTO: 221 THOUSANDS/UL (ref 149–390)
PMV BLD AUTO: 12.3 FL (ref 8.9–12.7)
POTASSIUM SERPL-SCNC: 4.1 MMOL/L (ref 3.5–5.3)
RBC # BLD AUTO: 4.14 MILLION/UL (ref 3.88–5.62)
SODIUM SERPL-SCNC: 138 MMOL/L (ref 135–147)
WBC # BLD AUTO: 9.99 THOUSAND/UL (ref 4.31–10.16)

## 2023-11-02 PROCEDURE — C9113 INJ PANTOPRAZOLE SODIUM, VIA: HCPCS | Performed by: INTERNAL MEDICINE

## 2023-11-02 PROCEDURE — 92526 ORAL FUNCTION THERAPY: CPT

## 2023-11-02 PROCEDURE — 82948 REAGENT STRIP/BLOOD GLUCOSE: CPT

## 2023-11-02 PROCEDURE — 85027 COMPLETE CBC AUTOMATED: CPT | Performed by: FAMILY MEDICINE

## 2023-11-02 PROCEDURE — 99232 SBSQ HOSP IP/OBS MODERATE 35: CPT | Performed by: NURSE PRACTITIONER

## 2023-11-02 PROCEDURE — 80048 BASIC METABOLIC PNL TOTAL CA: CPT | Performed by: FAMILY MEDICINE

## 2023-11-02 PROCEDURE — 97530 THERAPEUTIC ACTIVITIES: CPT

## 2023-11-02 PROCEDURE — 97112 NEUROMUSCULAR REEDUCATION: CPT

## 2023-11-02 PROCEDURE — 97535 SELF CARE MNGMENT TRAINING: CPT

## 2023-11-02 PROCEDURE — 99232 SBSQ HOSP IP/OBS MODERATE 35: CPT | Performed by: FAMILY MEDICINE

## 2023-11-02 RX ORDER — INSULIN LISPRO 100 [IU]/ML
1-6 INJECTION, SOLUTION INTRAVENOUS; SUBCUTANEOUS
Status: DISCONTINUED | OUTPATIENT
Start: 2023-11-02 | End: 2023-11-03

## 2023-11-02 RX ORDER — INSULIN LISPRO 100 [IU]/ML
1-6 INJECTION, SOLUTION INTRAVENOUS; SUBCUTANEOUS
Status: DISCONTINUED | OUTPATIENT
Start: 2023-11-02 | End: 2023-11-02

## 2023-11-02 RX ORDER — INSULIN LISPRO 100 [IU]/ML
3 INJECTION, SOLUTION INTRAVENOUS; SUBCUTANEOUS
Status: DISCONTINUED | OUTPATIENT
Start: 2023-11-03 | End: 2023-11-03

## 2023-11-02 RX ORDER — INSULIN GLARGINE 100 [IU]/ML
16 INJECTION, SOLUTION SUBCUTANEOUS
Status: DISCONTINUED | OUTPATIENT
Start: 2023-11-02 | End: 2023-11-03

## 2023-11-02 RX ORDER — PANTOPRAZOLE SODIUM 40 MG/1
40 TABLET, DELAYED RELEASE ORAL
Status: DISCONTINUED | OUTPATIENT
Start: 2023-11-03 | End: 2023-11-04 | Stop reason: HOSPADM

## 2023-11-02 RX ADMIN — INSULIN LISPRO 3 UNITS: 100 INJECTION, SOLUTION INTRAVENOUS; SUBCUTANEOUS at 06:24

## 2023-11-02 RX ADMIN — AMLODIPINE BESYLATE 10 MG: 10 TABLET ORAL at 08:35

## 2023-11-02 RX ADMIN — INSULIN LISPRO 4 UNITS: 100 INJECTION, SOLUTION INTRAVENOUS; SUBCUTANEOUS at 11:55

## 2023-11-02 RX ADMIN — TICAGRELOR 90 MG: 90 TABLET ORAL at 08:36

## 2023-11-02 RX ADMIN — SENNOSIDES, DOCUSATE SODIUM 1 TABLET: 8.6; 5 TABLET ORAL at 16:34

## 2023-11-02 RX ADMIN — INSULIN LISPRO 3 UNITS: 100 INJECTION, SOLUTION INTRAVENOUS; SUBCUTANEOUS at 00:32

## 2023-11-02 RX ADMIN — CARVEDILOL 12.5 MG: 12.5 TABLET, FILM COATED ORAL at 08:35

## 2023-11-02 RX ADMIN — INSULIN LISPRO 2 UNITS: 100 INJECTION, SOLUTION INTRAVENOUS; SUBCUTANEOUS at 08:31

## 2023-11-02 RX ADMIN — INSULIN LISPRO 2 UNITS: 100 INJECTION, SOLUTION INTRAVENOUS; SUBCUTANEOUS at 14:02

## 2023-11-02 RX ADMIN — ACETAMINOPHEN 975 MG: 650 SUSPENSION ORAL at 06:00

## 2023-11-02 RX ADMIN — NYSTATIN: 100000 POWDER TOPICAL at 16:32

## 2023-11-02 RX ADMIN — INSULIN GLARGINE 16 UNITS: 100 INJECTION, SOLUTION SUBCUTANEOUS at 21:30

## 2023-11-02 RX ADMIN — SENNOSIDES, DOCUSATE SODIUM 1 TABLET: 8.6; 5 TABLET ORAL at 08:35

## 2023-11-02 RX ADMIN — ATORVASTATIN CALCIUM 40 MG: 40 TABLET, FILM COATED ORAL at 16:34

## 2023-11-02 RX ADMIN — NYSTATIN: 100000 POWDER TOPICAL at 08:36

## 2023-11-02 RX ADMIN — TICAGRELOR 90 MG: 90 TABLET ORAL at 21:30

## 2023-11-02 RX ADMIN — ACETAMINOPHEN 975 MG: 650 SUSPENSION ORAL at 22:55

## 2023-11-02 RX ADMIN — ASPIRIN 81 MG CHEWABLE TABLET 81 MG: 81 TABLET CHEWABLE at 08:35

## 2023-11-02 RX ADMIN — POLYETHYLENE GLYCOL 3350 17 G: 17 POWDER, FOR SOLUTION ORAL at 08:32

## 2023-11-02 RX ADMIN — ENOXAPARIN SODIUM 40 MG: 40 INJECTION SUBCUTANEOUS at 08:31

## 2023-11-02 RX ADMIN — INSULIN LISPRO 3 UNITS: 100 INJECTION, SOLUTION INTRAVENOUS; SUBCUTANEOUS at 16:32

## 2023-11-02 RX ADMIN — PANTOPRAZOLE SODIUM 40 MG: 40 INJECTION, POWDER, FOR SOLUTION INTRAVENOUS at 08:31

## 2023-11-02 RX ADMIN — ACETAMINOPHEN 975 MG: 650 SUSPENSION ORAL at 14:01

## 2023-11-02 RX ADMIN — CARVEDILOL 12.5 MG: 12.5 TABLET, FILM COATED ORAL at 16:34

## 2023-11-02 NOTE — PLAN OF CARE
Problem: Potential for Falls  Goal: Patient will remain free of falls  Description: INTERVENTIONS:  - Educate patient/family on patient safety including physical limitations  - Instruct patient to call for assistance with activity   - Consult OT/PT to assist with strengthening/mobility   - Keep Call bell within reach  - Keep bed low and locked with side rails adjusted as appropriate  - Keep care items and personal belongings within reach  - Initiate and maintain comfort rounds  - Make Fall Risk Sign visible to staff  - Offer Toileting every 2 Hours, in advance of need  - Initiate/Maintain bed alarm  - Obtain necessary fall risk management equipment: non skid footwear  - Apply yellow socks and bracelet for high fall risk patients  - Consider moving patient to room near nurses station  Outcome: Progressing     Problem: MOBILITY - ADULT  Goal: Maintain or return to baseline ADL function  Description: INTERVENTIONS:  -  Assess patient's ability to carry out ADLs; assess patient's baseline for ADL function and identify physical deficits which impact ability to perform ADLs (bathing, care of mouth/teeth, toileting, grooming, dressing, etc.)  - Assess/evaluate cause of self-care deficits   - Assess range of motion  - Assess patient's mobility; develop plan if impaired  - Assess patient's need for assistive devices and provide as appropriate  - Encourage maximum independence but intervene and supervise when necessary  - Involve family in performance of ADLs  - Assess for home care needs following discharge   - Consider OT consult to assist with ADL evaluation and planning for discharge  - Provide patient education as appropriate  Outcome: Progressing  Goal: Maintains/Returns to pre admission functional level  Description: INTERVENTIONS:  - Perform BMAT or MOVE assessment daily.   - Set and communicate daily mobility goal to care team and patient/family/caregiver.    - Collaborate with rehabilitation services on mobility goals if consulted  - Perform Range of Motion 3 times a day. - Reposition patient every 2 hours. - Record patient progress and toleration of activity level   Outcome: Progressing     Problem: Nutrition/Hydration-ADULT  Goal: Nutrient/Hydration intake appropriate for improving, restoring or maintaining nutritional needs  Description: Monitor and assess patient's nutrition/hydration status for malnutrition. Collaborate with interdisciplinary team and initiate plan and interventions as ordered. Monitor patient's weight and dietary intake as ordered or per policy. Utilize nutrition screening tool and intervene as necessary. Determine patient's food preferences and provide high-protein, high-caloric foods as appropriate.      INTERVENTIONS:  - Monitor oral intake, urinary output, labs, and treatment plans  - Assess nutrition and hydration status and recommend course of action  - Evaluate amount of meals eaten  - Assist patient with eating if necessary   - Allow adequate time for meals  - Recommend/ encourage appropriate diets, oral nutritional supplements, and vitamin/mineral supplements  - Order, calculate, and assess calorie counts as needed  - Recommend, monitor, and adjust tube feedings and TPN/PPN based on assessed needs  - Assess need for intravenous fluids  - Provide specific nutrition/hydration education as appropriate  - Include patient/family/caregiver in decisions related to nutrition  Outcome: Progressing     Problem: SAFETY ADULT  Goal: Patient will remain free of falls  Description: INTERVENTIONS:  - Educate patient/family on patient safety including physical limitations  - Instruct patient to call for assistance with activity   - Consult OT/PT to assist with strengthening/mobility   - Keep Call bell within reach  - Keep bed low and locked with side rails adjusted as appropriate  - Keep care items and personal belongings within reach  - Initiate and maintain comfort rounds  - Make Fall Risk Sign visible to staff  - Offer Toileting every 2 Hours, in advance of need  - Initiate/Maintain bed alarm  - Obtain necessary fall risk management equipment: non skid footwear  - Apply yellow socks and bracelet for high fall risk patients  - Consider moving patient to room near nurses station  Outcome: Progressing  Goal: Maintain or return to baseline ADL function  Description: INTERVENTIONS:  -  Assess patient's ability to carry out ADLs; assess patient's baseline for ADL function and identify physical deficits which impact ability to perform ADLs (bathing, care of mouth/teeth, toileting, grooming, dressing, etc.)  - Assess/evaluate cause of self-care deficits   - Assess range of motion  - Assess patient's mobility; develop plan if impaired  - Assess patient's need for assistive devices and provide as appropriate  - Encourage maximum independence but intervene and supervise when necessary  - Involve family in performance of ADLs  - Assess for home care needs following discharge   - Consider OT consult to assist with ADL evaluation and planning for discharge  - Provide patient education as appropriate  Outcome: Progressing  Goal: Maintains/Returns to pre admission functional level  Description: INTERVENTIONS:  - Perform BMAT or MOVE assessment daily.   - Set and communicate daily mobility goal to care team and patient/family/caregiver. - Collaborate with rehabilitation services on mobility goals if consulted  - Perform Range of Motion 3 times a day. - Reposition patient every 2 hours.   - Record patient progress and toleration of activity level   Outcome: Progressing     Problem: DISCHARGE PLANNING  Goal: Discharge to home or other facility with appropriate resources  Description: INTERVENTIONS:  - Identify barriers to discharge w/patient and caregiver  - Arrange for needed discharge resources and transportation as appropriate  - Identify discharge learning needs (meds, wound care, etc.)  - Arrange for interpretive services to assist at discharge as needed  - Refer to Case Management Department for coordinating discharge planning if the patient needs post-hospital services based on physician/advanced practitioner order or complex needs related to functional status, cognitive ability, or social support system  Outcome: Progressing     Problem: Prexisting or High Potential for Compromised Skin Integrity  Goal: Skin integrity is maintained or improved  Description: INTERVENTIONS:  - Identify patients at risk for skin breakdown  - Assess and monitor skin integrity  - Assess and monitor nutrition and hydration status  - Monitor labs   - Assess for incontinence   - Turn and reposition patient  - Assist with mobility/ambulation  - Relieve pressure over bony prominences  - Avoid friction and shearing  - Provide appropriate hygiene as needed including keeping skin clean and dry  - Evaluate need for skin moisturizer/barrier cream  - Collaborate with interdisciplinary team   - Patient/family teaching  - Consider wound care consult   Outcome: Progressing     Problem: NEUROSENSORY - ADULT  Goal: Achieves stable or improved neurological status  Description: INTERVENTIONS  - Monitor and report changes in neurological status  - Monitor vital signs such as temperature, blood pressure, glucose, and any other labs ordered   - Initiate measures to prevent increased intracranial pressure  - Monitor for seizure activity and implement precautions if appropriate      Outcome: Progressing  Goal: Achieves maximal functionality and self care  Description: INTERVENTIONS  - Monitor swallowing and airway patency with patient fatigue and changes in neurological status  - Encourage and assist patient to increase activity and self care.    - Encourage visually impaired, hearing impaired and aphasic patients to use assistive/communication devices  Outcome: Progressing     Problem: PAIN - ADULT  Goal: Verbalizes/displays adequate comfort level or baseline comfort level  Description: Interventions:  - Encourage patient to monitor pain and request assistance  - Assess pain using appropriate pain scale  - Administer analgesics based on type and severity of pain and evaluate response  - Implement non-pharmacological measures as appropriate and evaluate response  - Consider cultural and social influences on pain and pain management  - Notify physician/advanced practitioner if interventions unsuccessful or patient reports new pain  Outcome: Progressing     Problem: INFECTION - ADULT  Goal: Absence or prevention of progression during hospitalization  Description: INTERVENTIONS:  - Assess and monitor for signs and symptoms of infection  - Monitor lab/diagnostic results  - Monitor all insertion sites, i.e. indwelling lines, tubes, and drains  - Monitor endotracheal if appropriate and nasal secretions for changes in amount and color  - [place] appropriate cooling/warming therapies per order  - Administer medications as ordered  - Instruct and encourage patient and family to use good hand hygiene technique  - Identify and instruct in appropriate isolation precautions for identified infection/condition  Outcome: Progressing     Problem: Knowledge Deficit  Goal: Patient/family/caregiver demonstrates understanding of disease process, treatment plan, medications, and discharge instructions  Description: Complete learning assessment and assess knowledge base. Interventions:  - Provide teaching at level of understanding  - Provide teaching via preferred learning methods  Outcome: Progressing     Problem: Communication Impairment  Goal: Ability to express needs and understand communication  Description: Assess patient's communication skills and ability to understand information. Patient will demonstrate use of effective communication techniques, alternative methods of communication and understanding even if not able to speak.      - Encourage communication and provide alternate methods of communication as needed. - Collaborate with case management/ for discharge needs. - Include patient/family/caregiver in decisions related to communication. Outcome: Progressing     Problem: Potential for Aspiration  Goal: Non-ventilated patient's risk of aspiration is minimized  Description: Assess and monitor vital signs, respiratory status, and labs (WBC). Monitor for signs of aspiration (tachypnea, cough, rales, wheezing, cyanosis, fever). - Assess and monitor patient's ability to swallow. - Place patient up in chair to eat if possible. - HOB up at 90 degrees to eat if unable to get patient up into chair.  - Supervise patient during oral intake. - Instruct patient/ family to take small bites. - Instruct patient/ family to take small single sips when taking liquids.   - Follow patient-specific strategies generated by speech pathologist.  Outcome: Progressing     Problem: METABOLIC, FLUID AND ELECTROLYTES - ADULT  Goal: Electrolytes maintained within normal limits  Description: INTERVENTIONS:  - Monitor labs and assess patient for signs and symptoms of electrolyte imbalances  - Administer electrolyte replacement as ordered  - Monitor response to electrolyte replacements, including repeat lab results as appropriate  - Instruct patient on fluid and nutrition as appropriate  Outcome: Progressing  Goal: Fluid balance maintained  Description: INTERVENTIONS:  - Monitor labs   - Monitor I/O and WT  - Instruct patient on fluid and nutrition as appropriate  - Assess for signs & symptoms of volume excess or deficit  Outcome: Progressing  Goal: Glucose maintained within target range  Description: INTERVENTIONS:  - Monitor Blood Glucose as ordered  - Assess for signs and symptoms of hyperglycemia and hypoglycemia  - Administer ordered medications to maintain glucose within target range  - Assess nutritional intake and initiate nutrition service referral as needed  Outcome: Progressing     Problem: HEMATOLOGIC - ADULT  Goal: Maintains hematologic stability  Description: INTERVENTIONS  - Assess for signs and symptoms of bleeding or hemorrhage  - Monitor labs  - Administer supportive blood products/factors as ordered and appropriate  Outcome: Progressing

## 2023-11-02 NOTE — PHYSICAL THERAPY NOTE
PHYSICAL THERAPY NOTE      Patient Name: Celia Guzman  LBNYV'M Date: 11/2/2023 11/02/23 1059   PT Last Visit   PT Visit Date 11/02/23   Note Type   Note Type Treatment   Pain Assessment   Pain Assessment Tool 0-10   Pain Score No Pain   Restrictions/Precautions   Weight Bearing Precautions Per Order No   Other Precautions Cognitive; Chair Alarm; Bed Alarm;Multiple lines; Fall Risk;Visual impairment  (L hemiparesis, L neglect)   General   Chart Reviewed Yes   Response to Previous Treatment Patient with no complaints from previous session. Family/Caregiver Present No   Cognition   Overall Cognitive Status Impaired   Arousal/Participation Arousable; Cooperative   Attention Attends with cues to redirect   Memory Decreased recall of precautions   Following Commands Follows one step commands with increased time or repetition   Comments Pt pleasant and cooperative t/o PT session. pt follows >75% of simple commands during functional mobility tasks   Bed Mobility   Supine to Sit 2  Maximal assistance   Additional items Assist x 2;HOB elevated; Increased time required;Verbal cues;LE management   Sit to Supine Unable to assess   Additional Comments Upon arrival, pt supine in bed. Pt left sitting OOB in chair with call bell and all needs within reach following PT session   Transfers   Sit to Stand 2  Maximal assistance   Additional items Assist x 2; Increased time required;Verbal cues  (BL knees blocked, LUE supported)   Stand to Sit 2  Maximal assistance   Additional items Assist x 2; Increased time required;Verbal cues   Stand pivot 2  Maximal assistance   Additional items Assist x 2; Increased time required;Verbal cues  (to drop arm chair)   Additional Comments Transfers w/ HHA   Ambulation/Elevation   Gait pattern Not appropriate   Balance   Static Sitting Poor   Dynamic Sitting Poor -   Static Standing Poor -   Endurance Deficit   Endurance Deficit Yes Endurance Deficit Description L hemiparesis, fatigue, deconditioning   Activity Tolerance   Activity Tolerance Patient limited by fatigue;Patient tolerated treatment well   Medical Staff Made Aware OT Jac Boudreaux, SLP Vicky   Nurse Made Aware RN cleared pt for PT session   Exercises   Quad Sets Sitting;10 reps;AROM; Left   Hip Flexion Sitting;10 reps;AAROM; Right   Ankle Pumps Sitting;10 reps;AROM; Bilateral;AAROM  (AROM on RLE; AAROM on LLE)   Neuro re-ed Pt completed x4 STS t/o PT session with Max Ax2 and BL HHA with BL knees blocked   Balance training  Sitting balance challenged EOB with Max-mod Ax1 posteriorly to prevent posterior and R lateral lean and aid in forward flexion to improve upright posture. Provided visual cues in mirror, verbal cues for scapular retraction and improved posture and tactile cues for muscle activation to increase upright posiitoning and improve core strength. Pt hands positioned on bed to improve weightbearing and muscle activation of LUE. Pt instructed in reaching tasks for food and brought to mouth for self-feeding tasks. Pt sat EOB x15 minutes   Assessment   Prognosis Fair   Problem List Decreased strength;Decreased range of motion;Decreased endurance; Impaired balance;Decreased mobility; Decreased coordination;Decreased cognition; Impaired judgement;Decreased safety awareness; Impaired vision; Impaired sensation; Impaired tone   Assessment Pt seen for PT treatment session this date. Therapy session focused on bed mobility, sitting balance and upright posture, and transfer training in order to improve overall mobility and independence. Pt requires max Ax2 for all functional mobility this date however demonstrated good upright posture during sitting balance tasks with correction of R lateral and posterior lean with cueing.  Pt demonstrated improved tolerance for functional mobility completing x4 STS and x1 SPT t/o PT session achieving 75% of full upright stand and increasing stand time to 10 seconds. Pt making good progress toward goals. Pt was left sitting in chair at the end of PT session with all needs in reach. Pt would benefit from continued PT services while in hospital to address remaining limitations. PT to continue treating pt and recommends post-acute rehab services. The patient's AM-Located within [facility] Basic Mobility Inpatient Short Form Raw Score is 6. A Raw score of less than or equal to 16 suggests the patient may benefit from discharge to post-acute rehabilitation services. Please also refer to the recommendation of the Physical Therapist for safe discharge planning. Barriers to Discharge Inaccessible home environment;Decreased caregiver support   Goals   Patient Goals to eat   STG Expiration Date 11/08/23   Plan   Treatment/Interventions Functional transfer training;LE strengthening/ROM; Therapeutic exercise; Endurance training;Cognitive reorientation;Patient/family training;Bed mobility; Equipment eval/education;Spoke to case management;Spoke to nursing;OT;ST   Progress Progressing toward goals   PT Frequency 3-5x/wk   Discharge Recommendation   Rehab Resource Intensity Level, PT I (Maximum Resource Intensity)   AM-PAC Basic Mobility Inpatient   Turning in Flat Bed Without Bedrails 1   Lying on Back to Sitting on Edge of Flat Bed Without Bedrails 1   Moving Bed to Chair 1   Standing Up From Chair Using Arms 1   Walk in Room 1   Climb 3-5 Stairs With Railing 1   Basic Mobility Inpatient Raw Score 6   Turning Head Towards Sound 3   Follow Simple Instructions 3   Low Function Basic Mobility Raw Score  12   Low Function Basic Mobility Standardized Score  18.33   Highest Level Of Mobility   JH-HLM Goal 2: Bed activities/Dependent transfer   JH-HLM Achieved 4: Move to chair/commode   Education   Education Provided Mobility training   Patient Demonstrates acceptance/verbal understanding   End of Consult   Patient Position at End of Consult Bedside chair;Bed/Chair alarm activated; All needs within reach Lisbeth Betancourt PT, DPT

## 2023-11-02 NOTE — ASSESSMENT & PLAN NOTE
Suspect aspiration vs mild fluid overload   On RA. No fever. Leukocytosis improving. Started  on tube feeds  CXR: Questionable left costophrenic angle opacification given patient positioning. Artifact versus pneumonia in this area cannot be entirely excluded. Mild right basilar linear atelectasis. Monitor off abx for now as leukocytosis improving and patient is afebrile.

## 2023-11-02 NOTE — OCCUPATIONAL THERAPY NOTE
Occupational Therapy Progress Note     Patient Name: Avril REYES Date: 11/2/2023  Problem List  Principal Problem:    CVA (cerebral vascular accident) [facility])  Active Problems:    Essential hypertension    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 chronic kidney disease, without long-term current use of insulin (HCC)    Chronic renal disease, stage IV ([address])    Renal cell carcinoma of right kidney ([address])    History of GI bleed    Cough and secretions           Occupational Therapy Treatment Note       11/02/23 1056   OT Last Visit   OT Visit Date 11/02/23   Note Type   Note Type Treatment   Pain Assessment   Pain Assessment Tool 0-10   Pain Score No Pain   Restrictions/Precautions   Weight Bearing Precautions Per Order No   Other Precautions Fall Risk;Cognitive; Bed Alarm;Telemetry;Multiple lines   Lifestyle   Autonomy I with ADL's/iaDL's, no AD with functional mobility +drives   Reciprocal Relationships supportive spouse home to assist as needed. Service to Others Retired    Intrinsic Gratification watching TV   ADL   Where Assessed Edge of bed   [place] 3  Moderate Assistance   Eating Deficit Bringing food to mouth assist;Setup;Supervision/safety; Increased time to complete   Toileting Assistance  2  Maximal Assistance   Toileting Deficit Perineal hygiene   Bed Mobility   Supine to Sit 2  Maximal assistance   Additional items Assist x 2   Transfers   Sit to Stand 2  Maximal assistance   Additional items Assist x 2   Stand to Sit 2  Maximal assistance   Additional items Assist x 2   Stand pivot 2  Maximal assistance   Additional items Assist x 2   Subjective   Subjective pt stated "it's because of all the work I did"   Cognition   Overall Cognitive Status Impaired   Arousal/Participation Arousable; Cooperative   Attention Attends with cues to redirect   Orientation Level Oriented to person;Oriented to place;Oriented to situation;Disoriented to time   Memory Decreased recall of precautions   Following Commands Follows one step commands with increased time or repetition   Activity Tolerance   Activity Tolerance Patient tolerated treatment well   Assessment   Assessment Pt seen for Occupational Therapy session with focus on activity tolerance, bed mob, functional transfers, sitting balance and tolerance for pt engagement in UB/LB self-care tasks. Pt cleared by GABBY/I[place] for pt participated in OT session. Pt presented  supine/HOB raised pt awake/alert and agreeable to participate in therapy following pt identifiers confirmed. Pt pleasant cooperative and reported his therapy goal to regain his independent. Pt reported that he use to be a . Pt required assist for bed mob, and unsupported sitting balance 2* decreased pt strength, balance and activity tolerance. He was able to tolerate sitting edge of pt bed for participation in eating. He continues to require total assist for toileting 2* pt decreased strength and pt L dexter-plegias  Pt will require post acute rehab service to continue to address these above noted pt deficit which currently impair pt ADL and functional mob.  Pt set up to bedside chair post session, chair alarm activated and all needs within pt reach   Plan   Treatment Interventions ADL retraining   Goal Expiration Date 11/08/23   OT Treatment Day 2   OT Frequency 3-5x/wk   Discharge Recommendation   Rehab Resource Intensity Level, OT I (Maximum Resource Intensity)   AM-PAC Daily Activity Inpatient   Lower Body Dressing 1   Bathing 2   Toileting 1   Upper Body Dressing 2   Grooming 2   Eating 2   Daily Activity Raw Score 10   Turning Head Towards Sound 3   Follow Simple Instructions 3   Low Function Daily Activity Raw Score 16   Low Function Daily Activity Standardized Score  27.65   AM-PAC Applied Cognition Inpatient   Following a Speech/Presentation 1   Understanding Ordinary Conversation 3   Taking Medications 2   Remembering Where Things Are Placed or Put Away 2 Remembering List of 4-5 Errands 1   Taking Care of Complicated Tasks 1   Applied Cognition Raw Score 10   Applied Cognition Standardized Score 24.98   Barthel Index   Feeding 5   Bathing 0   Grooming Score 0   Dressing Score 5   Bladder Score 0   Bowels Score 0   Toilet Use Score 0   Transfers (Bed/Chair) Score 0   Mobility (Level Surface) Score 0   Stairs Score 0   Barthel Index Score 10   Modified Drake Scale   Modified Drake Scale 4     Kavya MCFARLAND/L

## 2023-11-02 NOTE — ASSESSMENT & PLAN NOTE
Baseline creatinine between 1.9-2.4. Currently at baseline. Trend BMP. -Creatinine is slightly uptrending. Monitor for now  Hold nephrotoxic agents. Encourage p.o. intake.   If the creatinine still uptrending will add IV fluids tomorrow

## 2023-11-02 NOTE — SPEECH THERAPY NOTE
Speech Language/Pathology  Speech-Language Pathology Progress Note      Patient Name: Van Greene    JEMGO'U Date: 11/2/2023      Subjective:  Pt was awake and alert. He was sitting up in chair at bedside. Seen w/ OT/PT Patient with increased conversation, increased interaction and less overall dysarthria though it is still mild. .    Objective:  Pt was seen today for dysphagia therapy. Current diet is puree with honey thick liquids. Pt was on room air. Oral care had already been completed. Focus of today's session was to maximize PO intake safety, determine potential for diet texture advancement, and determine potential for liquid consistency advancement. Textures offered today included mech soft soft solid and nectar thick liquid via straw. Swallow function:   Bolus retrieval and control were successful and prompt. Mastication and AP transfer were mildly, reduced, and he was able to clear the solid w/ time and mult swallows. Able to draw from the straw for nt with immed swallows and no overt coughing for 4 oz . Pharyngeal swallow initiation was present. Hyolaryngeal excursion was reduced. No s/s aspiration occurred during session today. Uses mult swallows to clear. Assessment:  Swallow function is improving, improved retrieval and transfers and no overt s/s aspiration w/ trials today. Pt's NG dislodged during session, spoke w/ MD, pt is now eating, would not re insert and allow him to eat and calorie count. Offer supplements as able. Plan:  Change diet texture to dysphagia 2 mechanical soft and nectar thick liquids. Continue ST follow up. Subsequent sessions to focus on maximizing PO intake safety, determining potential for diet texture advancement, determining potential for liquid consistency advancement, and improving pt's self monitoring.

## 2023-11-02 NOTE — ASSESSMENT & PLAN NOTE
Lab Results   Component Value Date    HGBA1C 6.7 (H) 10/24/2023   Changed to 4 times daily dosing. Increase the dose of Lantus and mealtime insulin given hypoglycemia.   Changed to p.o. diet

## 2023-11-02 NOTE — PROGRESS NOTES
PHYSICAL MEDICINE AND REHABILITATION CONSULT NOTE  Shivani Ceron 70 y.o. male MRN: [id number]  Unit/Bed#: [facility] 701-01 Encounter: [id number]    Requested by (Physician/Service): Aleksandar Vasquez MD  Reason for Consultation:  Assessment of rehabilitation needs    Assessment:  Rehabilitation Diagnosis:   Right paramedian veronique infarct  Left hemiplegia   Dysarthria  Dysphagia with NG tube on tube feeds  Impaired mobility and self care  Impaired cognition     Recommendations:  Rehabilitation Plan:  Continue PT/OT (SLP) while on acute care. The patient will likely require sub-acute inpatient rehabilitation for slower paced rehabilitation course. Covid-19 Testing: [facility] inpatient rehabilitation units require testing within 48 hours of all potential admissions at this time. *Re-testing is NOT required for patients recovering from COVID-19 infection if isolation has been discontinued per CDC criteria. Medical Co-morbidities Plan:  Aspiration vs mild fluid overload   Hx of GI bleed  Hx of renal cell carcinoma of the right kidney s/p nephrectomy   Chronic renal disease stage IV  Diabetes type 2  Hypertension   Bladder: incontinent with condom cath in place   Bowel: Incontinent LBM 10/28  DVT ppx: Lovenox and SCD    Thank you for this consultation. Do not hesitate to contact service with further questions. KAYODE Reddy  PM&R    I have spent a total time of 15 minutes on 11/02/23 in caring for this patient including Patient and family education, Counseling / Coordination of care, Documenting in the medical record, Obtaining or reviewing history  , and Communicating with other healthcare professionals . Subjective/Interval history:  The patient continues with NG tube and family is deciding on PEG tube placement. He continues with dysarthria and left hemiplegia. He is very fatigued today and falls asleep easily on exam. He currently denies any pain, dizziness, numbness or tingling.      Review of Systems: 10 point ROS negative except for what is noted in HPI    Function:  Prior level of function and living situation: The patient lives in a one level home with about 6 or 7 HOLLY. He reports being independent. He used a cane after his last stroke but has not been needing to use it recently. He lives with his spouse. Current level of function:  Physical Therapy: Maximal assist x 2 for bed mobility and transfers   Occupational Therapy: Maximal to total assist for ADLs   Speech Therapy: NPO with NG tube in place       Physical Exam:  BP (!) 171/83 (BP Location: Right arm)   Pulse 89   Temp 99.5 °F (37.5 °C) (Oral)   Resp 20   Ht 6' (1.829 m)   Wt 103 kg (228 lb)   SpO2 94%   BMI 30.92 kg/m²        Intake/Output Summary (Last 24 hours) at 11/2/2023 1021  Last data filed at 11/2/2023 0901  Gross per 24 hour   Intake 1159 ml   Output 850 ml   Net 309 ml       Body mass index is 30.92 kg/m². Physical Exam  Constitutional:       General: He is not in acute distress. Appearance: He is not toxic-appearing. Comments: Fatigued    HENT:      Head: Atraumatic. Comments: Left facial droop      Right Ear: External ear normal.      Left Ear: External ear normal.      Nose:      Comments: NG tube   Cardiovascular:      Rate and Rhythm: Normal rate and regular rhythm. Heart sounds: Normal heart sounds. Pulmonary:      Effort: Pulmonary effort is normal. No respiratory distress. Abdominal:      General: There is no distension. Musculoskeletal:      Comments: Left hemiplegia    Skin:     General: Skin is warm and dry. Neurological:      Comments: Oriented to person and place    Psychiatric:         Cognition and Memory: Cognition is impaired. Memory is impaired.               Social History:    Social History     Socioeconomic History    Marital status: /Civil Union     Spouse name: Not on file    Number of children: Not on file    Years of education: Not on file    Highest education level: Not on file   Occupational History    Occupation: Retired 2015 -     Tobacco Use    Smoking status: Never    Smokeless tobacco: Never   Vaping Use    Vaping Use: Never used   Substance and Sexual Activity    Alcohol use: Never    Drug use: Never    Sexual activity: Not on file   Other Topics Concern    Not on file   Social History Narrative    Denies drug use - As per [place]    Consumes on average 1 cup of regular coffee per day      Social Determinants of Health     Financial Resource Strain: Low Risk  (11/15/2022)    Overall Financial Resource Strain (CARDIA)     Difficulty of Paying Living Expenses: Not very hard   Food Insecurity: No Food Insecurity (10/26/2023)    Hunger Vital Sign     Worried About Running Out of Food in the Last Year: Never true     [address] in the Last Year: Never true   Transportation Needs: No Transportation Needs (10/26/2023)    PRAPARE - Transportation     Lack of Transportation (Medical): No     Lack of Transportation (Non-Medical):  No   Physical Activity: Not on file   Stress: Not on file   Social Connections: Not on file   Intimate Partner Violence: Not on file   Housing Stability: Unknown (10/26/2023)    Housing Stability Vital Sign     Unable to Pay for Housing in the Last Year: No     Number of Places Lived in the Last Year: Not on file     Unstable Housing in the Last Year: No        Family History:    Family History   Problem Relation Age of Onset    Colon cancer Mother     Diabetes type II Mother     Heart disease Mother     Prostate cancer Father          Medications:     Current Facility-Administered Medications:     acetaminophen (TYLENOL) oral suspension 975 mg, 975 mg, Oral, Q8H, James Mcgrath MD, 975 mg at 11/02/23 0600    amLODIPine (NORVASC) tablet 10 mg, 10 mg, Oral, Daily, James Mcgrath MD, 10 mg at 11/02/23 5757    aspirin chewable tablet 81 mg, 81 mg, Per NG Tube, Daily, James Mcgrath MD, 81 mg at 11/02/23 0835    atorvastatin (LIPITOR) tablet 40 mg, 40 mg, Per NG Tube, QPM, Joshua Metzger MD, 40 mg at 11/01/23 1701    bisacodyl (DULCOLAX) rectal suppository 10 mg, 10 mg, Rectal, Daily PRN, Joshua Metzger MD    carvedilol (COREG) tablet 12.5 mg, 12.5 mg, Oral, BID With Meals, Joshua Metzger MD, 12.5 mg at 11/02/23 0835    enoxaparin (LOVENOX) subcutaneous injection 40 mg, 40 mg, Subcutaneous, Daily, Re Hollingsworth MD, 40 mg at 11/02/23 0831    insulin glargine (LANTUS) subcutaneous injection 14 Units 0.14 mL, 14 Units, Subcutaneous, HS, Kailyn Farrar MD, 14 Units at 11/01/23 2201    insulin lispro (HumaLOG) 100 units/mL subcutaneous injection 1-6 Units, 1-6 Units, Subcutaneous, Q6H [facility], 3 Units at 11/02/23 0624 **AND** Fingerstick Glucose (POCT), , , Q6H, Re Hollingsworth MD    insulin lispro (HumaLOG) 100 units/mL subcutaneous injection 2 Units, 2 Units, Subcutaneous, Q6H, Joshua Metzger MD, 2 Units at 11/02/23 0831    labetalol (NORMODYNE) injection 10 mg, 10 mg, Intravenous, Q6H PRN, Joshua Metzger MD, 10 mg at 10/27/23 1662    nystatin (MYCOSTATIN) powder, , Topical, BID, Kailyn Farrar MD, Given at 11/02/23 0836    ondansetron (ZOFRAN) injection 4 mg, 4 mg, Intravenous, Q6H PRN, Re Hollingsworth MD    pantoprazole (PROTONIX) injection 40 mg, 40 mg, Intravenous, Q12H [facility], Re Hollingsworth MD, 40 mg at 11/02/23 0831    polyethylene glycol (MIRALAX) packet 17 g, 17 g, Per NG Tube, Daily, Joshua Metzger MD, 17 g at 11/02/23 7120    senna-docusate sodium (SENOKOT S) 8.6-50 mg per tablet 1 tablet, 1 tablet, Per NG Tube, BID, Joshua Metzger MD, 1 tablet at 11/02/23 9418    ticagrelor (BRILINTA) tablet 90 mg, 90 mg, Per NG Tube, Q12H [facility], Joshua Metzger MD, 90 mg at 11/02/23 7184    Past Medical History:     Past Medical History:   Diagnosis Date    Hypertension     TIA (transient ischemic attack)         Past Surgical History:     Past Surgical History:   Procedure Laterality Date    CYSTECTOMY      Per patient cyst removal from his back    LASIK      DC LAPAROSCOPY RADICAL NEPHRECTOMY Right 7/6/2022    Procedure: NEPHRECTOMY RADICAL LAPAROSCOPIC W/ ROBOTICS, poss open;  Surgeon: Kayla Chin MD;  Location: [facility];  Service: Urology         Allergies: Allergies   Allergen Reactions    Plavix [Clopidogrel] Rash     Stopped two days ago because got a rash and doctors thought it was from this            LABORATORY RESULTS:      Lab Results   Component Value Date    HGB 12.9 11/02/2023    HCT 39.2 11/02/2023    WBC 9.99 11/02/2023     Lab Results   Component Value Date    BUN 47 (H) 11/02/2023    K 4.1 11/02/2023     11/02/2023    CREATININE 2.04 (H) 11/02/2023     Lab Results   Component Value Date    PROTIME 13.0 10/24/2023    INR 0.99 10/24/2023        DIAGNOSTIC STUDIES: Reviewed  XR chest portable    Result Date: 10/27/2023  Impression: Questionable left costophrenic angle opacification given patient positioning. Artifact versus pneumonia in this area cannot be entirely excluded. Mild right basilar linear atelectasis. Resident: Isela Mesa, the attending radiologist, have reviewed the images and agree with the final report above. Workstation performed: [id number]     XR abdomen 1 vw portable    Result Date: 10/25/2023  Impression: Enteric tube positioned within the distal stomach. . Workstation performed: [id number]     XR abdomen 1 vw portable    Result Date: 10/25/2023  Impression: Enteric tube coiled over the region of the mouth. This was subsequently repositioned. Workstation performed: [id number]     MRI brain wo contrast    Result Date: 10/25/2023  Impression: Acute to early subacute lacunar infarct in the right paramedian veronique. No mass effect or hemorrhagic conversion.  Workstation performed: [id number]     CTA stroke alert (head/neck)    Result Date: 10/24/2023  Impression: Occlusion of the left V3 and left V4 segments, similar to the prior exam. Severe near occlusive plaque stenosis proximal left cervical ICA with greater than 90% stenosis, worsened compared to the prior exam. 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 I personally communicated this study with   Dr. Esperanza Hampton   on 10/24/2023 8:29 PM. Workstation performed: [id number]     CT stroke alert brain    Result Date: 10/24/2023  Impression: No acute intracranial abnormality. Chronic microangiopathic changes.  I personally discussed this study with   Dr. Esperanza Hampton   on 10/24/2023 8:29 PM. Workstation performed: [id number]

## 2023-11-02 NOTE — PLAN OF CARE
Problem: OCCUPATIONAL THERAPY ADULT  Goal: Performs self-care activities at highest level of function for planned discharge setting. See evaluation for individualized goals. Description: Treatment Interventions: ADL retraining, Visual perceptual retraining, Functional transfer training, UE strengthening/ROM, Endurance training, Cognitive reorientation, Patient/family training, Equipment evaluation/education, Neuromuscular reeducation, Fine motor coordination activities, Compensatory technique education, Continued evaluation, Energy conservation, Activityengagement          See flowsheet documentation for full assessment, interventions and recommendations. Outcome: Progressing  Note: Limitation: Decreased ADL status, Decreased UE ROM, Decreased UE strength, Decreased Safe judgement during ADL, Decreased cognition, Decreased endurance, Decreased sensation, Visual deficit, Decreased fine motor control, Decreased self-care trans, Decreased high-level ADLs, Non-func L UE  Prognosis: Fair  Assessment: Pt seen for Occupational Therapy session with focus on activity tolerance, bed mob, functional transfers, sitting balance and tolerance for pt engagement in UB/LB self-care tasks. Pt cleared by GABBY/Ivett for pt participated in OT session. Pt presented  supine/HOB raised pt awake/alert and agreeable to participate in therapy following pt identifiers confirmed. Pt pleasant cooperative and reported his therapy goal to become independent. Pt reported that he use to be a . Pt required assist for bed mob, and unsupported sitting balance 2* decreased pt strength, balance and activity tolerance. He was able to tolerate sitting edge of pt bed for participation in eating. He continues to require total assist for toileting 2* pt decreased strength and pt L dexter-plegias  Pt will require post acute rehab service to continue to address these above noted pt deficit which currently impair pt ADL and functional mob.  Pt set up to bedside chair post session, chair alarm activated and all needs within pt reach  Recommendation: Physiatry Consult  Rehab Resource Intensity Level, OT: I (Maximum Resource Intensity)

## 2023-11-02 NOTE — ASSESSMENT & PLAN NOTE
Presented with left-sided weakness, thrombolytics were declined by patient and family. CT head: No acute intracranial abnormality. Chronic microangiopathic changes. 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 No mass effect or hemorrhagic conversion. Echo: EF 50% with grade 1 diastolic dysfunction  On dual antiplatelet with aspirin and Brilinta for 90 days followed by aspirin monotherapy. PT OT  PMR following.>. rehab on dc. Atorvastatin 40 mg daily. Vascular surgery evaluated, OP follow up for ICA stenosis. Patient required NG tube feeding. Had a repeat VPS. Currently patient is tolerating dysphagia diet.

## 2023-11-02 NOTE — QUICK NOTE
GASTROENTEROLOGY QUICK NOTE:    Patient's chart reviewed. Initially consulted for possible PEG tube placement. Discussed with wife who was hesitant in moving forward given need to hold Plavix x5 day prior to procedure. Fortunately, patient with improvement in oropharyngeal dysphagia. Discussed with SLIM. Will hold on further PEG tube discussions or GI intervention. Gastroenterology to sign off. Please call with any further questions or concerns. Thank you.      Abbie Lorenzana DO, PGY-4  [facility]

## 2023-11-02 NOTE — PROGRESS NOTES
[address] [facility]  Progress Note  Name: Aj Rose I  MRN: [id number]  Unit/Bed#: [facility]PHP 701-01 I Date of Admission: 10/24/2023   Date of Service: 11/2/2023 I Hospital Day: 9    Assessment/Plan   * CVA (cerebral vascular accident) [facility])  Assessment & Plan  Presented with left-sided weakness, thrombolytics were declined by patient and family. CT head: No acute intracranial abnormality. Chronic microangiopathic changes. 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 No mass effect or hemorrhagic conversion. Echo: EF 50% with grade 1 diastolic dysfunction  On dual antiplatelet with aspirin and Brilinta for 90 days followed by aspirin monotherapy. PT OT  PMR following.>. rehab on dc. Atorvastatin 40 mg daily. Vascular surgery evaluated, OP follow up for ICA stenosis. Patient required NG tube feeding. Had a repeat VPS. Currently patient is tolerating dysphagia diet. Cough and secretions  Assessment & Plan  Suspect aspiration vs mild fluid overload   On RA. No fever. Leukocytosis improving. Started  on tube feeds  CXR: Questionable left costophrenic angle opacification given patient positioning. Artifact versus pneumonia in this area cannot be entirely excluded. Mild right basilar linear atelectasis. Monitor off abx for now as leukocytosis improving and patient is afebrile. History of GI bleed  Assessment & Plan  Patient with history of coffee-ground material in the hospitalization last year; concerning for GI bleeding, as the patient is placed on aspirin with Brilinta; will place patient on pantoprazole prophylaxis.     Renal cell carcinoma of right kidney [facility])  Assessment & Plan  Status post right nephrectomy; follows up with urology. Chronic renal disease, stage IV (HCC)  Assessment & Plan  Baseline creatinine between 1.9-2.4. Currently at baseline. Trend BMP. -Creatinine is slightly uptrending. Monitor for now  Hold nephrotoxic agents. Encourage p.o. intake. If the creatinine still uptrending will add IV fluids tomorrow    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 chronic kidney disease, without long-term current use of insulin (HCC)  Assessment & Plan  Lab Results   Component Value Date    HGBA1C 6.7 (H) 10/24/2023   Changed to 4 times daily dosing. Increase the dose of Lantus and mealtime insulin given hypoglycemia. Changed to p.o. diet      Essential hypertension  Assessment & Plan  Was On permissive hypertension with stroke  Restarted home dose amlodipine, increased dose to 10 mg amlodipine daily  Added low dose coreg, increased dose  Prn labetalol               VTE Pharmacologic Prophylaxis:   Moderate Risk (Score 3-4) - Pharmacological DVT Prophylaxis Ordered: enoxaparin (Lovenox). Patient Centered Rounds: I performed bedside rounds with nursing staff today. Education and Discussions with Family / Patient: Updated  (wife) at bedside. Total Time Spent on Date of Encounter in care of [id number]  mins. This time was spent on one or more of the following: performing physical exam; counseling and coordination of care; obtaining or reviewing history; documenting in the medical record; reviewing/ordering tests, medications or procedures; communicating with other healthcare professionals and discussing with patient's family/caregivers. Current Length of Stay: 9 day(s)  Current Patient Status: Inpatient   Certification Statement: The patient will continue to require additional inpatient hospital stay due to pending rehab  Discharge Plan: Anticipate discharge in 24-48 hrs to rehab facility.     Code Status: Level 1 - Full Code    Subjective:   Patient seen and examined. Discussed with speech. Patient did very well with dysphagia diet. Speech 1 to upgrade the diet to level 2 with nectar thick. Patient is eating very well. Patient sitting up in chair. Speech appears to be improving from before    Objective:     Vitals:   Temp (24hrs), Av.7 °F (37.1 °C), Min:98.1 °F (36.7 °C), Max:99.5 °F (37.5 °C)    Temp:  [98.1 °F (36.7 °C)-99.5 °F (37.5 °C)] 98.5 °F (36.9 °C)  HR:  [70-89] 70  Resp:  [20] 20  BP: (130-171)/(69-83) 133/77  SpO2:  [94 %-98 %] 96 %  Body mass index is 30.92 kg/m². Input and Output Summary (last 24 hours): Intake/Output Summary (Last 24 hours) at 2023 1535  Last data filed at 2023 1300  Gross per 24 hour   Intake 1259 ml   Output 850 ml   Net 409 ml       Physical Exam:   Physical Exam  Constitutional:       General: He is not in acute distress. Appearance: Normal appearance. HENT:      Head: Normocephalic. Nose: Nose normal. No congestion. Eyes:      Conjunctiva/sclera: Conjunctivae normal.   Cardiovascular:      Rate and Rhythm: Regular rhythm. Pulses: Normal pulses. Pulmonary:      Comments: Decreased breath sounds bilateral  Abdominal:      General: Abdomen is flat. Musculoskeletal:         General: Normal range of motion. Skin:     General: Skin is warm. Neurological:      Mental Status: He is alert. Comments: Left upper and lower extremity weakness noted. Left sided facial droop.   Dysarthria present but significantly better         Additional Data:     Labs:  Results from last 7 days   Lab Units 23  0512 10/29/23  0534 10/27/23  0511   WBC Thousand/uL 9.99 9.20 10.58*   HEMOGLOBIN g/dL 12.9 13.5 13.5   HEMATOCRIT % 39.2 38.6 40.6   PLATELETS Thousands/uL 221 168 157   BANDS PCT %  --   --  1   NEUTROS PCT %  --  68  --    LYMPHS PCT %  --  14  --    LYMPHO PCT %  --   --  9*   MONOS PCT %  --  13*  --    MONO PCT %  --   --  9 EOS PCT %  --  5 3     Results from last 7 days   Lab Units 11/02/23  0512   SODIUM mmol/L 138   POTASSIUM mmol/L 4.1   CHLORIDE mmol/L 103   CO2 mmol/L 29   BUN mg/dL 47*   CREATININE mg/dL 2.04*   ANION GAP mmol/L 6   CALCIUM mg/dL 8.6   GLUCOSE RANDOM mg/dL 219*         Results from last 7 days   Lab Units 11/02/23  1354 11/02/23  1131 11/02/23  0623 11/02/23  0017 11/01/23  2155 11/01/23  1704 11/01/23  1413 11/01/23  1039 11/01/23  0855 11/01/23  0605 11/01/23  0253 11/01/23  0059   POC GLUCOSE mg/dl 284* 277* 242* 269* 194* 198* 235* 256* 278* 221* 157* 163*         Results from last 7 days   Lab Units 10/29/23  0534   PROCALCITONIN ng/ml 0.13       Lines/Drains:  Invasive Devices       Peripheral Intravenous Line  Duration             Peripheral IV 10/30/23 Distal;Left;Ventral (anterior) Forearm 3 days              Drain  Duration             External Urinary Catheter Medium 4 days    NG/OG/Enteral Tube Nasogastric 12 Fr Left nare 1 day                          Imaging: No pertinent imaging reviewed.     Recent Cultures (last 7 days):         Last 24 Hours Medication List:   Current Facility-Administered Medications   Medication Dose Route Frequency Provider Last Rate    acetaminophen  975 mg Oral Q8H Bryce Chavez MD      amLODIPine  10 mg Oral Daily Bryce Chavez MD      aspirin  81 mg Per NG Tube Daily Bryce Chavez MD      atorvastatin  40 mg Per NG Tube QPM Bryce Chavez MD      bisacodyl  10 mg Rectal Daily PRN Bryce Chavez MD      carvedilol  12.5 mg Oral BID With Meals Bryce Chavez MD      enoxaparin  40 mg Subcutaneous Daily Nemo Chavarria MD      insulin glargine  16 Units Subcutaneous HS Kacy Roldan MD      insulin lispro  1-6 Units Subcutaneous TID With Meals Kacy Roldan MD      [START ON 11/3/2023] insulin lispro  3 Units Subcutaneous TID With Meals Kacy Roldan MD      labetalol  10 mg Intravenous Q6H PRN Bryce Chavez MD      nystatin   Topical BID Kacy Roldan MD ondansetron  4 mg Intravenous Q6H PRN Hector Sotelo MD      [START ON 11/3/2023] pantoprazole  40 mg Oral Early Morning Pam Moya MD      polyethylene glycol  17 g Per NG Tube Daily James Mcgrath MD      senna-docusate sodium  1 tablet Per NG Tube BID James Mcgrath MD      ticagrelor  90 mg Per NG Tube Q12H [address] James Mcgrath MD          Today, Patient Was Seen By: Pam Moya MD    **Please Note: This note may have been constructed using a voice recognition system. **

## 2023-11-02 NOTE — PLAN OF CARE
Problem: PHYSICAL THERAPY ADULT  Goal: Performs mobility at highest level of function for planned discharge setting. See evaluation for individualized goals. Description: Treatment/Interventions: LE strengthening/ROM, Therapeutic exercise, Endurance training, Cognitive reorientation, Patient/family training, Equipment eval/education, Bed mobility, Spoke to nursing, Spoke to case management, ST, OT, Family, Continued evaluation, Compensatory technique education, Spoke to advanced practitioner  Equipment Recommended:  (TBD)       See flowsheet documentation for full assessment, interventions and recommendations. Outcome: Progressing  Note: Prognosis: Fair  Problem List: Decreased strength, Decreased range of motion, Decreased endurance, Impaired balance, Decreased mobility, Decreased coordination, Decreased cognition, Impaired judgement, Decreased safety awareness, Impaired vision, Impaired sensation, Impaired tone  Assessment: Pt seen for PT treatment session this date. Therapy session focused on bed mobility, sitting balance and upright posture, and transfer training in order to improve overall mobility and independence. Pt requires max Ax2 for all functional mobility this date however demonstrated good upright posture during sitting balance tasks with correction of R lateral and posterior lean with cueing. Pt demonstrated improved tolerance for functional mobility completing x4 STS and x1 SPT t/o PT session achieving 75% of full upright stand and increasing stand time to 10 seconds. Pt making good progress toward goals. Pt was left sitting in chair at the end of PT session with all needs in reach. Pt would benefit from continued PT services while in hospital to address remaining limitations. PT to continue treating pt and recommends post-acute rehab services. The patient's AM-PAC Basic Mobility Inpatient Short Form Raw Score is 6.  A Raw score of less than or equal to 16 suggests the patient may benefit from discharge to post-acute rehabilitation services. Please also refer to the recommendation of the Physical Therapist for safe discharge planning. Barriers to Discharge: Inaccessible home environment, Decreased caregiver support     Rehab Resource Intensity Level, PT: I (Maximum Resource Intensity)    See flowsheet documentation for full assessment.

## 2023-11-03 LAB
ANION GAP SERPL CALCULATED.3IONS-SCNC: 8 MMOL/L
BUN SERPL-MCNC: 44 MG/DL (ref 5–25)
CALCIUM SERPL-MCNC: 9 MG/DL (ref 8.4–10.2)
CHLORIDE SERPL-SCNC: 104 MMOL/L (ref 96–108)
CO2 SERPL-SCNC: 28 MMOL/L (ref 21–32)
CREAT SERPL-MCNC: 2.08 MG/DL (ref 0.6–1.3)
ERYTHROCYTE [DISTWIDTH] IN BLOOD BY AUTOMATED COUNT: 12.3 % (ref 11.6–15.1)
GFR SERPL CREATININE-BSD FRML MDRD: 30 ML/MIN/1.73SQ M
GLUCOSE SERPL-MCNC: 152 MG/DL (ref 65–140)
GLUCOSE SERPL-MCNC: 169 MG/DL (ref 65–140)
GLUCOSE SERPL-MCNC: 282 MG/DL (ref 65–140)
GLUCOSE SERPL-MCNC: 294 MG/DL (ref 65–140)
GLUCOSE SERPL-MCNC: 342 MG/DL (ref 65–140)
GLUCOSE SERPL-MCNC: 441 MG/DL (ref 65–140)
HCT VFR BLD AUTO: 39.8 % (ref 36.5–49.3)
HGB BLD-MCNC: 13.4 G/DL (ref 12–17)
MCH RBC QN AUTO: 31.2 PG (ref 26.8–34.3)
MCHC RBC AUTO-ENTMCNC: 33.7 G/DL (ref 31.4–37.4)
MCV RBC AUTO: 93 FL (ref 82–98)
PLATELET # BLD AUTO: 229 THOUSANDS/UL (ref 149–390)
PMV BLD AUTO: 11.3 FL (ref 8.9–12.7)
POTASSIUM SERPL-SCNC: 4.4 MMOL/L (ref 3.5–5.3)
RBC # BLD AUTO: 4.29 MILLION/UL (ref 3.88–5.62)
SODIUM SERPL-SCNC: 140 MMOL/L (ref 135–147)
WBC # BLD AUTO: 10.48 THOUSAND/UL (ref 4.31–10.16)

## 2023-11-03 PROCEDURE — 85027 COMPLETE CBC AUTOMATED: CPT | Performed by: FAMILY MEDICINE

## 2023-11-03 PROCEDURE — 80048 BASIC METABOLIC PNL TOTAL CA: CPT | Performed by: FAMILY MEDICINE

## 2023-11-03 PROCEDURE — 82948 REAGENT STRIP/BLOOD GLUCOSE: CPT

## 2023-11-03 PROCEDURE — 92526 ORAL FUNCTION THERAPY: CPT

## 2023-11-03 PROCEDURE — 99232 SBSQ HOSP IP/OBS MODERATE 35: CPT | Performed by: FAMILY MEDICINE

## 2023-11-03 PROCEDURE — 97112 NEUROMUSCULAR REEDUCATION: CPT

## 2023-11-03 RX ORDER — INSULIN GLARGINE 100 [IU]/ML
20 INJECTION, SOLUTION SUBCUTANEOUS
Status: DISCONTINUED | OUTPATIENT
Start: 2023-11-03 | End: 2023-11-04 | Stop reason: HOSPADM

## 2023-11-03 RX ORDER — INSULIN LISPRO 100 [IU]/ML
1-6 INJECTION, SOLUTION INTRAVENOUS; SUBCUTANEOUS
Status: DISCONTINUED | OUTPATIENT
Start: 2023-11-03 | End: 2023-11-04 | Stop reason: HOSPADM

## 2023-11-03 RX ORDER — INSULIN LISPRO 100 [IU]/ML
1-5 INJECTION, SOLUTION INTRAVENOUS; SUBCUTANEOUS
Status: DISCONTINUED | OUTPATIENT
Start: 2023-11-03 | End: 2023-11-04 | Stop reason: HOSPADM

## 2023-11-03 RX ORDER — INSULIN LISPRO 100 [IU]/ML
5 INJECTION, SOLUTION INTRAVENOUS; SUBCUTANEOUS
Status: DISCONTINUED | OUTPATIENT
Start: 2023-11-03 | End: 2023-11-04 | Stop reason: HOSPADM

## 2023-11-03 RX ADMIN — ACETAMINOPHEN 975 MG: 650 SUSPENSION ORAL at 22:01

## 2023-11-03 RX ADMIN — INSULIN LISPRO 3 UNITS: 100 INJECTION, SOLUTION INTRAVENOUS; SUBCUTANEOUS at 09:10

## 2023-11-03 RX ADMIN — CARVEDILOL 12.5 MG: 12.5 TABLET, FILM COATED ORAL at 17:21

## 2023-11-03 RX ADMIN — CARVEDILOL 12.5 MG: 12.5 TABLET, FILM COATED ORAL at 09:03

## 2023-11-03 RX ADMIN — INSULIN GLARGINE 20 UNITS: 100 INJECTION, SOLUTION SUBCUTANEOUS at 21:29

## 2023-11-03 RX ADMIN — INSULIN LISPRO 3 UNITS: 100 INJECTION, SOLUTION INTRAVENOUS; SUBCUTANEOUS at 11:02

## 2023-11-03 RX ADMIN — INSULIN LISPRO 3 UNITS: 100 INJECTION, SOLUTION INTRAVENOUS; SUBCUTANEOUS at 21:29

## 2023-11-03 RX ADMIN — POLYETHYLENE GLYCOL 3350 17 G: 17 POWDER, FOR SOLUTION ORAL at 09:03

## 2023-11-03 RX ADMIN — ATORVASTATIN CALCIUM 40 MG: 40 TABLET, FILM COATED ORAL at 17:21

## 2023-11-03 RX ADMIN — INSULIN LISPRO 4 UNITS: 100 INJECTION, SOLUTION INTRAVENOUS; SUBCUTANEOUS at 17:21

## 2023-11-03 RX ADMIN — ASPIRIN 81 MG CHEWABLE TABLET 81 MG: 81 TABLET CHEWABLE at 09:03

## 2023-11-03 RX ADMIN — SENNOSIDES, DOCUSATE SODIUM 1 TABLET: 8.6; 5 TABLET ORAL at 17:21

## 2023-11-03 RX ADMIN — INSULIN LISPRO 1 UNITS: 100 INJECTION, SOLUTION INTRAVENOUS; SUBCUTANEOUS at 09:11

## 2023-11-03 RX ADMIN — TICAGRELOR 90 MG: 90 TABLET ORAL at 21:29

## 2023-11-03 RX ADMIN — PANTOPRAZOLE SODIUM 40 MG: 40 TABLET, DELAYED RELEASE ORAL at 06:16

## 2023-11-03 RX ADMIN — INSULIN LISPRO 5 UNITS: 100 INJECTION, SOLUTION INTRAVENOUS; SUBCUTANEOUS at 17:21

## 2023-11-03 RX ADMIN — SENNOSIDES, DOCUSATE SODIUM 1 TABLET: 8.6; 5 TABLET ORAL at 09:03

## 2023-11-03 RX ADMIN — AMLODIPINE BESYLATE 10 MG: 10 TABLET ORAL at 09:03

## 2023-11-03 RX ADMIN — ENOXAPARIN SODIUM 40 MG: 40 INJECTION SUBCUTANEOUS at 09:03

## 2023-11-03 RX ADMIN — ACETAMINOPHEN 975 MG: 650 SUSPENSION ORAL at 06:16

## 2023-11-03 RX ADMIN — INSULIN LISPRO 5 UNITS: 100 INJECTION, SOLUTION INTRAVENOUS; SUBCUTANEOUS at 11:03

## 2023-11-03 RX ADMIN — NYSTATIN: 100000 POWDER TOPICAL at 09:16

## 2023-11-03 RX ADMIN — TICAGRELOR 90 MG: 90 TABLET ORAL at 09:16

## 2023-11-03 RX ADMIN — NYSTATIN: 100000 POWDER TOPICAL at 17:24

## 2023-11-03 NOTE — ASSESSMENT & PLAN NOTE
Lab Results   Component Value Date    HGBA1C 6.7 (H) 10/24/2023   Remains elevated. Adjusting the dose of Lantus. Likely due to the fact that the patient started eating with improved p.o. intake adjust the dose of insulin and monitor.

## 2023-11-03 NOTE — PROGRESS NOTES
[address] [facility]  Progress Note  Name: Felix Rivera I  MRN: [id number]  Unit/Bed#: [facility]PHP 701-01 I Date of Admission: 10/24/2023   Date of Service: 11/3/2023 I Hospital Day: 10    Assessment/Plan   * CVA (cerebral vascular accident) [facility])  Assessment & Plan  Presented with left-sided weakness, thrombolytics were declined by patient and family. CT head: No acute intracranial abnormality. Chronic microangiopathic changes. 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 No mass effect or hemorrhagic conversion. Echo: EF 50% with grade 1 diastolic dysfunction  On dual antiplatelet with aspirin and Brilinta for 90 days followed by aspirin monotherapy. PT OT  PMR following.>. rehab on dc. Atorvastatin 40 mg daily. Vascular surgery evaluated, OP follow up for ICA stenosis. Patient required NG tube feeding. Had a repeat VPS. Currently patient is tolerating dysphagia diet. Cough and secretions  Assessment & Plan  Suspect aspiration vs mild fluid overload   On RA. No fever. Leukocytosis improving. Started  on tube feeds  CXR: Questionable left costophrenic angle opacification given patient positioning. Artifact versus pneumonia in this area cannot be entirely excluded. Mild right basilar linear atelectasis. Monitor off abx for now as leukocytosis improving and patient is afebrile.     History of GI bleed  Assessment & Plan  Patient with history of coffee-ground material in the hospitalization last year; concerning for GI bleeding, as the patient is placed on aspirin with Brilinta; will place patient on pantoprazole prophylaxis. Renal cell carcinoma of right kidney [facility])  Assessment & Plan  Status post right nephrectomy; follows up with urology. Chronic renal disease, stage IV (HCC)  Assessment & Plan  Baseline creatinine between 1.9-2.4. Currently at baseline. Trend BMP. -Creatinine is slightly uptrending. Monitor for now  Hold nephrotoxic agents. Encourage p.o. intake. 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 chronic kidney disease, without long-term current use of insulin [facility])  Assessment & Plan  Lab Results   Component Value Date    HGBA1C 6.7 (H) 10/24/2023   Remains elevated. Adjusting the dose of Lantus. Likely due to the fact that the patient started eating with improved p.o. intake adjust the dose of insulin and monitor. Essential hypertension  Assessment & Plan  Was On permissive hypertension with stroke  Restarted home dose amlodipine, increased dose to 10 mg amlodipine daily  Added low dose coreg, increased dose  Prn labetalol               VTE Pharmacologic Prophylaxis:   Moderate Risk (Score 3-4) - Pharmacological DVT Prophylaxis Ordered: enoxaparin (Lovenox). Patient Centered Rounds: I performed bedside rounds with nursing staff today. Discussions with Specialists or Other Care Team Provider:     Education and Discussions with Family / Patient: Updated  (wife) via phone. Total Time Spent on Date of Encounter in care of patient: 45  mins. This time was spent on one or more of the following: performing physical exam; counseling and coordination of care; obtaining or reviewing history; documenting in the medical record; reviewing/ordering tests, medications or procedures; communicating with other healthcare professionals and discussing with patient's family/caregivers.     Current Length of Stay: 10 day(s)  Current Patient Status: Inpatient   Certification Statement: The patient will continue to require additional inpatient hospital stay due to ending rehab   Discharge Plan: Anticipate discharge tomorrow to rehab facility. Code Status: Level 1 - Full Code    Subjective:   Patient seen and examined. No events overnight. Discussed with nursing. Adjusting the dose of insulin monitor blood sugar    Objective:     Vitals:   Temp (24hrs), Av.5 °F (36.9 °C), Min:97.5 °F (36.4 °C), Max:99.1 °F (37.3 °C)    Temp:  [97.5 °F (36.4 °C)-99.1 °F (37.3 °C)] 97.5 °F (36.4 °C)  HR:  [69-78] 69  Resp:  [16] 16  BP: (131-153)/(64-82) 131/64  SpO2:  [94 %-98 %] 97 %  Body mass index is 30.92 kg/m². Input and Output Summary (last 24 hours): Intake/Output Summary (Last 24 hours) at 11/3/2023 1628  Last data filed at 11/3/2023 0549  Gross per 24 hour   Intake 240 ml   Output 650 ml   Net -410 ml       Physical Exam:   Physical Exam  Constitutional:       General: He is not in acute distress. HENT:      Head: Normocephalic and atraumatic. Nose: Nose normal.   Eyes:      General: No scleral icterus. Cardiovascular:      Rate and Rhythm: Normal rate and regular rhythm. Pulses: Normal pulses. Heart sounds: Normal heart sounds. Pulmonary:      Effort: Pulmonary effort is normal. No respiratory distress. Breath sounds: Normal breath sounds. Abdominal:      General: Bowel sounds are normal. There is no distension. Musculoskeletal:         General: No swelling. Normal range of motion. Skin:     General: Skin is warm. Coloration: Skin is not jaundiced. Neurological:      Mental Status: He is alert. Cranial Nerves: No cranial nerve deficit. Comments: Left upper and lower extremity weakness . left facial droop and dysarthria.   Dysarthria is significantly better         Additional Data:     Labs:  Results from last 7 days   Lab Units 23  0544 23  0512 10/29/23  0534   WBC Thousand/uL 10.48*   < > 9.20   HEMOGLOBIN g/dL 13.4   < > 13.5   HEMATOCRIT % 39.8   < > 38.6   PLATELETS Thousands/uL 229   < > 168   NEUTROS PCT %  --   --  68   LYMPHS PCT % --   --  14   MONOS PCT %  --   --  13*   EOS PCT %  --   --  5    < > = values in this interval not displayed.      Results from last 7 days   Lab Units 11/03/23  0544   SODIUM mmol/L 140   POTASSIUM mmol/L 4.4   CHLORIDE mmol/L 104   CO2 mmol/L 28   BUN mg/dL 44*   CREATININE mg/dL 2.08*   ANION GAP mmol/L 8   CALCIUM mg/dL 9.0   GLUCOSE RANDOM mg/dL 152*         Results from last 7 days   Lab Units 11/03/23  1529 11/03/23  1102 11/03/23  1024 11/03/23  0615 11/02/23  2058 11/02/23  1548 11/02/23  1354 11/02/23  1131 11/02/23  0623 11/02/23  0017 11/01/23  2155 11/01/23  1704   POC GLUCOSE mg/dl 282* 342* 441* 169* 273* 265* 284* 277* 242* 269* 194* 198*         Results from last 7 days   Lab Units 10/29/23  0534   PROCALCITONIN ng/ml 0.13       Lines/Drains:  Invasive Devices       Peripheral Intravenous Line  Duration             Peripheral IV 11/03/23 Distal;Right;Upper;Ventral (anterior) Arm <1 day              Drain  Duration             External Urinary Catheter Medium 5 days                          Imaging: No new images to review    Recent Cultures (last 7 days):         Last 24 Hours Medication List:   Current Facility-Administered Medications   Medication Dose Route Frequency Provider Last Rate    acetaminophen  975 mg Oral Q8H Michel Avilez MD      amLODIPine  10 mg Oral Daily Michel Avilez MD      aspirin  81 mg Per NG Tube Daily Michel Avilez MD      atorvastatin  40 mg Per NG Tube QPM Michel Avilez MD      bisacodyl  10 mg Rectal Daily PRN Michel Avilez MD      carvedilol  12.5 mg Oral BID With Meals Michel Avilez MD      enoxaparin  40 mg Subcutaneous Daily Cande Dawson MD      insulin glargine  20 Units Subcutaneous HS Georges Cornelius MD      insulin lispro  1-5 Units Subcutaneous HS Georges Cornelius MD      insulin lispro  1-6 Units Subcutaneous TID [facility] Georges Cornelius MD      insulin lispro  5 Units Subcutaneous TID With Meals Georges Cornelius MD      labetalol  10 mg Intravenous Q6H PRN James Mcgrath MD      nystatin   Topical BID Pam Moya MD      ondansetron  4 mg Intravenous Q6H PRN Hector Sotelo MD      pantoprazole  40 mg Oral Early Morning Pam Moya MD      polyethylene glycol  17 g Per NG Tube Daily James Mcgrath MD      senna-docusate sodium  1 tablet Per NG Tube BID James Mcgrath MD      ticagrelor  90 mg Per NG Tube Q12H [address] James Mcgrath MD          Today, Patient Was Seen By: Pam Moya MD    **Please Note: This note may have been constructed using a voice recognition system. **

## 2023-11-03 NOTE — PHYSICAL THERAPY NOTE
PHYSICAL THERAPY NOTE          Patient Name: Berta Chakraborty  AKCTO'EMANI Date: 11/3/2023       11/03/23 0953   PT Last Visit   PT Visit Date 11/03/23   Note Type   Note Type Treatment   Pain Assessment   Pain Assessment Tool 0-10   Pain Score No Pain   Restrictions/Precautions   Weight Bearing Precautions Per Order No   Braces or Orthoses   (sling for tranfers due to LUE hemiparesis)   Other Precautions Cognitive; Chair Alarm; Bed Alarm;Multiple lines;Telemetry; Fall Risk  (L hemiparesis)   General   Chart Reviewed Yes   Response to Previous Treatment Patient with no complaints from previous session. Family/Caregiver Present No   Cognition   Overall Cognitive Status Impaired   Arousal/Participation Cooperative   Attention Attends with cues to redirect   Orientation Level Oriented X4   Following Commands Follows one step commands with increased time or repetition   Comments very pleasant to work with   Subjective   Subjective agreeable   Bed Mobility   Supine to Sit 2  Maximal assistance   Additional items Assist x 2;HOB elevated; Increased time required;LE management   Sit to Supine 2  Maximal assistance   Additional items Assist x 2; Increased time required;Verbal cues;LE management   Transfers   Sit to Stand 2  Maximal assistance   Additional items Assist x 2; Increased time required;Verbal cues   Stand to Sit 2  Maximal assistance   Additional items Assist x 2; Increased time required;Verbal cues   Stand pivot 2  Maximal assistance   Additional items Assist x 2; Increased time required;Verbal cues   Additional Comments (S)  R HHA and LUE supported with sling; b/l Knee block. x3 STS Throughout standing for 20-60 sec each.  L leg flexion pattern off of floor with SPT   Ambulation/Elevation   Gait pattern Not appropriate   Balance   Static Sitting Poor +   Dynamic Sitting Poor   Static Standing Poor -   Dynamic Standing Poor -   Endurance Deficit   Endurance Deficit Yes   Endurance Deficit Description L hemiparesis, fatigue   Activity Tolerance   Activity Tolerance Patient limited by fatigue   Medical Staff Made Aware librado Latif tech   Nurse Made Aware yes-cleared   Exercises   Knee AROM Long Arc Quad Sitting;5 reps;PROM; Left   Neuro re-ed sitting EOB, anterior WS 3x5 reps with minAx1, reaching with RUE for anterior object and then performing R lateral WS to reach outside MAGALY and place object down x4 reps. Balance training  static/dynamic sitting EOB x20 min throughout   Assessment   Prognosis Fair   Problem List Decreased strength;Decreased endurance; Impaired balance;Decreased mobility; Decreased coordination;Decreased cognition; Impaired judgement;Decreased safety awareness; Impaired vision; Impaired sensation;Obesity   Assessment Pt agreeable to participate in PT session. Pt performed functional mobility and therex as outlined above. Pt with posterior lean, poor righting reactions and poor weight shifting ability. Session consisted of anterior WS to assist with STS tranfers as well as R lateral WS to strengthen trunk and work towards dynamic WS to assist with future slide board transfers. VC for upright head and neck posture with sitting and standing. VC for gaze and head movement with reaching activities. Encouraged mental imagery and as much movement of L side as possible to promote neuroplasticity. Pt left seated in chair with soft care cushion and chair alarm donned, call bell, phone, and all personal needs within reach. Pt will continue to benefit from skilled acute care PT to further address their functional mobility limitations. Based on pt presentation and PT clinical judgement, believe pt would best benefit from rehab.    Barriers to Discharge Inaccessible home environment;Decreased caregiver support   Goals   Patient Goals to sit in chair   STG Expiration Date 11/08/23   PT Treatment Day 3   Plan   Treatment/Interventions Functional transfer training;LE strengthening/ROM; Endurance training; Therapeutic exercise;Cognitive reorientation;Patient/family training;Equipment eval/education; Bed mobility;Spoke to nursing;Spoke to case management;OT   Progress Progressing toward goals   PT Frequency 3-5x/wk   Discharge Recommendation   Rehab Resource Intensity Level, PT I (Maximum Resource Intensity)   AM-PAC Basic Mobility Inpatient   Turning in Flat Bed Without Bedrails 1   Lying on Back to Sitting on Edge of Flat Bed Without Bedrails 1   Moving Bed to Chair 1   Standing Up From Chair Using Arms 1   Walk in Room 1   Climb 3-5 Stairs With Railing 1   Basic Mobility Inpatient Raw Score 6   Turning Head Towards Sound 3   Follow Simple Instructions 3   Low Function Basic Mobility Raw Score  12   Low Function Basic Mobility Standardized Score  18.33   Highest Level Of Mobility   JH-HLM Goal 2: Bed activities/Dependent transfer   JH-HLM Achieved 5: Stand (1 or more minutes)   Dominic Zarate, PT, DPT

## 2023-11-03 NOTE — RESTORATIVE TECHNICIAN NOTE
Restorative Technician Note      Patient Name: Lesly Son     Note Type: Mobility  Patient Position Upon Consult: Supine  Activity Performed: Transferred; Dangled; Stood  Assistive Device: Other (Comment) (Assist x2 to sit EOB/stand x4 and then stand-pivot OOB to the chair. Assisted PT Clarissa Sher.)  Education Provided: Yes  Patient Position at End of Consult: Bedside chair;  All needs within reach; Bed/Chair alarm activated      Simone CAAL, Restorative Technician, [employer]

## 2023-11-03 NOTE — ASSESSMENT & PLAN NOTE
Baseline creatinine between 1.9-2.4. Currently at baseline. Trend BMP. -Creatinine is slightly uptrending. Monitor for now  Hold nephrotoxic agents. Encourage p.o. intake.

## 2023-11-03 NOTE — PLAN OF CARE
Problem: Potential for Falls  Goal: Patient will remain free of falls  Description: INTERVENTIONS:  - Educate patient/family on patient safety including physical limitations  - Instruct patient to call for assistance with activity   - Consult OT/PT to assist with strengthening/mobility   - Keep Call bell within reach  - Keep bed low and locked with side rails adjusted as appropriate  - Keep care items and personal belongings within reach  - Initiate and maintain comfort rounds  - Make Fall Risk Sign visible to staff  - Offer Toileting every 2 Hours, in advance of need  - Initiate/Maintain bed alarm  - Obtain necessary fall risk management equipment: non skid footwear  - Apply yellow socks and bracelet for high fall risk patients  - Consider moving patient to room near nurses station  Outcome: Progressing     Problem: MOBILITY - ADULT  Goal: Maintain or return to baseline ADL function  Description: INTERVENTIONS:  -  Assess patient's ability to carry out ADLs; assess patient's baseline for ADL function and identify physical deficits which impact ability to perform ADLs (bathing, care of mouth/teeth, toileting, grooming, dressing, etc.)  - Assess/evaluate cause of self-care deficits   - Assess range of motion  - Assess patient's mobility; develop plan if impaired  - Assess patient's need for assistive devices and provide as appropriate  - Encourage maximum independence but intervene and supervise when necessary  - Involve family in performance of ADLs  - Assess for home care needs following discharge   - Consider OT consult to assist with ADL evaluation and planning for discharge  - Provide patient education as appropriate  Outcome: Progressing  Goal: Maintains/Returns to pre admission functional level  Description: INTERVENTIONS:  - Perform BMAT or MOVE assessment daily.   - Set and communicate daily mobility goal to care team and patient/family/caregiver.    - Collaborate with rehabilitation services on mobility goals if consulted  - Perform Range of Motion 3 times a day. - Reposition patient every 2 hours. - Record patient progress and toleration of activity level   Outcome: Progressing     Problem: Nutrition/Hydration-ADULT  Goal: Nutrient/Hydration intake appropriate for improving, restoring or maintaining nutritional needs  Description: Monitor and assess patient's nutrition/hydration status for malnutrition. Collaborate with interdisciplinary team and initiate plan and interventions as ordered. Monitor patient's weight and dietary intake as ordered or per policy. Utilize nutrition screening tool and intervene as necessary. Determine patient's food preferences and provide high-protein, high-caloric foods as appropriate.      INTERVENTIONS:  - Monitor oral intake, urinary output, labs, and treatment plans  - Assess nutrition and hydration status and recommend course of action  - Evaluate amount of meals eaten  - Assist patient with eating if necessary   - Allow adequate time for meals  - Recommend/ encourage appropriate diets, oral nutritional supplements, and vitamin/mineral supplements  - Order, calculate, and assess calorie counts as needed  - Recommend, monitor, and adjust tube feedings and TPN/PPN based on assessed needs  - Assess need for intravenous fluids  - Provide specific nutrition/hydration education as appropriate  - Include patient/family/caregiver in decisions related to nutrition  Outcome: Progressing     Problem: SAFETY ADULT  Goal: Patient will remain free of falls  Description: INTERVENTIONS:  - Educate patient/family on patient safety including physical limitations  - Instruct patient to call for assistance with activity   - Consult OT/PT to assist with strengthening/mobility   - Keep Call bell within reach  - Keep bed low and locked with side rails adjusted as appropriate  - Keep care items and personal belongings within reach  - Initiate and maintain comfort rounds  - Make Fall Risk Sign visible to staff  - Offer Toileting every 2 Hours, in advance of need  - Initiate/Maintain bed alarm  - Obtain necessary fall risk management equipment: non skid footwear  - Apply yellow socks and bracelet for high fall risk patients  - Consider moving patient to room near nurses station  Outcome: Progressing  Goal: Maintain or return to baseline ADL function  Description: INTERVENTIONS:  -  Assess patient's ability to carry out ADLs; assess patient's baseline for ADL function and identify physical deficits which impact ability to perform ADLs (bathing, care of mouth/teeth, toileting, grooming, dressing, etc.)  - Assess/evaluate cause of self-care deficits   - Assess range of motion  - Assess patient's mobility; develop plan if impaired  - Assess patient's need for assistive devices and provide as appropriate  - Encourage maximum independence but intervene and supervise when necessary  - Involve family in performance of ADLs  - Assess for home care needs following discharge   - Consider OT consult to assist with ADL evaluation and planning for discharge  - Provide patient education as appropriate  Outcome: Progressing  Goal: Maintains/Returns to pre admission functional level  Description: INTERVENTIONS:  - Perform BMAT or MOVE assessment daily.   - Set and communicate daily mobility goal to care team and patient/family/caregiver. - Collaborate with rehabilitation services on mobility goals if consulted  - Perform Range of Motion 3 times a day. - Reposition patient every 2 hours.   - Record patient progress and toleration of activity level   Outcome: Progressing     Problem: DISCHARGE PLANNING  Goal: Discharge to home or other facility with appropriate resources  Description: INTERVENTIONS:  - Identify barriers to discharge w/patient and caregiver  - Arrange for needed discharge resources and transportation as appropriate  - Identify discharge learning needs (meds, wound care, etc.)  - Arrange for interpretive services to assist at discharge as needed  - Refer to Case Management Department for coordinating discharge planning if the patient needs post-hospital services based on physician/advanced practitioner order or complex needs related to functional status, cognitive ability, or social support system  Outcome: Progressing     Problem: Prexisting or High Potential for Compromised Skin Integrity  Goal: Skin integrity is maintained or improved  Description: INTERVENTIONS:  - Identify patients at risk for skin breakdown  - Assess and monitor skin integrity  - Assess and monitor nutrition and hydration status  - Monitor labs   - Assess for incontinence   - Turn and reposition patient  - Assist with mobility/ambulation  - Relieve pressure over bony prominences  - Avoid friction and shearing  - Provide appropriate hygiene as needed including keeping skin clean and dry  - Evaluate need for skin moisturizer/barrier cream  - Collaborate with interdisciplinary team   - Patient/family teaching  - Consider wound care consult   Outcome: Progressing     Problem: NEUROSENSORY - ADULT  Goal: Achieves stable or improved neurological status  Description: INTERVENTIONS  - Monitor and report changes in neurological status  - Monitor vital signs such as temperature, blood pressure, glucose, and any other labs ordered   - Initiate measures to prevent increased intracranial pressure  - Monitor for seizure activity and implement precautions if appropriate      Outcome: Progressing  Goal: Achieves maximal functionality and self care  Description: INTERVENTIONS  - Monitor swallowing and airway patency with patient fatigue and changes in neurological status  - Encourage and assist patient to increase activity and self care.    - Encourage visually impaired, hearing impaired and aphasic patients to use assistive/communication devices  Outcome: Progressing     Problem: PAIN - ADULT  Goal: Verbalizes/displays adequate comfort level or baseline comfort level  Description: Interventions:  - Encourage patient to monitor pain and request assistance  - Assess pain using appropriate pain scale  - Administer analgesics based on type and severity of pain and evaluate response  - Implement non-pharmacological measures as appropriate and evaluate response  - Consider cultural and social influences on pain and pain management  - Notify physician/advanced practitioner if interventions unsuccessful or patient reports new pain  Outcome: Progressing     Problem: INFECTION - ADULT  Goal: Absence or prevention of progression during hospitalization  Description: INTERVENTIONS:  - Assess and monitor for signs and symptoms of infection  - Monitor lab/diagnostic results  - Monitor all insertion sites, i.e. indwelling lines, tubes, and drains  - Monitor endotracheal if appropriate and nasal secretions for changes in amount and color  - Carver appropriate cooling/warming therapies per order  - Administer medications as ordered  - Instruct and encourage patient and family to use good hand hygiene technique  - Identify and instruct in appropriate isolation precautions for identified infection/condition  Outcome: Progressing     Problem: Knowledge Deficit  Goal: Patient/family/caregiver demonstrates understanding of disease process, treatment plan, medications, and discharge instructions  Description: Complete learning assessment and assess knowledge base. Interventions:  - Provide teaching at level of understanding  - Provide teaching via preferred learning methods  Outcome: Progressing     Problem: Communication Impairment  Goal: Ability to express needs and understand communication  Description: Assess patient's communication skills and ability to understand information. Patient will demonstrate use of effective communication techniques, alternative methods of communication and understanding even if not able to speak.      - Encourage communication and provide alternate methods of communication as needed. - Collaborate with case management/ for discharge needs. - Include patient/family/caregiver in decisions related to communication. Outcome: Progressing     Problem: Potential for Aspiration  Goal: Non-ventilated patient's risk of aspiration is minimized  Description: Assess and monitor vital signs, respiratory status, and labs (WBC). Monitor for signs of aspiration (tachypnea, cough, rales, wheezing, cyanosis, fever). - Assess and monitor patient's ability to swallow. - Place patient up in chair to eat if possible. - HOB up at 90 degrees to eat if unable to get patient up into chair.  - Supervise patient during oral intake. - Instruct patient/ family to take small bites. - Instruct patient/ family to take small single sips when taking liquids.   - Follow patient-specific strategies generated by speech pathologist.  Outcome: Progressing     Problem: METABOLIC, FLUID AND ELECTROLYTES - ADULT  Goal: Electrolytes maintained within normal limits  Description: INTERVENTIONS:  - Monitor labs and assess patient for signs and symptoms of electrolyte imbalances  - Administer electrolyte replacement as ordered  - Monitor response to electrolyte replacements, including repeat lab results as appropriate  - Instruct patient on fluid and nutrition as appropriate  Outcome: Progressing  Goal: Fluid balance maintained  Description: INTERVENTIONS:  - Monitor labs   - Monitor I/O and WT  - Instruct patient on fluid and nutrition as appropriate  - Assess for signs & symptoms of volume excess or deficit  Outcome: Progressing  Goal: Glucose maintained within target range  Description: INTERVENTIONS:  - Monitor Blood Glucose as ordered  - Assess for signs and symptoms of hyperglycemia and hypoglycemia  - Administer ordered medications to maintain glucose within target range  - Assess nutritional intake and initiate nutrition service referral as needed  Outcome: Progressing     Problem: HEMATOLOGIC - ADULT  Goal: Maintains hematologic stability  Description: INTERVENTIONS  - Assess for signs and symptoms of bleeding or hemorrhage  - Monitor labs  - Administer supportive blood products/factors as ordered and appropriate  Outcome: Progressing

## 2023-11-03 NOTE — PROGRESS NOTES
Patient:  Caryn Finn    MRN:  [id number]    Genia Request ID:  [id number]    Level of care reserved:  [address]    Partner Reserved:  [id number] [facility] [place] ([place]), [facility] [phone]    Clinical needs requested:    Geography searched:  10 miles around [address] [place] [address]    Start of Service:    Request sent:  1:57pm EDT on 11/3/2023 by Pedro Vance    Partner reserved:  2:52pm EDT on 11/3/2023 by Pedro Vance    Choice list shared:

## 2023-11-03 NOTE — CASE MANAGEMENT
Case Management Discharge Planning Note    Patient name Shayy Rao  Location [facility] 701/[facility] 004-79 MRN [id number]  : 1945 Date 11/3/2023       Current Admission Date: 10/24/2023  Current Admission Diagnosis:CVA (cerebral vascular accident) [facility])   Patient Active Problem List    Diagnosis Date Noted    Cough and secretions 10/27/2023    History of GI bleed 10/24/2023    Renal cell carcinoma of right kidney ([address]) 2023    Chronic renal disease, stage IV ([address]) 2023    Persistent proteinuria 2022    Bilateral lower extremity edema 2022    Chronic renal impairment     GI bleed 2022    SIRS (systemic inflammatory response syndrome) ([address]) 2022    Tachycardia 2022    Hypercalcemia 2022    Chronic pain syndrome 10/05/2020    Chronic right-sided low back pain with right-sided sciatica 2020    Lumbar radiculopathy 2020    Polyarticular arthritis 2019    Class 1 obesity due to excess calories with serious comorbidity and body mass index (BMI) of 32.0 to 32.9 in adult 2019    Essential hypertension 09/10/2019    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 chronic kidney disease, without long-term current use of insulin ([address]) 09/10/2019    CVA (cerebral vascular accident) ([address]) 07/10/2019    Cerebral aneurysm, nonruptured 2019    TIA (transient ischemic attack) 2019      LOS (days): 10  Geometric Mean LOS (GMLOS) (days): 2.90  Days to GMLOS:-6.8     OBJECTIVE:  Risk of Unplanned Readmission Score: 15.88         Current admission status: Inpatient   Preferred Pharmacy:   [address] [place] [address] [address], [address] [address]  Phone: [phone] Fax: [phone]    Primary Care Provider: KAYODE Sherwood    Primary Insurance: [facility]  Secondary Insurance:     DISCHARGE DETAILS:    Discharge planning discussed with[de-identified] Wife via phone  Freedom of Choice: Yes  Comments - Freedom of Choice: FOC discussed. Top two SNF facilities are [facility] and [facility] acute, agreeable to adding other facilities if no availability. CM contacted family/caregiver?: Yes  Were Treatment Team discharge recommendations reviewed with patient/caregiver?: Yes          Contacts  Patient Contacts: Fili Doansins, wife  Relationship to Patient[de-identified] Family  Contact Method: Phone  Phone Number: [phone]  Reason/Outcome: Discharge Planning       Other Referral/Resources/Interventions Provided:  Referral Comments: Pt is not appropriate for [facility]. Referrals sent in [facility] for SNF. Wife is in agreement. Pending available bed, auth, and transporation.

## 2023-11-03 NOTE — CASE MANAGEMENT
Case Management Discharge Planning Note    Patient name Harini Jones  Location [facility] 701/[facility] 738-11 MRN [id number]  : 1945 Date 11/3/2023       Current Admission Date: 10/24/2023  Current Admission Diagnosis:CVA (cerebral vascular accident) [facility])   Patient Active Problem List    Diagnosis Date Noted    Cough and secretions 10/27/2023    History of GI bleed 10/24/2023    Renal cell carcinoma of right kidney ([address]) 2023    Chronic renal disease, stage IV ([address]) 2023    Persistent proteinuria 2022    Bilateral lower extremity edema 2022    Chronic renal impairment     GI bleed 2022    SIRS (systemic inflammatory response syndrome) ([address]) 2022    Tachycardia 2022    Hypercalcemia 2022    Chronic pain syndrome 10/05/2020    Chronic right-sided low back pain with right-sided sciatica 2020    Lumbar radiculopathy 2020    Polyarticular arthritis 2019    Class 1 obesity due to excess calories with serious comorbidity and body mass index (BMI) of 32.0 to 32.9 in adult 2019    Essential hypertension 09/10/2019    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 chronic kidney disease, without long-term current use of insulin ([address]) 09/10/2019    CVA (cerebral vascular accident) ([address]) 07/10/2019    Cerebral aneurysm, nonruptured 2019    TIA (transient ischemic attack) 2019      LOS (days): 10  Geometric Mean LOS (GMLOS) (days): 2.90  Days to GMLOS:-6.8     OBJECTIVE:  Risk of Unplanned Readmission Score: 15.88         Current admission status: Inpatient   Preferred Pharmacy:   [address] [place] [address] [address] [place] [address]  Phone: [phone] Fax: [phone]    Primary Care Provider: KAYODE Cardenas    Primary Insurance: THE [facility]  Secondary Insurance:     DISCHARGE DETAILS:      [facility] is reserved in [place].   They may be able to accept patient  with Matthew Robledo pending. Waiting to hear back from [facility] acute. CM will set up transportation as soon as we get the approval.  Norma Ibarra updated.

## 2023-11-03 NOTE — PLAN OF CARE
Problem: PHYSICAL THERAPY ADULT  Goal: Performs mobility at highest level of function for planned discharge setting. See evaluation for individualized goals. Description: Treatment/Interventions: LE strengthening/ROM, Therapeutic exercise, Endurance training, Cognitive reorientation, Patient/family training, Equipment eval/education, Bed mobility, Spoke to nursing, Spoke to case management, ST, OT, Family, Continued evaluation, Compensatory technique education, Spoke to advanced practitioner  Equipment Recommended:  (TBD)       See flowsheet documentation for full assessment, interventions and recommendations. Outcome: Progressing  Note: Prognosis: Fair  Problem List: Decreased strength, Decreased endurance, Impaired balance, Decreased mobility, Decreased coordination, Decreased cognition, Impaired judgement, Decreased safety awareness, Impaired vision, Impaired sensation, Obesity  Assessment: Pt agreeable to participate in PT session. Pt performed functional mobility and therex as outlined above. Pt with posterior lean, poor righting reactions and poor weight shifting ability. Session consisted of anterior WS to assist with STS tranfers as well as R lateral WS to strengthen trunk and work towards dynamic WS to assist with future slide board transfers. VC for upright head and neck posture with sitting and standing. VC for gaze and head movement with reaching activities. Encouraged mental imagery and as much movement of L side as possible to promote neuroplasticity. Pt left seated in chair with soft care cushion and chair alarm donned, call bell, phone, and all personal needs within reach. Pt will continue to benefit from skilled acute care PT to further address their functional mobility limitations. Based on pt presentation and PT clinical judgement, believe pt would best benefit from rehab.   Barriers to Discharge: Inaccessible home environment, Decreased caregiver support     Rehab Resource Intensity Level, PT: I (Maximum Resource Intensity)    See flowsheet documentation for full assessment.

## 2023-11-03 NOTE — CASE MANAGEMENT
Case Management Discharge Planning Note    Patient name Harini Jones  Location [facility] 701/[facility] 306-53 MRN [id number]  : 1945 Date 11/3/2023       Current Admission Date: 10/24/2023  Current Admission Diagnosis:CVA (cerebral vascular accident) [facility])   Patient Active Problem List    Diagnosis Date Noted    Cough and secretions 10/27/2023    History of GI bleed 10/24/2023    Renal cell carcinoma of right kidney ([address]) 2023    Chronic renal disease, stage IV ([address]) 2023    Persistent proteinuria 2022    Bilateral lower extremity edema 2022    Chronic renal impairment     GI bleed 2022    SIRS (systemic inflammatory response syndrome) ([address]) 2022    Tachycardia 2022    Hypercalcemia 2022    Chronic pain syndrome 10/05/2020    Chronic right-sided low back pain with right-sided sciatica 2020    Lumbar radiculopathy 2020    Polyarticular arthritis 2019    Class 1 obesity due to excess calories with serious comorbidity and body mass index (BMI) of 32.0 to 32.9 in adult 2019    Essential hypertension 09/10/2019    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 chronic kidney disease, without long-term current use of insulin ([address]) 09/10/2019    CVA (cerebral vascular accident) ([address]) 07/10/2019    Cerebral aneurysm, nonruptured 2019    TIA (transient ischemic attack) 2019      LOS (days): 10  Geometric Mean LOS (GMLOS) (days): 2.90  Days to GMLOS:-6.8     OBJECTIVE:  Risk of Unplanned Readmission Score: 15.88         Current admission status: Inpatient   Preferred Pharmacy:   [address] [place] [address] [id number] Shaina Prescott,  [address] - [address] [place] [address]  Phone: [phone] Fax: [phone]    Primary Care Provider: KAYODE Cardenas    Primary Insurance: THE [facility]  Secondary Insurance:     DISCHARGE DETAILS:    Discharge planning discussed with[de-identified] Benja Sherman     Contacts  Patient Contacts: Claudia Fairbanks, wife  Relationship to Patient[de-identified] Family  Contact Method: Phone  Reason/Outcome: Discharge Planning       Other Referral/Resources/Interventions Provided:  Referral Comments: Pt is cleared to go to [facility] in 11/4/23, transportation is set for 1200. Provider, family, Jay Jon, and facility updated. Treatment Team Recommendation: Short Term Rehab, SNF  Discharge Destination Plan[de-identified] SNF, Short Term Rehab  Transport at Discharge : [facility]  Dispatcher Contacted: Yes  Number/Name of Dispatcher: Jimena German by [facility] and Unit #):  SLETS [phone]  ETA of Transport (Date): 11/04/23  ETA of Transport (Time): 1200

## 2023-11-03 NOTE — SPEECH THERAPY NOTE
Speech Language/Pathology  Speech-Language Pathology Progress Note      Patient Name: Jud Boles    KCLSX'Y Date: 11/3/2023      Subjective:  Pt was awake and alert. He was sitting up in chair at bedside. Patient does not have his dentures in, noted L sided retention upon my arrival.  Dentures were placed in the mouth w/ fixodent. Objective:  Pt was seen today for dysphagia therapy. Current diet is dysphagia 2 mechanical soft with nectar thick liquids. Pt was on room air. Oral care had already been completed. Focus of today's session was to maximize PO intake safety, determine potential for diet texture advancement, and determine potential for liquid consistency advancement. Textures offered today included mech soft soft solid and nectar thick liquid via straw. Swallow function:   Bolus retrieval and manipulation were mildly and reduced. AP transfer was [facility]. Once his teeth was in and he placed material on the R withw / tsp there was min to no  L sided retention. Pharyngeal swallow initiation was present. Hyolaryngeal excursion was reduced. No s/s aspiration occurred during session today. SLP provided min cueing/feedback throughout session. Pt was responsive to cueing. Assessment:  Swallow function is stable with current diet. Pt needs dentures in his mouth for full breakdown      Plan:  Continue dysphagia 2 mechanical soft and nectar thick liquids. Continue ST follow up. Subsequent sessions to focus on determining potential for diet texture advancement, determining potential for liquid consistency advancement, improving pt's self monitoring, and improving use of strategies to minimize risk of aspiration.

## 2023-11-04 ENCOUNTER — TELEPHONE (OUTPATIENT)
Dept: OTHER | Facility: OTHER | Age: 78
End: 2023-11-04

## 2023-11-04 VITALS
SYSTOLIC BLOOD PRESSURE: 167 MMHG | HEART RATE: 70 BPM | RESPIRATION RATE: 16 BRPM | TEMPERATURE: 98.4 F | OXYGEN SATURATION: 96 % | WEIGHT: 228 LBS | BODY MASS INDEX: 30.88 KG/M2 | DIASTOLIC BLOOD PRESSURE: 70 MMHG | HEIGHT: 72 IN

## 2023-11-04 LAB
GLUCOSE SERPL-MCNC: 134 MG/DL (ref 65–140)
GLUCOSE SERPL-MCNC: 216 MG/DL (ref 65–140)

## 2023-11-04 PROCEDURE — 82948 REAGENT STRIP/BLOOD GLUCOSE: CPT

## 2023-11-04 PROCEDURE — 99239 HOSP IP/OBS DSCHRG MGMT >30: CPT | Performed by: FAMILY MEDICINE

## 2023-11-04 RX ORDER — CARVEDILOL 12.5 MG/1
12.5 TABLET ORAL 2 TIMES DAILY WITH MEALS
Refills: 0
Start: 2023-11-04

## 2023-11-04 RX ORDER — ATORVASTATIN CALCIUM 40 MG/1
40 TABLET, FILM COATED ORAL DAILY
Refills: 0
Start: 2023-11-04

## 2023-11-04 RX ORDER — INSULIN LISPRO 100 [IU]/ML
1-6 INJECTION, SOLUTION INTRAVENOUS; SUBCUTANEOUS
Refills: 0
Start: 2023-11-04

## 2023-11-04 RX ORDER — AMLODIPINE BESYLATE 5 MG/1
10 TABLET ORAL DAILY
Qty: 90 TABLET | Refills: 0
Start: 2023-11-04

## 2023-11-04 RX ORDER — PANTOPRAZOLE SODIUM 40 MG/1
40 TABLET, DELAYED RELEASE ORAL
Refills: 0
Start: 2023-11-05

## 2023-11-04 RX ADMIN — TICAGRELOR 90 MG: 90 TABLET ORAL at 08:35

## 2023-11-04 RX ADMIN — AMLODIPINE BESYLATE 10 MG: 10 TABLET ORAL at 08:33

## 2023-11-04 RX ADMIN — ASPIRIN 81 MG CHEWABLE TABLET 81 MG: 81 TABLET CHEWABLE at 08:33

## 2023-11-04 RX ADMIN — PANTOPRAZOLE SODIUM 40 MG: 40 TABLET, DELAYED RELEASE ORAL at 06:32

## 2023-11-04 RX ADMIN — NYSTATIN: 100000 POWDER TOPICAL at 08:37

## 2023-11-04 RX ADMIN — CARVEDILOL 12.5 MG: 12.5 TABLET, FILM COATED ORAL at 08:33

## 2023-11-04 RX ADMIN — ACETAMINOPHEN 975 MG: 650 SUSPENSION ORAL at 06:32

## 2023-11-04 RX ADMIN — ENOXAPARIN SODIUM 40 MG: 40 INJECTION SUBCUTANEOUS at 08:33

## 2023-11-04 RX ADMIN — INSULIN LISPRO 5 UNITS: 100 INJECTION, SOLUTION INTRAVENOUS; SUBCUTANEOUS at 08:34

## 2023-11-04 RX ADMIN — SENNOSIDES, DOCUSATE SODIUM 1 TABLET: 8.6; 5 TABLET ORAL at 08:33

## 2023-11-04 RX ADMIN — POLYETHYLENE GLYCOL 3350 17 G: 17 POWDER, FOR SOLUTION ORAL at 08:37

## 2023-11-04 NOTE — ASSESSMENT & PLAN NOTE
Presented with left-sided weakness, thrombolytics were declined by patient and family. CT head: No acute intracranial abnormality. Chronic microangiopathic changes. 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 No mass effect or hemorrhagic conversion. Echo: EF 50% with grade 1 diastolic dysfunction  On dual antiplatelet with aspirin and Brilinta for 90 days followed by aspirin monotherapy. PT OT  PMR following.>. rehab on dc. Atorvastatin 40 mg daily. Vascular surgery evaluated, OP follow up for ICA stenosis. Patient is tolerating oral diet-dysphagia 2 with nectar thick liquid.   Advance as tolerated

## 2023-11-04 NOTE — PLAN OF CARE
Problem: Potential for Falls  Goal: Patient will remain free of falls  Description: INTERVENTIONS:  - Educate patient/family on patient safety including physical limitations  - Instruct patient to call for assistance with activity   - Consult OT/PT to assist with strengthening/mobility   - Keep Call bell within reach  - Keep bed low and locked with side rails adjusted as appropriate  - Keep care items and personal belongings within reach  - Initiate and maintain comfort rounds  - Make Fall Risk Sign visible to staff  - Offer Toileting every 2 Hours, in advance of need  - Initiate/Maintain bed alarm  - Obtain necessary fall risk management equipment: non skid footwear  - Apply yellow socks and bracelet for high fall risk patients  - Consider moving patient to room near nurses station  Outcome: Progressing     Problem: MOBILITY - ADULT  Goal: Maintain or return to baseline ADL function  Description: INTERVENTIONS:  -  Assess patient's ability to carry out ADLs; assess patient's baseline for ADL function and identify physical deficits which impact ability to perform ADLs (bathing, care of mouth/teeth, toileting, grooming, dressing, etc.)  - Assess/evaluate cause of self-care deficits   - Assess range of motion  - Assess patient's mobility; develop plan if impaired  - Assess patient's need for assistive devices and provide as appropriate  - Encourage maximum independence but intervene and supervise when necessary  - Involve family in performance of ADLs  - Assess for home care needs following discharge   - Consider OT consult to assist with ADL evaluation and planning for discharge  - Provide patient education as appropriate  Outcome: Progressing  Goal: Maintains/Returns to pre admission functional level  Description: INTERVENTIONS:  - Perform BMAT or MOVE assessment daily.   - Set and communicate daily mobility goal to care team and patient/family/caregiver.    - Collaborate with rehabilitation services on mobility goals if consulted  - Perform Range of Motion 3 times a day. - Reposition patient every 2 hours. - Record patient progress and toleration of activity level   Outcome: Progressing     Problem: Nutrition/Hydration-ADULT  Goal: Nutrient/Hydration intake appropriate for improving, restoring or maintaining nutritional needs  Description: Monitor and assess patient's nutrition/hydration status for malnutrition. Collaborate with interdisciplinary team and initiate plan and interventions as ordered. Monitor patient's weight and dietary intake as ordered or per policy. Utilize nutrition screening tool and intervene as necessary. Determine patient's food preferences and provide high-protein, high-caloric foods as appropriate.      INTERVENTIONS:  - Monitor oral intake, urinary output, labs, and treatment plans  - Assess nutrition and hydration status and recommend course of action  - Evaluate amount of meals eaten  - Assist patient with eating if necessary   - Allow adequate time for meals  - Recommend/ encourage appropriate diets, oral nutritional supplements, and vitamin/mineral supplements  - Order, calculate, and assess calorie counts as needed  - Recommend, monitor, and adjust tube feedings and TPN/PPN based on assessed needs  - Assess need for intravenous fluids  - Provide specific nutrition/hydration education as appropriate  - Include patient/family/caregiver in decisions related to nutrition  Outcome: Progressing     Problem: SAFETY ADULT  Goal: Patient will remain free of falls  Description: INTERVENTIONS:  - Educate patient/family on patient safety including physical limitations  - Instruct patient to call for assistance with activity   - Consult OT/PT to assist with strengthening/mobility   - Keep Call bell within reach  - Keep bed low and locked with side rails adjusted as appropriate  - Keep care items and personal belongings within reach  - Initiate and maintain comfort rounds  - Make Fall Risk Sign visible to staff  - Offer Toileting every 2 Hours, in advance of need  - Initiate/Maintain bed alarm  - Obtain necessary fall risk management equipment: non skid footwear  - Apply yellow socks and bracelet for high fall risk patients  - Consider moving patient to room near nurses station  Outcome: Progressing  Goal: Maintain or return to baseline ADL function  Description: INTERVENTIONS:  -  Assess patient's ability to carry out ADLs; assess patient's baseline for ADL function and identify physical deficits which impact ability to perform ADLs (bathing, care of mouth/teeth, toileting, grooming, dressing, etc.)  - Assess/evaluate cause of self-care deficits   - Assess range of motion  - Assess patient's mobility; develop plan if impaired  - Assess patient's need for assistive devices and provide as appropriate  - Encourage maximum independence but intervene and supervise when necessary  - Involve family in performance of ADLs  - Assess for home care needs following discharge   - Consider OT consult to assist with ADL evaluation and planning for discharge  - Provide patient education as appropriate  Outcome: Progressing  Goal: Maintains/Returns to pre admission functional level  Description: INTERVENTIONS:  - Perform BMAT or MOVE assessment daily.   - Set and communicate daily mobility goal to care team and patient/family/caregiver. - Collaborate with rehabilitation services on mobility goals if consulted  - Perform Range of Motion 3 times a day. - Reposition patient every 2 hours.   - Record patient progress and toleration of activity level   Outcome: Progressing     Problem: DISCHARGE PLANNING  Goal: Discharge to home or other facility with appropriate resources  Description: INTERVENTIONS:  - Identify barriers to discharge w/patient and caregiver  - Arrange for needed discharge resources and transportation as appropriate  - Identify discharge learning needs (meds, wound care, etc.)  - Arrange for interpretive services to assist at discharge as needed  - Refer to Case Management Department for coordinating discharge planning if the patient needs post-hospital services based on physician/advanced practitioner order or complex needs related to functional status, cognitive ability, or social support system  Outcome: Progressing     Problem: Prexisting or High Potential for Compromised Skin Integrity  Goal: Skin integrity is maintained or improved  Description: INTERVENTIONS:  - Identify patients at risk for skin breakdown  - Assess and monitor skin integrity  - Assess and monitor nutrition and hydration status  - Monitor labs   - Assess for incontinence   - Turn and reposition patient  - Assist with mobility/ambulation  - Relieve pressure over bony prominences  - Avoid friction and shearing  - Provide appropriate hygiene as needed including keeping skin clean and dry  - Evaluate need for skin moisturizer/barrier cream  - Collaborate with interdisciplinary team   - Patient/family teaching  - Consider wound care consult   Outcome: Progressing     Problem: NEUROSENSORY - ADULT  Goal: Achieves stable or improved neurological status  Description: INTERVENTIONS  - Monitor and report changes in neurological status  - Monitor vital signs such as temperature, blood pressure, glucose, and any other labs ordered   - Initiate measures to prevent increased intracranial pressure  - Monitor for seizure activity and implement precautions if appropriate      Outcome: Progressing  Goal: Achieves maximal functionality and self care  Description: INTERVENTIONS  - Monitor swallowing and airway patency with patient fatigue and changes in neurological status  - Encourage and assist patient to increase activity and self care.    - Encourage visually impaired, hearing impaired and aphasic patients to use assistive/communication devices  Outcome: Progressing     Problem: PAIN - ADULT  Goal: Verbalizes/displays adequate comfort level or baseline comfort level  Description: Interventions:  - Encourage patient to monitor pain and request assistance  - Assess pain using appropriate pain scale  - Administer analgesics based on type and severity of pain and evaluate response  - Implement non-pharmacological measures as appropriate and evaluate response  - Consider cultural and social influences on pain and pain management  - Notify physician/advanced practitioner if interventions unsuccessful or patient reports new pain  Outcome: Progressing     Problem: INFECTION - ADULT  Goal: Absence or prevention of progression during hospitalization  Description: INTERVENTIONS:  - Assess and monitor for signs and symptoms of infection  - Monitor lab/diagnostic results  - Monitor all insertion sites, i.e. indwelling lines, tubes, and drains  - Monitor endotracheal if appropriate and nasal secretions for changes in amount and color  - [place] appropriate cooling/warming therapies per order  - Administer medications as ordered  - Instruct and encourage patient and family to use good hand hygiene technique  - Identify and instruct in appropriate isolation precautions for identified infection/condition  Outcome: Progressing     Problem: Knowledge Deficit  Goal: Patient/family/caregiver demonstrates understanding of disease process, treatment plan, medications, and discharge instructions  Description: Complete learning assessment and assess knowledge base. Interventions:  - Provide teaching at level of understanding  - Provide teaching via preferred learning methods  Outcome: Progressing     Problem: Communication Impairment  Goal: Ability to express needs and understand communication  Description: Assess patient's communication skills and ability to understand information. Patient will demonstrate use of effective communication techniques, alternative methods of communication and understanding even if not able to speak.      - Encourage communication and provide alternate methods of communication as needed. - Collaborate with case management/ for discharge needs. - Include patient/family/caregiver in decisions related to communication. Outcome: Progressing     Problem: Potential for Aspiration  Goal: Non-ventilated patient's risk of aspiration is minimized  Description: Assess and monitor vital signs, respiratory status, and labs (WBC). Monitor for signs of aspiration (tachypnea, cough, rales, wheezing, cyanosis, fever). - Assess and monitor patient's ability to swallow. - Place patient up in chair to eat if possible. - HOB up at 90 degrees to eat if unable to get patient up into chair.  - Supervise patient during oral intake. - Instruct patient/ family to take small bites. - Instruct patient/ family to take small single sips when taking liquids.   - Follow patient-specific strategies generated by speech pathologist.  Outcome: Progressing     Problem: METABOLIC, FLUID AND ELECTROLYTES - ADULT  Goal: Electrolytes maintained within normal limits  Description: INTERVENTIONS:  - Monitor labs and assess patient for signs and symptoms of electrolyte imbalances  - Administer electrolyte replacement as ordered  - Monitor response to electrolyte replacements, including repeat lab results as appropriate  - Instruct patient on fluid and nutrition as appropriate  Outcome: Progressing  Goal: Fluid balance maintained  Description: INTERVENTIONS:  - Monitor labs   - Monitor I/O and WT  - Instruct patient on fluid and nutrition as appropriate  - Assess for signs & symptoms of volume excess or deficit  Outcome: Progressing  Goal: Glucose maintained within target range  Description: INTERVENTIONS:  - Monitor Blood Glucose as ordered  - Assess for signs and symptoms of hyperglycemia and hypoglycemia  - Administer ordered medications to maintain glucose within target range  - Assess nutritional intake and initiate nutrition service referral as needed  Outcome: Progressing     Problem: HEMATOLOGIC - ADULT  Goal: Maintains hematologic stability  Description: INTERVENTIONS  - Assess for signs and symptoms of bleeding or hemorrhage  - Monitor labs  - Administer supportive blood products/factors as ordered and appropriate  Outcome: Progressing

## 2023-11-04 NOTE — ASSESSMENT & PLAN NOTE
Suspect aspiration vs mild fluid overload   On RA. No fever. Leukocytosis improving. CXR: Questionable left costophrenic angle opacification given patient positioning. Artifact versus pneumonia in this area cannot be entirely excluded. Mild right basilar linear atelectasis. Monitored off abx for now as leukocytosis improving and patient is afebrile.

## 2023-11-04 NOTE — TELEPHONE ENCOUNTER
Lexi from [id number] called in stating pt is a new admission and she would like to review orders. On call paged via TC.

## 2023-11-04 NOTE — DISCHARGE SUMMARY
[address] [facility]  Discharge- Judah Zamudio 1945, 70 y.o. male MRN: [id number]  Unit/Bed#: [facility] 701-01 Encounter: [id number]  Primary Care Provider: KAYODE Godwin   Date and time admitted to hospital: 10/24/2023  8:22 PM    * CVA (cerebral vascular accident) [facility])  Assessment & Plan  Presented with left-sided weakness, thrombolytics were declined by patient and family. CT head: No acute intracranial abnormality. Chronic microangiopathic changes. 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 No mass effect or hemorrhagic conversion. Echo: EF 50% with grade 1 diastolic dysfunction  On dual antiplatelet with aspirin and Brilinta for 90 days followed by aspirin monotherapy. PT OT  PMR following.>. rehab on dc. Atorvastatin 40 mg daily. Vascular surgery evaluated, OP follow up for ICA stenosis. Patient is tolerating oral diet-dysphagia 2 with nectar thick liquid. Advance as tolerated        Cough and secretions  Assessment & Plan  Suspect aspiration vs mild fluid overload   On RA. No fever. Leukocytosis improving. CXR: Questionable left costophrenic angle opacification given patient positioning. Artifact versus pneumonia in this area cannot be entirely excluded. Mild right basilar linear atelectasis. Monitored off abx for now as leukocytosis improving and patient is afebrile.     History of GI bleed  Assessment & Plan  Patient with history of coffee-ground material in the hospitalization last year; concerning for GI bleeding, as the patient is placed on aspirin with Brilinta; will place patient on pantoprazole prophylaxis. Renal cell carcinoma of right kidney [facility])  Assessment & Plan  Status post right nephrectomy; follows up with urology. Chronic renal disease, stage IV (HCC)  Assessment & Plan  Baseline creatinine between 1.9-2.4. Currently at baseline. Hold nephrotoxic agents. Encourage p.o. intake. 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 chronic kidney disease, without long-term current use of insulin ([address])  Assessment & Plan  Lab Results   Component Value Date    HGBA1C 6.7 (H) 10/24/2023   Patient with elevated blood sugar with tube feeding. Was on insulin while in the hospital.  Restart back on the oral hypoglycemic agents as outpatient. Continue with sliding scale and frequent blood sugar check in the facility    Essential hypertension  Assessment & Plan  Was On permissive hypertension with stroke  Restarted home dose amlodipine, increased dose to 10 mg amlodipine daily  Added low dose coreg      Medical Problems       Resolved Problems  Date Reviewed: 11/4/2023   None       Discharging Physician / Practitioner: Ekta Meek MD  PCP: Atnonella Kelley, [address] [place] [address]  Admission Date:   Admission Orders (From admission, onward)       Ordered        10/24/23 2205  Inpatient Admission  Once                          Discharge Date: 11/04/23    Consultations During Hospital Stay:  Physical medicine and rehabilitation  Neurology  Vascular surgery  Gastroenterology    Procedures Performed:   Echocardiogram-left ventricular cavity size is normal.  Ejection fraction 50%. Systolic function is low normal.  Grade 1 diastolic dysfunction. Significant Findings / Test Results:     X-ray abdomen 11/1-feeding tube in the region of the gastric fundus  Chest x-ray-questionable left costophrenic angle opacification given the patient positioning. Artifact versus pneumonia in this area cannot be entirely excluded. Mild right basilar linear atelectasis  Lower extremity arterial Doppler.   Evaluation shows no evidence of thrombus in the common femoral vein. No evidence of acute or chronic DVT on the left side. Carotid Doppler -there is no evidence of arterial disease throughout the extracranial carotid system on the right. Left there is 70 to 99% stenosis noted in the internal carotid artery. Plaque is heterogenic and irregular. Vertebral flow is antegrade  MRI brain-acute to early subacute lacunar infract in the right paramedian veronique. No mass effect or hemorrhagic conversion  CTA stroke alert-occlusion of the left V3 and left V4 segment similar to prior exam.  Severe near occlusive plaque stenosis proximal left cervical ICA with greater than 90% stenosis worsened compared to the prior exam.  Laterally projecting 5.3 to 5.7 mm left supraclinoid ICA aneurysm. CT stroke alert brain-no acute intracranial abnormality. Chronic microangiopathic changes  Incidental Findings:       Test Results Pending at Discharge (will require follow up): Outpatient Tests Requested:  BMP in a week    Complications: none    Reason for Admission: Left-sided weakness    Hospital Course:   Stephan Maidson is a 70 y.o. male patient who originally presented to the hospital on 10/24/2023 due to left-sided weakness. Patient with past medical history significant for nonruptured cerebral aneurysm, chronic pain, CKD stage IV type 2 diabetes mellitus. Renal cell carcinoma status post nephrectomy, obesity, essential hypertension with previous history of GI bleeding arthritis. Patient came to the hospital with the left-sided weakness along with left-sided facial droop. Blood pressure was elevated at the time of presentation to the emergency room. Stroke alert was called upon arrival to the emergency room. Evaluated by neurology. Opted not to have thrombolytics. Neurology started the patient on Brilinta and aspirin. Patient had MRI of the brain confirming the presence of stroke. Due to concern for ICA stenosis vascular surgery evaluated the patient. Plan for outpatient follow-up with vascular surgery. Patient with flaccid paralysis on the left side. Initially patient needed feeding tube placement for tube feeding. Patient failed multiple speech evaluation and recommended to have a PEG tube placement. GI was consulted. Patient's family was concerned about stopping the antiplatelet agents for the duration required by the gastroenterology. In the meantime patient improved. Repeat VBG showed that the patient may be able to tolerate a diet. Patient was started on oral diet and he was able to tolerate the diet and feeding tube was discontinued. PT OT recommended rehab and patient was evaluated by acute rehab felt that he is not a candidate for acute rehab and he was discharged in a stable condition to rehab on 11/4/2023. For details refer to the chart    Please see above list of diagnoses and related plan for additional information. Condition at Discharge: good    Discharge Day Visit / Exam:   Subjective: Patient seen and examined. No events overnight. Nursing reported that today when they are worsening if they noticed a small open area in the perineum  Vitals: Blood Pressure: 167/70 (11/04/23 0723)  Pulse: 70 (11/04/23 0723)  Temperature: 98.4 °F (36.9 °C) (11/04/23 0642)  Temp Source: Oral (11/03/23 1500)  Respirations: 16 (11/04/23 0723)  Height: 6' (182.9 cm) (10/25/23 1437)  Weight - Scale: 103 kg (228 lb) (10/25/23 1437)  SpO2: 96 % (11/04/23 0723)  Exam:   Physical Exam  Constitutional:       General: He is not in acute distress. HENT:      Head: Normocephalic. Nose: Nose normal.   Cardiovascular:      Rate and Rhythm: Normal rate and regular rhythm. Heart sounds: Normal heart sounds. Pulmonary:      Breath sounds: Normal breath sounds. Abdominal:      Palpations: Abdomen is soft. Skin:     General: Skin is warm. Neurological:      Mental Status: He is alert.       Comments: Left-sided facial droop and left sided hemiplegia-left upper and lower extremity flaccid  Speech dysarthria present but significantly better         Discussion with Family: Wife updated over the phone    Discharge instructions/Information to patient and family:   See after visit summary for information provided to patient and family. Provisions for Follow-Up Care:  See after visit summary for information related to follow-up care and any pertinent home health orders. Disposition:   Other [address] at [address] [facility] [facility] Readmission: none     Discharge Statement:  I spent 45  minutes discharging the patient. This time was spent on the day of discharge. I had direct contact with the patient on the day of discharge. Greater than 50% of the total time was spent examining patient, answering all patient questions, arranging and discussing plan of care with patient as well as directly providing post-discharge instructions. Additional time then spent on discharge activities. Discharge Medications:  See after visit summary for reconciled discharge medications provided to patient and/or family.       **Please Note: This note may have been constructed using a voice recognition system**

## 2023-11-04 NOTE — PLAN OF CARE
Problem: Potential for Falls  Goal: Patient will remain free of falls  Description: INTERVENTIONS:  - Educate patient/family on patient safety including physical limitations  - Instruct patient to call for assistance with activity   - Consult OT/PT to assist with strengthening/mobility   - Keep Call bell within reach  - Keep bed low and locked with side rails adjusted as appropriate  - Keep care items and personal belongings within reach  - Initiate and maintain comfort rounds  - Make Fall Risk Sign visible to staff  - Offer Toileting every 2 Hours, in advance of need  - Initiate/Maintain bed alarm  - Obtain necessary fall risk management equipment: non skid footwear  - Apply yellow socks and bracelet for high fall risk patients  - Consider moving patient to room near nurses station  Outcome: Progressing     Problem: MOBILITY - ADULT  Goal: Maintain or return to baseline ADL function  Description: INTERVENTIONS:  -  Assess patient's ability to carry out ADLs; assess patient's baseline for ADL function and identify physical deficits which impact ability to perform ADLs (bathing, care of mouth/teeth, toileting, grooming, dressing, etc.)  - Assess/evaluate cause of self-care deficits   - Assess range of motion  - Assess patient's mobility; develop plan if impaired  - Assess patient's need for assistive devices and provide as appropriate  - Encourage maximum independence but intervene and supervise when necessary  - Involve family in performance of ADLs  - Assess for home care needs following discharge   - Consider OT consult to assist with ADL evaluation and planning for discharge  - Provide patient education as appropriate  Outcome: Progressing  Goal: Maintains/Returns to pre admission functional level  Description: INTERVENTIONS:  - Perform BMAT or MOVE assessment daily.   - Set and communicate daily mobility goal to care team and patient/family/caregiver.    - Collaborate with rehabilitation services on mobility goals if consulted  - Perform Range of Motion 3 times a day. - Reposition patient every 2 hours. - Record patient progress and toleration of activity level   Outcome: Progressing     Problem: Nutrition/Hydration-ADULT  Goal: Nutrient/Hydration intake appropriate for improving, restoring or maintaining nutritional needs  Description: Monitor and assess patient's nutrition/hydration status for malnutrition. Collaborate with interdisciplinary team and initiate plan and interventions as ordered. Monitor patient's weight and dietary intake as ordered or per policy. Utilize nutrition screening tool and intervene as necessary. Determine patient's food preferences and provide high-protein, high-caloric foods as appropriate. INTERVENTIONS:  - Monitor oral intake, urinary output, labs, and treatment plans  - Assess nutrition and hydration status and recommend course of action  - Evaluate amount of meals eaten  - Assist patient with eating if necessary   - Allow adequate time for meals  - Recommend/ encourage appropriate diets, oral nutritional supplements, and vitamin/mineral supplements  - Order, calculate, and assess calorie counts as needed  - Recommend, monitor, and adjust tube feedings and TPN/PPN based on assessed needs  - Assess need for intravenous fluids  - Provide specific nutrition/hydration education as appropriate  - Include patient/family/caregiver in decisions related to nutrition  Outcome: Progressing     Problem: SAFETY ADULT  Goal: Maintains/Returns to pre admission functional level  Description: INTERVENTIONS:  - Perform BMAT or MOVE assessment daily.   - Set and communicate daily mobility goal to care team and patient/family/caregiver. - Collaborate with rehabilitation services on mobility goals if consulted  - Perform Range of Motion 3 times a day. - Reposition patient every 2 hours.   - Record patient progress and toleration of activity level   Outcome: Progressing     Problem: DISCHARGE PLANNING  Goal: Discharge to home or other facility with appropriate resources  Description: INTERVENTIONS:  - Identify barriers to discharge w/patient and caregiver  - Arrange for needed discharge resources and transportation as appropriate  - Identify discharge learning needs (meds, wound care, etc.)  - Arrange for interpretive services to assist at discharge as needed  - Refer to Case Management Department for coordinating discharge planning if the patient needs post-hospital services based on physician/advanced practitioner order or complex needs related to functional status, cognitive ability, or social support system  Outcome: Progressing     Problem: PAIN - ADULT  Goal: Verbalizes/displays adequate comfort level or baseline comfort level  Description: Interventions:  - Encourage patient to monitor pain and request assistance  - Assess pain using appropriate pain scale  - Administer analgesics based on type and severity of pain and evaluate response  - Implement non-pharmacological measures as appropriate and evaluate response  - Consider cultural and social influences on pain and pain management  - Notify physician/advanced practitioner if interventions unsuccessful or patient reports new pain  Outcome: Progressing     Problem: Knowledge Deficit  Goal: Patient/family/caregiver demonstrates understanding of disease process, treatment plan, medications, and discharge instructions  Description: Complete learning assessment and assess knowledge base.   Interventions:  - Provide teaching at level of understanding  - Provide teaching via preferred learning methods  Outcome: Progressing     Problem: Prexisting or High Potential for Compromised Skin Integrity  Goal: Skin integrity is maintained or improved  Description: INTERVENTIONS:  - Identify patients at risk for skin breakdown  - Assess and monitor skin integrity  - Assess and monitor nutrition and hydration status  - Monitor labs   - Assess for incontinence   - Turn and reposition patient  - Assist with mobility/ambulation  - Relieve pressure over bony prominences  - Avoid friction and shearing  - Provide appropriate hygiene as needed including keeping skin clean and dry  - Evaluate need for skin moisturizer/barrier cream  - Collaborate with interdisciplinary team   - Patient/family teaching  - Consider wound care consult   Outcome: Progressing     Problem: METABOLIC, FLUID AND ELECTROLYTES - ADULT  Goal: Electrolytes maintained within normal limits  Description: INTERVENTIONS:  - Monitor labs and assess patient for signs and symptoms of electrolyte imbalances  - Administer electrolyte replacement as ordered  - Monitor response to electrolyte replacements, including repeat lab results as appropriate  - Instruct patient on fluid and nutrition as appropriate  Outcome: Progressing  Goal: Fluid balance maintained  Description: INTERVENTIONS:  - Monitor labs   - Monitor I/O and WT  - Instruct patient on fluid and nutrition as appropriate  - Assess for signs & symptoms of volume excess or deficit  Outcome: Progressing  Goal: Glucose maintained within target range  Description: INTERVENTIONS:  - Monitor Blood Glucose as ordered  - Assess for signs and symptoms of hyperglycemia and hypoglycemia  - Administer ordered medications to maintain glucose within target range  - Assess nutritional intake and initiate nutrition service referral as needed  Outcome: Progressing     Problem: Communication Impairment  Goal: Ability to express needs and understand communication  Description: Assess patient's communication skills and ability to understand information. Patient will demonstrate use of effective communication techniques, alternative methods of communication and understanding even if not able to speak. - Encourage communication and provide alternate methods of communication as needed. - Collaborate with case management/ for discharge needs.   - Include patient/family/caregiver in decisions related to communication. Outcome: Progressing     Problem: Potential for Aspiration  Goal: Non-ventilated patient's risk of aspiration is minimized  Description: Assess and monitor vital signs, respiratory status, and labs (WBC). Monitor for signs of aspiration (tachypnea, cough, rales, wheezing, cyanosis, fever). - Assess and monitor patient's ability to swallow. - Place patient up in chair to eat if possible. - HOB up at 90 degrees to eat if unable to get patient up into chair.  - Supervise patient during oral intake. - Instruct patient/ family to take small bites. - Instruct patient/ family to take small single sips when taking liquids.   - Follow patient-specific strategies generated by speech pathologist.  Outcome: Progressing

## 2023-11-04 NOTE — ASSESSMENT & PLAN NOTE
Lab Results   Component Value Date    HGBA1C 6.7 (H) 10/24/2023   Patient with elevated blood sugar with tube feeding. Was on insulin while in the hospital.  Restart back on the oral hypoglycemic agents as outpatient.   Continue with sliding scale and frequent blood sugar check in the facility

## 2023-11-04 NOTE — DISCHARGE INSTR - AVS FIRST PAGE
Check your blood sugar before meals and at bedtime.     Discontinue Brilinta after 3 months and continue with the aspirin alone after 3 months

## 2023-11-04 NOTE — ASSESSMENT & PLAN NOTE
Baseline creatinine between 1.9-2.4. Currently at baseline. Hold nephrotoxic agents. Encourage p.o. intake.

## 2023-11-04 NOTE — ASSESSMENT & PLAN NOTE
Was On permissive hypertension with stroke  Restarted home dose amlodipine, increased dose to 10 mg amlodipine daily  Added low dose coreg

## 2023-11-06 ENCOUNTER — NURSING HOME VISIT (OUTPATIENT)
Dept: GERIATRICS | Facility: OTHER | Age: 78
End: 2023-11-06
Payer: COMMERCIAL

## 2023-11-06 DIAGNOSIS — I63.9 CEREBROVASCULAR ACCIDENT (CVA), UNSPECIFIED MECHANISM (HCC): Primary | ICD-10-CM

## 2023-11-06 DIAGNOSIS — I10 ESSENTIAL HYPERTENSION: ICD-10-CM

## 2023-11-06 DIAGNOSIS — E11.22 TYPE 2 DIABETES MELLITUS WITH STAGE 3A CHRONIC KIDNEY DISEASE, WITHOUT LONG-TERM CURRENT USE OF INSULIN (HCC): ICD-10-CM

## 2023-11-06 DIAGNOSIS — N18.9 CHRONIC RENAL IMPAIRMENT, UNSPECIFIED CKD STAGE: ICD-10-CM

## 2023-11-06 DIAGNOSIS — N18.31 TYPE 2 DIABETES MELLITUS WITH STAGE 3A CHRONIC KIDNEY DISEASE, WITHOUT LONG-TERM CURRENT USE OF INSULIN (HCC): ICD-10-CM

## 2023-11-06 PROCEDURE — 99306 1ST NF CARE HIGH MDM 50: CPT | Performed by: FAMILY MEDICINE

## 2023-11-06 NOTE — UTILIZATION REVIEW
NOTIFICATION OF ADMISSION DISCHARGE   This is a Notification of Discharge from [address]. Please be advised that this patient has been discharge from our facility. Below you will find the admission and discharge date and time including the patient’s disposition. UTILIZATION REVIEW CONTACT:  Rere Farrell  Utilization   Network Utilization Review Department  Phone: [phone] x carefully listen to the prompts. All voicemails are confidential.  Email: [email or web address]     ADMISSION INFORMATION  PRESENTATION DATE: 10/24/2023  8:22 PM  OBERVATION ADMISSION DATE:   INPATIENT ADMISSION DATE: 10/24/23 10:05 PM   DISCHARGE DATE: 11/4/2023 12:10 PM   DISPOSITION:[facility] SNF/TCU/SNU    Network Utilization Review Department  ATTENTION: Please call with any questions or concerns to [phone] and carefully listen to the prompts so that you are directed to the right person. All voicemails are confidential.   For Discharge needs, contact Care Management DC Support Team at [phone] opt. 2  Send all requests for admission clinical reviews, approved or denied determinations and any other requests to dedicated fax number below belonging to the campus where the patient is receiving treatment.  List of dedicated fax numbers for the Facilities:  [place] DENIALS (Administrative/Medical Necessity) [phone]   DISCHARGE SUPPORT TEAM (Network) [phone] [address] [facility] (Maternity/NICU/Pediatrics) [phone]   [address] [facility] [address] [place] [address] [phone] [address] [facility] [phone]   [address] [place] [address]  [address]  [phone]

## 2023-11-06 NOTE — PROGRESS NOTES
[address] [address] [address] [facility] [id number] [place] [address], [address]   [facility]  History and Physical    NAME: Tee Caballero  AGE: 70 y.o. SEX: male [id number]    DATE OF ENCOUNTER: 11/6/2023    Code status:  CPR    Assessment and Plan     1. Cerebrovascular accident (CVA), unspecified mechanism ([address])  - with left sided weakness, dysarthria and dysphagia  - PT, OT and ST ordered  - aspiration precautions  - incentive spirometry  - cont Ticagrelor 90 mg po bid  - cont Atorvastatin 40 mg po qhs    2. Essential hypertension  - cont coreg 12.5 mg po bid  - cont Amlodipine 10 mg po qd  - cont ASA 81 mg po qd    3. 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 chronic kidney disease, without long-term current use of insulin (HCC)  - cont Metformin 500 mg po bid (home med, family asked to restart)  - cont Nateglinide 60 mg po qd  - cont accuchecks bid  - HbA1c: 10/24/23 : 6.7    4. Chronic renal impairment, unspecified CKD stage  - encourage po hydration  - avoid nephrotoxic drugs    All medications and routine orders were reviewed and updated as needed. Plan discussed with: Family member and Patients wife    Chief Complaint     Seen for admission at [address] [place] [address]    History of Present Illness     Arin Baldwin, a 71 y/o male with PMH of HTN, DM2, CVA, CKD4, RCC of right kidney s/p nephrectomy, h/o GI bleed got admitted to the hospital with left sided weakness. Stroke alert was called, Neurology evaluated, MRI confirmed stroke, opted not to give thrombolytics and started on Brillinta and ASA. Vascular sx got consulted for concern of ICA stenosis, recommended out patient follow up. He was on feeding tube, later his swallowing improved, started on oral diet. He got discharged to [facility] for subacute rehab. He was seen and examined, stable. His wife and sister is at bedside. He is able to answer questions. He lives at home with wife, independent of ADLs and most of the IADLs.   Staff have no concerns at this time. HISTORY:  Past Medical History:   Diagnosis Date   • Hypertension    • TIA (transient ischemic attack)      Family History   Problem Relation Age of Onset   • Colon cancer Mother    • Diabetes type II Mother    • Heart disease Mother    • Prostate cancer Father      Social History     Socioeconomic History   • Marital status: /Civil Union     Spouse name: None   • Number of children: None   • Years of education: None   • Highest education level: None   Occupational History   • Occupation: Retired 2015 -     Tobacco Use   • Smoking status: Never   • Smokeless tobacco: Never   Vaping Use   • Vaping Use: Never used   Substance and Sexual Activity   • Alcohol use: Never   • Drug use: Never   • Sexual activity: None   Other Topics Concern   • None   Social History Narrative    Denies drug use - As per [place]    Consumes on average 1 cup of regular coffee per day      Social Determinants of Health     Financial Resource Strain: Low Risk  (11/15/2022)    Overall Financial Resource Strain (CARDIA)    • Difficulty of Paying Living Expenses: Not very hard   Food Insecurity: No Food Insecurity (10/26/2023)    Hunger Vital Sign    • Worried About Running Out of Food in the Last Year: Never true    • Ran Out of Food in the Last Year: Never true   Transportation Needs: No Transportation Needs (10/26/2023)    PRAPARE - Transportation    • Lack of Transportation (Medical): No    • Lack of Transportation (Non-Medical): No   Physical Activity: Not on file   Stress: Not on file   Social Connections: Not on file   Intimate Partner Violence: Not on file   Housing Stability: Unknown (10/26/2023)    Housing Stability Vital Sign    • Unable to Pay for Housing in the Last Year: No    • Number of Places Lived in the Last Year: Not on file    • Unstable Housing in the Last Year: No       Allergies:   Allergies   Allergen Reactions   • Plavix [Clopidogrel] Rash     Stopped two days ago because got a rash and doctors thought it was from this        Review of Systems     Review of Systems   Constitutional:  Positive for activity change and fatigue. HENT:  Positive for hearing loss. Negative for dental problem and trouble swallowing. Eyes: Negative. Respiratory: Negative. Cardiovascular: Negative. Gastrointestinal: Negative. Genitourinary: Negative. Musculoskeletal:  Positive for gait problem. Negative for arthralgias. Neurological:  Positive for weakness. Psychiatric/Behavioral: Negative. All other systems reviewed and are negative. As in [facility] [facility] [address]. Medications and orders     All medications reviewed and updated in half-way EMR. Objective     Vitals: T: 97.6, P: 63, R: 16, BP: 142/68, 97% on RA, Wt: 225.8 lbs    Physical Exam  Vitals and nursing note reviewed. Constitutional:       General: He is not in acute distress. Appearance: He is well-developed. He is obese. He is not diaphoretic. HENT:      Head: Normocephalic and atraumatic. Nose: Nose normal.      Mouth/Throat:      Mouth: Mucous membranes are moist.      Pharynx: Oropharynx is clear. No oropharyngeal exudate. Eyes:      General: No scleral icterus. Right eye: No discharge. Left eye: No discharge. Extraocular Movements: Extraocular movements intact. Conjunctiva/sclera: Conjunctivae normal.   Cardiovascular:      Rate and Rhythm: Normal rate and regular rhythm. Heart sounds: Normal heart sounds. No murmur heard. Pulmonary:      Effort: Pulmonary effort is normal. No respiratory distress. Breath sounds: Normal breath sounds. No wheezing. Chest:      Chest wall: No tenderness. Abdominal:      General: Bowel sounds are normal. There is no distension. Palpations: Abdomen is soft. Tenderness: There is no abdominal tenderness. There is no guarding. Musculoskeletal:         General: Deformity present. No tenderness. Cervical back: Normal range of motion. Right lower leg: No edema. Comments: Impaired ROM in left side due to weakness       Skin:     General: Skin is warm and dry. Neurological:      Mental Status: He is alert and oriented to person, place, and time. Cranial Nerves: No cranial nerve deficit. Motor: Weakness present. No abnormal muscle tone. Coordination: Coordination abnormal.      Gait: Gait abnormal.      Comments: Left sided weakness  Dysarthria  dysphagia   Psychiatric:         Mood and Affect: Mood normal.         Behavior: Behavior normal.         Pertinent Laboratory/Diagnostic Studies: The following labs/studies were reviewed please see chart or hospital paperwork for details. Ref Range & Units 11/3/23 0544 11/2/23 0512 10/29/23 0534 10/27/23 0511 10/26/23 0513 10/25/23 0617 10/24/23 2054    Sodium 135 - 147 mmol/L 140 138 138 137 139 139 137   Potassium 3.5 - 5.3 mmol/L 4.4 4.1 3.6 3.6 3.8 4.5 4.3   Chloride 96 - 108 mmol/L 104 103 104 101 104 103 102   CO2 21 - 32 mmol/L 28 29 27 27 27 27 29   ANION GAP mmol/L 8 6 7 9 8 9 6   BUN 5 - 25 mg/dL 44 High  47 High  36 High  26 High  27 High  30 High  32 High    Creatinine 0.60 - 1.30 mg/dL 2.08 High  2.04 High  CM 1.95 High  CM 1.82 High  CM 1.94 High  CM 2.04 High  CM 1.99 High  CM   Comment: Standardized to IDMS reference method   Glucose 65 - 140 mg/dL 152 High  219 High   High     High   High  CM   Comment: If the patient is fasting, the ADA then defines impaired fasting glucose as > 100 mg/dL and diabetes as > or equal to 123 mg/dL.    Calcium 8.4 - 10.2 mg/dL 9.0 8.6 8.2 Low  8.4 8.4 9.0 9.0   eGFR ml/min/1.73sq m 30 30 32 35 32 30      Ref Range & Units 11/3/23 0544 11/2/23 0512 10/29/23 0534 10/27/23 0511 10/26/23 0513 10/24/23 2054 5/17/23 1047    WBC 4.31 - 10.16 Thousand/uL 10.48 High  9.99 9.20 10.58 High  12.76 High  12.02 High  8.91   RBC 3.88 - 5.62 Million/uL 4.29 4.14 4.23 4.40 4.14 4.34 4.46   Hemoglobin 12.0 - 17.0 g/dL 13.4 12.9 13.5 13.5 12.9 13.6 14.0   Hematocrit 36.5 - 49.3 % 39.8 39.2 38.6 40.6 38.2 40.1 41.0   MCV 82 - 98 fL 93 95 91 92 92 92 92   MCH 26.8 - 34.3 pg 31.2 31.2 31.9 30.7 31.2 31.3 31.4   MCHC 31.4 - 37.4 g/dL 33.7 32.9 35.0 33.3 33.8 33.9 34.1   RDW 11.6 - 15.1 % 12.3 12.2 12.4 12.5 12.6 12.4 12.4   Platelets 542 - 926 Thousands/uL 229 221 168 157 167 152 201       - Counseling Documentation: patient was counseled regarding: risk factor reductions

## 2023-11-07 ENCOUNTER — TELEPHONE (OUTPATIENT)
Dept: NEPHROLOGY | Facility: CLINIC | Age: 78
End: 2023-11-07

## 2023-11-08 ENCOUNTER — NURSING HOME VISIT (OUTPATIENT)
Dept: WOUND CARE | Facility: HOSPITAL | Age: 78
End: 2023-11-08
Payer: COMMERCIAL

## 2023-11-08 DIAGNOSIS — L89.153 PRESSURE INJURY OF SACRAL REGION, STAGE 3 (HCC): Primary | ICD-10-CM

## 2023-11-08 DIAGNOSIS — R26.2 AMBULATORY DYSFUNCTION: ICD-10-CM

## 2023-11-08 PROCEDURE — 99304 1ST NF CARE SF/LOW MDM 25: CPT | Performed by: NURSE PRACTITIONER

## 2023-11-08 NOTE — PROGRESS NOTES
[facility]       Patient ID: Rolando Rubalcava is a 70 y.o. male Date of Birth 1945     Location of Service: [facility]    Performed wound round with: Wound team     Chief Complaint : Sacrum    Wound Instructions:  Wound:  Sacrum  Discontinue previous wound order  Cleanse the wound bed with NSS   Apply non-sting skin prep to periwound area  Apply triad paste to wound bed   Frequency : twice a day and prn for soiling  Offload all wounds  Turn and reposition frequently,   Increase protein intake. Monitor for any sign of infection or worsening, inform PCP or patient's primary physician in your facility. Allergies  Plavix [clopidogrel]      Assessment & Plan:  1. Pressure injury of sacral region, stage 3 (HCC)  Assessment & Plan:  Sacrum  Full thickness, no obvious sign of infection  Local wound care : triad  Continue to offload, on air mattress  Increase protein intake  Follow up next week      2. Ambulatory dysfunction  Assessment & Plan:  On STR               Subjective:   11/8/2023This is a 70 y.o., male referred to our service for wound/ skin alterations on sacrum. Patient have a complex medical history including but not limited to type 2 diabetes and HTN . Patient was referred by Senior Care Team. Patient was seen in collaboration with the facility wound team.     Wound History: Patient was admitted with pressure injury on the sacrum. Received patient in bed, seems comfortable. Patient have a good appetite. Patient is incontinent of bowel and bladder. Patient needs assistance with turning and repositioning in bed. Currently on frozen nutritional treat. Review of Systems   Constitutional: Negative. Respiratory: Negative. Cardiovascular: Negative. Skin:  Positive for wound. Psychiatric/Behavioral: Negative. Objective:    Physical Exam  Constitutional:       Appearance: Normal appearance.    Cardiovascular: Rate and Rhythm: Normal rate. Pulmonary:      Effort: Pulmonary effort is normal.   Genitourinary:     Comments: incontinent  Musculoskeletal:      Comments: LROM   Skin:     Findings: Lesion present. Comments: Location : Sacrum. 2.1 x 0.7 x 0.2 cm, 100% granulation, small amount of serous drainage, periwound is normal, no malodor, no obvious sign of infection   Neurological:      Mental Status: He is alert. Procedures           Patient's care was coordinated with nursing facility staff. Recent vitals, labs and updated medications were reviewed on EMR or chart system of facility. Past Medical, surgical, social, medication and allergy history and patient's previous records were reviewed and updated as appropriate: Most up-to date information is available in the facility EMR where the patient is currently admitted.     Patient Active Problem List   Diagnosis    Cerebral aneurysm, nonruptured    TIA (transient ischemic attack)    CVA (cerebral vascular accident) ([address])    Essential hypertension    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 chronic kidney disease, without long-term current use of insulin ([facility])    Class 1 obesity due to excess calories with serious comorbidity and body mass index (BMI) of 32.0 to 32.9 in adult    Polyarticular arthritis    Chronic right-sided low back pain with right-sided sciatica    Lumbar radiculopathy    Chronic pain syndrome    Hypercalcemia    GI bleed    SIRS (systemic inflammatory response syndrome) ([facility])    Tachycardia    Chronic renal impairment    Persistent proteinuria    Bilateral lower extremity edema    Chronic renal disease, stage IV ([facility])    Renal cell carcinoma of right kidney ([facility])    History of GI bleed    Cough and secretions    Pressure injury of sacral region, stage 3 ([address])    Ambulatory dysfunction     Past Medical History:   Diagnosis Date    Hypertension     TIA (transient ischemic attack)      Past Surgical History:   Procedure Laterality Date CYSTECTOMY      Per patient cyst removal from his back    LASIK      WI LAPAROSCOPY RADICAL NEPHRECTOMY Right 7/6/2022    Procedure: NEPHRECTOMY RADICAL LAPAROSCOPIC W/ ROBOTICS, poss open;  Surgeon: Janae Callaway MD;  Location: [facility];  Service: Urology     Social History     Socioeconomic History    Marital status: /Civil Union     Spouse name: None    Number of children: None    Years of education: None    Highest education level: None   Occupational History    Occupation: Retired 2015 -     Tobacco Use    Smoking status: Never    Smokeless tobacco: Never   Vaping Use    Vaping Use: Never used   Substance and Sexual Activity    Alcohol use: Never    Drug use: Never    Sexual activity: None   Other Topics Concern    None   Social History Narrative    Denies drug use - As per [place]    Consumes on average 1 cup of regular coffee per day      Social Determinants of Health     Financial Resource Strain: Low Risk  (11/15/2022)    Overall Financial Resource Strain (CARDIA)     Difficulty of Paying Living Expenses: Not very hard   Food Insecurity: No Food Insecurity (10/26/2023)    Hunger Vital Sign     Worried About Running Out of Food in the Last Year: Never true     [address] in the Last Year: Never true   Transportation Needs: No Transportation Needs (10/26/2023)    PRAPARE - Transportation     Lack of Transportation (Medical): No     Lack of Transportation (Non-Medical):  No   Physical Activity: Not on file   Stress: Not on file   Social Connections: Not on file   Intimate Partner Violence: Not on file   Housing Stability: Unknown (10/26/2023)    Housing Stability Vital Sign     Unable to Pay for Housing in the Last Year: No     Number of Places Lived in the Last Year: Not on file     Unstable Housing in the Last Year: No        Current Outpatient Medications:     amLODIPine (NORVASC) 5 mg tablet, Take 2 tablets (10 mg total) by mouth daily, Disp: 90 tablet, Rfl: 0    Ascorbic Acid (vitamin C) 100 MG tablet, Take 100 mg by mouth daily, Disp: , Rfl:     aspirin (ECOTRIN LOW STRENGTH) 81 mg EC tablet, Take 81 mg by mouth daily, Disp: , Rfl:     atorvastatin (LIPITOR) 40 mg tablet, Take 1 tablet (40 mg total) by mouth daily, Disp: , Rfl: 0    carvedilol (COREG) 12.5 mg tablet, Take 1 tablet (12.5 mg total) by mouth 2 (two) times a day with meals, Disp: , Rfl: 0    cholecalciferol (VITAMIN D3) 1,000 units tablet, Take 1,000 Units by mouth daily, Disp: , Rfl:     insulin lispro (HumaLOG) 100 units/mL injection, Inject 1-6 Units under the skin 3 (three) times a day before meals, Disp: , Rfl: 0    Multiple Vitamins-Minerals (MICHELLE MULTI MEN) TABS, Take by mouth 2 (two) times a day, Disp: , Rfl:     nateglinide (STARLIX) 60 mg tablet, Take 1 tablet (60 mg total) by mouth daily before dinner, Disp: 90 tablet, Rfl: 4    pantoprazole (PROTONIX) 40 mg tablet, Take 1 tablet (40 mg total) by mouth daily in the early morning Do not start before November 5, 2023., Disp: , Rfl: 0    ticagrelor (BRILINTA) 90 MG, 1 tablet (90 mg total) by Per NG Tube route every 12 (twelve) hours, Disp: , Rfl: 0  Family History   Problem Relation Age of Onset    Colon cancer Mother     Diabetes type II Mother     Heart disease Mother     Prostate cancer Father               Coordination of Care: Wound team aware of the treatment plan. Facility nurse will provide wound treatment and monitor the wound for any changes. Patient / Staff education : Patient / Staff was given education on sign of infection and pressure ulcer prevention. Patient/ Staff verbalized understanding     Follow up :  Next week    Voice-recognition software may have been used in the preparation of this document. Occasional wrong word or "sound-alike" substitutions may have occurred due to the inherent limitations of voice recognition software. Interpretation should be guided by context.       KAYODE Frost

## 2023-11-08 NOTE — ASSESSMENT & PLAN NOTE
Sacrum  Full thickness, no obvious sign of infection  Local wound care : triad  Continue to offload, on air mattress  Increase protein intake  Follow up next week

## 2023-11-10 ENCOUNTER — NURSING HOME VISIT (OUTPATIENT)
Dept: GERIATRICS | Facility: OTHER | Age: 78
End: 2023-11-10
Payer: COMMERCIAL

## 2023-11-10 VITALS
HEART RATE: 72 BPM | TEMPERATURE: 97.7 F | SYSTOLIC BLOOD PRESSURE: 136 MMHG | OXYGEN SATURATION: 97 % | BODY MASS INDEX: 30.62 KG/M2 | RESPIRATION RATE: 18 BRPM | WEIGHT: 225.8 LBS | DIASTOLIC BLOOD PRESSURE: 66 MMHG

## 2023-11-10 DIAGNOSIS — R26.2 AMBULATORY DYSFUNCTION: ICD-10-CM

## 2023-11-10 DIAGNOSIS — I10 ESSENTIAL HYPERTENSION: ICD-10-CM

## 2023-11-10 DIAGNOSIS — N18.31 TYPE 2 DIABETES MELLITUS WITH STAGE 3A CHRONIC KIDNEY DISEASE, WITHOUT LONG-TERM CURRENT USE OF INSULIN (HCC): ICD-10-CM

## 2023-11-10 DIAGNOSIS — E11.22 TYPE 2 DIABETES MELLITUS WITH STAGE 3A CHRONIC KIDNEY DISEASE, WITHOUT LONG-TERM CURRENT USE OF INSULIN (HCC): ICD-10-CM

## 2023-11-10 DIAGNOSIS — C64.1 RENAL CELL CARCINOMA OF RIGHT KIDNEY (HCC): ICD-10-CM

## 2023-11-10 DIAGNOSIS — L89.153 PRESSURE INJURY OF SACRAL REGION, STAGE 3 (HCC): ICD-10-CM

## 2023-11-10 DIAGNOSIS — I63.9 CEREBROVASCULAR ACCIDENT (CVA), UNSPECIFIED MECHANISM (HCC): Primary | ICD-10-CM

## 2023-11-10 DIAGNOSIS — N18.4 CHRONIC RENAL DISEASE, STAGE IV (HCC): ICD-10-CM

## 2023-11-10 PROBLEM — G45.9 TIA (TRANSIENT ISCHEMIC ATTACK): Status: RESOLVED | Noted: 2019-07-09 | Resolved: 2023-11-10

## 2023-11-10 PROCEDURE — 99309 SBSQ NF CARE MODERATE MDM 30: CPT | Performed by: NURSE PRACTITIONER

## 2023-11-10 NOTE — ASSESSMENT & PLAN NOTE
Lab Results   Component Value Date    HGBA1C 6.7 (H) 10/24/2023     Per geriatric guidelines, goal HgA1c is > 7.5 to prevent hypoglycemic event in the older adult with cognitive decline    Continue Accu Checks  Continue Nateglinide 60 mg daily in the afternoon  Home metformin held inpatient but family requested to restart at rehab- Continue Metformin home dose 500 mg BID  Hypoglycemic protocol

## 2023-11-10 NOTE — PROGRESS NOTES
[address]  [phone]  FACILITY: [facility]  Code 31 (STR)      NAME: Celia Guzman  AGE: 70 y.o. SEX: male CODE STATUS: CPR    DATE OF ENCOUNTER: 11/10/2023    Assessment and Plan     1. Cerebrovascular accident (CVA), unspecified mechanism ([address])  Assessment & Plan:  Presented to hospital with left-sided weakness family and patient declined thrombolytics  CT of the head without any acute abnormality did show chronic microangiopathic changes  CTA with occlusion of the left V3 and left V4 segments similar to prior severe near occlusive plaque stenosis to the proximal left cervical ICA greater than 90% stenosis appears worse than prior exam additionally with a left ICA aneurysm 5.3 x 5.7 mm consistent with prior exam  MRI brain with acute to early subacute lacunar infarct in the right veronique, no mass effect or hemorrhagic conversion  Echo with EF 50% with grade 1 DD  Continue dual antiplatelet therapy with aspirin and Brilinta x90 days followed by aspirin monotherapy  Continue PT OT  Outpatient follow-up with vascular surgery for ICA stenosis follow-up  Also noted with dysphagia continue dysphagia 2 with nectar thick-continue speech therapy      2. Essential hypertension  Assessment & Plan:  Continue to monitor at rehab s/p CVA  Avoid Hypotension   Continue home dose Amlodipine 10 mg daily   Started on Coreg 12.5 mg BID with hold parameters while inpatient         3.  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 chronic kidney disease, without long-term current use of insulin (HCC)  Assessment & Plan:  Lab Results   Component Value Date    HGBA1C 6.7 (H) 10/24/2023     Per geriatric guidelines, goal HgA1c is > 7.5 to prevent hypoglycemic event in the older adult with cognitive decline    Continue Accu Checks  Continue Nateglinide 60 mg daily in the afternoon  Home metformin held inpatient but family requested to restart at rehab- Continue Metformin home dose 500 mg BID  Hypoglycemic protocol      4. Chronic renal disease, stage IV [facility])  Assessment & Plan:  Lab Results   Component Value Date    EGFR 30 11/03/2023    EGFR 30 11/02/2023    EGFR 32 10/29/2023    CREATININE 2.08 (H) 11/03/2023    CREATININE 2.04 (H) 11/02/2023    CREATININE 1.95 (H) 10/29/2023     Baseline creatinine between 1.9-2.4. Repeat BMP today Creat 1.2 at baseline   Renally dose medications  Avoid Hypotension  Avoid NSAIDS  Repeat BMP next week (11/16/23)      5. Renal cell carcinoma of right kidney [facility])  Assessment & Plan:  S/P Right nephrectomy  OP f/u with Urology as scheduled      6. Ambulatory dysfunction  Assessment & Plan:  Multifactorial  Continue PT/OT  Fall Precautions  Ensure adequate nutrition/hydration   Monitor CBC/BMP    following for d/c planning         7. Pressure injury of sacral region, stage 3 ([address])  Assessment & Plan:  Noted on Sacrum- not visualized on exam today  Followed by Wound care CRNP- seen on Wednesday  Wound noted no s/s of infection at the time  Continue local wound care with [facility]  Offload  Continue Air Mattress  Continue to encourage protein intake            All medications and routine orders were reviewed and updated as needed.     Chief Complaint     STR follow up visit    Past Medical and Surgica History      Past Medical History:   Diagnosis Date    Hypertension     TIA (transient ischemic attack)      Past Surgical History:   Procedure Laterality Date    CYSTECTOMY      Per patient cyst removal from his back    LASIK      DE LAPAROSCOPY RADICAL NEPHRECTOMY Right 7/6/2022    Procedure: NEPHRECTOMY RADICAL LAPAROSCOPIC W/ ROBOTICS, poss open;  Surgeon: Isiah Hinton MD;  Location: [facility];  Service: Urology     Allergies   Allergen Reactions    Plavix [Clopidogrel] Rash     Stopped two days ago because got a rash and doctors thought it was from this           History of Present Illness     Aj Rose is a 70 y.o. male admitted to [facility] following hospital stay for Acute CVA. Patient has a past medical Hx including but not limited to HTN, DM2, CVA, CKD4, RCC s/p Right Nephrectomy. Patient is seen in collaboration with nursing for medical mgmt and STR follow up. Patient initially presented to hospital with left sided weakness, dysarthria, dysphagia. Alert was called and neurology evaluated, MRI did confirm acute CVA. Patient and family opted not to receive thrombolytics. Patient was started on dual antiplatelet therapy with Brilinta and aspirin. Additionally work-up revealed ICA stenosis and recommended outpatient vascular surgery consult. Patient initially required feeding tube due to severe dysphagia. Later patient swallowing improved and was started on an oral diet. Patient was recommended discharge to postacute rehab. At baseline patient lives at home with his wife was independent with his ADLs and most of his IADLs. Seen and examined at bedside today. Patient is a reliable historian. Does not appear in any distress, pleasant and cooperative. Alert and oriented x3. Patient denies pain on exam.  Patient states he has a good appetite feels he is getting stronger since the hospital.Denies CP/SOB/N/V/D. Denies lightheadedness, dizziness, headaches, vision changes. Denies any bowel or bladder issues. Patient is actively participating in therapy. No concerns from nursing at this time. The patient's allergies, past medical, surgical, social and family history were reviewed and unchanged. Review of Systems     Review of Systems   Constitutional:  Positive for activity change. Musculoskeletal:  Positive for gait problem. Skin:  Positive for wound. Neurological:  Positive for weakness. All other systems reviewed and are negative.         Objective     Vitals:   Vitals:    11/10/23 1048   BP: 136/66   Pulse: 72   Resp: 18   Temp: 97.7 °F (36.5 °C)   SpO2: 97%         Physical Exam  Vitals and nursing note reviewed. Constitutional:       General: He is not in acute distress. Appearance: Normal appearance. HENT:      Head: Normocephalic and atraumatic. Nose: No congestion or rhinorrhea. Mouth/Throat:      Mouth: Mucous membranes are moist.      Comments: Left facial droop  Eyes:      General: No scleral icterus. Extraocular Movements: Extraocular movements intact. Conjunctiva/sclera: Conjunctivae normal.      Pupils: Pupils are equal, round, and reactive to light. Cardiovascular:      Rate and Rhythm: Normal rate and regular rhythm. Pulses: Normal pulses. Heart sounds: Normal heart sounds. No murmur heard. Pulmonary:      Effort: Pulmonary effort is normal.      Breath sounds: Normal breath sounds. No wheezing, rhonchi or rales. Abdominal:      General: Bowel sounds are normal. There is no distension. Palpations: Abdomen is soft. Tenderness: There is no abdominal tenderness. Musculoskeletal:         General: No swelling or signs of injury. Comments: Left sided hemiparesis , Left leg in multi podis boot   Lymphadenopathy:      Cervical: No cervical adenopathy. Skin:     General: Skin is warm and dry. Findings: No erythema. Comments: Noted with sacral ulcer, unable to visualize on exam today- please see Wound Care CRNP notes   Neurological:      Mental Status: He is alert and oriented to person, place, and time. GCS: GCS eye subscore is 4. GCS verbal subscore is 5. GCS motor subscore is 6. Cranial Nerves: Dysarthria and facial asymmetry present. Sensory: No sensory deficit. Motor: Weakness present. Gait: Gait abnormal.   Psychiatric:         Attention and Perception: Attention normal.         Mood and Affect: Mood normal.         Speech: Speech normal.         Behavior: Behavior normal. Behavior is cooperative.          Pertinent Laboratory/Diagnostic Studies:     Reviewed in facility chart      Current Medications Medications reviewed and updated see facility [facility] for details. Current Outpatient Medications:     metFORMIN (GLUCOPHAGE) 500 mg tablet, Take 500 mg by mouth 2 (two) times a day with meals, Disp: , Rfl:     amLODIPine (NORVASC) 5 mg tablet, Take 2 tablets (10 mg total) by mouth daily, Disp: 90 tablet, Rfl: 0    Ascorbic Acid (vitamin C) 100 MG tablet, Take 100 mg by mouth daily, Disp: , Rfl:     aspirin (ECOTRIN LOW STRENGTH) 81 mg EC tablet, Take 81 mg by mouth daily, Disp: , Rfl:     atorvastatin (LIPITOR) 40 mg tablet, Take 1 tablet (40 mg total) by mouth daily, Disp: , Rfl: 0    carvedilol (COREG) 12.5 mg tablet, Take 1 tablet (12.5 mg total) by mouth 2 (two) times a day with meals, Disp: , Rfl: 0    cholecalciferol (VITAMIN D3) 1,000 units tablet, Take 1,000 Units by mouth daily, Disp: , Rfl:     insulin lispro (HumaLOG) 100 units/mL injection, Inject 1-6 Units under the skin 3 (three) times a day before meals, Disp: , Rfl: 0    nateglinide (STARLIX) 60 mg tablet, Take 1 tablet (60 mg total) by mouth daily before dinner, Disp: 90 tablet, Rfl: 4    pantoprazole (PROTONIX) 40 mg tablet, Take 1 tablet (40 mg total) by mouth daily in the early morning Do not start before November 5, 2023., Disp: , Rfl: 0    ticagrelor (BRILINTA) 90 MG, 1 tablet (90 mg total) by Per NG Tube route every 12 (twelve) hours, Disp: , Rfl: 0     Please note:  Voice-recognition software may have been used in the preparation of this document. Occasional wrong word or "sound-alike" substitutions may have occurred due to the inherent limitations of voice recognition software. Interpretation should be guided by context.          KAYODE Li  11/10/2023  10:49 AM

## 2023-11-10 NOTE — ASSESSMENT & PLAN NOTE
Noted on Sacrum- not visualized on exam today  Followed by Wound care CRNP- seen on Wednesday  Wound noted no s/s of infection at the time  Continue local wound care with [facility]  Offload  Continue Air Mattress  Continue to encourage protein intake

## 2023-11-10 NOTE — ASSESSMENT & PLAN NOTE
Continue to monitor at rehab s/p CVA  Avoid Hypotension   Continue home dose Amlodipine 10 mg daily   Started on Coreg 12.5 mg BID with hold parameters while inpatient

## 2023-11-10 NOTE — ASSESSMENT & PLAN NOTE
Presented to hospital with left-sided weakness family and patient declined thrombolytics  CT of the head without any acute abnormality did show chronic microangiopathic changes  CTA with occlusion of the left V3 and left V4 segments similar to prior severe near occlusive plaque stenosis to the proximal left cervical ICA greater than 90% stenosis appears worse than prior exam additionally with a left ICA aneurysm 5.3 x 5.7 mm consistent with prior exam  MRI brain with acute to early subacute lacunar infarct in the right veronique, no mass effect or hemorrhagic conversion  Echo with EF 50% with grade 1 DD  Continue dual antiplatelet therapy with aspirin and Brilinta x90 days followed by aspirin monotherapy  Continue PT OT  Outpatient follow-up with vascular surgery for ICA stenosis follow-up  Also noted with dysphagia continue dysphagia 2 with nectar thick-continue speech therapy

## 2023-11-10 NOTE — ASSESSMENT & PLAN NOTE
Lab Results   Component Value Date    EGFR 30 11/03/2023    EGFR 30 11/02/2023    EGFR 32 10/29/2023    CREATININE 2.08 (H) 11/03/2023    CREATININE 2.04 (H) 11/02/2023    CREATININE 1.95 (H) 10/29/2023     Baseline creatinine between 1.9-2.4.   Repeat BMP today Creat 1.2 at baseline   Renally dose medications  Avoid Hypotension  Avoid NSAIDS  Repeat BMP next week (11/16/23)

## 2023-11-13 ENCOUNTER — TELEPHONE (OUTPATIENT)
Dept: NEUROLOGY | Facility: CLINIC | Age: 78
End: 2023-11-13

## 2023-11-13 NOTE — TELEPHONE ENCOUNTER
Post CVA Discharge Follow Up  Hospitalization: 10/24/23-11/4/23    According to chart, patient discharged to [facility]. Called facility, spoke with the patient's nurse. She reports how the patient is doing okay. Denies any new or worsening stroke-like symptoms since discharge. Ambulation / ADLs:  Patient is a max assist OOB. He continues to require assistance with ADLs and transfers. Patient is on a pureed diet with honey thick liquids. He remains incontinent at this time. Medication Review:  Reviewed medications with them. There have not been any medication changes since discharge from the hospital. No reported missed doses, medication side effects, or signs of bleeding. Last reported /60      Appointments:  Per inpatient neurology notes:  "Dispo recommendations TBD. Patient will require stroke follow up with stroke service in 6-8 weeks."    Message sent to neurology provider for clarification for follow up. There is no estimated discharge date at this time.

## 2023-11-14 ENCOUNTER — NURSING HOME VISIT (OUTPATIENT)
Dept: GERIATRICS | Facility: OTHER | Age: 78
End: 2023-11-14
Payer: COMMERCIAL

## 2023-11-14 VITALS
HEART RATE: 80 BPM | TEMPERATURE: 97.7 F | OXYGEN SATURATION: 97 % | RESPIRATION RATE: 18 BRPM | WEIGHT: 225.8 LBS | DIASTOLIC BLOOD PRESSURE: 68 MMHG | BODY MASS INDEX: 30.62 KG/M2 | SYSTOLIC BLOOD PRESSURE: 144 MMHG

## 2023-11-14 DIAGNOSIS — R26.2 AMBULATORY DYSFUNCTION: ICD-10-CM

## 2023-11-14 DIAGNOSIS — N18.31 TYPE 2 DIABETES MELLITUS WITH STAGE 3A CHRONIC KIDNEY DISEASE, WITHOUT LONG-TERM CURRENT USE OF INSULIN (HCC): ICD-10-CM

## 2023-11-14 DIAGNOSIS — L89.153 PRESSURE INJURY OF SACRAL REGION, STAGE 3 (HCC): ICD-10-CM

## 2023-11-14 DIAGNOSIS — I63.9 CEREBROVASCULAR ACCIDENT (CVA), UNSPECIFIED MECHANISM (HCC): Primary | ICD-10-CM

## 2023-11-14 DIAGNOSIS — N18.4 CHRONIC RENAL DISEASE, STAGE IV (HCC): ICD-10-CM

## 2023-11-14 DIAGNOSIS — C64.1 RENAL CELL CARCINOMA OF RIGHT KIDNEY (HCC): ICD-10-CM

## 2023-11-14 DIAGNOSIS — E11.22 TYPE 2 DIABETES MELLITUS WITH STAGE 3A CHRONIC KIDNEY DISEASE, WITHOUT LONG-TERM CURRENT USE OF INSULIN (HCC): ICD-10-CM

## 2023-11-14 DIAGNOSIS — I10 ESSENTIAL HYPERTENSION: ICD-10-CM

## 2023-11-14 PROCEDURE — 99309 SBSQ NF CARE MODERATE MDM 30: CPT | Performed by: NURSE PRACTITIONER

## 2023-11-14 NOTE — PROGRESS NOTES
[address]  [phone]  FACILITY: [facility]  Code 31 (STR)      NAME: Reggie Montoya  AGE: 70 y.o. SEX: male CODE STATUS: CPR    DATE OF ENCOUNTER: 11/14/2023    Assessment and Plan     1. Cerebrovascular accident (CVA), unspecified mechanism ([address])  Assessment & Plan:  Presented to hospital with left-sided weakness family and patient declined thrombolytics  CT of the head without any acute abnormality did show chronic microangiopathic changes  CTA with occlusion of the left V3 and left V4 segments similar to prior severe near occlusive plaque stenosis to the proximal left cervical ICA greater than 90% stenosis appears worse than prior exam additionally with a left ICA aneurysm 5.3 x 5.7 mm consistent with prior exam  MRI brain with acute to early subacute lacunar infarct in the right veronique, no mass effect or hemorrhagic conversion  Echo with EF 50% with grade 1 DD  Continue dual antiplatelet therapy with aspirin and Brilinta x90 days followed by aspirin monotherapy  Continue PT OT  Outpatient follow-up with vascular surgery for ICA stenosis follow-up  Also noted with dysphagia continue dysphagia 2 with nectar thick-continue speech therapy      2. 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 chronic kidney disease, without long-term current use of insulin (HCC)  Assessment & Plan:    Lab Results   Component Value Date    HGBA1C 6.7 (H) 10/24/2023     Per geriatric guidelines, goal HgA1c is > 7.5 to prevent hypoglycemic event in the older adult with cognitive decline    Continue Accu Checks  Continue Nateglinide 60 mg daily in the afternoon  Home metformin held inpatient but family requested to restart at rehab- Continue Metformin home dose 500 mg BID  Hypoglycemic protocol      3.  Essential hypertension  Assessment & Plan:  Continue to monitor at rehab s/p CVA  Avoid Hypotension   Continue home dose Amlodipine 10 mg daily   Started on Coreg 12.5 mg BID with hold parameters while inpatient         4. Chronic renal disease, stage IV [facility])  Assessment & Plan:    Lab Results   Component Value Date    EGFR 30 11/03/2023    EGFR 30 11/02/2023    EGFR 32 10/29/2023    CREATININE 2.08 (H) 11/03/2023    CREATININE 2.04 (H) 11/02/2023    CREATININE 1.95 (H) 10/29/2023     Baseline creatinine between 1.9-2.4. Repeat BMP today Creat 1.2 at baseline   Renally dose medications  Avoid Hypotension  Avoid NSAIDS  Repeat BMP (11/16/23)      5. Ambulatory dysfunction  Assessment & Plan:  Multifactorial  Continue PT/OT  Fall Precautions  Ensure adequate nutrition/hydration   Monitor CBC/BMP    following for d/c planning         6. Pressure injury of sacral region, stage 3 [facility])  Assessment & Plan:  Noted on Sacrum- not visualized on exam today  Followed by Wound care CRNP- seen on Wednesday  Wound noted no s/s of infection at the time  Continue local wound care with [facility]  Offload  Continue Air Mattress  Continue to encourage protein intake       7. Renal cell carcinoma of right kidney [facility])  Assessment & Plan:  S/P Right nephrectomy  OP f/u with Urology as scheduled           All medications and routine orders were reviewed and updated as needed.     Chief Complaint     STR follow up visit    Past Medical and Surgica History      Past Medical History:   Diagnosis Date    Hypertension     TIA (transient ischemic attack)      Past Surgical History:   Procedure Laterality Date    CYSTECTOMY      Per patient cyst removal from his back    LASIK      [place] LAPAROSCOPY RADICAL NEPHRECTOMY Right 7/6/2022    Procedure: NEPHRECTOMY RADICAL LAPAROSCOPIC W/ ROBOTICS, poss open;  Surgeon: Alba Ramirez MD;  Location: [facility];  Service: Urology     Allergies   Allergen Reactions    Plavix [Clopidogrel] Rash     Stopped two days ago because got a rash and doctors thought it was from this           History of Present Illness     Alejandro Cueto is a 70 y.o. male admitted to [facility] following hospital stay for Acute CVA. Patient has a past medical Hx including but not limited to HTN, DM2, CVA, CKD4, RCC s/p Right Nephrectomy. Patient is seen in collaboration with nursing for medical mgmt and STR follow up. Patient initially presented to hospital with left sided weakness, dysarthria, dysphagia. Alert was called and neurology evaluated, MRI did confirm acute CVA. Patient and family opted not to receive thrombolytics. Patient was started on dual antiplatelet therapy with Brilinta and aspirin. Additionally work-up revealed ICA stenosis and recommended outpatient vascular surgery consult. Patient initially required feeding tube due to severe dysphagia. Later patient swallowing improved and was started on an oral diet. Patient was recommended discharge to postacute rehab. At baseline patient lives at home with his wife was independent with his ADLs and most of his IADLs. Seen and examined at bedside today. Patient is a reliable historian. Does not appear in any distress, pleasant and cooperative. Alert and oriented x3. Patient denies pain on exam. Still with limited/no mobility of left arm and leg. Patient states he has a good appetite, denies any bowel or bladder issues. Denies CP/SOB/N/V/D. Denies lightheadedness, dizziness, headaches, vision changes. Patient is actively participating in therapy. No concerns from nursing at this time. The patient's allergies, past medical, surgical, social and family history were reviewed and unchanged. Review of Systems     Review of Systems   Constitutional:  Positive for activity change. Musculoskeletal:  Positive for gait problem. Skin:  Positive for wound. Neurological:  Positive for weakness. All other systems reviewed and are negative.         Objective     Vitals:   Vitals:    11/14/23 0947   BP: 144/68   Pulse: 80   Resp: 18   Temp: 97.7 °F (36.5 °C)   SpO2: 97%         Physical Exam  Vitals and nursing note reviewed. Constitutional:       General: He is not in acute distress. Appearance: Normal appearance. HENT:      Head: Normocephalic and atraumatic. Nose: No congestion or rhinorrhea. Mouth/Throat:      Mouth: Mucous membranes are moist.      Comments: Left facial droop  Eyes:      General: No scleral icterus. Extraocular Movements: Extraocular movements intact. Conjunctiva/sclera: Conjunctivae normal.      Pupils: Pupils are equal, round, and reactive to light. Cardiovascular:      Rate and Rhythm: Normal rate and regular rhythm. Pulses: Normal pulses. Heart sounds: Normal heart sounds. No murmur heard. Pulmonary:      Effort: Pulmonary effort is normal.      Breath sounds: Normal breath sounds. No wheezing, rhonchi or rales. Abdominal:      General: Bowel sounds are normal. There is no distension. Palpations: Abdomen is soft. Tenderness: There is no abdominal tenderness. Musculoskeletal:         General: No swelling or signs of injury. Comments: Left sided hemiparesis , Left leg in multi podis boot   Lymphadenopathy:      Cervical: No cervical adenopathy. Skin:     General: Skin is warm and dry. Findings: No erythema. Comments: Noted with sacral ulcer, unable to visualize on exam today- please see Wound Care CRNP notes   Neurological:      Mental Status: He is alert and oriented to person, place, and time. GCS: GCS eye subscore is 4. GCS verbal subscore is 5. GCS motor subscore is 6. Cranial Nerves: Dysarthria and facial asymmetry present. Sensory: No sensory deficit. Motor: Weakness present. Gait: Gait abnormal.   Psychiatric:         Attention and Perception: Attention normal.         Mood and Affect: Mood normal.         Speech: Speech normal.         Behavior: Behavior normal. Behavior is cooperative.          Pertinent Laboratory/Diagnostic Studies:     Reviewed in facility chart      Current Medications   Medications reviewed and updated see facility [facility] for details. Current Outpatient Medications:     amLODIPine (NORVASC) 5 mg tablet, Take 2 tablets (10 mg total) by mouth daily, Disp: 90 tablet, Rfl: 0    Ascorbic Acid (vitamin C) 100 MG tablet, Take 100 mg by mouth daily, Disp: , Rfl:     aspirin (ECOTRIN LOW STRENGTH) 81 mg EC tablet, Take 81 mg by mouth daily, Disp: , Rfl:     atorvastatin (LIPITOR) 40 mg tablet, Take 1 tablet (40 mg total) by mouth daily, Disp: , Rfl: 0    carvedilol (COREG) 12.5 mg tablet, Take 1 tablet (12.5 mg total) by mouth 2 (two) times a day with meals, Disp: , Rfl: 0    cholecalciferol (VITAMIN D3) 1,000 units tablet, Take 1,000 Units by mouth daily, Disp: , Rfl:     insulin lispro (HumaLOG) 100 units/mL injection, Inject 1-6 Units under the skin 3 (three) times a day before meals, Disp: , Rfl: 0    metFORMIN (GLUCOPHAGE) 500 mg tablet, Take 500 mg by mouth 2 (two) times a day with meals, Disp: , Rfl:     nateglinide (STARLIX) 60 mg tablet, Take 1 tablet (60 mg total) by mouth daily before dinner, Disp: 90 tablet, Rfl: 4    pantoprazole (PROTONIX) 40 mg tablet, Take 1 tablet (40 mg total) by mouth daily in the early morning Do not start before November 5, 2023., Disp: , Rfl: 0    ticagrelor (BRILINTA) 90 MG, 1 tablet (90 mg total) by Per NG Tube route every 12 (twelve) hours, Disp: , Rfl: 0     Please note:  Voice-recognition software may have been used in the preparation of this document. Occasional wrong word or "sound-alike" substitutions may have occurred due to the inherent limitations of voice recognition software. Interpretation should be guided by context.          KAYODE Cavanaugh  11/14/2023  9:52 AM

## 2023-11-14 NOTE — ASSESSMENT & PLAN NOTE
Lab Results   Component Value Date    EGFR 30 11/03/2023    EGFR 30 11/02/2023    EGFR 32 10/29/2023    CREATININE 2.08 (H) 11/03/2023    CREATININE 2.04 (H) 11/02/2023    CREATININE 1.95 (H) 10/29/2023     Baseline creatinine between 1.9-2.4.   Repeat BMP today Creat 1.2 at baseline   Renally dose medications  Avoid Hypotension  Avoid NSAIDS  Repeat BMP (11/16/23) 36.7

## 2023-11-15 ENCOUNTER — NURSING HOME VISIT (OUTPATIENT)
Dept: WOUND CARE | Facility: HOSPITAL | Age: 78
End: 2023-11-15
Payer: COMMERCIAL

## 2023-11-15 DIAGNOSIS — R26.2 AMBULATORY DYSFUNCTION: ICD-10-CM

## 2023-11-15 DIAGNOSIS — L89.150 PRESSURE INJURY OF SACRAL REGION, UNSTAGEABLE (HCC): Primary | ICD-10-CM

## 2023-11-15 PROCEDURE — 99309 SBSQ NF CARE MODERATE MDM 30: CPT | Performed by: NURSE PRACTITIONER

## 2023-11-15 NOTE — PROGRESS NOTES
[facility]       Patient ID: Caryn Finn is a 70 y.o. male Date of Birth 1945     Location of Service: [facility]    Performed wound round with: Wound team     Chief Complaint : Sacrum    Wound Instructions:  Wound:  Sacrum  Discontinue previous wound order  Cleanse the wound bed with NSS   Apply non-sting skin prep to periwound area  Apply triad paste to wound bed   Frequency : twice a day and prn for soiling  Offload all wounds  Turn and reposition frequently,   Increase protein intake. Monitor for any sign of infection or worsening, inform PCP or patient's primary physician in your facility. Allergies  Plavix [clopidogrel]      Assessment & Plan:  1. Pressure injury of sacral region, unstageable [facility])  Assessment & Plan:  Sacrum  Wound worsened, 100% slough, no obvious sign of infection  Local wound care with triad  Increase protein intake, on frozen nutritional treat  Continue to offload   Follow up next week        2. Ambulatory dysfunction  Assessment & Plan:  On 24/7 restorative care               Subjective:   11/8/2023This is a 70 y.o., male referred to our service for wound/ skin alterations on sacrum. Patient have a complex medical history including but not limited to type 2 diabetes and HTN . Patient was referred by Senior Care Team. Patient was seen in collaboration with the facility wound team.     Wound History: Patient was admitted with pressure injury on the sacrum. Received patient in bed, seems comfortable. Patient have a good appetite. Patient is incontinent of bowel and bladder. Patient needs assistance with turning and repositioning in bed. Currently on frozen nutritional treat. 11/15/2023 Follow up for wound on the sacrum. Received patient, not in distress. Facility staff did not report any significant issues related to the wound. Review of Systems   Constitutional: Negative.     Respiratory: Negative. Cardiovascular: Negative. Skin:  Positive for wound. Psychiatric/Behavioral: Negative. Objective:    Physical Exam  Constitutional:       Appearance: Normal appearance. Pulmonary:      Effort: Pulmonary effort is normal.   Genitourinary:     Comments: incontinent  Musculoskeletal:      Comments: LROM   Skin:     Findings: Lesion present. Comments: Location : Sacrum. 2.1 x 1.3 x 0.1 cm, 100% slough,  small amount of serous drainage, periwound is maceration, no malodor, no obvious sign of infection   Neurological:      Mental Status: He is alert. Procedures           Patient's care was coordinated with nursing facility staff. Recent vitals, labs and updated medications were reviewed on EMR or chart system of facility. Past Medical, surgical, social, medication and allergy history and patient's previous records were reviewed and updated as appropriate: Most up-to date information is available in the facility EMR where the patient is currently admitted.     Patient Active Problem List   Diagnosis    Cerebral aneurysm, nonruptured    CVA (cerebral vascular accident) ([address])    Essential hypertension    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 chronic kidney disease, without long-term current use of insulin ([address])    Class 1 obesity due to excess calories with serious comorbidity and body mass index (BMI) of 32.0 to 32.9 in adult    Polyarticular arthritis    Chronic right-sided low back pain with right-sided sciatica    Lumbar radiculopathy    Chronic pain syndrome    Hypercalcemia    GI bleed    SIRS (systemic inflammatory response syndrome) (HCC)    Tachycardia    Chronic renal impairment    Persistent proteinuria    Bilateral lower extremity edema    Chronic renal disease, stage IV (HCC)    Renal cell carcinoma of right kidney (HCC)    History of GI bleed    Cough and secretions    Pressure injury of sacral region, unstageable [facility])    Ambulatory dysfunction     Past Medical History:   Diagnosis Date    Hypertension     TIA (transient ischemic attack)      Past Surgical History:   Procedure Laterality Date    CYSTECTOMY      Per patient cyst removal from his back    LASIK      CT LAPAROSCOPY RADICAL NEPHRECTOMY Right 7/6/2022    Procedure: NEPHRECTOMY RADICAL LAPAROSCOPIC W/ ROBOTICS, poss open;  Surgeon: Judith Soria MD;  Location: [facility];  Service: Urology     Social History     Socioeconomic History    Marital status: /Civil Union     Spouse name: None    Number of children: None    Years of education: None    Highest education level: None   Occupational History    Occupation: Retired 2015 -     Tobacco Use    Smoking status: Never    Smokeless tobacco: Never   Vaping Use    Vaping Use: Never used   Substance and Sexual Activity    Alcohol use: Never    Drug use: Never    Sexual activity: None   Other Topics Concern    None   Social History Narrative    Denies drug use - As per [place]    Consumes on average 1 cup of regular coffee per day      Social Determinants of Health     Financial Resource Strain: Low Risk  (11/15/2022)    Overall Financial Resource Strain (CARDIA)     Difficulty of Paying Living Expenses: Not very hard   Food Insecurity: No Food Insecurity (10/26/2023)    Hunger Vital Sign     Worried About Running Out of Food in the Last Year: Never true     [address] in the Last Year: Never true   Transportation Needs: No Transportation Needs (10/26/2023)    PRAPARE - Transportation     Lack of Transportation (Medical): No     Lack of Transportation (Non-Medical):  No   Physical Activity: Not on file   Stress: Not on file   Social Connections: Not on file   Intimate Partner Violence: Not on file   Housing Stability: Unknown (10/26/2023)    Housing Stability Vital Sign     Unable to Pay for Housing in the Last Year: No     Number of Places Lived in the Last Year: Not on file     Unstable Housing in the Last Year: No        Current Outpatient Medications:     amLODIPine (NORVASC) 5 mg tablet, Take 2 tablets (10 mg total) by mouth daily, Disp: 90 tablet, Rfl: 0    Ascorbic Acid (vitamin C) 100 MG tablet, Take 100 mg by mouth daily, Disp: , Rfl:     aspirin (ECOTRIN LOW STRENGTH) 81 mg EC tablet, Take 81 mg by mouth daily, Disp: , Rfl:     atorvastatin (LIPITOR) 40 mg tablet, Take 1 tablet (40 mg total) by mouth daily, Disp: , Rfl: 0    carvedilol (COREG) 12.5 mg tablet, Take 1 tablet (12.5 mg total) by mouth 2 (two) times a day with meals, Disp: , Rfl: 0    cholecalciferol (VITAMIN D3) 1,000 units tablet, Take 1,000 Units by mouth daily, Disp: , Rfl:     insulin lispro (HumaLOG) 100 units/mL injection, Inject 1-6 Units under the skin 3 (three) times a day before meals, Disp: , Rfl: 0    metFORMIN (GLUCOPHAGE) 500 mg tablet, Take 500 mg by mouth 2 (two) times a day with meals, Disp: , Rfl:     nateglinide (STARLIX) 60 mg tablet, Take 1 tablet (60 mg total) by mouth daily before dinner, Disp: 90 tablet, Rfl: 4    pantoprazole (PROTONIX) 40 mg tablet, Take 1 tablet (40 mg total) by mouth daily in the early morning Do not start before November 5, 2023., Disp: , Rfl: 0    ticagrelor (BRILINTA) 90 MG, 1 tablet (90 mg total) by Per NG Tube route every 12 (twelve) hours, Disp: , Rfl: 0  Family History   Problem Relation Age of Onset    Colon cancer Mother     Diabetes type II Mother     Heart disease Mother     Prostate cancer Father               Coordination of Care: Wound team aware of the treatment plan. Facility nurse will provide wound treatment and monitor the wound for any changes. Patient / Staff education : Patient / Staff was given education on sign of infection and pressure ulcer prevention. Patient/ Staff verbalized understanding     Follow up :  Next week    Voice-recognition software may have been used in the preparation of this document.  Occasional wrong word or "sound-alike" substitutions may have occurred due to the inherent limitations of voice recognition software. Interpretation should be guided by context.       KAYODE Galvez

## 2023-11-15 NOTE — ASSESSMENT & PLAN NOTE
Sacrum  Wound worsened, 100% slough, no obvious sign of infection  Local wound care with triad  Increase protein intake, on frozen nutritional treat  Continue to offload   Follow up next week

## 2023-11-17 NOTE — TELEPHONE ENCOUNTER
Hello Good Morning,     Patient is now scheduled with  , spoke to [place] at the facility. Scheduled for 12/21/2023, 8:30 am, Jasen Jonas.      Thank you,     Sulma Ford WITH [facility] 8/29/2019    HFU/ Nicole Boyle / CVA     [place]- FACILITY- 11/4/2023      Patient can followup with neurovascular AP, resident or attending physician in 6-8 weeks.

## 2023-11-17 NOTE — TELEPHONE ENCOUNTER
Hi all,    Can someone please reach out to [facility] to schedule patient's stroke HFU? The follow up instructions are attached to this encounter from Martha Noland.     Thank you,  Ashwin Santos

## 2023-11-20 ENCOUNTER — NURSING HOME VISIT (OUTPATIENT)
Dept: GERIATRICS | Facility: OTHER | Age: 78
End: 2023-11-20
Payer: COMMERCIAL

## 2023-11-20 VITALS
HEART RATE: 74 BPM | BODY MASS INDEX: 30.62 KG/M2 | OXYGEN SATURATION: 97 % | TEMPERATURE: 97.7 F | DIASTOLIC BLOOD PRESSURE: 76 MMHG | WEIGHT: 225.8 LBS | SYSTOLIC BLOOD PRESSURE: 128 MMHG | RESPIRATION RATE: 18 BRPM

## 2023-11-20 DIAGNOSIS — N18.4 CHRONIC RENAL DISEASE, STAGE IV (HCC): ICD-10-CM

## 2023-11-20 DIAGNOSIS — I63.9 CEREBROVASCULAR ACCIDENT (CVA), UNSPECIFIED MECHANISM (HCC): Primary | ICD-10-CM

## 2023-11-20 DIAGNOSIS — N18.31 TYPE 2 DIABETES MELLITUS WITH STAGE 3A CHRONIC KIDNEY DISEASE, WITHOUT LONG-TERM CURRENT USE OF INSULIN (HCC): ICD-10-CM

## 2023-11-20 DIAGNOSIS — E11.22 TYPE 2 DIABETES MELLITUS WITH STAGE 3A CHRONIC KIDNEY DISEASE, WITHOUT LONG-TERM CURRENT USE OF INSULIN (HCC): ICD-10-CM

## 2023-11-20 DIAGNOSIS — I10 ESSENTIAL HYPERTENSION: ICD-10-CM

## 2023-11-20 DIAGNOSIS — C64.1 RENAL CELL CARCINOMA OF RIGHT KIDNEY (HCC): ICD-10-CM

## 2023-11-20 PROCEDURE — 99309 SBSQ NF CARE MODERATE MDM 30: CPT | Performed by: NURSE PRACTITIONER

## 2023-11-20 NOTE — PROGRESS NOTES
[address]  [phone]  FACILITY: [facility]  Code 31 (STR)      NAME: Lew Alonzo  AGE: 70 y.o. SEX: male CODE STATUS: CPR    DATE OF ENCOUNTER: 11/20/2023    Assessment and Plan     1. Cerebrovascular accident (CVA), unspecified mechanism ([address])  Assessment & Plan:  Presented to hospital with left-sided weakness family and patient declined thrombolytics  CT of the head without any acute abnormality did show chronic microangiopathic changes  CTA with occlusion of the left V3 and left V4 segments similar to prior severe near occlusive plaque stenosis to the proximal left cervical ICA greater than 90% stenosis appears worse than prior exam additionally with a left ICA aneurysm 5.3 x 5.7 mm consistent with prior exam  MRI brain with acute to early subacute lacunar infarct in the right [place], no mass effect or hemorrhagic conversion  Echo with EF 50% with grade 1 DD  Continue dual antiplatelet therapy with aspirin and Brilinta x90 days followed by aspirin monotherapy  Continue PT OT  Outpatient follow-up with vascular surgery for ICA stenosis follow-up  Also noted with dysphagia continue dysphagia 2 with nectar thick-continue speech therapy      2. 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 chronic kidney disease, without long-term current use of insulin (HCC)  Assessment & Plan:    Lab Results   Component Value Date    HGBA1C 6.7 (H) 10/24/2023     Per geriatric guidelines, goal HgA1c is > 7.5 to prevent hypoglycemic event in the older adult with cognitive decline    Continue Accu Checks  Continue Nateglinide 60 mg daily in the afternoon  Home metformin held inpatient but family requested to restart at rehab- Continue Metformin home dose 500 mg BID  Hypoglycemic protocol      3.  Essential hypertension  Assessment & Plan:  Continue to monitor at rehab s/p CVA  Avoid Hypotension   Continue home dose Amlodipine 10 mg daily   Started on Coreg 12.5 mg BID with hold parameters while inpatient         4. Chronic renal disease, stage IV [facility])  Assessment & Plan:    Lab Results   Component Value Date    EGFR 30 11/03/2023    EGFR 30 11/02/2023    EGFR 32 10/29/2023    CREATININE 2.08 (H) 11/03/2023    CREATININE 2.04 (H) 11/02/2023    CREATININE 1.95 (H) 10/29/2023     Baseline creatinine between 1.9-2.4. Repeat Creatinine at [facility] ranging 1.2- 1.7-> stable at baseline   Renally dose medications  Avoid Hypotension  Avoid NSAIDS      5. Renal cell carcinoma of right kidney [facility])  Assessment & Plan:  S/P Right nephrectomy  OP f/u with Urology as scheduled           All medications and routine orders were reviewed and updated as needed. Chief Complaint     STR follow up visit    Past Medical and Surgica History      Past Medical History:   Diagnosis Date    Hypertension     TIA (transient ischemic attack)      Past Surgical History:   Procedure Laterality Date    CYSTECTOMY      Per patient cyst removal from his back    LASIK      [place] LAPAROSCOPY RADICAL NEPHRECTOMY Right 7/6/2022    Procedure: NEPHRECTOMY RADICAL LAPAROSCOPIC W/ ROBOTICS, poss open;  Surgeon: Kaylee Ling MD;  Location: [facility];  Service: Urology     Allergies   Allergen Reactions    Plavix [Clopidogrel] Rash     Stopped two days ago because got a rash and doctors thought it was from this           History of Present Illness     Berta Chakraborty is a 70 y.o. male admitted to [address] [place] [address] following hospital stay for Acute CVA. Patient has a past medical Hx including but not limited to HTN, DM2, CVA, CKD4, RCC s/p Right Nephrectomy. Patient is seen in collaboration with nursing for medical mgmt and STR follow up. Patient initially presented to hospital with left sided weakness, dysarthria, dysphagia. Alert was called and neurology evaluated, MRI did confirm acute CVA. Patient and family opted not to receive thrombolytics.   Patient was started on dual antiplatelet therapy with Brilinta and aspirin. Additionally work-up revealed ICA stenosis and recommended outpatient vascular surgery consult. Patient initially required feeding tube due to severe dysphagia. Later patient swallowing improved and was started on an oral diet. Patient was recommended discharge to postacute rehab. At baseline patient lives at home with his wife was independent with his ADLs and most of his IADLs. Seen and examined at bedside today. Patient is a reliable historian. Does not appear in any distress, pleasant and cooperative. Alert and oriented x3. Patient denies pain on exam. He is sitting up in bed, feeding himself lunch. Still with limited/no mobility of left arm and leg. Patient states he has a good appetite, denies any bowel or bladder issues. Denies CP/SOB/N/V/D. Denies lightheadedness, dizziness, headaches, vision changes. Patient is actively participating in therapy. No concerns from nursing at this time. The patient's allergies, past medical, surgical, social and family history were reviewed and unchanged. Review of Systems     Review of Systems   Constitutional:  Positive for activity change. Musculoskeletal:  Positive for gait problem. Skin:  Positive for wound. Neurological:  Positive for weakness. All other systems reviewed and are negative. Objective     Vitals:   Vitals:    11/20/23 1405   BP: 128/76   Pulse: 74   Resp: 18   Temp: 97.7 °F (36.5 °C)   SpO2: 97%         Physical Exam  Vitals and nursing note reviewed. Constitutional:       General: He is not in acute distress. Appearance: Normal appearance. HENT:      Head: Normocephalic and atraumatic. Nose: No congestion or rhinorrhea. Mouth/Throat:      Mouth: Mucous membranes are moist.      Comments: Left facial droop  Eyes:      General: No scleral icterus. Extraocular Movements: Extraocular movements intact.       Conjunctiva/sclera: Conjunctivae normal.      Pupils: Pupils are equal, round, and reactive to light. Cardiovascular:      Rate and Rhythm: Normal rate and regular rhythm. Pulses: Normal pulses. Heart sounds: Normal heart sounds. No murmur heard. Pulmonary:      Effort: Pulmonary effort is normal.      Breath sounds: Normal breath sounds. No wheezing, rhonchi or rales. Abdominal:      General: Bowel sounds are normal. There is no distension. Palpations: Abdomen is soft. Tenderness: There is no abdominal tenderness. Musculoskeletal:         General: No swelling or signs of injury. Comments: Left sided hemiparesis , Left leg in multi podis boot   Lymphadenopathy:      Cervical: No cervical adenopathy. Skin:     General: Skin is warm and dry. Findings: No erythema. Comments: Noted with sacral ulcer, unable to visualize on exam today- please see Wound Care CRNP notes   Neurological:      Mental Status: He is alert and oriented to person, place, and time. Mental status is at baseline. GCS: GCS eye subscore is 4. GCS verbal subscore is 5. GCS motor subscore is 6. Cranial Nerves: Dysarthria and facial asymmetry present. Sensory: No sensory deficit. Motor: Weakness present. Gait: Gait abnormal.   Psychiatric:         Attention and Perception: Attention normal.         Mood and Affect: Mood normal.         Speech: Speech normal.         Behavior: Behavior normal. Behavior is cooperative. Pertinent Laboratory/Diagnostic Studies:     Reviewed in facility chart      Current Medications   Medications reviewed and updated see facility [facility] for details.       Current Outpatient Medications:     amLODIPine (NORVASC) 5 mg tablet, Take 2 tablets (10 mg total) by mouth daily, Disp: 90 tablet, Rfl: 0    Ascorbic Acid (vitamin C) 100 MG tablet, Take 100 mg by mouth daily, Disp: , Rfl:     aspirin (ECOTRIN LOW STRENGTH) 81 mg EC tablet, Take 81 mg by mouth daily, Disp: , Rfl:     atorvastatin (LIPITOR) 40 mg tablet, Take 1 tablet (40 mg total) by mouth daily, Disp: , Rfl: 0    carvedilol (COREG) 12.5 mg tablet, Take 1 tablet (12.5 mg total) by mouth 2 (two) times a day with meals, Disp: , Rfl: 0    cholecalciferol (VITAMIN D3) 1,000 units tablet, Take 1,000 Units by mouth daily, Disp: , Rfl:     insulin lispro (HumaLOG) 100 units/mL injection, Inject 1-6 Units under the skin 3 (three) times a day before meals, Disp: , Rfl: 0    metFORMIN (GLUCOPHAGE) 500 mg tablet, Take 500 mg by mouth 2 (two) times a day with meals, Disp: , Rfl:     nateglinide (STARLIX) 60 mg tablet, Take 1 tablet (60 mg total) by mouth daily before dinner, Disp: 90 tablet, Rfl: 4    pantoprazole (PROTONIX) 40 mg tablet, Take 1 tablet (40 mg total) by mouth daily in the early morning Do not start before November 5, 2023., Disp: , Rfl: 0    ticagrelor (BRILINTA) 90 MG, 1 tablet (90 mg total) by Per NG Tube route every 12 (twelve) hours, Disp: , Rfl: 0     Please note:  Voice-recognition software may have been used in the preparation of this document. Occasional wrong word or "sound-alike" substitutions may have occurred due to the inherent limitations of voice recognition software. Interpretation should be guided by context.          KAYODE Castellon  11/20/2023  2:14 PM

## 2023-11-20 NOTE — ASSESSMENT & PLAN NOTE
Lab Results   Component Value Date    EGFR 30 11/03/2023    EGFR 30 11/02/2023    EGFR 32 10/29/2023    CREATININE 2.08 (H) 11/03/2023    CREATININE 2.04 (H) 11/02/2023    CREATININE 1.95 (H) 10/29/2023     Baseline creatinine between 1.9-2.4.   Repeat Creatinine at [facility] ranging 1.2- 1.7-> stable at baseline   Renally dose medications  Avoid Hypotension  Avoid NSAIDS

## 2023-11-22 ENCOUNTER — NURSING HOME VISIT (OUTPATIENT)
Dept: WOUND CARE | Facility: HOSPITAL | Age: 78
End: 2023-11-22

## 2023-11-22 ENCOUNTER — HOSPITAL ENCOUNTER (EMERGENCY)
Facility: HOSPITAL | Age: 78
End: 2023-11-22
Attending: EMERGENCY MEDICINE
Payer: COMMERCIAL

## 2023-11-22 ENCOUNTER — TELEPHONE (OUTPATIENT)
Dept: OTHER | Facility: OTHER | Age: 78
End: 2023-11-22

## 2023-11-22 ENCOUNTER — APPOINTMENT (EMERGENCY)
Dept: CT IMAGING | Facility: HOSPITAL | Age: 78
End: 2023-11-22
Payer: COMMERCIAL

## 2023-11-22 ENCOUNTER — NURSING HOME VISIT (OUTPATIENT)
Dept: GERIATRICS | Facility: OTHER | Age: 78
End: 2023-11-22
Payer: COMMERCIAL

## 2023-11-22 ENCOUNTER — APPOINTMENT (EMERGENCY)
Dept: RADIOLOGY | Facility: HOSPITAL | Age: 78
End: 2023-11-22
Payer: COMMERCIAL

## 2023-11-22 VITALS
SYSTOLIC BLOOD PRESSURE: 152 MMHG | BODY MASS INDEX: 30.62 KG/M2 | TEMPERATURE: 97.6 F | HEART RATE: 91 BPM | WEIGHT: 225.8 LBS | OXYGEN SATURATION: 97 % | DIASTOLIC BLOOD PRESSURE: 73 MMHG | RESPIRATION RATE: 20 BRPM

## 2023-11-22 VITALS
HEART RATE: 85 BPM | RESPIRATION RATE: 20 BRPM | DIASTOLIC BLOOD PRESSURE: 77 MMHG | SYSTOLIC BLOOD PRESSURE: 158 MMHG | OXYGEN SATURATION: 95 % | TEMPERATURE: 98.6 F

## 2023-11-22 DIAGNOSIS — I63.9 CEREBROVASCULAR ACCIDENT (CVA), UNSPECIFIED MECHANISM (HCC): ICD-10-CM
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
## 2023-11-22 DIAGNOSIS — I10 ESSENTIAL HYPERTENSION: ICD-10-CM

## 2023-11-22 DIAGNOSIS — N18.31 TYPE 2 DIABETES MELLITUS WITH STAGE 3A CHRONIC KIDNEY DISEASE, WITHOUT LONG-TERM CURRENT USE OF INSULIN (HCC): ICD-10-CM
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
## 2023-11-22 DIAGNOSIS — E11.22 TYPE 2 DIABETES MELLITUS WITH STAGE 3A CHRONIC KIDNEY DISEASE, WITHOUT LONG-TERM CURRENT USE OF INSULIN (HCC): ICD-10-CM

## 2023-11-22 DIAGNOSIS — R26.2 AMBULATORY DYSFUNCTION: Primary | ICD-10-CM
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
## 2023-11-22 DIAGNOSIS — N18.4 CHRONIC RENAL DISEASE, STAGE IV (HCC): ICD-10-CM

## 2023-11-22 DIAGNOSIS — L89.150 PRESSURE INJURY OF SACRAL REGION, UNSTAGEABLE (HCC): ICD-10-CM

## 2023-11-22 DIAGNOSIS — C64.1 RENAL CELL CARCINOMA OF RIGHT KIDNEY (HCC): ICD-10-CM

## 2023-11-22 PROCEDURE — 72125 CT NECK SPINE W/O DYE: CPT

## 2023-11-22 PROCEDURE — 70450 CT HEAD/BRAIN W/O DYE: CPT

## 2023-11-22 PROCEDURE — 71045 X-RAY EXAM CHEST 1 VIEW: CPT

## 2023-11-22 PROCEDURE — 72192 CT PELVIS W/O DYE: CPT

## 2023-11-22 PROCEDURE — 99284 EMERGENCY DEPT VISIT MOD MDM: CPT

## 2023-11-22 PROCEDURE — 99309 SBSQ NF CARE MODERATE MDM 30: CPT | Performed by: NURSE PRACTITIONER

## 2023-11-22 PROCEDURE — 99285 EMERGENCY DEPT VISIT HI MDM: CPT

## 2023-11-22 PROCEDURE — 73502 X-RAY EXAM HIP UNI 2-3 VIEWS: CPT

## 2023-11-22 RX ORDER — AMLODIPINE BESYLATE 5 MG/1
5 TABLET ORAL ONCE
Status: DISCONTINUED | OUTPATIENT
Start: 2023-11-22 | End: 2023-11-22 | Stop reason: HOSPADM

## 2023-11-22 RX ORDER — ONDANSETRON 4 MG/1
4 TABLET, ORALLY DISINTEGRATING ORAL ONCE
Status: COMPLETED | OUTPATIENT
Start: 2023-11-22 | End: 2023-11-22

## 2023-11-22 RX ORDER — CARVEDILOL 12.5 MG/1
12.5 TABLET ORAL ONCE
Status: DISCONTINUED | OUTPATIENT
Start: 2023-11-22 | End: 2023-11-22 | Stop reason: HOSPADM

## 2023-11-22 RX ORDER — PANTOPRAZOLE SODIUM 40 MG/1
40 TABLET, DELAYED RELEASE ORAL ONCE
Status: DISCONTINUED | OUTPATIENT
Start: 2023-11-22 | End: 2023-11-22 | Stop reason: HOSPADM

## 2023-11-22 RX ADMIN — ONDANSETRON 4 MG: 4 TABLET, ORALLY DISINTEGRATING ORAL at 04:29

## 2023-11-22 NOTE — PROGRESS NOTES
[address]  [phone]  FACILITY: [facility]  Code 31 (STR)      NAME: Berta Chakraborty  AGE: 70 y.o. SEX: male CODE STATUS: CPR    DATE OF ENCOUNTER: 11/22/2023    Assessment and Plan     1. Fall at nursing home, subsequent encounter  Assessment & Plan:  Unwitnessed fall oob to floor on 11/21-11/22   C/O let hip pain  Sent to ED---> Trauma evaluated --> imaging negative for Fx/dislocation of hip  CT head/C-spine negative for Fractre or bleed   Continue PT/OT  Fall Precautions       2. Cerebrovascular accident (CVA), unspecified mechanism ([address])  Assessment & Plan:  Presented to hospital with left-sided weakness family and patient declined thrombolytics  CT of the head without any acute abnormality did show chronic microangiopathic changes  CTA with occlusion of the left V3 and left V4 segments similar to prior severe near occlusive plaque stenosis to the proximal left cervical ICA greater than 90% stenosis appears worse than prior exam additionally with a left ICA aneurysm 5.3 x 5.7 mm consistent with prior exam  MRI brain with acute to early subacute lacunar infarct in the right veronique, no mass effect or hemorrhagic conversion  Echo with EF 50% with grade 1 DD  Continue dual antiplatelet therapy with aspirin and Brilinta x90 days followed by aspirin monotherapy  Continue PT OT  Outpatient follow-up with vascular surgery for ICA stenosis follow-up  Also noted with dysphagia continue dysphagia 2 with nectar thick-continue speech therapy      3.  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 chronic kidney disease, without long-term current use of insulin (HCC)  Assessment & Plan:      Lab Results   Component Value Date    HGBA1C 6.7 (H) 10/24/2023     Per geriatric guidelines, goal HgA1c is > 7.5 to prevent hypoglycemic event in the older adult with cognitive decline    Continue Accu Checks  Continue Nateglinide 60 mg daily in the afternoon  Home metformin held inpatient but family requested to restart at rehab- Continue Metformin home dose 500 mg BID  Hypoglycemic protocol      4. Essential hypertension  Assessment & Plan:  Continue to monitor at rehab s/p CVA  Avoid Hypotension   Continue home dose Amlodipine 10 mg daily   Started on Coreg 12.5 mg BID with hold parameters while inpatient         5. Chronic renal disease, stage IV [facility])  Assessment & Plan:    Lab Results   Component Value Date    EGFR 30 11/03/2023    EGFR 30 11/02/2023    EGFR 32 10/29/2023    CREATININE 2.08 (H) 11/03/2023    CREATININE 2.04 (H) 11/02/2023    CREATININE 1.95 (H) 10/29/2023     Baseline creatinine between 1.9-2.4. Repeat Creatinine at [facility] ranging 1.2- 1.7-> stable at baseline   Renally dose medications  Avoid Hypotension  Avoid NSAIDS      6. Renal cell carcinoma of right kidney [facility])  Assessment & Plan:  S/P Right nephrectomy  OP f/u with Urology as scheduled           All medications and routine orders were reviewed and updated as needed. Chief Complaint     STR follow up visit    Past Medical and Surgica History      Past Medical History:   Diagnosis Date    Hypertension     TIA (transient ischemic attack)      Past Surgical History:   Procedure Laterality Date    CYSTECTOMY      Per patient cyst removal from his back    LASIK      SC LAPAROSCOPY RADICAL NEPHRECTOMY Right 7/6/2022    Procedure: NEPHRECTOMY RADICAL LAPAROSCOPIC W/ ROBOTICS, poss open;  Surgeon: Oumar Loco MD;  Location: [facility];  Service: Urology     Allergies   Allergen Reactions    Plavix [Clopidogrel] Rash     Stopped two days ago because got a rash and doctors thought it was from this           History of Present Illness     Caryn Finn is a 70 y.o. male admitted to [address] [place] [address] following hospital stay for Acute CVA. Patient has a past medical Hx including but not limited to HTN, DM2, CVA, CKD4, RCC s/p Right Nephrectomy.  Patient is seen in collaboration with nursing for medical mgmt and STR follow up. Patient initially presented to hospital with left sided weakness, dysarthria, dysphagia. Alert was called and neurology evaluated, MRI did confirm acute CVA. Patient and family opted not to receive thrombolytics. Patient was started on dual antiplatelet therapy with Brilinta and aspirin. Additionally work-up revealed ICA stenosis and recommended outpatient vascular surgery consult. Patient initially required feeding tube due to severe dysphagia. Later patient swallowing improved and was started on an oral diet. Patient was recommended discharge to postacute rehab. At baseline patient lives at home with his wife was independent with his ADLs and most of his IADLs. Seen and examined at bedside today. Patient is a reliable historian. Patient was sent to ED last evening for fall oob, he returns this morning. Patient is resting in bed, does not appear in distress. He is sleeping but awakens with ease. He denies pain after fall. Still with limited/no mobility of left arm and leg. Patient states he has a good appetite, denies any bowel or bladder issues. Denies CP/SOB/N/V/D. Denies lightheadedness, dizziness, headaches, vision changes. Patient is actively participating in therapy. No concerns from nursing at this time. The patient's allergies, past medical, surgical, social and family history were reviewed and unchanged. Review of Systems     Review of Systems   Constitutional:  Positive for activity change. Musculoskeletal:  Positive for gait problem. Skin:  Positive for wound. Neurological:  Positive for weakness. All other systems reviewed and are negative. Objective     Vitals:   Vitals:    11/22/23 0925   BP: 152/73   Pulse: 91   Resp: 20   Temp: 97.6 °F (36.4 °C)   SpO2: 97%         Physical Exam  Vitals and nursing note reviewed. Constitutional:       General: He is not in acute distress. Appearance: Normal appearance.    HENT: Head: Normocephalic and atraumatic. Nose: No congestion or rhinorrhea. Mouth/Throat:      Mouth: Mucous membranes are moist.      Comments: Left facial droop  Eyes:      General: No scleral icterus. Extraocular Movements: Extraocular movements intact. Conjunctiva/sclera: Conjunctivae normal.      Pupils: Pupils are equal, round, and reactive to light. Cardiovascular:      Rate and Rhythm: Normal rate and regular rhythm. Pulses: Normal pulses. Heart sounds: Normal heart sounds. No murmur heard. Pulmonary:      Effort: Pulmonary effort is normal.      Breath sounds: Normal breath sounds. No wheezing, rhonchi or rales. Abdominal:      General: Bowel sounds are normal. There is no distension. Palpations: Abdomen is soft. Tenderness: There is no abdominal tenderness. Musculoskeletal:         General: No swelling or signs of injury. Comments: Left sided hemiparesis , Left leg in multi podis boot   Lymphadenopathy:      Cervical: No cervical adenopathy. Skin:     General: Skin is warm and dry. Findings: No erythema. Comments: Noted with sacral ulcer, unable to visualize on exam today- please see Wound Care CRNP notes   Neurological:      Mental Status: He is alert and oriented to person, place, and time. Mental status is at baseline. GCS: GCS eye subscore is 4. GCS verbal subscore is 5. GCS motor subscore is 6. Cranial Nerves: Dysarthria and facial asymmetry present. Sensory: No sensory deficit. Motor: Weakness present. Gait: Gait abnormal.   Psychiatric:         Attention and Perception: Attention normal.         Mood and Affect: Mood normal.         Speech: Speech normal.         Behavior: Behavior normal. Behavior is cooperative. Pertinent Laboratory/Diagnostic Studies:     Reviewed in facility chart      Current Medications   Medications reviewed and updated see facility [facility] for details.       Current Outpatient Medications: amLODIPine (NORVASC) 5 mg tablet, Take 2 tablets (10 mg total) by mouth daily, Disp: 90 tablet, Rfl: 0    Ascorbic Acid (vitamin C) 100 MG tablet, Take 100 mg by mouth daily, Disp: , Rfl:     aspirin (ECOTRIN LOW STRENGTH) 81 mg EC tablet, Take 81 mg by mouth daily, Disp: , Rfl:     atorvastatin (LIPITOR) 40 mg tablet, Take 1 tablet (40 mg total) by mouth daily, Disp: , Rfl: 0    carvedilol (COREG) 12.5 mg tablet, Take 1 tablet (12.5 mg total) by mouth 2 (two) times a day with meals, Disp: , Rfl: 0    cholecalciferol (VITAMIN D3) 1,000 units tablet, Take 1,000 Units by mouth daily, Disp: , Rfl:     insulin lispro (HumaLOG) 100 units/mL injection, Inject 1-6 Units under the skin 3 (three) times a day before meals, Disp: , Rfl: 0    metFORMIN (GLUCOPHAGE) 500 mg tablet, Take 500 mg by mouth 2 (two) times a day with meals, Disp: , Rfl:     nateglinide (STARLIX) 60 mg tablet, Take 1 tablet (60 mg total) by mouth daily before dinner, Disp: 90 tablet, Rfl: 4    pantoprazole (PROTONIX) 40 mg tablet, Take 1 tablet (40 mg total) by mouth daily in the early morning Do not start before November 5, 2023., Disp: , Rfl: 0    ticagrelor (BRILINTA) 90 MG, 1 tablet (90 mg total) by Per NG Tube route every 12 (twelve) hours, Disp: , Rfl: 0  No current facility-administered medications for this visit. Facility-Administered Medications Ordered in Other Visits:     amLODIPine (NORVASC) tablet 5 mg, 5 mg, Oral, Once, [employer]    carvedilol (COREG) tablet 12.5 mg, 12.5 mg, Oral, Once, [employer]    pantoprazole (PROTONIX) EC tablet 40 mg, 40 mg, Oral, Once, [employer], KAYODE    ticagrelor (BRILINTA) tablet 90 mg, 90 mg, Oral, Once, [employer]     Please note:  Voice-recognition software may have been used in the preparation of this document. Occasional wrong word or "sound-alike" substitutions may have occurred due to the inherent limitations of voice recognition software.   Interpretation should be guided by context.          KAYODE Smalls  11/22/2023  9:32 AM

## 2023-11-22 NOTE — ASSESSMENT & PLAN NOTE
Unwitnessed fall oob to floor on 11/21-11/22   C/O let hip pain  Sent to ED---> Trauma evaluated --> imaging negative for Fx/dislocation of hip  CT head/C-spine negative for Fractre or bleed   Continue PT/OT  Fall Precautions

## 2023-11-22 NOTE — TELEPHONE ENCOUNTER
Rosalba from [id number] called to report that pt had fallen and appears to have a left hip injury. She would like to send pt to ER for evaluation. Her phone is [phone].

## 2023-11-22 NOTE — ASSESSMENT & PLAN NOTE
Lab Results   Component Value Date    EGFR 30 11/03/2023    EGFR 30 11/02/2023    EGFR 32 10/29/2023    CREATININE 2.08 (H) 11/03/2023    CREATININE 2.04 (H) 11/02/2023    CREATININE 1.95 (H) 10/29/2023     Baseline creatinine between 1.9-2.4.   Repeat Creatinine at [facility] ranging 1.2- 1.7-> stable at baseline   Renally dose medications  Avoid Hypotension  Avoid NSAIDS

## 2023-11-22 NOTE — DISCHARGE INSTRUCTIONS
You were seen in the ER after a fall out of bed. Your x-rays and CT scans are without traumatic injury. Return to the ER if you develop severe headache, vision changes, difficulty breathing, vomiting, weakness, confusion, or lethargy.

## 2023-11-22 NOTE — ED PROVIDER NOTES
Emergency Department Trauma Note  Haleigh Dorsey 70 y.o. male MRN: [id number]  Unit/Bed#: ED-28/ED-28 Encounter: [id number]      Trauma Alert: Trauma Acuity: C  Model of Arrival:   via    Trauma Team: Current Providers  Attending Provider: Miranda Duenas DO  Attending Provider: Maximus Shen DO  Nurse Practitioner: KAYODE Guerra  Registered Nurse: Saira Perez RN  Consultants:     None      History of Present Illness     Chief Complaint:   Chief Complaint   Patient presents with    Fall     Patient from [address], unwitnessed fall from bed, unknown HS, complains of R hip pain. GCS 15. Hx of stroke with L sided weakness     HPI:  Haleigh Dorsey is a 70 y.o. male who presents with slide out of bed. Mechanism:Details of Incident: Fall from bed Injury Date: 11/22/23        The patient is a 74-year-old male with PMH of HTN, T2DM, stage III CKD, renal cell carcinoma s/p right nephrectomy, nonruptured cerebral aneurysm, and recent finding of CVA 1 month PTA currently on aspirin and Brilinta presenting for evaluation of fall out of bed. Patient is brought in by EMS for a slide out of bed landing onto his left side. It was unwitnessed and there is unknown head strike. His only current complaint is left hip pain which EMS reports he was found lying on his left side. He denies headache, facial pain, jaw pain, CP/SOB, abdominal pain, N/V, upper extremity pain, and lower leg pain. He notes having deficits of left arm and left leg weakness since his stroke 1 month ago. He denies new numbness or tingling. History provided by:  Patient and EMS personnel   used: No    Fall  Associated symptoms: no abdominal pain, no back pain, no chest pain, no headaches, no nausea, no neck pain and no vomiting      Review of Systems   Constitutional:  Negative for chills and fever.    Eyes:  Positive for visual disturbance (Notes blurred vision since CVA 1 month ago, no change in visual acuity or worsening vision per patient). Negative for photophobia and pain. Respiratory:  Negative for cough and shortness of breath. Cardiovascular:  Negative for chest pain and palpitations. Gastrointestinal:  Negative for abdominal pain, nausea and vomiting. Musculoskeletal:  Positive for arthralgias (Left hip) and gait problem (Nonambulatory since CVA 1 month ago). Negative for back pain, joint swelling, neck pain and neck stiffness. Skin:  Negative for color change, pallor, rash and wound. Neurological:  Positive for weakness (Left upper and lower extremity weakness since CVA 1 month ago) and numbness (Chronic decree sensation to left upper and left lower extremities). Negative for dizziness, syncope, light-headedness and headaches. All other systems reviewed and are negative.       Historical Information     Immunizations:   Immunization History   Administered Date(s) Administered    COVID-19 PFIZER VACCINE 0.3 ML IM 03/23/2021, 04/14/2021, 11/30/2021    COVID-19 Pfizer vac (Irineo-sucrose, gray cap) 12 yr+ IM 06/03/2022    INFLUENZA 11/03/2014, 09/14/2015    Influenza, high dose seasonal 0.7 mL 10/30/2019, 09/29/2020    Pneumococcal Conjugate 13-Valent 01/03/2017    Pneumococcal Polysaccharide PPV23 11/25/2014, 10/17/2017    Tdap 01/31/2023       Past Medical History:   Diagnosis Date    Hypertension     TIA (transient ischemic attack)        Family History   Problem Relation Age of Onset    Colon cancer Mother     Diabetes type II Mother     Heart disease Mother     Prostate cancer Father      Past Surgical History:   Procedure Laterality Date    CYSTECTOMY      Per patient cyst removal from his back    LASIK      RI LAPAROSCOPY RADICAL NEPHRECTOMY Right 7/6/2022    Procedure: NEPHRECTOMY RADICAL LAPAROSCOPIC W/ ROBOTICS, poss open;  Surgeon: Josi Moran MD;  Location: [facility];  Service: Urology     Social History     Tobacco Use    Smoking status: Never    Smokeless tobacco: Never Vaping Use    Vaping Use: Never used   Substance Use Topics    Alcohol use: Never    Drug use: Never     E-Cigarette/Vaping    E-Cigarette Use Never User      E-Cigarette/Vaping Substances    Nicotine No     THC No     CBD No     Flavoring No     Other No     Unknown No        Family History: non-contributory    Meds/Allergies   Prior to Admission Medications   Prescriptions Last Dose Informant Patient Reported? Taking? Ascorbic Acid (vitamin C) 100 MG tablet  Self Yes No   Sig: Take 100 mg by mouth daily   amLODIPine (NORVASC) 5 mg tablet   No No   Sig: Take 2 tablets (10 mg total) by mouth daily   aspirin (ECOTRIN LOW STRENGTH) 81 mg EC tablet  Self Yes No   Sig: Take 81 mg by mouth daily   atorvastatin (LIPITOR) 40 mg tablet   No No   Sig: Take 1 tablet (40 mg total) by mouth daily   carvedilol (COREG) 12.5 mg tablet   No No   Sig: Take 1 tablet (12.5 mg total) by mouth 2 (two) times a day with meals   cholecalciferol (VITAMIN D3) 1,000 units tablet  Self Yes No   Sig: Take 1,000 Units by mouth daily   insulin lispro (HumaLOG) 100 units/mL injection   No No   Sig: Inject 1-6 Units under the skin 3 (three) times a day before meals   metFORMIN (GLUCOPHAGE) 500 mg tablet   Yes No   Sig: Take 500 mg by mouth 2 (two) times a day with meals   nateglinide (STARLIX) 60 mg tablet   No No   Sig: Take 1 tablet (60 mg total) by mouth daily before dinner   pantoprazole (PROTONIX) 40 mg tablet   No No   Sig: Take 1 tablet (40 mg total) by mouth daily in the early morning Do not start before 2023.    ticagrelor (BRILINTA) 90 MG   No No   Si tablet (90 mg total) by Per NG Tube route every 12 (twelve) hours      Facility-Administered Medications: None       Allergies   Allergen Reactions    Plavix [Clopidogrel] Rash     Stopped two days ago because got a rash and doctors thought it was from this        PHYSICAL EXAM    PE limited by: NA    Objective   Vitals:   First set: Temperature: 98.6 °F (37 °C) (23 7249)  Pulse: 94 (11/22/23 0315)  Respirations: 20 (11/22/23 0315)  Blood Pressure: 156/82 (11/22/23 0315)  SpO2: 98 % (11/22/23 0315)    Primary Survey:   (A) Airway: Patent  (B) Breathing: Spontaneous, equal breath sounds bilaterally  (C) Circulation: Pulses:   pedal  2/4 and radial  2/4  (D) Disabliity:  GCS Total:  15  (E) Expose:  Completed    Secondary Survey: (Click on Physical Exam tab above)  Physical Exam  Vitals and nursing note reviewed. Exam conducted with a chaperone present (Bedside nurse Danisha Lopes). Constitutional:       General: He is not in acute distress. Appearance: Normal appearance. He is well-developed. He is not ill-appearing, toxic-appearing or diaphoretic. HENT:      Head: Normocephalic and atraumatic. No raccoon eyes, Duncan's sign, contusion or laceration. Jaw: There is normal jaw occlusion. No tenderness, swelling, pain on movement or malocclusion. Right Ear: Ear canal and external ear normal. No hemotympanum. Left Ear: Ear canal and external ear normal. No hemotympanum. Nose: Nose normal. No signs of injury or nasal tenderness. Mouth/Throat:      Lips: Pink. Mouth: Mucous membranes are moist. No injury. Pharynx: Oropharynx is clear. Uvula midline. Eyes:      General: Lids are normal. Vision grossly intact. Gaze aligned appropriately. Extraocular Movements: Extraocular movements intact. Right eye: No nystagmus. Left eye: No nystagmus. Conjunctiva/sclera: Conjunctivae normal.      Pupils: Pupils are equal, round, and reactive to light. Comments: 3mm equal, round reactive to light bilaterally   Neck:      Trachea: Phonation normal. No abnormal tracheal secretions. Cardiovascular:      Rate and Rhythm: Normal rate and regular rhythm. Pulses: Normal pulses. Radial pulses are 2+ on the right side and 2+ on the left side. Dorsalis pedis pulses are 2+ on the right side and 2+ on the left side.       Heart sounds: Normal heart sounds, S1 normal and S2 normal. No murmur heard. Pulmonary:      Effort: Pulmonary effort is normal. No tachypnea or respiratory distress. Breath sounds: Normal air entry. No stridor, decreased air movement or transmitted upper airway sounds. Examination of the right-lower field reveals decreased breath sounds. Examination of the left-lower field reveals decreased breath sounds. Decreased breath sounds present. Chest:      Chest wall: No swelling, tenderness, crepitus or edema. Abdominal:      Palpations: Abdomen is soft. Tenderness: There is no abdominal tenderness. There is no guarding or rebound. Negative signs include Renteria's sign. Genitourinary:     Comments: Stage II pressure injury to sacrum  Musculoskeletal:      Right shoulder: Normal. No swelling, deformity or bony tenderness. Normal range of motion. Normal strength. Left shoulder: Normal. No swelling, deformity or bony tenderness. Normal strength. Right upper arm: Normal.      Left upper arm: Normal.      Right elbow: Normal.      Left elbow: Normal. No swelling or deformity. Normal range of motion. No tenderness. Right forearm: Normal.      Left forearm: Normal. No swelling or bony tenderness. Right wrist: Normal.      Left wrist: Normal. No swelling, deformity or bony tenderness. Normal range of motion. Right hand: Normal.      Left hand: No swelling, deformity or bony tenderness. Cervical back: Neck supple. Bony tenderness present. No swelling, edema, deformity or erythema. Pain with movement and spinous process tenderness (C-collar applied after finding of midline cervical tenderness) present. Thoracic back: No swelling, edema, deformity, signs of trauma, tenderness or bony tenderness. Normal range of motion. Lumbar back: No swelling, edema, deformity, signs of trauma, tenderness or bony tenderness. Normal range of motion.       Right hip: Normal.      Left hip: No deformity, bony tenderness or crepitus. Decreased range of motion. Decreased strength. Right upper leg: Normal.      Left upper leg: No swelling, edema, deformity or bony tenderness. Right knee: Normal.      Left knee: Normal.      Right lower leg: Normal. No edema. Left lower leg: Normal. No edema. Right ankle: Normal.      Left ankle: No swelling or deformity. No tenderness. Normal range of motion. Normal pulse. Right foot: Normal. Normal range of motion and normal capillary refill. No swelling, deformity or bony tenderness. Normal pulse. Left foot: Normal range of motion and normal capillary refill. Foot drop (Chronic, has brace applied to ankle/foot to maintain dorsiflexion) present. No swelling, deformity or bony tenderness. Normal pulse. Comments: RUE and RLE: 5/5  LUE: 4/5 for deltoid and biceps, 4+/5 for triceps  LLE: 3/5      Skin:     General: Skin is warm and dry. Capillary Refill: Capillary refill takes less than 2 seconds. Findings: No abrasion, bruising, ecchymosis, laceration, rash or wound. Neurological:      General: No focal deficit present. Mental Status: He is alert and oriented to person, place, and time. Mental status is at baseline. GCS: GCS eye subscore is 4. GCS verbal subscore is 5. GCS motor subscore is 6. Cranial Nerves: No dysarthria or facial asymmetry. Sensory: Sensory deficit (Notably decreased sensation to left upper and left lower extremities) present. Motor: Weakness (To left upper and left lower extremities) present. Cervical spine cleared by clinical criteria? No (imaging required)      Invasive Devices       Drain  Duration             External Urinary Catheter Medium 24 days                    Lab Results:   Results Reviewed       None                   Imaging Studies:   Direct to CT: Yes  CT pelvis wo contrast   Final Result by Ernst Victor MD (36/18 [id number])         1.  No definite acute fracture identified involving either hip or elsewhere in the pelvis. Consider MRI for further evaluation if symptoms persist.,   2. Additional findings as noted. Workstation performed: [id number]         XR Trauma chest portable   ED Interpretation by Denise Ennis [address] [place] [address] (11/22 0421)   No acute cardiopulmonary disease notified by me. Final Result by Cally Perez DO (11/22 0510)      No acute cardiopulmonary disease is seen. Other findings as above. .                  Workstation performed: [id number]         XR hip/pelv 2-3 vws left if performed   ED Interpretation by Denise Ennis [address] [place] [address] (11/22 0423)   No acute fracture or dislocation or bony abnormality identified by me. TRAUMA - CT head wo contrast   Final Result by Willie Aranda MD (11/22 1336)      Stable mild senescent changes without acute intracranial hemorrhage or depressed calvarial fracture. Workstation performed: [id number]         TRAUMA - CT spine cervical wo contrast   Final Result by Willie Aranda MD (11/22 0421)      Multilevel degenerative changes without acute cervical spine fracture or traumatic malalignment. Workstation performed: [id number]               Procedures  Procedures         ED Course  ED Course as of 11/22/23 0644   Wed Nov 22, 2023   7070 Patient is at neurologic baseline without new neurologic deficits or complaints. His chief complaint is left hip pain. No bony tenderness or ecchymosis or swelling on exam.  Able to range hip to 45 degrees with slight resistance. Notably decreased sensation and strength to both left upper and left lower extremities which is consistent with his recent CVA. He is neurovascularly intact. Head to toe exam is overall without evidence of trauma. Given patient is on aspirin and Brilinta, will obtain CT head imaging as well as CT cervical spine given midline cervical tenderness.   Will obtain chest x-ray as well as left hip and pelvis to further evaluate for fracture. Patient does not have any pain at rest, he defers pain medication at this time. 7400 TRAUMA - CT head wo contrast  IMPRESSION:     Stable mild senescent changes without acute intracranial hemorrhage or depressed calvarial fracture. 0423 TRAUMA - CT spine cervical wo contrast  IMPRESSION:     Multilevel degenerative changes without acute cervical spine fracture or traumatic malalignment. 0425 No acute fracture identified on left hip/pelvis imaging. Discussed case with Dr. Nupur Mahoney who advises for CT pelvis without contrast to further evaluate for occult fracture. Patient did have 1 episode of vomiting since returning from CT (small, undigested food/mucus). He currently denies nausea, however will give Zofran ODT. Repeat abdominal exam is soft, rounded, nontender. No new ecchymosis or swelling on head to toe exam.   0523 XR Trauma chest portable  IMPRESSION:     No acute cardiopulmonary disease is seen. 0559 CT pelvis wo contrast  IMPRESSION:        1. No definite acute fracture identified involving either hip or elsewhere in the pelvis. Consider MRI for further evaluation if symptoms persist.,  2. Additional findings as noted. 4607 On reevaluation, patient sleeping comfortably. When awoken, he remains neurologically intact. No new complaints. Currently denies all pain. Repeat evaluation of the left hip is without swelling or ecchymosis or bony tenderness. Patient notes he overall feels well. Plan made for discharge given stable scans without evidence of traumatic finding. Patient currently at baseline as compared to prior documentation. 1847 Estimated pickup time at 8:30 AM.  Patient sleeping comfortably. Will order daytime medications while pending transfer. Patient signed out to Dr. Hamzah Hughes while awaiting transfer. Medical Decision Making  DDx including but not limited to:  Intracranial injury, concussion, cervical injury, extremity injury-fracture or dislocation or strain or sprain or contusion    See ED course for further MDM and disposition discussion. Problems Addressed:  Fall, initial encounter: acute illness or injury    Amount and/or Complexity of Data Reviewed  Radiology: ordered and independent interpretation performed. Decision-making details documented in ED Course. Risk  OTC drugs. Prescription drug management. Disposition  [facility] Transfer: No  Final diagnoses:   Fall, initial encounter     Time reflects when diagnosis was documented in both MDM as applicable and the Disposition within this note       Time User Action Codes Description Comment    11/22/2023  6:00 AM Sergo Jimenez Add [W19. Luly Hilda, initial encounter           ED Disposition       ED Disposition   Discharge    Condition   Stable    Date/Time   Wed Nov 22, 2023  6:00 AM    Comment   Aj Rose discharge to home/self care. Follow-up Information       Follow up With Specialties Details Why Contact Info Additional [facility] Emergency Department Emergency Medicine Go to  If symptoms worsen [address]  [address]  Emergency Department, [place], [place], [facility]          Patient's Medications   Discharge Prescriptions    No medications on file     No discharge procedures on file.     PDMP Review       None            ED Provider  Electronically Signed by           Sergo Jimenez, [address] [place] [address]  11/22/23 [facility], [address] [place] [address]  11/22/23 1023

## 2023-11-23 NOTE — ASSESSMENT & PLAN NOTE
- Location : Sacrum  - Wound healing status: Progressing well,  - Autolytic debridement using Triad paste  - Pressure redistribution device - on air mattress  - Continue to offload  - Increase protein - on frozen treat  - No sign localized infection during visit  - Clinical factors affecting wound healing includes age, chronicity,  co-morbidities, incontinence, location of the wound, mobility impairement,   -Follow up : Next week

## 2023-11-23 NOTE — PROGRESS NOTES
[facility]       Patient ID: Darnella Soulier is a 70 y.o. male Date of Birth 1945     Location of Service: [facility]    Performed wound round with: Wound team     Chief Complaint : Sacrum    Wound Instructions:  Wound:  Sacrum  Discontinue previous wound order  Cleanse the wound bed with NSS   Apply non-sting skin prep to periwound area  Apply triad paste to wound bed   Frequency : twice a day and prn for soiling  Offload all wounds  Turn and reposition frequently,   Increase protein intake. Monitor for any sign of infection or worsening, inform PCP or patient's primary physician in your facility. Allergies  Plavix [clopidogrel]      Assessment & Plan:  1. Ambulatory dysfunction  Assessment & Plan:  On 24/7 restorative care      2. Pressure injury of sacral region, unstageable [facility])  Assessment & Plan:  - Location : Sacrum  - Wound healing status: Progressing well,  - Autolytic debridement using Triad paste  - Pressure redistribution device - on air mattress  - Continue to offload  - Increase protein - on frozen treat  - No sign localized infection during visit  - Clinical factors affecting wound healing includes age, chronicity,  co-morbidities, incontinence, location of the wound, mobility impairement,   -Follow up : Next week                 Subjective:   11/8/2023This is a 70 y.o., male referred to our service for wound/ skin alterations on sacrum. Patient have a complex medical history including but not limited to type 2 diabetes and HTN . Patient was referred by Senior Care Team. Patient was seen in collaboration with the facility wound team.     Wound History: Patient was admitted with pressure injury on the sacrum. Received patient in bed, seems comfortable. Patient have a good appetite. Patient is incontinent of bowel and bladder. Patient needs assistance with turning and repositioning in bed.  Currently on frozen nutritional treat.     11/15/2023 Follow up for wound on the sacrum. Received patient, not in distress. Facility staff did not report any significant issues related to the wound. 11/22/2023 Follow up for wound on the sacrum. Received patient, not in distress. Facility staff did not report any significant issues related to the wound. Denies pain. Review of Systems   Constitutional: Negative. Respiratory: Negative. Cardiovascular: Negative. Skin:  Positive for wound. Psychiatric/Behavioral: Negative. Objective:    Physical Exam  Constitutional:       Appearance: Normal appearance. Pulmonary:      Effort: Pulmonary effort is normal.   Genitourinary:     Comments: incontinent  Musculoskeletal:      Comments: LROM   Skin:     Findings: Lesion present. Comments: Location : Sacrum. 2.3 x 1.1 x 0.1 cm, 20% slough, 80% granulation, no drainage, periwound is normal, no malodor, no obvious sign of infection   Neurological:      Mental Status: He is alert. Procedures           Patient's care was coordinated with nursing facility staff. Recent vitals, labs and updated medications were reviewed on EMR or chart system of facility. Past Medical, surgical, social, medication and allergy history and patient's previous records were reviewed and updated as appropriate: Most up-to date information is available in the facility EMR where the patient is currently admitted.     Patient Active Problem List   Diagnosis    Cerebral aneurysm, nonruptured    CVA (cerebral vascular accident) ([facility])    Essential hypertension    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 chronic kidney disease, without long-term current use of insulin ([facility])    Class 1 obesity due to excess calories with serious comorbidity and body mass index (BMI) of 32.0 to 32.9 in adult    Polyarticular arthritis    Chronic right-sided low back pain with right-sided sciatica    Lumbar radiculopathy    Chronic pain syndrome    Hypercalcemia GI bleed    SIRS (systemic inflammatory response syndrome) (HCC)    Tachycardia    Chronic renal impairment    Persistent proteinuria    Bilateral lower extremity edema    Chronic renal disease, stage IV (HCC)    Renal cell carcinoma of right kidney (HCC)    History of GI bleed    Cough and secretions    Pressure injury of sacral region, unstageable [facility])    Ambulatory dysfunction    Fall at nursing home     Past Medical History:   Diagnosis Date    Hypertension     TIA (transient ischemic attack)      Past Surgical History:   Procedure Laterality Date    CYSTECTOMY      Per patient cyst removal from his back    LASIK      [place] LAPAROSCOPY RADICAL NEPHRECTOMY Right 7/6/2022    Procedure: NEPHRECTOMY RADICAL LAPAROSCOPIC W/ ROBOTICS, poss open;  Surgeon: Janae Callaway MD;  Location: [facility];  Service: Urology     Social History     Socioeconomic History    Marital status: /Civil Union     Spouse name: None    Number of children: None    Years of education: None    Highest education level: None   Occupational History    Occupation: Retired 2015 -     Tobacco Use    Smoking status: Never    Smokeless tobacco: Never   Vaping Use    Vaping Use: Never used   Substance and Sexual Activity    Alcohol use: Never    Drug use: Never    Sexual activity: None   Other Topics Concern    None   Social History Narrative    Denies drug use - As per Ripley Maurisioort    Consumes on average 1 cup of regular coffee per day      Social Determinants of Health     Financial Resource Strain: Low Risk  (11/15/2022)    Overall Financial Resource Strain (CARDIA)     Difficulty of Paying Living Expenses: Not very hard   Food Insecurity: No Food Insecurity (10/26/2023)    Hunger Vital Sign     Worried About Running Out of Food in the Last Year: Never true     Ran Out of Food in the Last Year: Never true   Transportation Needs: No Transportation Needs (10/26/2023)    PRAPARE - Transportation     Lack of Transportation (Medical): No     Lack of Transportation (Non-Medical): No   Physical Activity: Not on file   Stress: Not on file   Social Connections: Not on file   Intimate Partner Violence: Not on file   Housing Stability: Unknown (10/26/2023)    Housing Stability Vital Sign     Unable to Pay for Housing in the Last Year: No     Number of Places Lived in the Last Year: Not on file     Unstable Housing in the Last Year: No        Current Outpatient Medications:     amLODIPine (NORVASC) 5 mg tablet, Take 2 tablets (10 mg total) by mouth daily, Disp: 90 tablet, Rfl: 0    Ascorbic Acid (vitamin C) 100 MG tablet, Take 100 mg by mouth daily, Disp: , Rfl:     aspirin (ECOTRIN LOW STRENGTH) 81 mg EC tablet, Take 81 mg by mouth daily, Disp: , Rfl:     atorvastatin (LIPITOR) 40 mg tablet, Take 1 tablet (40 mg total) by mouth daily, Disp: , Rfl: 0    carvedilol (COREG) 12.5 mg tablet, Take 1 tablet (12.5 mg total) by mouth 2 (two) times a day with meals, Disp: , Rfl: 0    cholecalciferol (VITAMIN D3) 1,000 units tablet, Take 1,000 Units by mouth daily, Disp: , Rfl:     insulin lispro (HumaLOG) 100 units/mL injection, Inject 1-6 Units under the skin 3 (three) times a day before meals, Disp: , Rfl: 0    metFORMIN (GLUCOPHAGE) 500 mg tablet, Take 500 mg by mouth 2 (two) times a day with meals, Disp: , Rfl:     nateglinide (STARLIX) 60 mg tablet, Take 1 tablet (60 mg total) by mouth daily before dinner, Disp: 90 tablet, Rfl: 4    pantoprazole (PROTONIX) 40 mg tablet, Take 1 tablet (40 mg total) by mouth daily in the early morning Do not start before November 5, 2023., Disp: , Rfl: 0    ticagrelor (BRILINTA) 90 MG, 1 tablet (90 mg total) by Per NG Tube route every 12 (twelve) hours, Disp: , Rfl: 0  No current facility-administered medications for this visit.   Family History   Problem Relation Age of Onset    Colon cancer Mother     Diabetes type II Mother     Heart disease Mother     Prostate cancer Father               Coordination of Care: Wound team aware of the treatment plan. Facility nurse will provide wound treatment and monitor the wound for any changes. Patient / Staff education : Patient / Staff was given education on sign of infection and pressure ulcer prevention. Patient/ Staff verbalized understanding     Follow up :  Next week    Voice-recognition software may have been used in the preparation of this document. Occasional wrong word or "sound-alike" substitutions may have occurred due to the inherent limitations of voice recognition software. Interpretation should be guided by context.       KAYODE Diggs

## 2023-11-23 NOTE — ASSESSMENT & PLAN NOTE
ACUTE/SICK VISIT:    Nursing notes reviewed and accepted    ASSESSMENT/PLAN:  Sore  Throat  Fever  Viral panel done   Strept screening positive  Amoxil for 10 days   Symptomatic measures discussed        SUBJECTIVE:  CHIEF COMPLAINT:  Aldo Shaikh is a 10 year old male who presents with Mother with the following chief complaints sore throat and fever      HISTORY OF PRESENT ILLNESS:  For 6 days, Aldo Shaikh has had fever and sore throat.Yesterday fever was around 104 No history of cough and congestion No history of vomiting Has constipation  He denies a history of any other symptoms.    Appetite has been mildly decreased .  Aldo Shaikh has been restless.  Activity is significantly decreased.    Social History:  /School:  Yes; 3rd grade  Exposure to someone at /school with similar symptoms:  No  Exposure to someone else at home with similar symptoms:  No    REVIEW OF SYSTEMS:  See HPI (History of Present Illness); otherwise denies HEENT, MUSCULOSKELETAL, RESPIRATORY, CARDIAC, GASTROINTESTINAL, GENITOURINARY, NEUROLOGIC or PSYCHIATRIC Symptoms.    Past medical, family, and social history reviewed and updated per family.    Current Outpatient Medications   Medication Sig Dispense Refill   • hydrocortisone (CORTIZONE) 2.5 % ointment Apply 1 application topically 2 times daily. Use until rash resolves and then as needed. 30 g 1     No current facility-administered medications for this visit.     ALLERGIES:  No Known Allergies    OBJECTIVE:  PHYSICAL EXAM:  There were no vitals taken for this visit.  General Appearance:  Healthy, alert, in no distress  HEENT:   Eyes:  normal without erythema or drainage   Ears:  Right TM (Tympanic Membrane):  normal          Left TM (Tympanic Membrane):  normal   Nose:  normal external appearance and nasal mucosa   Oropharynx:  mild erythema   Neck:  Normal and no adenopathy at cervical or supraclavicular sites and masses  Lungs:  clear to auscultation and  On 24/7 restorative care percussion  Heart:  regular rate and rhythm and no murmurs, clicks, or gallops  Abdomen:  soft, non-tender, Bowel sounds normal, no masses or [id number] noted  Skin:  Skin color, texture, turgor are normal. There are no bruises, rashes or lesions.        Gideon NUGENT   9/14/2023  2:56 PM

## 2023-11-28 ENCOUNTER — NURSING HOME VISIT (OUTPATIENT)
Dept: GERIATRICS | Facility: OTHER | Age: 78
End: 2023-11-28
Payer: COMMERCIAL

## 2023-11-28 ENCOUNTER — TELEPHONE (OUTPATIENT)
Dept: OTHER | Facility: OTHER | Age: 78
End: 2023-11-28

## 2023-11-28 VITALS
OXYGEN SATURATION: 99 % | DIASTOLIC BLOOD PRESSURE: 78 MMHG | SYSTOLIC BLOOD PRESSURE: 134 MMHG | HEART RATE: 90 BPM | BODY MASS INDEX: 30.62 KG/M2 | RESPIRATION RATE: 18 BRPM | TEMPERATURE: 98.3 F | WEIGHT: 225.8 LBS
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
## 2023-11-28 DIAGNOSIS — M54.2 NECK PAIN: Primary | ICD-10-CM

## 2023-11-28 DIAGNOSIS — C64.1 RENAL CELL CARCINOMA OF RIGHT KIDNEY (HCC): ICD-10-CM
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
## 2023-11-28 DIAGNOSIS — E11.22 TYPE 2 DIABETES MELLITUS WITH STAGE 3A CHRONIC KIDNEY DISEASE, WITHOUT LONG-TERM CURRENT USE OF INSULIN (HCC): ICD-10-CM

## 2023-11-28 DIAGNOSIS — N18.31 TYPE 2 DIABETES MELLITUS WITH STAGE 3A CHRONIC KIDNEY DISEASE, WITHOUT LONG-TERM CURRENT USE OF INSULIN (HCC): ICD-10-CM

## 2023-11-28 DIAGNOSIS — I10 ESSENTIAL HYPERTENSION: ICD-10-CM

## 2023-11-28 DIAGNOSIS — I63.9 CEREBROVASCULAR ACCIDENT (CVA), UNSPECIFIED MECHANISM (HCC): ICD-10-CM

## 2023-11-28 PROCEDURE — 99309 SBSQ NF CARE MODERATE MDM 30: CPT | Performed by: NURSE PRACTITIONER

## 2023-11-28 RX ORDER — BACLOFEN 5 MG/1
5 TABLET ORAL EVERY 8 HOURS PRN
Refills: 0
Start: 2023-11-28

## 2023-11-28 NOTE — ASSESSMENT & PLAN NOTE
Unwitnessed fall oob to floor on 11/21-11/22   C/O let hip pain  Sent to ED---> Trauma evaluated --> imaging negative for Fx/dislocation of hip  CT head/C-spine negative for Fracture or bleed   Continue PT/OT  Fall Precautions     Today c/o left neck pain - does have muscle tightness/spasms- noted with cervical degenerative changes on recent CT c spine---> Treat with PRN Voltarn and PRN Baclofen, Continue Scheduled Tylenol

## 2023-11-28 NOTE — TELEPHONE ENCOUNTER
[phone]/CDLJH from [facility]/Pt is having pain all over. Dieudonne Nichole was paged via TC    Please allow the on-call provider 20-30 mins to respond. If you have not heard from them within that time, please feel free to call back.

## 2023-11-28 NOTE — PROGRESS NOTES
[address]  [phone]  FACILITY: [facility]  Code 31 (STR)      NAME: Belem Pitt  AGE: 70 y.o. SEX: male CODE STATUS: CPR    DATE OF ENCOUNTER: 11/28/2023    Assessment and Plan     1. Neck pain  Assessment & Plan:  Patient c/o Left sided neck pain   Did have recent fall oob ---> sent to ED 11/22 with below imaging   CT C Spine (11/22/2023) Multilevel degenerative changes without acute cervical spine fracture or traumatic malalignment. No indication for repeat imaging at this time     Plan:   Continue Tylenol 975 mg Q 8 hrs scheduled  Start Baclofen PRN for muscle spasms  Start Voltaren gel to b/l neck QID PRN  Continue PT/OT treatment    Orders:  -     Baclofen 5 MG TABS; Take 5 mg by mouth every 8 (eight) hours as needed (muscle spasm/tightness)  -     Diclofenac Sodium (VOLTAREN) 1 %; Apply 2 g topically every 6 (six) hours as needed (Arthritis pain)    2. Fall at nursing home, subsequent encounter  Assessment & Plan:  Unwitnessed fall oob to floor on 11/21-11/22   C/O let hip pain  Sent to ED---> Trauma evaluated --> imaging negative for Fx/dislocation of hip  CT head/C-spine negative for Fracture or bleed   Continue PT/OT  Fall Precautions     Today c/o left neck pain - does have muscle tightness/spasms- noted with cervical degenerative changes on recent CT c spine---> Treat with PRN Voltarn and PRN Baclofen, Continue Scheduled Tylenol       3.  Cerebrovascular accident (CVA), unspecified mechanism ([address])  Assessment & Plan:  Presented to hospital with left-sided weakness family and patient declined thrombolytics  CT of the head without any acute abnormality did show chronic microangiopathic changes  CTA with occlusion of the left V3 and left V4 segments similar to prior severe near occlusive plaque stenosis to the proximal left cervical ICA greater than 90% stenosis appears worse than prior exam additionally with a left ICA aneurysm 5.3 x 5.7 mm consistent with prior exam  MRI brain with acute to early subacute lacunar infarct in the right veronique, no mass effect or hemorrhagic conversion  Echo with EF 50% with grade 1 DD  Continue dual antiplatelet therapy with aspirin and Brilinta x90 days followed by aspirin monotherapy  Continue PT OT  Outpatient follow-up with vascular surgery for ICA stenosis follow-up  Also noted with dysphagia continue dysphagia 2 with nectar thick-continue speech therapy      4. 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 chronic kidney disease, without long-term current use of insulin (HCC)  Assessment & Plan:      Lab Results   Component Value Date    HGBA1C 6.7 (H) 10/24/2023     Per geriatric guidelines, goal HgA1c is > 7.5 to prevent hypoglycemic event in the older adult with cognitive decline    Continue Accu Checks  Continue Nateglinide 60 mg daily in the afternoon  Home metformin held inpatient but family requested to restart at rehab- Continue Metformin home dose 500 mg BID  Hypoglycemic protocol        5. Renal cell carcinoma of right kidney [facility])  Assessment & Plan:  S/P Right nephrectomy  OP f/u with Urology as scheduled      6. Essential hypertension  Assessment & Plan:  Continue to monitor at rehab s/p CVA  Avoid Hypotension   Continue home dose Amlodipine 10 mg daily   Started on Coreg 12.5 mg BID with hold parameters while inpatient              All medications and routine orders were reviewed and updated as needed.     Chief Complaint     STR follow up visit    Past Medical and Surgica History      Past Medical History:   Diagnosis Date    Hypertension     TIA (transient ischemic attack)      Past Surgical History:   Procedure Laterality Date    CYSTECTOMY      Per patient cyst removal from his back    LASIK      [place] LAPAROSCOPY RADICAL NEPHRECTOMY Right 7/6/2022    Procedure: NEPHRECTOMY RADICAL LAPAROSCOPIC W/ ROBOTICS, poss open;  Surgeon: Kwaku Resendiz MD;  Location: [facility];  Service: Urology Allergies   Allergen Reactions    Plavix [Clopidogrel] Rash     Stopped two days ago because got a rash and doctors thought it was from this           History of Present Illness     Reggie Montoya is a 70 y.o. male admitted to [address] [place] [address] following hospital stay for Acute CVA. Patient has a past medical Hx including but not limited to HTN, DM2, CVA, CKD4, RCC s/p Right Nephrectomy. Patient is seen in collaboration with nursing for medical mgmt and STR follow up. Patient initially presented to hospital with left sided weakness, dysarthria, dysphagia. Alert was called and neurology evaluated, MRI did confirm acute CVA. Patient and family opted not to receive thrombolytics. Patient was started on dual antiplatelet therapy with Brilinta and aspirin. Additionally work-up revealed ICA stenosis and recommended outpatient vascular surgery consult. Patient initially required feeding tube due to severe dysphagia. Later patient swallowing improved and was started on an oral diet. Patient was recommended discharge to postacute rehab. At baseline patient lives at home with his wife was independent with his ADLs and most of his IADLs. Seen and examined at bedside today. Patient is a reliable historian. Patient main complaint today is left sided neck pain/tightness, he is requesting an x ray, reviewd recent CT c spine, recommed to contine tylenol and will start voltarn and baclofen prn. Otherwise patient is oob in w/c. PT/OT states patient has made some gains with left sided muscle contraction still no movement. Patient states he has a good appetite, denies any bowel or bladder issues. Denies CP/SOB/N/V/D. Denies lightheadedness, dizziness, headaches, vision changes. Patient is actively participating in therapy. No concerns from nursing at this time. The patient's allergies, past medical, surgical, social and family history were reviewed and unchanged.     Review of Systems Review of Systems   Constitutional:  Positive for activity change. Musculoskeletal:  Positive for gait problem and neck pain. Skin:  Positive for wound. Neurological:  Positive for weakness. All other systems reviewed and are negative. Objective     Vitals:   Vitals:    11/28/23 1850   BP: 134/78   Pulse: 90   Resp: 18   Temp: 98.3 °F (36.8 °C)   SpO2: 99%         Physical Exam  Vitals and nursing note reviewed. Constitutional:       General: He is not in acute distress. Appearance: Normal appearance. HENT:      Head: Normocephalic and atraumatic. Nose: No congestion or rhinorrhea. Mouth/Throat:      Mouth: Mucous membranes are moist.      Comments: Left facial droop  Eyes:      General: No scleral icterus. Extraocular Movements: Extraocular movements intact. Conjunctiva/sclera: Conjunctivae normal.      Pupils: Pupils are equal, round, and reactive to light. Neck:      Trachea: Trachea normal.     Cardiovascular:      Rate and Rhythm: Normal rate and regular rhythm. Pulses: Normal pulses. Heart sounds: Normal heart sounds. No murmur heard. Pulmonary:      Effort: Pulmonary effort is normal.      Breath sounds: Normal breath sounds. No wheezing, rhonchi or rales. Abdominal:      General: Bowel sounds are normal. There is no distension. Palpations: Abdomen is soft. Tenderness: There is no abdominal tenderness. Musculoskeletal:         General: No swelling or signs of injury. Cervical back: No edema, erythema, signs of trauma, rigidity, torticollis or crepitus. Muscular tenderness present. No pain with movement or spinous process tenderness. Decreased range of motion. Comments: Left sided hemiparesis , Left leg in multi podis boot   Lymphadenopathy:      Cervical: No cervical adenopathy. Skin:     General: Skin is warm and dry. Findings: No erythema.       Comments: Noted with sacral ulcer, unable to visualize on exam today- please see Wound Care CRNP notes   Neurological:      Mental Status: He is alert and oriented to person, place, and time. Mental status is at baseline. GCS: GCS eye subscore is 4. GCS verbal subscore is 5. GCS motor subscore is 6. Cranial Nerves: Dysarthria and facial asymmetry present. Sensory: No sensory deficit. Motor: Weakness present. Gait: Gait abnormal.   Psychiatric:         Attention and Perception: Attention normal.         Mood and Affect: Mood normal.         Speech: Speech normal.         Behavior: Behavior normal. Behavior is cooperative. Pertinent Laboratory/Diagnostic Studies:     Reviewed in facility chart      Current Medications   Medications reviewed and updated see facility [facility] for details.       Current Outpatient Medications:     Baclofen 5 MG TABS, Take 5 mg by mouth every 8 (eight) hours as needed (muscle spasm/tightness), Disp: , Rfl: 0    Diclofenac Sodium (VOLTAREN) 1 %, Apply 2 g topically every 6 (six) hours as needed (Arthritis pain), Disp: , Rfl:     amLODIPine (NORVASC) 5 mg tablet, Take 2 tablets (10 mg total) by mouth daily, Disp: 90 tablet, Rfl: 0    Ascorbic Acid (vitamin C) 100 MG tablet, Take 100 mg by mouth daily, Disp: , Rfl:     aspirin (ECOTRIN LOW STRENGTH) 81 mg EC tablet, Take 81 mg by mouth daily, Disp: , Rfl:     atorvastatin (LIPITOR) 40 mg tablet, Take 1 tablet (40 mg total) by mouth daily, Disp: , Rfl: 0    carvedilol (COREG) 12.5 mg tablet, Take 1 tablet (12.5 mg total) by mouth 2 (two) times a day with meals, Disp: , Rfl: 0    cholecalciferol (VITAMIN D3) 1,000 units tablet, Take 1,000 Units by mouth daily, Disp: , Rfl:     insulin lispro (HumaLOG) 100 units/mL injection, Inject 1-6 Units under the skin 3 (three) times a day before meals, Disp: , Rfl: 0    metFORMIN (GLUCOPHAGE) 500 mg tablet, Take 500 mg by mouth 2 (two) times a day with meals, Disp: , Rfl:     nateglinide (STARLIX) 60 mg tablet, Take 1 tablet (60 mg total) by mouth daily before dinner, Disp: 90 tablet, Rfl: 4    pantoprazole (PROTONIX) 40 mg tablet, Take 1 tablet (40 mg total) by mouth daily in the early morning Do not start before November 5, 2023., Disp: , Rfl: 0    ticagrelor (BRILINTA) 90 MG, 1 tablet (90 mg total) by Per NG Tube route every 12 (twelve) hours, Disp: , Rfl: 0     Please note:  Voice-recognition software may have been used in the preparation of this document. Occasional wrong word or "sound-alike" substitutions may have occurred due to the inherent limitations of voice recognition software. Interpretation should be guided by context.          KAYODE Alvarado  11/28/2023  12:31 PM

## 2023-11-28 NOTE — ASSESSMENT & PLAN NOTE
Patient c/o Left sided neck pain   Did have recent fall oob ---> sent to ED 11/22 with below imaging   CT C Spine (11/22/2023) Multilevel degenerative changes without acute cervical spine fracture or traumatic malalignment.     No indication for repeat imaging at this time     Plan:   Continue Tylenol 975 mg Q 8 hrs scheduled  Start Baclofen PRN for muscle spasms  Start Voltaren gel to b/l neck QID PRN  Continue PT/OT treatment

## 2023-11-29 ENCOUNTER — NURSING HOME VISIT (OUTPATIENT)
Dept: WOUND CARE | Facility: HOSPITAL | Age: 78
End: 2023-11-29
Payer: COMMERCIAL

## 2023-11-29 DIAGNOSIS — L89.153 PRESSURE INJURY OF SACRAL REGION, STAGE 3 (HCC): ICD-10-CM

## 2023-11-29 DIAGNOSIS — R26.2 AMBULATORY DYSFUNCTION: Primary | ICD-10-CM

## 2023-11-29 PROCEDURE — 99308 SBSQ NF CARE LOW MDM 20: CPT | Performed by: NURSE PRACTITIONER

## 2023-11-29 NOTE — PROGRESS NOTES
[facility]       Patient ID: Darnella Soulier is a 70 y.o. male Date of Birth 1945     Location of Service: [facility]    Performed wound round with: Wound team     Chief Complaint : Sacrum    Wound Instructions:  Wound:  Sacrum  Discontinue previous wound order  Cleanse the wound bed with NSS   Apply non-sting skin prep to periwound area  Apply triad paste to wound bed   Frequency : twice a day and prn for soiling  Offload all wounds  Turn and reposition frequently,   Increase protein intake. Monitor for any sign of infection or worsening, inform PCP or patient's primary physician in your facility. Allergies  Plavix [clopidogrel]      Assessment & Plan:  1. Ambulatory dysfunction  Assessment & Plan: On short-term rehab      2. Pressure injury of sacral region, stage 3 [facility])  Assessment & Plan:  Sacral  Wound improved, increased granulation  Continue wound care with triad paste  On air mattress  On protein supplement - frozen nutritional treat  Follow up next week               Subjective:   11/8/2023This is a 70 y.o., male referred to our service for wound/ skin alterations on sacrum. Patient have a complex medical history including but not limited to type 2 diabetes and HTN . Patient was referred by Senior Care Team. Patient was seen in collaboration with the facility wound team.     Wound History: Patient was admitted with pressure injury on the sacrum. Received patient in bed, seems comfortable. Patient have a good appetite. Patient is incontinent of bowel and bladder. Patient needs assistance with turning and repositioning in bed. Currently on frozen nutritional treat. 11/15/2023 Follow up for wound on the sacrum. Received patient, not in distress. Facility staff did not report any significant issues related to the wound. 11/22/2023 Follow up for wound on the sacrum. Received patient, not in distress.  Facility staff did not report any significant issues related to the wound. Denies pain. November 29, 2023. Follow-up for wound on the sacrum. Received patient in bed, seems comfortable. Denies pain. No significant issues reported related to the wound. Review of Systems   Constitutional: Negative. Respiratory: Negative. Cardiovascular: Negative. Skin:  Positive for wound. Psychiatric/Behavioral: Negative. Objective:    Physical Exam  Constitutional:       Appearance: Normal appearance. Pulmonary:      Effort: Pulmonary effort is normal.   Genitourinary:     Comments: incontinent  Musculoskeletal:      Comments: LROM   Skin:     Findings: Lesion present. Comments: Sacrum: Wound size is 1.6 x 2.6 x 0.1 cm.,  100% granulation, small amount of serous drainage, periwound is normal, no malodor, no obvious sign of infection, denies tenderness on palpation   Neurological:      Mental Status: He is alert. Procedures           Patient's care was coordinated with nursing facility staff. Recent vitals, labs and updated medications were reviewed on EMR or chart system of facility. Past Medical, surgical, social, medication and allergy history and patient's previous records were reviewed and updated as appropriate: Most up-to date information is available in the facility EMR where the patient is currently admitted.     Patient Active Problem List   Diagnosis    Cerebral aneurysm, nonruptured    CVA (cerebral vascular accident) ([address])    Essential hypertension    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 chronic kidney disease, without long-term current use of insulin ([facility])    Class 1 obesity due to excess calories with serious comorbidity and body mass index (BMI) of 32.0 to 32.9 in adult    Polyarticular arthritis    Chronic right-sided low back pain with right-sided sciatica    Lumbar radiculopathy    Chronic pain syndrome    Hypercalcemia    GI bleed    SIRS (systemic inflammatory response syndrome) (HCC)    Tachycardia    Chronic renal impairment    Persistent proteinuria    Bilateral lower extremity edema    Chronic renal disease, stage IV (HCC)    Renal cell carcinoma of right kidney (HCC)    History of GI bleed    Cough and secretions    Pressure injury of sacral region, unstageable [facility])    Ambulatory dysfunction    Fall at nursing home    Neck pain    Pressure injury of sacral region, stage 3 ([address] [facility])     Past Medical History:   Diagnosis Date    Hypertension     TIA (transient ischemic attack)      Past Surgical History:   Procedure Laterality Date    CYSTECTOMY      Per patient cyst removal from his back    LASIK      ME LAPAROSCOPY RADICAL NEPHRECTOMY Right 7/6/2022    Procedure: NEPHRECTOMY RADICAL LAPAROSCOPIC W/ ROBOTICS, poss open;  Surgeon: Naila Vaughn MD;  Location: [facility];  Service: Urology     Social History     Socioeconomic History    Marital status: /Civil Union     Spouse name: None    Number of children: None    Years of education: None    Highest education level: None   Occupational History    Occupation: Retired 2015 -     Tobacco Use    Smoking status: Never    Smokeless tobacco: Never   Vaping Use    Vaping Use: Never used   Substance and Sexual Activity    Alcohol use: Never    Drug use: Never    Sexual activity: None   Other Topics Concern    None   Social History Narrative    Denies drug use - As per [facility]    Consumes on average 1 cup of regular coffee per day      Social Determinants of Health     Financial Resource Strain: Low Risk  (11/15/2022)    Overall Financial Resource Strain (CARDIA)     Difficulty of Paying Living Expenses: Not very hard   Food Insecurity: No Food Insecurity (10/26/2023)    Hunger Vital Sign     Worried About Running Out of Food in the Last Year: Never true     Ran Out of Food in the Last Year: Never true   Transportation Needs: No Transportation Needs (10/26/2023)    PRAPARE - Transportation     Lack of Transportation (Medical): No     Lack of Transportation (Non-Medical):  No   Physical Activity: Not on file   Stress: Not on file   Social Connections: Not on file   Intimate Partner Violence: Not on file   Housing Stability: Unknown (10/26/2023)    Housing Stability Vital Sign     Unable to Pay for Housing in the Last Year: No     Number of Places Lived in the Last Year: Not on file     Unstable Housing in the Last Year: No        Current Outpatient Medications:     amLODIPine (NORVASC) 5 mg tablet, Take 2 tablets (10 mg total) by mouth daily, Disp: 90 tablet, Rfl: 0    Ascorbic Acid (vitamin C) 100 MG tablet, Take 100 mg by mouth daily, Disp: , Rfl:     aspirin (ECOTRIN LOW STRENGTH) 81 mg EC tablet, Take 81 mg by mouth daily, Disp: , Rfl:     atorvastatin (LIPITOR) 40 mg tablet, Take 1 tablet (40 mg total) by mouth daily, Disp: , Rfl: 0    Baclofen 5 MG TABS, Take 5 mg by mouth every 8 (eight) hours as needed (muscle spasm/tightness), Disp: , Rfl: 0    carvedilol (COREG) 12.5 mg tablet, Take 1 tablet (12.5 mg total) by mouth 2 (two) times a day with meals, Disp: , Rfl: 0    cholecalciferol (VITAMIN D3) 1,000 units tablet, Take 1,000 Units by mouth daily, Disp: , Rfl:     Diclofenac Sodium (VOLTAREN) 1 %, Apply 2 g topically every 6 (six) hours as needed (Arthritis pain), Disp: , Rfl:     insulin lispro (HumaLOG) 100 units/mL injection, Inject 1-6 Units under the skin 3 (three) times a day before meals, Disp: , Rfl: 0    metFORMIN (GLUCOPHAGE) 500 mg tablet, Take 500 mg by mouth 2 (two) times a day with meals, Disp: , Rfl:     nateglinide (STARLIX) 60 mg tablet, Take 1 tablet (60 mg total) by mouth daily before dinner, Disp: 90 tablet, Rfl: 4    pantoprazole (PROTONIX) 40 mg tablet, Take 1 tablet (40 mg total) by mouth daily in the early morning Do not start before November 5, 2023., Disp: , Rfl: 0    ticagrelor (BRILINTA) 90 MG, 1 tablet (90 mg total) by Per NG Tube route every 12 (twelve) hours, Disp: , Rfl: 0  Family History   Problem Relation Age of Onset    Colon cancer Mother     Diabetes type II Mother     Heart disease Mother     Prostate cancer Father               Coordination of Care: Wound team aware of the treatment plan. Facility nurse will provide wound treatment and monitor the wound for any changes. Patient / Staff education : Patient / Staff was given education on sign of infection and pressure ulcer prevention. Patient/ Staff verbalized understanding     Follow up :  Next week    Voice-recognition software may have been used in the preparation of this document. Occasional wrong word or "sound-alike" substitutions may have occurred due to the inherent limitations of voice recognition software. Interpretation should be guided by context.       KAYODE Webster

## 2023-11-29 NOTE — ASSESSMENT & PLAN NOTE
Sacral  Wound improved, increased granulation  Continue wound care with triad paste  On air mattress  On protein supplement - frozen nutritional treat  Follow up next week

## 2023-12-01 ENCOUNTER — TELEPHONE (OUTPATIENT)
Dept: NEUROLOGY | Facility: CLINIC | Age: 78
End: 2023-12-01

## 2023-12-01 NOTE — TELEPHONE ENCOUNTER
Post CVA Discharge Follow Up  Hospitalization: 10/24/23-11/4/23    According to chart, patient discharged to [place] [facility]. Called facility, reached the patient's nurse, Lexi. Reports the patient is doing the same. She denies any new or worsening stroke-like symptoms. He continues to have the following symptoms: left sided weakness, dysarthria and dysphagia. Ambulation / ADLs:  Patient is a max assist OOB. He continues to require assistance with ADLs and transfers. Patient is on a mechanical soft diet with honey thick liquids. He remains incontinent at this time. Medication Review:  Reviewed medications with them. There have not been any medication changes since discharge from the hospital. No reported missed doses, medication side effects, or signs of bleeding. Last reported /75    Appointments:  Reminded her of the patient's upcoming appointment. There is no estimated discharge date at this time. Denies any questions or concerns at this time.

## 2023-12-04 ENCOUNTER — NURSING HOME VISIT (OUTPATIENT)
Dept: GERIATRICS | Facility: OTHER | Age: 78
End: 2023-12-04
Payer: COMMERCIAL

## 2023-12-04 VITALS
TEMPERATURE: 98.1 F | BODY MASS INDEX: 27.78 KG/M2 | DIASTOLIC BLOOD PRESSURE: 66 MMHG | RESPIRATION RATE: 18 BRPM | OXYGEN SATURATION: 98 % | HEART RATE: 90 BPM | SYSTOLIC BLOOD PRESSURE: 146 MMHG | WEIGHT: 204.8 LBS

## 2023-12-04 DIAGNOSIS — I63.9 CEREBROVASCULAR ACCIDENT (CVA), UNSPECIFIED MECHANISM (HCC): Primary | ICD-10-CM

## 2023-12-04 DIAGNOSIS — E44.0 MODERATE PROTEIN-CALORIE MALNUTRITION (HCC): ICD-10-CM

## 2023-12-04 DIAGNOSIS — I69.319 COGNITIVE DEFICIT, POST-STROKE: ICD-10-CM

## 2023-12-04 DIAGNOSIS — N18.4 CHRONIC RENAL DISEASE, STAGE IV (HCC): ICD-10-CM

## 2023-12-04 PROCEDURE — 99309 SBSQ NF CARE MODERATE MDM 30: CPT | Performed by: NURSE PRACTITIONER

## 2023-12-04 RX ORDER — MIRTAZAPINE 7.5 MG/1
7.5 TABLET, FILM COATED ORAL
Start: 2023-12-04

## 2023-12-05 NOTE — ASSESSMENT & PLAN NOTE
Lab Results   Component Value Date    EGFR 30 11/03/2023    EGFR 30 11/02/2023    EGFR 32 10/29/2023    CREATININE 2.08 (H) 11/03/2023    CREATININE 2.04 (H) 11/02/2023    CREATININE 1.95 (H) 10/29/2023     Baseline creatinine between 1.9-2.4.   Repeat Creatinine at [facility] ranging 1.2- 1.7-> stable at baseline   Renally dose medications  Avoid Hypotension  Avoid NSAIDS    Repeat labs mon 12/11

## 2023-12-05 NOTE — PROGRESS NOTES
[address]  [phone]  FACILITY: [facility]  Code 31 (STR)      NAME: Aj Rose  AGE: 70 y.o. SEX: male CODE STATUS: CPR    DATE OF ENCOUNTER: 12/4/2023    Assessment and Plan     1. Cerebrovascular accident (CVA), unspecified mechanism ([address] )  Assessment & Plan:  Presented to hospital with left-sided weakness family and patient declined thrombolytics  MRI brain with acute to early subacute lacunar infarct in the right veronique, no mass effect or hemorrhagic conversion  Echo with EF 50% with grade 1 DD  Continue dual antiplatelet therapy with aspirin and Brilinta x90 days followed by aspirin monotherapy  Continue PT OT  Outpatient follow-up with vascular surgery for ICA stenosis follow-up  Also noted with dysphagia continue dysphagia 2 with nectar thick-continue speech therapy    Orders:  -     ticagrelor (BRILINTA) 90 MG; 1 tablet (90 mg total) by Per NG Tube route every 12 (twelve) hours    2. Moderate protein-calorie malnutrition (HCC)  Assessment & Plan:  Malnutrition Findings:          Weight loss  Poor appetite   Chronic multiple comorbidities     Start mirtazapine 7.5 mg Q HS   Nutritional Supplemetns   RD following at rehab                       BMI Findings: Body mass index is 27.78 kg/m². Orders:  -     mirtazapine (REMERON) 7.5 MG tablet; Take 1 tablet (7.5 mg total) by mouth daily at bedtime    3. Cognitive deficit, post-stroke  Assessment & Plan:  Nursing noted hallucinations and combativeness/agitation with care   Family concerned about behavior change, also noted poor appetite   Check CBC BMP Mon 12/11  Start Mirtazapine 7.5 mg Q HS       Orders:  -     mirtazapine (REMERON) 7.5 MG tablet; Take 1 tablet (7.5 mg total) by mouth daily at bedtime    4.  Chronic renal disease, stage IV [facility])  Assessment & Plan:    Lab Results   Component Value Date    EGFR 30 11/03/2023    EGFR 30 11/02/2023    EGFR 32 10/29/2023    CREATININE 2.08 (H) 11/03/2023    CREATININE 2.04 (H) 11/02/2023    CREATININE 1.95 (H) 10/29/2023     Baseline creatinine between 1.9-2.4. Repeat Creatinine at [facility] ranging 1.2- 1.7-> stable at baseline   Renally dose medications  Avoid Hypotension  Avoid NSAIDS    Repeat labs mon 12/11           All medications and routine orders were reviewed and updated as needed. Chief Complaint     STR follow up visit    Past Medical and Surgica History      Past Medical History:   Diagnosis Date    Hypertension     TIA (transient ischemic attack)      Past Surgical History:   Procedure Laterality Date    CYSTECTOMY      Per patient cyst removal from his back    LASIK      DE LAPAROSCOPY RADICAL NEPHRECTOMY Right 7/6/2022    Procedure: NEPHRECTOMY RADICAL LAPAROSCOPIC W/ ROBOTICS, poss open;  Surgeon: Oumar Loco MD;  Location: [facility];  Service: Urology     Allergies   Allergen Reactions    Plavix [Clopidogrel] Rash     Stopped two days ago because got a rash and doctors thought it was from this           History of Present Illness     Caryn Finn is a 70 y.o. male admitted to [address] [place] [address] following hospital stay for Acute CVA. Patient has a past medical Hx including but not limited to HTN, DM2, CVA, CKD4, RCC s/p Right Nephrectomy. Patient is seen in collaboration with nursing for medical mgmt and STR follow up. Patient initially presented to hospital with left sided weakness, dysarthria, dysphagia. Alert was called and neurology evaluated, MRI did confirm acute CVA. Patient and family opted not to receive thrombolytics. Patient was started on dual antiplatelet therapy with Brilinta and aspirin. Additionally work-up revealed ICA stenosis and recommended outpatient vascular surgery consult. Patient initially required feeding tube due to severe dysphagia. Later patient swallowing improved and was started on an oral diet. Patient was recommended discharge to postacute rehab.   At baseline patient lives at home with his wife was independent with his ADLs and most of his IADLs. Seen and examined at bedside today. Patient is able to provide a limited reliable history. He is AAOx 2-3. He states he is tired today. He states he is "doing what I can" with therapy. He offers no other complaints on exam. He is asking to take a nap. His family mentioned to nursing some concern for decreased appetite and concern for depression. Will start Mirtazapine. There was note of some agitated behavior with care this past weekend. Will check labs. Patient denies any bowel or bladder issues. Denies CP/SOB/N/V/D. Denies lightheadedness, dizziness, headaches, vision changes. Patient is actively participating in therapy. No other concerns from nursing at this time. The patient's allergies, past medical, surgical, social and family history were reviewed and unchanged. Review of Systems     Review of Systems   Constitutional:  Positive for activity change and fatigue. Musculoskeletal:  Positive for gait problem. Neurological:  Positive for weakness. All other systems reviewed and are negative. Objective     Vitals:   Vitals:    12/04/23 2000   BP: 146/66   Pulse: 90   Resp: 18   Temp: 98.1 °F (36.7 °C)   SpO2: 98%         Physical Exam  Vitals and nursing note reviewed. Constitutional:       General: He is not in acute distress. Appearance: Normal appearance. HENT:      Head: Normocephalic and atraumatic. Nose: No congestion or rhinorrhea. Mouth/Throat:      Mouth: Mucous membranes are moist.      Comments: Left facial droop  Eyes:      General: No scleral icterus. Extraocular Movements: Extraocular movements intact. Conjunctiva/sclera: Conjunctivae normal.      Pupils: Pupils are equal, round, and reactive to light. Neck:      Trachea: Trachea normal.   Cardiovascular:      Rate and Rhythm: Normal rate and regular rhythm. Pulses: Normal pulses.       Heart sounds: Normal heart sounds. No murmur heard. Pulmonary:      Effort: Pulmonary effort is normal.      Breath sounds: Normal breath sounds. No wheezing, rhonchi or rales. Abdominal:      General: Bowel sounds are normal. There is no distension. Palpations: Abdomen is soft. Tenderness: There is no abdominal tenderness. Musculoskeletal:         General: No swelling or signs of injury. Cervical back: No edema, erythema, signs of trauma, rigidity, torticollis or crepitus. No pain with movement, spinous process tenderness or muscular tenderness. Decreased range of motion. Comments: Left sided hemiparesis    Lymphadenopathy:      Cervical: No cervical adenopathy. Skin:     General: Skin is warm and dry. Findings: Bruising present. No erythema. Comments: Noted with sacral ulcer, unable to visualize on exam today- please see [address] notes  Bruising to right arm    Neurological:      Mental Status: He is alert and oriented to person, place, and time. Mental status is at baseline. GCS: GCS eye subscore is 4. GCS verbal subscore is 5. GCS motor subscore is 6. Cranial Nerves: Dysarthria and facial asymmetry present. Sensory: No sensory deficit. Motor: Weakness present. Gait: Gait abnormal.   Psychiatric:         Attention and Perception: Attention normal.         Mood and Affect: Mood normal.         Speech: Speech normal.         Behavior: Behavior normal. Behavior is cooperative. Pertinent Laboratory/Diagnostic Studies:     Reviewed in facility chart      Current Medications   Medications reviewed and updated see facility [facility] for details.       Current Outpatient Medications:     mirtazapine (REMERON) 7.5 MG tablet, Take 1 tablet (7.5 mg total) by mouth daily at bedtime, Disp: , Rfl:     ticagrelor (BRILINTA) 90 MG, 1 tablet (90 mg total) by Per NG Tube route every 12 (twelve) hours, Disp: 180 tablet, Rfl: 0    amLODIPine (NORVASC) 5 mg tablet, Take 2 tablets (10 mg total) by mouth daily, Disp: 90 tablet, Rfl: 0    Ascorbic Acid (vitamin C) 100 MG tablet, Take 100 mg by mouth daily, Disp: , Rfl:     aspirin (ECOTRIN LOW STRENGTH) 81 mg EC tablet, Take 81 mg by mouth daily, Disp: , Rfl:     atorvastatin (LIPITOR) 40 mg tablet, Take 1 tablet (40 mg total) by mouth daily, Disp: , Rfl: 0    Baclofen 5 MG TABS, Take 5 mg by mouth every 8 (eight) hours as needed (muscle spasm/tightness), Disp: , Rfl: 0    carvedilol (COREG) 12.5 mg tablet, Take 1 tablet (12.5 mg total) by mouth 2 (two) times a day with meals, Disp: , Rfl: 0    cholecalciferol (VITAMIN D3) 1,000 units tablet, Take 1,000 Units by mouth daily, Disp: , Rfl:     Diclofenac Sodium (VOLTAREN) 1 %, Apply 2 g topically every 6 (six) hours as needed (Arthritis pain), Disp: , Rfl:     insulin lispro (HumaLOG) 100 units/mL injection, Inject 1-6 Units under the skin 3 (three) times a day before meals, Disp: , Rfl: 0    metFORMIN (GLUCOPHAGE) 500 mg tablet, Take 500 mg by mouth 2 (two) times a day with meals, Disp: , Rfl:     nateglinide (STARLIX) 60 mg tablet, Take 1 tablet (60 mg total) by mouth daily before dinner, Disp: 90 tablet, Rfl: 4    pantoprazole (PROTONIX) 40 mg tablet, Take 1 tablet (40 mg total) by mouth daily in the early morning Do not start before November 5, 2023., Disp: , Rfl: 0     Please note:  Voice-recognition software may have been used in the preparation of this document. Occasional wrong word or "sound-alike" substitutions may have occurred due to the inherent limitations of voice recognition software. Interpretation should be guided by context.          KAYODE Jett  12/4/2023  8:02 PM

## 2023-12-05 NOTE — ASSESSMENT & PLAN NOTE
Nursing noted hallucinations and combativeness/agitation with care   Family concerned about behavior change, also noted poor appetite   Check CBC BMP Mon 12/11  Start Mirtazapine 7.5 mg Q HS

## 2023-12-05 NOTE — ASSESSMENT & PLAN NOTE
Malnutrition Findings:          Weight loss  Poor appetite   Chronic multiple comorbidities     Start mirtazapine 7.5 mg Q HS   Nutritional Supplemetns   RD following at rehab                       BMI Findings: Body mass index is 27.78 kg/m².

## 2023-12-06 ENCOUNTER — NURSING HOME VISIT (OUTPATIENT)
Dept: WOUND CARE | Facility: HOSPITAL | Age: 78
End: 2023-12-06
Payer: COMMERCIAL

## 2023-12-06 DIAGNOSIS — R26.2 AMBULATORY DYSFUNCTION: Primary | ICD-10-CM

## 2023-12-06 DIAGNOSIS — L89.153 PRESSURE INJURY OF SACRAL REGION, STAGE 3 (HCC): ICD-10-CM

## 2023-12-06 PROCEDURE — 99308 SBSQ NF CARE LOW MDM 20: CPT | Performed by: NURSE PRACTITIONER

## 2023-12-06 NOTE — ASSESSMENT & PLAN NOTE
Presented to hospital with left-sided weakness family and patient declined thrombolytics  MRI brain with acute to early subacute lacunar infarct in the right veronique, no mass effect or hemorrhagic conversion  Echo with EF 50% with grade 1 DD  Continue dual antiplatelet therapy with aspirin and Brilinta x90 days followed by aspirin monotherapy  Continue PT OT  Outpatient follow-up with vascular surgery for ICA stenosis follow-up  Also noted with dysphagia continue dysphagia 2 with nectar thick-continue speech therapy

## 2023-12-06 NOTE — ASSESSMENT & PLAN NOTE
Sacral  Wound size slightly decreased, 1.6 x 0.5 cm, 100% granulation  Continue wound care with triad paste  On air mattress  On protein supplement - frozen nutritional treat  Follow up next week

## 2023-12-06 NOTE — PROGRESS NOTES
[facility]       Patient ID: Pam Jefferson is a 70 y.o. male Date of Birth 1945     Location of Service: [facility]    Performed wound round with: Wound team     Chief Complaint : Sacrum    Wound Instructions:  Wound:  Sacrum  Discontinue previous wound order  Cleanse the wound bed with NSS   Apply non-sting skin prep to periwound area  Apply triad paste to wound bed   Frequency : twice a day and prn for soiling  Offload all wounds  Turn and reposition frequently,   Increase protein intake. Monitor for any sign of infection or worsening, inform PCP or patient's primary physician in your facility. Allergies  Plavix [clopidogrel]      Assessment & Plan:  1. Ambulatory dysfunction  Assessment & Plan:  On 24/7 restorative care      2. Pressure injury of sacral region, stage 3 ([facility])  Assessment & Plan:  Sacral  Wound size slightly decreased, 1.6 x 0.5 cm, 100% granulation  Continue wound care with triad paste  On air mattress  On protein supplement - frozen nutritional treat  Follow up next week               Subjective:   11/8/2023This is a 70 y.o., male referred to our service for wound/ skin alterations on sacrum. Patient have a complex medical history including but not limited to type 2 diabetes and HTN . Patient was referred by Senior Care Team. Patient was seen in collaboration with the facility wound team.     Wound History: Patient was admitted with pressure injury on the sacrum. Received patient in bed, seems comfortable. Patient have a good appetite. Patient is incontinent of bowel and bladder. Patient needs assistance with turning and repositioning in bed. Currently on frozen nutritional treat. 11/15/2023 Follow up for wound on the sacrum. Received patient, not in distress. Facility staff did not report any significant issues related to the wound. 11/22/2023 Follow up for wound on the sacrum.  Received patient, not in distress. Facility staff did not report any significant issues related to the wound. Denies pain. November 29, 2023. Follow-up for wound on the sacrum. Received patient in bed, seems comfortable. Denies pain. No significant issues reported related to the wound. 12/6/2023 Follow up for wound on the sacrum. Received patient, not in distress. Facility staff did not report any significant issues related to the wound. Review of Systems   Constitutional: Negative. Respiratory: Negative. Cardiovascular: Negative. Skin:  Positive for wound. Psychiatric/Behavioral: Negative. Objective:    Physical Exam  Constitutional:       Appearance: Normal appearance. Pulmonary:      Effort: Pulmonary effort is normal.   Genitourinary:     Comments: incontinent  Musculoskeletal:      Comments: LROM   Skin:     Findings: Lesion present. Comments: Sacrum: Wound size is 1.6 x 0.5 x 0.1 cm.,  100% granulation, small amount of serous drainage, periwound is normal, no malodor, no obvious sign of infection, denies tenderness on palpation   Neurological:      Mental Status: He is alert. Procedures           Patient's care was coordinated with nursing facility staff. Recent vitals, labs and updated medications were reviewed on EMR or chart system of facility. Past Medical, surgical, social, medication and allergy history and patient's previous records were reviewed and updated as appropriate: Most up-to date information is available in the facility EMR where the patient is currently admitted.     Patient Active Problem List   Diagnosis    Cerebral aneurysm, nonruptured    CVA (cerebral vascular accident) ([facility])    Essential hypertension    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 chronic kidney disease, without long-term current use of insulin ([facility])    Class 1 obesity due to excess calories with serious comorbidity and body mass index (BMI) of 32.0 to 32.9 in adult    Polyarticular arthritis    Chronic right-sided low back pain with right-sided sciatica    Lumbar radiculopathy    Chronic pain syndrome    Hypercalcemia    GI bleed    SIRS (systemic inflammatory response syndrome) (HCC)    Tachycardia    Chronic renal impairment    Persistent proteinuria    Bilateral lower extremity edema    Chronic renal disease, stage IV (HCC)    Renal cell carcinoma of right kidney (HCC)    History of GI bleed    Cough and secretions    Pressure injury of sacral region, unstageable [facility])    Ambulatory dysfunction    Fall at nursing home    Neck pain    Pressure injury of sacral region, stage 3 (HCC)    Moderate protein-calorie malnutrition (HCC)    Cognitive deficit, post-stroke     Past Medical History:   Diagnosis Date    Hypertension     TIA (transient ischemic attack)      Past Surgical History:   Procedure Laterality Date    CYSTECTOMY      Per patient cyst removal from his back    LASIK      [facility] LAPAROSCOPY RADICAL NEPHRECTOMY Right 7/6/2022    Procedure: NEPHRECTOMY RADICAL LAPAROSCOPIC W/ ROBOTICS, poss open;  Surgeon: Faith Christianson MD;  Location: [facility];  Service: Urology     Social History     Socioeconomic History    Marital status: /Civil Union     Spouse name: None    Number of children: None    Years of education: None    Highest education level: None   Occupational History    Occupation: Retired 2015 -     Tobacco Use    Smoking status: Never    Smokeless tobacco: Never   Vaping Use    Vaping Use: Never used   Substance and Sexual Activity    Alcohol use: Never    Drug use: Never    Sexual activity: None   Other Topics Concern    None   Social History Narrative    Denies drug use - As per [place]    Consumes on average 1 cup of regular coffee per day      Social Determinants of Health     Financial Resource Strain: Low Risk  (11/15/2022)    Overall Financial Resource Strain (CARDIA)     Difficulty of Paying Living Expenses: Not very hard   Food Insecurity: No Food Insecurity (10/26/2023)    Hunger Vital Sign     Worried About Running Out of Food in the Last Year: Never true     [address] in the Last Year: Never true   Transportation Needs: No Transportation Needs (10/26/2023)    PRAPARE - Transportation     Lack of Transportation (Medical): No     Lack of Transportation (Non-Medical):  No   Physical Activity: Not on file   Stress: Not on file   Social Connections: Not on file   Intimate Partner Violence: Not on file   Housing Stability: Unknown (10/26/2023)    Housing Stability Vital Sign     Unable to Pay for Housing in the Last Year: No     Number of Places Lived in the Last Year: Not on file     Unstable Housing in the Last Year: No        Current Outpatient Medications:     amLODIPine (NORVASC) 5 mg tablet, Take 2 tablets (10 mg total) by mouth daily, Disp: 90 tablet, Rfl: 0    Ascorbic Acid (vitamin C) 100 MG tablet, Take 100 mg by mouth daily, Disp: , Rfl:     aspirin (ECOTRIN LOW STRENGTH) 81 mg EC tablet, Take 81 mg by mouth daily, Disp: , Rfl:     atorvastatin (LIPITOR) 40 mg tablet, Take 1 tablet (40 mg total) by mouth daily, Disp: , Rfl: 0    Baclofen 5 MG TABS, Take 5 mg by mouth every 8 (eight) hours as needed (muscle spasm/tightness), Disp: , Rfl: 0    carvedilol (COREG) 12.5 mg tablet, Take 1 tablet (12.5 mg total) by mouth 2 (two) times a day with meals, Disp: , Rfl: 0    cholecalciferol (VITAMIN D3) 1,000 units tablet, Take 1,000 Units by mouth daily, Disp: , Rfl:     Diclofenac Sodium (VOLTAREN) 1 %, Apply 2 g topically every 6 (six) hours as needed (Arthritis pain), Disp: , Rfl:     insulin lispro (HumaLOG) 100 units/mL injection, Inject 1-6 Units under the skin 3 (three) times a day before meals, Disp: , Rfl: 0    metFORMIN (GLUCOPHAGE) 500 mg tablet, Take 500 mg by mouth 2 (two) times a day with meals, Disp: , Rfl:     mirtazapine (REMERON) 7.5 MG tablet, Take 1 tablet (7.5 mg total) by mouth daily at bedtime, Disp: , Rfl:     nateglinide (STARLIX) 60 mg tablet, Take 1 tablet (60 mg total) by mouth daily before dinner, Disp: 90 tablet, Rfl: 4    pantoprazole (PROTONIX) 40 mg tablet, Take 1 tablet (40 mg total) by mouth daily in the early morning Do not start before November 5, 2023., Disp: , Rfl: 0    ticagrelor (BRILINTA) 90 MG, 1 tablet (90 mg total) by Per NG Tube route every 12 (twelve) hours, Disp: , Rfl: 0  Family History   Problem Relation Age of Onset    Colon cancer Mother     Diabetes type II Mother     Heart disease Mother     Prostate cancer Father               Coordination of Care: Wound team aware of the treatment plan. Facility nurse will provide wound treatment and monitor the wound for any changes. Patient / Staff education : Patient / Staff was given education on sign of infection and pressure ulcer prevention. Patient/ Staff verbalized understanding     Follow up :  Next week    Voice-recognition software may have been used in the preparation of this document. Occasional wrong word or "sound-alike" substitutions may have occurred due to the inherent limitations of voice recognition software. Interpretation should be guided by context.       KAYODE Souza

## 2023-12-11 PROBLEM — R41.89 UNRESPONSIVE: Status: ACTIVE | Noted: 2023-01-01

## 2023-12-11 NOTE — ASSESSMENT & PLAN NOTE
Malnutrition Findings:          Weight loss  Poor appetite   Chronic multiple comorbidities     We started mirtazapine 7.5 mg Q HS last week at rehab  Nutritional Supplemetns   RD following at rehab                       BMI Findings: There is no height or weight on file to calculate BMI.

## 2023-12-11 NOTE — ASSESSMENT & PLAN NOTE
Presented to hospital Oct 24th 2023 with left-sided weakness family and patient declined thrombolytics in ED  MRI brain with acute to early subacute lacunar infarct in the right veronique, no mass effect or hemorrhagic conversion  Echo with EF 50% with grade 1 DD  Continue dual antiplatelet therapy with aspirin and Brilinta x90 days followed by aspirin monotherapy  Continue PT OT  Outpatient follow-up with vascular surgery for ICA stenosis follow-up  Also noted with dysphagia continue dysphagia 2 with nectar thick-continue speech therapy

## 2023-12-11 NOTE — ASSESSMENT & PLAN NOTE
Lab Results   Component Value Date    EGFR 31 12/11/2023    EGFR 30 11/03/2023    EGFR 30 11/02/2023    CREATININE 1.99 (H) 12/11/2023    CREATININE 2.08 (H) 11/03/2023    CREATININE 2.04 (H) 11/02/2023     Baseline creatinine between 1.9-2.4.   Repeat Creatinine at [facility] ranging 1.2- 1.7-> stable at baseline   Renally dose medications  Avoid Hypotension  Avoid NSAIDS    Repeat labs mon 12/11--> No change from prior

## 2023-12-11 NOTE — ED PROVIDER NOTES
History  Chief Complaint   Patient presents with    Altered Mental Status     Pt arrives via EMS with [place] Check about an hour prior to arrival. Pt GCS 3 during triage     60-year-old male presents to the ED for evaluation of change in mental status and unresponsiveness. HPI is provided by EMS due to patient's clinical status. Per EMS, patient presents from facility with last known well being about an hour prior to arrival to the ED. EMS found the patient unconscious unresponsive though at the time was protecting his airway. Prior to Admission Medications   Prescriptions Last Dose Informant Patient Reported? Taking?    Ascorbic Acid (vitamin C) 100 MG tablet  Self Yes No   Sig: Take 100 mg by mouth daily   Baclofen 5 MG TABS   No No   Sig: Take 5 mg by mouth every 8 (eight) hours as needed (muscle spasm/tightness)   Diclofenac Sodium (VOLTAREN) 1 %   No No   Sig: Apply 2 g topically every 6 (six) hours as needed (Arthritis pain)   amLODIPine (NORVASC) 5 mg tablet   No No   Sig: Take 2 tablets (10 mg total) by mouth daily   aspirin (ECOTRIN LOW STRENGTH) 81 mg EC tablet  Self Yes No   Sig: Take 81 mg by mouth daily   atorvastatin (LIPITOR) 40 mg tablet   No No   Sig: Take 1 tablet (40 mg total) by mouth daily   carvedilol (COREG) 12.5 mg tablet   No No   Sig: Take 1 tablet (12.5 mg total) by mouth 2 (two) times a day with meals   cholecalciferol (VITAMIN D3) 1,000 units tablet  Self Yes No   Sig: Take 1,000 Units by mouth daily   insulin lispro (HumaLOG) 100 units/mL injection   No No   Sig: Inject 1-6 Units under the skin 3 (three) times a day before meals   metFORMIN (GLUCOPHAGE) 500 mg tablet   Yes No   Sig: Take 500 mg by mouth 2 (two) times a day with meals   mirtazapine (REMERON) 7.5 MG tablet   No No   Sig: Take 1 tablet (7.5 mg total) by mouth daily at bedtime   nateglinide (STARLIX) 60 mg tablet   No No   Sig: Take 1 tablet (60 mg total) by mouth daily before dinner   pantoprazole (PROTONIX) 40 mg tablet   No No   Sig: Take 1 tablet (40 mg total) by mouth daily in the early morning Do not start before 2023. ticagrelor (BRILINTA) 90 MG   No No   Si tablet (90 mg total) by Per NG Tube route every 12 (twelve) hours      Facility-Administered Medications: None       Past Medical History:   Diagnosis Date    Hypertension     TIA (transient ischemic attack)        Past Surgical History:   Procedure Laterality Date    CYSTECTOMY      Per patient cyst removal from his back    LASIK      ID LAPAROSCOPY RADICAL NEPHRECTOMY Right 2022    Procedure: NEPHRECTOMY RADICAL LAPAROSCOPIC W/ ROBOTICS, poss open;  Surgeon: Satnam Theodore MD;  Location: [facility];  Service: Urology       Family History   Problem Relation Age of Onset    Colon cancer Mother     Diabetes type II Mother     Heart disease Mother     Prostate cancer Father      I have reviewed and agree with the history as documented. E-Cigarette/Vaping    E-Cigarette Use Never User      E-Cigarette/Vaping Substances    Nicotine No     THC No     CBD No     Flavoring No     Other No     Unknown No      Social History     Tobacco Use    Smoking status: Never    Smokeless tobacco: Never   Vaping Use    Vaping Use: Never used   Substance Use Topics    Alcohol use: Never    Drug use: Never       Review of Systems   Unable to perform ROS: Patient unresponsive       Physical Exam  Physical Exam  Vitals and nursing note reviewed. Constitutional:       General: He is in acute distress. Appearance: He is ill-appearing. Comments: Unconscious unresponsive   HENT:      Head: Normocephalic and atraumatic. Mouth/Throat:      Comments: Dry  Eyes:      Pupils: Pupils are equal, round, and reactive to light. Cardiovascular:      Rate and Rhythm: Normal rate and regular rhythm. Pulmonary:      Effort: Accessory muscle usage, respiratory distress and retractions present. Breath sounds: Normal breath sounds.    Abdominal: General: There is no distension. Palpations: Abdomen is soft. Skin:     General: Skin is warm and dry. Capillary Refill: Capillary refill takes more than 3 seconds. Neurological:      Mental Status: He is unresponsive. GCS: GCS eye subscore is 1. GCS verbal subscore is 1. GCS motor subscore is 1.          Vital Signs  ED Triage Vitals   Temperature Pulse Respirations Blood Pressure SpO2   12/11/23 1030 12/11/23 1000 12/11/23 1004 12/11/23 1000 12/11/23 1000   97.7 °F (36.5 °C) 97 12 (!) 78/51 93 %      Temp Source Heart Rate Source Patient Position - Orthostatic VS BP Location FiO2 (%)   12/11/23 1030 12/11/23 1004 12/11/23 1004 12/11/23 1004 --   Bladder Monitor Sitting Right arm       Pain Score       --                  Vitals:    12/11/23 1048 12/11/23 1059 12/11/23 1101 12/11/23 1107   BP: 98/50 (!) 86/66 (!) 78/36 137/58   Pulse: (!) 122      Patient Position - Orthostatic VS: Lying            Visual Acuity      ED Medications  Medications   fentanyl citrate (PF) 100 MCG/2ML 100 mcg (100 mcg Intravenous Not Given 12/11/23 1045)   EPINEPHrine (ADRENALIN) injection (1 mg Intravenous Given 12/11/23 1107)   sodium bicarbonate 8.4 % injection (50 mEq Intravenous Given 12/11/23 1046)   EPINEPHrine 5,000 mcg (STANDARD CONCENTRATION) IV in sodium chloride 0.9% 250 mL (0 mcg/min Intravenous Stopped 12/11/23 1115)   norepinephrine (LEVOPHED) 4 mg (STANDARD CONCENTRATION) IV in sodium chloride 0.9% 250 mL (0 mcg/min Intravenous Stopped 12/11/23 1115)   ketamine (KETALAR) 50 mg/mL **ADS Override Pull** (  Given 12/11/23 1020)   ondansetron (ZOFRAN) injection 4 mg (4 mg Intravenous Given 12/11/23 1018)   sodium chloride 0.9 % bolus 1,000 mL (0 mL Intravenous Stopped 12/11/23 1117)   ondansetron (ZOFRAN) injection 4 mg (4 mg Intravenous Given 12/11/23 1021)   Succinylcholine Chloride 100 mg/5 mL syringe 100 mg (100 mg Intravenous Given 12/11/23 1021)   Ketamine HCl 100 mg (100 mg Intravenous Given 12/11/23 1020)       Diagnostic Studies  Results Reviewed       Procedure Component Value Units Date/Time    Salicylate level [[id number]]  (Normal) Collected: 12/11/23 1044    Lab Status: Final result Specimen: Blood from Arm, Left Updated: 83/28/81 0954     Salicylate Lvl <5 mg/dL     HS Troponin I 2hr [[id number]]     Lab Status: No result Specimen: Blood     HS Troponin 0hr (reflex protocol) [[id number]]  (Normal) Collected: 12/11/23 1044    Lab Status: Final result Specimen: Blood from Arm, Left Updated: 12/11/23 1114     hs TnI 0hr 28 ng/L     Acetaminophen level-If concentration is detectable, please discuss with medical  on call.  [[id number]]  (Abnormal) Collected: 12/11/23 1044    Lab Status: Final result Specimen: Blood from Arm, Left Updated: 12/11/23 1107     Acetaminophen Level <2 ug/mL     Ethanol [[id number]]  (Normal) Collected: 12/11/23 1044    Lab Status: Final result Specimen: Blood from Arm, Left Updated: 12/11/23 1107     Ethanol Lvl <10 mg/dL     Comprehensive metabolic panel [[id number]]  (Abnormal) Collected: 12/11/23 1044    Lab Status: Final result Specimen: Blood from Arm, Left Updated: 12/11/23 1106     Sodium 139 mmol/L      Potassium 4.1 mmol/L      Chloride 105 mmol/L      CO2 21 mmol/L      ANION GAP 13 mmol/L      BUN 29 mg/dL      Creatinine 1.99 mg/dL      Glucose 348 mg/dL      Calcium 8.9 mg/dL      Corrected Calcium 9.5 mg/dL      AST 32 U/L      ALT 24 U/L      Alkaline Phosphatase 94 U/L      Total Protein 6.0 g/dL      Albumin 3.3 g/dL      Total Bilirubin 0.87 mg/dL      eGFR 31 ml/min/1.73sq m     Narrative:      [place] guidelines for Chronic Kidney Disease (CKD):     Stage 1 with normal or high GFR (GFR > 90 mL/min/1.73 square meters)    Stage 2 Mild CKD (GFR = 60-89 mL/min/1.73 square meters)    Stage 3A Moderate CKD (GFR = 45-59 mL/min/1.73 square meters)    Stage 3B Moderate CKD (GFR = 30-44 mL/min/1.73 square meters)    Stage 4 Severe CKD (GFR = 15-29 mL/min/1.73 square meters)    Stage 5 End Stage CKD (GFR <15 mL/min/1.73 square meters)  Note: GFR calculation is accurate only with a steady state creatinine    CBC and differential [[id number]]  (Abnormal) Collected: 12/11/23 1044    Lab Status: Final result Specimen: Blood from Arm, Left Updated: 12/11/23 1102     WBC 13.02 Thousand/uL      RBC 3.85 Million/uL      Hemoglobin 11.7 g/dL      Hematocrit 36.7 %      MCV 95 fL      MCH 30.4 pg      MCHC 31.9 g/dL      RDW 13.1 %      MPV 11.7 fL      Platelets 723 Thousands/uL      nRBC 0 /100 WBCs      Neutrophils Relative 62 %      Immat GRANS % 1 %      Lymphocytes Relative 27 %      Monocytes Relative 6 %      Eosinophils Relative 3 %      Basophils Relative 1 %      Neutrophils Absolute 8.18 Thousands/µL      Immature Grans Absolute 0.14 Thousand/uL      Lymphocytes Absolute 3.47 Thousands/µL      Monocytes Absolute 0.83 Thousand/µL      Eosinophils Absolute 0.34 Thousand/µL      Basophils Absolute 0.06 Thousands/µL     UA w Reflex to Microscopic w Reflex to Culture [[id number]]     Lab Status: No result Specimen: Urine                    XR chest 1 view portable    (Results Pending)              Procedures  CriticalCare Time    Date/Time: 12/11/2023 10:21 AM    Performed by: Williams Becerra DO  Authorized by: Williams Becerra DO    Critical care provider statement:     Critical care time (minutes):  46    Critical care was necessary to treat or prevent imminent or life-threatening deterioration of the following conditions:  Cardiac failure, circulatory failure, respiratory failure and shock    Critical care was time spent personally by me on the following activities:  Ordering and performing treatments and interventions, development of treatment plan with patient or surrogate, ordering and review of radiographic studies, re-evaluation of patient's condition, evaluation of patient's response to treatment, examination of patient and ventilator management  Intubation    Date/Time: 12/11/2023 11:52 AM    Performed by: Narda Guajardo MD  Authorized by: Alona Logan DO    Patient location:  ED  Other Assisting Provider: Yes (comment) (Dr Alisha Santos)    Consent:     Consent obtained:  Emergent situation  Pre-procedure details:     Patient status:  Unresponsive    Pretreatment medications:  Ketamine    Paralytics:  Succinylcholine  Indications:     Indications for intubation: respiratory failure, airway protection and hypoxemia    Procedure details:     Preoxygenation:  Nonrebreather mask (nasal cannula and nonrebreather)    CPR in progress: no      Intubation method:  Oral    Oral intubation technique:  Glidescope    Tube size (mm):  7.5    Tube type:  Cuffed    Number of attempts:  1    Cricoid pressure: yes      Tube visualized through cords: yes    Placement assessment:     ETT to lip:  25    Tube secured with:  ETT landa    Breath sounds:  Equal and absent over the epigastrium    Placement verification: chest rise, condensation, colorimetric ETCO2 device, direct visualization, equal breath sounds, ETCO2 detector and tube exhalation      CXR findings:  ETT in proper place (high -> advanced 2 cm)  Post-procedure details:     Patient tolerance of procedure: Tolerated well, no immediate complications           ED Course                                             Medical Decision Making  65-year-old male presents for change in mental status. He was seen immediately upon arrival due to clinical status. On my evaluation, patient is unconscious unresponsive with GCS of 3. He is currently protecting his airway but given his mental status, will need to intubate. He was placed on full cardiopulmonary monitoring. Was noted to be hypotensive. He was continued on oxygen supplementation that was started by EMS. Fluids were started on pressure bag with no response and blood pressure. He was then started on norepinephrine infusion which was uptitrated as needed. Push dose epinephrine was then mixed and pushed by me as indicated. Once he had improved blood pressures, decision made to manage the patient's airway given his cannot protect his airway. He is hard episode of vomiting which contained food products. He was turned onto his side until he stopped vomiting. He was given 4 mg of Zofran IV. Given the considerable risk for aspiration with his mental status, decision made to intubate. Intubation as described in procedure note. Eventually once postintubation x-ray was obtained, ET tube required advancing by 2 cm. He then became bradycardic and went into cardiac arrest.  Please see appropriate documentation for details of care during cardiac arrest, but generally standard ACLS protocol was followed with epinephrines. He did receive 100 mill equivalents of sodium and bicarbonate. We transiently obtained return of spontaneous circulation and attempted optimizing his blood pressure by adding epinephrine infusion. He had subsequent episodes of bradycardia resulting in cardiac arrest, again standard ACLS was followed. We made attempts at reaching the patient's emergency contact/significant other with no success. After multiple episodes of cardiac arrest and ACLS care, further care deemed to be futile. No overt reversible causes at this time. With subsequent cardiac arrest, he was confirmed to be pulseless. Time of death was called at 11:16 AM.    Amount and/or Complexity of Data Reviewed  Labs: ordered. Radiology: ordered. Risk  Prescription drug management.              Disposition  Final diagnoses:   Cardiopulmonary arrest ([address])   Hypotension     Time reflects when diagnosis was documented in both MDM as applicable and the Disposition within this note       Time User Action Codes Description Comment    12/11/2023 11:51 AM Ang Fickle Add [I46.9] Cardiopulmonary arrest ([address])     12/11/2023 11:51 AM Ang Fickle Add [I95.9] Hypotension           ED Disposition ED Disposition       Condition   --    Date/Time   Mon Dec 11, 2023 11:20 AM    Comment   --             Follow-up Information    None         Patient's Medications   Discharge Prescriptions    No medications on file       No discharge procedures on file.     PDMP Review       None            ED Provider  Electronically Signed by             Dimitri Parkinson DO  23 [phone] Singh Farrell DO  [id number]

## 2023-12-11 NOTE — PROGRESS NOTES
[address]  [phone]  FACILITY: [facility]   Code 31  ACUTE VISIT    Assessment/Plan:    1. Unresponsive  Assessment & Plan:  Notifed by nursing patient was unresponsive   Upon exam patient was diaphoretic, hypotensive with labored breathing  Full Code recommended ED eval   Patient was 95% on room air however his breathing was irregular and labored  BS was 200  His BP was 60/40 HR 80s intially then became bradycardic 40s  His pupils were not reactive to light  His roommate stated he was talking to him and everything seemed "normal" around 8:45 am  We placed patient in reverse trendelenburg and placed him on 2L NC, he maintained a pulse during event   EMS arrived quickly and took patient to [facility] ED       2. Cerebrovascular accident (CVA), unspecified mechanism ([address])  Assessment & Plan:  Presented to hospital Oct 24th 2023 with left-sided weakness family and patient declined thrombolytics in ED  MRI brain with acute to early subacute lacunar infarct in the right veronique, no mass effect or hemorrhagic conversion  Echo with EF 50% with grade 1 DD  Continue dual antiplatelet therapy with aspirin and Brilinta x90 days followed by aspirin monotherapy  Continue PT OT  Outpatient follow-up with vascular surgery for ICA stenosis follow-up  Also noted with dysphagia continue dysphagia 2 with nectar thick-continue speech therapy      3. Chronic renal disease, stage IV [facility])  Assessment & Plan:    Lab Results   Component Value Date    EGFR 31 12/11/2023    EGFR 30 11/03/2023    EGFR 30 11/02/2023    CREATININE 1.99 (H) 12/11/2023    CREATININE 2.08 (H) 11/03/2023    CREATININE 2.04 (H) 11/02/2023     Baseline creatinine between 1.9-2.4. Repeat Creatinine at [facility] ranging 1.2- 1.7-> stable at baseline   Renally dose medications  Avoid Hypotension  Avoid NSAIDS    Repeat labs mon 12/11--> No change from prior       4.  Moderate protein-calorie malnutrition [facility])  Assessment & Plan:  Malnutrition Findings:          Weight loss  Poor appetite   Chronic multiple comorbidities     We started mirtazapine 7.5 mg Q HS last week at rehab  Nutritional Supplemetns   RD following at rehab                       BMI Findings: There is no height or weight on file to calculate BMI. Subjective:      Patient ID: Luna Ba is a 70 y.o. male. CODE STATUS: CPR   Patient is a STR patient at [address] [place] [address]. Seen and evaluated at bedside today for Acute visit per request of nursing for c/o Unresponsive. PAST MEDICAL HISTORY:  Past Medical History:   Diagnosis Date    Hypertension     TIA (transient ischemic attack)      Past Surgical History:   Procedure Laterality Date    CYSTECTOMY      Per patient cyst removal from his back    LASIK      NC LAPAROSCOPY RADICAL NEPHRECTOMY Right 7/6/2022    Procedure: NEPHRECTOMY RADICAL LAPAROSCOPIC W/ ROBOTICS, poss open;  Surgeon: Naila Vaughn MD;  Location: [facility];  Service: Urology         Luna Ba is a 70 y.o. male admitted to [address] [place] [address] following hospital stay for Acute CVA. Patient has a past medical Hx including but not limited to HTN, DM2, CVA, CKD4, RCC s/p Right Nephrectomy. Patient is seen in collaboration with nursing for medical mgmt and ACUTE visit. Nursing noted patient was unresponsive and "did not look good". Upon assessment patient was pale, diaphoretic. His pupils were fixed. He had palpable pulse and HR was initially in the 60-80s and quickly became bradycardic. Nursing checked vitals, BP was 60/40, , RR 24, O2 on Room air 94-95%. Patient appeared in distress. He would not respond verbally due did attempt to clear his throat. He was placed in revere trendelenburg and placed on 2 L NC. Noted to be full code. No CPR initiated as patient maintained a pulse. EMS arrived and took patient to ED at [facility].  Of note, patient's roommate states he was having a "normal conversation" with him about 1 hr prior. There did not seem to be any distress, patient was reportedly talking about his wife and how he missed her and really wanted to go home. At baseline patient is able to provide a reliable history. On my prior exams he was noted to have decreased appetite. Wife had been concerned about some depression and not eating and was started on Mirtazapine for this and had been tolerating this well. He had been actively participating in therapy. Review of Systems   Unable to perform ROS: Patient unresponsive         Objective:    VITALS:   Vitals:    12/11/23 0933   BP: (!) 58/42   Pulse: (!) 44   Resp: (!) 24   Temp: 97.8 °F (36.6 °C)   SpO2: 95%          Physical Exam  Vitals and nursing note reviewed. Constitutional:       General: He is not in acute distress. Appearance: Normal appearance. HENT:      Head: Normocephalic and atraumatic. Nose: No congestion or rhinorrhea. Mouth/Throat:      Mouth: Mucous membranes are moist.      Comments: Left facial droop  Eyes:      General: No scleral icterus. Conjunctiva/sclera: Conjunctivae normal.      Pupils:      Right eye: Pupil is not reactive. Left eye: Pupil is not reactive. Neck:      Trachea: Trachea normal.   Cardiovascular:      Rate and Rhythm: Bradycardia present. Pulses: Normal pulses. Heart sounds: Normal heart sounds. No murmur heard. Pulmonary:      Effort: Accessory muscle usage and respiratory distress present. Breath sounds: Normal breath sounds. Decreased air movement present. No wheezing, rhonchi or rales. Abdominal:      General: Bowel sounds are normal. There is no distension. Palpations: Abdomen is soft. Tenderness: There is no abdominal tenderness. Musculoskeletal:         General: No swelling or signs of injury. Cervical back: No edema, erythema, signs of trauma, rigidity, torticollis or crepitus.  No pain with movement, spinous process tenderness or muscular tenderness. Decreased range of motion. Comments: Left sided hemiparesis    Lymphadenopathy:      Cervical: No cervical adenopathy. Skin:     General: Skin is warm and moist.      Coloration: Skin is pale. Findings: Bruising and wound present. No erythema. Comments: Noted with sacral ulcer, unable to visualize on exam today- please see [address] notes  Bruising to right arm    Neurological:      Mental Status: He is alert and oriented to person, place, and time. Mental status is at baseline. GCS: GCS eye subscore is 1. GCS verbal subscore is 1. GCS motor subscore is 1. Cranial Nerves: Facial asymmetry present. Sensory: No sensory deficit. No current facility-administered medications for this visit.     Current Outpatient Medications:     amLODIPine (NORVASC) 5 mg tablet, Take 2 tablets (10 mg total) by mouth daily, Disp: 90 tablet, Rfl: 0    Ascorbic Acid (vitamin C) 100 MG tablet, Take 100 mg by mouth daily, Disp: , Rfl:     aspirin (ECOTRIN LOW STRENGTH) 81 mg EC tablet, Take 81 mg by mouth daily, Disp: , Rfl:     atorvastatin (LIPITOR) 40 mg tablet, Take 1 tablet (40 mg total) by mouth daily, Disp: , Rfl: 0    Baclofen 5 MG TABS, Take 5 mg by mouth every 8 (eight) hours as needed (muscle spasm/tightness), Disp: , Rfl: 0    carvedilol (COREG) 12.5 mg tablet, Take 1 tablet (12.5 mg total) by mouth 2 (two) times a day with meals, Disp: , Rfl: 0    cholecalciferol (VITAMIN D3) 1,000 units tablet, Take 1,000 Units by mouth daily, Disp: , Rfl:     Diclofenac Sodium (VOLTAREN) 1 %, Apply 2 g topically every 6 (six) hours as needed (Arthritis pain), Disp: , Rfl:     insulin lispro (HumaLOG) 100 units/mL injection, Inject 1-6 Units under the skin 3 (three) times a day before meals, Disp: , Rfl: 0    metFORMIN (GLUCOPHAGE) 500 mg tablet, Take 500 mg by mouth 2 (two) times a day with meals, Disp: , Rfl:     mirtazapine (REMERON) 7.5 MG tablet, Take 1 tablet (7.5 mg total) by mouth daily at bedtime, Disp: , Rfl:     nateglinide (STARLIX) 60 mg tablet, Take 1 tablet (60 mg total) by mouth daily before dinner, Disp: 90 tablet, Rfl: 4    pantoprazole (PROTONIX) 40 mg tablet, Take 1 tablet (40 mg total) by mouth daily in the early morning Do not start before November 5, 2023., Disp: , Rfl: 0    ticagrelor (BRILINTA) 90 MG, 1 tablet (90 mg total) by Per NG Tube route every 12 (twelve) hours, Disp: 180 tablet, Rfl: 0    Facility-Administered Medications Ordered in Other Visits:     EPINEPHrine 5,000 mcg (STANDARD CONCENTRATION) IV in sodium chloride 0.9% 250 mL, 1-20 mcg/min, Intravenous, Titrated, Dania Lainez MD, Stopped at 12/11/23 1115    fentanyl citrate (PF) 100 MCG/2ML 100 mcg, 100 mcg, Intravenous, Once, [employer], DO    norepinephrine (LEVOPHED) 4 mg (STANDARD CONCENTRATION) IV in sodium chloride 0.9% 250 mL, 1-30 mcg/min, Intravenous, Titrated, Dania Lainez MD, Stopped at 12/11/23 1115      Please note:  Voice-recognition software may have been used in the preparation of this document. Occasional wrong word or "sound-alike" substitutions may have occurred due to the inherent limitations of voice recognition software. Interpretation should be guided by context.        KAYODE Persaud  12/11/23  3:04 PM

## 2023-12-11 NOTE — ASSESSMENT & PLAN NOTE
Notifed by nursing patient was unresponsive   Upon exam patient was diaphoretic, hypotensive with labored breathing  Full Code recommended ED eval   Patient was 95% on room air however his breathing was irregular and labored  BS was 200  His BP was 60/40 HR 80s intially then became bradycardic 40s  His pupils were not reactive to light  His roommate stated he was talking to him and everything seemed "normal" around 8:45 am  We placed patient in reverse trendelenburg and placed him on 2L NC, he maintained a pulse during event   EMS arrived quickly and took patient to [facility] ED

## 2023-12-17 LAB
ATRIAL RATE: 117 BPM
ATRIAL RATE: 92 BPM
P AXIS: -88 DEGREES
P AXIS: 83 DEGREES
PR INTERVAL: 122 MS
PR INTERVAL: 146 MS
QRS AXIS: -57 DEGREES
QRS AXIS: -72 DEGREES
QRSD INTERVAL: 76 MS
QRSD INTERVAL: 96 MS
QT INTERVAL: 350 MS
QT INTERVAL: 376 MS
QTC INTERVAL: 464 MS
QTC INTERVAL: 488 MS
T WAVE AXIS: -11 DEGREES
T WAVE AXIS: 47 DEGREES
VENTRICULAR RATE: 117 BPM
VENTRICULAR RATE: 92 BPM

## 2024-08-26 NOTE — ASSESSMENT & PLAN NOTE
Eadvice sent to pt notifying him of [facility].  Await pt response to see if he would like a referral.   Presented with left-sided weakness, thrombolytics were declined by patient and family. CT head: No acute intracranial abnormality. Chronic microangiopathic changes. 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 No mass effect or hemorrhagic conversion. Echo: EF 50% with grade 1 DD    PLAN:  Currently on stroke pathway. Neurology following, dual antiplatelet with aspirin and Brilinta for 90 days followed by aspirin monotherapy. Now off permissive hypertension  PT OT  PMR following.>. rehab on dc. Currently n.p.o. with NG tube. Swallow therapy following. VBS requested. Atorvastatin 40 mg daily. Patient was started on Brilinta as patient has Plavix allergy. Vascular surgery evaluated, OP follow up for ICA stenosis.

## (undated) DEVICE — ENDOPATH PNEUMONEEDLE INSUFFLATION NEEDLES WITH LUER LOCK CONNECTORS 120MM: Brand: ENDOPATH

## (undated) DEVICE — TROCAR: Brand: KII FIOS FIRST ENTRY

## (undated) DEVICE — HEMOSTATIC MATRIX SURGIFLO 8ML W/THROMBIN

## (undated) DEVICE — HEAVY DUTY TABLE COVER: Brand: CONVERTORS

## (undated) DEVICE — THE ECHELON FLEX POWERED PLUS ARTICULATING ENDOSCOPIC LINEAR CUTTERS ARE STERILE, SINGLE PATIENT USE INSTRUMENTS THAT SIMULTANEOUSLYCUT AND STAPLE TISSUE. THERE ARE SIX STAGGERED ROWS OF STAPLES, THREE ON EITHER SIDE OF THE CUT LINE. THE ECHELON FLEX 45 POWERED PLUSINSTRUMENTS HAVE A STAPLE LINE THAT IS APPROXIMATELY 45 MM LONG AND A CUT LINE THAT IS APPROXIMATELY 42 MM LONG. THE SHAFT CAN ROTATE FREELYIN BOTH DIRECTIONS AND AN ARTICULATION MECHANISM ENABLES THE DISTAL PORTION OF THE SHAFT TO PIVOT TO FACILITATE LATERAL ACCESS TO THE OPERATIVESITE.THE INSTRUMENTS ARE PACKAGED WITH A PRIMARY LITHIUM BATTERY PACK THAT MUST BE INSTALLED PRIOR TO USE. THERE ARE SPECIFIC REQUIREMENTS FORDISPOSING OF THE BATTERY PACK. REFER TO THE BATTERY PACK DISPOSAL SECTION.THE INSTRUMENTS ARE PACKAGED WITHOUT A RELOAD AND MUST BE LOADED PRIOR TO USE. A STAPLE RETAINING CAP ON THE RELOAD PROTECTS THE STAPLE LEGPOINTS DURING SHIPPING AND TRANSPORTATION. THE INSTRUMENTS’ LOCK-OUT FEATURE IS DESIGNED TO PREVENT A USED OR IMPROPERLY INSTALLED RELOADFROM BEING REFIRED OR AN INSTRUMENT FROM BEING FIRED WITHOUT A RELOAD.: Brand: ECHELON FLEX

## (undated) DEVICE — AIRSEAL TUBE SMOKE EVAC LUMENX3 FILTERED

## (undated) DEVICE — VESSEL LOOPS X-RAY DETECTABLE: Brand: DEROYAL

## (undated) DEVICE — VISUALIZATION SYSTEM: Brand: CLEARIFY

## (undated) DEVICE — ADHESIVE SKIN HIGH VISCOSITY EXOFIN 1ML

## (undated) DEVICE — THE ECHELON, ECHELON ENDOPATH™ AND ECHELON FLEX™ FAMILIES OF ENDOSCOPIC LINEAR CUTTERS AND RELOADS ARE STERILE, SINGLE PATIENT USE INSTRUMENTS THAT SIMULTANEOUSLY CUT AND STAPLE TISSUE. THERE ARE SIX STAGGERED ROWS OF STAPLES, THREE ON EITHER SIDE OF THE CUT LINE. THE 45 MM INSTRUMENTS HAVE A STAPLE LINE THATIS APPROXIMATELY 45 MM LONG AND A CUT LINE THAT IS APPROXIMATELY 42 MM LONG. THE SHAFT CAN ROTATE FREELY IN BOTH DIRECTIONS AND AN ARTICULATION MECHANISM ON ARTICULATING INSTRUMENTS ENABLES BENDING THE DISTAL PORTIONOF THE SHAFT TO FACILITATE LATERAL ACCESS OF THE OPERATIVE SITE.THE INSTRUMENTS ARE SHIPPED WITHOUT A RELOAD AND MUST BE LOADED PRIOR TO USE. A STAPLE RETAINING CAP ON THE RELOAD PROTECTS THE STAPLE LEG POINTS DURING SHIPPING AND TRANSPORTATION. THE INSTRUMENTS’ LOCK-OUT FEATURE IS DESIGNED TO PREVENT A USED RELOAD FROM BEING REFIRED.: Brand: ECHELON ENDOPATH

## (undated) DEVICE — BETHLEHEM MAJOR GENERAL PACK: Brand: CARDINAL HEALTH

## (undated) DEVICE — SUT ETHILON 3-0 FS-1 18 IN 663G

## (undated) DEVICE — GLOVE SRG BIOGEL ECLIPSE 7.5

## (undated) DEVICE — HEM-O-LOK CLIP CARTRIDGE LARGE DA VINCI SI/XI

## (undated) DEVICE — CANNULA SEAL

## (undated) DEVICE — GLOVE INDICATOR PI UNDERGLOVE SZ 8 BLUE

## (undated) DEVICE — VESSEL SEALER EXTEND: Brand: ENDOWRIST

## (undated) DEVICE — LUBRICANT INST ELECTROLUBE ANTISTK WO PAD

## (undated) DEVICE — TIP COVER ACCESSORY

## (undated) DEVICE — ARM DRAPE

## (undated) DEVICE — CLAMP TOWEL TUBING DISPOSABLE

## (undated) DEVICE — SUT SILK 0 30 IN A306H

## (undated) DEVICE — SUT PDS II 0 CT-2 27 IN Z334H

## (undated) DEVICE — DRAPE SHEET X-LG

## (undated) DEVICE — 3M™ IOBAN™ 2 ANTIMICROBIAL INCISE DRAPE 6650EZ: Brand: IOBAN™ 2

## (undated) DEVICE — SUT MONOCRYL 4-0 PS-2 27 IN Y426H

## (undated) DEVICE — SUT VLOC 90 3-0 V-20 9IN VLOCM0644

## (undated) DEVICE — TROCAR PORT ACCESS 12 X120MM W/BLDLS OPTICAL TIP AIRSEAL

## (undated) DEVICE — COLUMN DRAPE

## (undated) DEVICE — TRAY FOLEY 16FR URIMETER SURESTEP

## (undated) DEVICE — SURGICEL 4 X 8

## (undated) DEVICE — INTENDED FOR TISSUE SEPARATION, AND OTHER PROCEDURES THAT REQUIRE A SHARP SURGICAL BLADE TO PUNCTURE OR CUT.: Brand: BARD-PARKER SAFETY BLADES SIZE 11, STERILE

## (undated) DEVICE — SUREFORM 45: Brand: SUREFORM

## (undated) DEVICE — PENCIL ELECTROSURG E-Z CLEAN -0035H

## (undated) DEVICE — CHLORAPREP HI-LITE 26ML ORANGE

## (undated) DEVICE — INTENDED FOR TISSUE SEPARATION, AND OTHER PROCEDURES THAT REQUIRE A SHARP SURGICAL BLADE TO PUNCTURE OR CUT.: Brand: BARD-PARKER SAFETY BLADES SIZE 15, STERILE

## (undated) DEVICE — SUREFORM 45 RELOAD WHITE: Brand: SUREFORM

## (undated) DEVICE — IRRIG ENDO FLO TUBING